# Patient Record
Sex: MALE | Race: WHITE | NOT HISPANIC OR LATINO
[De-identification: names, ages, dates, MRNs, and addresses within clinical notes are randomized per-mention and may not be internally consistent; named-entity substitution may affect disease eponyms.]

---

## 2017-10-30 ENCOUNTER — RECORD ABSTRACTING (OUTPATIENT)
Age: 67
End: 2017-10-30

## 2017-10-30 DIAGNOSIS — Z82.49 FAMILY HISTORY OF ISCHEMIC HEART DISEASE AND OTHER DISEASES OF THE CIRCULATORY SYSTEM: ICD-10-CM

## 2017-10-30 DIAGNOSIS — Z80.52 FAMILY HISTORY OF MALIGNANT NEOPLASM OF BLADDER: ICD-10-CM

## 2017-10-30 DIAGNOSIS — Z83.3 FAMILY HISTORY OF DIABETES MELLITUS: ICD-10-CM

## 2017-10-30 DIAGNOSIS — Z83.49 FAMILY HISTORY OF OTHER ENDOCRINE, NUTRITIONAL AND METABOLIC DISEASES: ICD-10-CM

## 2017-10-30 DIAGNOSIS — Z80.49 FAMILY HISTORY OF MALIGNANT NEOPLASM OF OTHER GENITAL ORGANS: ICD-10-CM

## 2017-10-30 DIAGNOSIS — Z87.39 PERSONAL HISTORY OF OTHER DISEASES OF THE MUSCULOSKELETAL SYSTEM AND CONNECTIVE TISSUE: ICD-10-CM

## 2017-11-06 ENCOUNTER — RX RENEWAL (OUTPATIENT)
Age: 67
End: 2017-11-06

## 2017-11-29 ENCOUNTER — APPOINTMENT (OUTPATIENT)
Dept: FAMILY MEDICINE | Facility: CLINIC | Age: 67
End: 2017-11-29
Payer: MEDICARE

## 2017-11-29 VITALS
WEIGHT: 248 LBS | HEIGHT: 69 IN | BODY MASS INDEX: 36.73 KG/M2 | SYSTOLIC BLOOD PRESSURE: 128 MMHG | DIASTOLIC BLOOD PRESSURE: 78 MMHG

## 2017-11-29 PROCEDURE — G0009: CPT

## 2017-11-29 PROCEDURE — 99214 OFFICE O/P EST MOD 30 MIN: CPT | Mod: 25

## 2017-11-29 PROCEDURE — 90732 PPSV23 VACC 2 YRS+ SUBQ/IM: CPT

## 2017-11-29 PROCEDURE — 36415 COLL VENOUS BLD VENIPUNCTURE: CPT

## 2017-11-29 RX ORDER — MUPIROCIN 20 MG/G
2 OINTMENT TOPICAL
Qty: 22 | Refills: 0 | Status: DISCONTINUED | COMMUNITY
Start: 2017-08-03

## 2017-11-30 ENCOUNTER — RESULT CHARGE (OUTPATIENT)
Age: 67
End: 2017-11-30

## 2017-11-30 LAB
BASOPHILS # BLD AUTO: 0.04 K/UL
BASOPHILS NFR BLD AUTO: 0.7 %
BILIRUB UR QL STRIP: 0
CHOLEST SERPL-MCNC: 179 MG/DL
CHOLEST/HDLC SERPL: 4.3 RATIO
CLARITY UR: CLEAR
COLLECTION METHOD: NORMAL
EOSINOPHIL # BLD AUTO: 0.21 K/UL
EOSINOPHIL NFR BLD AUTO: 3.5
GLUCOSE UR-MCNC: 500
HCG UR QL: 0.2 EU/DL
HCT VFR BLD CALC: 42.5 %
HDLC SERPL-MCNC: 42 MG/DL
HGB BLD-MCNC: 14.2 G/DL
HGB UR QL STRIP.AUTO: 0
IMM GRANULOCYTES NFR BLD AUTO: 0.3 %
KETONES UR-MCNC: 0
LDLC SERPL CALC-MCNC: 107 MG/DL
LEUKOCYTE ESTERASE UR QL STRIP: 0
LYMPHOCYTES # BLD AUTO: 1.83 K/UL
LYMPHOCYTES NFR BLD AUTO: 30.6 %
MAN DIFF?: NORMAL
MCHC RBC-ENTMCNC: 31.3 PG
MCHC RBC-ENTMCNC: 33.4 GM/DL
MCV RBC AUTO: 93.8 FL
MONOCYTES # BLD AUTO: 0.43 K/UL
MONOCYTES NFR BLD AUTO: 7.2 %
NEUTROPHILS # BLD AUTO: 3.46 K/UL
NEUTROPHILS NFR BLD AUTO: 57.7 %
NITRITE UR QL STRIP: 0
PH UR STRIP: 5.5
PLATELET # BLD AUTO: 244 K/UL
PROT UR STRIP-MCNC: 0
PSA FREE FLD-MCNC: 7.2
PSA FREE SERPL-MCNC: 0.26 NG/ML
PSA SERPL-MCNC: 3.59 NG/ML
RBC # BLD: 4.53 M/UL
RBC # FLD: 13 %
SP GR UR STRIP: 1.01
T3FREE SERPL-MCNC: 2.89 PG/ML
T4 FREE SERPL-MCNC: 1.3 NG/DL
TRIGL SERPL-MCNC: 151 MG/DL
TSH SERPL-ACNC: 0.49 UIU/ML
WBC # FLD AUTO: 5.99 K/UL

## 2017-12-05 ENCOUNTER — RECORD ABSTRACTING (OUTPATIENT)
Age: 67
End: 2017-12-05

## 2017-12-05 LAB
ALBUMIN SERPL ELPH-MCNC: 4.8 G/DL
ALP BLD-CCNC: 80 U/L
ALT SERPL-CCNC: 40 U/L
ANION GAP SERPL CALC-SCNC: 19 MMOL/L
AST SERPL-CCNC: 33 U/L
BILIRUB SERPL-MCNC: 0.7 MG/DL
BUN SERPL-MCNC: 30 MG/DL
CALCIUM SERPL-MCNC: 10.3 MG/DL
CHLORIDE SERPL-SCNC: 99 MMOL/L
CO2 SERPL-SCNC: 25 MMOL/L
CREAT SERPL-MCNC: 1.15 MG/DL
GLUCOSE SERPL-MCNC: 129 MG/DL
POTASSIUM SERPL-SCNC: 4.8 MMOL/L
PROT SERPL-MCNC: 7.7 G/DL
SODIUM SERPL-SCNC: 143 MMOL/L

## 2017-12-06 ENCOUNTER — TRANSCRIPTION ENCOUNTER (OUTPATIENT)
Age: 67
End: 2017-12-06

## 2017-12-07 ENCOUNTER — RX RENEWAL (OUTPATIENT)
Age: 67
End: 2017-12-07

## 2017-12-11 ENCOUNTER — APPOINTMENT (OUTPATIENT)
Dept: FAMILY MEDICINE | Facility: CLINIC | Age: 67
End: 2017-12-11
Payer: MEDICARE

## 2017-12-11 VITALS
BODY MASS INDEX: 36.73 KG/M2 | HEIGHT: 69 IN | DIASTOLIC BLOOD PRESSURE: 68 MMHG | WEIGHT: 248 LBS | SYSTOLIC BLOOD PRESSURE: 124 MMHG

## 2017-12-11 DIAGNOSIS — Z71.89 OTHER SPECIFIED COUNSELING: ICD-10-CM

## 2017-12-11 LAB — PSA SERPL-MCNC: NORMAL

## 2017-12-11 PROCEDURE — 90472 IMMUNIZATION ADMIN EACH ADD: CPT

## 2017-12-11 PROCEDURE — 99213 OFFICE O/P EST LOW 20 MIN: CPT | Mod: 25

## 2017-12-11 PROCEDURE — 90632 HEPA VACCINE ADULT IM: CPT | Mod: GY

## 2017-12-11 PROCEDURE — 90691 TYPHOID VACCINE IM: CPT | Mod: GY

## 2017-12-11 PROCEDURE — 90471 IMMUNIZATION ADMIN: CPT

## 2017-12-12 LAB — HBA1C MFR BLD HPLC: 6.6 %

## 2018-01-05 ENCOUNTER — RX RENEWAL (OUTPATIENT)
Age: 68
End: 2018-01-05

## 2018-02-09 ENCOUNTER — RX RENEWAL (OUTPATIENT)
Age: 68
End: 2018-02-09

## 2018-02-09 DIAGNOSIS — L65.9 NONSCARRING HAIR LOSS, UNSPECIFIED: ICD-10-CM

## 2018-03-08 ENCOUNTER — FORM ENCOUNTER (OUTPATIENT)
Age: 68
End: 2018-03-08

## 2018-03-09 ENCOUNTER — APPOINTMENT (OUTPATIENT)
Dept: RADIOLOGY | Facility: CLINIC | Age: 68
End: 2018-03-09
Payer: MEDICARE

## 2018-03-09 ENCOUNTER — OUTPATIENT (OUTPATIENT)
Dept: OUTPATIENT SERVICES | Facility: HOSPITAL | Age: 68
LOS: 1 days | End: 2018-03-09
Payer: MEDICARE

## 2018-03-09 ENCOUNTER — APPOINTMENT (OUTPATIENT)
Dept: FAMILY MEDICINE | Facility: CLINIC | Age: 68
End: 2018-03-09
Payer: MEDICARE

## 2018-03-09 VITALS
BODY MASS INDEX: 36.73 KG/M2 | HEIGHT: 69 IN | WEIGHT: 248 LBS | DIASTOLIC BLOOD PRESSURE: 72 MMHG | SYSTOLIC BLOOD PRESSURE: 118 MMHG

## 2018-03-09 DIAGNOSIS — Z00.8 ENCOUNTER FOR OTHER GENERAL EXAMINATION: ICD-10-CM

## 2018-03-09 PROCEDURE — 71046 X-RAY EXAM CHEST 2 VIEWS: CPT | Mod: 26

## 2018-03-09 PROCEDURE — 99213 OFFICE O/P EST LOW 20 MIN: CPT

## 2018-03-09 PROCEDURE — 71046 X-RAY EXAM CHEST 2 VIEWS: CPT

## 2018-03-14 ENCOUNTER — RX RENEWAL (OUTPATIENT)
Age: 68
End: 2018-03-14

## 2018-04-11 ENCOUNTER — RX RENEWAL (OUTPATIENT)
Age: 68
End: 2018-04-11

## 2018-05-14 ENCOUNTER — RX RENEWAL (OUTPATIENT)
Age: 68
End: 2018-05-14

## 2018-06-08 ENCOUNTER — RX RENEWAL (OUTPATIENT)
Age: 68
End: 2018-06-08

## 2018-07-12 ENCOUNTER — NON-APPOINTMENT (OUTPATIENT)
Age: 68
End: 2018-07-12

## 2018-07-12 ENCOUNTER — APPOINTMENT (OUTPATIENT)
Dept: FAMILY MEDICINE | Facility: CLINIC | Age: 68
End: 2018-07-12
Payer: MEDICARE

## 2018-07-12 VITALS
DIASTOLIC BLOOD PRESSURE: 74 MMHG | BODY MASS INDEX: 37.47 KG/M2 | WEIGHT: 253 LBS | SYSTOLIC BLOOD PRESSURE: 126 MMHG | HEIGHT: 69 IN

## 2018-07-12 LAB
BILIRUB UR QL STRIP: 0
CLARITY UR: CLEAR
COLLECTION METHOD: NORMAL
GLUCOSE UR-MCNC: 1000
HCG UR QL: 0.2 EU/DL
HGB UR QL STRIP.AUTO: 0
KETONES UR-MCNC: 0
LEUKOCYTE ESTERASE UR QL STRIP: 0
NITRITE UR QL STRIP: 0
PH UR STRIP: 5
PROT UR STRIP-MCNC: 0
SP GR UR STRIP: 1.02

## 2018-07-12 PROCEDURE — 36415 COLL VENOUS BLD VENIPUNCTURE: CPT

## 2018-07-12 PROCEDURE — 90632 HEPA VACCINE ADULT IM: CPT | Mod: GY

## 2018-07-12 PROCEDURE — G0439: CPT

## 2018-07-12 PROCEDURE — 90471 IMMUNIZATION ADMIN: CPT | Mod: GY

## 2018-07-12 PROCEDURE — 99213 OFFICE O/P EST LOW 20 MIN: CPT | Mod: 25

## 2018-07-12 PROCEDURE — 93000 ELECTROCARDIOGRAM COMPLETE: CPT

## 2018-07-12 PROCEDURE — 81002 URINALYSIS NONAUTO W/O SCOPE: CPT

## 2018-07-12 NOTE — PHYSICAL EXAM
[20/___] : left eye 20/[unfilled] [FreeTextEntry1] : 500 R 25 L 15   1000 R 15 L 15   2000 R 15 L 15   4000 R 30 L 20

## 2018-07-12 NOTE — HEALTH RISK ASSESSMENT
[Very Good] : ~his/her~  mood as very good [No falls in past year] : Patient reported no falls in the past year [0] : 2) Feeling down, depressed, or hopeless: Not at all (0) [Patient reported colonoscopy was abnormal] : Patient reported colonoscopy was abnormal [None] : None [With Significant Other] : lives with significant other [# of Members in Household ___] :  household currently consist of [unfilled] member(s) [Retired] : retired [High School] : high school [Single] : single [Sexually Active] : sexually active [Feels Safe at Home] : Feels safe at home [Fully functional (bathing, dressing, toileting, transferring, walking, feeding)] : Fully functional (bathing, dressing, toileting, transferring, walking, feeding) [Fully functional (using the telephone, shopping, preparing meals, housekeeping, doing laundry, using] : Fully functional and needs no help or supervision to perform IADLs (using the telephone, shopping, preparing meals, housekeeping, doing laundry, using transportation, managing medications and managing finances) [Smoke Detector] : smoke detector [Carbon Monoxide Detector] : carbon monoxide detector [Sunscreen] : uses sunscreen [Discussed at today's visit] : Advance Directives Discussed at today's visit [] : No [[id number]] : 0 [Change in mental status noted] : No change in mental status noted [Language] : denies difficulty with language [Behavior] : denies difficulty with behavior [Handling Complex Tasks] : denies difficulty handling complex tasks [High Risk Behavior] : no high risk behavior [Reports changes in hearing] : Reports no changes in hearing [Reports changes in vision] : Reports no changes in vision [Reports changes in dental health] : Reports no changes in dental health [Seat Belt] : does not use seat belt [ColonoscopyDate] : 01/10 [ColonoscopyComments] : polyps

## 2018-07-13 LAB
ALBUMIN SERPL ELPH-MCNC: 4.9 G/DL
ALP BLD-CCNC: 83 U/L
ALT SERPL-CCNC: 37 U/L
ANION GAP SERPL CALC-SCNC: 19 MMOL/L
AST SERPL-CCNC: 30 U/L
BASOPHILS # BLD AUTO: 0.04 K/UL
BASOPHILS NFR BLD AUTO: 0.6 %
BILIRUB SERPL-MCNC: 0.8 MG/DL
BUN SERPL-MCNC: 28 MG/DL
CALCIUM SERPL-MCNC: 9.7 MG/DL
CHLORIDE SERPL-SCNC: 97 MMOL/L
CHOLEST SERPL-MCNC: 180 MG/DL
CHOLEST/HDLC SERPL: 3.9 RATIO
CO2 SERPL-SCNC: 25 MMOL/L
CREAT SERPL-MCNC: 1.04 MG/DL
EOSINOPHIL # BLD AUTO: 0.41 K/UL
EOSINOPHIL NFR BLD AUTO: 6.5 %
GLUCOSE SERPL-MCNC: 127 MG/DL
HBA1C MFR BLD HPLC: 6.8 %
HCT VFR BLD CALC: 42.9 %
HDLC SERPL-MCNC: 46 MG/DL
HGB BLD-MCNC: 14.6 G/DL
IMM GRANULOCYTES NFR BLD AUTO: 0.2 %
LDLC SERPL CALC-MCNC: 110 MG/DL
LYMPHOCYTES # BLD AUTO: 1.61 K/UL
LYMPHOCYTES NFR BLD AUTO: 25.6 %
MAN DIFF?: NORMAL
MCHC RBC-ENTMCNC: 31.3 PG
MCHC RBC-ENTMCNC: 34 GM/DL
MCV RBC AUTO: 91.9 FL
MONOCYTES # BLD AUTO: 0.65 K/UL
MONOCYTES NFR BLD AUTO: 10.4 %
NEUTROPHILS # BLD AUTO: 3.56 K/UL
NEUTROPHILS NFR BLD AUTO: 56.7 %
PLATELET # BLD AUTO: 227 K/UL
POTASSIUM SERPL-SCNC: 4.4 MMOL/L
PROT SERPL-MCNC: 7.5 G/DL
RBC # BLD: 4.67 M/UL
RBC # FLD: 13.6 %
SODIUM SERPL-SCNC: 141 MMOL/L
TRIGL SERPL-MCNC: 122 MG/DL
WBC # FLD AUTO: 6.28 K/UL

## 2018-07-14 LAB
PSA FREE FLD-MCNC: 7.2
PSA FREE SERPL-MCNC: 0.34 NG/ML
PSA SERPL-MCNC: 4.71 NG/ML

## 2018-07-16 ENCOUNTER — RX RENEWAL (OUTPATIENT)
Age: 68
End: 2018-07-16

## 2018-07-17 ENCOUNTER — TRANSCRIPTION ENCOUNTER (OUTPATIENT)
Age: 68
End: 2018-07-17

## 2018-07-17 LAB
CREAT SPEC-SCNC: 97 MG/DL
MICROALBUMIN 24H UR DL<=1MG/L-MCNC: <1.2 MG/DL
MICROALBUMIN/CREAT 24H UR-RTO: NORMAL
T3FREE SERPL-MCNC: 3.08 PG/ML
T4 FREE SERPL-MCNC: 2 NG/DL
TSH SERPL-ACNC: 0.25 UIU/ML

## 2018-07-23 ENCOUNTER — RX RENEWAL (OUTPATIENT)
Age: 68
End: 2018-07-23

## 2018-08-08 ENCOUNTER — RX RENEWAL (OUTPATIENT)
Age: 68
End: 2018-08-08

## 2018-08-17 ENCOUNTER — APPOINTMENT (OUTPATIENT)
Dept: FAMILY MEDICINE | Facility: CLINIC | Age: 68
End: 2018-08-17
Payer: MEDICARE

## 2018-08-17 VITALS
DIASTOLIC BLOOD PRESSURE: 76 MMHG | WEIGHT: 253 LBS | HEIGHT: 69 IN | SYSTOLIC BLOOD PRESSURE: 118 MMHG | BODY MASS INDEX: 37.47 KG/M2

## 2018-08-17 PROCEDURE — 99213 OFFICE O/P EST LOW 20 MIN: CPT

## 2018-08-17 NOTE — COUNSELING
[Weight management counseling provided] : Weight management [Healthy eating counseling provided] : healthy eating [Activity counseling provided] : activity [Behavioral health counseling provided] : behavioral health  [Weight Self Once Weekly] : Weight self once weekly [Low Salt Diet] : Low salt diet [Walking] : Walking [Engage in a relaxing activity] : Engage in a relaxing activity [None] : None [Good understanding] : Patient has a good understanding of lifestyle changes and the steps needed to achieve self management goals

## 2018-08-17 NOTE — REVIEW OF SYSTEMS
[Nausea] : no nausea [Constipation] : no constipation [Diarrhea] : no diarrhea [Vomiting] : no vomiting [Melena] : no melena [Negative] : Heme/Lymph

## 2018-08-17 NOTE — HISTORY OF PRESENT ILLNESS
[FreeTextEntry1] : Discuss medication. Has been on Protonix for over a year. States if he misses a dose he gets return of heartburn. Has not gone yet for repeat upper endoscopy

## 2018-08-17 NOTE — PLAN
[FreeTextEntry1] : Recommended cutting down on Protonix if able \par Still needs upper endoscopy\par F/u prn

## 2018-09-12 ENCOUNTER — RX RENEWAL (OUTPATIENT)
Age: 68
End: 2018-09-12

## 2018-09-14 ENCOUNTER — RX RENEWAL (OUTPATIENT)
Age: 68
End: 2018-09-14

## 2018-09-20 ENCOUNTER — APPOINTMENT (OUTPATIENT)
Dept: FAMILY MEDICINE | Facility: CLINIC | Age: 68
End: 2018-09-20
Payer: MEDICARE

## 2018-09-20 VITALS
SYSTOLIC BLOOD PRESSURE: 120 MMHG | BODY MASS INDEX: 37.47 KG/M2 | WEIGHT: 253 LBS | DIASTOLIC BLOOD PRESSURE: 72 MMHG | HEIGHT: 69 IN

## 2018-09-20 PROCEDURE — 90686 IIV4 VACC NO PRSV 0.5 ML IM: CPT

## 2018-09-20 PROCEDURE — 99213 OFFICE O/P EST LOW 20 MIN: CPT | Mod: 25

## 2018-09-20 PROCEDURE — G0008: CPT

## 2018-09-20 NOTE — COUNSELING
[Weight management counseling provided] : Weight management [Healthy eating counseling provided] : healthy eating [Behavioral health counseling provided] : behavioral health  [None] : None [Good understanding] : Patient has a good understanding of lifestyle changes and the steps needed to achieve self management goals

## 2018-10-11 ENCOUNTER — RX RENEWAL (OUTPATIENT)
Age: 68
End: 2018-10-11

## 2018-10-19 ENCOUNTER — APPOINTMENT (OUTPATIENT)
Dept: FAMILY MEDICINE | Facility: CLINIC | Age: 68
End: 2018-10-19
Payer: MEDICARE

## 2018-10-19 VITALS
SYSTOLIC BLOOD PRESSURE: 118 MMHG | WEIGHT: 253 LBS | HEIGHT: 69 IN | BODY MASS INDEX: 37.47 KG/M2 | DIASTOLIC BLOOD PRESSURE: 78 MMHG

## 2018-10-19 PROCEDURE — 99214 OFFICE O/P EST MOD 30 MIN: CPT | Mod: 25

## 2018-10-19 PROCEDURE — 36415 COLL VENOUS BLD VENIPUNCTURE: CPT

## 2018-10-19 PROCEDURE — 81002 URINALYSIS NONAUTO W/O SCOPE: CPT

## 2018-10-19 RX ORDER — PROMETHAZINE HYDROCHLORIDE AND CODEINE PHOSPHATE 6.25; 1 MG/5ML; MG/5ML
6.25-1 SOLUTION ORAL
Qty: 120 | Refills: 0 | Status: DISCONTINUED | COMMUNITY
Start: 2018-03-09 | End: 2018-10-19

## 2018-10-19 NOTE — HISTORY OF PRESENT ILLNESS
[Diabetes Mellitus] : Diabetes Mellitus [Hyperlipidemia] : Hyperlipidemia [FreeTextEntry6] : Fasting DM and cholesterol re check, also PSA [No episodes] : No hypoglycemic episodes since the last visit. [Does not check] : Patient does not check blood glucose regularly [Regular podiatrist visits] : Patient did not have regular podiatrist visit [Understanding of foot care] : Patient expressed understanding of foot care [Retinopathy] : No retinopathy [Most Recent A1C: ___] : Most recent A1C was [unfilled] [Target A1C:  ___] : Target A1C is [unfilled] [Target goal met] : A1C target goal met [Moderate Intensity] : Patient is currently on moderate intensity statin  therapy [EyeExamDate] : 10/19

## 2018-10-19 NOTE — HEALTH RISK ASSESSMENT
[] : No [No falls in past year] : Patient reported no falls in the past year [0] : 2) Feeling down, depressed, or hopeless: Not at all (0) [[id number]] : 0

## 2018-10-22 LAB
ALBUMIN SERPL ELPH-MCNC: 5 G/DL
ALP BLD-CCNC: 89 U/L
ALT SERPL-CCNC: 50 U/L
ANION GAP SERPL CALC-SCNC: 16 MMOL/L
AST SERPL-CCNC: 43 U/L
BILIRUB SERPL-MCNC: 0.6 MG/DL
BUN SERPL-MCNC: 23 MG/DL
CALCIUM SERPL-MCNC: 10.2 MG/DL
CHLORIDE SERPL-SCNC: 100 MMOL/L
CHOLEST SERPL-MCNC: 125 MG/DL
CHOLEST/HDLC SERPL: 3.5 RATIO
CO2 SERPL-SCNC: 27 MMOL/L
CREAT SERPL-MCNC: 1.22 MG/DL
CREAT SPEC-SCNC: 108 MG/DL
GLUCOSE SERPL-MCNC: 136 MG/DL
HBA1C MFR BLD HPLC: 7 %
HDLC SERPL-MCNC: 36 MG/DL
LDLC SERPL CALC-MCNC: 57 MG/DL
MICROALBUMIN 24H UR DL<=1MG/L-MCNC: 1.3 MG/DL
MICROALBUMIN/CREAT 24H UR-RTO: 12 MG/G
POTASSIUM SERPL-SCNC: 4.9 MMOL/L
PROT SERPL-MCNC: 7.3 G/DL
SODIUM SERPL-SCNC: 143 MMOL/L
TRIGL SERPL-MCNC: 158 MG/DL

## 2018-11-19 ENCOUNTER — RX RENEWAL (OUTPATIENT)
Age: 68
End: 2018-11-19

## 2018-12-10 ENCOUNTER — RX RENEWAL (OUTPATIENT)
Age: 68
End: 2018-12-10

## 2019-01-10 ENCOUNTER — RX RENEWAL (OUTPATIENT)
Age: 69
End: 2019-01-10

## 2019-01-18 ENCOUNTER — APPOINTMENT (OUTPATIENT)
Dept: FAMILY MEDICINE | Facility: CLINIC | Age: 69
End: 2019-01-18
Payer: MEDICARE

## 2019-01-18 ENCOUNTER — LABORATORY RESULT (OUTPATIENT)
Age: 69
End: 2019-01-18

## 2019-01-18 VITALS
HEIGHT: 69 IN | BODY MASS INDEX: 37.47 KG/M2 | SYSTOLIC BLOOD PRESSURE: 140 MMHG | WEIGHT: 253 LBS | DIASTOLIC BLOOD PRESSURE: 80 MMHG

## 2019-01-18 PROCEDURE — 81002 URINALYSIS NONAUTO W/O SCOPE: CPT

## 2019-01-18 PROCEDURE — 99214 OFFICE O/P EST MOD 30 MIN: CPT | Mod: 25

## 2019-01-18 PROCEDURE — 36415 COLL VENOUS BLD VENIPUNCTURE: CPT

## 2019-01-18 RX ORDER — AZITHROMYCIN 250 MG/1
250 TABLET, FILM COATED ORAL
Qty: 6 | Refills: 0 | Status: DISCONTINUED | COMMUNITY
Start: 2018-03-09 | End: 2019-01-18

## 2019-01-18 NOTE — HEALTH RISK ASSESSMENT
[No falls in past year] : Patient reported no falls in the past year [0] : 2) Feeling down, depressed, or hopeless: Not at all (0) [] : No [YAD9Kddcg] : 0

## 2019-01-18 NOTE — HISTORY OF PRESENT ILLNESS
[Diabetes Mellitus] : Diabetes Mellitus [Hyperlipidemia] : Hyperlipidemia [No episodes] : No hypoglycemic episodes since the last visit. [Does not check] : Patient does not check blood glucose regularly [Regular podiatrist visits] : Patient had regular podiatrist visits [Understanding of foot care] : Patient expressed understanding of foot care [Most Recent A1C: ___] : Most recent A1C was [unfilled] [Target A1C:  ___] : Target A1C is [unfilled] [Moderate Intensity] : Patient is currently on moderate intensity statin  therapy [Does not check BP] : The patient is not checking blood pressure [<130/90] : Target blood pressure is  <130/90 [Near target goal] : BP near target goal [FreeTextEntry6] : Fasting DM check, pt. would like PSA checked too, it was elevated last time. [Retinopathy] : No retinopathy [EyeExamDate] : 09/18

## 2019-01-21 LAB
ALBUMIN SERPL ELPH-MCNC: 4.9 G/DL
ALP BLD-CCNC: 92 U/L
ALT SERPL-CCNC: 28 U/L
ANION GAP SERPL CALC-SCNC: 17 MMOL/L
AST SERPL-CCNC: 22 U/L
BILIRUB SERPL-MCNC: 0.8 MG/DL
BUN SERPL-MCNC: 28 MG/DL
CALCIUM SERPL-MCNC: 10.1 MG/DL
CHLORIDE SERPL-SCNC: 98 MMOL/L
CHOLEST SERPL-MCNC: 157 MG/DL
CHOLEST/HDLC SERPL: 3.7 RATIO
CO2 SERPL-SCNC: 25 MMOL/L
CREAT SERPL-MCNC: 1.11 MG/DL
GLUCOSE SERPL-MCNC: 159 MG/DL
HDLC SERPL-MCNC: 42 MG/DL
LDLC SERPL CALC-MCNC: 92 MG/DL
POTASSIUM SERPL-SCNC: 4.2 MMOL/L
PROT SERPL-MCNC: 7.3 G/DL
SODIUM SERPL-SCNC: 140 MMOL/L
TRIGL SERPL-MCNC: 114 MG/DL

## 2019-01-23 ENCOUNTER — TRANSCRIPTION ENCOUNTER (OUTPATIENT)
Age: 69
End: 2019-01-23

## 2019-01-23 LAB
HBA1C MFR BLD HPLC: 7.3 %
PSA FREE FLD-MCNC: 7 %
PSA FREE SERPL-MCNC: 0.32 NG/ML
PSA SERPL-MCNC: 4.58 NG/ML

## 2019-02-06 ENCOUNTER — RX RENEWAL (OUTPATIENT)
Age: 69
End: 2019-02-06

## 2019-02-11 ENCOUNTER — TRANSCRIPTION ENCOUNTER (OUTPATIENT)
Age: 69
End: 2019-02-11

## 2019-02-11 ENCOUNTER — CLINICAL ADVICE (OUTPATIENT)
Age: 69
End: 2019-02-11

## 2019-02-26 ENCOUNTER — APPOINTMENT (OUTPATIENT)
Age: 69
End: 2019-02-26
Payer: MEDICARE

## 2019-02-26 VITALS
DIASTOLIC BLOOD PRESSURE: 73 MMHG | SYSTOLIC BLOOD PRESSURE: 132 MMHG | WEIGHT: 250 LBS | BODY MASS INDEX: 37.89 KG/M2 | HEIGHT: 68 IN | HEART RATE: 76 BPM | TEMPERATURE: 98 F | OXYGEN SATURATION: 94 %

## 2019-02-26 PROCEDURE — 99203 OFFICE O/P NEW LOW 30 MIN: CPT

## 2019-03-05 NOTE — HISTORY OF PRESENT ILLNESS
[FreeTextEntry1] : 68 year old man seen 02/26/2019 with complaint of elevated PSA. This began 7/2018, when PSA was 4.7. Repeat 1/2019 confirmed elevation. See trend below. He denies any urinary symptoms. \par It is associated with nothing. Nothing makes symptoms occur.\par No hematuria, no dysuria, no frequency, no urgency, no hesitancy, no straining. No incontinence. \par No fevers, no chills, no nausea, no vomiting, no flank pain. \par \par PSA (%FREE) TREND \par 4.58   (7%)   1/2019\par 4.71   (7%)   7/2018\par 3.59   (26%) 11/2017

## 2019-03-05 NOTE — PHYSICAL EXAM
[General Appearance - Well Developed] : well developed [General Appearance - Well Nourished] : well nourished [Normal Appearance] : normal appearance [Well Groomed] : well groomed [General Appearance - In No Acute Distress] : no acute distress [Edema] : no peripheral edema [Respiration, Rhythm And Depth] : normal respiratory rhythm and effort [Exaggerated Use Of Accessory Muscles For Inspiration] : no accessory muscle use [Abdomen Soft] : soft [Abdomen Tenderness] : non-tender [Costovertebral Angle Tenderness] : no ~M costovertebral angle tenderness [Urethral Meatus] : meatus normal [Urinary Bladder Findings] : the bladder was normal on palpation [Scrotum] : the scrotum was normal [Testes Mass (___cm)] : there were no testicular masses [No Prostate Nodules] : no prostate nodules [Prostate Size ___ gm] : prostate size [unfilled] gm [Normal Station and Gait] : the gait and station were normal for the patient's age [] : no rash [No Focal Deficits] : no focal deficits [Oriented To Time, Place, And Person] : oriented to person, place, and time [Affect] : the affect was normal [Mood] : the mood was normal [Not Anxious] : not anxious [No Palpable Adenopathy] : no palpable adenopathy

## 2019-03-05 NOTE — ASSESSMENT
[FreeTextEntry1] : 69 yo M with elevated PSA. Discussed with patient that PSA is imprecise test. PSA can be elevated due to benign prostate enlargement, prostate stimulation, sexual activity, urinary tract infection, prostatitis, as well as prostate cancer. There is no value for PSA which is diagnostic for prostate cancer, nor is there value which conclusively excludes prostate cancer. PSA simply stratifies risk for prostate cancer and diagnosis of prostate cancer requires prostate biopsy. \par \par We discussed that his PSA is only mildly elevated, but the percent free was low which is more concerning. Patient is hesitant to undergo prostate biopsy, but agrees to repeat PSA. If it remains elevated or rises, he will consider consenting to biopsy.

## 2019-03-07 ENCOUNTER — CLINICAL ADVICE (OUTPATIENT)
Age: 69
End: 2019-03-07

## 2019-03-11 ENCOUNTER — FORM ENCOUNTER (OUTPATIENT)
Age: 69
End: 2019-03-11

## 2019-03-12 ENCOUNTER — OUTPATIENT (OUTPATIENT)
Dept: OUTPATIENT SERVICES | Facility: HOSPITAL | Age: 69
LOS: 1 days | End: 2019-03-12
Payer: MEDICARE

## 2019-03-12 ENCOUNTER — APPOINTMENT (OUTPATIENT)
Dept: MRI IMAGING | Facility: CLINIC | Age: 69
End: 2019-03-12
Payer: MEDICARE

## 2019-03-12 DIAGNOSIS — Z00.8 ENCOUNTER FOR OTHER GENERAL EXAMINATION: ICD-10-CM

## 2019-03-12 PROCEDURE — 72197 MRI PELVIS W/O & W/DYE: CPT

## 2019-03-12 PROCEDURE — 72197 MRI PELVIS W/O & W/DYE: CPT | Mod: 26

## 2019-03-12 PROCEDURE — A9585: CPT

## 2019-03-13 ENCOUNTER — TRANSCRIPTION ENCOUNTER (OUTPATIENT)
Age: 69
End: 2019-03-13

## 2019-03-14 ENCOUNTER — RX RENEWAL (OUTPATIENT)
Age: 69
End: 2019-03-14

## 2019-03-15 ENCOUNTER — RX RENEWAL (OUTPATIENT)
Age: 69
End: 2019-03-15

## 2019-03-17 ENCOUNTER — RX RENEWAL (OUTPATIENT)
Age: 69
End: 2019-03-17

## 2019-03-19 ENCOUNTER — APPOINTMENT (OUTPATIENT)
Dept: UROLOGY | Facility: CLINIC | Age: 69
End: 2019-03-19
Payer: MEDICARE

## 2019-03-19 PROCEDURE — 99214 OFFICE O/P EST MOD 30 MIN: CPT

## 2019-03-19 NOTE — PHYSICAL EXAM
[General Appearance - Well Developed] : well developed [General Appearance - Well Nourished] : well nourished [Normal Appearance] : normal appearance [Well Groomed] : well groomed [General Appearance - In No Acute Distress] : no acute distress [Abdomen Soft] : soft [Abdomen Tenderness] : non-tender [Costovertebral Angle Tenderness] : no ~M costovertebral angle tenderness [Urethral Meatus] : meatus normal [Urinary Bladder Findings] : the bladder was normal on palpation [Scrotum] : the scrotum was normal [Testes Mass (___cm)] : there were no testicular masses [No Prostate Nodules] : no prostate nodules [Edema] : no peripheral edema [] : no respiratory distress [Respiration, Rhythm And Depth] : normal respiratory rhythm and effort [Exaggerated Use Of Accessory Muscles For Inspiration] : no accessory muscle use [Oriented To Time, Place, And Person] : oriented to person, place, and time [Affect] : the affect was normal [Not Anxious] : not anxious [Mood] : the mood was normal [Normal Station and Gait] : the gait and station were normal for the patient's age [No Focal Deficits] : no focal deficits [No Palpable Adenopathy] : no palpable adenopathy

## 2019-04-08 ENCOUNTER — LABORATORY RESULT (OUTPATIENT)
Age: 69
End: 2019-04-08

## 2019-04-08 ENCOUNTER — OUTPATIENT (OUTPATIENT)
Dept: OUTPATIENT SERVICES | Facility: HOSPITAL | Age: 69
LOS: 1 days | End: 2019-04-08
Payer: MEDICARE

## 2019-04-08 ENCOUNTER — APPOINTMENT (OUTPATIENT)
Dept: UROLOGY | Facility: CLINIC | Age: 69
End: 2019-04-08
Payer: MEDICARE

## 2019-04-08 VITALS
DIASTOLIC BLOOD PRESSURE: 61 MMHG | SYSTOLIC BLOOD PRESSURE: 114 MMHG | TEMPERATURE: 97.6 F | BODY MASS INDEX: 37.03 KG/M2 | WEIGHT: 250 LBS | HEIGHT: 69 IN | HEART RATE: 65 BPM | RESPIRATION RATE: 17 BRPM

## 2019-04-08 VITALS — SYSTOLIC BLOOD PRESSURE: 120 MMHG | HEART RATE: 61 BPM | DIASTOLIC BLOOD PRESSURE: 69 MMHG

## 2019-04-08 DIAGNOSIS — R35.0 FREQUENCY OF MICTURITION: ICD-10-CM

## 2019-04-08 PROCEDURE — 55700: CPT

## 2019-04-08 PROCEDURE — 76872 US TRANSRECTAL: CPT | Mod: 26

## 2019-04-08 PROCEDURE — 76872 US TRANSRECTAL: CPT

## 2019-04-08 PROCEDURE — 76942 ECHO GUIDE FOR BIOPSY: CPT | Mod: 26,59

## 2019-04-08 PROCEDURE — 76942 ECHO GUIDE FOR BIOPSY: CPT | Mod: 59

## 2019-04-09 DIAGNOSIS — R97.20 ELEVATED PROSTATE SPECIFIC ANTIGEN [PSA]: ICD-10-CM

## 2019-04-09 DIAGNOSIS — N40.1 BENIGN PROSTATIC HYPERPLASIA WITH LOWER URINARY TRACT SYMPTOMS: ICD-10-CM

## 2019-04-09 RX ORDER — ENEMA 19; 7 G/133ML; G/133ML
7-19 ENEMA RECTAL
Qty: 1 | Refills: 0 | Status: COMPLETED | COMMUNITY
Start: 2019-03-20 | End: 2019-04-08

## 2019-04-12 ENCOUNTER — TRANSCRIPTION ENCOUNTER (OUTPATIENT)
Age: 69
End: 2019-04-12

## 2019-04-12 ENCOUNTER — RX RENEWAL (OUTPATIENT)
Age: 69
End: 2019-04-12

## 2019-04-19 ENCOUNTER — APPOINTMENT (OUTPATIENT)
Dept: FAMILY MEDICINE | Facility: CLINIC | Age: 69
End: 2019-04-19
Payer: MEDICARE

## 2019-04-19 VITALS
DIASTOLIC BLOOD PRESSURE: 72 MMHG | BODY MASS INDEX: 37.03 KG/M2 | SYSTOLIC BLOOD PRESSURE: 112 MMHG | HEIGHT: 69 IN | WEIGHT: 250 LBS

## 2019-04-19 LAB
BILIRUB UR QL STRIP: 0
CLARITY UR: CLEAR
COLLECTION METHOD: NORMAL
GLUCOSE UR-MCNC: 500
HCG UR QL: 0.2 EU/DL
HGB UR QL STRIP.AUTO: NORMAL
KETONES UR-MCNC: 0
LEUKOCYTE ESTERASE UR QL STRIP: 0
NITRITE UR QL STRIP: 0
PH UR STRIP: 5
PROT UR STRIP-MCNC: 0
SP GR UR STRIP: 1.02

## 2019-04-19 PROCEDURE — 36415 COLL VENOUS BLD VENIPUNCTURE: CPT

## 2019-04-19 PROCEDURE — 99215 OFFICE O/P EST HI 40 MIN: CPT | Mod: 25

## 2019-04-19 PROCEDURE — 81002 URINALYSIS NONAUTO W/O SCOPE: CPT

## 2019-04-19 RX ORDER — GEMFIBROZIL 600 MG/1
600 TABLET, FILM COATED ORAL TWICE DAILY
Qty: 180 | Refills: 0 | Status: DISCONTINUED | COMMUNITY
End: 2019-04-19

## 2019-04-19 NOTE — HEALTH RISK ASSESSMENT
[No falls in past year] : Patient reported no falls in the past year [0] : 2) Feeling down, depressed, or hopeless: Not at all (0) [] : No [[id number]] : 0

## 2019-04-19 NOTE — HISTORY OF PRESENT ILLNESS
[Diabetes Mellitus] : Diabetes Mellitus [Hyperlipidemia] : Hyperlipidemia [Does not check] : Patient does not check blood glucose regularly [No episodes] : No hypoglycemic episodes since the last visit. [Understanding of foot care] : Patient expressed understanding of foot care [Most Recent A1C: ___] : Most recent A1C was [unfilled] [Target A1C:  ___] : Target A1C is [unfilled] [No Retinopathy] : No retinopathy [Near target goal] : A1C near target goal [FreeTextEntry6] : Fasting DM check, PSA also. Prostate biopsy was positive. Going for radiation tx consultation on Monday with Dr. Smauel Bee in [facility]. [Regular podiatrist visits] : Patient did not have regular podiatrist visit [EyeExamDate] : 04/19

## 2019-04-20 LAB
ALBUMIN SERPL ELPH-MCNC: 5 G/DL
ALP BLD-CCNC: 73 U/L
ALT SERPL-CCNC: 36 U/L
ANION GAP SERPL CALC-SCNC: 16 MMOL/L
APPEARANCE: CLEAR
AST SERPL-CCNC: 30 U/L
BACTERIA: NEGATIVE
BILIRUB SERPL-MCNC: 0.7 MG/DL
BILIRUBIN URINE: NEGATIVE
BLOOD URINE: ABNORMAL
BUN SERPL-MCNC: 34 MG/DL
CALCIUM SERPL-MCNC: 10.2 MG/DL
CHLORIDE SERPL-SCNC: 98 MMOL/L
CHOLEST SERPL-MCNC: 176 MG/DL
CHOLEST/HDLC SERPL: 5 RATIO
CO2 SERPL-SCNC: 25 MMOL/L
COLOR: YELLOW
CREAT SERPL-MCNC: 1.19 MG/DL
CREAT SPEC-SCNC: 173 MG/DL
ESTIMATED AVERAGE GLUCOSE: 128 MG/DL
GLUCOSE QUALITATIVE U: ABNORMAL
GLUCOSE SERPL-MCNC: 99 MG/DL
HBA1C MFR BLD HPLC: 6.1 %
HDLC SERPL-MCNC: 35 MG/DL
HYALINE CASTS: 0 /LPF
KETONES URINE: NEGATIVE
LDLC SERPL CALC-MCNC: 110 MG/DL
LEUKOCYTE ESTERASE URINE: NEGATIVE
MICROALBUMIN 24H UR DL<=1MG/L-MCNC: 2.1 MG/DL
MICROALBUMIN/CREAT 24H UR-RTO: 12 MG/G
MICROSCOPIC-UA: NORMAL
NITRITE URINE: NEGATIVE
PH URINE: 5
POTASSIUM SERPL-SCNC: 4.2 MMOL/L
PROT SERPL-MCNC: 7.4 G/DL
PROTEIN URINE: NORMAL
PSA FREE FLD-MCNC: 7 %
PSA FREE SERPL-MCNC: 0.56 NG/ML
PSA SERPL-MCNC: 7.85 NG/ML
RED BLOOD CELLS URINE: 8 /HPF
SODIUM SERPL-SCNC: 139 MMOL/L
SPECIFIC GRAVITY URINE: 1.02
SQUAMOUS EPITHELIAL CELLS: 1 /HPF
TRIGL SERPL-MCNC: 154 MG/DL
UROBILINOGEN URINE: NORMAL
WHITE BLOOD CELLS URINE: 1 /HPF

## 2019-04-24 ENCOUNTER — RX RENEWAL (OUTPATIENT)
Age: 69
End: 2019-04-24

## 2019-05-08 ENCOUNTER — RX RENEWAL (OUTPATIENT)
Age: 69
End: 2019-05-08

## 2019-05-14 ENCOUNTER — APPOINTMENT (OUTPATIENT)
Dept: UROLOGY | Facility: CLINIC | Age: 69
End: 2019-05-14

## 2019-05-31 ENCOUNTER — FORM ENCOUNTER (OUTPATIENT)
Age: 69
End: 2019-05-31

## 2019-05-31 ENCOUNTER — APPOINTMENT (OUTPATIENT)
Dept: FAMILY MEDICINE | Facility: CLINIC | Age: 69
End: 2019-05-31
Payer: MEDICARE

## 2019-05-31 VITALS
SYSTOLIC BLOOD PRESSURE: 138 MMHG | HEIGHT: 69 IN | WEIGHT: 250 LBS | DIASTOLIC BLOOD PRESSURE: 82 MMHG | BODY MASS INDEX: 37.03 KG/M2

## 2019-05-31 DIAGNOSIS — M54.42 LUMBAGO WITH SCIATICA, LEFT SIDE: ICD-10-CM

## 2019-05-31 DIAGNOSIS — M54.41 LUMBAGO WITH SCIATICA, LEFT SIDE: ICD-10-CM

## 2019-05-31 PROCEDURE — 99213 OFFICE O/P EST LOW 20 MIN: CPT

## 2019-05-31 NOTE — HISTORY OF PRESENT ILLNESS
[FreeTextEntry8] : Pt. still has pain in lower back and both legs x 1 month.  Also, medication renewal.\par Pain comes and goes. Went to a chiropractor which helped somewhat, but still with pain. Pain radiating down both legs to the calf. Radiation is on and off. No paresthesias or weakness.

## 2019-05-31 NOTE — PHYSICAL EXAM
[Normal] : no CVA tenderness, no spinal tenderness [de-identified] : Pain on lateral and roational ROM on and off with radiation down leg. No limitation. No deficits. No weakness

## 2019-06-01 ENCOUNTER — APPOINTMENT (OUTPATIENT)
Dept: RADIOLOGY | Facility: CLINIC | Age: 69
End: 2019-06-01
Payer: MEDICARE

## 2019-06-01 ENCOUNTER — OUTPATIENT (OUTPATIENT)
Dept: OUTPATIENT SERVICES | Facility: HOSPITAL | Age: 69
LOS: 1 days | End: 2019-06-01
Payer: MEDICARE

## 2019-06-01 DIAGNOSIS — M54.42 LUMBAGO WITH SCIATICA, LEFT SIDE: ICD-10-CM

## 2019-06-01 PROCEDURE — 72110 X-RAY EXAM L-2 SPINE 4/>VWS: CPT

## 2019-06-01 PROCEDURE — 72110 X-RAY EXAM L-2 SPINE 4/>VWS: CPT | Mod: 26

## 2019-06-04 ENCOUNTER — TRANSCRIPTION ENCOUNTER (OUTPATIENT)
Age: 69
End: 2019-06-04

## 2019-06-10 ENCOUNTER — CLINICAL ADVICE (OUTPATIENT)
Age: 69
End: 2019-06-10

## 2019-06-10 ENCOUNTER — RX RENEWAL (OUTPATIENT)
Age: 69
End: 2019-06-10

## 2019-06-21 ENCOUNTER — APPOINTMENT (OUTPATIENT)
Dept: FAMILY MEDICINE | Facility: CLINIC | Age: 69
End: 2019-06-21
Payer: MEDICARE

## 2019-06-21 VITALS
BODY MASS INDEX: 35.25 KG/M2 | WEIGHT: 238 LBS | HEIGHT: 69 IN | DIASTOLIC BLOOD PRESSURE: 62 MMHG | SYSTOLIC BLOOD PRESSURE: 110 MMHG

## 2019-06-21 DIAGNOSIS — M54.5 LOW BACK PAIN: ICD-10-CM

## 2019-06-21 DIAGNOSIS — G89.29 LOW BACK PAIN: ICD-10-CM

## 2019-06-21 PROCEDURE — 99213 OFFICE O/P EST LOW 20 MIN: CPT

## 2019-06-21 NOTE — PHYSICAL EXAM
[Normal] : normal gait, coordination grossly intact, no focal deficits [de-identified] : FROM of back. No weakness or limitation. No focal deficits no striagh leg raise tenderness [de-identified] : diminished reflexes bilaterally [de-identified] : FROM as above

## 2019-06-27 ENCOUNTER — FORM ENCOUNTER (OUTPATIENT)
Age: 69
End: 2019-06-27

## 2019-06-28 ENCOUNTER — APPOINTMENT (OUTPATIENT)
Dept: MRI IMAGING | Facility: CLINIC | Age: 69
End: 2019-06-28
Payer: MEDICARE

## 2019-06-28 ENCOUNTER — OUTPATIENT (OUTPATIENT)
Dept: OUTPATIENT SERVICES | Facility: HOSPITAL | Age: 69
LOS: 1 days | End: 2019-06-28
Payer: MEDICARE

## 2019-06-28 DIAGNOSIS — Z00.8 ENCOUNTER FOR OTHER GENERAL EXAMINATION: ICD-10-CM

## 2019-06-28 PROCEDURE — 72148 MRI LUMBAR SPINE W/O DYE: CPT

## 2019-06-28 PROCEDURE — 72148 MRI LUMBAR SPINE W/O DYE: CPT | Mod: 26

## 2019-07-02 ENCOUNTER — TRANSCRIPTION ENCOUNTER (OUTPATIENT)
Age: 69
End: 2019-07-02

## 2019-07-10 ENCOUNTER — CLINICAL ADVICE (OUTPATIENT)
Age: 69
End: 2019-07-10

## 2019-07-11 ENCOUNTER — RX RENEWAL (OUTPATIENT)
Age: 69
End: 2019-07-11

## 2019-07-12 ENCOUNTER — APPOINTMENT (OUTPATIENT)
Dept: FAMILY MEDICINE | Facility: CLINIC | Age: 69
End: 2019-07-12
Payer: MEDICARE

## 2019-07-12 VITALS
HEIGHT: 69 IN | DIASTOLIC BLOOD PRESSURE: 76 MMHG | WEIGHT: 238 LBS | SYSTOLIC BLOOD PRESSURE: 112 MMHG | BODY MASS INDEX: 35.25 KG/M2

## 2019-07-12 PROCEDURE — 36415 COLL VENOUS BLD VENIPUNCTURE: CPT

## 2019-07-12 PROCEDURE — 99215 OFFICE O/P EST HI 40 MIN: CPT | Mod: 25

## 2019-07-12 NOTE — HISTORY OF PRESENT ILLNESS
[Diabetes Mellitus] : Diabetes Mellitus [Hyperlipidemia] : Hyperlipidemia [No episodes] : No hypoglycemic episodes since the last visit. [Does not check] : Patient does not check blood glucose regularly [No Retinopathy] : No retinopathy [Understanding of foot care] : Patient expressed understanding of foot care [Target A1C:  ___] : Target A1C is [unfilled] [Most Recent A1C: ___] : Most recent A1C was [unfilled] [High Intensity] : Patient is currently on high intensity statin therapy [Target goal met] : A1C target goal met [FreeTextEntry6] : Fasting DM check, cholesterol check. [Regular podiatrist visits] : Patient did not have regular podiatrist visit [EyeExamDate] : 05/19

## 2019-07-12 NOTE — HEALTH RISK ASSESSMENT
[Yes] : Yes [] : No [Patient reported colonoscopy was abnormal] : Patient reported colonoscopy was abnormal [ColonoscopyDate] : 03/19 [ColonoscopyComments] : polyps

## 2019-07-15 LAB
ALBUMIN SERPL ELPH-MCNC: 5 G/DL
ALP BLD-CCNC: 67 U/L
ALT SERPL-CCNC: 35 U/L
ANION GAP SERPL CALC-SCNC: 17 MMOL/L
AST SERPL-CCNC: 35 U/L
BILIRUB SERPL-MCNC: 0.9 MG/DL
BUN SERPL-MCNC: 31 MG/DL
CALCIUM SERPL-MCNC: 10.1 MG/DL
CHLORIDE SERPL-SCNC: 97 MMOL/L
CHOLEST SERPL-MCNC: 172 MG/DL
CHOLEST/HDLC SERPL: 4.5 RATIO
CO2 SERPL-SCNC: 26 MMOL/L
CREAT SERPL-MCNC: 1.38 MG/DL
ESTIMATED AVERAGE GLUCOSE: 128 MG/DL
GLUCOSE SERPL-MCNC: 91 MG/DL
HBA1C MFR BLD HPLC: 6.1 %
HDLC SERPL-MCNC: 38 MG/DL
LDLC SERPL CALC-MCNC: 104 MG/DL
POTASSIUM SERPL-SCNC: 4.2 MMOL/L
PROT SERPL-MCNC: 7.4 G/DL
SODIUM SERPL-SCNC: 140 MMOL/L
TRIGL SERPL-MCNC: 148 MG/DL

## 2019-09-05 ENCOUNTER — RX RENEWAL (OUTPATIENT)
Age: 69
End: 2019-09-05

## 2019-10-05 ENCOUNTER — RX RENEWAL (OUTPATIENT)
Age: 69
End: 2019-10-05

## 2019-10-11 ENCOUNTER — NON-APPOINTMENT (OUTPATIENT)
Age: 69
End: 2019-10-11

## 2019-10-11 ENCOUNTER — LABORATORY RESULT (OUTPATIENT)
Age: 69
End: 2019-10-11

## 2019-10-11 ENCOUNTER — APPOINTMENT (OUTPATIENT)
Dept: FAMILY MEDICINE | Facility: CLINIC | Age: 69
End: 2019-10-11
Payer: MEDICARE

## 2019-10-11 VITALS
BODY MASS INDEX: 37.03 KG/M2 | WEIGHT: 250 LBS | DIASTOLIC BLOOD PRESSURE: 70 MMHG | SYSTOLIC BLOOD PRESSURE: 120 MMHG | HEIGHT: 69 IN

## 2019-10-11 PROCEDURE — G0444 DEPRESSION SCREEN ANNUAL: CPT | Mod: 59

## 2019-10-11 PROCEDURE — 36415 COLL VENOUS BLD VENIPUNCTURE: CPT

## 2019-10-11 PROCEDURE — 93000 ELECTROCARDIOGRAM COMPLETE: CPT

## 2019-10-11 PROCEDURE — 99213 OFFICE O/P EST LOW 20 MIN: CPT | Mod: 25

## 2019-10-11 PROCEDURE — G0439: CPT

## 2019-10-11 PROCEDURE — G0008: CPT

## 2019-10-11 PROCEDURE — 90686 IIV4 VACC NO PRSV 0.5 ML IM: CPT

## 2019-10-11 NOTE — PHYSICAL EXAM
[20/___] : left eye 20/[unfilled] [No Acute Distress] : no acute distress [Well Developed] : well developed [Well Nourished] : well nourished [Normal Sclera/Conjunctiva] : normal sclera/conjunctiva [Well-Appearing] : well-appearing [PERRL] : pupils equal round and reactive to light [EOMI] : extraocular movements intact [Normal Oropharynx] : the oropharynx was normal [Normal Outer Ear/Nose] : the outer ears and nose were normal in appearance [No JVD] : no jugular venous distention [No Lymphadenopathy] : no lymphadenopathy [Supple] : supple [Thyroid Normal, No Nodules] : the thyroid was normal and there were no nodules present [No Respiratory Distress] : no respiratory distress  [Normal Rate] : normal rate  [No Accessory Muscle Use] : no accessory muscle use [Clear to Auscultation] : lungs were clear to auscultation bilaterally [Regular Rhythm] : with a regular rhythm [No Carotid Bruits] : no carotid bruits [No Murmur] : no murmur heard [Normal S1, S2] : normal S1 and S2 [No Abdominal Bruit] : a ~M bruit was not heard ~T in the abdomen [No Varicosities] : no varicosities [No Edema] : there was no peripheral edema [Pedal Pulses Present] : the pedal pulses are present [Soft] : abdomen soft [No Palpable Aorta] : no palpable aorta [No Extremity Clubbing/Cyanosis] : no extremity clubbing/cyanosis [Non-distended] : non-distended [Non Tender] : non-tender [No Masses] : no abdominal mass palpated [No HSM] : no HSM [Normal Bowel Sounds] : normal bowel sounds [Normal Posterior Cervical Nodes] : no posterior cervical lymphadenopathy [Normal Anterior Cervical Nodes] : no anterior cervical lymphadenopathy [No CVA Tenderness] : no CVA  tenderness [No Spinal Tenderness] : no spinal tenderness [No Joint Swelling] : no joint swelling [Grossly Normal Strength/Tone] : grossly normal strength/tone [No Rash] : no rash [Coordination Grossly Intact] : coordination grossly intact [No Focal Deficits] : no focal deficits [Normal Gait] : normal gait [Deep Tendon Reflexes (DTR)] : deep tendon reflexes were 2+ and symmetric [Normal Affect] : the affect was normal [Normal Insight/Judgement] : insight and judgment were intact [FreeTextEntry1] : 500 R 25 L 30   1000 R 20 L 20   2000 R 15 L 15   4000 R 20 L 20

## 2019-10-11 NOTE — HEALTH RISK ASSESSMENT
[0] : 2) Feeling down, depressed, or hopeless: Not at all (0) [Very Good] : ~his/her~  mood as very good [Yes] : Yes [4 or more  times a week (4 pts)] : 4 or more  times a week (4 points) [1 or 2 (0 pts)] : 1 or 2 (0 points) [Never (0 pts)] : Never (0 points) [No] : In the past 12 months have you used drugs other than those required for medical reasons? No [No falls in past year] : Patient reported no falls in the past year [HIV test declined] : HIV test declined [Hepatitis C test declined] : Hepatitis C test declined [None] : None [With Significant Other] : lives with significant other [Retired] : retired [High School] : high school [Single] : single [# Of Children ___] : has [unfilled] children [Sexually Active] : sexually active [Feels Safe at Home] : Feels safe at home [Fully functional (bathing, dressing, toileting, transferring, walking, feeding)] : Fully functional (bathing, dressing, toileting, transferring, walking, feeding) [Fully functional (using the telephone, shopping, preparing meals, housekeeping, doing laundry, using] : Fully functional and needs no help or supervision to perform IADLs (using the telephone, shopping, preparing meals, housekeeping, doing laundry, using transportation, managing medications and managing finances) [Smoke Detector] : smoke detector [Carbon Monoxide Detector] : carbon monoxide detector [Patient/Caregiver not ready to engage] : Patient/Caregiver not ready to engage [Designated Healthcare Proxy] : Designated healthcare proxy [] : No [de-identified] : SBIRT screen on chart and patient appropriately counseled [Audit-CScore] : 4 [UFS6Eomgo] : 0 [Language] : denies difficulty with language [Change in mental status noted] : No change in mental status noted [Behavior] : denies difficulty with behavior [Reasoning] : denies difficulty with reasoning [Reports changes in hearing] : Reports no changes in hearing [High Risk Behavior] : no high risk behavior [Reports changes in vision] : Reports no changes in vision [Reports changes in dental health] : Reports no changes in dental health [Guns at Home] : no guns at home [Sunscreen] : does not use sunscreen [Seat Belt] : does not use seat belt [AdvancecareDate] : 10/19

## 2019-10-14 LAB
ALBUMIN SERPL ELPH-MCNC: 4.9 G/DL
ALP BLD-CCNC: 71 U/L
ALT SERPL-CCNC: 33 U/L
ANION GAP SERPL CALC-SCNC: 18 MMOL/L
AST SERPL-CCNC: 29 U/L
BASOPHILS # BLD AUTO: 0.04 K/UL
BASOPHILS NFR BLD AUTO: 0.8 %
BILIRUB SERPL-MCNC: 0.6 MG/DL
BUN SERPL-MCNC: 33 MG/DL
CALCIUM SERPL-MCNC: 9.7 MG/DL
CHLORIDE SERPL-SCNC: 101 MMOL/L
CHOLEST SERPL-MCNC: 165 MG/DL
CHOLEST/HDLC SERPL: 3.8 RATIO
CO2 SERPL-SCNC: 22 MMOL/L
CREAT SERPL-MCNC: 1.27 MG/DL
EOSINOPHIL # BLD AUTO: 0.2 K/UL
EOSINOPHIL NFR BLD AUTO: 3.8 %
GLUCOSE SERPL-MCNC: 105 MG/DL
HCT VFR BLD CALC: 38.6 %
HDLC SERPL-MCNC: 44 MG/DL
HGB BLD-MCNC: 12.8 G/DL
IMM GRANULOCYTES NFR BLD AUTO: 0.2 %
LDLC SERPL CALC-MCNC: 98 MG/DL
LYMPHOCYTES # BLD AUTO: 1.36 K/UL
LYMPHOCYTES NFR BLD AUTO: 25.7 %
MAN DIFF?: NORMAL
MCHC RBC-ENTMCNC: 31.4 PG
MCHC RBC-ENTMCNC: 33.2 GM/DL
MCV RBC AUTO: 94.6 FL
MONOCYTES # BLD AUTO: 0.53 K/UL
MONOCYTES NFR BLD AUTO: 10 %
NEUTROPHILS # BLD AUTO: 3.16 K/UL
NEUTROPHILS NFR BLD AUTO: 59.5 %
PLATELET # BLD AUTO: 197 K/UL
POTASSIUM SERPL-SCNC: 4.7 MMOL/L
PROT SERPL-MCNC: 7.3 G/DL
PSA FREE FLD-MCNC: 15 %
PSA FREE SERPL-MCNC: 0.12 NG/ML
PSA SERPL-MCNC: 0.78 NG/ML
RBC # BLD: 4.08 M/UL
RBC # FLD: 12.9 %
SODIUM SERPL-SCNC: 141 MMOL/L
T3FREE SERPL-MCNC: 3.22 PG/ML
T4 FREE SERPL-MCNC: 1.4 NG/DL
TRIGL SERPL-MCNC: 116 MG/DL
TSH SERPL-ACNC: 0.75 UIU/ML
WBC # FLD AUTO: 5.3 K/UL

## 2019-11-18 ENCOUNTER — APPOINTMENT (OUTPATIENT)
Dept: FAMILY MEDICINE | Facility: CLINIC | Age: 69
End: 2019-11-18
Payer: MEDICARE

## 2019-11-18 VITALS
SYSTOLIC BLOOD PRESSURE: 134 MMHG | BODY MASS INDEX: 37.03 KG/M2 | DIASTOLIC BLOOD PRESSURE: 74 MMHG | HEIGHT: 69 IN | WEIGHT: 250 LBS

## 2019-11-18 PROCEDURE — 99213 OFFICE O/P EST LOW 20 MIN: CPT

## 2019-11-18 NOTE — COUNSELING
[Fall prevention counseling provided] : Fall prevention counseling provided [Behavioral health counseling provided] : Behavioral health counseling provided [Good understanding] : Patient has a good understanding of disease, goals and obesity follow-up plan

## 2019-11-18 NOTE — HISTORY OF PRESENT ILLNESS
[FreeTextEntry1] : RX renewal of diazepam. Does not need to use it that frequently. Meds work when he takes it.

## 2019-11-18 NOTE — PHYSICAL EXAM
[No Acute Distress] : no acute distress [Well Nourished] : well nourished [Well-Appearing] : well-appearing [Well Developed] : well developed [Normal Sclera/Conjunctiva] : normal sclera/conjunctiva [PERRL] : pupils equal round and reactive to light [Normal Outer Ear/Nose] : the outer ears and nose were normal in appearance [EOMI] : extraocular movements intact [No JVD] : no jugular venous distention [Normal Oropharynx] : the oropharynx was normal [No Lymphadenopathy] : no lymphadenopathy [Supple] : supple [Thyroid Normal, No Nodules] : the thyroid was normal and there were no nodules present [No Respiratory Distress] : no respiratory distress  [No Accessory Muscle Use] : no accessory muscle use [Normal Rate] : normal rate  [Clear to Auscultation] : lungs were clear to auscultation bilaterally [Regular Rhythm] : with a regular rhythm [Normal S1, S2] : normal S1 and S2 [No Murmur] : no murmur heard [No Carotid Bruits] : no carotid bruits [No Varicosities] : no varicosities [No Abdominal Bruit] : a ~M bruit was not heard ~T in the abdomen [Pedal Pulses Present] : the pedal pulses are present [No Edema] : there was no peripheral edema [No Palpable Aorta] : no palpable aorta [Soft] : abdomen soft [No Extremity Clubbing/Cyanosis] : no extremity clubbing/cyanosis [Non Tender] : non-tender [No Masses] : no abdominal mass palpated [Non-distended] : non-distended [No HSM] : no HSM [Normal Bowel Sounds] : normal bowel sounds [Normal Posterior Cervical Nodes] : no posterior cervical lymphadenopathy [Normal Anterior Cervical Nodes] : no anterior cervical lymphadenopathy [No Spinal Tenderness] : no spinal tenderness [No CVA Tenderness] : no CVA  tenderness [No Joint Swelling] : no joint swelling [Grossly Normal Strength/Tone] : grossly normal strength/tone [No Rash] : no rash [Coordination Grossly Intact] : coordination grossly intact [Normal Gait] : normal gait [No Focal Deficits] : no focal deficits [Deep Tendon Reflexes (DTR)] : deep tendon reflexes were 2+ and symmetric [Normal Affect] : the affect was normal [Normal Insight/Judgement] : insight and judgment were intact

## 2019-11-20 ENCOUNTER — APPOINTMENT (OUTPATIENT)
Dept: UROLOGY | Facility: CLINIC | Age: 69
End: 2019-11-20
Payer: MEDICARE

## 2019-11-20 PROCEDURE — 99214 OFFICE O/P EST MOD 30 MIN: CPT

## 2019-11-20 NOTE — PHYSICAL EXAM
[General Appearance - Well Developed] : well developed [General Appearance - Well Nourished] : well nourished [Normal Appearance] : normal appearance [Well Groomed] : well groomed [General Appearance - In No Acute Distress] : no acute distress [Abdomen Soft] : soft [Abdomen Tenderness] : non-tender [Urethral Meatus] : meatus normal [Costovertebral Angle Tenderness] : no ~M costovertebral angle tenderness [Urinary Bladder Findings] : the bladder was normal on palpation [Scrotum] : the scrotum was normal [Testes Mass (___cm)] : there were no testicular masses [No Prostate Nodules] : no prostate nodules [Respiration, Rhythm And Depth] : normal respiratory rhythm and effort [Edema] : no peripheral edema [] : no respiratory distress [Exaggerated Use Of Accessory Muscles For Inspiration] : no accessory muscle use [Oriented To Time, Place, And Person] : oriented to person, place, and time [Affect] : the affect was normal [Mood] : the mood was normal [Not Anxious] : not anxious [Normal Station and Gait] : the gait and station were normal for the patient's age [No Focal Deficits] : no focal deficits [No Palpable Adenopathy] : no palpable adenopathy

## 2019-11-21 LAB
APPEARANCE: CLEAR
BACTERIA: NEGATIVE
BILIRUBIN URINE: NEGATIVE
BLOOD URINE: NEGATIVE
COLOR: YELLOW
GLUCOSE QUALITATIVE U: ABNORMAL
HYALINE CASTS: 0 /LPF
KETONES URINE: NEGATIVE
LEUKOCYTE ESTERASE URINE: NEGATIVE
MICROSCOPIC-UA: NORMAL
NITRITE URINE: NEGATIVE
PH URINE: 7.5
PROTEIN URINE: NEGATIVE
RED BLOOD CELLS URINE: 0 /HPF
SPECIFIC GRAVITY URINE: 1.02
SQUAMOUS EPITHELIAL CELLS: 0 /HPF
UROBILINOGEN URINE: NORMAL
WHITE BLOOD CELLS URINE: 0 /HPF

## 2019-11-22 ENCOUNTER — RX RENEWAL (OUTPATIENT)
Age: 69
End: 2019-11-22

## 2019-11-24 LAB — URINE CYTOLOGY: NORMAL

## 2019-12-03 ENCOUNTER — RX RENEWAL (OUTPATIENT)
Age: 69
End: 2019-12-03

## 2019-12-04 ENCOUNTER — RX RENEWAL (OUTPATIENT)
Age: 69
End: 2019-12-04

## 2019-12-16 ENCOUNTER — RX RENEWAL (OUTPATIENT)
Age: 69
End: 2019-12-16

## 2020-01-24 ENCOUNTER — APPOINTMENT (OUTPATIENT)
Dept: FAMILY MEDICINE | Facility: CLINIC | Age: 70
End: 2020-01-24
Payer: MEDICARE

## 2020-01-24 VITALS
OXYGEN SATURATION: 98 % | WEIGHT: 254 LBS | SYSTOLIC BLOOD PRESSURE: 100 MMHG | HEIGHT: 69 IN | DIASTOLIC BLOOD PRESSURE: 70 MMHG | BODY MASS INDEX: 37.62 KG/M2 | TEMPERATURE: 98.2 F | HEART RATE: 61 BPM

## 2020-01-24 LAB
BILIRUB UR QL STRIP: NEGATIVE
CLARITY UR: CLEAR
GLUCOSE UR-MCNC: 500
HCG UR QL: 0.2 EU/DL
HGB UR QL STRIP.AUTO: NEGATIVE
KETONES UR-MCNC: 15
LEUKOCYTE ESTERASE UR QL STRIP: NEGATIVE
NITRITE UR QL STRIP: NEGATIVE
PH UR STRIP: 5
PROT UR STRIP-MCNC: NEGATIVE
SP GR UR STRIP: 1.03

## 2020-01-24 PROCEDURE — 81002 URINALYSIS NONAUTO W/O SCOPE: CPT

## 2020-01-24 PROCEDURE — 36415 COLL VENOUS BLD VENIPUNCTURE: CPT

## 2020-01-24 PROCEDURE — 99214 OFFICE O/P EST MOD 30 MIN: CPT | Mod: 25

## 2020-01-27 LAB
ALBUMIN SERPL ELPH-MCNC: 4.6 G/DL
ALP BLD-CCNC: 80 U/L
ALT SERPL-CCNC: 36 U/L
ANION GAP SERPL CALC-SCNC: 15 MMOL/L
AST SERPL-CCNC: 30 U/L
BASOPHILS # BLD AUTO: 0.05 K/UL
BASOPHILS NFR BLD AUTO: 0.8 %
BILIRUB SERPL-MCNC: 0.8 MG/DL
BUN SERPL-MCNC: 25 MG/DL
CALCIUM SERPL-MCNC: 10.1 MG/DL
CHLORIDE SERPL-SCNC: 98 MMOL/L
CHOLEST SERPL-MCNC: 111 MG/DL
CHOLEST/HDLC SERPL: 2.6 RATIO
CO2 SERPL-SCNC: 26 MMOL/L
CREAT SERPL-MCNC: 1.21 MG/DL
EOSINOPHIL # BLD AUTO: 0.18 K/UL
EOSINOPHIL NFR BLD AUTO: 2.9 %
ESTIMATED AVERAGE GLUCOSE: 117 MG/DL
GLUCOSE SERPL-MCNC: 115 MG/DL
HBA1C MFR BLD HPLC: 5.7 %
HCT VFR BLD CALC: 37.4 %
HDLC SERPL-MCNC: 43 MG/DL
HGB BLD-MCNC: 12.4 G/DL
IMM GRANULOCYTES NFR BLD AUTO: 0.2 %
LDLC SERPL CALC-MCNC: 47 MG/DL
LYMPHOCYTES # BLD AUTO: 1.25 K/UL
LYMPHOCYTES NFR BLD AUTO: 19.9 %
MAN DIFF?: NORMAL
MCHC RBC-ENTMCNC: 30.3 PG
MCHC RBC-ENTMCNC: 33.2 GM/DL
MCV RBC AUTO: 91.4 FL
MONOCYTES # BLD AUTO: 0.62 K/UL
MONOCYTES NFR BLD AUTO: 9.9 %
NEUTROPHILS # BLD AUTO: 4.16 K/UL
NEUTROPHILS NFR BLD AUTO: 66.3 %
PLATELET # BLD AUTO: 214 K/UL
POTASSIUM SERPL-SCNC: 4.3 MMOL/L
PROT SERPL-MCNC: 6.8 G/DL
PSA FREE FLD-MCNC: 24 %
PSA FREE SERPL-MCNC: 0.11 NG/ML
PSA SERPL-MCNC: 0.45 NG/ML
RBC # BLD: 4.09 M/UL
RBC # FLD: 12.5 %
SODIUM SERPL-SCNC: 139 MMOL/L
T3FREE SERPL-MCNC: 3.05 PG/ML
T4 FREE SERPL-MCNC: 1.9 NG/DL
TRIGL SERPL-MCNC: 104 MG/DL
TSH SERPL-ACNC: 0.42 UIU/ML
WBC # FLD AUTO: 6.27 K/UL

## 2020-05-22 ENCOUNTER — APPOINTMENT (OUTPATIENT)
Dept: FAMILY MEDICINE | Facility: CLINIC | Age: 70
End: 2020-05-22
Payer: MEDICARE

## 2020-05-22 VITALS
RESPIRATION RATE: 14 BRPM | BODY MASS INDEX: 35.84 KG/M2 | OXYGEN SATURATION: 98 % | SYSTOLIC BLOOD PRESSURE: 137 MMHG | HEIGHT: 69 IN | HEART RATE: 71 BPM | TEMPERATURE: 98.6 F | DIASTOLIC BLOOD PRESSURE: 78 MMHG | WEIGHT: 242 LBS

## 2020-05-22 DIAGNOSIS — Z11.59 ENCOUNTER FOR SCREENING FOR OTHER VIRAL DISEASES: ICD-10-CM

## 2020-05-22 LAB
BILIRUB UR QL STRIP: NORMAL
CLARITY UR: CLEAR
COLLECTION METHOD: NORMAL
GLUCOSE UR-MCNC: NORMAL
HCG UR QL: 0.2 EU/DL
HGB UR QL STRIP.AUTO: NORMAL
KETONES UR-MCNC: NORMAL
LEUKOCYTE ESTERASE UR QL STRIP: NORMAL
NITRITE UR QL STRIP: NORMAL
PH UR STRIP: 5
PROT UR STRIP-MCNC: NORMAL
SP GR UR STRIP: 1.02

## 2020-05-22 PROCEDURE — 81002 URINALYSIS NONAUTO W/O SCOPE: CPT

## 2020-05-22 PROCEDURE — 99214 OFFICE O/P EST MOD 30 MIN: CPT | Mod: 25

## 2020-05-22 PROCEDURE — 36415 COLL VENOUS BLD VENIPUNCTURE: CPT

## 2020-05-22 NOTE — HISTORY OF PRESENT ILLNESS
[Diabetes Mellitus] : Diabetes Mellitus [Obesity] : Obesity [Hypertension] : Hypertension [Other: ___] : [unfilled] [FreeTextEntry6] : PT IS REQUESTING COVID-19 ANTIBODIES [No episodes] : No hypoglycemic episodes since the last visit. [Does not check] : Patient does not check blood glucose regularly [Understanding of foot care] : Patient expressed understanding of foot care [Most Recent A1C: ___] : Most recent A1C was [unfilled] [Target A1C:  ___] : Target A1C is [unfilled] [Target goal met] : A1C target goal met [Moderate Intensity] : Patient is currently on moderate intensity statin  therapy [EyeExamDate] : 10/19

## 2020-05-22 NOTE — HEALTH RISK ASSESSMENT
[No falls in past year] : Patient reported no falls in the past year [] : No [YFS2Ijxvs] : 0 [0] : 2) Feeling down, depressed, or hopeless: Not at all (0)

## 2020-05-23 LAB
ALBUMIN SERPL ELPH-MCNC: 4.6 G/DL
ALP BLD-CCNC: 86 U/L
ALT SERPL-CCNC: 32 U/L
ANION GAP SERPL CALC-SCNC: 14 MMOL/L
AST SERPL-CCNC: 28 U/L
BILIRUB SERPL-MCNC: 0.6 MG/DL
BUN SERPL-MCNC: 29 MG/DL
CALCIUM SERPL-MCNC: 9.7 MG/DL
CHLORIDE SERPL-SCNC: 102 MMOL/L
CHOLEST SERPL-MCNC: 112 MG/DL
CHOLEST/HDLC SERPL: 3.3 RATIO
CO2 SERPL-SCNC: 26 MMOL/L
CREAT SERPL-MCNC: 1.17 MG/DL
ESTIMATED AVERAGE GLUCOSE: 134 MG/DL
GLUCOSE SERPL-MCNC: 111 MG/DL
HBA1C MFR BLD HPLC: 6.3 %
HDLC SERPL-MCNC: 34 MG/DL
LDLC SERPL CALC-MCNC: 53 MG/DL
POTASSIUM SERPL-SCNC: 4.3 MMOL/L
PROT SERPL-MCNC: 7 G/DL
PSA FREE FLD-MCNC: 18 %
PSA FREE SERPL-MCNC: 0.05 NG/ML
PSA SERPL-MCNC: 0.27 NG/ML
SARS-COV-2 IGG SERPL IA-ACNC: <0.1 INDEX
SARS-COV-2 IGG SERPL QL IA: NEGATIVE
SODIUM SERPL-SCNC: 141 MMOL/L
TRIGL SERPL-MCNC: 125 MG/DL

## 2020-05-28 ENCOUNTER — APPOINTMENT (OUTPATIENT)
Dept: UROLOGY | Facility: CLINIC | Age: 70
End: 2020-05-28
Payer: MEDICARE

## 2020-05-28 VITALS — TEMPERATURE: 97.3 F

## 2020-05-28 DIAGNOSIS — R35.0 FREQUENCY OF MICTURITION: ICD-10-CM

## 2020-05-28 PROCEDURE — 99213 OFFICE O/P EST LOW 20 MIN: CPT

## 2020-05-28 NOTE — PHYSICAL EXAM
[General Appearance - Well Developed] : well developed [General Appearance - Well Nourished] : well nourished [Normal Appearance] : normal appearance [General Appearance - In No Acute Distress] : no acute distress [Well Groomed] : well groomed [Abdomen Soft] : soft [Abdomen Tenderness] : non-tender [Costovertebral Angle Tenderness] : no ~M costovertebral angle tenderness [Scrotum] : the scrotum was normal [Urinary Bladder Findings] : the bladder was normal on palpation [Urethral Meatus] : meatus normal [Testes Mass (___cm)] : there were no testicular masses [No Prostate Nodules] : no prostate nodules [Edema] : no peripheral edema [Respiration, Rhythm And Depth] : normal respiratory rhythm and effort [Exaggerated Use Of Accessory Muscles For Inspiration] : no accessory muscle use [] : no respiratory distress [Affect] : the affect was normal [Oriented To Time, Place, And Person] : oriented to person, place, and time [Mood] : the mood was normal [Not Anxious] : not anxious [Normal Station and Gait] : the gait and station were normal for the patient's age [No Palpable Adenopathy] : no palpable adenopathy [No Focal Deficits] : no focal deficits

## 2020-05-29 LAB
APPEARANCE: CLEAR
BACTERIA: NEGATIVE
BILIRUBIN URINE: NEGATIVE
BLOOD URINE: NEGATIVE
COLOR: ABNORMAL
GLUCOSE QUALITATIVE U: ABNORMAL
HYALINE CASTS: 0 /LPF
KETONES URINE: NEGATIVE
LEUKOCYTE ESTERASE URINE: NEGATIVE
MICROSCOPIC-UA: NORMAL
NITRITE URINE: NEGATIVE
PH URINE: 5.5
PROTEIN URINE: NORMAL
RED BLOOD CELLS URINE: 1 /HPF
SPECIFIC GRAVITY URINE: 1.04
SQUAMOUS EPITHELIAL CELLS: 0 /HPF
URINE CYTOLOGY: NORMAL
UROBILINOGEN URINE: NORMAL
WHITE BLOOD CELLS URINE: 1 /HPF

## 2020-08-29 ENCOUNTER — TRANSCRIPTION ENCOUNTER (OUTPATIENT)
Age: 70
End: 2020-08-29

## 2020-09-09 ENCOUNTER — LABORATORY RESULT (OUTPATIENT)
Age: 70
End: 2020-09-09

## 2020-09-09 ENCOUNTER — APPOINTMENT (OUTPATIENT)
Dept: FAMILY MEDICINE | Facility: CLINIC | Age: 70
End: 2020-09-09
Payer: MEDICARE

## 2020-09-09 VITALS
BODY MASS INDEX: 36.14 KG/M2 | TEMPERATURE: 97.5 F | WEIGHT: 244 LBS | OXYGEN SATURATION: 96 % | RESPIRATION RATE: 16 BRPM | HEART RATE: 70 BPM | SYSTOLIC BLOOD PRESSURE: 118 MMHG | DIASTOLIC BLOOD PRESSURE: 73 MMHG | HEIGHT: 69 IN

## 2020-09-09 PROCEDURE — G0008: CPT

## 2020-09-09 PROCEDURE — 90662 IIV NO PRSV INCREASED AG IM: CPT

## 2020-09-09 PROCEDURE — 99214 OFFICE O/P EST MOD 30 MIN: CPT | Mod: 25

## 2020-09-09 PROCEDURE — 81003 URINALYSIS AUTO W/O SCOPE: CPT | Mod: QW

## 2020-09-09 PROCEDURE — 36415 COLL VENOUS BLD VENIPUNCTURE: CPT

## 2020-09-09 RX ORDER — DIAZEPAM 5 MG/1
5 TABLET ORAL
Qty: 30 | Refills: 0 | Status: ACTIVE | COMMUNITY
Start: 1900-01-01 | End: 1900-01-01

## 2020-09-09 NOTE — HEALTH RISK ASSESSMENT
[Yes] : Yes [] : No [No falls in past year] : Patient reported no falls in the past year [[id number]] : 0 [0] : 2) Feeling down, depressed, or hopeless: Not at all (0)

## 2020-09-09 NOTE — PHYSICAL EXAM
[Normal] : no respiratory distress, lungs were clear to auscultation bilaterally and no accessory muscle use [Normal Rate] : normal rate  [Regular Rhythm] : with a regular rhythm [Pedal Pulses Present] : the pedal pulses are present [No Edema] : there was no peripheral edema [de-identified] : 3/6 holosystolic murmur loudest at apex and radiating to axilla. [de-identified] : Decreased sensation on monofilament foot testing at points 1, 2, 3, 4, 5, 6, 8, and 10 on left and 1, 2, 3, 4, 5, 6, and 10 on right.

## 2020-09-09 NOTE — HISTORY OF PRESENT ILLNESS
[Diabetes Mellitus] : Diabetes Mellitus [Patient was last seen on ___] : Patient was last seen on [unfilled] [No episodes] : No hypoglycemic episodes since the last visit. [Does not check] : Patient does not check blood glucose regularly [No Retinopathy] : No retinopathy [Understanding of foot care] : Patient expressed understanding of foot care [Regular podiatrist visits] : Patient did not have regular podiatrist visit [Most Recent A1C: ___] : Most recent A1C was [unfilled] [Target goal met] : A1C target goal met [High Intensity] : Patient is currently on high intensity statin therapy [EyeExamDate] : 08/20

## 2020-09-10 ENCOUNTER — LABORATORY RESULT (OUTPATIENT)
Age: 70
End: 2020-09-10

## 2020-09-11 LAB
ALBUMIN SERPL ELPH-MCNC: 4.6 G/DL
ALP BLD-CCNC: 80 U/L
ALT SERPL-CCNC: 29 U/L
ANION GAP SERPL CALC-SCNC: 13 MMOL/L
AST SERPL-CCNC: 26 U/L
BILIRUB SERPL-MCNC: 0.7 MG/DL
BILIRUB UR QL STRIP: NORMAL
BUN SERPL-MCNC: 27 MG/DL
CALCIUM SERPL-MCNC: 9.7 MG/DL
CHLORIDE SERPL-SCNC: 102 MMOL/L
CHOLEST SERPL-MCNC: 106 MG/DL
CHOLEST/HDLC SERPL: 2.7 RATIO
CO2 SERPL-SCNC: 27 MMOL/L
CREAT SERPL-MCNC: 1.22 MG/DL
CREAT SPEC-SCNC: 164 MG/DL
ESTIMATED AVERAGE GLUCOSE: 128 MG/DL
GLUCOSE SERPL-MCNC: 113 MG/DL
GLUCOSE UR-MCNC: NORMAL
HBA1C MFR BLD HPLC: 6.1 %
HCG UR QL: 0.2 EU/DL
HDLC SERPL-MCNC: 40 MG/DL
HGB UR QL STRIP.AUTO: NORMAL
KETONES UR-MCNC: NORMAL
LDLC SERPL CALC-MCNC: 46 MG/DL
LEUKOCYTE ESTERASE UR QL STRIP: NORMAL
MICROALBUMIN 24H UR DL<=1MG/L-MCNC: 1.2 MG/DL
MICROALBUMIN/CREAT 24H UR-RTO: 8 MG/G
NITRITE UR QL STRIP: NORMAL
PH UR STRIP: 6.5
POTASSIUM SERPL-SCNC: 4.4 MMOL/L
PROT SERPL-MCNC: 7 G/DL
PROT UR STRIP-MCNC: NORMAL
SODIUM SERPL-SCNC: 141 MMOL/L
SP GR UR STRIP: >=1.03
TRIGL SERPL-MCNC: 100 MG/DL

## 2020-09-14 ENCOUNTER — TRANSCRIPTION ENCOUNTER (OUTPATIENT)
Age: 70
End: 2020-09-14

## 2020-09-15 ENCOUNTER — TRANSCRIPTION ENCOUNTER (OUTPATIENT)
Age: 70
End: 2020-09-15

## 2020-10-02 ENCOUNTER — APPOINTMENT (OUTPATIENT)
Dept: FAMILY MEDICINE | Facility: CLINIC | Age: 70
End: 2020-10-02
Payer: MEDICARE

## 2020-10-02 VITALS
OXYGEN SATURATION: 96 % | HEIGHT: 69 IN | SYSTOLIC BLOOD PRESSURE: 123 MMHG | WEIGHT: 251 LBS | BODY MASS INDEX: 37.18 KG/M2 | TEMPERATURE: 97.6 F | DIASTOLIC BLOOD PRESSURE: 74 MMHG | HEART RATE: 73 BPM

## 2020-10-02 PROCEDURE — 99213 OFFICE O/P EST LOW 20 MIN: CPT

## 2020-10-03 NOTE — PHYSICAL EXAM
[Normal Oropharynx] : the oropharynx was normal [Normal TMs] : both tympanic membranes were normal [Normal Nasal Mucosa] : the nasal mucosa was normal [Normal] : normal gait, coordination grossly intact, no focal deficits and deep tendon reflexes were 2+ and symmetric [de-identified] : Mild erythema and welling to right cheek. Swlling extending down past jaw like and significant selling to skin of right side of neck. No discharge

## 2020-10-03 NOTE — PLAN
[FreeTextEntry1] : Advised at length\par Ice packs\par Benedryl prn\par NSAID prn\par Call immed if any rssh, SOB, wheeze, swelling of tongue or face

## 2020-10-03 NOTE — HISTORY OF PRESENT ILLNESS
[FreeTextEntry8] : pt says he got bit by a bee 2 days ago on his R cheek of his face and its itchy.+\par Swelling to fact and neck. No SOB no wheeze. No fevers\par

## 2020-11-11 ENCOUNTER — RX RENEWAL (OUTPATIENT)
Age: 70
End: 2020-11-11

## 2020-11-12 ENCOUNTER — RX RENEWAL (OUTPATIENT)
Age: 70
End: 2020-11-12

## 2020-11-28 ENCOUNTER — RX RENEWAL (OUTPATIENT)
Age: 70
End: 2020-11-28

## 2020-12-03 ENCOUNTER — APPOINTMENT (OUTPATIENT)
Dept: UROLOGY | Facility: CLINIC | Age: 70
End: 2020-12-03

## 2020-12-11 ENCOUNTER — APPOINTMENT (OUTPATIENT)
Dept: FAMILY MEDICINE | Facility: CLINIC | Age: 70
End: 2020-12-11
Payer: MEDICARE

## 2020-12-11 VITALS
HEIGHT: 69 IN | DIASTOLIC BLOOD PRESSURE: 69 MMHG | TEMPERATURE: 96.7 F | OXYGEN SATURATION: 96 % | SYSTOLIC BLOOD PRESSURE: 127 MMHG | WEIGHT: 251 LBS | BODY MASS INDEX: 37.18 KG/M2 | HEART RATE: 69 BPM

## 2020-12-11 DIAGNOSIS — F41.1 GENERALIZED ANXIETY DISORDER: ICD-10-CM

## 2020-12-11 LAB
BILIRUB UR QL STRIP: NORMAL
CLARITY UR: CLEAR
COLLECTION METHOD: NORMAL
GLUCOSE UR-MCNC: ABNORMAL
HCG UR QL: 0.2 EU/DL
HGB UR QL STRIP.AUTO: NORMAL
KETONES UR-MCNC: NORMAL
LEUKOCYTE ESTERASE UR QL STRIP: NORMAL
NITRITE UR QL STRIP: NORMAL
PH UR STRIP: 5
PROT UR STRIP-MCNC: NORMAL
SP GR UR STRIP: 1.03

## 2020-12-11 PROCEDURE — 36415 COLL VENOUS BLD VENIPUNCTURE: CPT

## 2020-12-11 PROCEDURE — G0439: CPT

## 2020-12-11 PROCEDURE — 81002 URINALYSIS NONAUTO W/O SCOPE: CPT

## 2020-12-11 PROCEDURE — 99213 OFFICE O/P EST LOW 20 MIN: CPT | Mod: 25

## 2020-12-11 NOTE — PHYSICAL EXAM
[No Acute Distress] : no acute distress [Well Nourished] : well nourished [Well Developed] : well developed [Well-Appearing] : well-appearing [Normal Sclera/Conjunctiva] : normal sclera/conjunctiva [PERRL] : pupils equal round and reactive to light [EOMI] : extraocular movements intact [Normal Outer Ear/Nose] : the outer ears and nose were normal in appearance [Normal Oropharynx] : the oropharynx was normal [No JVD] : no jugular venous distention [No Lymphadenopathy] : no lymphadenopathy [Supple] : supple [Thyroid Normal, No Nodules] : the thyroid was normal and there were no nodules present [No Respiratory Distress] : no respiratory distress  [No Accessory Muscle Use] : no accessory muscle use [Clear to Auscultation] : lungs were clear to auscultation bilaterally [Normal Rate] : normal rate  [Regular Rhythm] : with a regular rhythm [Normal S1, S2] : normal S1 and S2 [No Murmur] : no murmur heard [No Carotid Bruits] : no carotid bruits [No Abdominal Bruit] : a ~M bruit was not heard ~T in the abdomen [No Varicosities] : no varicosities [Pedal Pulses Present] : the pedal pulses are present [No Edema] : there was no peripheral edema [No Palpable Aorta] : no palpable aorta [No Extremity Clubbing/Cyanosis] : no extremity clubbing/cyanosis [Soft] : abdomen soft [Non Tender] : non-tender [Non-distended] : non-distended [No Masses] : no abdominal mass palpated [No HSM] : no HSM [Normal Bowel Sounds] : normal bowel sounds [Normal] : soft, non-tender, non-distended, no masses palpated, no HSM and normal bowel sounds [Normal Posterior Cervical Nodes] : no posterior cervical lymphadenopathy [Normal Anterior Cervical Nodes] : no anterior cervical lymphadenopathy [No CVA Tenderness] : no CVA  tenderness [No Spinal Tenderness] : no spinal tenderness [No Joint Swelling] : no joint swelling [Grossly Normal Strength/Tone] : grossly normal strength/tone [No Rash] : no rash [Coordination Grossly Intact] : coordination grossly intact [No Focal Deficits] : no focal deficits [Normal Gait] : normal gait [Normal Affect] : the affect was normal [Normal Insight/Judgement] : insight and judgment were intact

## 2020-12-11 NOTE — HEALTH RISK ASSESSMENT
[Yes] : Yes [2 - 4 times a month (2 pts)] : 2-4 times a month (2 points) [1 or 2 (0 pts)] : 1 or 2 (0 points) [No] : In the past 12 months have you used drugs other than those required for medical reasons? No [Very Good] : ~his/her~  mood as very good [No falls in past year] : Patient reported no falls in the past year [0] : 2) Feeling down, depressed, or hopeless: Not at all (0) [No Retinopathy] : No retinopathy [Patient reported colonoscopy was normal] : Patient reported colonoscopy was normal [HIV test declined] : HIV test declined [Hepatitis C test declined] : Hepatitis C test declined [None] : None [With Significant Other] : lives with significant other [Retired] : retired [High School] : high school [Single] : single [Significant Other] : lives with significant other [Sexually Active] : sexually active [Feels Safe at Home] : Feels safe at home [Fully functional (bathing, dressing, toileting, transferring, walking, feeding)] : Fully functional (bathing, dressing, toileting, transferring, walking, feeding) [Fully functional (using the telephone, shopping, preparing meals, housekeeping, doing laundry, using] : Fully functional and needs no help or supervision to perform IADLs (using the telephone, shopping, preparing meals, housekeeping, doing laundry, using transportation, managing medications and managing finances) [] : No [Audit-CScore] : 2 [[id number]] : 0 [EyeExamDate] : 08/20 [Change in mental status noted] : No change in mental status noted [Language] : denies difficulty with language [Behavior] : denies difficulty with behavior [Learning/Retaining New Information] : denies difficulty learning/retaining new information [Handling Complex Tasks] : denies difficulty handling complex tasks [Reasoning] : denies difficulty with reasoning [Spatial Ability and Orientation] : denies difficulty with spatial ability and orientation [High Risk Behavior] : no high risk behavior [Reports changes in hearing] : Reports no changes in hearing [Reports changes in vision] : Reports no changes in vision [Reports changes in dental health] : Reports no changes in dental health [Smoke Detector] : no smoke detector [Carbon Monoxide Detector] : no carbon monoxide detector [Guns at Home] : no guns at home [ColonoscopyDate] : 03/19

## 2020-12-13 LAB
CHOLEST SERPL-MCNC: 109 MG/DL
CREAT SPEC-SCNC: 176 MG/DL
ESTIMATED AVERAGE GLUCOSE: 137 MG/DL
HBA1C MFR BLD HPLC: 6.4 %
HDLC SERPL-MCNC: 38 MG/DL
LDLC SERPL CALC-MCNC: 50 MG/DL
MICROALBUMIN 24H UR DL<=1MG/L-MCNC: 1.2 MG/DL
MICROALBUMIN/CREAT 24H UR-RTO: 7 MG/G
NONHDLC SERPL-MCNC: 71 MG/DL
PSA FREE FLD-MCNC: 23 %
PSA FREE SERPL-MCNC: 0.03 NG/ML
PSA SERPL-MCNC: 0.15 NG/ML
T3FREE SERPL-MCNC: 2.82 PG/ML
T4 FREE SERPL-MCNC: 1.6 NG/DL
TRIGL SERPL-MCNC: 104 MG/DL
TSH SERPL-ACNC: 0.5 UIU/ML

## 2020-12-23 ENCOUNTER — TRANSCRIPTION ENCOUNTER (OUTPATIENT)
Age: 70
End: 2020-12-23

## 2020-12-23 LAB
ALBUMIN SERPL ELPH-MCNC: 4.7 G/DL
ALP BLD-CCNC: 90 U/L
ALT SERPL-CCNC: 26 U/L
ANION GAP SERPL CALC-SCNC: 15 MMOL/L
AST SERPL-CCNC: 36 U/L
BILIRUB SERPL-MCNC: 0.9 MG/DL
BUN SERPL-MCNC: 41 MG/DL
CALCIUM SERPL-MCNC: 9.9 MG/DL
CHLORIDE SERPL-SCNC: 99 MMOL/L
CO2 SERPL-SCNC: 23 MMOL/L
CREAT SERPL-MCNC: 1.49 MG/DL
GLUCOSE SERPL-MCNC: 122 MG/DL
POTASSIUM SERPL-SCNC: 4 MMOL/L
PROT SERPL-MCNC: 7.2 G/DL
SODIUM SERPL-SCNC: 138 MMOL/L

## 2020-12-31 ENCOUNTER — RX RENEWAL (OUTPATIENT)
Age: 70
End: 2020-12-31

## 2021-01-14 ENCOUNTER — APPOINTMENT (OUTPATIENT)
Dept: FAMILY MEDICINE | Facility: CLINIC | Age: 71
End: 2021-01-14
Payer: MEDICARE

## 2021-01-14 VITALS
TEMPERATURE: 97.2 F | HEIGHT: 69 IN | SYSTOLIC BLOOD PRESSURE: 131 MMHG | DIASTOLIC BLOOD PRESSURE: 72 MMHG | HEART RATE: 66 BPM | WEIGHT: 251 LBS | BODY MASS INDEX: 37.18 KG/M2 | OXYGEN SATURATION: 99 %

## 2021-01-14 DIAGNOSIS — R79.89 OTHER SPECIFIED ABNORMAL FINDINGS OF BLOOD CHEMISTRY: ICD-10-CM

## 2021-01-14 PROCEDURE — 36415 COLL VENOUS BLD VENIPUNCTURE: CPT

## 2021-01-14 PROCEDURE — 81002 URINALYSIS NONAUTO W/O SCOPE: CPT

## 2021-01-15 ENCOUNTER — TRANSCRIPTION ENCOUNTER (OUTPATIENT)
Age: 71
End: 2021-01-15

## 2021-01-15 LAB
ANION GAP SERPL CALC-SCNC: 16 MMOL/L
BUN SERPL-MCNC: 31 MG/DL
CALCIUM SERPL-MCNC: 9.8 MG/DL
CHLORIDE SERPL-SCNC: 99 MMOL/L
CO2 SERPL-SCNC: 23 MMOL/L
CREAT SERPL-MCNC: 1.41 MG/DL
GLUCOSE SERPL-MCNC: 108 MG/DL
POTASSIUM SERPL-SCNC: 4.1 MMOL/L
SODIUM SERPL-SCNC: 138 MMOL/L

## 2021-01-27 ENCOUNTER — RX RENEWAL (OUTPATIENT)
Age: 71
End: 2021-01-27

## 2021-01-28 ENCOUNTER — RX RENEWAL (OUTPATIENT)
Age: 71
End: 2021-01-28

## 2021-02-14 ENCOUNTER — RX RENEWAL (OUTPATIENT)
Age: 71
End: 2021-02-14

## 2021-03-11 ENCOUNTER — RX RENEWAL (OUTPATIENT)
Age: 71
End: 2021-03-11

## 2021-03-12 ENCOUNTER — APPOINTMENT (OUTPATIENT)
Dept: FAMILY MEDICINE | Facility: CLINIC | Age: 71
End: 2021-03-12
Payer: MEDICARE

## 2021-03-12 VITALS
BODY MASS INDEX: 37.18 KG/M2 | HEIGHT: 69 IN | WEIGHT: 251 LBS | HEART RATE: 67 BPM | TEMPERATURE: 97.3 F | OXYGEN SATURATION: 97 % | DIASTOLIC BLOOD PRESSURE: 70 MMHG | SYSTOLIC BLOOD PRESSURE: 118 MMHG

## 2021-03-12 LAB
BILIRUB UR QL STRIP: NORMAL
CLARITY UR: CLEAR
COLLECTION METHOD: NORMAL
GLUCOSE UR-MCNC: ABNORMAL
HCG UR QL: 0.2 EU/DL
HGB UR QL STRIP.AUTO: NORMAL
KETONES UR-MCNC: ABNORMAL
LEUKOCYTE ESTERASE UR QL STRIP: NORMAL
NITRITE UR QL STRIP: NORMAL
PH UR STRIP: 5
PROT UR STRIP-MCNC: NORMAL
SP GR UR STRIP: 1.03

## 2021-03-12 PROCEDURE — 99214 OFFICE O/P EST MOD 30 MIN: CPT | Mod: 25

## 2021-03-12 PROCEDURE — 81003 URINALYSIS AUTO W/O SCOPE: CPT | Mod: QW

## 2021-03-12 PROCEDURE — 36415 COLL VENOUS BLD VENIPUNCTURE: CPT

## 2021-03-12 NOTE — HISTORY OF PRESENT ILLNESS
[Diabetes Mellitus] : Diabetes Mellitus [Hyperlipidemia] : Hyperlipidemia [Hypertension] : Hypertension [No episodes] : No hypoglycemic episodes since the last visit. [Does not check] : Patient does not check blood glucose regularly [Understanding of foot care] : Patient expressed understanding of foot care [No Retinopathy] : No retinopathy [Most Recent A1C: ___] : Most recent A1C was [unfilled] [Target A1C:  ___] : Target A1C is [unfilled] [Target goal met] : A1C target goal met [FreeTextEntry6] : pt is here for DM check  [Regular podiatrist visits] : Patient did not have regular podiatrist visit [EyeExamDate] : 10/20

## 2021-03-12 NOTE — HEALTH RISK ASSESSMENT
[Yes] : Yes [No falls in past year] : Patient reported no falls in the past year [0] : 2) Feeling down, depressed, or hopeless: Not at all (0) [] : No [[id number]] : 0

## 2021-03-13 LAB
ALBUMIN SERPL ELPH-MCNC: 4.3 G/DL
ALP BLD-CCNC: 85 U/L
ALT SERPL-CCNC: 23 U/L
ANION GAP SERPL CALC-SCNC: 10 MMOL/L
AST SERPL-CCNC: 24 U/L
BILIRUB SERPL-MCNC: 0.6 MG/DL
BUN SERPL-MCNC: 27 MG/DL
CALCIUM SERPL-MCNC: 9.5 MG/DL
CHLORIDE SERPL-SCNC: 101 MMOL/L
CHOLEST SERPL-MCNC: 95 MG/DL
CO2 SERPL-SCNC: 27 MMOL/L
CREAT SERPL-MCNC: 1.12 MG/DL
CREAT SPEC-SCNC: 224 MG/DL
ESTIMATED AVERAGE GLUCOSE: 114 MG/DL
GLUCOSE SERPL-MCNC: 101 MG/DL
HBA1C MFR BLD HPLC: 5.6 %
HDLC SERPL-MCNC: 36 MG/DL
LDLC SERPL CALC-MCNC: 35 MG/DL
MICROALBUMIN 24H UR DL<=1MG/L-MCNC: 1.4 MG/DL
MICROALBUMIN/CREAT 24H UR-RTO: 6 MG/G
NONHDLC SERPL-MCNC: 59 MG/DL
POTASSIUM SERPL-SCNC: 4.1 MMOL/L
PROT SERPL-MCNC: 6.9 G/DL
SODIUM SERPL-SCNC: 137 MMOL/L
TRIGL SERPL-MCNC: 120 MG/DL

## 2021-04-02 ENCOUNTER — RX RENEWAL (OUTPATIENT)
Age: 71
End: 2021-04-02

## 2021-04-14 ENCOUNTER — RX RENEWAL (OUTPATIENT)
Age: 71
End: 2021-04-14

## 2021-04-15 ENCOUNTER — RX RENEWAL (OUTPATIENT)
Age: 71
End: 2021-04-15

## 2021-05-03 ENCOUNTER — RX RENEWAL (OUTPATIENT)
Age: 71
End: 2021-05-03

## 2021-06-01 ENCOUNTER — RX RENEWAL (OUTPATIENT)
Age: 71
End: 2021-06-01

## 2021-06-11 ENCOUNTER — APPOINTMENT (OUTPATIENT)
Dept: FAMILY MEDICINE | Facility: CLINIC | Age: 71
End: 2021-06-11
Payer: MEDICARE

## 2021-06-11 VITALS
DIASTOLIC BLOOD PRESSURE: 69 MMHG | HEART RATE: 63 BPM | OXYGEN SATURATION: 98 % | WEIGHT: 251 LBS | SYSTOLIC BLOOD PRESSURE: 114 MMHG | HEIGHT: 69 IN | TEMPERATURE: 97.8 F | BODY MASS INDEX: 37.18 KG/M2

## 2021-06-11 DIAGNOSIS — T63.92XA: ICD-10-CM

## 2021-06-11 LAB
BILIRUB UR QL STRIP: NORMAL
CLARITY UR: CLEAR
COLLECTION METHOD: NORMAL
GLUCOSE UR-MCNC: ABNORMAL
HCG UR QL: 0.2 EU/DL
HGB UR QL STRIP.AUTO: NORMAL
KETONES UR-MCNC: NORMAL
LEUKOCYTE ESTERASE UR QL STRIP: NORMAL
NITRITE UR QL STRIP: NORMAL
PH UR STRIP: 5
PROT UR STRIP-MCNC: NORMAL
SP GR UR STRIP: 1.01

## 2021-06-11 PROCEDURE — 99213 OFFICE O/P EST LOW 20 MIN: CPT | Mod: 25

## 2021-06-11 PROCEDURE — 36415 COLL VENOUS BLD VENIPUNCTURE: CPT

## 2021-06-11 PROCEDURE — 81003 URINALYSIS AUTO W/O SCOPE: CPT | Mod: QW

## 2021-06-11 RX ORDER — DICLOFENAC SODIUM 75 MG/1
75 TABLET, DELAYED RELEASE ORAL
Qty: 1 | Refills: 1 | Status: DISCONTINUED | COMMUNITY
Start: 2018-11-19 | End: 2021-06-11

## 2021-06-11 RX ORDER — CEFDINIR 300 MG/1
300 CAPSULE ORAL
Qty: 6 | Refills: 0 | Status: DISCONTINUED | COMMUNITY
Start: 2019-03-20 | End: 2021-06-11

## 2021-06-11 NOTE — PHYSICAL EXAM
[Normal Rate] : normal rate  [Regular Rhythm] : with a regular rhythm [Normal S1, S2] : normal S1 and S2 [Normal] : no joint swelling and grossly normal strength and tone [de-identified] : 1-2/6 syst murmur

## 2021-06-11 NOTE — HEALTH RISK ASSESSMENT
[Yes] : Yes [2 - 3 times a week (3 pts)] : 2 - 3  times a week (3 points) [1 or 2 (0 pts)] : 1 or 2 (0 points) [Never (0 pts)] : Never (0 points) [No] : In the past 12 months have you used drugs other than those required for medical reasons? No [No falls in past year] : Patient reported no falls in the past year [0] : 2) Feeling down, depressed, or hopeless: Not at all (0) [] : No [Audit-CScore] : 3 [[id number]] : 0

## 2021-06-11 NOTE — HISTORY OF PRESENT ILLNESS
[Diabetes Mellitus] : Diabetes Mellitus [FreeTextEntry6] : Pt is here for DM check. [No episodes] : No hypoglycemic episodes since the last visit. [Does not check] : Patient does not check blood glucose regularly [Regular podiatrist visits] : Patient did not have regular podiatrist visit [Understanding of foot care] : Patient expressed understanding of foot care

## 2021-06-13 LAB
ALBUMIN SERPL ELPH-MCNC: 4.4 G/DL
ALP BLD-CCNC: 73 U/L
ALT SERPL-CCNC: 18 U/L
ANION GAP SERPL CALC-SCNC: 12 MMOL/L
AST SERPL-CCNC: 23 U/L
BILIRUB SERPL-MCNC: 1 MG/DL
BUN SERPL-MCNC: 24 MG/DL
CALCIUM SERPL-MCNC: 9.6 MG/DL
CHLORIDE SERPL-SCNC: 101 MMOL/L
CHOLEST SERPL-MCNC: 102 MG/DL
CO2 SERPL-SCNC: 24 MMOL/L
CREAT SERPL-MCNC: 1.13 MG/DL
CREAT SPEC-SCNC: 51 MG/DL
ESTIMATED AVERAGE GLUCOSE: 114 MG/DL
GLUCOSE SERPL-MCNC: 94 MG/DL
HBA1C MFR BLD HPLC: 5.6 %
HDLC SERPL-MCNC: 45 MG/DL
LDLC SERPL CALC-MCNC: 40 MG/DL
MICROALBUMIN 24H UR DL<=1MG/L-MCNC: <1.2 MG/DL
MICROALBUMIN/CREAT 24H UR-RTO: NORMAL MG/G
NONHDLC SERPL-MCNC: 57 MG/DL
POTASSIUM SERPL-SCNC: 4.2 MMOL/L
PROT SERPL-MCNC: 6.6 G/DL
PSA FREE FLD-MCNC: 15 %
PSA FREE SERPL-MCNC: 0.02 NG/ML
PSA SERPL-MCNC: 0.14 NG/ML
SODIUM SERPL-SCNC: 138 MMOL/L
TRIGL SERPL-MCNC: 83 MG/DL

## 2021-06-20 ENCOUNTER — RX RENEWAL (OUTPATIENT)
Age: 71
End: 2021-06-20

## 2021-07-01 ENCOUNTER — RX RENEWAL (OUTPATIENT)
Age: 71
End: 2021-07-01

## 2021-07-19 ENCOUNTER — RX RENEWAL (OUTPATIENT)
Age: 71
End: 2021-07-19

## 2021-08-16 ENCOUNTER — RX RENEWAL (OUTPATIENT)
Age: 71
End: 2021-08-16

## 2021-09-06 ENCOUNTER — RX RENEWAL (OUTPATIENT)
Age: 71
End: 2021-09-06

## 2021-09-10 ENCOUNTER — APPOINTMENT (OUTPATIENT)
Dept: FAMILY MEDICINE | Facility: CLINIC | Age: 71
End: 2021-09-10
Payer: MEDICARE

## 2021-09-10 ENCOUNTER — NON-APPOINTMENT (OUTPATIENT)
Age: 71
End: 2021-09-10

## 2021-09-10 VITALS
TEMPERATURE: 97.3 F | DIASTOLIC BLOOD PRESSURE: 71 MMHG | SYSTOLIC BLOOD PRESSURE: 114 MMHG | WEIGHT: 251 LBS | HEART RATE: 68 BPM | BODY MASS INDEX: 37.18 KG/M2 | HEIGHT: 69 IN | OXYGEN SATURATION: 98 %

## 2021-09-10 LAB
BILIRUB UR QL STRIP: NORMAL
CLARITY UR: CLEAR
COLLECTION METHOD: NORMAL
GLUCOSE UR-MCNC: ABNORMAL
HCG UR QL: 0.2 EU/DL
HGB UR QL STRIP.AUTO: NORMAL
KETONES UR-MCNC: ABNORMAL
LEUKOCYTE ESTERASE UR QL STRIP: NORMAL
NITRITE UR QL STRIP: NORMAL
PH UR STRIP: 6
PROT UR STRIP-MCNC: NORMAL
SP GR UR STRIP: 1.02

## 2021-09-10 PROCEDURE — 90662 IIV NO PRSV INCREASED AG IM: CPT

## 2021-09-10 PROCEDURE — 99214 OFFICE O/P EST MOD 30 MIN: CPT | Mod: 25

## 2021-09-10 PROCEDURE — 36415 COLL VENOUS BLD VENIPUNCTURE: CPT

## 2021-09-10 PROCEDURE — G0008: CPT

## 2021-09-10 PROCEDURE — 81003 URINALYSIS AUTO W/O SCOPE: CPT | Mod: QW

## 2021-09-10 NOTE — PHYSICAL EXAM
[No Acute Distress] : no acute distress [Well Nourished] : well nourished [Well Developed] : well developed [Well-Appearing] : well-appearing [Normal Sclera/Conjunctiva] : normal sclera/conjunctiva [PERRL] : pupils equal round and reactive to light [EOMI] : extraocular movements intact [Normal Outer Ear/Nose] : the outer ears and nose were normal in appearance [Normal Oropharynx] : the oropharynx was normal [No JVD] : no jugular venous distention [No Lymphadenopathy] : no lymphadenopathy [Supple] : supple [Thyroid Normal, No Nodules] : the thyroid was normal and there were no nodules present [No Respiratory Distress] : no respiratory distress  [No Accessory Muscle Use] : no accessory muscle use [Clear to Auscultation] : lungs were clear to auscultation bilaterally [Normal Rate] : normal rate  [Regular Rhythm] : with a regular rhythm [Normal S1, S2] : normal S1 and S2 [No Murmur] : no murmur heard [No Carotid Bruits] : no carotid bruits [No Abdominal Bruit] : a ~M bruit was not heard ~T in the abdomen [No Varicosities] : no varicosities [Pedal Pulses Present] : the pedal pulses are present [No Edema] : there was no peripheral edema [No Palpable Aorta] : no palpable aorta [No Extremity Clubbing/Cyanosis] : no extremity clubbing/cyanosis [Soft] : abdomen soft [Non Tender] : non-tender [Non-distended] : non-distended [No Masses] : no abdominal mass palpated [No HSM] : no HSM [Normal Bowel Sounds] : normal bowel sounds [Normal Posterior Cervical Nodes] : no posterior cervical lymphadenopathy [Normal Anterior Cervical Nodes] : no anterior cervical lymphadenopathy [No Spinal Tenderness] : no spinal tenderness [No CVA Tenderness] : no CVA  tenderness [No Joint Swelling] : no joint swelling [Grossly Normal Strength/Tone] : grossly normal strength/tone [No Rash] : no rash [Coordination Grossly Intact] : coordination grossly intact [No Focal Deficits] : no focal deficits [Normal Gait] : normal gait [Normal Affect] : the affect was normal [Deep Tendon Reflexes (DTR)] : deep tendon reflexes were 2+ and symmetric [Normal Insight/Judgement] : insight and judgment were intact

## 2021-09-10 NOTE — HISTORY OF PRESENT ILLNESS
[Diabetes Mellitus] : Diabetes Mellitus [FreeTextEntry6] : Pt is here for DM check. [Does not check] : Patient does not check blood glucose regularly [Regular podiatrist visits] : Patient did not have regular podiatrist visit [Understanding of foot care] : Patient expressed understanding of foot care [No Retinopathy] : No retinopathy [Most Recent A1C: ___] : Most recent A1C was [unfilled] [Target A1C:  ___] : Target A1C is [unfilled] [Moderate Intensity] : Patient is currently on moderate intensity statin  therapy [EyeExamDate] : 03/20

## 2021-09-11 LAB
ALBUMIN SERPL ELPH-MCNC: 4.6 G/DL
ALP BLD-CCNC: 94 U/L
ALT SERPL-CCNC: 24 U/L
ANION GAP SERPL CALC-SCNC: 13 MMOL/L
AST SERPL-CCNC: 29 U/L
BILIRUB SERPL-MCNC: 0.5 MG/DL
BUN SERPL-MCNC: 31 MG/DL
CALCIUM SERPL-MCNC: 9.5 MG/DL
CHLORIDE SERPL-SCNC: 100 MMOL/L
CHOLEST SERPL-MCNC: 110 MG/DL
CO2 SERPL-SCNC: 24 MMOL/L
CREAT SERPL-MCNC: 1.21 MG/DL
CREAT SPEC-SCNC: 166 MG/DL
ESTIMATED AVERAGE GLUCOSE: 114 MG/DL
GLUCOSE SERPL-MCNC: 101 MG/DL
HBA1C MFR BLD HPLC: 5.6 %
HDLC SERPL-MCNC: 40 MG/DL
LDLC SERPL CALC-MCNC: 46 MG/DL
MICROALBUMIN 24H UR DL<=1MG/L-MCNC: <1.2 MG/DL
MICROALBUMIN/CREAT 24H UR-RTO: NORMAL MG/G
NONHDLC SERPL-MCNC: 71 MG/DL
POTASSIUM SERPL-SCNC: 4.1 MMOL/L
PROT SERPL-MCNC: 6.9 G/DL
SODIUM SERPL-SCNC: 137 MMOL/L
TRIGL SERPL-MCNC: 123 MG/DL

## 2021-09-13 LAB
PSA FREE FLD-MCNC: NORMAL %
PSA FREE SERPL-MCNC: <0.01 NG/ML
PSA SERPL-MCNC: 0.11 NG/ML

## 2021-09-17 ENCOUNTER — RX RENEWAL (OUTPATIENT)
Age: 71
End: 2021-09-17

## 2021-09-24 ENCOUNTER — TRANSCRIPTION ENCOUNTER (OUTPATIENT)
Age: 71
End: 2021-09-24

## 2021-10-04 ENCOUNTER — RX RENEWAL (OUTPATIENT)
Age: 71
End: 2021-10-04

## 2021-11-02 ENCOUNTER — RX RENEWAL (OUTPATIENT)
Age: 71
End: 2021-11-02

## 2021-11-22 ENCOUNTER — RX RENEWAL (OUTPATIENT)
Age: 71
End: 2021-11-22

## 2021-12-04 ENCOUNTER — RX RENEWAL (OUTPATIENT)
Age: 71
End: 2021-12-04

## 2021-12-13 ENCOUNTER — APPOINTMENT (OUTPATIENT)
Dept: FAMILY MEDICINE | Facility: CLINIC | Age: 71
End: 2021-12-13
Payer: MEDICARE

## 2021-12-13 ENCOUNTER — RESULT CHARGE (OUTPATIENT)
Age: 71
End: 2021-12-13

## 2021-12-13 VITALS
BODY MASS INDEX: 34.96 KG/M2 | OXYGEN SATURATION: 97 % | HEIGHT: 69 IN | SYSTOLIC BLOOD PRESSURE: 119 MMHG | TEMPERATURE: 98.2 F | WEIGHT: 236 LBS | DIASTOLIC BLOOD PRESSURE: 74 MMHG | HEART RATE: 65 BPM

## 2021-12-13 DIAGNOSIS — Z87.898 PERSONAL HISTORY OF OTHER SPECIFIED CONDITIONS: ICD-10-CM

## 2021-12-13 LAB
BILIRUB UR QL STRIP: NORMAL
CLARITY UR: CLEAR
COLLECTION METHOD: NORMAL
GLUCOSE UR-MCNC: ABNORMAL
HCG UR QL: 0.2 EU/DL
HGB UR QL STRIP.AUTO: NORMAL
KETONES UR-MCNC: NORMAL
LEUKOCYTE ESTERASE UR QL STRIP: NORMAL
NITRITE UR QL STRIP: NORMAL
PH UR STRIP: 5.5
PROT UR STRIP-MCNC: NORMAL
SP GR UR STRIP: 1.01

## 2021-12-13 PROCEDURE — G0439: CPT

## 2021-12-13 PROCEDURE — 81003 URINALYSIS AUTO W/O SCOPE: CPT | Mod: QW

## 2021-12-13 PROCEDURE — 99213 OFFICE O/P EST LOW 20 MIN: CPT | Mod: 25

## 2021-12-13 PROCEDURE — 36415 COLL VENOUS BLD VENIPUNCTURE: CPT

## 2021-12-13 NOTE — HEALTH RISK ASSESSMENT
[Never] : Never [Yes] : Yes [2 - 4 times a month (2 pts)] : 2-4 times a month (2 points) [7 to 9 (3 pts)] : 7 to 9 (3  points) [Never (0 pts)] : Never (0 points) [No] : In the past 12 months have you used drugs other than those required for medical reasons? No [No falls in past year] : Patient reported no falls in the past year [0] : 2) Feeling down, depressed, or hopeless: Not at all (0) [Patient reported colonoscopy was normal] : Patient reported colonoscopy was normal [HIV test declined] : HIV test declined [Hepatitis C test declined] : Hepatitis C test declined [With Family] : lives with family [# of Members in Household ___] :  household currently consist of [unfilled] member(s) [Retired] : retired [College] : College [Single] : single [Sexually Active] : sexually active [Feels Safe at Home] : Feels safe at home [Fully functional (bathing, dressing, toileting, transferring, walking, feeding)] : Fully functional (bathing, dressing, toileting, transferring, walking, feeding) [Fully functional (using the telephone, shopping, preparing meals, housekeeping, doing laundry, using] : Fully functional and needs no help or supervision to perform IADLs (using the telephone, shopping, preparing meals, housekeeping, doing laundry, using transportation, managing medications and managing finances) [Reports normal functional visual acuity (ie: able to read med bottle)] : Reports normal functional visual acuity [Smoke Detector] : smoke detector [Carbon Monoxide Detector] : carbon monoxide detector [Sunscreen] : uses sunscreen [Very Good] : ~his/her~  mood as very good [PHQ-2 Negative - No further assessment needed] : PHQ-2 Negative - No further assessment needed [Audit-CScore] : 5 [[id number]] : 0 [Change in mental status noted] : No change in mental status noted [Language] : denies difficulty with language [Behavior] : denies difficulty with behavior [Learning/Retaining New Information] : denies difficulty learning/retaining new information [Handling Complex Tasks] : denies difficulty handling complex tasks [Reasoning] : denies difficulty with reasoning [Spatial Ability and Orientation] : denies difficulty with spatial ability and orientation [None] : None [High Risk Behavior] : no high risk behavior [Reports changes in hearing] : Reports no changes in hearing [Reports changes in vision] : Reports no changes in vision [Reports changes in dental health] : Reports no changes in dental health [Guns at Home] : no guns at home [Seat Belt] : does not use seat belt [Travel to Developing Areas] : does not  travel to developing areas [TB Exposure] : is not being exposed to tuberculosis [Caregiver Concerns] : does not have caregiver concerns [ColonoscopyDate] : 03/19 [Designated Healthcare Proxy] : Designated healthcare proxy [AdvancecareDate] : 12/21

## 2021-12-14 LAB
ALBUMIN SERPL ELPH-MCNC: 4.3 G/DL
ALP BLD-CCNC: 82 U/L
ALT SERPL-CCNC: 24 U/L
ANION GAP SERPL CALC-SCNC: 14 MMOL/L
AST SERPL-CCNC: 28 U/L
BASOPHILS # BLD AUTO: 0.05 K/UL
BASOPHILS NFR BLD AUTO: 0.8 %
BILIRUB SERPL-MCNC: 1.1 MG/DL
BUN SERPL-MCNC: 22 MG/DL
CALCIUM SERPL-MCNC: 10 MG/DL
CHLORIDE SERPL-SCNC: 102 MMOL/L
CHOLEST SERPL-MCNC: 118 MG/DL
CO2 SERPL-SCNC: 25 MMOL/L
CREAT SERPL-MCNC: 1.19 MG/DL
CREAT SPEC-SCNC: 61 MG/DL
EOSINOPHIL # BLD AUTO: 0.12 K/UL
EOSINOPHIL NFR BLD AUTO: 1.9 %
ESTIMATED AVERAGE GLUCOSE: 111 MG/DL
GLUCOSE SERPL-MCNC: 104 MG/DL
HBA1C MFR BLD HPLC: 5.5 %
HCT VFR BLD CALC: 38.9 %
HDLC SERPL-MCNC: 45 MG/DL
HGB BLD-MCNC: 12.9 G/DL
IMM GRANULOCYTES NFR BLD AUTO: 0.2 %
LDLC SERPL CALC-MCNC: 52 MG/DL
LYMPHOCYTES # BLD AUTO: 1.33 K/UL
LYMPHOCYTES NFR BLD AUTO: 21.1 %
MAN DIFF?: NORMAL
MCHC RBC-ENTMCNC: 30.1 PG
MCHC RBC-ENTMCNC: 33.2 GM/DL
MCV RBC AUTO: 90.7 FL
MICROALBUMIN 24H UR DL<=1MG/L-MCNC: <1.2 MG/DL
MICROALBUMIN/CREAT 24H UR-RTO: NORMAL MG/G
MONOCYTES # BLD AUTO: 0.63 K/UL
MONOCYTES NFR BLD AUTO: 10 %
NEUTROPHILS # BLD AUTO: 4.16 K/UL
NEUTROPHILS NFR BLD AUTO: 66 %
NONHDLC SERPL-MCNC: 73 MG/DL
PLATELET # BLD AUTO: 187 K/UL
POTASSIUM SERPL-SCNC: 4.4 MMOL/L
PROT SERPL-MCNC: 6.7 G/DL
PSA FREE FLD-MCNC: 13 %
PSA FREE SERPL-MCNC: 0.02 NG/ML
PSA SERPL-MCNC: 0.17 NG/ML
RBC # BLD: 4.29 M/UL
RBC # FLD: 13.6 %
SODIUM SERPL-SCNC: 140 MMOL/L
T3FREE SERPL-MCNC: 2.31 PG/ML
T4 FREE SERPL-MCNC: 1.5 NG/DL
TRIGL SERPL-MCNC: 110 MG/DL
TSH SERPL-ACNC: 0.61 UIU/ML
WBC # FLD AUTO: 6.3 K/UL

## 2021-12-17 ENCOUNTER — RX RENEWAL (OUTPATIENT)
Age: 71
End: 2021-12-17

## 2021-12-18 ENCOUNTER — TRANSCRIPTION ENCOUNTER (OUTPATIENT)
Age: 71
End: 2021-12-18

## 2021-12-18 LAB
COVID-19 NUCLEOCAPSID  GAM ANTIBODY INTERPRETATION: POSITIVE
COVID-19 SPIKE DOMAIN ANTIBODY INTERPRETATION: POSITIVE
SARS-COV-2 AB SERPL IA-ACNC: >250 U/ML
SARS-COV-2 AB SERPL QL IA: 32.3 INDEX

## 2021-12-20 ENCOUNTER — RX RENEWAL (OUTPATIENT)
Age: 71
End: 2021-12-20

## 2022-03-21 ENCOUNTER — APPOINTMENT (OUTPATIENT)
Dept: FAMILY MEDICINE | Facility: CLINIC | Age: 72
End: 2022-03-21
Payer: MEDICARE

## 2022-03-21 VITALS
DIASTOLIC BLOOD PRESSURE: 69 MMHG | SYSTOLIC BLOOD PRESSURE: 106 MMHG | BODY MASS INDEX: 34.8 KG/M2 | TEMPERATURE: 98 F | WEIGHT: 235 LBS | HEIGHT: 69 IN | HEART RATE: 64 BPM | OXYGEN SATURATION: 96 %

## 2022-03-21 LAB
BILIRUB UR QL STRIP: NORMAL
GLUCOSE UR-MCNC: ABNORMAL
HCG UR QL: 0.2 EU/DL
HGB UR QL STRIP.AUTO: NORMAL
KETONES UR-MCNC: NORMAL
LEUKOCYTE ESTERASE UR QL STRIP: NORMAL
NITRITE UR QL STRIP: NORMAL
PH UR STRIP: 5
PROT UR STRIP-MCNC: NORMAL
SP GR UR STRIP: 1.02

## 2022-03-21 PROCEDURE — 36415 COLL VENOUS BLD VENIPUNCTURE: CPT

## 2022-03-21 PROCEDURE — 99214 OFFICE O/P EST MOD 30 MIN: CPT | Mod: 25

## 2022-03-21 PROCEDURE — 81003 URINALYSIS AUTO W/O SCOPE: CPT | Mod: QW

## 2022-03-21 NOTE — HISTORY OF PRESENT ILLNESS
[Diabetes Mellitus] : Diabetes Mellitus [FreeTextEntry6] : pt is here for DM CHECK. [No episodes] : No hypoglycemic episodes since the last visit.

## 2022-03-21 NOTE — HEALTH RISK ASSESSMENT
[Never] : Never [0-4] : 0-4 [Yes] : Yes [2 - 3 times a week (3 pts)] : 2 - 3  times a week (3 points) [1 or 2 (0 pts)] : 1 or 2 (0 points) [Never (0 pts)] : Never (0 points) [No] : In the past 12 months have you used drugs other than those required for medical reasons? No [No falls in past year] : Patient reported no falls in the past year [0] : 2) Feeling down, depressed, or hopeless: Not at all (0) [Audit-CScore] : 3 [[id number]] : 0

## 2022-03-22 LAB
ALBUMIN SERPL ELPH-MCNC: 4.5 G/DL
ALP BLD-CCNC: 79 U/L
ALT SERPL-CCNC: 21 U/L
ANION GAP SERPL CALC-SCNC: 13 MMOL/L
AST SERPL-CCNC: 23 U/L
BILIRUB SERPL-MCNC: 0.8 MG/DL
BUN SERPL-MCNC: 27 MG/DL
CALCIUM SERPL-MCNC: 9.9 MG/DL
CHLORIDE SERPL-SCNC: 99 MMOL/L
CHOLEST SERPL-MCNC: 108 MG/DL
CO2 SERPL-SCNC: 26 MMOL/L
CREAT SERPL-MCNC: 1.21 MG/DL
CREAT SPEC-SCNC: 117 MG/DL
EGFR: 64 ML/MIN/1.73M2
ESTIMATED AVERAGE GLUCOSE: 111 MG/DL
GLUCOSE SERPL-MCNC: 109 MG/DL
HBA1C MFR BLD HPLC: 5.5 %
HDLC SERPL-MCNC: 42 MG/DL
LDLC SERPL CALC-MCNC: 42 MG/DL
MICROALBUMIN 24H UR DL<=1MG/L-MCNC: <1.2 MG/DL
MICROALBUMIN/CREAT 24H UR-RTO: NORMAL MG/G
NONHDLC SERPL-MCNC: 67 MG/DL
POTASSIUM SERPL-SCNC: 4 MMOL/L
PROT SERPL-MCNC: 6.6 G/DL
SODIUM SERPL-SCNC: 137 MMOL/L
TRIGL SERPL-MCNC: 123 MG/DL

## 2022-04-11 PROBLEM — Z11.59 SCREENING FOR VIRAL DISEASE: Status: ACTIVE | Noted: 2020-05-22

## 2022-06-12 ENCOUNTER — RX RENEWAL (OUTPATIENT)
Age: 72
End: 2022-06-12

## 2022-06-20 ENCOUNTER — APPOINTMENT (OUTPATIENT)
Dept: FAMILY MEDICINE | Facility: CLINIC | Age: 72
End: 2022-06-20
Payer: MEDICARE

## 2022-06-20 VITALS
BODY MASS INDEX: 34.8 KG/M2 | HEART RATE: 67 BPM | HEIGHT: 69 IN | DIASTOLIC BLOOD PRESSURE: 65 MMHG | WEIGHT: 235 LBS | OXYGEN SATURATION: 96 % | SYSTOLIC BLOOD PRESSURE: 105 MMHG

## 2022-06-20 PROCEDURE — 36415 COLL VENOUS BLD VENIPUNCTURE: CPT

## 2022-06-20 PROCEDURE — 99214 OFFICE O/P EST MOD 30 MIN: CPT | Mod: 25

## 2022-06-20 PROCEDURE — 81003 URINALYSIS AUTO W/O SCOPE: CPT | Mod: QW

## 2022-06-20 NOTE — HISTORY OF PRESENT ILLNESS
[Diabetes Mellitus] : Diabetes Mellitus [Hyperlipidemia] : Hyperlipidemia [Hypertension] : Hypertension [Other: ___] : [unfilled] [FreeTextEntry6] : pt is here for dm check\par C/o periodic GERD [No episodes] : No hypoglycemic episodes since the last visit. [Does not check] : Patient does not check blood glucose regularly [Understanding of foot care] : Patient expressed understanding of foot care [Retinopathy] : Retinopathy [Most Recent A1C: ___] : Most recent A1C was [unfilled] [Target A1C:  ___] : Target A1C is [unfilled] [Moderate Intensity] : Patient is currently on moderate intensity statin  therapy [EyeExamDate] : 04/22 [Does not check BP] : The patient is not checking blood pressure [<140/90] : Target blood pressure is <140/90 [Target goal met] : BP target goal met [Doing Well] : Patient is doing well [Managed with medications] : managed with  medication [Moderate Intensity Therapy] : Patient is currently on moderate intensity statin  therapy

## 2022-06-21 LAB
ALBUMIN SERPL ELPH-MCNC: 4.6 G/DL
ALP BLD-CCNC: 81 U/L
ALT SERPL-CCNC: 24 U/L
ANION GAP SERPL CALC-SCNC: 13 MMOL/L
AST SERPL-CCNC: 29 U/L
BILIRUB SERPL-MCNC: 1 MG/DL
BUN SERPL-MCNC: 22 MG/DL
CALCIUM SERPL-MCNC: 9.3 MG/DL
CHLORIDE SERPL-SCNC: 103 MMOL/L
CHOLEST SERPL-MCNC: 115 MG/DL
CO2 SERPL-SCNC: 25 MMOL/L
CREAT SERPL-MCNC: 1.16 MG/DL
CREAT SPEC-SCNC: 161 MG/DL
EGFR: 67 ML/MIN/1.73M2
ESTIMATED AVERAGE GLUCOSE: 123 MG/DL
GLUCOSE SERPL-MCNC: 99 MG/DL
HBA1C MFR BLD HPLC: 5.9 %
HDLC SERPL-MCNC: 41 MG/DL
LDLC SERPL CALC-MCNC: 49 MG/DL
MICROALBUMIN 24H UR DL<=1MG/L-MCNC: <1.2 MG/DL
MICROALBUMIN/CREAT 24H UR-RTO: NORMAL MG/G
NONHDLC SERPL-MCNC: 73 MG/DL
POTASSIUM SERPL-SCNC: 4.3 MMOL/L
PROT SERPL-MCNC: 6.8 G/DL
PSA FREE FLD-MCNC: 14 %
PSA FREE SERPL-MCNC: 0.03 NG/ML
PSA SERPL-MCNC: 0.21 NG/ML
SODIUM SERPL-SCNC: 140 MMOL/L
TRIGL SERPL-MCNC: 123 MG/DL

## 2022-06-22 LAB
BILIRUB UR QL STRIP: NORMAL
GLUCOSE UR-MCNC: ABNORMAL
HCG UR QL: 0.2 EU/DL
HGB UR QL STRIP.AUTO: NORMAL
KETONES UR-MCNC: ABNORMAL
LEUKOCYTE ESTERASE UR QL STRIP: NORMAL
NITRITE UR QL STRIP: NORMAL
PH UR STRIP: 5.5
PROT UR STRIP-MCNC: NORMAL
SP GR UR STRIP: 1.02

## 2022-07-11 ENCOUNTER — RX RENEWAL (OUTPATIENT)
Age: 72
End: 2022-07-11

## 2022-07-13 ENCOUNTER — RX RENEWAL (OUTPATIENT)
Age: 72
End: 2022-07-13

## 2022-08-24 ENCOUNTER — RX RENEWAL (OUTPATIENT)
Age: 72
End: 2022-08-24

## 2022-09-12 ENCOUNTER — RESULT CHARGE (OUTPATIENT)
Age: 72
End: 2022-09-12

## 2022-09-12 ENCOUNTER — APPOINTMENT (OUTPATIENT)
Dept: FAMILY MEDICINE | Facility: CLINIC | Age: 72
End: 2022-09-12

## 2022-09-12 VITALS
BODY MASS INDEX: 34.7 KG/M2 | SYSTOLIC BLOOD PRESSURE: 96 MMHG | DIASTOLIC BLOOD PRESSURE: 78 MMHG | HEART RATE: 71 BPM | TEMPERATURE: 97.8 F | WEIGHT: 235 LBS | OXYGEN SATURATION: 97 %

## 2022-09-12 LAB
BILIRUB UR QL STRIP: NORMAL
GLUCOSE UR-MCNC: ABNORMAL
HCG UR QL: 0.2 EU/DL
HGB UR QL STRIP.AUTO: NORMAL
KETONES UR-MCNC: NORMAL
LEUKOCYTE ESTERASE UR QL STRIP: NORMAL
NITRITE UR QL STRIP: NORMAL
PH UR STRIP: 6
PROT UR STRIP-MCNC: NORMAL
SP GR UR STRIP: 1.02

## 2022-09-12 PROCEDURE — G0008: CPT

## 2022-09-12 PROCEDURE — 90686 IIV4 VACC NO PRSV 0.5 ML IM: CPT

## 2022-09-12 PROCEDURE — 81003 URINALYSIS AUTO W/O SCOPE: CPT | Mod: QW

## 2022-09-12 PROCEDURE — 99214 OFFICE O/P EST MOD 30 MIN: CPT | Mod: 25

## 2022-09-12 PROCEDURE — 36415 COLL VENOUS BLD VENIPUNCTURE: CPT

## 2022-09-12 NOTE — HISTORY OF PRESENT ILLNESS
[Diabetes Mellitus] : Diabetes Mellitus [No episodes] : No hypoglycemic episodes since the last visit. [Does not check] : Patient does not check blood glucose regularly [Understanding of foot care] : Patient expressed understanding of foot care [No Retinopathy] : No retinopathy [Most Recent A1C: ___] : Most recent A1C was [unfilled] [Target goal met] : A1C target goal met [Moderate Intensity] : Patient is currently on moderate intensity statin  therapy [Managed with medications] : managed with  medication [Moderate Intensity Therapy] : Patient is currently on moderate intensity statin  therapy [FreeTextEntry6] : pt is here for dm check. [EyeExamDate] : 06/22

## 2022-09-13 LAB
ALBUMIN SERPL ELPH-MCNC: 4.3 G/DL
ALP BLD-CCNC: 101 U/L
ALT SERPL-CCNC: 22 U/L
ANION GAP SERPL CALC-SCNC: 14 MMOL/L
AST SERPL-CCNC: 29 U/L
BILIRUB SERPL-MCNC: 0.8 MG/DL
BUN SERPL-MCNC: 26 MG/DL
CALCIUM SERPL-MCNC: 10 MG/DL
CHLORIDE SERPL-SCNC: 101 MMOL/L
CHOLEST SERPL-MCNC: 118 MG/DL
CO2 SERPL-SCNC: 23 MMOL/L
CREAT SERPL-MCNC: 1.15 MG/DL
CREAT SPEC-SCNC: 110 MG/DL
EGFR: 68 ML/MIN/1.73M2
ESTIMATED AVERAGE GLUCOSE: 114 MG/DL
GLUCOSE SERPL-MCNC: 103 MG/DL
HBA1C MFR BLD HPLC: 5.6 %
HDLC SERPL-MCNC: 41 MG/DL
LDLC SERPL CALC-MCNC: 52 MG/DL
MICROALBUMIN 24H UR DL<=1MG/L-MCNC: <1.2 MG/DL
MICROALBUMIN/CREAT 24H UR-RTO: NORMAL MG/G
NONHDLC SERPL-MCNC: 78 MG/DL
POTASSIUM SERPL-SCNC: 4.2 MMOL/L
PROT SERPL-MCNC: 6.6 G/DL
PSA FREE FLD-MCNC: NORMAL %
PSA FREE SERPL-MCNC: <0.01 NG/ML
PSA SERPL-MCNC: 0.14 NG/ML
SODIUM SERPL-SCNC: 138 MMOL/L
T3FREE SERPL-MCNC: 2.24 PG/ML
T4 FREE SERPL-MCNC: 1.5 NG/DL
TRIGL SERPL-MCNC: 129 MG/DL
TSH SERPL-ACNC: 1.08 UIU/ML

## 2022-09-19 ENCOUNTER — RX RENEWAL (OUTPATIENT)
Age: 72
End: 2022-09-19

## 2022-09-21 ENCOUNTER — RX RENEWAL (OUTPATIENT)
Age: 72
End: 2022-09-21

## 2022-10-04 ENCOUNTER — APPOINTMENT (OUTPATIENT)
Dept: FAMILY MEDICINE | Facility: CLINIC | Age: 72
End: 2022-10-04

## 2022-10-04 DIAGNOSIS — J06.9 ACUTE UPPER RESPIRATORY INFECTION, UNSPECIFIED: ICD-10-CM

## 2022-10-04 PROCEDURE — 99213 OFFICE O/P EST LOW 20 MIN: CPT | Mod: 95

## 2022-10-04 NOTE — HISTORY OF PRESENT ILLNESS
[Sore Throat] : sore throat [Cough] : cough [Earache] : earache [Headache] : headache [Tmax= ___] : Tmax = [unfilled] [Home] : at home, [unfilled] , at the time of the visit. [Medical Office: ([facility])___] : at the medical office located in  [Verbal consent obtained from patient] : the patient, [unfilled] [FreeTextEntry4] : Miriam Moeller [Congestion] : no congestion [Wheezing] : no wheezing [Chills] : no chills [Anorexia] : no anorexia [Shortness Of Breath] : no shortness of breath [Fatigue] : not fatigue [Fever] : no fever [FreeTextEntry2] : No loss of smell or taste, N/V/D [FreeTextEntry8] : C/o sore throat x 3 days. Tested for covid with rapid test negative last pm

## 2022-10-04 NOTE — PLAN
[FreeTextEntry1] : Get plenty of rest\par Drink plenty of fluids\par Tylenol or Advil as needed for pain or fever\par F/u if not improving or if symptoms worsen\par Gargle with warm salt water\par Chloraseptic spray as needed for sore throat\par Drink warm liquids\par vaporizer or humidifier\par Do another covid test tonight or tomorrow

## 2022-10-04 NOTE — PHYSICAL EXAM
[Normal Sclera/Conjunctiva] : normal sclera/conjunctiva [Normal Outer Ear/Nose] : the outer ears and nose were normal in appearance [No JVD] : no jugular venous distention [No Respiratory Distress] : no respiratory distress  [Normal] : normal rate, regular rhythm, normal S1 and S2 and no murmur heard

## 2022-11-05 ENCOUNTER — RX RENEWAL (OUTPATIENT)
Age: 72
End: 2022-11-05

## 2022-12-04 ENCOUNTER — RX RENEWAL (OUTPATIENT)
Age: 72
End: 2022-12-04

## 2022-12-14 ENCOUNTER — APPOINTMENT (OUTPATIENT)
Dept: FAMILY MEDICINE | Facility: CLINIC | Age: 72
End: 2022-12-14

## 2022-12-14 ENCOUNTER — NON-APPOINTMENT (OUTPATIENT)
Age: 72
End: 2022-12-14

## 2022-12-14 VITALS
DIASTOLIC BLOOD PRESSURE: 74 MMHG | HEIGHT: 69 IN | OXYGEN SATURATION: 97 % | SYSTOLIC BLOOD PRESSURE: 122 MMHG | WEIGHT: 230 LBS | HEART RATE: 69 BPM | TEMPERATURE: 98 F | BODY MASS INDEX: 34.07 KG/M2

## 2022-12-14 DIAGNOSIS — N13.8 BENIGN PROSTATIC HYPERPLASIA WITH LOWER URINARY TRACT SYMPMS: ICD-10-CM

## 2022-12-14 DIAGNOSIS — N40.1 BENIGN PROSTATIC HYPERPLASIA WITH LOWER URINARY TRACT SYMPMS: ICD-10-CM

## 2022-12-14 PROCEDURE — 36415 COLL VENOUS BLD VENIPUNCTURE: CPT

## 2022-12-14 PROCEDURE — 99213 OFFICE O/P EST LOW 20 MIN: CPT | Mod: 25

## 2022-12-14 PROCEDURE — 81003 URINALYSIS AUTO W/O SCOPE: CPT | Mod: QW

## 2022-12-14 PROCEDURE — G0439: CPT

## 2022-12-14 PROCEDURE — 93000 ELECTROCARDIOGRAM COMPLETE: CPT

## 2022-12-14 NOTE — HEALTH RISK ASSESSMENT
[Never] : Never [2 - 3 times a week (3 pts)] : 2 - 3  times a week (3 points) [1 or 2 (0 pts)] : 1 or 2 (0 points) [Never (0 pts)] : Never (0 points) [No] : In the past 12 months have you used drugs other than those required for medical reasons? No [No falls in past year] : Patient reported no falls in the past year [0] : 2) Feeling down, depressed, or hopeless: Not at all (0) [PHQ-2 Negative - No further assessment needed] : PHQ-2 Negative - No further assessment needed [HIV test declined] : HIV test declined [Hepatitis C test declined] : Hepatitis C test declined [With Family] : lives with family [# of Members in Household ___] :  household currently consist of [unfilled] member(s) [Retired] : retired [High School] : high school [Single] : single [Sexually Active] : sexually active [Feels Safe at Home] : Feels safe at home [Fully functional (bathing, dressing, toileting, transferring, walking, feeding)] : Fully functional (bathing, dressing, toileting, transferring, walking, feeding) [Fully functional (using the telephone, shopping, preparing meals, housekeeping, doing laundry, using] : Fully functional and needs no help or supervision to perform IADLs (using the telephone, shopping, preparing meals, housekeeping, doing laundry, using transportation, managing medications and managing finances) [Reports normal functional visual acuity (ie: able to read med bottle)] : Reports normal functional visual acuity [Smoke Detector] : smoke detector [Carbon Monoxide Detector] : carbon monoxide detector [Safety elements used in home] : safety elements used in home [Seat Belt] :  uses seat belt [Sunscreen] : uses sunscreen [Audit-CScore] : 3 [[id number]] : 0 [Change in mental status noted] : No change in mental status noted [Language] : denies difficulty with language [Behavior] : denies difficulty with behavior [Learning/Retaining New Information] : denies difficulty learning/retaining new information [Handling Complex Tasks] : denies difficulty handling complex tasks [Reasoning] : denies difficulty with reasoning [Spatial Ability and Orientation] : denies difficulty with spatial ability and orientation [Reports changes in hearing] : Reports no changes in hearing [Reports changes in vision] : Reports no changes in vision [Reports changes in dental health] : Reports no changes in dental health [Guns at Home] : no guns at home [Travel to Developing Areas] : does not  travel to developing areas [TB Exposure] : is not being exposed to tuberculosis [Caregiver Concerns] : does not have caregiver concerns [ColonoscopyDate] : 1167 [ColonoscopyComments] : polyps

## 2022-12-14 NOTE — HISTORY OF PRESENT ILLNESS
[FreeTextEntry1] : pt is here for cpe\par States that pantoprazole does not always work. Needs to supplement it with tums\par Also had an episode of RICE during thanksgiving while walking up a steep hill. Has not happed since

## 2022-12-15 LAB
BASOPHILS # BLD AUTO: 0.05 K/UL
BASOPHILS NFR BLD AUTO: 0.8 %
BILIRUB UR QL STRIP: NORMAL
CHOLEST SERPL-MCNC: 108 MG/DL
CLARITY UR: CLEAR
COLLECTION METHOD: NORMAL
EOSINOPHIL # BLD AUTO: 0.16 K/UL
EOSINOPHIL NFR BLD AUTO: 2.5 %
ESTIMATED AVERAGE GLUCOSE: 117 MG/DL
GLUCOSE UR-MCNC: ABNORMAL
HBA1C MFR BLD HPLC: 5.7 %
HCG UR QL: 0.2 EU/DL
HCT VFR BLD CALC: 38.5 %
HDLC SERPL-MCNC: 39 MG/DL
HGB BLD-MCNC: 12.8 G/DL
HGB UR QL STRIP.AUTO: NORMAL
IMM GRANULOCYTES NFR BLD AUTO: 0.2 %
KETONES UR-MCNC: NORMAL
LDLC SERPL CALC-MCNC: 43 MG/DL
LEUKOCYTE ESTERASE UR QL STRIP: NORMAL
LYMPHOCYTES # BLD AUTO: 1.58 K/UL
LYMPHOCYTES NFR BLD AUTO: 24.4 %
MAN DIFF?: NORMAL
MCHC RBC-ENTMCNC: 29.6 PG
MCHC RBC-ENTMCNC: 33.2 GM/DL
MCV RBC AUTO: 88.9 FL
MONOCYTES # BLD AUTO: 0.61 K/UL
MONOCYTES NFR BLD AUTO: 9.4 %
NEUTROPHILS # BLD AUTO: 4.07 K/UL
NEUTROPHILS NFR BLD AUTO: 62.7 %
NITRITE UR QL STRIP: NORMAL
NONHDLC SERPL-MCNC: 69 MG/DL
PH UR STRIP: 6
PLATELET # BLD AUTO: 201 K/UL
PROT UR STRIP-MCNC: NORMAL
PSA FREE FLD-MCNC: 16 %
PSA FREE SERPL-MCNC: 0.02 NG/ML
PSA SERPL-MCNC: 0.12 NG/ML
RBC # BLD: 4.33 M/UL
RBC # FLD: 12.8 %
SP GR UR STRIP: 1.02
T3FREE SERPL-MCNC: 2.35 PG/ML
T4 FREE SERPL-MCNC: 1.8 NG/DL
TRIGL SERPL-MCNC: 133 MG/DL
TSH SERPL-ACNC: 0.43 UIU/ML
WBC # FLD AUTO: 6.48 K/UL

## 2022-12-16 LAB
HCV AB SER QL: NONREACTIVE
HCV S/CO RATIO: 0.27 S/CO
HIV1+2 AB SPEC QL IA.RAPID: NONREACTIVE

## 2022-12-19 LAB
ALBUMIN SERPL ELPH-MCNC: 4.7 G/DL
ALP BLD-CCNC: 78 U/L
ALT SERPL-CCNC: 26 U/L
ANION GAP SERPL CALC-SCNC: 13 MMOL/L
AST SERPL-CCNC: 28 U/L
BILIRUB SERPL-MCNC: 1.2 MG/DL
BUN SERPL-MCNC: 27 MG/DL
CALCIUM SERPL-MCNC: 10.4 MG/DL
CHLORIDE SERPL-SCNC: 101 MMOL/L
CO2 SERPL-SCNC: 25 MMOL/L
CREAT SERPL-MCNC: 1.38 MG/DL
EGFR: 54 ML/MIN/1.73M2
GLUCOSE SERPL-MCNC: 104 MG/DL
POTASSIUM SERPL-SCNC: 4 MMOL/L
PROT SERPL-MCNC: 7 G/DL
SODIUM SERPL-SCNC: 140 MMOL/L

## 2023-01-04 ENCOUNTER — TRANSCRIPTION ENCOUNTER (OUTPATIENT)
Age: 73
End: 2023-01-04

## 2023-01-04 ENCOUNTER — INPATIENT (INPATIENT)
Facility: HOSPITAL | Age: 73
LOS: 0 days | Discharge: ANOTHER HOSPITAL | DRG: 287 | End: 2023-01-05
Attending: FAMILY MEDICINE | Admitting: FAMILY MEDICINE
Payer: MEDICARE

## 2023-01-04 VITALS — HEIGHT: 68 IN | WEIGHT: 225.09 LBS

## 2023-01-04 DIAGNOSIS — I24.9 ACUTE ISCHEMIC HEART DISEASE, UNSPECIFIED: ICD-10-CM

## 2023-01-04 LAB
A1C WITH ESTIMATED AVERAGE GLUCOSE RESULT: 5.7 % — HIGH (ref 4–5.6)
ALBUMIN SERPL ELPH-MCNC: 3.5 G/DL — SIGNIFICANT CHANGE UP (ref 3.3–5)
ALP SERPL-CCNC: 85 U/L — SIGNIFICANT CHANGE UP (ref 40–120)
ALT FLD-CCNC: 28 U/L — SIGNIFICANT CHANGE UP (ref 12–78)
ANION GAP SERPL CALC-SCNC: 6 MMOL/L — SIGNIFICANT CHANGE UP (ref 5–17)
APPEARANCE UR: CLEAR — SIGNIFICANT CHANGE UP
APTT BLD: 34 SEC — SIGNIFICANT CHANGE UP (ref 27.5–35.5)
AST SERPL-CCNC: 27 U/L — SIGNIFICANT CHANGE UP (ref 15–37)
BASOPHILS # BLD AUTO: 0.05 K/UL — SIGNIFICANT CHANGE UP (ref 0–0.2)
BASOPHILS NFR BLD AUTO: 0.6 % — SIGNIFICANT CHANGE UP (ref 0–2)
BILIRUB SERPL-MCNC: 0.7 MG/DL — SIGNIFICANT CHANGE UP (ref 0.2–1.2)
BILIRUB UR-MCNC: NEGATIVE — SIGNIFICANT CHANGE UP
BLD GP AB SCN SERPL QL: SIGNIFICANT CHANGE UP
BUN SERPL-MCNC: 28 MG/DL — HIGH (ref 7–23)
CALCIUM SERPL-MCNC: 9.1 MG/DL — SIGNIFICANT CHANGE UP (ref 8.5–10.1)
CHLORIDE SERPL-SCNC: 103 MMOL/L — SIGNIFICANT CHANGE UP (ref 96–108)
CO2 SERPL-SCNC: 28 MMOL/L — SIGNIFICANT CHANGE UP (ref 22–31)
COLOR SPEC: YELLOW — SIGNIFICANT CHANGE UP
CREAT SERPL-MCNC: 1.25 MG/DL — SIGNIFICANT CHANGE UP (ref 0.5–1.3)
DIFF PNL FLD: NEGATIVE — SIGNIFICANT CHANGE UP
EGFR: 61 ML/MIN/1.73M2 — SIGNIFICANT CHANGE UP
EOSINOPHIL # BLD AUTO: 0.19 K/UL — SIGNIFICANT CHANGE UP (ref 0–0.5)
EOSINOPHIL NFR BLD AUTO: 2.4 % — SIGNIFICANT CHANGE UP (ref 0–6)
ESTIMATED AVERAGE GLUCOSE: 117 MG/DL — HIGH (ref 68–114)
FLUAV AG NPH QL: SIGNIFICANT CHANGE UP
FLUBV AG NPH QL: SIGNIFICANT CHANGE UP
GLUCOSE BLDC GLUCOMTR-MCNC: 98 MG/DL — SIGNIFICANT CHANGE UP (ref 70–99)
GLUCOSE SERPL-MCNC: 118 MG/DL — HIGH (ref 70–99)
GLUCOSE UR QL: 1000 MG/DL
HCT VFR BLD CALC: 37.1 % — LOW (ref 39–50)
HGB BLD-MCNC: 12.3 G/DL — LOW (ref 13–17)
IMM GRANULOCYTES NFR BLD AUTO: 0.3 % — SIGNIFICANT CHANGE UP (ref 0–0.9)
INR BLD: 1.01 RATIO — SIGNIFICANT CHANGE UP (ref 0.88–1.16)
KETONES UR-MCNC: NEGATIVE — SIGNIFICANT CHANGE UP
LEUKOCYTE ESTERASE UR-ACNC: NEGATIVE — SIGNIFICANT CHANGE UP
LYMPHOCYTES # BLD AUTO: 1.63 K/UL — SIGNIFICANT CHANGE UP (ref 1–3.3)
LYMPHOCYTES # BLD AUTO: 20.7 % — SIGNIFICANT CHANGE UP (ref 13–44)
MAGNESIUM SERPL-MCNC: 2.1 MG/DL — SIGNIFICANT CHANGE UP (ref 1.6–2.6)
MCHC RBC-ENTMCNC: 29.1 PG — SIGNIFICANT CHANGE UP (ref 27–34)
MCHC RBC-ENTMCNC: 33.2 GM/DL — SIGNIFICANT CHANGE UP (ref 32–36)
MCV RBC AUTO: 87.7 FL — SIGNIFICANT CHANGE UP (ref 80–100)
MONOCYTES # BLD AUTO: 0.77 K/UL — SIGNIFICANT CHANGE UP (ref 0–0.9)
MONOCYTES NFR BLD AUTO: 9.8 % — SIGNIFICANT CHANGE UP (ref 2–14)
NEUTROPHILS # BLD AUTO: 5.2 K/UL — SIGNIFICANT CHANGE UP (ref 1.8–7.4)
NEUTROPHILS NFR BLD AUTO: 66.2 % — SIGNIFICANT CHANGE UP (ref 43–77)
NITRITE UR-MCNC: NEGATIVE — SIGNIFICANT CHANGE UP
PH UR: 5 — SIGNIFICANT CHANGE UP (ref 5–8)
PLATELET # BLD AUTO: 225 K/UL — SIGNIFICANT CHANGE UP (ref 150–400)
POTASSIUM SERPL-MCNC: 3.9 MMOL/L — SIGNIFICANT CHANGE UP (ref 3.5–5.3)
POTASSIUM SERPL-SCNC: 3.9 MMOL/L — SIGNIFICANT CHANGE UP (ref 3.5–5.3)
PROT SERPL-MCNC: 6.9 GM/DL — SIGNIFICANT CHANGE UP (ref 6–8.3)
PROT UR-MCNC: NEGATIVE — SIGNIFICANT CHANGE UP
PROTHROM AB SERPL-ACNC: 11.7 SEC — SIGNIFICANT CHANGE UP (ref 10.5–13.4)
RBC # BLD: 4.23 M/UL — SIGNIFICANT CHANGE UP (ref 4.2–5.8)
RBC # FLD: 12.5 % — SIGNIFICANT CHANGE UP (ref 10.3–14.5)
RSV RNA NPH QL NAA+NON-PROBE: SIGNIFICANT CHANGE UP
SARS-COV-2 RNA SPEC QL NAA+PROBE: SIGNIFICANT CHANGE UP
SODIUM SERPL-SCNC: 137 MMOL/L — SIGNIFICANT CHANGE UP (ref 135–145)
SP GR SPEC: 1.01 — SIGNIFICANT CHANGE UP (ref 1.01–1.02)
T3 SERPL-MCNC: 86 NG/DL — SIGNIFICANT CHANGE UP (ref 80–200)
T4 AB SER-ACNC: 10.7 UG/DL — SIGNIFICANT CHANGE UP (ref 4.6–12)
TROPONIN I, HIGH SENSITIVITY RESULT: 18.9 NG/L — SIGNIFICANT CHANGE UP
TROPONIN I, HIGH SENSITIVITY RESULT: 21.73 NG/L — SIGNIFICANT CHANGE UP
TROPONIN I, HIGH SENSITIVITY RESULT: 22.08 NG/L — SIGNIFICANT CHANGE UP
TSH SERPL-MCNC: 0.37 UU/ML — SIGNIFICANT CHANGE UP (ref 0.34–4.82)
UROBILINOGEN FLD QL: NEGATIVE — SIGNIFICANT CHANGE UP
WBC # BLD: 7.86 K/UL — SIGNIFICANT CHANGE UP (ref 3.8–10.5)
WBC # FLD AUTO: 7.86 K/UL — SIGNIFICANT CHANGE UP (ref 3.8–10.5)

## 2023-01-04 PROCEDURE — 99222 1ST HOSP IP/OBS MODERATE 55: CPT

## 2023-01-04 PROCEDURE — 82962 GLUCOSE BLOOD TEST: CPT

## 2023-01-04 PROCEDURE — 93010 ELECTROCARDIOGRAM REPORT: CPT

## 2023-01-04 PROCEDURE — 81003 URINALYSIS AUTO W/O SCOPE: CPT

## 2023-01-04 PROCEDURE — C1894: CPT

## 2023-01-04 PROCEDURE — 84443 ASSAY THYROID STIM HORMONE: CPT

## 2023-01-04 PROCEDURE — C1769: CPT

## 2023-01-04 PROCEDURE — 93454 CORONARY ARTERY ANGIO S&I: CPT | Mod: 26

## 2023-01-04 PROCEDURE — 71045 X-RAY EXAM CHEST 1 VIEW: CPT | Mod: 26

## 2023-01-04 PROCEDURE — 93306 TTE W/DOPPLER COMPLETE: CPT

## 2023-01-04 PROCEDURE — 84480 ASSAY TRIIODOTHYRONINE (T3): CPT

## 2023-01-04 PROCEDURE — 36415 COLL VENOUS BLD VENIPUNCTURE: CPT

## 2023-01-04 PROCEDURE — 84484 ASSAY OF TROPONIN QUANT: CPT

## 2023-01-04 PROCEDURE — 71045 X-RAY EXAM CHEST 1 VIEW: CPT

## 2023-01-04 PROCEDURE — 83036 HEMOGLOBIN GLYCOSYLATED A1C: CPT

## 2023-01-04 PROCEDURE — 93454 CORONARY ARTERY ANGIO S&I: CPT

## 2023-01-04 PROCEDURE — C1887: CPT

## 2023-01-04 PROCEDURE — 84436 ASSAY OF TOTAL THYROXINE: CPT

## 2023-01-04 PROCEDURE — 86803 HEPATITIS C AB TEST: CPT

## 2023-01-04 PROCEDURE — 99285 EMERGENCY DEPT VISIT HI MDM: CPT

## 2023-01-04 RX ORDER — ATORVASTATIN CALCIUM 80 MG/1
20 TABLET, FILM COATED ORAL AT BEDTIME
Refills: 0 | Status: DISCONTINUED | OUTPATIENT
Start: 2023-01-04 | End: 2023-01-05

## 2023-01-04 RX ORDER — SODIUM CHLORIDE 9 MG/ML
1000 INJECTION, SOLUTION INTRAVENOUS
Refills: 0 | Status: DISCONTINUED | OUTPATIENT
Start: 2023-01-04 | End: 2023-01-05

## 2023-01-04 RX ORDER — ASPIRIN/CALCIUM CARB/MAGNESIUM 324 MG
81 TABLET ORAL ONCE
Refills: 0 | Status: COMPLETED | OUTPATIENT
Start: 2023-01-04 | End: 2023-01-04

## 2023-01-04 RX ORDER — ZOLPIDEM TARTRATE 10 MG/1
5 TABLET ORAL AT BEDTIME
Refills: 0 | Status: DISCONTINUED | OUTPATIENT
Start: 2023-01-04 | End: 2023-01-05

## 2023-01-04 RX ORDER — SODIUM CHLORIDE 9 MG/ML
1000 INJECTION INTRAMUSCULAR; INTRAVENOUS; SUBCUTANEOUS
Refills: 0 | Status: DISCONTINUED | OUTPATIENT
Start: 2023-01-04 | End: 2023-01-05

## 2023-01-04 RX ORDER — LOPERAMIDE HCL 2 MG
2 TABLET ORAL EVERY 6 HOURS
Refills: 0 | Status: DISCONTINUED | OUTPATIENT
Start: 2023-01-04 | End: 2023-01-05

## 2023-01-04 RX ORDER — GLUCAGON INJECTION, SOLUTION 0.5 MG/.1ML
1 INJECTION, SOLUTION SUBCUTANEOUS ONCE
Refills: 0 | Status: DISCONTINUED | OUTPATIENT
Start: 2023-01-04 | End: 2023-01-05

## 2023-01-04 RX ORDER — SENNA PLUS 8.6 MG/1
2 TABLET ORAL AT BEDTIME
Refills: 0 | Status: DISCONTINUED | OUTPATIENT
Start: 2023-01-04 | End: 2023-01-05

## 2023-01-04 RX ORDER — DEXTROSE 50 % IN WATER 50 %
15 SYRINGE (ML) INTRAVENOUS ONCE
Refills: 0 | Status: DISCONTINUED | OUTPATIENT
Start: 2023-01-04 | End: 2023-01-05

## 2023-01-04 RX ORDER — INSULIN LISPRO 100/ML
VIAL (ML) SUBCUTANEOUS
Refills: 0 | Status: DISCONTINUED | OUTPATIENT
Start: 2023-01-04 | End: 2023-01-05

## 2023-01-04 RX ORDER — LOSARTAN POTASSIUM 100 MG/1
100 TABLET, FILM COATED ORAL DAILY
Refills: 0 | Status: DISCONTINUED | OUTPATIENT
Start: 2023-01-04 | End: 2023-01-05

## 2023-01-04 RX ORDER — ENOXAPARIN SODIUM 100 MG/ML
40 INJECTION SUBCUTANEOUS EVERY 24 HOURS
Refills: 0 | Status: DISCONTINUED | OUTPATIENT
Start: 2023-01-04 | End: 2023-01-05

## 2023-01-04 RX ORDER — LANOLIN ALCOHOL/MO/W.PET/CERES
5 CREAM (GRAM) TOPICAL AT BEDTIME
Refills: 0 | Status: DISCONTINUED | OUTPATIENT
Start: 2023-01-04 | End: 2023-01-05

## 2023-01-04 RX ORDER — DEXTROSE 50 % IN WATER 50 %
25 SYRINGE (ML) INTRAVENOUS ONCE
Refills: 0 | Status: DISCONTINUED | OUTPATIENT
Start: 2023-01-04 | End: 2023-01-05

## 2023-01-04 RX ORDER — ONDANSETRON 8 MG/1
4 TABLET, FILM COATED ORAL EVERY 6 HOURS
Refills: 0 | Status: DISCONTINUED | OUTPATIENT
Start: 2023-01-04 | End: 2023-01-05

## 2023-01-04 RX ORDER — LEVOTHYROXINE SODIUM 125 MCG
150 TABLET ORAL DAILY
Refills: 0 | Status: DISCONTINUED | OUTPATIENT
Start: 2023-01-04 | End: 2023-01-05

## 2023-01-04 RX ORDER — PANTOPRAZOLE SODIUM 20 MG/1
40 TABLET, DELAYED RELEASE ORAL
Refills: 0 | Status: DISCONTINUED | OUTPATIENT
Start: 2023-01-04 | End: 2023-01-05

## 2023-01-04 RX ORDER — DEXTROSE 50 % IN WATER 50 %
12.5 SYRINGE (ML) INTRAVENOUS ONCE
Refills: 0 | Status: DISCONTINUED | OUTPATIENT
Start: 2023-01-04 | End: 2023-01-05

## 2023-01-04 RX ORDER — ASPIRIN/CALCIUM CARB/MAGNESIUM 324 MG
81 TABLET ORAL DAILY
Refills: 0 | Status: DISCONTINUED | OUTPATIENT
Start: 2023-01-04 | End: 2023-01-05

## 2023-01-04 RX ORDER — NITROGLYCERIN 6.5 MG
0.4 CAPSULE, EXTENDED RELEASE ORAL
Refills: 0 | Status: DISCONTINUED | OUTPATIENT
Start: 2023-01-04 | End: 2023-01-05

## 2023-01-04 RX ORDER — SODIUM CHLORIDE 9 MG/ML
3 INJECTION INTRAMUSCULAR; INTRAVENOUS; SUBCUTANEOUS ONCE
Refills: 0 | Status: COMPLETED | OUTPATIENT
Start: 2023-01-04 | End: 2023-01-04

## 2023-01-04 RX ORDER — SODIUM CHLORIDE 9 MG/ML
250 INJECTION INTRAMUSCULAR; INTRAVENOUS; SUBCUTANEOUS ONCE
Refills: 0 | Status: COMPLETED | OUTPATIENT
Start: 2023-01-04 | End: 2023-01-04

## 2023-01-04 RX ADMIN — ATORVASTATIN CALCIUM 20 MILLIGRAM(S): 80 TABLET, FILM COATED ORAL at 21:55

## 2023-01-04 RX ADMIN — Medication 81 MILLIGRAM(S): at 06:41

## 2023-01-04 RX ADMIN — SODIUM CHLORIDE 75 MILLILITER(S): 9 INJECTION INTRAMUSCULAR; INTRAVENOUS; SUBCUTANEOUS at 17:32

## 2023-01-04 RX ADMIN — Medication 5 MILLIGRAM(S): at 21:54

## 2023-01-04 RX ADMIN — SODIUM CHLORIDE 250 MILLILITER(S): 9 INJECTION INTRAMUSCULAR; INTRAVENOUS; SUBCUTANEOUS at 11:35

## 2023-01-04 NOTE — H&P ADULT - NSHPPHYSICALEXAM_GEN_ALL_CORE
Vital Signs Last 24 Hrs  T(C): 37.1 (04 Jan 2023 03:26), Max: 37.1 (04 Jan 2023 03:26)  T(F): 98.7 (04 Jan 2023 03:26), Max: 98.7 (04 Jan 2023 03:26)  HR: 62 (04 Jan 2023 03:26) (62 - 62)  BP: 119/70 (04 Jan 2023 03:26) (119/70 - 119/70)  BP(mean): 85 (04 Jan 2023 03:26) (85 - 85)  RR: 18 (04 Jan 2023 03:26) (18 - 18)  SpO2: 100% (04 Jan 2023 03:26) (100% - 100%)    Parameters below as of 04 Jan 2023 03:26  Patient On (Oxygen Delivery Method): room air      Constitutional: NAD, awake and alert, well-developed  HEENT: PERRLA, EOMI, Pharynx Clear  Neck: Supple, No JVD, No Lymphadenopathy  Respiratory: Breath sounds are clear bilaterally, No wheezing, rales or rhonchi  Cardiovascular: S1 and S2, regular rate and rhythm, 2/6 systolic Murmur, rubs or gallops  Gastrointestinal: Bowel Sounds present, soft, nontender. No Hepatosplenomegaly. No masses  Extremities: Without clubbing, cyanosis or edema  Vascular: 2+ peripheral pulses  Neurological: A/O x 3, no focal deficits  Musculoskeletal: FROM upper and lower extremities  Skin: No rashes

## 2023-01-04 NOTE — CONSULT NOTE ADULT - ASSESSMENT
73 yo M with above PMHx presents c/o acute CP associated with nausea    -Concerning patient is having typical cardiac symptoms with significant risk factors and recent + nuclear stress test  -Plan for [facility] today with Dr. Elkins. it will be in the late afternoon so OK to have a light breakfast  -Restart his home ASA 81 and atorvastatin 40  -Monitor BP at home on losartan 100-HCTZ 12.5, would continue  -Recommend holding his metformin since will be getting IV contrast via [facility]

## 2023-01-04 NOTE — PHARMACOTHERAPY INTERVENTION NOTE - COMMENTS
Medication history complete, reviewed medication with patient and confirmed with Awilda.  Called MD Dr. Jernigan at [phone] to verify patients dose of Atorvastatin (20mg) and Levothyroxine (150).

## 2023-01-04 NOTE — ED PROVIDER NOTE - CLINICAL SUMMARY MEDICAL DECISION MAKING FREE TEXT BOX
pt with hx dm with recurrent chest pain since cruise vacation where he had simlar episode and was found to have inc troponin pt with hx dm, htn, obesity with recurrent chest pain since [place] episode and was found to have inc troponin. Labs, chest xray ekg cardiology consult admission

## 2023-01-04 NOTE — ED ADULT TRIAGE NOTE - CHIEF COMPLAINT QUOTE
PT C/O CHEST PAIN, NAUSEA. "PT STATES I WAS LAYING DOWN IN BED AND I STARTED GETTING CHEST PAIN, AND NAUSEA. I HAVEN'T HAD THIS PAIN BEFORE". HX DM, HBP, HLD. SEEN BY DR GARCIA ON TUESDAY CALLED BY HIS OFFICE AND TOLD HAS TO HAVE A ANGIOGRAM ON TUESDAY. PT IS WEARING A HEART MONITOR  . PT TOOK 1 ASPIRIN 325 AT 2PM . PT WIFE STATES " HE WAS ON CRUISE AND PT WAS LYING ON BED UNCONSCIOUS, I TRIED TO WAKE HIM NO RESPONSE THEN HE WOKE UP AND THREW UP LIKE A VOLCANO. WHILE ON CRUISE HIS BLOOD TEST  TROPONIN WAS A 9 AND THEN WAS A 1 3 DAYS LATER. PT IS A&OX 3 SPEAKING IN FULL SENTENCES. PT IS EXPERIENCING PAIN AND STAT EKG DONE IN TRIAGE.

## 2023-01-04 NOTE — H&P ADULT - ASSESSMENT
Pt is a 71 yo wm with hx dm, htn, obesity, RICE, abnomal PET scan presents with CP     Assessment:  Acute Cardiac Syndrome  Diabetes  Obesity  HTN  Prostate Cancer s/p Cyber Knife  BPH  Hypothyroidism      Plan:  Admit to telemetry  Serial CE's  Cardiology Consult appreciated  Continue Losartan  Hold Metformin  Hold Farxiga  Hold Januvia  Insulin for coverage as needed  For Cardiac Cath this afternoon  Continue Lipitor  Continue ASA  Continue Synthroid  Futher Tx as his condition warrants    DVT Prophylaxis:  SQ lovenox    Advanced Directives:  Full Code

## 2023-01-04 NOTE — H&P ADULT - HISTORY OF PRESENT ILLNESS
Pt is a 73 yo wm with hx dm, htn, obesity who was on cruise on Dec 28 th when he developed midsternal cp with vomiting and passed out. Het saw ship's doctor who did bw and ekg and he was told he had a troponin of 9. pt was treated and it was decided to trend troponins and they went down. Pt followed up with his cardiologist upon return who advised him to have pet then advised him to have angiogram which was scheduled for 1 week. Pt states was advised to go to ER if pain returned.On evening of admission pt was lying in bed and noted midsternal chest pain with slight nausea. No fever or cough. Nonsmoker occ EtOH no drugs. Positive FH for CAD.        Active Problems  Encounter for preventive health examination (V70.0) (Z00.00)  Acute bilateral low back pain with bilateral sciatica (724.2,724.3) (M54.42,M54.41)  Alopecia (704.00) (L65.9)  Benign localized hyperplasia of prostate with urinary obstruction (600.21,599.69)  (N40.1,N13.8)  Bronchitis (490) (J40)  Chronic bilateral low back pain without sciatica (724.2,338.29) (M54.50,G89.29)  Chronic GERD (530.81) (K21.9)  Counseling for travel (V65.49) (Z71.84)  Diabetes mellitus type 2 in obese (250.00,278.00) (E11.69,E66.9)  Generalized anxiety disorder (300.02) (F41.1)  Hyperlipidemia (272.4) (E78.5)  Hypertension (401.9) (I10)  Hypothyroidism (244.9) (E03.9)  Insect bite of other part of head, initial encounter (910.4,E906.4) (S00.86XA,W57.XXXA)  Muscle weakness of lower extremity (728.87) (M62.81)  Non-insulin dependent type 2 diabetes mellitus (250.00) (E11.9)  Obesity due to excess calories (278.00) (E66.09)  Prerenal azotemia (790.6) (R79.89)  Prophylactic vaccination against streptococcus pneumoniae and influenza (V06.6) (Z23)  Prostate cancer s/p Cyber Knife (185) (C61)  Screening for viral disease (V73.99) (Z11.59)  Urinary frequency (788.41) (R35.0)    Past Medical History  History of arthritis (V13.4) (Z87.39)  History of elevated prostate specific antigen (PSA) (V13.89) (Z87.898)  History of elevated prostate specific antigen (PSA) (V13.89) (Z87.898)  History of Venomous sting, intentional self-harm, initial encounter (989.5,E950.9)  (T63.92XA)  Viral URI with cough (465.9) (J06.9)    Surgical History  History of Surgery Excision Lipoma    Family History  Family history of cardiac disorder (V17.49) (Z82.49) : Father, Brother  Family history of hyperlipidemia (V18.19) (Z83.438) : Father  Family history of malignant neoplasm of urinary bladder (V16.52) (Z80.52) : Mother,  Brother  Family history of malignant neoplasm of uterus (V16.49) (Z80.49) : Sister  Family history of thyroid disease (V18.19) (Z83.49) : Sister  Family history of type 2 diabetes mellitus (V18.0) (Z83.3) : Brother    Social History  Moderate alcohol use  Never a smoker  No illicit drug use  Occupation    Home Meds  Atorvastatin Calcium 20 MG Oral Tablet; Take 1 tablet daily  diazePAM 5 MG Oral Tablet; TAKE 1 TABLET DAILY MDD:1 tab  Farxiga 10 MG Oral Tablet; Take 1 tablet daily  Finasteride 1 MG Oral Tablet; TAKE ONE TABLET BY MOUTH ONE TIME DAILY  Januvia 50 MG Oral Tablet; Take 1 tablet daily  Levothyroxine Sodium 150 MCG Oral Tablet; Take 1 tablet daily  Losartan Potassium-HCTZ 100-12.5 MG Oral Tablet; Take 1 tablet daily  metFORMIN HCl  MG Oral Tablet Extended Release 24 Hour; TAKE 4 TABLETS  ONCE DAILY  Pantoprazole Sodium 20 MG Oral Tablet Delayed Release; TAKE 1 TABLET ONCE DAILY  Zolpidem Tartrate 10 MG Oral Tablet; TAKE 1 TABLET AT BEDTIME PRN MDD:1 tab    Allergies  No Known Drug Allergies

## 2023-01-04 NOTE — ED PROVIDER NOTE - PROGRESS NOTE DETAILS
Acute bronchiolitis Katharine DO: Patient had been admitted by Dr. Vargas- patient's pmd- Dr. Jernigan- admitting team made aware; Dr. Jernigan to admit; Dr. Kianna Heath at bedside- plan for cardiac cath today and patient made aware.

## 2023-01-04 NOTE — ED PROVIDER NOTE - NSICDXPASTMEDICALHX_GEN_ALL_CORE_FT
PAST MEDICAL HISTORY:  Diabetes mellitus     Hypertension     Hypothyroidism     Obesity     Prostate CA

## 2023-01-04 NOTE — PROGRESS NOTE ADULT - SUBJECTIVE AND OBJECTIVE BOX
HPI:   Pt is a 71 yo wm with hx T2DM, HTN, HLD obesity, GERD, hypothyroidism, prostate cancer who was on cruise on Dec 28 th when he developed midsternal cp with vomiting and passed out. He saw ship's doctor who did bw and ekg and he was told he had a troponin of 9. pt was treated and it was decided to trend troponins and they went down. Pt followed up with his cardiologist upon return who advised him to have pet then advised him to have angiogram which was scheduled for 1 week. Pt states was advised to go to ER if pain returned. On evening of admission pt was lying in bed and noted midsternal chest pain with slight nausea. No fever or cough. Nonsmoker occ EtOH no drugs. Positive FH for CAD. Pt. referred for [facility] for further evaluation.        Home Meds  Atorvastatin Calcium 20 MG Oral Tablet; Take 1 tablet daily  diazePAM 5 MG Oral Tablet; TAKE 1 TABLET DAILY MDD:1 tab  Farxiga 10 MG Oral Tablet; Take 1 tablet daily  Finasteride 1 MG Oral Tablet; TAKE ONE TABLET BY MOUTH ONE TIME DAILY  Januvia 50 MG Oral Tablet; Take 1 tablet daily  Levothyroxine Sodium 150 MCG Oral Tablet; Take 1 tablet daily  Losartan Potassium-HCTZ 100-12.5 MG Oral Tablet; Take 1 tablet daily  metFORMIN HCl  MG Oral Tablet Extended Release 24 Hour; TAKE 4 TABLETS  ONCE DAILY  Pantoprazole Sodium 20 MG Oral Tablet Delayed Release; TAKE 1 TABLET ONCE DAILY  Zolpidem Tartrate 10 MG Oral Tablet; TAKE 1 TABLET AT BEDTIME PRN MDD:1 tab    Allergies: No Known Drug Allergies   (04 Jan 2023 09:58)      T(C): 37.1 (01-04-23 @ 03:26), Max: 37.1 (01-04-23 @ 03:26)  HR: 63 (01-04-23 @ 16:35) (62 - 63)  BP: 128/70 (01-04-23 @ 16:35) (119/70 - 128/70)  RR: 18 (01-04-23 @ 16:35) (18 - 18)  SpO2: 98% (01-04-23 @ 16:35) (98% - 100%)  Wt(kg): --    PHYSICAL EXAM:  Neurologic: Non-focal, AxOx3.  No neuro deficits  Vascular: Peripheral pulses palpable 2+ bilaterally  Procedure Site: RT. radial band in place site benign soft no bleeding no hematoma +1 radial pulse no c/o numbness/tingling, <3sec cap refill, fingers/hand warm to touch      PROCEDURE RESULTS:  S/P [facility] non-obstructive CAD aortic valve calcification    ASSESSMENT/PLAN: 	  Pt is a 71 yo wm with hx T2DM, HTN, HLD obesity, GERD, hypothyroidism, prostate cancer who was on cruise on Dec 28 th when he developed midsternal cp with vomiting and passed out. He saw ship's doctor who did bw and ekg and he was told he had a troponin of 9. pt was treated and it was decided to trend troponins and they went down. Pt followed up with his cardiologist upon return who advised him to have pet then advised him to have angiogram which was scheduled for 1 week. Pt states was advised to go to ER if pain returned. On evening of admission pt was lying in bed and noted midsternal chest pain with slight nausea. S/P [facility]     -Continue telemetry monitoring   -VS, labs, diet, activity as per PCI orders  -IV hydration  -Encourage PO fluids  -2Decho to evaluate valves (aortic valve calcification)  -ASA 81mg  -Losartan 100mg  -Lipitor 20mg  -HCTZ 12.5mg   -Toprol XL\-Plan of care D/W pt. and MD  -Discussed therapeutic lifestyle changes to reduce risk factors such as following a cardiac diet, weight loss, maintaining a healthy weight, exercise, smoking cessation, medication compliance, and regular follow-up  with MD to know our numbers (BP, cholesterol, weight, and glucose  - Follow-up AM labs/EKG/site check  -Follow-up with attending/cardiologist       HPI:   Pt is a 71 yo wm with hx T2DM, HTN, HLD obesity, GERD, hypothyroidism, prostate cancer who was on cruise on Dec 28 th when he developed midsternal cp with vomiting and passed out. He saw ship's doctor who did bw and ekg and he was told he had a troponin of 9. pt was treated and it was decided to trend troponins and they went down. Pt followed up with his cardiologist upon return who advised him to have pet then advised him to have angiogram which was scheduled for 1 week. Pt states was advised to go to ER if pain returned. On evening of admission pt was lying in bed and noted midsternal chest pain with slight nausea. No fever or cough. Nonsmoker occ EtOH no drugs. Positive FH for CAD. Pt. referred for [facility] for further evaluation.        Home Meds  Atorvastatin Calcium 20 MG Oral Tablet; Take 1 tablet daily  diazePAM 5 MG Oral Tablet; TAKE 1 TABLET DAILY MDD:1 tab  Farxiga 10 MG Oral Tablet; Take 1 tablet daily  Finasteride 1 MG Oral Tablet; TAKE ONE TABLET BY MOUTH ONE TIME DAILY  Januvia 50 MG Oral Tablet; Take 1 tablet daily  Levothyroxine Sodium 150 MCG Oral Tablet; Take 1 tablet daily  Losartan Potassium-HCTZ 100-12.5 MG Oral Tablet; Take 1 tablet daily  metFORMIN HCl  MG Oral Tablet Extended Release 24 Hour; TAKE 4 TABLETS  ONCE DAILY  Pantoprazole Sodium 20 MG Oral Tablet Delayed Release; TAKE 1 TABLET ONCE DAILY  Zolpidem Tartrate 10 MG Oral Tablet; TAKE 1 TABLET AT BEDTIME PRN MDD:1 tab    Allergies: No Known Drug Allergies   (04 Jan 2023 09:58)      T(C): 37.1 (01-04-23 @ 03:26), Max: 37.1 (01-04-23 @ 03:26)  HR: 63 (01-04-23 @ 16:35) (62 - 63)  BP: 128/70 (01-04-23 @ 16:35) (119/70 - 128/70)  RR: 18 (01-04-23 @ 16:35) (18 - 18)  SpO2: 98% (01-04-23 @ 16:35) (98% - 100%)  Wt(kg): --    PHYSICAL EXAM:  Neurologic: Non-focal, AxOx3.  No neuro deficits  Vascular: Peripheral pulses palpable 2+ bilaterally  Procedure Site: RT. radial band in place site benign soft no bleeding no hematoma +1 radial pulse no c/o numbness/tingling, <3sec cap refill, fingers/hand warm to touch      PROCEDURE RESULTS:  S/P [facility] non-obstructive CAD aortic valve calcification    ASSESSMENT/PLAN: 	  Pt is a 71 yo wm with hx T2DM, HTN, HLD obesity, GERD, hypothyroidism, prostate cancer who was on cruise on Dec 28 th when he developed midsternal cp with vomiting and passed out. He saw ship's doctor who did bw and ekg and he was told he had a troponin of 9. pt was treated and it was decided to trend troponins and they went down. Pt followed up with his cardiologist upon return who advised him to have pet then advised him to have angiogram which was scheduled for 1 week. Pt states was advised to go to ER if pain returned. On evening of admission pt was lying in bed and noted midsternal chest pain with slight nausea. S/P [facility]     -Continue telemetry monitoring   -VS, labs, diet, activity as per PCI orders  -Gentle hydration EDP not calculated   -Encourage PO fluids  -2Decho to evaluate valves (aortic valve calcification)  -ASA 81mg  -Losartan 100mg  -Lipitor 20mg  -HCTZ 12.5mg   -Toprol XL\-Plan of care D/W pt. and MD  -Discussed therapeutic lifestyle changes to reduce risk factors such as following a cardiac diet, weight loss, maintaining a healthy weight, exercise, smoking cessation, medication compliance, and regular follow-up  with MD to know our numbers (BP, cholesterol, weight, and glucose  - Follow-up AM labs/EKG/site check  -Follow-up with attending/cardiologist

## 2023-01-04 NOTE — H&P ADULT - NSHPREVIEWOFSYSTEMS_GEN_ALL_CORE
CONSTITUTIONAL: No weakness, fevers or chills  EYES/ENT: No visual changes;  No vertigo or throat pain   NECK: No pain or stiffness  RESPIRATORY: No cough, wheezing, hemoptysis; No shortness of breath at rest. RICE  CARDIOVASCULAR: No  palpitations  GASTROINTESTINAL: No abdominal or epigastric pain. No nausea, vomiting, or hematemesis; No diarrhea or constipation. No melena or hematochezia.  GENITOURINARY: No dysuria, frequency or hematuria  NEUROLOGICAL: No numbness or weakness  SKIN: No itching, burning, rashes, or lesions   All other review of systems is negative unless indicated above

## 2023-01-04 NOTE — ED ADULT NURSE NOTE - OBJECTIVE STATEMENT
73 y/o male, a&ox4, received to Ed with c/o CP. Pt reports CP, starting last night while sleeping, took an aspirin, CP resolved prior to Ed arrival. 12 lead ECG completed, NSR on cardiac monitor. PMH HTN, DM non insulin dependent. Pt has been 18G IV placed to left forearm, labs collected and sent off. Pt medicated as per MD orders.

## 2023-01-04 NOTE — ED PROVIDER NOTE - OBJECTIVE STATEMENT
Pt is a 71 yo wm with hx dm, htn, obesisty whowas on cruise on Dec 28 th when he developed midsternal cp with vomiting and passed out. SO antioneyemi pt saw bro doctor who did bw and ekg and he was told he had a troponin of 9. pt was treated and it was decided to trend troponins and they went down. pt followed up with his cardiologist upon return who advised him to have pet then advised him to have angiogram which is scheduled. Pt states was advised to go to ER if pain returned. Tonight pt was lyiing in bed and noted midsternal chest pain with slight nausea. No fever or cough. Nonsmoker occ etoh no drugs. Pt is a 73 yo wm with hx dm, htn, obesity who was on cruise on Dec 28 th when he developed midsternal cp with vomiting and passed out. SO colton pt saw bro doctor who did bw and ekg and he was told he had a troponin of 9. pt was treated and it was decided to trend troponins and they went down. pt followed up with his cardiologist upon return who advised him to have pet then advised him to have angiogram which is scheduled. Pt states was advised to go to ER if pain returned. Tonight pt was lyiing in bed and noted midsternal chest pain with slight nausea. No fever or cough. Nonsmoker occ etoh no drugs.

## 2023-01-04 NOTE — CHART NOTE - NSCHARTNOTEFT_GEN_A_CORE
ER 1/4/23: Pt is a 71 yo wm with hx dm, htn, obesity who was on cruise on Dec 28 th when he developed midsternal cp with vomiting and passed out. Het saw ship's doctor who did bw and ekg and he was told he had a troponin of 9. pt was treated and it was decided to trend troponins and they went down. Pt followed up with his cardiologist upon return who advised him to have pet then advised him to have angiogram which was scheduled for 1 week. Pt states was advised to go to ER if pain returned.On evening of admission pt was lying in bed and noted midsternal chest pain with slight nausea. No fever or cough. Nonsmoker occ EtOH no drugs. Positive FH for CAD.    1/4/23 pt referred to cardiac cath for further evaluation. Pt seen and examined in ER holding. No acute distress noted, denies CP/SOB at this time.  Pt gives consent to speak with Jennifer Everett ([phone]) in regards to procedure and outcomes.   ASA class: II  Creatinine:  1.25  GFR: 61  Bleeding  Risk score:  1.1%  Jett Score: 4pts   SIOBHAN prehydration NS 250cc bolus x 1  -Consent obtained for cardiac catheterization w/ coronary angiogram, possible sedation and/or analgesia and possible stent placement. Pt is competent, has capacity, and understands risks and benefits of procedure. Risks and benefits discussed. Risk discussed included, but not limited to MI, stroke, mortality, major bleeding, arrythmia, or infection. All questions answered

## 2023-01-04 NOTE — PACU DISCHARGE NOTE - COMMENTS
report given to RN on [facility]. s/p LHc, right wrist pressure drsg in place. CDI, no evidence of hematoma or bleeding. vital signs stable. transported on zoll monitor

## 2023-01-04 NOTE — ED PROVIDER NOTE - DIFFERENTIAL DIAGNOSIS
Differential Diagnosis acs, cardiomyopathy, pe, chest wall pain, pericarditis, pleuritis, aortic dissection

## 2023-01-04 NOTE — H&P ADULT - NSHPLABSRESULTS_GEN_ALL_CORE
.                      12.3   7.86  )-----------( 225      ( 2023 05:00 )             37.1     01-04    137  |  103  |  28<H>  ----------------------------<  118<H>  3.9   |  28  |  1.25    Ca    9.1      2023 05:00  Mg     2.1     -    TPro  6.9  /  Alb  3.5  /  TBili  0.7  /  DBili  x   /  AST  27  /  ALT  28  /  AlkPhos  85  01-04    Urinalysis Basic - ( 2023 09:38 )  Color: Yellow / Appearance: Clear / S.015 / pH: x  Gluc: x / Ketone: Negative  / Bili: Negative / Urobili: Negative   Blood: x / Protein: Negative / Nitrite: Negative   Leuk Esterase: Negative / RBC: x / WBC x   Sq Epi: x / Non Sq Epi: x / Bacteria: x      PT/INR - ( 2023 05:00 )   PT: 11.7 sec;   INR: 1.01 ratio    PTT - ( 2023 05:00 )  PTT:34.0 sec

## 2023-01-05 ENCOUNTER — TRANSCRIPTION ENCOUNTER (OUTPATIENT)
Age: 73
End: 2023-01-05

## 2023-01-05 ENCOUNTER — INPATIENT (INPATIENT)
Facility: HOSPITAL | Age: 73
LOS: 0 days | Discharge: HOME | DRG: 307 | End: 2023-01-06
Attending: THORACIC SURGERY (CARDIOTHORACIC VASCULAR SURGERY) | Admitting: THORACIC SURGERY (CARDIOTHORACIC VASCULAR SURGERY)
Payer: MEDICARE

## 2023-01-05 VITALS
WEIGHT: 223.33 LBS | HEART RATE: 65 BPM | OXYGEN SATURATION: 95 % | RESPIRATION RATE: 18 BRPM | DIASTOLIC BLOOD PRESSURE: 67 MMHG | SYSTOLIC BLOOD PRESSURE: 119 MMHG | TEMPERATURE: 99 F | HEIGHT: 68 IN

## 2023-01-05 VITALS — SYSTOLIC BLOOD PRESSURE: 134 MMHG | HEART RATE: 94 BPM | DIASTOLIC BLOOD PRESSURE: 63 MMHG

## 2023-01-05 DIAGNOSIS — I35.0 NONRHEUMATIC AORTIC (VALVE) STENOSIS: ICD-10-CM

## 2023-01-05 LAB
A1C WITH ESTIMATED AVERAGE GLUCOSE RESULT: 5.3 % — SIGNIFICANT CHANGE UP (ref 4–5.6)
A1C WITH ESTIMATED AVERAGE GLUCOSE RESULT: 5.6 % — SIGNIFICANT CHANGE UP (ref 4–5.6)
ALBUMIN SERPL ELPH-MCNC: 4.3 G/DL — SIGNIFICANT CHANGE UP (ref 3.3–5.2)
ALP SERPL-CCNC: 80 U/L — SIGNIFICANT CHANGE UP (ref 40–120)
ALT FLD-CCNC: 23 U/L — SIGNIFICANT CHANGE UP
ANION GAP SERPL CALC-SCNC: 11 MMOL/L — SIGNIFICANT CHANGE UP (ref 5–17)
APTT BLD: 33.1 SEC — SIGNIFICANT CHANGE UP (ref 27.5–35.5)
AST SERPL-CCNC: 26 U/L — SIGNIFICANT CHANGE UP
BASOPHILS # BLD AUTO: 0.04 K/UL — SIGNIFICANT CHANGE UP (ref 0–0.2)
BASOPHILS NFR BLD AUTO: 0.5 % — SIGNIFICANT CHANGE UP (ref 0–2)
BILIRUB SERPL-MCNC: 0.9 MG/DL — SIGNIFICANT CHANGE UP (ref 0.4–2)
BLD GP AB SCN SERPL QL: SIGNIFICANT CHANGE UP
BUN SERPL-MCNC: 20.2 MG/DL — HIGH (ref 8–20)
CALCIUM SERPL-MCNC: 9.4 MG/DL — SIGNIFICANT CHANGE UP (ref 8.4–10.5)
CHLORIDE SERPL-SCNC: 102 MMOL/L — SIGNIFICANT CHANGE UP (ref 96–108)
CO2 SERPL-SCNC: 25 MMOL/L — SIGNIFICANT CHANGE UP (ref 22–29)
CREAT SERPL-MCNC: 1.24 MG/DL — SIGNIFICANT CHANGE UP (ref 0.5–1.3)
EGFR: 62 ML/MIN/1.73M2 — SIGNIFICANT CHANGE UP
EOSINOPHIL # BLD AUTO: 0.14 K/UL — SIGNIFICANT CHANGE UP (ref 0–0.5)
EOSINOPHIL NFR BLD AUTO: 1.8 % — SIGNIFICANT CHANGE UP (ref 0–6)
ESTIMATED AVERAGE GLUCOSE: 105 MG/DL — SIGNIFICANT CHANGE UP (ref 68–114)
ESTIMATED AVERAGE GLUCOSE: 114 MG/DL — SIGNIFICANT CHANGE UP (ref 68–114)
GLUCOSE BLDC GLUCOMTR-MCNC: 100 MG/DL — HIGH (ref 70–99)
GLUCOSE BLDC GLUCOMTR-MCNC: 107 MG/DL — HIGH (ref 70–99)
GLUCOSE BLDC GLUCOMTR-MCNC: 123 MG/DL — HIGH (ref 70–99)
GLUCOSE SERPL-MCNC: 109 MG/DL — HIGH (ref 70–99)
HCT VFR BLD CALC: 36.4 % — LOW (ref 39–50)
HCV AB S/CO SERPL IA: 0.18 S/CO — SIGNIFICANT CHANGE UP (ref 0–0.99)
HCV AB SERPL-IMP: SIGNIFICANT CHANGE UP
HGB BLD-MCNC: 11.9 G/DL — LOW (ref 13–17)
IMM GRANULOCYTES NFR BLD AUTO: 0.3 % — SIGNIFICANT CHANGE UP (ref 0–0.9)
INR BLD: 1.09 RATIO — SIGNIFICANT CHANGE UP (ref 0.88–1.16)
LYMPHOCYTES # BLD AUTO: 1.51 K/UL — SIGNIFICANT CHANGE UP (ref 1–3.3)
LYMPHOCYTES # BLD AUTO: 19.3 % — SIGNIFICANT CHANGE UP (ref 13–44)
MAGNESIUM SERPL-MCNC: 2.2 MG/DL — SIGNIFICANT CHANGE UP (ref 1.6–2.6)
MCHC RBC-ENTMCNC: 28.5 PG — SIGNIFICANT CHANGE UP (ref 27–34)
MCHC RBC-ENTMCNC: 32.7 GM/DL — SIGNIFICANT CHANGE UP (ref 32–36)
MCV RBC AUTO: 87.1 FL — SIGNIFICANT CHANGE UP (ref 80–100)
MONOCYTES # BLD AUTO: 0.72 K/UL — SIGNIFICANT CHANGE UP (ref 0–0.9)
MONOCYTES NFR BLD AUTO: 9.2 % — SIGNIFICANT CHANGE UP (ref 2–14)
NEUTROPHILS # BLD AUTO: 5.38 K/UL — SIGNIFICANT CHANGE UP (ref 1.8–7.4)
NEUTROPHILS NFR BLD AUTO: 68.9 % — SIGNIFICANT CHANGE UP (ref 43–77)
NT-PROBNP SERPL-SCNC: 318 PG/ML — HIGH (ref 0–300)
PA ADP PRP-ACNC: 242 PRU — SIGNIFICANT CHANGE UP (ref 180–376)
PLATELET # BLD AUTO: 213 K/UL — SIGNIFICANT CHANGE UP (ref 150–400)
POTASSIUM SERPL-MCNC: 4.1 MMOL/L — SIGNIFICANT CHANGE UP (ref 3.5–5.3)
POTASSIUM SERPL-SCNC: 4.1 MMOL/L — SIGNIFICANT CHANGE UP (ref 3.5–5.3)
PREALB SERPL-MCNC: 26 MG/DL — SIGNIFICANT CHANGE UP (ref 18–38)
PROT SERPL-MCNC: 7 G/DL — SIGNIFICANT CHANGE UP (ref 6.6–8.7)
PROTHROM AB SERPL-ACNC: 12.6 SEC — SIGNIFICANT CHANGE UP (ref 10.5–13.4)
RBC # BLD: 4.18 M/UL — LOW (ref 4.2–5.8)
RBC # FLD: 12.7 % — SIGNIFICANT CHANGE UP (ref 10.3–14.5)
SODIUM SERPL-SCNC: 138 MMOL/L — SIGNIFICANT CHANGE UP (ref 135–145)
TSH SERPL-MCNC: 0.45 UIU/ML — SIGNIFICANT CHANGE UP (ref 0.27–4.2)
WBC # BLD: 7.81 K/UL — SIGNIFICANT CHANGE UP (ref 3.8–10.5)
WBC # FLD AUTO: 7.81 K/UL — SIGNIFICANT CHANGE UP (ref 3.8–10.5)

## 2023-01-05 PROCEDURE — 99238 HOSP IP/OBS DSCHRG MGMT 30/<: CPT

## 2023-01-05 PROCEDURE — 93306 TTE W/DOPPLER COMPLETE: CPT | Mod: 26

## 2023-01-05 PROCEDURE — 93010 ELECTROCARDIOGRAM REPORT: CPT

## 2023-01-05 PROCEDURE — 99231 SBSQ HOSP IP/OBS SF/LOW 25: CPT

## 2023-01-05 PROCEDURE — 99223 1ST HOSP IP/OBS HIGH 75: CPT

## 2023-01-05 PROCEDURE — 71045 X-RAY EXAM CHEST 1 VIEW: CPT | Mod: 26

## 2023-01-05 RX ORDER — GLUCAGON INJECTION, SOLUTION 0.5 MG/.1ML
1 INJECTION, SOLUTION SUBCUTANEOUS ONCE
Refills: 0 | Status: DISCONTINUED | OUTPATIENT
Start: 2023-01-05 | End: 2023-01-06

## 2023-01-05 RX ORDER — LANOLIN ALCOHOL/MO/W.PET/CERES
10 CREAM (GRAM) TOPICAL AT BEDTIME
Refills: 0 | Status: DISCONTINUED | OUTPATIENT
Start: 2023-01-05 | End: 2023-01-06

## 2023-01-05 RX ORDER — ATORVASTATIN CALCIUM 80 MG/1
20 TABLET, FILM COATED ORAL AT BEDTIME
Refills: 0 | Status: DISCONTINUED | OUTPATIENT
Start: 2023-01-05 | End: 2023-01-06

## 2023-01-05 RX ORDER — DEXTROSE 50 % IN WATER 50 %
25 SYRINGE (ML) INTRAVENOUS ONCE
Refills: 0 | Status: DISCONTINUED | OUTPATIENT
Start: 2023-01-05 | End: 2023-01-06

## 2023-01-05 RX ORDER — LEVOTHYROXINE SODIUM 125 MCG
175 TABLET ORAL DAILY
Refills: 0 | Status: DISCONTINUED | OUTPATIENT
Start: 2023-01-05 | End: 2023-01-06

## 2023-01-05 RX ORDER — ENOXAPARIN SODIUM 100 MG/ML
40 INJECTION SUBCUTANEOUS EVERY 24 HOURS
Refills: 0 | Status: DISCONTINUED | OUTPATIENT
Start: 2023-01-05 | End: 2023-01-06

## 2023-01-05 RX ORDER — ASPIRIN/CALCIUM CARB/MAGNESIUM 324 MG
81 TABLET ORAL DAILY
Refills: 0 | Status: DISCONTINUED | OUTPATIENT
Start: 2023-01-05 | End: 2023-01-06

## 2023-01-05 RX ORDER — GINKGO BILOBA 40 MG
1 CAPSULE ORAL
Qty: 0 | Refills: 0 | DISCHARGE

## 2023-01-05 RX ORDER — INSULIN LISPRO 100/ML
VIAL (ML) SUBCUTANEOUS
Refills: 0 | Status: DISCONTINUED | OUTPATIENT
Start: 2023-01-05 | End: 2023-01-06

## 2023-01-05 RX ORDER — MILK THISTLE 150 MG
1 CAPSULE ORAL
Qty: 0 | Refills: 0 | DISCHARGE

## 2023-01-05 RX ORDER — FINASTERIDE 5 MG/1
5 TABLET, FILM COATED ORAL DAILY
Refills: 0 | Status: DISCONTINUED | OUTPATIENT
Start: 2023-01-05 | End: 2023-01-06

## 2023-01-05 RX ORDER — UBIDECARENONE 100 MG
1 CAPSULE ORAL
Qty: 0 | Refills: 0 | DISCHARGE

## 2023-01-05 RX ORDER — GLUCOSAMINE/CHONDROITIN/C/MANG 500-400 MG
1 CAPSULE ORAL
Qty: 0 | Refills: 0 | DISCHARGE

## 2023-01-05 RX ORDER — SODIUM CHLORIDE 9 MG/ML
3 INJECTION INTRAMUSCULAR; INTRAVENOUS; SUBCUTANEOUS EVERY 8 HOURS
Refills: 0 | Status: DISCONTINUED | OUTPATIENT
Start: 2023-01-05 | End: 2023-01-06

## 2023-01-05 RX ORDER — PANTOPRAZOLE SODIUM 20 MG/1
40 TABLET, DELAYED RELEASE ORAL
Refills: 0 | Status: DISCONTINUED | OUTPATIENT
Start: 2023-01-05 | End: 2023-01-06

## 2023-01-05 RX ORDER — DEXTROSE 50 % IN WATER 50 %
15 SYRINGE (ML) INTRAVENOUS ONCE
Refills: 0 | Status: DISCONTINUED | OUTPATIENT
Start: 2023-01-05 | End: 2023-01-06

## 2023-01-05 RX ORDER — DEXTROSE 50 % IN WATER 50 %
12.5 SYRINGE (ML) INTRAVENOUS ONCE
Refills: 0 | Status: DISCONTINUED | OUTPATIENT
Start: 2023-01-05 | End: 2023-01-06

## 2023-01-05 RX ORDER — LEVOTHYROXINE SODIUM 125 MCG
1 TABLET ORAL
Qty: 0 | Refills: 0 | DISCHARGE

## 2023-01-05 RX ADMIN — ATORVASTATIN CALCIUM 20 MILLIGRAM(S): 80 TABLET, FILM COATED ORAL at 21:53

## 2023-01-05 RX ADMIN — ENOXAPARIN SODIUM 40 MILLIGRAM(S): 100 INJECTION SUBCUTANEOUS at 15:20

## 2023-01-05 RX ADMIN — SODIUM CHLORIDE 3 MILLILITER(S): 9 INJECTION INTRAMUSCULAR; INTRAVENOUS; SUBCUTANEOUS at 21:46

## 2023-01-05 RX ADMIN — Medication 10 MILLIGRAM(S): at 22:00

## 2023-01-05 RX ADMIN — PANTOPRAZOLE SODIUM 40 MILLIGRAM(S): 20 TABLET, DELAYED RELEASE ORAL at 06:48

## 2023-01-05 RX ADMIN — Medication 81 MILLIGRAM(S): at 09:28

## 2023-01-05 RX ADMIN — Medication 150 MICROGRAM(S): at 06:49

## 2023-01-05 RX ADMIN — LOSARTAN POTASSIUM 100 MILLIGRAM(S): 100 TABLET, FILM COATED ORAL at 09:28

## 2023-01-05 NOTE — PROGRESS NOTE ADULT - SUBJECTIVE AND OBJECTIVE BOX
CHIEF COMPLAINT:  Patient is a 72y old  Male who presents with a chief complaint of CP and nausea    HPI: 73 yo M h/o HTN, DM, HLD, GERD, hypothyroidism prostate CA s/p cyberknife and obesity who presents c/o CP and nausea. Mid 2022 he was on a cruise when he developed midsternal CP, nausea and syncopized. He had an EKG, troponins and lab work done that was stable. After jose cruise he continued to have intermittent CP and progressive SOB/RICE. He had an outpatient cardiac PET in my office that was mildly positive in the anterior walls/LAD region concerning for LAD ischemia. He was planned for LHC with Dr. Elkins 1/10/23.    1/3/22 when he was laying down to go to bed he had severe substernal chest pains with associated nausea. It did not feel like his usual GERD, symptoms have now resolved.  : Patient is s/p LHC showing mild nonobstructive CAD but was concerning he may have severe AS. Overnight patient had some episodes of second degree type 1 AV block, asymptomatic. He is feeling well with no complaints this AM        PMHx:  PAST MEDICAL & SURGICAL HISTORY:  See above    Social History:  Never smoker. Denies any significant etoh or any illicit drug use.  Dr. Abdulaziz Jernigan is his nephew    FAMILY HISTORY:  Father had his first MI at age 65,  around 78  Brother had MI at 62      ALLERGIES:  Allergies  No Known Allergies    REVIEW OF SYSTEMS:  10 system ROS was obtained, all pertinent positives and negatives are in HPI otherwise they were negative.    Vital Signs Last 24 Hrs  T(C): 36.7 (2023 07:43), Max: 36.9 (2023 19:55)  T(F): 98 (2023 07:43), Max: 98.5 (2023 19:55)  HR: 57 (2023 07:43) (57 - 72)  BP: 121/63 (2023 07:43) (105/48 - 128/70)  BP(mean): 67 (2023 18:26) (67 - 67)  RR: 17 (2023 07:43) (17 - 18)  SpO2: 95% (2023 07:43) (95% - 98%)    Parameters below as of 2023 07:43  Patient On (Oxygen Delivery Method): room air      PHYSICAL EXAM:   Constitutional: awake and alert in NAD  HEENT: EOMI, Normal Hearing, MMM  Neck: Soft and supple, No LAD, No JVD, no carotid bruit  Respiratory: Breath sounds are clear bilaterally, No wheezing, rales or rhonchi, good air movement  Cardiovascular: RRR, IV/VI systolic ejection murmur at RUSB radiating to carotids and III/VI systolic murmur at apex  Gastrointestinal: Bowel Sounds present, soft, nontender, nondistended, no guarding, no rebound  Extremities: Warm and well perfused, no peripheral edema  Neurological: A/O x 3, no focal deficits appreciated  Skin: No rashes appreciated    MEDICATIONS  (STANDING):  aspirin enteric coated 81 milliGRAM(s) Oral daily  atorvastatin 20 milliGRAM(s) Oral at bedtime  dextrose 5%. 1000 milliLiter(s) (50 mL/Hr) IV Continuous <Continuous>  dextrose 5%. 1000 milliLiter(s) (100 mL/Hr) IV Continuous <Continuous>  dextrose 50% Injectable 25 Gram(s) IV Push once  dextrose 50% Injectable 12.5 Gram(s) IV Push once  dextrose 50% Injectable 25 Gram(s) IV Push once  enoxaparin Injectable 40 milliGRAM(s) SubCutaneous every 24 hours  glucagon  Injectable 1 milliGRAM(s) IntraMuscular once  hydrochlorothiazide 12.5 milliGRAM(s) Oral daily  insulin lispro (ADMELOG) corrective regimen sliding scale   SubCutaneous three times a day before meals  levothyroxine 150 MICROGram(s) Oral daily  losartan 100 milliGRAM(s) Oral daily  melatonin 5 milliGRAM(s) Oral at bedtime  pantoprazole    Tablet 40 milliGRAM(s) Oral before breakfast  sodium chloride 0.9%. 1000 milliLiter(s) (75 mL/Hr) IV Continuous <Continuous>    MEDICATIONS  (PRN):  aluminum hydroxide/magnesium hydroxide/simethicone Suspension 30 milliLiter(s) Oral every 4 hours PRN Dyspepsia  dextrose Oral Gel 15 Gram(s) Oral once PRN Blood Glucose LESS THAN 70 milliGRAM(s)/deciliter  loperamide 2 milliGRAM(s) Oral every 6 hours PRN Diarrhea  nitroglycerin     SubLingual 0.4 milliGRAM(s) SubLingual every 5 minutes PRN Chest Pain  ondansetron Injectable 4 milliGRAM(s) IV Push every 6 hours PRN Nausea  senna 2 Tablet(s) Oral at bedtime PRN Constipation  zolpidem 5 milliGRAM(s) Oral at bedtime PRN Insomnia  zolpidem 5 milliGRAM(s) Oral at bedtime PRN Insomnia      LABS: All Labs Reviewed:                      12.3   7.86  )-----------( 225      ( 2023 05:00 )             37.1     01-04    137  |  103  |  28<H>  ----------------------------<  118<H>  3.9   |  28  |  1.25    Ca    9.1      2023 05:00  Mg     2.1     01-04    TPro  6.9  /  Alb  3.5  /  TBili  0.7  /  DBili  x   /  AST  27  /  ALT  28  /  AlkPhos  85  01-04    PT/INR - ( 2023 05:00 )   PT: 11.7 sec;   INR: 1.01 ratio    PTT - ( 2023 05:00 )  PTT:34.0 sec    HS-troponins WNL x2    RADIOLOGY:      EK/4/23, my interpretation NSR, LAFB, LVH and PRWP. Abnormal EKG    TELEMETRY:    Reviewed. Remains in sinus rhythm. Overnight had a few episodes of second degree type 1 AV block.    ECHO:    ACC: [id number] EXAM:  ECHO TTE WO CON COMP W DOPP                        PROCEDURE DATE:  2023      M-Mode Measurements (cm)     LVEDd: 5.23 cm            LVESd: 3.47 cm   IVSEd: 1.5 cm   LVPWd: 1.5 cm             AO Root Dimension: 3.6cm                             ACS: 0.6 cm                             LA: 3.9 cm                             LVOT: 2 cm    Doppler Measurements:     AV Velocity:467 cm/s                 MV Peak E-Wave: 52.8 cm/s   AV Peak Gradient: 87.24 mmHg MV Peak A-Wave: 102 cm/s   AV Mean Gradient: 59 mmHg            MV E/A Ratio: 0.52 %   AV Area (Continuity):0.5 cm^2        MV Peak Gradient: 1.12 mmHg   TR Velocity:250 cm/s   TR Gradient:25 mmHg   Estimated RAP:5 mmHg   RVSP:30 mmHg       Summary     The left ventricle is normal in size, wall motion and contractility.   Estimated left ventricular ejection fraction is 55-60 %.   The left atrium is mildly dilated.   Normal appearing right ventricle structure and function.   Significant fibrocalcific changes noted to the aortic valve leaflets with restriction in leaflet excursion.   Peak and mean transaortic gradients are 97 and 67 mmHg respectively; JESS is estimated around 0.4-0.5cm2. this finding is consistent with severe   aortic stenosis.   Mild (1+) aortic regurgitation is present.   Mild mitral annular calcification is present.   Trace to mild mitral regurgitation is present.   EA reversal of the mitral inflow consistent with reduced compliance of the left ventricle.   Trace to mild tricuspid valve regurgitation is present.   Trace pericardial effusion is present.   There is no evidence of tamponade.    [facility]:  (23): Sperate ostia for LAD and LCx (no LM), LAD with mild diffuse disease with 30% stenosis in mLAD, LCx with mild diffuse disease with 20% stenosis in pLCx, RCA mild diffuse disease. Concerns for increased gradients at AV and significant calcifications of AV noted.

## 2023-01-05 NOTE — DISCHARGE NOTE PROVIDER - HOSPITAL COURSE
Pt underwent cardiac cath which showed non obstructive CAD but suspicious for AS. Underwent echo which showed sever AS. Pt candidate for AVR  possibly TAVR  Seen by cardiology and interventional Cardiology. Arrangements made to transfer to [facility] for w/u for AVR and possible surgery

## 2023-01-05 NOTE — H&P ADULT - NSHPPHYSICALEXAM_GEN_ALL_CORE
Gen:  Neuro:  HEENT:  Neck:  Pulm:  CV:  Abd:  Ext:  skin:  vascular:   psych: Gen: NAD  Neuro: A&Ox3 non focal speech clear and intact  HEENT:PERRL  Neck: supple no JVD or carotid bruits  Pulm: CTA b/l no wheezing  CV:S1S2 RRR +CLIVE  Abd:+BS soft NT ND  Ext: no edema/cyanosis OQUENDO   skin: warm dry perfused, no rashes  vascular: 2+ DP/radial pulses b/l  psych: normal mood and affect

## 2023-01-05 NOTE — PROGRESS NOTE ADULT - ASSESSMENT
Pt is a 71 yo wm with hx dm, htn, obesity, RICE, abnomal PET scan presents with CP     Assessment:  Rapidly Worsening Aortic stenosis with h/o RICE, Syncope and CP  Diabetes  Obesity  HTN  Prostate Cancer s/p Cyber Knife  BPH  Hypothyroidism      Plan:  Continue telemetry  Cardiology Consult appreciated  Continue Losartan  Hold Metformin  Hold Farxiga  Hold Januvia  Insulin for coverage as needed  For Transfer to [facility] for Cardiac CT, Thoracic Surgery evaluation and anticipated TAVR this afternoon  Continue Lipitor  Continue ASA  Continue Synthroid    DVT Prophylaxis:  SQ lovenox    Advanced Directives:  Full Code        
73 yo M with above PMHx presents c/o acute CP associated with nausea    -LHC showing nonobstructive CAD with concerns for significant AS, which was confirmed on TTE showing severe AS with mean gradients as high as 65mmHg and JESS calculated around 0.4-0.5  -C/W ASA and statin, BB  -Discussed with Dr. Elkins and Dr. Jernigan. Plan for Structural heart team/Dr. Elkins to see patient today to start the process of outpatient AVR (likely TAVR). After they see him he is stable from a cardiac standpoint for D/C.  -He was told to limit his caffeine since dehydrating and avoid etoh. He was told to stay hydrated and take it easy, no heavy lifting.

## 2023-01-05 NOTE — CONSULT NOTE ADULT - ASSESSMENT
Pt is a 71 yo M with a history of  diabetes, HTN, prostate ca s/p cyberknife 2 who complains fo worsening fatigue for the past 6 weeks with a syncopal episode 2 weeks ago, presenting to ED on 1/4 c/o chest pain found to have severe aortic stenosis.

## 2023-01-05 NOTE — DISCHARGE NOTE PROVIDER - CARE PROVIDER_API CALL
Abdulaziz Jernigan)  Family Medicine  [address] [facility], [place], [address], [place] [address]  Phone: [phone]  Fax: [phone]  Follow Up Time:

## 2023-01-05 NOTE — PATIENT PROFILE ADULT - FALL HARM RISK - HARM RISK INTERVENTIONS

## 2023-01-05 NOTE — DISCHARGE NOTE PROVIDER - NSDCMRMEDTOKEN_GEN_ALL_CORE_FT
Aspirin Enteric Coated 81 mg oral delayed release tablet: 1 tab(s) orally once a day  atorvastatin 20 mg oral tablet: 1 tab(s) orally once a day  Centrum Silver oral tablet: 1 tab(s) orally once a day  cyanocobalamin 1000 mcg oral tablet: 1 tab(s) orally once a day  doxycycline hyclate 100 mg oral tablet: 1 tab(s) orally once a day  Farxiga 10 mg oral tablet: 1 tab(s) orally once a day  finasteride 1 mg oral tablet: 1 tab(s) orally once a day  Januvia 50 mg oral tablet: 1 tab(s) orally once a day  Krill Oil 500m cap(s) orally once a day  levothyroxine 150 mcg (0.15 mg) oral tablet: 1 tab(s) orally once a day  losartan-hydroCHLOROthiazide 100 mg-12.5 mg oral tablet: 1 tab(s) orally once a day  Melatonin 5 mg oral tablet: 2 tab(s) orally once a day (at bedtime)  metFORMIN 500 mg oral tablet, extended release: 2 tab(s) orally 2 times a day  pantoprazole 40 mg oral delayed release tablet: 1 tab(s) orally once a day  PreserVision AREDS 2 oral capsule: 1 cap(s) orally once a day  Super B Complex oral tablet: 1 tab(s) orally once a day  Vitamin D3 25 mcg (1000 intl units) oral tablet: 2 tab(s) orally once a day  zolpidem 10 mg oral tablet: 1 tab(s) orally once a day (at bedtime)

## 2023-01-05 NOTE — H&P ADULT - PROBLEM SELECTOR PLAN 1
admit to Dr. Rodriges  preop work up for TAVR  possible TAVR CTA in AM if Bun/Cr stable  JOHN for DM  hold oral agents  hold losartan/hctz    d/w Dr. Rodriges

## 2023-01-05 NOTE — H&P ADULT - NSICDXPASTMEDICALHX_GEN_ALL_CORE_FT
PAST MEDICAL HISTORY:  Diabetes mellitus     Hypertension     Obesity      PAST MEDICAL HISTORY:  Diabetes mellitus     Hypertension     Hypothyroidism     Obesity     Prostate CA

## 2023-01-05 NOTE — CONSULT NOTE ADULT - TIME BILLING
see above
Differential diagnosis and plan of care discussed with patient after the evaluation.   Counseling on diet, exercise, and medication compliance was done.  Counseling on smoking and alcohol cessation was offered when appropriate.  Pain assessed and judicious use of narcotics when appropriate was discussed.  Importance of fall prevention discussed.  Advanced care planning was discussed with patient and family.

## 2023-01-05 NOTE — CONSULT NOTE ADULT - NS ATTEND AMEND GEN_ALL_CORE FT
Agree with above. Plan to fast track TAVR evaluation by transfer to [facility] for CTA TAVR protocol and CTS evaluation. Based on assessment of anatomy, will then decide on optimal valve therapy. Findings relayed to patient and patient's family.     Thank you for allowing me to participate in the care of your patient. Feel free to contact me if any clinical issues arise via contact information below or MS Teams.    Markus Elkins MD, FAC, [facility]   Director, Interventional Cardiology, [address], [address] [place] [address], [address] Office: [phone]  [place] Office: [phone]  Email: [email or web address]

## 2023-01-05 NOTE — PATIENT PROFILE ADULT - FALL HARM RISK - HARM RISK INTERVENTIONS

## 2023-01-05 NOTE — DISCHARGE NOTE NURSING/CASE MANAGEMENT/SOCIAL WORK - NSDCPEFALRISK_GEN_ALL_CORE
For information on Fall & Injury Prevention, visit: [email or web address] OR  [email or web address] OR  https://www.cdc.gov/steadi/patient.html

## 2023-01-05 NOTE — DISCHARGE NOTE NURSING/CASE MANAGEMENT/SOCIAL WORK - PATIENT PORTAL LINK FT
You can access the FollowMyHealth Patient Portal offered by [facility] by registering at the following website: [email or web address]. By joining [employer]’s FollowMyHealth portal, you will also be able to view your health information using other applications (apps) compatible with our system.

## 2023-01-05 NOTE — PATIENT PROFILE ADULT - FUNCTIONAL ASSESSMENT - BASIC MOBILITY 6.
4-calculated by average/Not able to assess (calculate score using [facility] averaging method)

## 2023-01-05 NOTE — DISCHARGE NOTE NURSING/CASE MANAGEMENT/SOCIAL WORK - NSDPACMPNY_GEN_ALL_CORE
NICOLETTE 3/14/22: EF (Visual Estimate): 40-45 %  Moderate global left ventricular systolic dysfunction. Tethered mitral valve leaflets with normal opening. Moderate-severe mitral regurgitation.Mild atheroma noted in aortic arch/descending aorta. 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   -renal f/u noted- planning initiating HD this admission- s/p non tunneled HD catheter on 3/15/22   -UE dopplers noted focal THROMBOSIS of the right cephalic vein at the  antecubital fossa.-- already on ac   -vascular f/u pending AVF --- pt can proceed from a cv standpoint      #THROMBOSIS of the right cephalic vein   -on a.c , vascular following    #Atrial Fibrillation/Flutter. S/P AF Ablation  -sp nicolette/ dccv 3/14   -yesterday noted with brief approx 9 sec 2nd degree type 1--- remains in NSR 1st degree   -EP to f/u, amiodarone 200 mg daily , Toprol  XL 25 mg daily  -nicolette  EF (Visual Estimate): 40-45 %  Moderate global left ventricular systolic dysfunction. Tethered mitral valve leaflets with normal opening. Moderate-severe mitral regurgitation. No left atrial or left atrial appendage thrombus  -on eliquis -- currently on HOLD for avf       #Coronary Artery Disease. S/P PCI to LAD  -stable without chest pain or dyspnea  -hsT peaked, likely demand ischemia in setting of CKD and acute HF   -continue toprol, statin, and asa 81mg daily    #Mitral Regurgitation  -continue to monitor, nicolette Tethered mitral valve leaflets with normal opening. Moderate-severe mitral regurgitation.    #Hypertension  -Blood pressure controlled  -Continue current medications.     NICOLETTE 3/14/22: EF (Visual Estimate): 40-45 %  Moderate global left ventricular systolic dysfunction. Tethered mitral valve leaflets with normal opening. Moderate-severe mitral regurgitation.Mild atheroma noted in aortic arch/descending aorta. 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   -renal f/u noted- planning initiating HD this admission- s/p non tunneled HD catheter on 3/15/22   -UE dopplers noted focal THROMBOSIS of the right cephalic vein at the  antecubital fossa.-- already on ac   -vascular f/u pending AVF --- pt can proceed from a cv standpoint      #THROMBOSIS of the right cephalic vein   -on a.c , vascular following    #Atrial Fibrillation/Flutter. S/P AF Ablation  -sp nicolette/ dccv 3/14   -yesterday noted with brief approx 9 sec 2nd degree type 1--- remains in NSR 1st degree   -EP to f/u, amiodarone 200 mg daily , Toprol  XL 25 mg daily  -nicolette  EF (Visual Estimate): 40-45 %  Moderate global left ventricular systolic dysfunction. Tethered mitral valve leaflets with normal opening. Moderate-severe mitral regurgitation. No left atrial or left atrial appendage thrombus  -on eliquis -- currently on HOLD for avf       #Coronary Artery Disease. S/P PCI to LAD  -stable without chest pain or dyspnea  -hsT peaked, likely demand ischemia in setting of CKD and acute HF   -continue toprol, statin, and asa 81mg daily    #Mitral Regurgitation  -continue to monitor, nicolette Tethered mitral valve leaflets with normal opening. Moderate-severe mitral regurgitation.    #Hypertension  -Blood pressure controlled  -Continue current medications.     Other (Specify)

## 2023-01-05 NOTE — H&P ADULT - HISTORY OF PRESENT ILLNESS
72M, pmhx HTN, DM (5.7), obesity, fhx of CAD with recent episode of CP/vomitting syncope while on cruis, + troponins was scheduled for elective cath, but presented to [place] ED 1/4 with CP/nausea, neg trops, echo with severe AS (JESS 0.5, mG 67, pG 97), cath with non obstructive CAD now transferred to [facility] for TAVR eval.  72M, pmhx HTN, DM (5.7), obesity, HLD, hypothyroidism, prostate ca s/p CyberKnife Fhx of CAD with recent episode of SOB, vomiting and syncope while on cruise in December, + troponins was scheduled for elective cath, but presented to [place] ED 1/4 with CP/nausea, neg trops, echo with severe AS (JESS 0.5, mG 67, pG 97), cath with non obstructive CAD now transferred to [facility] for TAVR eval.   72M, pmhx HTN, DM (5.7), obesity, HLD, hypothyroidism, prostate ca s/p CyberKnife Fhx of CAD with recent episode of SOB, vomiting and syncope while on cruise in December, + troponins was scheduled for elective cath, but presented to [place] ED 1/4 with CP/nausea, neg trops, echo with severe AS (JESS 0.5, mG 67, pG 97), cath with non obstructive CAD now transferred to [facility] for TAVR eval.  Pt reports a few episodes of SOB/RICE over last year. One time being while pushing snow in the driveway, another time while hiking with his neice where he needed to take multiple rest breaks and now the time on the cruise.  Pt states he can exert himself without exacerbation of symptoms.  Pt denies current CP, palpitations, SOB, cough, fever, chills, itchiness/rash, diaphoresis, vision changes, HA, dizziness/lightheadedness, numbness/tingling, abd pain, N/V.

## 2023-01-05 NOTE — CONSULT NOTE ADULT - PROBLEM SELECTOR RECOMMENDATION 9
Echo showed normal EF but JESS 0.5, PG/MG 97/67 and PVel 4.6 m/s with worsening fatigue and recent syncope.  - Will transfer to [facility] for further evaluation

## 2023-01-05 NOTE — PROGRESS NOTE ADULT - SUBJECTIVE AND OBJECTIVE BOX
SUBJECTIVE:    CHIEF COMPLAINT:  Patient is a 72y old  Male who presents with a chief complaint of Chest Pain (2023 09:06)      HPI:   Pt is a 71 yo wm with hx dm, htn, obesity who was on cruise on Dec 28 th when he developed midsternal cp with vomiting and passed out. Het saw ship's doctor who did bw and ekg and he was told he had a troponin of 9. pt was treated and it was decided to trend troponins and they went down. Pt followed up with his cardiologist upon return who advised him to have pet then advised him to have angiogram which was scheduled for 1 week. Pt states was advised to go to ER if pain returned.On evening of admission pt was lying in bed and noted midsternal chest pain with slight nausea. No fever or cough. Nonsmoker occ EtOH no drugs. Positive FH for CAD.        Active Problems  Encounter for preventive health examination (V70.0) (Z00.00)  Acute bilateral low back pain with bilateral sciatica (724.2,724.3) (M54.42,M54.41)  Alopecia (704.00) (L65.9)  Benign localized hyperplasia of prostate with urinary obstruction (600.21,599.69)  (N40.1,N13.8)  Bronchitis (490) (J40)  Chronic bilateral low back pain without sciatica (724.2,338.29) (M54.50,G89.29)  Chronic GERD (530.81) (K21.9)  Counseling for travel (V65.49) (Z71.84)  Diabetes mellitus type 2 in obese (250.00,278.00) (E11.69,E66.9)  Generalized anxiety disorder (300.02) (F41.1)  Hyperlipidemia (272.4) (E78.5)  Hypertension (401.9) (I10)  Hypothyroidism (244.9) (E03.9)  Insect bite of other part of head, initial encounter (910.4,E906.4) (S00.86XA,W57.XXXA)  Muscle weakness of lower extremity (728.87) (M62.81)  Non-insulin dependent type 2 diabetes mellitus (250.00) (E11.9)  Obesity due to excess calories (278.00) (E66.09)  Prerenal azotemia (790.6) (R79.89)  Prophylactic vaccination against streptococcus pneumoniae and influenza (V06.6) (Z23)  Prostate cancer s/p Cyber Knife (185) (C61)  Screening for viral disease (V73.99) (Z11.59)  Urinary frequency (788.41) (R35.0)    Past Medical History  History of arthritis (V13.4) (Z87.39)  History of elevated prostate specific antigen (PSA) (V13.89) (Z87.898)  History of elevated prostate specific antigen (PSA) (V13.89) (Z87.898)  History of Venomous sting, intentional self-harm, initial encounter (989.5,E950.9)  (T63.92XA)  Viral URI with cough (465.9) (J06.9)    Surgical History  History of Surgery Excision Lipoma    Family History  Family history of cardiac disorder (V17.49) (Z82.49) : Father, Brother  Family history of hyperlipidemia (V18.19) (Z83.438) : Father  Family history of malignant neoplasm of urinary bladder (V16.52) (Z80.52) : Mother,  Brother  Family history of malignant neoplasm of uterus (V16.49) (Z80.49) : Sister  Family history of thyroid disease (V18.19) (Z83.49) : Sister  Family history of type 2 diabetes mellitus (V18.0) (Z83.3) : Brother    Social History  Moderate alcohol use  Never a smoker  No illicit drug use  Occupation    Home Meds  Atorvastatin Calcium 20 MG Oral Tablet; Take 1 tablet daily  diazePAM 5 MG Oral Tablet; TAKE 1 TABLET DAILY MDD:1 tab  Farxiga 10 MG Oral Tablet; Take 1 tablet daily  Finasteride 1 MG Oral Tablet; TAKE ONE TABLET BY MOUTH ONE TIME DAILY  Januvia 50 MG Oral Tablet; Take 1 tablet daily  Levothyroxine Sodium 150 MCG Oral Tablet; Take 1 tablet daily  Losartan Potassium-HCTZ 100-12.5 MG Oral Tablet; Take 1 tablet daily  metFORMIN HCl  MG Oral Tablet Extended Release 24 Hour; TAKE 4 TABLETS  ONCE DAILY  Pantoprazole Sodium 20 MG Oral Tablet Delayed Release; TAKE 1 TABLET ONCE DAILY  Zolpidem Tartrate 10 MG Oral Tablet; TAKE 1 TABLET AT BEDTIME PRN MDD:1 tab    Allergies  No Known Drug Allergies   (2023 09:58)      Interval HPI and Overnight Events: Pt resting comfortably. Tolerated the Cath. No occlusive CAD but Aortic Stenosis. Echo showed severe AS    REVIEW OF SYSTEMS:  CONSTITUTIONAL: No weakness, fevers or chills  EYES/ENT: No visual changes;  No vertigo or throat pain   NECK: No pain or stiffness  RESPIRATORY: No cough, wheezing, hemoptysis; No shortness of breath  CARDIOVASCULAR: No chest pain or palpitations  GASTROINTESTINAL: No abdominal or epigastric pain. No nausea, vomiting, or hematemesis; No diarrhea or constipation. No melena or hematochezia.  GENITOURINARY: No dysuria, frequency or hematuria  NEUROLOGICAL: No numbness or weakness  SKIN: No itching, burning, rashes, or lesions   All other review of systems is negative unless indicated above    OBJECTIVE    Vital Signs Last 24 Hrs  T(C): 36.7 (2023 07:43), Max: 36.9 (2023 19:55)  T(F): 98 (2023 07:43), Max: 98.5 (2023 19:55)  HR: 94 (2023 09:25) (57 - 94)  BP: 134/63 (2023 09:25) (105/48 - 134/63)  BP(mean): 67 (2023 18:26) (67 - 67)  RR: 17 (2023 07:43) (17 - 18)  SpO2: 95% (2023 07:43) (95% - 98%)    Parameters below as of 2023 07:43  Patient On (Oxygen Delivery Method): room air        MEDICATIONS  (STANDING):  aspirin enteric coated 81 milliGRAM(s) Oral daily  atorvastatin 20 milliGRAM(s) Oral at bedtime  dextrose 5%. 1000 milliLiter(s) (50 mL/Hr) IV Continuous <Continuous>  dextrose 5%. 1000 milliLiter(s) (100 mL/Hr) IV Continuous <Continuous>  dextrose 50% Injectable 25 Gram(s) IV Push once  dextrose 50% Injectable 12.5 Gram(s) IV Push once  dextrose 50% Injectable 25 Gram(s) IV Push once  enoxaparin Injectable 40 milliGRAM(s) SubCutaneous every 24 hours  glucagon  Injectable 1 milliGRAM(s) IntraMuscular once  hydrochlorothiazide 12.5 milliGRAM(s) Oral daily  insulin lispro (ADMELOG) corrective regimen sliding scale   SubCutaneous three times a day before meals  levothyroxine 150 MICROGram(s) Oral daily  losartan 100 milliGRAM(s) Oral daily  melatonin 5 milliGRAM(s) Oral at bedtime  pantoprazole    Tablet 40 milliGRAM(s) Oral before breakfast  sodium chloride 0.9%. 1000 milliLiter(s) (75 mL/Hr) IV Continuous <Continuous>      LABS:                         12.3   7.86  )-----------( 225      ( 2023 05:00 )             37.1     01-04    137  |  103  |  28<H>  ----------------------------<  118<H>  3.9   |  28  |  1.25    Ca    9.1      2023 05:00  Mg     2.1     -04    TPro  6.9  /  Alb  3.5  /  TBili  0.7  /  DBili  x   /  AST  27  /  ALT  28  /  AlkPhos  85  04    Urinalysis Basic - ( 2023 09:38 )    Color: Yellow / Appearance: Clear / S.015 / pH: x  Gluc: x / Ketone: Negative  / Bili: Negative / Urobili: Negative   Blood: x / Protein: Negative / Nitrite: Negative   Leuk Esterase: Negative / RBC: x / WBC x   Sq Epi: x / Non Sq Epi: x / Bacteria: x      PT/INR - ( 2023 05:00 )   PT: 11.7 sec;   INR: 1.01 ratio         PTT - ( 2023 05:00 )  PTT:34.0 sec    THYROID PANEL    @ 13:29  T4 10.7  TSH 0.37    CAPILLARY BLOOD GLUCOSE  POCT Blood Glucose.: 123 mg/dL (2023 11:35)  POCT Blood Glucose.: 100 mg/dL (2023 08:09)  POCT Blood Glucose.: 98 mg/dL (2023 21:53)    RADIOLOGY/EKG:  Cardiac Catheterization (23 @ 16:10) >    Diagnostic Conclusions   Non-obstructive coronary artery disease. Calcified aortic valve present.     Recommendations   Recommend aggressive medical management of non-obstructive CAD.   Obtain TTE  to evaluate aortic valve given worsening murmur as well as heavily      TTE Echo Complete w/o Contrast w/ Doppler (23 @ 07:57) >   Impression     Summary   The left ventricle is normal in size, wall motion and contractility.   Estimated left ventricular ejection fraction is 55-60 %.   The left atrium is mildly dilated.   Normal appearing right ventricle structure and function.   Significant fibrocalcific changes noted to the aortic valve leaflets with   restriction in leaflet excursion.   Peak and mean transaortic gradients are 97 and 67 mmHg respectively; JESS   is estimated around 0.4-0.5cm2. this finding is consistent with severe   aortic stenosis.   Mild (1+) aortic regurgitation is present.   Mild mitral annular calcification is present.   Trace to mild mitral regurgitation is present.   EA reversal of the mitral inflow consistent with reduced compliance of   the   left ventricle.   Trace to mild tricuspid valve regurgitation is present.   Trace pericardial effusion is present.   There is no evidence of tamponade.

## 2023-01-05 NOTE — H&P ADULT - NSHPSOCIALHISTORY_GEN_ALL_CORE
lives with girlfriend  no children  independent, no assist devices   denies cigarettes/ilicit drugs  social ETOH

## 2023-01-05 NOTE — H&P ADULT - ASSESSMENT
72M, pmhx HTN, DM (5.7), obesity, fhx of CAD with recent episode of CP/vomiting syncope while on cruis, + troponins was scheduled for elective cath, but presented to [place] ED 1/4 with CP/nausea, neg trops, echo with severe AS (JESS 0.5, mG 67, pG 97), cath with non obstructive CAD now transferred to [facility] for TAVR eval.  72M, pmhx HTN, DM (5.7), obesity, HLD, hypothyroidism, prostate ca s/p CyberKnife Fhx of CAD with recent episode of SOB, vomiting and syncope while on cruise in December, + troponins was scheduled for elective cath, but presented to [place] ED 1/4 with CP/nausea, neg trops, echo with severe AS (JESS 0.5, mG 67, pG 97), cath with non obstructive CAD now transferred to [facility] for TAVR eval.

## 2023-01-05 NOTE — CONSULT NOTE ADULT - SUBJECTIVE AND OBJECTIVE BOX
HPI:   Pt is a 71 yo M with a history of  diabetes, HTN, prostate ca s/p cyberknife 2 who complains fo worsening fatigue for the past 6 weeks with a syncopal episode 2 weeks ago, presenting to ED on  c/o chest pain found to have severe aortic stenosis.    The patient initially reported exertional fatigue last winter after sweeping snow off his driveway.  He saw his cardiologist and had a stress test which was normal.  In 2022 he was hiking with family when he felt very weak and "almost passed out.'  He hiked back to his car, having to stop to rest every 100 ft.  Since then he has felt more fatigued than normal.  He went on a cruise - and after dinner on , he went back to his room and per girlfriend, he became unconscious and vomited  He was woken up by EMS personnel and seen in [facility] with no KEK changes and normal troponin.  He was planning on following up with his cardiologist but developed mid sternal chest pain and dyspnea early the morning of  waking him form his sleep for which he presented to  ED.  He had a LHC with showed normal coronaries but elevated AV gradients.  Echo showed normal EF but JESS 0.5, PG/MG 97/67 and PVel 4.6 m/s            Active Problems  Encounter for preventive health examination (V70.0) (Z00.00)  Acute bilateral low back pain with bilateral sciatica (724.2,724.3) (M54.42,M54.41)  Alopecia (704.00) (L65.9)  Benign localized hyperplasia of prostate with urinary obstruction (600.21,599.69)  (N40.1,N13.8)  Bronchitis (490) (J40)  Chronic bilateral low back pain without sciatica (724.2,338.29) (M54.50,G89.29)  Chronic GERD (530.81) (K21.9)  Counseling for travel (V65.49) (Z71.84)  Diabetes mellitus type 2 in obese (250.00,278.00) (E11.69,E66.9)  Generalized anxiety disorder (300.02) (F41.1)  Hyperlipidemia (272.4) (E78.5)  Hypertension (401.9) (I10)  Hypothyroidism (244.9) (E03.9)  Insect bite of other part of head, initial encounter (910.4,E906.4) (S00.86XA,W57.XXXA)  Muscle weakness of lower extremity (728.87) (M62.81)  Non-insulin dependent type 2 diabetes mellitus (250.00) (E11.9)  Obesity due to excess calories (278.00) (E66.09)  Prerenal azotemia (790.6) (R79.89)  Prophylactic vaccination against streptococcus pneumoniae and influenza (V06.6) (Z23)  Prostate cancer s/p Cyber Knife (185) (C61)  Screening for viral disease (V73.99) (Z11.59)  Urinary frequency (788.41) (R35.0)    Past Medical History  History of arthritis (V13.4) (Z87.39)  History of elevated prostate specific antigen (PSA) (V13.89) (Z87.898)  History of elevated prostate specific antigen (PSA) (V13.89) (Z87.898)  History of Venomous sting, intentional self-harm, initial encounter (989.5,E950.9)  (T63.92XA)  Viral URI with cough (465.9) (J06.9)    Surgical History  History of Surgery Excision Lipoma    Family History  Family history of cardiac disorder (V17.49) (Z82.49) : Father, Brother  Family history of hyperlipidemia (V18.19) (Z83.438) : Father  Family history of malignant neoplasm of urinary bladder (V16.52) (Z80.52) : Mother,  Brother  Family history of malignant neoplasm of uterus (V16.49) (Z80.49) : Sister  Family history of thyroid disease (V18.19) (Z83.49) : Sister  Family history of type 2 diabetes mellitus (V18.0) (Z83.3) : Brother    Social History  Moderate alcohol use  Never a smoker  No illicit drug use  Occupation    Home Meds  Atorvastatin Calcium 20 MG Oral Tablet; Take 1 tablet daily  diazePAM 5 MG Oral Tablet; TAKE 1 TABLET DAILY MDD:1 tab  Farxiga 10 MG Oral Tablet; Take 1 tablet daily  Finasteride 1 MG Oral Tablet; TAKE ONE TABLET BY MOUTH ONE TIME DAILY  Januvia 50 MG Oral Tablet; Take 1 tablet daily  Levothyroxine Sodium 150 MCG Oral Tablet; Take 1 tablet daily  Losartan Potassium-HCTZ 100-12.5 MG Oral Tablet; Take 1 tablet daily  metFORMIN HCl  MG Oral Tablet Extended Release 24 Hour; TAKE 4 TABLETS  ONCE DAILY  Pantoprazole Sodium 20 MG Oral Tablet Delayed Release; TAKE 1 TABLET ONCE DAILY  Zolpidem Tartrate 10 MG Oral Tablet; TAKE 1 TABLET AT BEDTIME PRN MDD:1 tab    Allergies  No Known Drug Allergies   (2023 09:58)    PAST MEDICAL & SURGICAL HISTORY:    REVIEW OF SYSTEMS:  14 point ROS negative in detail apart from as documented in HPI.    MEDICATIONS  (STANDING):  aspirin enteric coated 81 milliGRAM(s) Oral daily  atorvastatin 20 milliGRAM(s) Oral at bedtime  dextrose 5%. 1000 milliLiter(s) (50 mL/Hr) IV Continuous <Continuous>  dextrose 5%. 1000 milliLiter(s) (100 mL/Hr) IV Continuous <Continuous>  dextrose 50% Injectable 25 Gram(s) IV Push once  dextrose 50% Injectable 12.5 Gram(s) IV Push once  dextrose 50% Injectable 25 Gram(s) IV Push once  enoxaparin Injectable 40 milliGRAM(s) SubCutaneous every 24 hours  glucagon  Injectable 1 milliGRAM(s) IntraMuscular once  hydrochlorothiazide 12.5 milliGRAM(s) Oral daily  insulin lispro (ADMELOG) corrective regimen sliding scale   SubCutaneous three times a day before meals  levothyroxine 150 MICROGram(s) Oral daily  losartan 100 milliGRAM(s) Oral daily  melatonin 5 milliGRAM(s) Oral at bedtime  pantoprazole    Tablet 40 milliGRAM(s) Oral before breakfast  sodium chloride 0.9%. 1000 milliLiter(s) (75 mL/Hr) IV Continuous <Continuous>    MEDICATIONS  (PRN):  aluminum hydroxide/magnesium hydroxide/simethicone Suspension 30 milliLiter(s) Oral every 4 hours PRN Dyspepsia  dextrose Oral Gel 15 Gram(s) Oral once PRN Blood Glucose LESS THAN 70 milliGRAM(s)/deciliter  loperamide 2 milliGRAM(s) Oral every 6 hours PRN Diarrhea  nitroglycerin     SubLingual 0.4 milliGRAM(s) SubLingual every 5 minutes PRN Chest Pain  ondansetron Injectable 4 milliGRAM(s) IV Push every 6 hours PRN Nausea  senna 2 Tablet(s) Oral at bedtime PRN Constipation  zolpidem 5 milliGRAM(s) Oral at bedtime PRN Insomnia  zolpidem 5 milliGRAM(s) Oral at bedtime PRN Insomnia    Home Medications:  Aspirin Enteric Coated 81 mg oral delayed release tablet: 1 tab(s) orally once a day (2023 10:47)  atorvastatin 20 mg oral tablet: 1 tab(s) orally once a day (2023 10:47)  Centrum Silver oral tablet: 1 tab(s) orally once a day (2023 10:47)  cyanocobalamin 1000 mcg oral tablet: 1 tab(s) orally once a day (2023 10:47)  doxycycline hyclate 100 mg oral tablet: 1 tab(s) orally once a day (2023 10:47)  Farxiga 10 mg oral tablet: 1 tab(s) orally once a day (2023 10:47)  finasteride 1 mg oral tablet: 1 tab(s) orally once a day (2023 10:47)  Januvia 50 mg oral tablet: 1 tab(s) orally once a day (2023 10:47)  Krill Oil 500m cap(s) orally once a day (2023 10:47)  levothyroxine 150 mcg (0.15 mg) oral tablet: 1 tab(s) orally once a day (2023 10:47)  losartan-hydroCHLOROthiazide 100 mg-12.5 mg oral tablet: 1 tab(s) orally once a day (2023 10:47)  Melatonin 5 mg oral tablet: 2 tab(s) orally once a day (at bedtime) (2023 10:47)  metFORMIN 500 mg oral tablet, extended release: 2 tab(s) orally 2 times a day (2023 10:47)  pantoprazole 40 mg oral delayed release tablet: 1 tab(s) orally once a day (2023 10:47)  PreserVision AREDS 2 oral capsule: 1 cap(s) orally once a day (2023 10:47)  Super B Complex oral tablet: 1 tab(s) orally once a day (2023 10:47)  Vitamin D3 25 mcg (1000 intl units) oral tablet: 2 tab(s) orally once a day (2023 10:47)  zolpidem 10 mg oral tablet: 1 tab(s) orally once a day (at bedtime) (2023 10:47)    Allergies    No Known Allergies    Intolerances      Social History:    FAMILY HISTORY:      ICU Vital Signs Last 24 Hrs  T(C): 36.7, Max: 36.9 ( @ 19:55)  HR: 94 (57 - 94)  BP: 134/63 (105/48 - 134/63)  BP(mean): 67 (67 - 67)  ABP: --  ABP(mean): --  RR: 17 (17 - 18)  SpO2: 95% (95% - 98%)        I&O's Summary Last 24 Hrs    IN: 75 mL / OUT: 0 mL / NET: 75 mL      Tele: SInus 80s, possible Mobitz I overnight     Physical Exam:    General: No distress. Comfortable.  HEENT: EOM intact.  Neck: Neck supple. JVP mildly elevated. No masses  Chest: Clear to auscultation bilaterally  CV: Normal S1 and S2. Harsh systolic murmur noted No rub, or gallops. Radial pulses normal.  Abdomen: Soft, non-distended, non-tender  Skin: No rashes or skin breakdown  Extremities:  Warm, no edema noted   Neurology: Alert and oriented times three. Sensation intact  Psych: Affect normal    Labs:    ( 23 @ 05:00 )               12.3   7.86  )--------( 225                  37.1     ( 23 @ 05:00 )     137  |  103  |  28  ---------------------<  118  3.9  |  28  |  1.25    Ca 9.1  Phos x   Mg 2.1    ( 23 @ 05:00 )  TPro  6.9  /  Alb  3.5  /  TBili  0.7  /  DBili  x   /  AST  27  /  ALT  28  /  AlkPhos  85    PTT/PT/INR - ( 23 @ 05:00 )  PTT: 34.0 sec / PT: 11.7 sec / INR: 1.01 ratio                    RADIOLOGY & ADDITIONAL STUDIES:
CHIEF COMPLAINT:  Patient is a 72y old  Male who presents with a chief complaint of CP and nausea    HPI: 71 yo M h/o HTN, DM, HLD, GERD, hypothyroidism prostate CA s/p cyberknife and obesity who presents c/o CP and nausea. Mid 2022 he was on a cruise when he developed midsternal CP, nausea and syncopized. He had an EKG, troponins and lab work done that was stable. After jose cruise he continued to have intermittent CP and progressive SOB/RICE. He had an outpatient cardiac PET in my office that was mildly positive in the anterior walls/LAD region concerning for LAD ischemia. He was planned for [facility] with Dr. Elkins 1/10/23.    1/3/22 when he was laying down to go to bed he had severe substernal chest pains with associated nausea. It did not feel like his usual GERD, symptoms have now resolved.        PMHx:  PAST MEDICAL & SURGICAL HISTORY:  See above    Social History:  Never smoker. Denies any significant etoh or any illicit drug use.  Dr. Abdulaziz Jernigan is his nephew    FAMILY HISTORY:  Father had his first MI at age 65,  around 78  Brother had MI at 62      ALLERGIES:  Allergies  No Known Allergies    REVIEW OF SYSTEMS:  10 system ROS was obtained, all pertinent positives and negatives are in HPI otherwise they were negative.    Vital Signs Last 24 Hrs  T(C): 37.1 (2023 03:26), Max: 37.1 (2023 03:26)  T(F): 98.7 (2023 03:26), Max: 98.7 (2023 03:26)  HR: 62 (2023 03:26) (62 - 62)  BP: 119/70 (2023 03:26) (119/70 - 119/70)  BP(mean): 85 (2023 03:26) (85 - 85)  RR: 18 (2023 03:26) (18 - 18)  SpO2: 100% (2023 03:26) (100% - 100%)    Parameters below as of 2023 03:26  Patient On (Oxygen Delivery Method): room air      PHYSICAL EXAM:   Constitutional: awake and alert in NAD  HEENT: EOMI, Normal Hearing, MMM  Neck: Soft and supple, No LAD, No JVD, no carotid bruit  Respiratory: Breath sounds are clear bilaterally, No wheezing, rales or rhonchi, good air movement  Cardiovascular: RRR, soft systolic murmur at apex  Gastrointestinal: Bowel Sounds present, soft, nontender, nondistended, no guarding, no rebound  Extremities: Warm and well perfused, no peripheral edema  Neurological: A/O x 3, no focal deficits appreciated  Skin: No rashes appreciated    MEDICATIONS:  MEDICATIONS  (STANDING):  sodium chloride 0.9% lock flush 3 milliLiter(s) IV Push once      LABS: All Labs Reviewed:                        12.3   7.86  )-----------( 225      ( 2023 05:00 )             37.1     01-04    137  |  103  |  28<H>  ----------------------------<  118<H>  3.9   |  28  |  1.25    Ca    9.1      2023 05:00  Mg     2.1     01-04    TPro  6.9  /  Alb  3.5  /  TBili  0.7  /  DBili  x   /  AST  27  /  ALT  28  /  AlkPhos  85  01-04    PT/INR - ( 2023 05:00 )   PT: 11.7 sec;   INR: 1.01 ratio    PTT - ( 2023 05:00 )  PTT:34.0 sec    HS-troponins WNL x2    RADIOLOGY:      EK/4/23, my interpretation NSR, LAFB, LVH and PRWP. Abnormal EKG    TELEMETRY:    Reviewed. HRs 50-60s in sinus with rare PVCs    ECHO:    Done in the office on 3/14/22: Normal LVSF EF 65-70% with moderate LVH and mild diastolic dysfunction, Mild AV sclerosis with mild AS/AI, Mild MAC/MR, moderately dilated LA, mild TR

## 2023-01-06 ENCOUNTER — NON-APPOINTMENT (OUTPATIENT)
Age: 73
End: 2023-01-06

## 2023-01-06 ENCOUNTER — TRANSCRIPTION ENCOUNTER (OUTPATIENT)
Age: 73
End: 2023-01-06

## 2023-01-06 VITALS
DIASTOLIC BLOOD PRESSURE: 79 MMHG | TEMPERATURE: 98 F | SYSTOLIC BLOOD PRESSURE: 131 MMHG | HEART RATE: 69 BPM | RESPIRATION RATE: 17 BRPM | OXYGEN SATURATION: 96 %

## 2023-01-06 DIAGNOSIS — Z29.9 ENCOUNTER FOR PROPHYLACTIC MEASURES, UNSPECIFIED: ICD-10-CM

## 2023-01-06 DIAGNOSIS — I10 ESSENTIAL (PRIMARY) HYPERTENSION: ICD-10-CM

## 2023-01-06 DIAGNOSIS — E11.9 TYPE 2 DIABETES MELLITUS WITHOUT COMPLICATIONS: ICD-10-CM

## 2023-01-06 DIAGNOSIS — E03.9 HYPOTHYROIDISM, UNSPECIFIED: ICD-10-CM

## 2023-01-06 LAB
ALBUMIN SERPL ELPH-MCNC: 4 G/DL — SIGNIFICANT CHANGE UP (ref 3.3–5.2)
ALP SERPL-CCNC: 76 U/L — SIGNIFICANT CHANGE UP (ref 40–120)
ALT FLD-CCNC: 19 U/L — SIGNIFICANT CHANGE UP
ANION GAP SERPL CALC-SCNC: 11 MMOL/L — SIGNIFICANT CHANGE UP (ref 5–17)
AST SERPL-CCNC: 23 U/L — SIGNIFICANT CHANGE UP
BASOPHILS # BLD AUTO: 0.03 K/UL — SIGNIFICANT CHANGE UP (ref 0–0.2)
BASOPHILS NFR BLD AUTO: 0.4 % — SIGNIFICANT CHANGE UP (ref 0–2)
BILIRUB SERPL-MCNC: 1 MG/DL — SIGNIFICANT CHANGE UP (ref 0.4–2)
BUN SERPL-MCNC: 18.2 MG/DL — SIGNIFICANT CHANGE UP (ref 8–20)
CALCIUM SERPL-MCNC: 9.2 MG/DL — SIGNIFICANT CHANGE UP (ref 8.4–10.5)
CHLORIDE SERPL-SCNC: 105 MMOL/L — SIGNIFICANT CHANGE UP (ref 96–108)
CO2 SERPL-SCNC: 24 MMOL/L — SIGNIFICANT CHANGE UP (ref 22–29)
CREAT SERPL-MCNC: 1.13 MG/DL — SIGNIFICANT CHANGE UP (ref 0.5–1.3)
EGFR: 69 ML/MIN/1.73M2 — SIGNIFICANT CHANGE UP
EOSINOPHIL # BLD AUTO: 0.18 K/UL — SIGNIFICANT CHANGE UP (ref 0–0.5)
EOSINOPHIL NFR BLD AUTO: 2.5 % — SIGNIFICANT CHANGE UP (ref 0–6)
GLUCOSE BLDC GLUCOMTR-MCNC: 102 MG/DL — HIGH (ref 70–99)
GLUCOSE BLDC GLUCOMTR-MCNC: 103 MG/DL — HIGH (ref 70–99)
GLUCOSE BLDC GLUCOMTR-MCNC: 109 MG/DL — HIGH (ref 70–99)
GLUCOSE SERPL-MCNC: 97 MG/DL — SIGNIFICANT CHANGE UP (ref 70–99)
HCT VFR BLD CALC: 36.8 % — LOW (ref 39–50)
HGB BLD-MCNC: 12 G/DL — LOW (ref 13–17)
IMM GRANULOCYTES NFR BLD AUTO: 0.4 % — SIGNIFICANT CHANGE UP (ref 0–0.9)
LYMPHOCYTES # BLD AUTO: 1.94 K/UL — SIGNIFICANT CHANGE UP (ref 1–3.3)
LYMPHOCYTES # BLD AUTO: 27.5 % — SIGNIFICANT CHANGE UP (ref 13–44)
MAGNESIUM SERPL-MCNC: 2 MG/DL — SIGNIFICANT CHANGE UP (ref 1.6–2.6)
MCHC RBC-ENTMCNC: 28.7 PG — SIGNIFICANT CHANGE UP (ref 27–34)
MCHC RBC-ENTMCNC: 32.6 GM/DL — SIGNIFICANT CHANGE UP (ref 32–36)
MCV RBC AUTO: 88 FL — SIGNIFICANT CHANGE UP (ref 80–100)
MONOCYTES # BLD AUTO: 0.72 K/UL — SIGNIFICANT CHANGE UP (ref 0–0.9)
MONOCYTES NFR BLD AUTO: 10.2 % — SIGNIFICANT CHANGE UP (ref 2–14)
MRSA PCR RESULT.: SIGNIFICANT CHANGE UP
NEUTROPHILS # BLD AUTO: 4.16 K/UL — SIGNIFICANT CHANGE UP (ref 1.8–7.4)
NEUTROPHILS NFR BLD AUTO: 59 % — SIGNIFICANT CHANGE UP (ref 43–77)
PLATELET # BLD AUTO: 193 K/UL — SIGNIFICANT CHANGE UP (ref 150–400)
POTASSIUM SERPL-MCNC: 3.8 MMOL/L — SIGNIFICANT CHANGE UP (ref 3.5–5.3)
POTASSIUM SERPL-SCNC: 3.8 MMOL/L — SIGNIFICANT CHANGE UP (ref 3.5–5.3)
PROT SERPL-MCNC: 6.7 G/DL — SIGNIFICANT CHANGE UP (ref 6.6–8.7)
RBC # BLD: 4.18 M/UL — LOW (ref 4.2–5.8)
RBC # FLD: 12.7 % — SIGNIFICANT CHANGE UP (ref 10.3–14.5)
S AUREUS DNA NOSE QL NAA+PROBE: SIGNIFICANT CHANGE UP
SARS-COV-2 RNA SPEC QL NAA+PROBE: SIGNIFICANT CHANGE UP
SODIUM SERPL-SCNC: 140 MMOL/L — SIGNIFICANT CHANGE UP (ref 135–145)
T4 FREE SERPL-MCNC: 1.7 NG/DL — SIGNIFICANT CHANGE UP (ref 0.9–1.8)
WBC # BLD: 7.06 K/UL — SIGNIFICANT CHANGE UP (ref 3.8–10.5)
WBC # FLD AUTO: 7.06 K/UL — SIGNIFICANT CHANGE UP (ref 3.8–10.5)

## 2023-01-06 PROCEDURE — 93005 ELECTROCARDIOGRAM TRACING: CPT

## 2023-01-06 PROCEDURE — 84134 ASSAY OF PREALBUMIN: CPT

## 2023-01-06 PROCEDURE — 36415 COLL VENOUS BLD VENIPUNCTURE: CPT

## 2023-01-06 PROCEDURE — 93880 EXTRACRANIAL BILAT STUDY: CPT

## 2023-01-06 PROCEDURE — 93306 TTE W/DOPPLER COMPLETE: CPT | Mod: 26

## 2023-01-06 PROCEDURE — 75574 CT ANGIO HRT W/3D IMAGE: CPT | Mod: 26

## 2023-01-06 PROCEDURE — 83880 ASSAY OF NATRIURETIC PEPTIDE: CPT

## 2023-01-06 PROCEDURE — 86900 BLOOD TYPING SEROLOGIC ABO: CPT

## 2023-01-06 PROCEDURE — 84439 ASSAY OF FREE THYROXINE: CPT

## 2023-01-06 PROCEDURE — 99232 SBSQ HOSP IP/OBS MODERATE 35: CPT

## 2023-01-06 PROCEDURE — U0003: CPT

## 2023-01-06 PROCEDURE — 87641 MR-STAPH DNA AMP PROBE: CPT

## 2023-01-06 PROCEDURE — 86850 RBC ANTIBODY SCREEN: CPT

## 2023-01-06 PROCEDURE — 93880 EXTRACRANIAL BILAT STUDY: CPT | Mod: 26

## 2023-01-06 PROCEDURE — 85610 PROTHROMBIN TIME: CPT

## 2023-01-06 PROCEDURE — 85730 THROMBOPLASTIN TIME PARTIAL: CPT

## 2023-01-06 PROCEDURE — 85025 COMPLETE CBC W/AUTO DIFF WBC: CPT

## 2023-01-06 PROCEDURE — 82962 GLUCOSE BLOOD TEST: CPT

## 2023-01-06 PROCEDURE — 74174 CTA ABD&PLVS W/CONTRAST: CPT | Mod: 26

## 2023-01-06 PROCEDURE — 84443 ASSAY THYROID STIM HORMONE: CPT

## 2023-01-06 PROCEDURE — 85576 BLOOD PLATELET AGGREGATION: CPT

## 2023-01-06 PROCEDURE — 71045 X-RAY EXAM CHEST 1 VIEW: CPT

## 2023-01-06 PROCEDURE — 83735 ASSAY OF MAGNESIUM: CPT

## 2023-01-06 PROCEDURE — 74174 CTA ABD&PLVS W/CONTRAST: CPT

## 2023-01-06 PROCEDURE — 80053 COMPREHEN METABOLIC PANEL: CPT

## 2023-01-06 PROCEDURE — 75574 CT ANGIO HRT W/3D IMAGE: CPT

## 2023-01-06 PROCEDURE — 94010 BREATHING CAPACITY TEST: CPT

## 2023-01-06 PROCEDURE — 93306 TTE W/DOPPLER COMPLETE: CPT

## 2023-01-06 PROCEDURE — 83036 HEMOGLOBIN GLYCOSYLATED A1C: CPT

## 2023-01-06 PROCEDURE — 86901 BLOOD TYPING SEROLOGIC RH(D): CPT

## 2023-01-06 PROCEDURE — U0005: CPT

## 2023-01-06 PROCEDURE — 87640 STAPH A DNA AMP PROBE: CPT

## 2023-01-06 RX ORDER — MULTIVIT-MIN/FERROUS GLUCONATE 9 MG/15 ML
1 LIQUID (ML) ORAL
Qty: 0 | Refills: 0 | DISCHARGE

## 2023-01-06 RX ORDER — CHOLECALCIFEROL (VITAMIN D3) 125 MCG
2 CAPSULE ORAL
Qty: 0 | Refills: 0 | DISCHARGE

## 2023-01-06 RX ORDER — POTASSIUM CHLORIDE 20 MEQ
40 PACKET (EA) ORAL ONCE
Refills: 0 | Status: COMPLETED | OUTPATIENT
Start: 2023-01-06 | End: 2023-01-06

## 2023-01-06 RX ORDER — PREGABALIN 225 MG/1
1 CAPSULE ORAL
Qty: 0 | Refills: 0 | DISCHARGE

## 2023-01-06 RX ORDER — ASPIRIN/CALCIUM CARB/MAGNESIUM 324 MG
1 TABLET ORAL
Qty: 0 | Refills: 0 | DISCHARGE

## 2023-01-06 RX ORDER — DAPAGLIFLOZIN 10 MG/1
1 TABLET, FILM COATED ORAL
Qty: 0 | Refills: 0 | DISCHARGE

## 2023-01-06 RX ORDER — METFORMIN HYDROCHLORIDE 850 MG/1
2 TABLET ORAL
Qty: 0 | Refills: 0 | DISCHARGE

## 2023-01-06 RX ADMIN — Medication 20 MILLIGRAM(S): at 05:44

## 2023-01-06 RX ADMIN — PANTOPRAZOLE SODIUM 40 MILLIGRAM(S): 20 TABLET, DELAYED RELEASE ORAL at 05:44

## 2023-01-06 RX ADMIN — SODIUM CHLORIDE 3 MILLILITER(S): 9 INJECTION INTRAMUSCULAR; INTRAVENOUS; SUBCUTANEOUS at 05:44

## 2023-01-06 RX ADMIN — Medication 40 MILLIEQUIVALENT(S): at 08:30

## 2023-01-06 RX ADMIN — Medication 20 MILLIGRAM(S): at 18:36

## 2023-01-06 RX ADMIN — ENOXAPARIN SODIUM 40 MILLIGRAM(S): 100 INJECTION SUBCUTANEOUS at 08:31

## 2023-01-06 RX ADMIN — SODIUM CHLORIDE 3 MILLILITER(S): 9 INJECTION INTRAMUSCULAR; INTRAVENOUS; SUBCUTANEOUS at 13:07

## 2023-01-06 RX ADMIN — Medication 175 MICROGRAM(S): at 05:43

## 2023-01-06 RX ADMIN — Medication 81 MILLIGRAM(S): at 08:31

## 2023-01-06 RX ADMIN — FINASTERIDE 5 MILLIGRAM(S): 5 TABLET, FILM COATED ORAL at 08:31

## 2023-01-06 NOTE — PROGRESS NOTE ADULT - SUBJECTIVE AND OBJECTIVE BOX
Severe Aortic Stenosis    PAST MEDICAL & SURGICAL HISTORY:  Hypertension  Diabetes mellitus  Obesity  Hypothyroidism  Prostate CA    Brief Hospital Course: 72M, pmhx HTN, HLD, type 2 DM (HA1c 5.7 on Metformin, Januvia, Farxiga), obesity, hypothyroidism, prostate ca s/p CyberKnife, with known family history of early CAD with recent episode of SOB, vomiting, and syncope while on cruise in December, with + troponins. Patient was scheduled for elective cath, but presented to [place] ED  with complaint of chest pain, nausea, neg trops, with workup significant for TTE with Severe AS (JESS 0.5, mG 67, pG 97). Cardiac cath with non obstructive CAD, now transferred to [facility]  for TAVR evaluation.     Significant recent/past 24 hr events: No overnight events reported.    Subjective: Patient lying in bed in NAD, remains asymptomatic. Denies fevers, chills, lightheadedness, dizziness, HA, CP, palpitations, SOB, cough, abdominal pain, N/V, diarrhea, numbness/tingling in extremities, or any other acute complaints. ROS negative x 10 systems except as noted above.    MEDICATIONS  (STANDING):  aspirin enteric coated 81 milliGRAM(s) Oral daily  atorvastatin 20 milliGRAM(s) Oral at bedtime  doxycycline hyclate Tablet 20 milliGRAM(s) Oral two times a day  enoxaparin Injectable 40 milliGRAM(s) SubCutaneous every 24 hours  finasteride 5 milliGRAM(s) Oral daily  insulin lispro (ADMELOG) corrective regimen sliding scale   SubCutaneous Before meals and at bedtime  levothyroxine 175 MICROGram(s) Oral daily  melatonin 10 milliGRAM(s) Oral at bedtime  pantoprazole    Tablet 40 milliGRAM(s) Oral before breakfast  sodium chloride 0.9% lock flush 3 milliLiter(s) IV Push every 8 hours    Allergies: No Known Allergies    Vitals   T(C): 36.6 (2023 00:00), Max: 37.1 (2023 17:08)  T(F): 97.9 (2023 00:00), Max: 98.8 (2023 17:08)  HR: 56 (2023 00:00) (56 - 94)  BP: 134/74 (2023 00:00) (119/67 - 134/74)  RR: 17 (2023 00:00) (17 - 18)  SpO2: 97% (2023 00:00) (95% - 97%)  O2 Parameters below as of 2023 00:00  Patient On (Oxygen Delivery Method): room air    Physical Exam  Neuro: A+O x 3, non-focal, speech clear and intact  HEENT:  NCAT, No conjuctival edema or icterus, no thrush.    Neck:  Supple, trachea midline  Pulm: CTA bilaterally, no accessory muscle use noted  CV: regular rate, regular rhythm, +S1S2, +CLIVE  Abd: soft, NT, ND, + BS  Ext: OQUENDO x 4, no edema, no cyanosis, distal motor/neuro/circ intact  Skin: warm, dry, perfused    LABS                        11.9   7.81  )-----------( 213      ( 2023 18:00 )             36.4     -    138  |  102  |  20.2<H>  ----------------------------<  109<H>  4.1   |  25.0  |  1.24    Ca    9.4      2023 18:00  Mg     2.2         TPro  7.0  /  Alb  4.3  /  TBili  0.9  /  DBili  x   /  AST  26  /  ALT  23  /  AlkPhos  80  -    PT/INR - ( 2023 18:00 )   PT: 12.6 sec;   INR: 1.09 ratio       PTT - ( 2023 18:00 )  PTT:33.1 sec    P2Y12 Plt Response Test (23 @ 23:35)    P2Y12 Plt Reactivity: 242    Thyroid Stimulating Hormone, Serum (23 @ 18:00)    Thyroid Stimulating Hormone, Serum: 0.45 uIU/mL    T4, Serum (23 @ 13:29)    T4, Serum: 10.7 ug/dL    Serum Pro-Brain Natriuretic Peptide (23 @ 18:00)    Serum Pro-Brain Natriuretic Peptide: 318    A1C with Estimated Average Glucose (23 @ 18:00)    A1C with Estimated Average Glucose Result: 5.3 %    Estimated Average Glucose: 105 mg/dL    POCT Blood Glucose.: 107 mg/dL (23 @ 21:45)  POCT Blood Glucose.: 123 mg/dL (23 @ 11:35)  POCT Blood Glucose.: 100 mg/dL (23 @ 08:09)    Urinalysis Basic - ( 2023 09:38 )  Color: Yellow / Appearance: Clear / S.015 / pH: x  Gluc: x / Ketone: Negative  / Bili: Negative / Urobili: Negative   Blood: x / Protein: Negative / Nitrite: Negative   Leuk Esterase: Negative / RBC: x / WBC x   Sq Epi: x / Non Sq Epi: x / Bacteria: x      Last CXR:  < from: Xray Chest 1 View- PORTABLE-Urgent (Xray Chest 1 View- PORTABLE-Urgent .) (23 @ 06:21) >  IMPRESSION: No acute finding or change.  < end of copied text >    Cardiac Cath:  < from: Cardiac Catheterization (23 @ 16:10) >  Angiographic Findings  Cardiac Arteries and Lesion Findings  LMCA: Separate ostia of LAD and Cx, no LM present  LAD: Mild diffuse atherosclerosis  Mid LAD: 30% stenosis.  LCx: Mild diffuse atherosclerosis  Prox CX: 20% stenosis.  RCA: Mild diffuse atherosclerosis  Impression  Diagnostic Conclusions  Non-obstructive coronary artery disease. Calcified aortic valve present.  Recommendations  Recommend aggressive medical management of non-obstructive CAD. Obtain TTE to evaluate aortic valve given worsening murmur as well as heavily calcified valve.  < end of copied text >    TTE:  < from: TTE Echo Complete w/o Contrast w/ Doppler (23 @ 07:57) >  Impression  Summary  The left ventricle is normal in size, wall motion and contractility. Estimated left ventricular ejection fraction is 55-60 %. The left atrium is mildly dilated. Normal appearing right ventricle structure and function. Significant fibrocalcific changes noted to the aortic valve leaflets with restriction in leaflet excursion. Peak and mean transaortic gradients are 97 and 67 mmHg respectively; JESS is estimated around 0.4-0.5cm2. this finding is consistent with severe aortic stenosis. Mild (1+) aortic regurgitation is present. Mild mitral annular calcification is present. Trace to mild mitral regurgitation is present. EA reversal of the mitral inflow consistent with reduced compliance of the left ventricle. Trace to mild tricuspid valve regurgitation is present. Trace pericardial effusion is present. There is no evidence of tamponade.  < end of copied text >

## 2023-01-06 NOTE — DISCHARGE NOTE PROVIDER - CARE PROVIDERS DIRECT ADDRESSES
,[email or web address]

## 2023-01-06 NOTE — DISCHARGE NOTE NURSING/CASE MANAGEMENT/SOCIAL WORK - NSDCPEFALRISK_GEN_ALL_CORE
Received prescription renewal request for:   Wellbutrin  MG 24 hr tablet    Last renewed on 2-23-22 for 90D/1RF    Last seen: 2-23-22  Follow up: 6 months  No Show/Cancelled appt: none  Next appointment: Message sent to PSAR's to schedule follow up appt    Renewing Rx for 30D/0RF(s)   For information on Fall & Injury Prevention, visit: [email or web address]  [email or web address] OR  https://www.cdc.gov/steadi/patient.html

## 2023-01-06 NOTE — PROGRESS NOTE ADULT - PROBLEM SELECTOR PLAN 2
HA1c 5.7 on Metformin, Januvia, and Farxiga as an outpatient.   Holding oral agents while inpatient.   Check fingersticks AC/HS with ISS ordered for BG control.   Diabetic/ consistent carb diet while not NPO. Currently NPO until TAVR CTA later today.

## 2023-01-06 NOTE — PROGRESS NOTE ADULT - ASSESSMENT
72M, pmhx HTN, HLD, type 2 DM (HA1c 5.7 on Metformin, Januvia, Farxiga), obesity, hypothyroidism, prostate ca s/p CyberKnife, with known family history of early CAD with recent episode of SOB, vomiting, and syncope while on cruise in December, with + troponins. Patient was scheduled for elective cath, but presented to [place] ED 1/4 with complaint of chest pain, nausea, neg trops, with workup significant for TTE with Severe AS (JESS 0.5, mG 67, pG 97). Cardiac cath with non obstructive CAD, now transferred to [facility] 1/5 for TAVR evaluation.

## 2023-01-06 NOTE — DISCHARGE NOTE PROVIDER - NSDCCPCAREPLAN_GEN_ALL_CORE_FT
PRINCIPAL DISCHARGE DIAGNOSIS  Diagnosis: Aortic stenosis  Assessment and Plan of Treatment: Please follow instructions outlined in discharge booklet.  DO NOT TAKE ANY MEDICATIONS ON THE MORNING OF SURGERY.  DO NOT TAKE ANY SUPPLEMENTS PRIOR TO SURGERY.      SECONDARY DISCHARGE DIAGNOSES  Diagnosis: Hypertension  Assessment and Plan of Treatment: Take all medications as prescribed preoperatively.  You have hypertension, or high blood pressure. It is very important to continue your medication as ordered. High blood pressure increases your risk for heart attack, stroke and kidney disease. It does not usually cause symptoms but it can be serious.   1. Be sure to take all of your medication as prescribed.  2. You may find it helpful to get a home blood pressure meter and check your blood pressure periodically.  3. Lifestyle changes can improve your blood pressure and lessen the amount of medication you need, such as: losing weight, choosing a diet low in fat and rich in fruits, vegetables and low-fat dairy products, reducing the amount of salt you eat, cut down on alcohol (to less than two drinks per day) and do something active most days for 30 minutes or more.      Diagnosis: Hypothyroidism  Assessment and Plan of Treatment: Take all medications as prescribed.    Diagnosis: Diabetes mellitus  Assessment and Plan of Treatment: Continue Januvia as previously directed.  HOLD Farxiga 3 days prior to surgery.  Last dose should be MONDAY 1/9/23.  RESTART Metformin on MONDAY 1/9/23 due to recent contrast administration.     PRINCIPAL DISCHARGE DIAGNOSIS  Diagnosis: Aortic stenosis  Assessment and Plan of Treatment: You will return for surgery on Wednesday 1/11/2023.  Presurgical testing will contact you monday or tuesday and review further instructions with you. Please use surgical scrubs starting sunday night daily and twice on tuesday. Do NOT use surgical scrub on your face or genital area. you can use your regular soap in those areas.   do NOT eat or drink anything after midnight tuesday night. The hospital will call you Tuesday evening and tell you when to report Wednesday for surgery.  You do not need to take any of your medications the morning of surgery.      SECONDARY DISCHARGE DIAGNOSES  Diagnosis: Hypertension  Assessment and Plan of Treatment: Take all medications as prescribed preoperatively.  You have hypertension, or high blood pressure. It is very important to continue your medication as ordered. High blood pressure increases your risk for heart attack, stroke and kidney disease. It does not usually cause symptoms but it can be serious.   1. Be sure to take all of your medication as prescribed.  2. You may find it helpful to get a home blood pressure meter and check your blood pressure periodically.  3. Lifestyle changes can improve your blood pressure and lessen the amount of medication you need, such as: losing weight, choosing a diet low in fat and rich in fruits, vegetables and low-fat dairy products, reducing the amount of salt you eat, cut down on alcohol (to less than two drinks per day) and do something active most days for 30 minutes or more.      Diagnosis: Diabetes mellitus  Assessment and Plan of Treatment: Continue Januvia as previously directed.  do NOT restart Farxiga   RESTART Metformin on MONDAY 1/9/23 due to recent contrast administration.    Diagnosis: Hypothyroidism  Assessment and Plan of Treatment: Take all medications as prescribed.

## 2023-01-06 NOTE — DISCHARGE NOTE PROVIDER - NSDCMRMEDTOKEN_GEN_ALL_CORE_FT
Aspirin Enteric Coated 81 mg oral delayed release tablet: 1 tab(s) orally once a day; DO NOT TAKE MORNING OF SURGERY  atorvastatin 20 mg oral tablet: 1 tab(s) orally once a day  doxycycline hyclate 100 mg oral tablet: 1 tab(s) orally once a day  Farxiga 10 mg oral tablet: 1 tab(s) orally once a day; TAKE LAST DOSE ON MONDAY 1/9 UNTIL SURGERY  finasteride 1 mg oral tablet: 1 tab(s) orally once a day  Januvia 50 mg oral tablet: 1 tab(s) orally once a day  levothyroxine 175 mcg (0.175 mg) oral tablet: 1 tab(s) orally once a day  losartan-hydroCHLOROthiazide 100 mg-12.5 mg oral tablet: 1 tab(s) orally once a day  Melatonin 5 mg oral tablet: 2 tab(s) orally once a day (at bedtime)  metFORMIN 500 mg oral tablet, extended release: 2 tab(s) orally 2 times a day; DO NOT RESTART UNTIL MONDAY 1/9 AND THEN HOLD DAY OF SURGERY  pantoprazole 40 mg oral delayed release tablet: 1 tab(s) orally once a day  zolpidem 10 mg oral tablet: 1 tab(s) orally once a day (at bedtime)   Aspirin Enteric Coated 81 mg oral delayed release tablet: 1 tab(s) orally once a day; DO NOT TAKE MORNING OF SURGERY  atorvastatin 20 mg oral tablet: 1 tab(s) orally once a day  doxycycline 20 mg oral tablet: 1 tab(s) orally once a day  finasteride 1 mg oral tablet: 1 tab(s) orally once a day  Januvia 50 mg oral tablet: 1 tab(s) orally once a day  levothyroxine 175 mcg (0.175 mg) oral tablet: 1 tab(s) orally once a day  losartan-hydroCHLOROthiazide 100 mg-12.5 mg oral tablet: 1 tab(s) orally once a day  Melatonin 5 mg oral tablet: 2 tab(s) orally once a day (at bedtime)  metFORMIN 500 mg oral tablet, extended release: 2 tab(s) orally 2 times a day; DO NOT RESTART UNTIL MONDAY 1/9 AND THEN HOLD DAY OF SURGERY  pantoprazole 40 mg oral delayed release tablet: 1 tab(s) orally once a day  zolpidem 10 mg oral tablet: 1 tab(s) orally once a day (at bedtime)

## 2023-01-06 NOTE — PHYSICAL THERAPY INITIAL EVALUATION ADULT - PERTINENT HX OF CURRENT PROBLEM, REHAB EVAL
Pt with PMH obesity, HLD, hypothyroidism, prostate CA, s/p cyberknife Khx of CAD with recent episodes of SOB, vomiting, syncope while on cruise in december, +troponins, scheduled for elective cath but presented to [place] ED 1/4 with CP/ nausea, severe AS, cath with non obstructive CAD now here at [facility] for TAVR eval.

## 2023-01-06 NOTE — DISCHARGE NOTE PROVIDER - NSDCFUADDAPPT_GEN_ALL_CORE_FT
The office will contact you prior to your surgery to discuss all preoperative instructions.    The cardiac surgery office is located at [facility], first floor. Take a left at the end of the lobby until the end of that [place] (past the elevator bank). Make a left and the office is on your right across from the elevators.   The presurgical testing will contact you monday or tuesday to discuss all preoperative instructions.

## 2023-01-06 NOTE — DISCHARGE NOTE PROVIDER - ATTENDING ATTESTATION STATEMENT
I have personally seen and examined the patient. I have collaborated with and supervised the
Well-developed, well nourished

## 2023-01-06 NOTE — DISCHARGE NOTE NURSING/CASE MANAGEMENT/SOCIAL WORK - NSDCFUADDAPPT_GEN_ALL_CORE_FT
The presurgical testing will contact you monday or tuesday to discuss all preoperative instructions.

## 2023-01-06 NOTE — PROGRESS NOTE ADULT - PROBLEM SELECTOR PLAN 1
Cardiac cath at  1/4 with non obstructive CAD.  TTE at  1/5 with Severe AS (JESS 0.5, mG 67, pG 97).   Transferred to [facility] 1/5 for TAVR evaluation.  Follow up further workup with b/l carotid US, PFTs, etc.   Plan for possible TAVR CTA this AM if BUN/Cr remains stable. May need pre and post contrast IVFs.   Plan to be discussed / reviewed with CT Surgery attending / team during AM rounds.

## 2023-01-06 NOTE — DISCHARGE NOTE PROVIDER - CARE PROVIDER_API CALL
Jerman Rodriges)  Surgery; Thoracic and Cardiac Surgery  [address] [place] [address]  Phone: [phone]  Fax: [phone]  Follow Up Time:    Jerman Rodriges)  Surgery; Thoracic and Cardiac Surgery  [address] [place] [address]  Phone: [phone]  Fax: [phone]  Follow Up Time:     Mahamed Heath)  Cardiovascular Disease; Internal Medicine  [address] [facility], [address]  [place] [address]  Phone: [phone]  Fax: [phone]  Follow Up Time:

## 2023-01-06 NOTE — DISCHARGE NOTE NURSING/CASE MANAGEMENT/SOCIAL WORK - PATIENT PORTAL LINK FT
You can access the FollowMyHealth Patient Portal offered by [facility] by registering at the following website: [email or web address]. By joining [employer]’s FollowMyHealth portal, you will also be able to view your health information using other applications (apps) compatible with our system.

## 2023-01-06 NOTE — PHYSICAL THERAPY INITIAL EVALUATION ADULT - ADDITIONAL COMMENTS
Pt AxOx4 states he lives in [facility] with girlfriend with 2STE 0 HR and 12  steps upstairs. Pt is retired and was independent PTA without use or owning DME.  Frailty Test results*  5m walk test= 5 seconds  Dynamometer= LUE: (1) 30kg, (2) 36kg, (3) 34kg  RUE:(1) 40kg, (2) 42kg, (3) 38kg

## 2023-01-06 NOTE — DISCHARGE NOTE PROVIDER - NSDCFUADDINST_GEN_ALL_CORE_FT
Please call the Cardiothoracic Surgery office at [phone] if you are experiencing any shortness of breath, chest pain, fevers or chills, drainage from the incisions, persistent nausea, vomiting or if you have any questions about your medications. If the symptoms are severe, call 911 and go to the nearest hospital.

## 2023-01-06 NOTE — DISCHARGE NOTE PROVIDER - HOSPITAL COURSE
72M, pmhx HTN, HLD, type 2 DM (HA1c 5.7 on Metformin, Januvia, Farxiga), obesity, hypothyroidism, prostate ca s/p CyberKnife, with known family history of early CAD with recent episode of SOB, vomiting, and syncope while on cruise in December, with + troponins. Patient was scheduled for elective cath, but presented to [facility] ED 1/4 with complaint of chest pain, nausea, neg trops, with workup significant for TTE with Severe AS (JESS 0.5, mG 67, pG 97). Cardiac cath with non obstructive CAD, now transferred to [facility] 1/5 for TAVR evaluation.  Pt is stable from a hemodynamic and respiratory standpoint and is cleared for discharge per Dr. Rodriges.  Pt to be scheduled for same day admit TAVR with tentative date of 1/13/2023. 72M, pmhx HTN, HLD, type 2 DM (HA1c 5.7 on Metformin, Januvia, Farxiga), obesity, hypothyroidism, prostate ca s/p CyberKnife, with known family history of early CAD with recent episode of SOB, vomiting, and syncope while on cruise in December, with + troponins. Patient was scheduled for elective cath, but presented to [place] ED 1/4 with complaint of chest pain, nausea, neg trops, with workup significant for TTE with Severe AS (JESS 0.5, mG 67, pG 97). Cardiac cath with non obstructive CAD, now transferred to [facility] 1/5 for TAVR evaluation.  Pt is stable from a hemodynamic and respiratory standpoint and is cleared for discharge per Dr. Rodriges.  Pt to be scheduled for same day admit TAVR with tentative date of 1/11/2023.

## 2023-01-09 PROBLEM — I10 ESSENTIAL (PRIMARY) HYPERTENSION: Chronic | Status: ACTIVE | Noted: 2023-01-05

## 2023-01-09 PROBLEM — E03.9 HYPOTHYROIDISM, UNSPECIFIED: Chronic | Status: ACTIVE | Noted: 2023-01-05

## 2023-01-09 PROBLEM — E11.9 TYPE 2 DIABETES MELLITUS WITHOUT COMPLICATIONS: Chronic | Status: ACTIVE | Noted: 2023-01-05

## 2023-01-09 PROBLEM — C61 MALIGNANT NEOPLASM OF PROSTATE: Chronic | Status: ACTIVE | Noted: 2023-01-05

## 2023-01-09 PROBLEM — E66.9 OBESITY, UNSPECIFIED: Chronic | Status: ACTIVE | Noted: 2023-01-05

## 2023-01-10 ENCOUNTER — NON-APPOINTMENT (OUTPATIENT)
Age: 73
End: 2023-01-10

## 2023-01-10 RX ORDER — CEFUROXIME AXETIL 250 MG
1500 TABLET ORAL ONCE
Refills: 0 | Status: COMPLETED | OUTPATIENT
Start: 2023-01-11 | End: 2023-01-11

## 2023-01-10 NOTE — ASU PATIENT PROFILE, ADULT - LEARNING ASSESSMENT (PATIENT) ADDITIONAL COMMENTS
Telephone interview with pt, instructed on pre-admission instructions/teaching, tips for safer surgery, pain management scale. Pt states last does of Farxiga was 1/3 when he was in the hospital. Pt verbalized understanding of all instructions given.

## 2023-01-10 NOTE — ASU PATIENT PROFILE, ADULT - VISION (WITH CORRECTIVE LENSES IF THE PATIENT USUALLY WEARS THEM):
reading glasses - cheaters/Partially impaired: cannot see medication labels or newsprint, but can see obstacles in path, and the surrounding layout; can count fingers at arm's length

## 2023-01-11 ENCOUNTER — TRANSCRIPTION ENCOUNTER (OUTPATIENT)
Age: 73
End: 2023-01-11

## 2023-01-11 ENCOUNTER — APPOINTMENT (OUTPATIENT)
Dept: CARDIOTHORACIC SURGERY | Facility: HOSPITAL | Age: 73
End: 2023-01-11

## 2023-01-11 ENCOUNTER — APPOINTMENT (OUTPATIENT)
Dept: CARDIOTHORACIC SURGERY | Facility: CLINIC | Age: 73
End: 2023-01-11
Payer: MEDICARE

## 2023-01-11 ENCOUNTER — INPATIENT (INPATIENT)
Facility: HOSPITAL | Age: 73
LOS: 0 days | Discharge: HOME | DRG: 267 | End: 2023-01-12
Attending: THORACIC SURGERY (CARDIOTHORACIC VASCULAR SURGERY) | Admitting: THORACIC SURGERY (CARDIOTHORACIC VASCULAR SURGERY)
Payer: MEDICARE

## 2023-01-11 VITALS
SYSTOLIC BLOOD PRESSURE: 125 MMHG | RESPIRATION RATE: 16 BRPM | WEIGHT: 225.09 LBS | TEMPERATURE: 98 F | HEIGHT: 68 IN | DIASTOLIC BLOOD PRESSURE: 61 MMHG | OXYGEN SATURATION: 98 % | HEART RATE: 60 BPM

## 2023-01-11 DIAGNOSIS — I35.0 NONRHEUMATIC AORTIC (VALVE) STENOSIS: ICD-10-CM

## 2023-01-11 LAB
ALBUMIN SERPL ELPH-MCNC: 3.9 G/DL — SIGNIFICANT CHANGE UP (ref 3.3–5.2)
ALP SERPL-CCNC: 80 U/L — SIGNIFICANT CHANGE UP (ref 40–120)
ALT FLD-CCNC: 18 U/L — SIGNIFICANT CHANGE UP
ANION GAP SERPL CALC-SCNC: 9 MMOL/L — SIGNIFICANT CHANGE UP (ref 5–17)
APTT BLD: 35.4 SEC — SIGNIFICANT CHANGE UP (ref 27.5–35.5)
AST SERPL-CCNC: 26 U/L — SIGNIFICANT CHANGE UP
BASE EXCESS BLDA CALC-SCNC: 1.8 MMOL/L — SIGNIFICANT CHANGE UP (ref -2–3)
BASE EXCESS BLDA CALC-SCNC: 4 MMOL/L — HIGH (ref -2–3)
BASOPHILS # BLD AUTO: 0.03 K/UL — SIGNIFICANT CHANGE UP (ref 0–0.2)
BASOPHILS NFR BLD AUTO: 0.2 % — SIGNIFICANT CHANGE UP (ref 0–2)
BILIRUB SERPL-MCNC: 1.1 MG/DL — SIGNIFICANT CHANGE UP (ref 0.4–2)
BLD GP AB SCN SERPL QL: SIGNIFICANT CHANGE UP
BUN SERPL-MCNC: 24.1 MG/DL — HIGH (ref 8–20)
CA-I BLDA-SCNC: 1.21 MMOL/L — SIGNIFICANT CHANGE UP (ref 1.15–1.33)
CA-I BLDA-SCNC: 1.26 MMOL/L — SIGNIFICANT CHANGE UP (ref 1.15–1.33)
CALCIUM SERPL-MCNC: 8.9 MG/DL — SIGNIFICANT CHANGE UP (ref 8.4–10.5)
CHLORIDE BLDA-SCNC: 103 MMOL/L — SIGNIFICANT CHANGE UP (ref 96–108)
CHLORIDE BLDA-SCNC: 104 MMOL/L — SIGNIFICANT CHANGE UP (ref 96–108)
CHLORIDE SERPL-SCNC: 103 MMOL/L — SIGNIFICANT CHANGE UP (ref 96–108)
CK SERPL-CCNC: 84 U/L — SIGNIFICANT CHANGE UP (ref 30–200)
CO2 SERPL-SCNC: 24 MMOL/L — SIGNIFICANT CHANGE UP (ref 22–29)
COHGB MFR BLDA: 0.9 % — SIGNIFICANT CHANGE UP
COHGB MFR BLDA: 1 % — SIGNIFICANT CHANGE UP
CREAT SERPL-MCNC: 0.95 MG/DL — SIGNIFICANT CHANGE UP (ref 0.5–1.3)
EGFR: 85 ML/MIN/1.73M2 — SIGNIFICANT CHANGE UP
EOSINOPHIL # BLD AUTO: 0.11 K/UL — SIGNIFICANT CHANGE UP (ref 0–0.5)
EOSINOPHIL NFR BLD AUTO: 0.9 % — SIGNIFICANT CHANGE UP (ref 0–6)
GAS PNL BLDA: SIGNIFICANT CHANGE UP
GLUCOSE BLDA-MCNC: 119 MG/DL — HIGH (ref 70–99)
GLUCOSE BLDA-MCNC: 121 MG/DL — HIGH (ref 70–99)
GLUCOSE BLDC GLUCOMTR-MCNC: 112 MG/DL — HIGH (ref 70–99)
GLUCOSE BLDC GLUCOMTR-MCNC: 180 MG/DL — HIGH (ref 70–99)
GLUCOSE SERPL-MCNC: 134 MG/DL — HIGH (ref 70–99)
HCO3 BLDA-SCNC: 26 MMOL/L — SIGNIFICANT CHANGE UP (ref 21–28)
HCO3 BLDA-SCNC: 28 MMOL/L — SIGNIFICANT CHANGE UP (ref 21–28)
HCT VFR BLD CALC: 33.1 % — LOW (ref 39–50)
HCT VFR BLDA CALC: 33 % — SIGNIFICANT CHANGE UP
HCT VFR BLDA CALC: 35 % — SIGNIFICANT CHANGE UP
HGB BLD-MCNC: 11.1 G/DL — LOW (ref 13–17)
HGB BLDA-MCNC: 11.1 G/DL — LOW (ref 12.6–17.4)
HGB BLDA-MCNC: 11.5 G/DL — LOW (ref 12.6–17.4)
IMM GRANULOCYTES NFR BLD AUTO: 0.7 % — SIGNIFICANT CHANGE UP (ref 0–0.9)
INR BLD: 1.14 RATIO — SIGNIFICANT CHANGE UP (ref 0.88–1.16)
LACTATE BLDA-MCNC: 1.2 MMOL/L — SIGNIFICANT CHANGE UP (ref 0.5–2)
LACTATE BLDA-MCNC: 1.5 MMOL/L — SIGNIFICANT CHANGE UP (ref 0.5–2)
LYMPHOCYTES # BLD AUTO: 0.93 K/UL — LOW (ref 1–3.3)
LYMPHOCYTES # BLD AUTO: 7.5 % — LOW (ref 13–44)
MAGNESIUM SERPL-MCNC: 1.7 MG/DL — SIGNIFICANT CHANGE UP (ref 1.6–2.6)
MCHC RBC-ENTMCNC: 29 PG — SIGNIFICANT CHANGE UP (ref 27–34)
MCHC RBC-ENTMCNC: 33.5 GM/DL — SIGNIFICANT CHANGE UP (ref 32–36)
MCV RBC AUTO: 86.4 FL — SIGNIFICANT CHANGE UP (ref 80–100)
METHGB MFR BLDA: 0.3 % — SIGNIFICANT CHANGE UP
METHGB MFR BLDA: 0.3 % — SIGNIFICANT CHANGE UP
MONOCYTES # BLD AUTO: 0.34 K/UL — SIGNIFICANT CHANGE UP (ref 0–0.9)
MONOCYTES NFR BLD AUTO: 2.8 % — SIGNIFICANT CHANGE UP (ref 2–14)
NEUTROPHILS # BLD AUTO: 10.86 K/UL — HIGH (ref 1.8–7.4)
NEUTROPHILS NFR BLD AUTO: 87.9 % — HIGH (ref 43–77)
OXYHGB MFR BLDA: 98 % — HIGH (ref 90–95)
OXYHGB MFR BLDA: 98 % — HIGH (ref 90–95)
PCO2 BLDA: 36 MMHG — SIGNIFICANT CHANGE UP (ref 35–48)
PCO2 BLDA: 44 MMHG — SIGNIFICANT CHANGE UP (ref 35–48)
PH BLDA: 7.42 — SIGNIFICANT CHANGE UP (ref 7.35–7.45)
PH BLDA: 7.46 — HIGH (ref 7.35–7.45)
PLATELET # BLD AUTO: 173 K/UL — SIGNIFICANT CHANGE UP (ref 150–400)
PO2 BLDA: 335 MMHG — HIGH (ref 83–108)
PO2 BLDA: 412 MMHG — HIGH (ref 83–108)
POTASSIUM BLDA-SCNC: 4 MMOL/L — SIGNIFICANT CHANGE UP (ref 3.5–5.1)
POTASSIUM BLDA-SCNC: 4 MMOL/L — SIGNIFICANT CHANGE UP (ref 3.5–5.1)
POTASSIUM SERPL-MCNC: 4.1 MMOL/L — SIGNIFICANT CHANGE UP (ref 3.5–5.3)
POTASSIUM SERPL-SCNC: 4.1 MMOL/L — SIGNIFICANT CHANGE UP (ref 3.5–5.3)
PROT SERPL-MCNC: 6.3 G/DL — LOW (ref 6.6–8.7)
PROTHROM AB SERPL-ACNC: 13.2 SEC — SIGNIFICANT CHANGE UP (ref 10.5–13.4)
RBC # BLD: 3.83 M/UL — LOW (ref 4.2–5.8)
RBC # FLD: 12.8 % — SIGNIFICANT CHANGE UP (ref 10.3–14.5)
SAO2 % BLDA: 99.4 % — HIGH (ref 94–98)
SAO2 % BLDA: 99.6 % — HIGH (ref 94–98)
SODIUM BLDA-SCNC: 134 MMOL/L — LOW (ref 136–145)
SODIUM BLDA-SCNC: 135 MMOL/L — LOW (ref 136–145)
SODIUM SERPL-SCNC: 136 MMOL/L — SIGNIFICANT CHANGE UP (ref 135–145)
TROPONIN T SERPL-MCNC: 0.02 NG/ML — SIGNIFICANT CHANGE UP (ref 0–0.06)
WBC # BLD: 12.36 K/UL — HIGH (ref 3.8–10.5)
WBC # FLD AUTO: 12.36 K/UL — HIGH (ref 3.8–10.5)

## 2023-01-11 PROCEDURE — 99223 1ST HOSP IP/OBS HIGH 75: CPT | Mod: FS

## 2023-01-11 PROCEDURE — 93306 TTE W/DOPPLER COMPLETE: CPT | Mod: 26

## 2023-01-11 PROCEDURE — 71045 X-RAY EXAM CHEST 1 VIEW: CPT | Mod: 26

## 2023-01-11 PROCEDURE — 33361 REPLACE AORTIC VALVE PERQ: CPT | Mod: 62,Q0

## 2023-01-11 PROCEDURE — MCOT1: CPT | Mod: NC

## 2023-01-11 PROCEDURE — 93010 ELECTROCARDIOGRAM REPORT: CPT

## 2023-01-11 DEVICE — CATH CVC 2 LUMEM  8FR X 11CM: Type: IMPLANTABLE DEVICE | Status: FUNCTIONAL

## 2023-01-11 DEVICE — INTRODUCER SHEATH DRYSEAL FLEX 18FR X 33CM: Type: IMPLANTABLE DEVICE | Status: FUNCTIONAL

## 2023-01-11 DEVICE — LOADING SYSTEM EVOLUT FX 23-29MM: Type: IMPLANTABLE DEVICE | Status: FUNCTIONAL

## 2023-01-11 DEVICE — VALVE TAVR EVOLUT FX 29MM: Type: IMPLANTABLE DEVICE | Status: FUNCTIONAL

## 2023-01-11 DEVICE — KIT ARTERIAL CATH 20GAX12 CM: Type: IMPLANTABLE DEVICE | Status: FUNCTIONAL

## 2023-01-11 DEVICE — CATH TAVR EVOLUT FX 14FR 23-29MM: Type: IMPLANTABLE DEVICE | Status: FUNCTIONAL

## 2023-01-11 RX ORDER — ATORVASTATIN CALCIUM 80 MG/1
20 TABLET, FILM COATED ORAL AT BEDTIME
Refills: 0 | Status: DISCONTINUED | OUTPATIENT
Start: 2023-01-11 | End: 2023-01-12

## 2023-01-11 RX ORDER — SITAGLIPTIN 50 MG/1
1 TABLET, FILM COATED ORAL
Qty: 0 | Refills: 0 | DISCHARGE

## 2023-01-11 RX ORDER — SENNA PLUS 8.6 MG/1
2 TABLET ORAL AT BEDTIME
Refills: 0 | Status: DISCONTINUED | OUTPATIENT
Start: 2023-01-11 | End: 2023-01-12

## 2023-01-11 RX ORDER — LANOLIN ALCOHOL/MO/W.PET/CERES
2 CREAM (GRAM) TOPICAL
Qty: 0 | Refills: 0 | DISCHARGE

## 2023-01-11 RX ORDER — FINASTERIDE 5 MG/1
1 TABLET, FILM COATED ORAL
Qty: 0 | Refills: 0 | DISCHARGE

## 2023-01-11 RX ORDER — POTASSIUM CHLORIDE 20 MEQ
10 PACKET (EA) ORAL
Refills: 0 | Status: DISCONTINUED | OUTPATIENT
Start: 2023-01-11 | End: 2023-01-11

## 2023-01-11 RX ORDER — INSULIN LISPRO 100/ML
VIAL (ML) SUBCUTANEOUS
Refills: 0 | Status: DISCONTINUED | OUTPATIENT
Start: 2023-01-11 | End: 2023-01-12

## 2023-01-11 RX ORDER — CEFUROXIME AXETIL 250 MG
1500 TABLET ORAL EVERY 8 HOURS
Refills: 0 | Status: COMPLETED | OUTPATIENT
Start: 2023-01-11 | End: 2023-01-12

## 2023-01-11 RX ORDER — SODIUM CHLORIDE 9 MG/ML
1000 INJECTION INTRAMUSCULAR; INTRAVENOUS; SUBCUTANEOUS
Refills: 0 | Status: DISCONTINUED | OUTPATIENT
Start: 2023-01-11 | End: 2023-01-12

## 2023-01-11 RX ORDER — PANTOPRAZOLE SODIUM 20 MG/1
40 TABLET, DELAYED RELEASE ORAL DAILY
Refills: 0 | Status: DISCONTINUED | OUTPATIENT
Start: 2023-01-12 | End: 2023-01-12

## 2023-01-11 RX ORDER — ATORVASTATIN CALCIUM 80 MG/1
1 TABLET, FILM COATED ORAL
Qty: 0 | Refills: 0 | DISCHARGE

## 2023-01-11 RX ORDER — ZOLPIDEM TARTRATE 10 MG/1
1 TABLET ORAL
Qty: 0 | Refills: 0 | DISCHARGE

## 2023-01-11 RX ORDER — PANTOPRAZOLE SODIUM 20 MG/1
40 TABLET, DELAYED RELEASE ORAL ONCE
Refills: 0 | Status: COMPLETED | OUTPATIENT
Start: 2023-01-11 | End: 2023-01-11

## 2023-01-11 RX ORDER — LEVOTHYROXINE SODIUM 125 MCG
1 TABLET ORAL
Qty: 0 | Refills: 0 | DISCHARGE

## 2023-01-11 RX ORDER — LANOLIN ALCOHOL/MO/W.PET/CERES
5 CREAM (GRAM) TOPICAL AT BEDTIME
Refills: 0 | Status: DISCONTINUED | OUTPATIENT
Start: 2023-01-11 | End: 2023-01-12

## 2023-01-11 RX ORDER — LOSARTAN/HYDROCHLOROTHIAZIDE 100MG-25MG
1 TABLET ORAL
Qty: 0 | Refills: 0 | DISCHARGE

## 2023-01-11 RX ORDER — LEVOTHYROXINE SODIUM 125 MCG
175 TABLET ORAL DAILY
Refills: 0 | Status: DISCONTINUED | OUTPATIENT
Start: 2023-01-12 | End: 2023-01-12

## 2023-01-11 RX ORDER — PANTOPRAZOLE SODIUM 20 MG/1
1 TABLET, DELAYED RELEASE ORAL
Qty: 0 | Refills: 0 | DISCHARGE

## 2023-01-11 RX ORDER — ASPIRIN/CALCIUM CARB/MAGNESIUM 324 MG
81 TABLET ORAL DAILY
Refills: 0 | Status: DISCONTINUED | OUTPATIENT
Start: 2023-01-11 | End: 2023-01-12

## 2023-01-11 RX ORDER — FINASTERIDE 5 MG/1
5 TABLET, FILM COATED ORAL DAILY
Refills: 0 | Status: DISCONTINUED | OUTPATIENT
Start: 2023-01-12 | End: 2023-01-12

## 2023-01-11 RX ORDER — SODIUM CHLORIDE 9 MG/ML
3 INJECTION INTRAMUSCULAR; INTRAVENOUS; SUBCUTANEOUS EVERY 8 HOURS
Refills: 0 | Status: DISCONTINUED | OUTPATIENT
Start: 2023-01-11 | End: 2023-01-11

## 2023-01-11 RX ADMIN — Medication 100 MILLIGRAM(S): at 18:44

## 2023-01-11 RX ADMIN — Medication 1: at 22:15

## 2023-01-11 RX ADMIN — Medication 5 MILLIGRAM(S): at 22:15

## 2023-01-11 RX ADMIN — ATORVASTATIN CALCIUM 20 MILLIGRAM(S): 80 TABLET, FILM COATED ORAL at 22:21

## 2023-01-11 RX ADMIN — Medication 81 MILLIGRAM(S): at 20:13

## 2023-01-11 RX ADMIN — PANTOPRAZOLE SODIUM 40 MILLIGRAM(S): 20 TABLET, DELAYED RELEASE ORAL at 18:44

## 2023-01-11 NOTE — BRIEF OPERATIVE NOTE - NSICDXBRIEFPREOP_GEN_ALL_CORE_FT
PRE-OP DIAGNOSIS:  Severe aortic stenosis 11-Jan-2023 12:12:26  Benito Maradiaga  Chronic diastolic CHF (congestive heart failure), NYHA class 2 11-Jan-2023 12:12:36  Benito Maradiaga  First degree AV block 11-Jan-2023 12:12:49  Benito Maradiaga

## 2023-01-11 NOTE — BRIEF OPERATIVE NOTE - COMMENTS
[employer] Representative: Garret Allen (clinical support)  Invasive Lines: Right Internal Jugular Introducer, Right Brachial Arterial Line, Left Femoral Arterial & Venous Sheaths ?  IV Medication Infusions: ? [employer] Representative: Garret lAlen (clinical support)  Invasive Lines: Right Internal Jugular Introducer, Right Brachial Arterial Line  IV Medication Infusions: None

## 2023-01-11 NOTE — BRIEF OPERATIVE NOTE - OPERATION/FINDINGS
Severe AS, Post Deployment ? PVL (mean gradient ? mmHg & JESS ?), ? Conduction or Rhythm Disturbances, Extubated in the OR? Post Deployment Mild PVL (mean gradient 6 mmHg), No Conduction or Rhythm Disturbances, Extubated in the OR

## 2023-01-11 NOTE — CHART NOTE - NSCHARTNOTEFT_GEN_A_CORE
Commercial 29mm [employer] CoreValve Evolut FX Percutaneous Transfemoral TAVR via Right Common Femoral Artery.  NCT# [id number], STS/ACC TVT Registry Patient ID# [id number].

## 2023-01-11 NOTE — BRIEF OPERATIVE NOTE - NSICDXBRIEFPOSTOP_GEN_ALL_CORE_FT
POST-OP DIAGNOSIS:  Severe aortic stenosis 11-Jan-2023 12:13:04  Benito Maradiaga  Chronic diastolic CHF (congestive heart failure), NYHA class 2 11-Jan-2023 12:13:12  Benito Maradiaga  First degree AV block 11-Jan-2023 12:13:22  Benito Maradiaga

## 2023-01-11 NOTE — BRIEF OPERATIVE NOTE - NSICDXBRIEFPROCEDURE_GEN_ALL_CORE_FT
PROCEDURES:  TAVR, percutaneous 11-Jan-2023 12:13:32 Percutaneous Transfemoral TAVR via Right Common Femoral Artery (29mm CoreValve Evolut FX) (NCT# [id number]) (STS/ACC TVT Registry Patient ID# [id number]) Benito Maradiaga

## 2023-01-12 ENCOUNTER — TRANSCRIPTION ENCOUNTER (OUTPATIENT)
Age: 73
End: 2023-01-12

## 2023-01-12 ENCOUNTER — NON-APPOINTMENT (OUTPATIENT)
Age: 73
End: 2023-01-12

## 2023-01-12 VITALS
RESPIRATION RATE: 22 BRPM | DIASTOLIC BLOOD PRESSURE: 58 MMHG | SYSTOLIC BLOOD PRESSURE: 126 MMHG | HEART RATE: 64 BPM | OXYGEN SATURATION: 98 %

## 2023-01-12 DIAGNOSIS — Z79.84 LONG TERM (CURRENT) USE OF ORAL HYPOGLYCEMIC DRUGS: ICD-10-CM

## 2023-01-12 DIAGNOSIS — K21.9 GASTRO-ESOPHAGEAL REFLUX DISEASE WITHOUT ESOPHAGITIS: ICD-10-CM

## 2023-01-12 DIAGNOSIS — N40.0 BENIGN PROSTATIC HYPERPLASIA WITHOUT LOWER URINARY TRACT SYMPTOMS: ICD-10-CM

## 2023-01-12 DIAGNOSIS — I10 ESSENTIAL (PRIMARY) HYPERTENSION: ICD-10-CM

## 2023-01-12 DIAGNOSIS — R07.9 CHEST PAIN, UNSPECIFIED: ICD-10-CM

## 2023-01-12 DIAGNOSIS — Z82.49 FAMILY HISTORY OF ISCHEMIC HEART DISEASE AND OTHER DISEASES OF THE CIRCULATORY SYSTEM: ICD-10-CM

## 2023-01-12 DIAGNOSIS — E11.9 TYPE 2 DIABETES MELLITUS WITHOUT COMPLICATIONS: ICD-10-CM

## 2023-01-12 DIAGNOSIS — Z85.46 PERSONAL HISTORY OF MALIGNANT NEOPLASM OF PROSTATE: ICD-10-CM

## 2023-01-12 DIAGNOSIS — E03.9 HYPOTHYROIDISM, UNSPECIFIED: ICD-10-CM

## 2023-01-12 DIAGNOSIS — I25.10 ATHEROSCLEROTIC HEART DISEASE OF NATIVE CORONARY ARTERY WITHOUT ANGINA PECTORIS: ICD-10-CM

## 2023-01-12 DIAGNOSIS — E78.5 HYPERLIPIDEMIA, UNSPECIFIED: ICD-10-CM

## 2023-01-12 DIAGNOSIS — E66.9 OBESITY, UNSPECIFIED: ICD-10-CM

## 2023-01-12 DIAGNOSIS — I35.0 NONRHEUMATIC AORTIC (VALVE) STENOSIS: ICD-10-CM

## 2023-01-12 LAB
ALBUMIN SERPL ELPH-MCNC: 3.8 G/DL — SIGNIFICANT CHANGE UP (ref 3.3–5.2)
ALP SERPL-CCNC: 75 U/L — SIGNIFICANT CHANGE UP (ref 40–120)
ALT FLD-CCNC: 17 U/L — SIGNIFICANT CHANGE UP
ANION GAP SERPL CALC-SCNC: 9 MMOL/L — SIGNIFICANT CHANGE UP (ref 5–17)
AST SERPL-CCNC: 22 U/L — SIGNIFICANT CHANGE UP
BILIRUB SERPL-MCNC: 1 MG/DL — SIGNIFICANT CHANGE UP (ref 0.4–2)
BUN SERPL-MCNC: 20.4 MG/DL — HIGH (ref 8–20)
CALCIUM SERPL-MCNC: 8.9 MG/DL — SIGNIFICANT CHANGE UP (ref 8.4–10.5)
CHLORIDE SERPL-SCNC: 103 MMOL/L — SIGNIFICANT CHANGE UP (ref 96–108)
CK SERPL-CCNC: 65 U/L — SIGNIFICANT CHANGE UP (ref 30–200)
CO2 SERPL-SCNC: 25 MMOL/L — SIGNIFICANT CHANGE UP (ref 22–29)
CREAT SERPL-MCNC: 1.01 MG/DL — SIGNIFICANT CHANGE UP (ref 0.5–1.3)
EGFR: 79 ML/MIN/1.73M2 — SIGNIFICANT CHANGE UP
GLUCOSE BLDC GLUCOMTR-MCNC: 108 MG/DL — HIGH (ref 70–99)
GLUCOSE SERPL-MCNC: 109 MG/DL — HIGH (ref 70–99)
HCT VFR BLD CALC: 34 % — LOW (ref 39–50)
HGB BLD-MCNC: 11.4 G/DL — LOW (ref 13–17)
MAGNESIUM SERPL-MCNC: 1.9 MG/DL — SIGNIFICANT CHANGE UP (ref 1.6–2.6)
MCHC RBC-ENTMCNC: 29.2 PG — SIGNIFICANT CHANGE UP (ref 27–34)
MCHC RBC-ENTMCNC: 33.5 GM/DL — SIGNIFICANT CHANGE UP (ref 32–36)
MCV RBC AUTO: 87 FL — SIGNIFICANT CHANGE UP (ref 80–100)
PLATELET # BLD AUTO: 188 K/UL — SIGNIFICANT CHANGE UP (ref 150–400)
POTASSIUM SERPL-MCNC: 4.2 MMOL/L — SIGNIFICANT CHANGE UP (ref 3.5–5.3)
POTASSIUM SERPL-SCNC: 4.2 MMOL/L — SIGNIFICANT CHANGE UP (ref 3.5–5.3)
PROT SERPL-MCNC: 6.2 G/DL — LOW (ref 6.6–8.7)
RBC # BLD: 3.91 M/UL — LOW (ref 4.2–5.8)
RBC # FLD: 12.8 % — SIGNIFICANT CHANGE UP (ref 10.3–14.5)
SODIUM SERPL-SCNC: 137 MMOL/L — SIGNIFICANT CHANGE UP (ref 135–145)
TROPONIN T SERPL-MCNC: 0.02 NG/ML — SIGNIFICANT CHANGE UP (ref 0–0.06)
WBC # BLD: 10.3 K/UL — SIGNIFICANT CHANGE UP (ref 3.8–10.5)
WBC # FLD AUTO: 10.3 K/UL — SIGNIFICANT CHANGE UP (ref 3.8–10.5)

## 2023-01-12 PROCEDURE — C8929: CPT

## 2023-01-12 PROCEDURE — 85018 HEMOGLOBIN: CPT

## 2023-01-12 PROCEDURE — 97163 PT EVAL HIGH COMPLEX 45 MIN: CPT

## 2023-01-12 PROCEDURE — 83735 ASSAY OF MAGNESIUM: CPT

## 2023-01-12 PROCEDURE — C1725: CPT

## 2023-01-12 PROCEDURE — C1760: CPT

## 2023-01-12 PROCEDURE — 82435 ASSAY OF BLOOD CHLORIDE: CPT

## 2023-01-12 PROCEDURE — 82962 GLUCOSE BLOOD TEST: CPT

## 2023-01-12 PROCEDURE — 85025 COMPLETE CBC W/AUTO DIFF WBC: CPT

## 2023-01-12 PROCEDURE — 85610 PROTHROMBIN TIME: CPT

## 2023-01-12 PROCEDURE — C9399: CPT

## 2023-01-12 PROCEDURE — 36415 COLL VENOUS BLD VENIPUNCTURE: CPT

## 2023-01-12 PROCEDURE — 85730 THROMBOPLASTIN TIME PARTIAL: CPT

## 2023-01-12 PROCEDURE — 82803 BLOOD GASES ANY COMBINATION: CPT

## 2023-01-12 PROCEDURE — 71045 X-RAY EXAM CHEST 1 VIEW: CPT

## 2023-01-12 PROCEDURE — 84484 ASSAY OF TROPONIN QUANT: CPT

## 2023-01-12 PROCEDURE — 86923 COMPATIBILITY TEST ELECTRIC: CPT

## 2023-01-12 PROCEDURE — 82330 ASSAY OF CALCIUM: CPT

## 2023-01-12 PROCEDURE — C1889: CPT

## 2023-01-12 PROCEDURE — 93325 DOPPLER ECHO COLOR FLOW MAPG: CPT

## 2023-01-12 PROCEDURE — 76000 FLUOROSCOPY <1 HR PHYS/QHP: CPT

## 2023-01-12 PROCEDURE — 93005 ELECTROCARDIOGRAM TRACING: CPT

## 2023-01-12 PROCEDURE — 99291 CRITICAL CARE FIRST HOUR: CPT

## 2023-01-12 PROCEDURE — C1894: CPT

## 2023-01-12 PROCEDURE — C1769: CPT

## 2023-01-12 PROCEDURE — 84295 ASSAY OF SERUM SODIUM: CPT

## 2023-01-12 PROCEDURE — 82947 ASSAY GLUCOSE BLOOD QUANT: CPT

## 2023-01-12 PROCEDURE — C1750: CPT

## 2023-01-12 PROCEDURE — 93010 ELECTROCARDIOGRAM REPORT: CPT

## 2023-01-12 PROCEDURE — 86900 BLOOD TYPING SEROLOGIC ABO: CPT

## 2023-01-12 PROCEDURE — 83605 ASSAY OF LACTIC ACID: CPT

## 2023-01-12 PROCEDURE — 80053 COMPREHEN METABOLIC PANEL: CPT

## 2023-01-12 PROCEDURE — 93312 ECHO TRANSESOPHAGEAL: CPT

## 2023-01-12 PROCEDURE — 84132 ASSAY OF SERUM POTASSIUM: CPT

## 2023-01-12 PROCEDURE — 71045 X-RAY EXAM CHEST 1 VIEW: CPT | Mod: 26

## 2023-01-12 PROCEDURE — 86850 RBC ANTIBODY SCREEN: CPT

## 2023-01-12 PROCEDURE — 85014 HEMATOCRIT: CPT

## 2023-01-12 PROCEDURE — 86901 BLOOD TYPING SEROLOGIC RH(D): CPT

## 2023-01-12 PROCEDURE — 93320 DOPPLER ECHO COMPLETE: CPT

## 2023-01-12 PROCEDURE — 82550 ASSAY OF CK (CPK): CPT

## 2023-01-12 PROCEDURE — 85027 COMPLETE CBC AUTOMATED: CPT

## 2023-01-12 PROCEDURE — C1887: CPT

## 2023-01-12 RX ORDER — ASPIRIN/CALCIUM CARB/MAGNESIUM 324 MG
1 TABLET ORAL
Qty: 0 | Refills: 0 | DISCHARGE

## 2023-01-12 RX ORDER — DAPAGLIFLOZIN 10 MG/1
10 TABLET, FILM COATED ORAL EVERY 24 HOURS
Refills: 0 | Status: DISCONTINUED | OUTPATIENT
Start: 2023-01-12 | End: 2023-01-12

## 2023-01-12 RX ORDER — DAPAGLIFLOZIN 10 MG/1
1 TABLET, FILM COATED ORAL
Qty: 0 | Refills: 0 | DISCHARGE
Start: 2023-01-12

## 2023-01-12 RX ORDER — BNT162B2 ORIGINAL AND OMICRON BA.4/BA.5 .1125; .1125 MG/2.25ML; MG/2.25ML
0.3 INJECTION, SUSPENSION INTRAMUSCULAR ONCE
Refills: 0 | Status: COMPLETED | OUTPATIENT
Start: 2023-01-12 | End: 2023-01-12

## 2023-01-12 RX ORDER — LOSARTAN POTASSIUM 100 MG/1
100 TABLET, FILM COATED ORAL DAILY
Refills: 0 | Status: DISCONTINUED | OUTPATIENT
Start: 2023-01-12 | End: 2023-01-12

## 2023-01-12 RX ORDER — METFORMIN HYDROCHLORIDE 850 MG/1
2 TABLET ORAL
Qty: 0 | Refills: 0 | DISCHARGE

## 2023-01-12 RX ADMIN — DAPAGLIFLOZIN 10 MILLIGRAM(S): 10 TABLET, FILM COATED ORAL at 11:56

## 2023-01-12 RX ADMIN — Medication 100 MILLIGRAM(S): at 03:26

## 2023-01-12 RX ADMIN — FINASTERIDE 5 MILLIGRAM(S): 5 TABLET, FILM COATED ORAL at 11:57

## 2023-01-12 RX ADMIN — Medication 175 MICROGRAM(S): at 06:20

## 2023-01-12 RX ADMIN — Medication 100 MILLIGRAM(S): at 12:03

## 2023-01-12 RX ADMIN — Medication 81 MILLIGRAM(S): at 11:57

## 2023-01-12 RX ADMIN — LOSARTAN POTASSIUM 100 MILLIGRAM(S): 100 TABLET, FILM COATED ORAL at 11:56

## 2023-01-12 NOTE — DISCHARGE NOTE NURSING/CASE MANAGEMENT/SOCIAL WORK - NSDCPEFALRISK_GEN_ALL_CORE
For information on Fall & Injury Prevention, visit: [email or web address] OR  [email or web address] OR  https://www.cdc.gov/steadi/patient.html 35.6

## 2023-01-12 NOTE — DISCHARGE NOTE PROVIDER - NSDCFUSCHEDAPPT_GEN_ALL_CORE_FT
[facility]  [address]  Scheduled Appointment: 03/10/2023     Jerman Rodriges  [facility] Physician [facility]  CTSURG [address]  Scheduled Appointment: 01/24/2023    [facility]  [facility] [address] [facility]  Scheduled Appointment: 03/10/2023

## 2023-01-12 NOTE — DISCHARGE NOTE PROVIDER - NSDCFUADDINST_GEN_ALL_CORE_FT
Please call the Cardiothoracic Surgery office at [phone] if you are experiencing any shortness of breath, chest pain, fevers or chills, drainage from the incisions, persistent nausea, vomiting or if you have any questions about your medications. If the symptoms are severe, call 911 and go to the nearest hospital.

## 2023-01-12 NOTE — DISCHARGE NOTE PROVIDER - NSDCCPCAREPLAN_GEN_ALL_CORE_FT
PRINCIPAL DISCHARGE DIAGNOSIS  Diagnosis: Severe aortic stenosis  Assessment and Plan of Treatment: - Keep the surgical sites clean and dry.  You may shower and pat the site dry.  - Monitor the surgical sites for signs of redness or drainage, these should be reported to your doctor immediately.  - You will receive a wallet card about your new valve in the mail.  Please carry it with you to present to anyone who may ask if you have any medical implants.  - Be sure to inform your doctors including your dentist about your valve since you will need to take antibiotics to reduce the risk of infection before certain medical and dental procedures.  - You will be given an appointment to follow up with your doctor in approximately 2 weeks.  It is important to keep this appointment so that your new valve can be assessed.  - You may resume all your normal activities.   --  You are being discharged with a ZUGGIOT heart rate monitor. This device will monitor your heart rate for 28 days after your TAVR surgery. You may shower with the device in place but do not submerge in water. You must follow the directions in the box to change the dressing every week. Carry the phone provided with you at all times. The phone transmits the signal from the monitor to your cardiologist. Charge the PHONE every night and keep it near you when doing so. Charge the MONITOR each week when you change the dressing. At the end of 28 days, place the sensor and phone in the  box and place in mailing envelope. The envelope can then be placed in your mailbox to return to the company. If there is any issue with your heart rate, you will recieved a phone call from the Cardiologist with further instructions. You can call the CT Surgery office with any questions.      SECONDARY DISCHARGE DIAGNOSES  Diagnosis: Chronic diastolic heart failure  Assessment and Plan of Treatment: Please follow up with your Cardiologist and Primary Care Physician 1-2 weeks from discharge.    Diagnosis: Diabetes mellitus  Assessment and Plan of Treatment: Resume Metformin 1/14/2023.   Please follow up with your Primary Care Physician 1-2 weeks from discharge.    Diagnosis: Hypertension  Assessment and Plan of Treatment: Resume Losartan-Hydrochlorothiazine as normal.  Please follow up with your Cardiologist and Primary Care Physician 1-2 weeks from discharge.

## 2023-01-12 NOTE — DISCHARGE NOTE PROVIDER - HOSPITAL COURSE
72M with PMH severe AS, Mod LVH, Chronic Diastolic CHF, Non-obstructive CAD, HTN, HLD, Baseline 1st degree AVB, Never Smoker, Stage 2 CKD, Prostate Ca s/p Cyberknife, Obesity, DM2 (A1C 5.3), GERD, Hypothyroid, L4 Pars Fracture (no subluxation), Chronic Back Pain, recently admitted to  1/4/23 after multiple syncopal episodes with unstable angina then 1/5/23 transferred to [facility] for evaluation of severe AS and was discharged home 1/6/23. Pt was admitted through [facility] 1/11/23 and underwent Percutaneous TF-TAVR via RCFA (CoreValve) with Dr. Rodriges. Post deployment, intraop ROVERTO revealed  mild PVL (mG 6 mmHg) without conduction or rhythm changes and pt was extubated in the OR. Postop course unremarkable.    Per Dr. Rodriges, patient is stable for discharge with follow up.   Patient will be discharged with a [employer] MCOT monitor to evaluate for potential conduction or rhythm disturbances post TAVR.    Post Procedure TTE:               72M with PMH severe AS, Mod LVH, Chronic Diastolic CHF, Non-obstructive CAD, HTN, HLD, Baseline 1st degree AVB, Never Smoker, Stage 2 CKD, Prostate Ca s/p Cyberknife, Obesity, DM2 (A1C 5.3), GERD, Hypothyroid, L4 Pars Fracture (no subluxation), Chronic Back Pain, recently admitted to  1/4/23 after multiple syncopal episodes with unstable angina then 1/5/23 transferred to [facility] for evaluation of severe AS and was discharged home 1/6/23. Pt was admitted through [facility] 1/11/23 and underwent Percutaneous TF-TAVR via RCFA (CoreValve) with Dr. Rodriges. Post deployment, intraop ROVERTO revealed  mild PVL (mG 6 mmHg) without conduction or rhythm changes and pt was extubated in the OR. Postop course unremarkable.    Per Dr. Rodriges, patient is stable for discharge with follow up.   Patient will be discharged with a [employer] MCOT monitor to evaluate for potential conduction or rhythm disturbances post TAVR.    Post Procedure TTE:  < from: TTE Echo Complete w/ Contrast w/ Doppler (01.11.23 @ 19:34) >  Summary:   1. Left ventricular ejection fraction, by visual estimation, is >75%.   2. Hyperdynamic global left ventricular systolic function.   3. Spectral Doppler shows impaired relaxation pattern of left   ventricular myocardial filling (Grade I diastolic dysfunction).   4. There is moderate concentric left ventricular hypertrophy.   5. Mildly enlarged left atrium.   6. There is no evidence of pericardial effusion.   7. Mild mitral annular calcification.   8. Thickening of the anterior mitral valve leaflet.   9. Trace mitral valve regurgitation.  10. TAVR in the aortic position.  11. LVOT vmax at rest : 120 cm/s, LVOT vmax with valsalva : 157 cm/s. No   evidence of dynamic LVOTO. There is mild paravalvular regurgitation of   the prosthetic AoV.  12. Mildly dilated pulmonary artery.  13. Adequate TR velocity was not obtained to accurately assess RVSP.  < end of copied text >

## 2023-01-12 NOTE — PATIENT PROFILE ADULT - NSPROPOAURINARYCATHETER_GEN_A_NUR
HR=92 bpm, ICXY=138/93 mmhg, SpO2=98 %, Resp=13 B/min, EtCO2=35 mmHg, Apnea=0 Seconds, Pain=0, Nettles=2, Comment=SR no

## 2023-01-12 NOTE — DISCHARGE NOTE PROVIDER - CARE PROVIDER_API CALL
Jerman Rodriges)  Surgery; Thoracic and Cardiac Surgery  [address] [place] [address]  Phone: [phone]  Fax: [phone]  Established Patient  Follow Up Time: 2 weeks    Kianna Heath ()  Cardiovascular Disease; Internal Medicine  [address] [facility], [address]  [place] [address]  Phone: [phone]  Fax: [phone]  Established Patient  Follow Up Time: 2 weeks   Jerman Rodriges)  Surgery; Thoracic and Cardiac Surgery  [address] [place] [address]  Phone: [phone]  Fax: [phone]  Established Patient  Scheduled Appointment: 01/24/2023 09:45 AM    Kianna Heath)  Cardiovascular Disease; Internal Medicine  [address] [facility], [address]  [place] [address]  Phone: [phone]  Fax: [phone]  Established Patient  Follow Up Time: 2 weeks

## 2023-01-12 NOTE — DISCHARGE NOTE NURSING/CASE MANAGEMENT/SOCIAL WORK - NSDCFUADDAPPT_GEN_ALL_CORE_FT
Please follow up with Dr. Rodriges on 01/24 at 9:45 AM  --  The cardiac surgery office is located on the first floor of [facility] at [facility] [place], [place]. Please enter through the lobby. A [facility] employee will then direct you where to go.  --  Your Care Navigator Nurse Practitioner will be in touch to see you in your home within a few days from discharge. The Follow Your Heart program can help ensure you understand your medications, discharge instructions and answer any questions you may have at that time. They are also a great source to address concerns during the day and may be reached at [phone].

## 2023-01-12 NOTE — DISCHARGE NOTE PROVIDER - NSDCCPTREATMENT_GEN_ALL_CORE_FT
PRINCIPAL PROCEDURE  Procedure: TAVR, percutaneous  Findings and Treatment: Percutaneous Transfemoral TAVR via Right Common Femoral Artery (29mm CoreValve Evolut FX) (NCT# [id number]) (STS/ACC TVT Registry Patient ID# [id number])

## 2023-01-12 NOTE — DISCHARGE NOTE PROVIDER - PROVIDER TOKENS
PROVIDER:[TOKEN:[[id number]],FOLLOWUP:[2 weeks],ESTABLISHEDPATIENT:[T]],PROVIDER:[TOKEN:[[id number]],FOLLOWUP:[2 weeks],ESTABLISHEDPATIENT:[T]] PROVIDER:[TOKEN:[[id number]],SCHEDULEDAPPT:[01/24/2023],SCHEDULEDAPPTTIME:[09:45 AM],ESTABLISHEDPATIENT:[T]],PROVIDER:[TOKEN:[[id number]],FOLLOWUP:[2 weeks],ESTABLISHEDPATIENT:[T]]

## 2023-01-12 NOTE — DISCHARGE NOTE PROVIDER - NSDCFUADDAPPT_GEN_ALL_CORE_FT
Please follow up with Dr. Rodriges on __________  --  The cardiac surgery office is located on the first floor of [facility] at [address] [place], [place]. Please enter through the lobby. A [facility] employee will then direct you where to go.  --  Your Care Navigator Nurse Practitioner will be in touch to see you in your home within a few days from discharge. The Follow Your Heart program can help ensure you understand your medications, discharge instructions and answer any questions you may have at that time. They are also a great source to address concerns during the day and may be reached at [phone].  Please follow up with Dr. Rodriges on 01/24 at 9:45 AM  --  The cardiac surgery office is located on the first floor of [facility] at [facility] [place], [place]. Please enter through the lobby. A [facility] employee will then direct you where to go.  --  Your Care Navigator Nurse Practitioner will be in touch to see you in your home within a few days from discharge. The Follow Your Heart program can help ensure you understand your medications, discharge instructions and answer any questions you may have at that time. They are also a great source to address concerns during the day and may be reached at [phone].

## 2023-01-12 NOTE — DISCHARGE NOTE NURSING/CASE MANAGEMENT/SOCIAL WORK - PATIENT PORTAL LINK FT
You can access the FollowMyHealth Patient Portal offered by [facility] by registering at the following website: [email or web address]. By joining [employer]’s FollowMyHealth portal, you will also be able to view your health information using other applications (apps) compatible with our system.

## 2023-01-12 NOTE — PROGRESS NOTE ADULT - SUBJECTIVE AND OBJECTIVE BOX
CONNOR SANDRA  MRN#: [id number]  Subjective:  Patient was seen and evaluate on AM rounds offering no specific complaints at this time.    OBJECTIVE:  ICU Vital Signs Last 24 Hrs  T(C): 36.4 (11 Jan 2023 14:29), Max: 36.7 (11 Jan 2023 10:37)  T(F): 97.6 (11 Jan 2023 14:29), Max: 98.1 (11 Jan 2023 10:37)  HR: 56 (11 Jan 2023 15:30) (51 - 61)  BP: 112/59 (11 Jan 2023 15:00) (112/59 - 125/61)  BP(mean): 82 (11 Jan 2023 15:00) (79 - 82)  ABP: 129/43 (11 Jan 2023 15:30) (124/41 - 144/52)  ABP(mean): 68 (11 Jan 2023 15:30) (64 - 87)  RR: 12 (11 Jan 2023 15:30) (12 - 21)  SpO2: 99% (11 Jan 2023 15:30) (98% - 100%)    O2 Parameters below as of 11 Jan 2023 15:00  Patient On (Oxygen Delivery Method): nasal cannula  O2 Flow (L/min): 2    01-11 @ 07:01  -  01-11 @ 15:31  --------------------------------------------------------  IN: 10 mL / OUT: 0 mL / NET: 10 mL    PHYSICAL EXAM:Daily Height in cm: 172.72 (11 Jan 2023 10:37)    Daily   General: WN/WD NAD    HEENT:     + NCAT  + EOMI  - Conjuctival edema   - Icterus   - Thrush   - ETT  - NGT/OGT  Neck:         + FROM    + JVD     - Nodes     - Masses    + Mid-line trachea   - Tracheostomy  Chest:         - Sternal click  - Sternal drainage  - Pacing wires  - Chest tubes  - SubQ emphysema  Lungs:          + CTA   - Rhonchi    - Rales    - Wheezing     - Decreased BS   - Dullness R L  Cardiac:       + S1 + S2    + RRR   - Irregular   - S3  - S4    - Murmurs   - Rub   - Hamman’s sign   Abdomen:    + BS     + Soft    + Non-tender     - Distended    - Organomegaly  - PEG  Extremities:   - Cyanosis U/L   - Clubbing  U/L  - LE/UE Edema   + Capillary refill    + Pulses - groin TVP  Neuro:        + Awake   +  Alert   - Confused   - Lethargic   - Sedated   - Generalized Weakness  Skin:        - Rashes    - Erythema   + Normal incisions   + IV sites intact  - Sacral decubitus    HOSPITAL MEDICATIONS: All mediciations reviewed and analyzed    MEDICATIONS  (STANDING):  aspirin enteric coated 81 milliGRAM(s) Oral daily  cefuroxime  IVPB 1500 milliGRAM(s) IV Intermittent every 8 hours  pantoprazole  Injectable 40 milliGRAM(s) IV Push once  potassium chloride  10 mEq/50 mL IVPB 10 milliEquivalent(s) IV Intermittent every 1 hour  potassium chloride  10 mEq/50 mL IVPB 10 milliEquivalent(s) IV Intermittent every 1 hour  potassium chloride  10 mEq/50 mL IVPB 10 milliEquivalent(s) IV Intermittent every 1 hour  sodium chloride 0.9%. 1000 milliLiter(s) (10 mL/Hr) IV Continuous <Continuous>    MEDICATIONS  (PRN):    LABS: All Lab data reviewed and analyzed                        11.1   12.36 )-----------( 173      ( 11 Jan 2023 14:13 )             33.1    01-11    136  |  103  |  24.1<H>  ----------------------------<  134<H>  4.1   |  24.0  |  0.95    Ca    8.9      11 Jan 2023 14:13  Mg     1.7     01-11    TPro  6.3<L>  /  Alb  3.9  /  TBili  1.1  /  DBili  x   /  AST  26  /  ALT  18  /  AlkPhos  80  01-11    PT/INR - ( 11 Jan 2023 14:13 )   PT: 13.2 sec;   INR: 1.14 ratio         PTT - ( 11 Jan 2023 14:13 )  PTT:35.4 sec LIVER FUNCTIONS - ( 11 Jan 2023 14:13 )  Alb: 3.9 g/dL / Pro: 6.3 g/dL / ALK PHOS: 80 U/L / ALT: 18 U/L / AST: 26 U/L / GGT: x         ABG - ( 11 Jan 2023 14:33 )  pH, Arterial: 7.440 pH, Blood: x     /  pCO2: 37    /  pO2: 141   / HCO3: 25    / Base Excess: 0.9   /  SaO2: 99.1      RADIOLOGY: - Reviewed and analyzed     Assessment:  S/P transfemoral NOMAN    Plan:  Respiratory status required supplemental oxygen & the following of continuous pulse oximetry for support & to prevent decompensation  Continued early mobilization as tolerated  Addressed analgesic regimen to optimize function  ASA continued for thromboembolism prophylaxis  Protonix maintained for GI bleeding prophylaxis  No need for TVP-EPS consult, patient has his baseline first degree heart block  Reviewed & addressed surgical site infection prophylaxis regimen
CONNOR SANDRA  MRN-[id number]    HPI:  72M with PMH severe AS, Mod LVH, Chronic Diastolic CHF, Non-obstructive CAD, HTN, HLD, Baseline 1st degree AVB, Never Smoker, Stage 2 CKD, Prostate Ca s/p Cyberknife, Obesity, DM2 (A1C 5.3), GERD, Hypothyroid, L4 Pars Fracture (no subluxation), Chronic Back Pain, recently admitted to  1/4/23 after multiple syncopal episodes with unstable angina then 1/5/23 transferred to [facility] for evaluation of severe AS and was discharged home 1/6/23. Pt was admitted through [facility] 1/11/23 and underwent Percutaneous TF-TAVR via RCFA (CoreValve) with Dr. Rodriges. Post deployment, intraop ROVERTO revealed  mild PVL (mG 6 mmHg) without conduction or rhythm changes and pt was extubated in the OR. Postop course unremarkable.  Patient will be discharged with a [employer] MCOT monitor to evaluate for potential conduction or rhythm disturbances post TAVR-    Surgery/Hospital Course:  ·  PRE-OP DIAGNOSIS:  Severe aortic stenosis   Chronic diastolic CHF (congestive heart failure), NYHA class 2   First degree AV block   ·  POST-OP DIAGNOSIS:  Severe aortic stenosis  Chronic diastolic CHF (congestive heart failure), NYHA class 2   First degree AV block   ·  PROCEDURES:  TAVR, percutaneous 11-Jan-2023   Percutaneous Transfemoral TAVR via Right Common Femoral Artery (29mm CoreValve Evolut FX)   Today:  No acute events     ICU Vital Signs Last 24 Hrs  T(C): 37 (12 Jan 2023 08:00), Max: 37 (12 Jan 2023 08:00)  T(F): 98.6 (12 Jan 2023 08:00), Max: 98.6 (12 Jan 2023 08:00)  HR: 59 (12 Jan 2023 07:00) (51 - 80)  BP: 124/59 (12 Jan 2023 07:00) (106/56 - 140/66)  BP(mean): 85 (12 Jan 2023 07:00) (74 - 95)  ABP: 144/49 (12 Jan 2023 04:00) (119/42 - 145/78)  ABP(mean): 79 (12 Jan 2023 04:00) (59 - 95)  RR: 16 (12 Jan 2023 07:00) (12 - 28)  SpO2: 100% (12 Jan 2023 07:00) (93% - 100%)    O2 Parameters below as of 12 Jan 2023 04:00  Patient On (Oxygen Delivery Method): room air            Physical Exam:  Gen: A&O   CNS: non focal 	  Neck: no JVD  RES : clear , no wheezing              CVS: Regular  rhythm. Normal S1/S2  Abd: Soft, non-distended. Bowel sounds present.  Skin: No rash.  Ext:  no edema    ============================I/O===========================   I&O's Detail    11 Jan 2023 07:01  -  12 Jan 2023 07:00  --------------------------------------------------------  IN:    IV PiggyBack: 100 mL    Oral Fluid: 400 mL    sodium chloride 0.9%: 60 mL  Total IN: 560 mL    OUT:    Voided (mL): 1600 mL  Total OUT: 1600 mL    Total NET: -1040 mL        ============================ LABS =========================                        11.4   10.30 )-----------( 188      ( 12 Jan 2023 02:15 )             34.0     01-12    137  |  103  |  20.4<H>  ----------------------------<  109<H>  4.2   |  25.0  |  1.01    Ca    8.9      12 Jan 2023 02:15  Mg     1.9     01-12    TPro  6.2<L>  /  Alb  3.8  /  TBili  1.0  /  DBili  x   /  AST  22  /  ALT  17  /  AlkPhos  75  01-12    LIVER FUNCTIONS - ( 12 Jan 2023 02:15 )  Alb: 3.8 g/dL / Pro: 6.2 g/dL / ALK PHOS: 75 U/L / ALT: 17 U/L / AST: 22 U/L / GGT: x           PT/INR - ( 11 Jan 2023 14:13 )   PT: 13.2 sec;   INR: 1.14 ratio         PTT - ( 11 Jan 2023 14:13 )  PTT:35.4 sec  ABG - ( 11 Jan 2023 14:33 )  pH, Arterial: 7.440 pH, Blood: x     /  pCO2: 37    /  pO2: 141   / HCO3: 25    / Base Excess: 0.9   /  SaO2: 99.1                ======================Micro/Rad/Cardio=================  Culture: Reviewed   CXR: Reviewed  Echo:Reviewed  ======================================================  PAST MEDICAL & SURGICAL HISTORY:  Hypertension      Diabetes mellitus      Obesity      Hypothyroidism      Prostate CA        ====================ASSESSMENT ==============  72M with PMH severe AS, Mod LVH, Chronic Diastolic CHF, Non-obstructive CAD, HTN, HLD, Baseline 1st degree AVB, Never Smoker, Stage 2 CKD, Prostate Ca s/p Cyberknife, Obesity, DM2 (A1C 5.3), GERD, Hypothyroid, L4 Pars Fracture (no subluxation), Chronic Back Pain, recently admitted to  1/4/23 after multiple syncopal episodes with unstable angina then 1/5/23 transferred to [facility] for evaluation of severe AS and was discharged home 1/6/23. Pt was admitted through [facility] 1/11/23 and underwent Percutaneous TF-TAVR via RCFA (CoreValve) with Dr. Rodriges. Post deployment, intraop ROVERTO revealed  mild PVL (mG 6 mmHg) without conduction or rhythm changes and pt was extubated in the OR. Postop course unremarkable.      Hypertension  Diabetes mellitus  Obesity  Hypothyroidism  Prostate CA  Post op Hypovolemia  Post op respiratory insufficiency       Plan:  Aspirin Enteric Coated 81 mg oral delayed release tablet: 1 tab(s) orally once a day; DO NOT TAKE MORNING OF SURGERY  atorvastatin 20 mg oral tablet: 1 tab(s) orally once a day  doxycycline 20 mg oral tablet: 1 tab(s) orally once a day  finasteride 1 mg oral tablet: 1 tab(s) orally once a day  Januvia 50 mg oral tablet: 1 tab(s) orally once a day  levothyroxine 175 mcg (0.175 mg) oral tablet: 1 tab(s) orally once a day  losartan-hydroCHLOROthiazide 100 mg-12.5 mg oral tablet: 1 tab(s) orally once a day  Melatonin 5 mg oral tablet: 2 tab(s) orally once a day (at bedtime)  metFORMIN 500 mg oral tablet, extended release: 2 tab(s) orally 2 times a day; DO NOT RESTART UNTIL MONDAY 1/9 AND THEN HOLD DAY OF SURGERY  pantoprazole 40 mg oral delayed release tablet: 1 tab(s) orally once a day  zolpidem 10 mg oral tablet: 1 tab(s) orally once a day (at bedtime)  -  ====================== NEUROLOGY=====================  melatonin 5 milliGRAM(s) Oral at bedtime    ==================== RESPIRATORY======================  Post op respiratory insufficiency    ====================CARDIOVASCULAR==================  Post op Hypovolemia  hydrochlorothiazide 12.5 milliGRAM(s) Oral daily  losartan 100 milliGRAM(s) Oral daily    ===================HEMATOLOGIC/ONC ===================  Monitor H&H/Plts    aspirin enteric coated 81 milliGRAM(s) Oral daily    ===================== RENAL =========================  Continue monitoring urine output, I&OS, BUN/Cr     ==================== GASTROINTESTINAL===================  pantoprazole    Tablet 40 milliGRAM(s) Oral daily  senna 2 Tablet(s) Oral at bedtime  sodium chloride 0.9%. 1000 milliLiter(s) (10 mL/Hr) IV Continuous <Continuous>    =======================    ENDOCRINE  =====================  atorvastatin 20 milliGRAM(s) Oral at bedtime  dapagliflozin 10 milliGRAM(s) Oral every 24 hours  finasteride 5 milliGRAM(s) Oral daily  insulin lispro (ADMELOG) corrective regimen sliding scale   SubCutaneous Before meals and at bedtime  levothyroxine 175 MICROGram(s) Oral daily  SITagliptin 50 milliGRAM(s) Oral daily    ========================INFECTIOUS DISEASE================      -Monitor Neurologic status ,   -Head of the bed should remain elevated to 45 degrees,  -Monitor adequacy of oxygenation and ventilation and attempt to wean oxygen ,  -Monitor for arrhythmias and monitor parameters for organ perfusion,  -Glycemic control is satisfactory,  -Nutritional goals will be met using po eventually , insure adequate caloric intake and monitor the same ,  -Electrolytes have been repleted as necessary , pain control has been achieved  and wound care has been carried out ,  -Stress ulcer and VTE prophylaxis will be achieved,  -Agressive PT and early mobility and ambulation goals will be met,      I have spent 35 minutes providing acute care for this critically ill patient     Patient requires continuous monitoring with bedside rhythm monitoring, pulse ox monitoring, and intermittent blood gas analysis. Care plan discussed with ICU care team. Patient remained critical and at risk for life threatening decompensation.

## 2023-01-12 NOTE — DISCHARGE NOTE PROVIDER - NSDCMRMEDTOKEN_GEN_ALL_CORE_FT
Aspirin Enteric Coated 81 mg oral delayed release tablet: 1 tab(s) orally once a day; DO NOT TAKE MORNING OF SURGERY  atorvastatin 20 mg oral tablet: 1 tab(s) orally once a day  doxycycline 20 mg oral tablet: 1 tab(s) orally once a day  finasteride 1 mg oral tablet: 1 tab(s) orally once a day  Januvia 50 mg oral tablet: 1 tab(s) orally once a day  levothyroxine 175 mcg (0.175 mg) oral tablet: 1 tab(s) orally once a day  losartan-hydroCHLOROthiazide 100 mg-12.5 mg oral tablet: 1 tab(s) orally once a day  Melatonin 5 mg oral tablet: 2 tab(s) orally once a day (at bedtime)  metFORMIN 500 mg oral tablet, extended release: 2 tab(s) orally 2 times a day; DO NOT RESTART UNTIL MONDAY 1/9 AND THEN HOLD DAY OF SURGERY  pantoprazole 40 mg oral delayed release tablet: 1 tab(s) orally once a day  zolpidem 10 mg oral tablet: 1 tab(s) orally once a day (at bedtime)   Aspirin Enteric Coated 81 mg oral delayed release tablet: 1 tab(s) orally once a day  atorvastatin 20 mg oral tablet: 1 tab(s) orally once a day  dapagliflozin 10 mg oral tablet: 1 tab(s) orally every 24 hours  doxycycline 20 mg oral tablet: 1 tab(s) orally once a day  finasteride 1 mg oral tablet: 1 tab(s) orally once a day  Januvia 50 mg oral tablet: 1 tab(s) orally once a day  levothyroxine 175 mcg (0.175 mg) oral tablet: 1 tab(s) orally once a day  losartan-hydroCHLOROthiazide 100 mg-12.5 mg oral tablet: 1 tab(s) orally once a day  Melatonin 5 mg oral tablet: 2 tab(s) orally once a day (at bedtime)  metFORMIN 500 mg oral tablet, extended release: 2 tab(s) orally 2 times a day; DO NOT RESTART UNTIL SATURDAY 1/14  pantoprazole 40 mg oral delayed release tablet: 1 tab(s) orally once a day  zolpidem 10 mg oral tablet: 1 tab(s) orally once a day (at bedtime)

## 2023-01-13 ENCOUNTER — NON-APPOINTMENT (OUTPATIENT)
Age: 73
End: 2023-01-13

## 2023-01-13 RX ORDER — LOSARTAN POTASSIUM AND HYDROCHLOROTHIAZIDE 100; 12.5 MG/1; MG/1
100-12.5 TABLET, FILM COATED ORAL DAILY
Refills: 0 | Status: DISCONTINUED | COMMUNITY
End: 2023-01-13

## 2023-01-13 RX ORDER — DAPAGLIFLOZIN 10 MG/1
10 TABLET, FILM COATED ORAL
Qty: 90 | Refills: 0 | Status: DISCONTINUED | COMMUNITY
Start: 2019-12-16 | End: 2023-01-13

## 2023-01-14 RX ORDER — METFORMIN HYDROCHLORIDE 850 MG/1
2 TABLET ORAL
Qty: 0 | Refills: 0 | DISCHARGE
Start: 2023-01-14

## 2023-01-15 ENCOUNTER — TRANSCRIPTION ENCOUNTER (OUTPATIENT)
Age: 73
End: 2023-01-15

## 2023-01-16 ENCOUNTER — OFFICE (OUTPATIENT)
Dept: URBAN - METROPOLITAN AREA CLINIC 102 | Facility: CLINIC | Age: 73
Setting detail: OPHTHALMOLOGY
End: 2023-01-16
Payer: MEDICARE

## 2023-01-16 DIAGNOSIS — H25.13: ICD-10-CM

## 2023-01-16 DIAGNOSIS — E11.9: ICD-10-CM

## 2023-01-16 DIAGNOSIS — E11.3211: ICD-10-CM

## 2023-01-16 DIAGNOSIS — H40.033: ICD-10-CM

## 2023-01-16 PROCEDURE — 92014 COMPRE OPH EXAM EST PT 1/>: CPT | Performed by: OPHTHALMOLOGY

## 2023-01-16 PROCEDURE — 92134 CPTRZ OPH DX IMG PST SGM RTA: CPT | Performed by: OPHTHALMOLOGY

## 2023-01-16 ASSESSMENT — KERATOMETRY
OD_K2POWER_DIOPTERS: 44.75
OS_K2POWER_DIOPTERS: 45.00
OD_AXISANGLE_DEGREES: 151
OS_AXISANGLE_DEGREES: 030
OD_K1POWER_DIOPTERS: 44.00
OS_K1POWER_DIOPTERS: 44.00

## 2023-01-16 ASSESSMENT — SPHEQUIV_DERIVED
OD_SPHEQUIV: 0.3
OS_SPHEQUIV: 0

## 2023-01-16 ASSESSMENT — REFRACTION_AUTOREFRACTION
OD_AXIS: 096
OS_AXIS: 119
OS_SPHERE: +0.25
OD_SPHERE: +0.55
OS_CYLINDER: -0.50
OD_CYLINDER: -0.50

## 2023-01-16 ASSESSMENT — AXIALLENGTH_DERIVED
OD_AL: 23.1616
OS_AL: 23.2302

## 2023-01-16 ASSESSMENT — CONFRONTATIONAL VISUAL FIELD TEST (CVF)
OS_FINDINGS: FULL
OD_FINDINGS: FULL

## 2023-01-16 ASSESSMENT — VISUAL ACUITY
OS_BCVA: 20/20+
OD_BCVA: 20/20

## 2023-01-17 ENCOUNTER — APPOINTMENT (OUTPATIENT)
Dept: CARE COORDINATION | Facility: HOME HEALTH | Age: 73
End: 2023-01-17

## 2023-01-17 ENCOUNTER — RX RENEWAL (OUTPATIENT)
Age: 73
End: 2023-01-17

## 2023-01-17 ENCOUNTER — TRANSCRIPTION ENCOUNTER (OUTPATIENT)
Age: 73
End: 2023-01-17

## 2023-01-17 VITALS
SYSTOLIC BLOOD PRESSURE: 128 MMHG | BODY MASS INDEX: 33.18 KG/M2 | DIASTOLIC BLOOD PRESSURE: 78 MMHG | RESPIRATION RATE: 15 BRPM | HEIGHT: 69 IN | HEART RATE: 70 BPM | OXYGEN SATURATION: 98 % | WEIGHT: 224 LBS

## 2023-01-17 NOTE — REVIEW OF SYSTEMS
[Fever] : no fever [Chills] : no chills [Feeling Poorly] : not feeling poorly [Feeling Tired] : feeling tired [Negative] : Psychiatric [FreeTextEntry7] : 2 days ago (at pt baseline)

## 2023-01-17 NOTE — ASSESSMENT
[FreeTextEntry1] : Pt recovering well at home s/p TAVR. Reviewed all medications and dosages with pt understanding. Pt has all medications in home and is taking as prescribed. Pain controlled with current medication regimen. Pt has some mild discomfort to right foot, likely neuropathic, not impeding ability to ambulate. Pt advised to elevate at rest and monitor for worsening symptoms at this time. Foot itself warm, pink without edema, unremarkable with full ROM. Pt encouraged to use Miralax daily for regularity as his last BM he felt he had to use force to relieve, advised pt to avoid further straining given groin access of TAVR. Pt with understanding he will obtain Miralax. No further new symptoms, issues or concerns to report at this time.\par

## 2023-01-17 NOTE — REASON FOR VISIT
[Follow-Up: _____] : a [unfilled] follow-up visit [FreeTextEntry1] : FOLLOW YOUR HEART- Transitional Care Management Program- [facility]\par \par

## 2023-01-17 NOTE — HISTORY OF PRESENT ILLNESS
[FreeTextEntry1] : 72M with PMH severe AS, Mod LVH, Chronic Diastolic CHF, Non-obstructive CAD, \par HTN, HLD, Baseline 1st degree AVB, Never Smoker, Stage 2 CKD, Prostate Ca s/p \par Cyberknife, Obesity, DM2 (A1C 5.3), GERD, Hypothyroid, L4 Pars Fracture (no \par subluxation), Chronic Back Pain, recently admitted to  1/4/23 after multiple \par syncopal episodes with unstable angina then 1/5/23 transferred to [facility] for \par evaluation of severe AS and was discharged home 1/6/23. Pt was admitted through \par [facility] 1/11/23 and underwent Percutaneous TF-TAVR via RCFA (CoreValve) with  \radha Rodriges. Post deployment, intraop ROVERTO revealed  mild PVL (mG 6 mmHg) without \par conduction or rhythm changes and pt was extubated in the OR. Postop course \par unremarkable. Pt discharged with MCOT monitoring for 30 days post TAVR. Pt remained hemodynamically stable and discharged home with support of spouse/family and the [facility] team. Initial visit completed in home\par CC "I get tired, but I'm doing ok"\par

## 2023-01-17 NOTE — PHYSICAL EXAM
[Sclera] : the sclera and conjunctiva were normal [Neck Appearance] : the appearance of the neck was normal [Respiration, Rhythm And Depth] : normal respiratory rhythm and effort [Exaggerated Use Of Accessory Muscles For Inspiration] : no accessory muscle use [Auscultation Breath Sounds / Voice Sounds] : lungs were clear to auscultation bilaterally [Apical Impulse] : the apical impulse was normal [Heart Rate And Rhythm] : heart rate was normal and rhythm regular [Heart Sounds] : normal S1 and S2 [Murmurs] : no murmurs [Examination Of The Chest] : the chest was normal in appearance [1+] : left 1+ [FreeTextEntry2] : B/L LE without edema, B/L calves soft, NT [Abdomen Soft] : soft [Abdomen Tenderness] : non-tender [Abnormal Walk] : normal gait [Musculoskeletal - Swelling] : no joint swelling seen [Skin Color & Pigmentation] : normal skin color and pigmentation [Skin Turgor] : normal skin turgor [] : no rash [FreeTextEntry1] : b/l groin sites with mild soft and resolving ecchymosis. steri strips in place. no induration or erythema noted [Oriented To Time, Place, And Person] : oriented to person, place, and time [Impaired Insight] : insight and judgment were intact [Affect] : the affect was normal

## 2023-01-23 NOTE — REVIEW OF SYSTEMS
Unable to reach patient daughter or leave ms.    Home health order was placed. Referral communication said she is est with [facility].. Telephone note says [facility].. I don't see orders for hospital bed, BP cuff, or shower attachment. Need to get in touch with patient daughter for further info.    [Negative] : Heme/Lymph

## 2023-01-24 ENCOUNTER — APPOINTMENT (OUTPATIENT)
Dept: CARDIOTHORACIC SURGERY | Facility: CLINIC | Age: 73
End: 2023-01-24
Payer: MEDICARE

## 2023-01-24 VITALS
BODY MASS INDEX: 33.18 KG/M2 | OXYGEN SATURATION: 98 % | DIASTOLIC BLOOD PRESSURE: 67 MMHG | HEIGHT: 69 IN | RESPIRATION RATE: 16 BRPM | HEART RATE: 69 BPM | SYSTOLIC BLOOD PRESSURE: 129 MMHG | TEMPERATURE: 98.8 F | WEIGHT: 224 LBS

## 2023-01-24 DIAGNOSIS — I35.0 NONRHEUMATIC AORTIC (VALVE) STENOSIS: ICD-10-CM

## 2023-01-24 PROCEDURE — 99214 OFFICE O/P EST MOD 30 MIN: CPT

## 2023-01-25 NOTE — CONSULT LETTER
[Dear  ___] : Dear  [unfilled], [Courtesy Letter:] : I had the pleasure of seeing your patient, [unfilled], in my office today. [Please see my note below.] : Please see my note below. [Consult Closing:] : Thank you very much for allowing me to participate in the care of this patient.  If you have any questions, please do not hesitate to contact me. [Sincerely,] : Sincerely, [FreeTextEntry2] : Kianna Heath DO [FreeTextEntry3] : Jerman Rodriges MD\par , Cardiothoracic Surgery\par , Cardiovascular and Thoracic Surgery\par [facility], [facility]\par [facility]\par [facility]\par [address] [place] [address]\[place] [address]\par Tel. [phone]\par Fax [phone]

## 2023-01-25 NOTE — REASON FOR VISIT
[Spouse] : spouse [de-identified] : transcatheter aortic valve replacement with #29 medtronic evolute FX [de-identified] : 1/11/23 [de-identified] : Post deployment, intraop ROVERTO revealed  mild PVL (mG 6 mmHg) without conduction or rhythm changes and pt was extubated in the OR. Postop course unremarkable. \par \par Today he reports some shortness of breath with exertion and palpitations. He had one episode of dizziness yesterday afternoon. Patient denies chest pain, cough, fevers, chills, fatigue and unintentional weight loss or gain.  All 10 review of symptoms negative unless specified above.

## 2023-01-25 NOTE — PROCEDURE
[FreeTextEntry1] : Transthoracic echocardiogram at [facility] on 1/11/23:\par Summary:\par Left ventricular ejection fraction >75%.\par Hyperdynamic global left ventricular systolic function\par Spectral Doppler shows impaired relaxation pattern of left ventricular myocardial filling (Grade I diastolic dysfunction)\par There is moderate concentric left ventricular hypertrophy\par Mildly enlarged left atrium\par There is no evidence of pericardial effusion\par Mild mitral annular calcification\par Thickening of the anterior mitral valve leaflet\par Trace mitral valve regurgitation\par TAVR in the aortic position\par LVOT vmax at rest: 120 cm/s, LVOT vmax with valsalva: 157 cm/s. No evidence of dynamic LVOTO. THere is mild paravalvular regurgitation of the prosthetic AoV\par Mildly dilated pulmonary artery\par Adequate TR velocity was not obtained to accurately assess RVSP.

## 2023-01-25 NOTE — ASSESSMENT
[FreeTextEntry1] : I had the pleasure of seeing Mr. CONNOR SANDRA  in the office today following his transcatheter aortic valve replacement.\par \par Briefly, Mr. SANDRA  is an 72 year old male who is status post transfemoral transcatheter aortic valve replacement on 1/11/23. The postoperative course was unremarkable. Since discharge, Mr. SANDRA  has been continuously improving and at this time primarily complains of some shortness of breath with exertion and palpitations.\par \par Exam was benign with well healing incisions and MCOT review was notable for Second degree, type 1 heart block. This was last noted at 6am on 1/21 and was likely while patient was asleep. This was escalated to the cardiology team who will evaluate.\par \par During the visit, we discussed the importance of increasing activity and exercise as tolerated. We discussed the need for antibiotic prophylaxis with procedures such as dental work and colonoscopy or endoscopy. We recommend waiting a full six months before undergoing any dental procedure or cleaning. The patient should maintain the MCOT device as instructed for a total of 30 days.\par \par Overall, I am happy with Mr. SANDRA's  progress. All questions were answered and concerns were addressed. I encouraged the patient to call the office with any other concerns.\par \par In summary:\par - EP evaluation\par - Continue current medications, may continue Plavix\par - May drive and lift without physical restriction\par - 30 day requirements post-TAVR: labs including complete blood count and basic metabolic panel, electrocardiogram and echocardiogram and may be completed.\par - Increase activity as tolerated\par - Eat a heart healthy diet\par - Follow up with cardiologist, Dr. Heath\par \par I, Dr. Rodriges, personally performed the evaluation and management (E/M) services for this established patient who presents today with an existing condition.  That E/M includes conducting the examination, assessing all new/exacerbated conditions, and establishing a new plan of care.  Today, Panchito Gaspar PA-C, was here to observe my evaluation and management services for this new problem/exacerbated condition to be followed going forward.\par \par

## 2023-01-25 NOTE — PHYSICAL EXAM
[] : no respiratory distress [Respiration, Rhythm And Depth] : normal respiratory rhythm and effort [Auscultation Breath Sounds / Voice Sounds] : lungs were clear to auscultation bilaterally [Apical Impulse] : the apical impulse was normal [Heart Rate And Rhythm] : heart rate was normal and rhythm regular [Heart Sounds] : normal S1 and S2 [Clean] : clean [Dry] : dry [Healing Well] : healing well [No Edema] : no edema [FreeTextEntry3] : b/l

## 2023-02-01 ENCOUNTER — NON-APPOINTMENT (OUTPATIENT)
Age: 73
End: 2023-02-01

## 2023-02-01 ENCOUNTER — APPOINTMENT (OUTPATIENT)
Dept: ELECTROPHYSIOLOGY | Facility: CLINIC | Age: 73
End: 2023-02-01
Payer: MEDICARE

## 2023-02-01 VITALS
BODY MASS INDEX: 33.92 KG/M2 | TEMPERATURE: 98.1 F | HEART RATE: 69 BPM | WEIGHT: 229 LBS | OXYGEN SATURATION: 96 % | HEIGHT: 69 IN | DIASTOLIC BLOOD PRESSURE: 64 MMHG | SYSTOLIC BLOOD PRESSURE: 128 MMHG

## 2023-02-01 PROCEDURE — 99203 OFFICE O/P NEW LOW 30 MIN: CPT

## 2023-02-01 PROCEDURE — 93000 ELECTROCARDIOGRAM COMPLETE: CPT

## 2023-02-01 RX ORDER — MELATONIN 5 MG
5 CAPSULE ORAL
Refills: 0 | Status: ACTIVE | COMMUNITY

## 2023-02-01 RX ORDER — TURMERIC ROOT EXTRACT 500 MG
TABLET ORAL DAILY
Refills: 0 | Status: ACTIVE | COMMUNITY

## 2023-02-01 RX ORDER — UBIDECARENONE/VIT E ACET 100MG-5
CAPSULE ORAL
Refills: 0 | Status: ACTIVE | COMMUNITY

## 2023-02-01 RX ORDER — FOLIC ACID/MULTIVIT,IRON,MINER 0.4MG-18MG
TABLET ORAL
Refills: 0 | Status: ACTIVE | COMMUNITY

## 2023-02-01 RX ORDER — HYDROCORTISONE ACETATE 0.5 %
CREAM (GRAM) TOPICAL DAILY
Refills: 0 | Status: ACTIVE | COMMUNITY

## 2023-02-01 RX ORDER — MULTIVITAMIN WITH MINERALS
TABLET ORAL
Refills: 0 | Status: ACTIVE | COMMUNITY

## 2023-02-01 RX ORDER — ASCORBIC ACID 300 MG
TABLET,CHEWABLE ORAL
Refills: 0 | Status: ACTIVE | COMMUNITY

## 2023-02-01 RX ORDER — ASPIRIN ENTERIC COATED TABLETS 81 MG 81 MG/1
81 TABLET, DELAYED RELEASE ORAL
Refills: 0 | Status: ACTIVE | COMMUNITY

## 2023-02-01 NOTE — HISTORY OF PRESENT ILLNESS
[FreeTextEntry1] : Mr. Menezes is a 72 year old male refered for arrhythmia management. The patient has a history of severe aortic stenosis and underwent TAVR (Medtronic Evolute FX) on 1/11/23. In addition, the patient has a history of HTN, DM, and hypothyroidism. The patient is wearing an MCOT post TAVR and was noted to have Mobitz type I AV block on monitoring. The episodes are associated with sinus slowing and likely represent vagally mediated AV conduction abnormality during sleep. No AV conduction disturbance is seen while awake. The patient has first degree AV block and left axis at baseline and a narrow QRS complex (unchanged from baseline). The patient denies any symptoms of dizziness, lightheadedness, near syncope, syncope or shortness of breath. He is not on any AV rosamaria blockers. .

## 2023-02-01 NOTE — DISCUSSION/SUMMARY
[FreeTextEntry1] : In summary, Mr. Menezes is a 72 year old male with a history of severe aortic stenosis s/p TAVR on 1/11/23. The patient has been wearing an MCOT post TAVR which showed Mobitz type I AV block. The episodes are associated with significant sinus node slowing suggesting a vagally mediated conduction abnormaltiy. Baseline ECG has a first degree AV block, left axis and a narrow QRS complex which appears unchanged from baseline. There is no indication for a pacemaker at this time. He will return for a follow up visit in 3 months or earlier if needed. \par  [EKG obtained to assist in diagnosis and management of assessed problem(s)] : EKG obtained to assist in diagnosis and management of assessed problem(s)

## 2023-02-05 ENCOUNTER — EMERGENCY (EMERGENCY)
Facility: HOSPITAL | Age: 73
LOS: 0 days | Discharge: HOME | End: 2023-02-05
Attending: EMERGENCY MEDICINE
Payer: MEDICARE

## 2023-02-05 VITALS
DIASTOLIC BLOOD PRESSURE: 71 MMHG | SYSTOLIC BLOOD PRESSURE: 145 MMHG | HEART RATE: 62 BPM | OXYGEN SATURATION: 100 % | TEMPERATURE: 98 F | RESPIRATION RATE: 16 BRPM

## 2023-02-05 VITALS — HEIGHT: 68 IN | WEIGHT: 223.11 LBS

## 2023-02-05 DIAGNOSIS — Z95.2 PRESENCE OF PROSTHETIC HEART VALVE: ICD-10-CM

## 2023-02-05 DIAGNOSIS — I10 ESSENTIAL (PRIMARY) HYPERTENSION: ICD-10-CM

## 2023-02-05 DIAGNOSIS — E11.9 TYPE 2 DIABETES MELLITUS WITHOUT COMPLICATIONS: ICD-10-CM

## 2023-02-05 DIAGNOSIS — Z79.82 LONG TERM (CURRENT) USE OF ASPIRIN: ICD-10-CM

## 2023-02-05 DIAGNOSIS — Z79.84 LONG TERM (CURRENT) USE OF ORAL HYPOGLYCEMIC DRUGS: ICD-10-CM

## 2023-02-05 DIAGNOSIS — E03.9 HYPOTHYROIDISM, UNSPECIFIED: ICD-10-CM

## 2023-02-05 DIAGNOSIS — Z79.02 LONG TERM (CURRENT) USE OF ANTITHROMBOTICS/ANTIPLATELETS: ICD-10-CM

## 2023-02-05 DIAGNOSIS — R04.0 EPISTAXIS: ICD-10-CM

## 2023-02-05 DIAGNOSIS — Z85.46 PERSONAL HISTORY OF MALIGNANT NEOPLASM OF PROSTATE: ICD-10-CM

## 2023-02-05 PROCEDURE — 99283 EMERGENCY DEPT VISIT LOW MDM: CPT

## 2023-02-05 PROCEDURE — 30903 CONTROL OF NOSEBLEED: CPT

## 2023-02-05 PROCEDURE — 30901 CONTROL OF NOSEBLEED: CPT | Mod: LT

## 2023-02-05 PROCEDURE — 99284 EMERGENCY DEPT VISIT MOD MDM: CPT | Mod: 25

## 2023-02-05 RX ORDER — CEPHALEXIN 500 MG
1 CAPSULE ORAL
Qty: 15 | Refills: 0
Start: 2023-02-05 | End: 2023-02-09

## 2023-02-05 RX ORDER — CEPHALEXIN 500 MG
500 CAPSULE ORAL ONCE
Refills: 0 | Status: COMPLETED | OUTPATIENT
Start: 2023-02-05 | End: 2023-02-05

## 2023-02-05 RX ADMIN — Medication 500 MILLIGRAM(S): at 10:42

## 2023-02-05 NOTE — ED PROVIDER NOTE - OBJECTIVE STATEMENT
73 y/o male with PMHx of prostate CA, hypothyroidism, DM, HTN, aortic valve replacement on 1/10/23 and placed on Plavix and baby ASA presents to the ED for epistaxis starting at 4 AM this morning. Pt woke up with nose bleed spontaneously. Reports that he has had no injury to his nose and did nothing to his nose yesterday. Pt squeezed around his nose for 10 minutes and subsequently packed his left nares after bleeding was not controlled.

## 2023-02-05 NOTE — ED ADULT TRIAGE NOTE - CHIEF COMPLAINT QUOTE
Patient presents to the emergency room with complaints of nose bleed that started 4 hours prior to arrival, nose packed by patient at home. Patient on plavix and aspirin 81mg, history of aortic valve replacement. Patient in no acute respiratory distress in triage.

## 2023-02-05 NOTE — ED ADULT TRIAGE NOTE - HEIGHT IN FEET
Form received at [facility] on 1/31/2018.     Please note that it takes 7-10 business days for completion.     Signed authorization received with form.   5

## 2023-02-05 NOTE — ED PROCEDURE NOTE - PROCEDURE ADDITIONAL DETAILS
Direct pressure applied for 10 minutes. Still with nose bleeding. Rhino rocket used 4.5 cm placed left nose. 4.5 cc air placed. Pt tolerated well. No active bleeding after nasal placed.

## 2023-02-05 NOTE — ED CLERICAL - NS ED CARE COORDINATION INFORMATION
This patient is enrolled in the [facility] program and has undergone a cardiac surgery procedure at [facility] and has active care navigation. This patient can be followed up by the care navigation team within 24 hours. To arrange close follow-up or to obtain additional clinical information about this patient, please call the contact number above. Please call the cardiac surgery team once patient is registered at [phone] for consultation PRIOR to disposition decision.  The patient recently underwent a cardiac surgery procedure and the team can assist in acute medical management.

## 2023-02-05 NOTE — ED ADULT NURSE NOTE - OBJECTIVE STATEMENT
patient ambulatory to ED c/o epistaxis x 4 hours.  on plavix and baby ASA.  woke up with nose bleeding spontaneously.

## 2023-02-05 NOTE — ED ADULT NURSE NOTE - NSFALLRSKINDICATORS_ED_ALL_ED
Subjective .     REASONS FOR FOLLOWUP:    1. Stage IIA ([id number]) left breast cancer:  Status post left mastectomy with left axillary lymph node dissection 03/12/2013.  Lesion #1 invasive ductal carcinoma grade 2, 1.5 cm ER positive (90%), IA positive (1-4%), HER-2/jodi FISH negative (ratio 1.4),  Ki-67 score of 8%.  Lesion #2 invasive ductal carcinoma, grade 2, 1.7 cm,  ER positive (70%), IA negative (%),HER-2/jodi FISH positive (ratio of 3.0).  3 of 15 lymph nodes positive (2 macrometastases, 1 micrometastasis, largest lesion 2.5 cm replacing lymph node).  Lymph node evaluated and is ER positive (90%), IA positive (30%), HER-2/jodi FISH negative (ratio of 1.4).    2. Iron deficiency anemia presumed secondary to menstrual blood loss.  Previously treated with iron sulfate 325 mg daily.  Iron discontinued secondary to GI side effects (diarrhea).  3. BRCA-1 and 2 testing negative.  Subsequent gene panel testing 10/3/17 with PRATEEK VUS (c.799A>G).  4. Participation in the [employer] Clinical trial.   5. Status post Mediport placement by Dr. Neumann 04/08/2013.  Mediport removed by Dr. Mabry 07/21/2014.  6. Initiation of adjuvant chemotherapy 04/16/2013.  Treatment planned:  dose-dense Adriamycin and Cytoxan with Neulasta support q.2 weeks x4 cycles.  Subsequent weekly Taxol and Herceptin x12 weeks.  Subsequent continuation of Herceptin q.3 weeks to complete one year.  Adjuvant endocrine therapy after completion of chemotherapy with tamoxifen (premenopausal).   Adjuvant radiation therapy following completion of chemotherapy.  Last dose of Taxol administered 08/27/2013. Tamoxifen initiated 09/25/2013 (plan to treat for 10 years).   Final dose of Herceptin administered 05/21/2014 (one year of treatment completed 06/11/2014).   7. 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  8. Borderline elevated transaminases. CT abdomen 10/30/2013 with normal appearing liver. Suspected steatohepatitis.    9. Left foot plantar fasciitis with stress fracture of the 4th metatarsal, status post evaluation with bone scan, 09/29/2014 and MRI of the left foot, 10/08/2014. Negative for metastatic disease.    10. Lymphedema of the left upper extremity with compression sleeve in place and having been evaluated in the lymphedema clinic.   11. Left wrist tenosynovitis identified on MRI of 06/18/2015. Status post steroid injection by orthopedics, on 07/18/2015.   12. Anemia with hemoglobin decreased to 10.8 on 3/30/16.  Evaluation revealing evidence of iron deficiency (related to menstrual blood loss) and B12 deficiency.  Initiated oral iron sulfate 325 mg twice a day as well as B12 replacement with weekly injections ×4 and subsequent monthly injections with intended transition to oral B12 1000 µg daily, however, patient did not follow through.  Patient also discontinued oral iron.  Recurrent iron deficiency with oral iron resumed 5/4/17 twice a day dosing, again discontinued due to poor tolerance.  Administration of Feraheme 11/15 and 11/22/17.  13. Vitamin D deficiency continuing on vitamin D 2000 units daily  14. Positive rheumatoid factor 4/28/17 (16.4), referred to rheumatology for further evaluation.  Initiated treatment with Plaquenil per rheumatology.  15. Status post KATE/BSO 6/5/18 (benign pathologic findings).  Transition from tamoxifen to adjuvant Arimidex 11/20/18 due to surgical postmenopausal state.  16. Osteopenia: DEXA scan 10/12/18 with maximal T score -1.4 in the lumbar spine, monitor annually on aromatase inhibitor therapy.  Calcium and vitamin D supplementation.  Normal 25-hydroxy vitamin D level 10/4/18 at 35.6.    HISTORY OF PRESENT ILLNESS:  The patient is a 45 y.o. year old female who is here for follow-up with the above-mentioned history.    History of Present Illness patient returns today in follow-up with laboratory studies and DEXA scan to review.  We are to discuss potential transition from  tamoxifen to aromatase inhibitor therapy since she is now surgically postmenopausal.  The interval since her last visit she does have some mild sinus congestion, recovering from an upper respiratory infection.  She does have mild chronic fatigue.  She has chronic arthritic complaints that are stable, involving her hands.  She does still have an area of scaling around the lateral aspect of her left chest wall which was felt to previously be a fungal infection.  She was given topical miconazole cream however has not been very compliant with this.  The area is pruritic.  She has not been taking her oral B12 for unclear reasons.  She also has not been taking calcium and vitamin D since her last visit although we had discussed this.    PAST MEDICAL HISTORY:    1. Hypertension.  2. History of menorrhagia status post [facility] with Dr. Elda Roland 16.  Pathology showed polypoid endometrium with disordered proliferation but no evidence of malignancy.  Menstrual blood loss ceased.  3. History of phentermine use for weight loss.  4. Status post knee surgery after traumatic fracture with hardware in place in .  5. Status post  x2.  6. Status post Mediport placement by Dr. Neumann on 2013. Mediport removed by Dr. Mabry 2014.  7. Left foot plantar fasciitis with stress fracture of the left 4th metatarsal, 2014.   8. Osteopenia.   9. Vitamin D deficiency.   10. Anxiety/depression.  Hospitalization required in 10/2015 due to major depressive disorder with psychotic features.   11. Anemia secondary to iron deficiency (menstrual blood loss) and B12 deficiency.  12. Suspected early rheumatoid arthritis initiated Plaquenil under the direction of Dr. Lopez in rheumatology in May 2017.  Positive rheumatoid factor 16.4 on 17.  13. Status post right carpal tunnel release surgery in 2017  14. Diabetes mellitus  15. Status post KATE/BSO 18    OB-GYN HISTORY:   G3, P3, A0, menarche age 12.   The patient has had regular menstrual cycles, however, has some degree of menorrhagia.  She was on oral contraceptives for 3-4 years in her late teens to early 20s. Menstrual cycles ceased with the 1st cycle of chemotherapy.     Menstrual cycles resumed in 2015. Endometrial biopsy performed due to endometrial thickening in 2015 and again 16 with benign pathology.  Recurrent severe dysfunctional uterine bleeding in early 2016 causing anemia.  D&C 16-  pathology showed polypoid endometrium with disordered proliferation but no evidence of malignancy.   Past Medical History:   Diagnosis Date   • Anxiety    • Arthritis    • Depression    • H/O Borderline vitamin D deficiency    • H/O Grade 1 peripheral neuropathy     Involving feet.   • H/O Iron deficiency anemia     Presumed secondary to menstrual loss.   • H/O Lymphedema of left upper extremity    • Hx of being hospitalized 10/2015    Due to major depressive disorder with psychotic features.   • Hypertension    • Malignant neoplasm of breast (CMS/HCC) 2013    LEFT SIDE WITH MASTECTOMY (BRCA-1 and 2 negative)   • Menorrhagia    • Osteopenia    • Plantar fasciitis     LEFT FOOT   • Rash     BILATERAL LOWER LEGS CAUSED BY CHEMO   • Sleep apnea     USES CPAP   • Tenosynovitis of left wrist    • Vitamin D deficiency     BORDERLINE     Past Surgical History:   Procedure Laterality Date   •  SECTION  , 2002    x2   • KNEE SURGERY Right     After traumatic fracture with hardware in place.   • MEDIPORT INSERTION, DOUBLE Right    • MEDIPORT REMOVAL Right 2014    By Dr. Mabry.   • MODIFIED RADICAL MASTECTOMY W/ AXILLARY LYMPH NODE DISSECTION Left 2013     ONCOLOGIC HISTORY:  (History from previous dates can be found in the separate document.)    Current Outpatient Medications on File Prior to Visit   Medication Sig Dispense Refill   • cholecalciferol (VITAMIN D3) 1000 UNITS tablet Take 1,000 Units by mouth daily.      • Cyanocobalamin (B-12) 1000 MCG capsule Take 1 capsule by mouth Daily.     • escitalopram (LEXAPRO) 20 MG tablet Take 20 mg by mouth daily.     • hydroxychloroquine (PLAQUENIL) 200 MG tablet Take 200 mg by mouth Daily.     • Loratadine 10 MG capsule Take 10 mg by mouth Daily.     • LORazepam (ATIVAN) 1 MG tablet Take 1 mg by mouth 3 (three) times a day.     • losartan-hydrochlorothiazide (HYZAAR) 100-25 MG per tablet Take 1 tablet by mouth Every Night.     • metFORMIN (GLUCOPHAGE) 500 MG tablet Take 500 mg by mouth Daily With Breakfast.     • metoprolol succinate XL (TOPROL-XL) 50 MG 24 hr tablet Take 50 mg by mouth Every Night.     • miconazole (MICONAZOLE ANTIFUNGAL) 2 % cream Apply  topically to the appropriate area as directed Every 12 (Twelve) Hours. 35 g 1   • TRULICITY 0.75 MG/0.5ML solution pen-injector      • vitamin D (ERGOCALCIFEROL) 08421 UNITS capsule capsule Take 1 capsule by mouth 1 (One) Time Per Week. For 8 weeks 8 capsule 0   • docusate sodium (COLACE) 250 MG capsule Take 1 capsule by mouth 2 (Two) Times a Day As Needed for Constipation. 30 capsule 0   • ferrous sulfate 325 (65 FE) MG tablet Take 325 mg by mouth 2 (two) times a day.     • fluticasone (FLONASE) 50 MCG/ACT nasal spray 1 spray into each nostril As Needed for Allergies.     • HYDROcodone-acetaminophen (NORCO) 5-325 MG per tablet Take 1 tablet by mouth Every 6 (Six) Hours As Needed for Severe Pain . 30 tablet 0   • ibuprofen (ADVIL,MOTRIN) 600 MG tablet Take 1 tablet by mouth Every 6 (Six) Hours As Needed for Mild Pain  or Moderate Pain . 60 tablet 0   • naproxen (NAPROSYN) 500 MG tablet Take 500 mg by mouth As Needed for Moderate Pain .       Current Facility-Administered Medications on File Prior to Visit   Medication Dose Route Frequency Provider Last Rate Last Dose   • cyanocobalamin injection 1,000 mcg  1,000 mcg Intramuscular Once Glenn Lyon Jr., MD           ALLERGIES:     Allergies   Allergen Reactions   •  Shellfish-Derived Products GI Intolerance   • Shrimp (Diagnostic) Nausea And Vomiting   • Adhesive Tape Rash       SOCIAL HISTORY:  The patient is  and lives with her  in [place], [place].  She works as a teacher.  She denies history of smoking, denies significant alcohol use.    Social History     Socioeconomic History   • Marital status:      Spouse name: Jonathan   • Number of children: 3   • Years of education: College   • Highest education level: Not on file   Social Needs   • Financial resource strain: Not on file   • Food insecurity - worry: Not on file   • Food insecurity - inability: Not on file   • Transportation needs - medical: Not on file   • Transportation needs - non-medical: Not on file   Occupational History   • Occupation: Teacher     Employer: [employer]     Comment: Worked in marketing in past.   Tobacco Use   • Smoking status: Never Smoker   • Smokeless tobacco: Never Used   Substance and Sexual Activity   • Alcohol use: Yes     Comment: Light (less than 1 per week)   • Drug use: No   • Sexual activity: Defer   Other Topics Concern   • Not on file   Social History Narrative    Enjoys family activities, sports, 4-H leader/[employer]. College education.       FAMILY HISTORY:  Maternal grandmother had breast cancer in her 70s; maternal grandmother’s sister had ovarian cancer in her 70s, and another sister had non-Hodgkin's lymphoma in her 70s.  Maternal grandfather had prostate cancer late in life, and his sister had ovarian cancer later in life.  On paternal side there is a history of diabetes mellitus.  A sister has had issues with anemia and easy bruising.  The patient was seen in Genetics Clinic and underwent BRCA-1 and 2 testing prior to surgery which returned negative.      Family History   Problem Relation Age of Onset   • Cancer Other    • Stroke Other    • Diabetes Other    • Hypertension Other    • Other Other         cardiac disorder   • Ovarian cancer Other    •  Lymphoma Other         NON-HODGKIN   • Diabetes insipidus Other    • Anemia Other    • Mental illness Other         Grandfather   • Arthritis Other    • Heart disease Other    • Breast cancer Maternal Grandmother         In her 70s.   • Prostate cancer Maternal Grandfather    • Other Sister         Anemia and easy bruising   • Hypertension Sister    • Hypertension Mother    • Mental illness Mother    • Hypertension Father    • Diabetes Father    • Malig Hyperthermia Neg Hx        Review of Systems   Constitutional: Positive for fatigue. Negative for activity change, appetite change, fever and unexpected weight change.   HENT: Positive for congestion. Negative for mouth sores, nosebleeds and voice change.    Respiratory: Negative for cough and shortness of breath.    Cardiovascular: Negative for chest pain, palpitations and leg swelling.   Gastrointestinal: Negative for abdominal distention, abdominal pain, blood in stool, constipation, diarrhea, nausea and vomiting.   Endocrine: Negative for cold intolerance and heat intolerance.   Genitourinary: Negative for difficulty urinating, dysuria, frequency, hematuria and menstrual problem.   Musculoskeletal: Positive for arthralgias. Negative for back pain, joint swelling and myalgias.   Skin: Negative for rash.   Neurological: Negative for dizziness, syncope, weakness, light-headedness, numbness and headaches.   Hematological: Negative for adenopathy. Does not bruise/bleed easily.   Psychiatric/Behavioral: Negative for confusion, dysphoric mood and sleep disturbance. The patient is not nervous/anxious.          Objective      Vitals:    11/20/18 1622   BP: 123/77   Pulse: 103   Resp: 18   Temp: 98.8 °F (37.1 °C)   SpO2: 95%      Current Status 11/20/2018   ECOG score 0   Pain 0/10    Physical Exam   Constitutional: She is oriented to person, place, and time. She appears well-developed and well-nourished.   HENT:   Mouth/Throat: Oropharynx is clear and moist.   Eyes:  Conjunctivae are normal.   Neck: No thyromegaly present.   Cardiovascular: Normal rate and regular rhythm. Exam reveals no gallop and no friction rub.   No murmur heard.  Pulmonary/Chest: Effort normal and breath sounds normal. No respiratory distress. She has no wheezes.   Abdominal: Soft. Bowel sounds are normal. She exhibits no distension. There is no tenderness.   Musculoskeletal: She exhibits no edema.   Lymphadenopathy:        Head (right side): No submandibular adenopathy present.     She has no cervical adenopathy.     She has no axillary adenopathy.        Right: No inguinal and no supraclavicular adenopathy present.        Left: No inguinal and no supraclavicular adenopathy present.   Neurological: She is alert and oriented to person, place, and time. She displays normal reflexes. No cranial nerve deficit. She exhibits normal muscle tone.   Skin: Skin is warm and dry. No rash noted.   There is a 2 cm annular scaling lesion in the left axillary fold, less erythematous with more scaling than previous exam.   Psychiatric: She has a normal mood and affect. Her behavior is normal.       RECENT LABS:  Hematology WBC   Date Value Ref Range Status   11/20/2018 9.11 4.00 - 10.00 10*3/mm3 Final     RBC   Date Value Ref Range Status   11/20/2018 4.20 3.90 - 5.00 10*6/mm3 Final     Hemoglobin   Date Value Ref Range Status   11/20/2018 12.0 11.5 - 14.9 g/dL Final     Hematocrit   Date Value Ref Range Status   11/20/2018 37.7 34.0 - 45.0 % Final     Platelets   Date Value Ref Range Status   11/20/2018 359 150 - 375 10*3/mm3 Final        Lab Results   Component Value Date    GLUCOSE 144 (H) 11/20/2018    BUN 19 11/20/2018    CREATININE 0.69 11/20/2018    EGFRIFNONA 92 11/20/2018    BCR 27.5 11/20/2018    CO2 27.0 11/20/2018    CALCIUM 9.5 11/20/2018    ALBUMIN 4.10 11/20/2018    AST 17 11/20/2018    ALT 17 11/20/2018     25-hydroxy vitamin D level 10/4/18 was 35.6    DEXA scan 10/12/18 with maximal T score -1.4 lumbar  spine    Mammogram 10/22/18 negative, BI-RADS 1.    Assessment/Plan     1. Stage IIA ([id number]) left breast cancer:   The patient had 2 primary lesions of the left breast, the first ER positive, NE borderline, HER-2/jodi negative. The second was ER positive, NE negative, HER-2/jodi positive. She had 3 of 15 lymph nodes positive. She received adjuvant dose-dense chemotherapy with AC x4 cycles followed by weekly Taxol x12 doses completed on 08/27/2013. She did receive Herceptin in conjunction with the Taxol and then completed a total of a year with last dose of Herceptin received on 05/21/2014 (3-week dose). She did initiate adjuvant tamoxifen on 09/25/2013 and we anticipate likely a 10-year course of treatment. BRCA-1 and 2 testing negative at time of diagnosis.  Subsequent gene panel testing 10/3/17 with PRATEEK VUS (c.799A>G).    Due to continued menorrhagia she underwent KATE/BSO on 6/5/18 with benign findings.  DEXA scan 10/12/18 with evidence of osteopenia with maximal T score -1.4 the lumbar spine.  Today, the patient returns with no clinical evidence of recurrent disease.  She underwent her mammogram on 10/22/18 which was negative, BI-RADS 1.  Since she is now surgically postmenopausal and there is evidence of only mild osteopenia, we have discussed potential transition from tamoxifen to aromatase inhibitor therapy with Arimidex 1 mg daily.  At this point, I have recommended that we attempt aromatase inhibitor therapy.  I do have some concerns regarding potential worsening of her arthralgias and we will need to monitor this closely.  She is willing to proceed.  I will see her back in 6-8 weeks to assess her tolerance.    2. Depression/anxiety with prior psychotic episode: The patient required hospitalization in the behavioral health unit in October 2015. She has had difficulties with anxiety and depression in the past, however, given her breast cancer diagnosis as well as stressors from her job and family, it  appears that these precipitated the event.  She has stabilized on Lexapro at 20 mg per day.   3. Left wrist tenosynovitis: This has improved.  4. Diffuse arthralgias and fatigue with suspected early rheumatoid arthritis: The patient was experiencing diffuse arthralgias as well as pain in her wrists right greater than left.  We did perform an evaluation 4/28/17 with negative MARYSE panel however rheumatoid factor was positive at 16.4.  She was referred to rheumatology Dr. Lopez in May 2017. It appears that the patient was felt to potentially have early autoimmune disorder/rheumatoid arthritis.  She was started on Plaquenil.  Symptoms have stabilized and improved slightly over time.  She does continue routine follow-up with rheumatology.  Concern regarding potential worsening of symptoms on aromatase inhibitor therapy.  5. Vitamin D deficiency: The patient went off her vitamin D supplement and developed recurrent vitamin D deficiency.  She had been receiving 1000 units per day with a low level on 5/27/16 of 25.2.  She subsequently increased her dose to 2000 units daily and her level improved to 34.1.  She reported compliance with her vitamin D supplementation and level on 4/28/17 was low at 29.8.  Therefore, she was transitioned to 50,000 units weekly for 8 weeks in early May 2017.  A repeat 25-hydroxy vitamin D level on 7/14/17 was 34.9.  The patient was asked to transition back to a 2000 unit daily.  Repeat 25-hydroxy vitamin D level on 2/28/18 was normal at 34.9.  For unclear reasons, the patient went off of vitamin D supplementation.  Repeat level on 10/4/18 was 35.6 and she was asked to resume vitamin D and calcium supplementation.  The patient however did not do so for unclear reasons.  She returns with DEXA scan showing osteopenia will be discussed below in we again recommended resuming calcium and vitamin D supplementation at least over-the-counter and she is agreeable.  6. Hot flashes secondary to tamoxifen: The  patient is not requiring any pharmacologic therapy for this.  Her symptoms are significantly improved.   7. Endometrial thickening with dysfunctional uterine bleeding on tamoxifen: The patient underwent endometrial biopsy in October 2015 and again in 1/18/16 with negative findings.  She then experienced a significant degree of menstrual blood loss in early March 2016 to the point where she became anemic.  She did undergo a D and C with Dr. Roland on 4/13/16.  Pathology showed a polypoid endometrium with disordered proliferation but no evidence of malignancy.  She did see her gynecologist in January 2017 and they had discussed a possible laparoscopic assisted vaginal hysterectomy once she loses some more weight.  The patient had 2 profuse menstrual cycles, one in April and the next in September 2017.  She had subsequent recurrence of menorrhagia and ultimately underwent KATE/BSO on 6/5/18.  Pathologic findings were benign.  8. Anemia: The patient developed significant anemia with a hemoglobin down to 10.8 on 3/30/16.  Evaluation revealed iron deficiency with a ferritin of 12.5 iron percent saturation of 5 and a TIBC 535.  She started oral iron sulfate 325 mg twice a day and was tolerating this very well.  Her hemoglobin improved into the 12 range.  Her iron studies showed a significant improvement previously in October 2016 with ferritin up to 54, iron saturation 16%.  She was asked to continue on oral iron twice a day however she did not do so.  The patient also was found to have B12 deficiency with a level of 187 on 3/30/16.  She started B12 replacement and received weekly dosing ×4 followed bimonthly injections, last received 7/22/16.  As it was difficult for her to come in to the office for B12 injections, she was asked to begin oral B12 1000 µg daily however she did not do this.  On 4/28/17, she was mildly anemic with a hemoglobin of 11.4.  She had a thrombocytosis as well, related to iron deficiency.  Repeat iron  studies 4/28/17 showed a ferritin that declined to 42, iron saturation low at 7%, elevated TIBC at 491. She was asked to resume oral iron twice daily.  B12 level 4/28/17 had remained in the low normal range at 370 and was monitored.  On 7/14/17, B12 level was again low normal at 299 if she was asked to resume oral B12 however she did not do so.  She was intolerant of oral iron with diarrhea and abdominal cramping.  Labs on 11/8/17 showed a declining iron saturation 9% and a slight decline in ferritin down to 39 with a TIBC at the 501.  Her hemoglobin was up to 12.8.  Oral iron was discontinued 11/8/17 and she proceeded on with Feraheme on 11/15/17 and 11/22/17.  She also resumed oral B12 1000 µg daily.  The patient had recurrent menorrhagia but is now status post KATE/BSO on 6/5/18.  Labs 10/4/18 with hemoglobin 12.0,  ferritin at 64.3, iron saturation low normal at 15% however elevated TIBC at 521.  We will continue to monitor her iron studies for now however hopefully they will not decline significantly further without continued menstrual blood loss.  B12 level 10/4/18 was normal at 573 and the patient was asked to continue on B12 supplementation.  For unclear reasons however she has not done so and I have again recommended that she resume this.  9. Elevated transaminases: The patient has experienced this in the past with negative hepatic imaging.  I suspect that she has underlying steatohepatitis.  Current LFTs are normal.  10. Left upper extremity lymphedema: The patient does wear her sleeve intermittently, is not wearing it today.   11. Osteopenia: Previous DEXA scan 3/5/14 osteopenia with maximal T score of -1.1 in the lumbar spine.  Repeat DEXA scan in 10/12/18 with maximal T score -1.4 lumbar spine showing worsening since 2014.  Patient was advised to resume calcium and vitamin D supplementation.  Aromatase inhibitor therapy initiated with Arimidex 1 mg daily on 11/20/18, plan annual DEXA scan while on  aromatase inhibitor therapy.  12. Tinea corporis: The patient had an area of apparent ringworm around her left axillary fold measuring around 2 cm, annular with central scaling.  Prescription sent miconazole 1% cream.  She noted initial improvement however has not been compliant with the cream and still has an area of scaling in this region on exam today.  I recommended that she resume the cream 3 times daily and if it is not improving she will require referral to dermatology.  13. Chronic fatigue: Somewhat improved today.  Thyroid function studies normal the last visit.  The patient reports compliance with CPAP.    PLAN:    1. Discontinue tamoxifen and begin Arimidex 1 mg daily  2. Resume oral B12 1000 µg daily  3. Resume calcium and vitamin D supplementation  4. Resume topical miconazole cream 3 times daily.  If no improvement in area of presumed tinea corporis, will refer to dermatology.  5. M.D. visit in 6-8 weeks with CBC, CMP                             no

## 2023-02-05 NOTE — ED PROVIDER NOTE - NSFOLLOWUPINSTRUCTIONS_ED_ALL_ED_FT
Please call and follow up with ENT 1-3 days.    Return to the Emergency Department for worsening or persistent symptoms, and/or ANY NEW OR CONCERNING SYMPTOMS. If you have issues obtaining follow up, please call: [phone] or [phone]  to obtain a doctor or specialist who takes your insurance in your area. (ENT)      Nosebleed, Adult      A nosebleed is when blood comes out of the nose. Nosebleeds are common. Usually, they are not a sign of a serious condition.    Nosebleeds can happen if a blood vessel in your nose starts to bleed or if the lining of your nose (mucous membrane) cracks. They are commonly caused by:  •Allergies.      •Colds.      •Picking your nose.      •Blowing your nose too hard.      •An injury from sticking an object into your nose or getting hit in the nose.      •Dry or cold air.      Less common causes of nosebleeds include:  •Toxic fumes.      •Something abnormal in the nose or in the air-filled spaces in the bones of the face (sinuses).      •Growths in the nose, such as polyps.      •Blood thinners or conditions that cause blood to clot slowly.      •Certain illnesses or procedures that irritate or dry out the nasal passages.        Follow these instructions at home:      When you have a nosebleed:      •Sit down and tilt your head slightly forward.      •Use a clean towel or tissue to pinch your nostrils under the bony part of your nose. After 5 minutes, let go of your nose and see if bleeding starts again. Do not release pressure before that time. If there is still bleeding, repeat the pinching and holding for 5 minutes or until the bleeding stops.      • Do not place tissues or gauze in the nose to stop the bleeding.      •Avoid lying down and avoid tilting your head backward. That may make blood collect in the throat and cause gagging or coughing.      •Use a nasal spray decongestant to help with a nosebleed as told by your health care provider.      After a nosebleed:     •Avoid blowing your nose or sniffing for a number of hours.      •Avoid straining, lifting, or bending at the waist for several days. You may go back to other normal activities as you are able.    •If you are taking aspirin or blood thinners and you have nosebleeds, talk to your health care provider. These medicines make bleeding more likely.  •Ask your health care provider if you should stop taking the medicines or if you should adjust the dose.      •Do not stop taking medicines that your health care provider has recommended unless he or she tells you to stop taking them.      •If your nosebleed was caused by dry mucous membranes, use over-the-counter saline nasal spray or gel and a humidifier as told by your health care provider. This will keep the mucous membranes moist and allow them to heal. If you need to use one of these products:  •Choose one that is water-soluble.      •Use only as much as you need and use it only as often as needed.      •Do not lie down right after you use it.      •If you get nosebleeds often, talk with your health care provider about medical treatments. Options may include:  •Nasal cautery. This treatment stops and prevents nosebleeds by using a chemical swab or electrical device to lightly burn tiny blood vessels inside the nose.      •Nasal packing. A gauze or other material is placed in the nose to keep constant pressure on the bleeding area.          Contact a health care provider if you:    •Have a fever.      •Get nosebleeds often or more often than usual.      •Bruise very easily.      •Have a nosebleed from having something stuck in your nose.      •Have bleeding in your mouth.      •Vomit or cough up brown material.      •Have a nosebleed after you start a new medicine.        Get help right away if:    •You have a nosebleed after a fall or a head injury.      •Your nosebleed does not go away after 20 minutes.      •You feel dizzy or weak.      •You have unusual bleeding from other parts of your body.      •You have unusual bruising on other parts of your body.      •You become sweaty.      •You vomit blood.        Summary    •A nosebleed is when blood comes out of the nose. Common causes include allergies, an injury to the nose, or cold or dry air.      •Initial treatment includes applying pressure for 5 minutes.       •Moisturizing the nose with saline nasal spray or gel after a nosebleed may help prevent future bleeding.      •Get help right away if your nosebleed does not go away after 20 minutes.      This information is not intended to replace advice given to you by your health care provider. Make sure you discuss any questions you have with your health care provider.

## 2023-02-05 NOTE — ED PROVIDER NOTE - PATIENT PORTAL LINK FT
You can access the FollowMyHealth Patient Portal offered by [facility] by registering at the following website: [email or web address]. By joining [employer]’s FollowMyHealth portal, you will also be able to view your health information using other applications (apps) compatible with our system.

## 2023-02-09 ENCOUNTER — NON-APPOINTMENT (OUTPATIENT)
Age: 73
End: 2023-02-09

## 2023-02-09 ENCOUNTER — APPOINTMENT (OUTPATIENT)
Dept: OTOLARYNGOLOGY | Facility: CLINIC | Age: 73
End: 2023-02-09

## 2023-02-09 ENCOUNTER — TRANSCRIPTION ENCOUNTER (OUTPATIENT)
Age: 73
End: 2023-02-09

## 2023-02-11 ENCOUNTER — APPOINTMENT (OUTPATIENT)
Dept: FAMILY MEDICINE | Facility: CLINIC | Age: 73
End: 2023-02-11
Payer: MEDICARE

## 2023-02-11 VITALS
SYSTOLIC BLOOD PRESSURE: 112 MMHG | OXYGEN SATURATION: 98 % | BODY MASS INDEX: 33.92 KG/M2 | WEIGHT: 229 LBS | HEART RATE: 72 BPM | TEMPERATURE: 97.9 F | DIASTOLIC BLOOD PRESSURE: 50 MMHG | HEIGHT: 69 IN

## 2023-02-11 PROCEDURE — 99214 OFFICE O/P EST MOD 30 MIN: CPT

## 2023-02-11 RX ORDER — LANCETS
EACH MISCELLANEOUS
Qty: 90 | Refills: 0 | Status: ACTIVE | COMMUNITY
Start: 2023-02-11 | End: 1900-01-01

## 2023-02-11 RX ORDER — LANCETS 33 GAUGE
EACH MISCELLANEOUS
Qty: 100 | Refills: 0 | Status: ACTIVE | COMMUNITY
Start: 2023-02-11 | End: 1900-01-01

## 2023-02-11 NOTE — PHYSICAL EXAM
[Normal] : soft, non-tender, non-distended, no masses palpated, no HSM and normal bowel sounds [de-identified] : trace 1/6 syst murmur....greatly improved over pre-taver exam

## 2023-02-11 NOTE — HISTORY OF PRESENT ILLNESS
[FreeTextEntry1] : Pt is here for a follow up from heart valve surgery.\par Was doing well s/p TAVR  But recently had epistaxis and nose packing.\par Had some SOB but not sure if that is from not being able to breath through his nose\par

## 2023-02-11 NOTE — PLAN
[FreeTextEntry1] : Suspect mild RICE secondary to nasal obstruction\par Observe, and slowly increase exercise\par If significant SOB would recommend sooner cardiac evaluation with echo\par Given referral to [facility]\par Discussed DAPT treatment vs decrease to single agent and bleeding risk. Could also discuss with cardiology at next visit.\par Rx for diabetic supplies

## 2023-02-13 ENCOUNTER — TRANSCRIPTION ENCOUNTER (OUTPATIENT)
Age: 73
End: 2023-02-13

## 2023-02-14 ENCOUNTER — RX RENEWAL (OUTPATIENT)
Age: 73
End: 2023-02-14

## 2023-02-25 ENCOUNTER — RX RENEWAL (OUTPATIENT)
Age: 73
End: 2023-02-25

## 2023-02-28 NOTE — ED ADULT TRIAGE NOTE - PAIN: PRESENCE, MLM
Gloria called to update the team after their visit with Dr. Yoder.  Surgery is scheduled for Thursday at 2pm. Offered support to all of them.  She expressed that Jefry was very emotional earlier but was better after the visit.  Encouraged her to call with any questions or problems.  She verbalized that she would.  Also told her that we would keep in touch.    complains of pain/discomfort

## 2023-03-02 NOTE — ED PROVIDER NOTE - OTHER FINDINGS
Requesting refill of   valACYclovir (VALTREX) 500 MG tablet 30 tablet 0 2/1/2023     Sig - Route: Take 1 tablet by mouth daily. Physical exam due as of 2/17/2023 - Oral      Cpe 12/18/2020 with Margot Calles NP.  CPE scheduled 3/21/2023   Okay to refill per protocol.         Antiviral for Herpes Refill Protocol - 12  Month Protocol Passed 03/02/2023 01:13 AM   Protocol Details  Seen by prescribing provider or same department within the last 12 months or has a future appt in 3 months - IF FAILED PLEASE LOOK AT CHART REVIEW FOR LAST VISIT AND PROCEED ACCORDINGLY    Patient is not pregnant    Medication (including dose and sig) on current meds list       
qs V1, V@

## 2023-03-10 ENCOUNTER — RESULT CHARGE (OUTPATIENT)
Age: 73
End: 2023-03-10

## 2023-03-10 ENCOUNTER — APPOINTMENT (OUTPATIENT)
Dept: FAMILY MEDICINE | Facility: CLINIC | Age: 73
End: 2023-03-10
Payer: MEDICARE

## 2023-03-10 VITALS
TEMPERATURE: 97.9 F | OXYGEN SATURATION: 97 % | HEART RATE: 72 BPM | WEIGHT: 229 LBS | BODY MASS INDEX: 33.92 KG/M2 | DIASTOLIC BLOOD PRESSURE: 58 MMHG | SYSTOLIC BLOOD PRESSURE: 116 MMHG | HEIGHT: 69 IN

## 2023-03-10 DIAGNOSIS — E66.09 OTHER OBESITY DUE TO EXCESS CALORIES: ICD-10-CM

## 2023-03-10 LAB
BILIRUB UR QL STRIP: NORMAL
CLARITY UR: CLEAR
COLLECTION METHOD: NORMAL
GLUCOSE UR-MCNC: ABNORMAL
HCG UR QL: 0.2 EU/DL
HGB UR QL STRIP.AUTO: NORMAL
KETONES UR-MCNC: NORMAL
LEUKOCYTE ESTERASE UR QL STRIP: NORMAL
NITRITE UR QL STRIP: NORMAL
PH UR STRIP: 5.5
PROT UR STRIP-MCNC: NORMAL
SP GR UR STRIP: 1.02

## 2023-03-10 PROCEDURE — 99214 OFFICE O/P EST MOD 30 MIN: CPT | Mod: 25

## 2023-03-10 PROCEDURE — 36415 COLL VENOUS BLD VENIPUNCTURE: CPT

## 2023-03-10 PROCEDURE — 81003 URINALYSIS AUTO W/O SCOPE: CPT | Mod: QW

## 2023-03-10 NOTE — HEALTH RISK ASSESSMENT
[Yes] : Yes [2 - 4 times a month (2 pts)] : 2-4 times a month (2 points) [1 or 2 (0 pts)] : 1 or 2 (0 points) [Never (0 pts)] : Never (0 points) [No] : In the past 12 months have you used drugs other than those required for medical reasons? No [No falls in past year] : Patient reported no falls in the past year [0] : 2) Feeling down, depressed, or hopeless: Not at all (0) [PHQ-2 Negative - No further assessment needed] : PHQ-2 Negative - No further assessment needed [Audit-CScore] : 2 [[id number]] : 0

## 2023-03-10 NOTE — HISTORY OF PRESENT ILLNESS
[Diabetes Mellitus] : Diabetes Mellitus [Hyperlipidemia] : Hyperlipidemia [Hypertension] : Hypertension [FreeTextEntry6] : pt is here for dm check [No episodes] : No hypoglycemic episodes since the last visit. [Understanding of foot care] : Patient expressed understanding of foot care [No Retinopathy] : No retinopathy [Most Recent A1C: ___] : Most recent A1C was [unfilled] [Target A1C:  ___] : Target A1C is [unfilled] [Moderate Intensity] : Patient is currently on moderate intensity statin  therapy [EyeExamDate] : 12/1/22

## 2023-03-11 LAB
ALBUMIN SERPL ELPH-MCNC: 4.5 G/DL
ALP BLD-CCNC: 102 U/L
ALT SERPL-CCNC: 25 U/L
ANION GAP SERPL CALC-SCNC: 15 MMOL/L
AST SERPL-CCNC: 24 U/L
BILIRUB SERPL-MCNC: 0.8 MG/DL
BUN SERPL-MCNC: 29 MG/DL
CALCIUM SERPL-MCNC: 9.7 MG/DL
CHLORIDE SERPL-SCNC: 104 MMOL/L
CHOLEST SERPL-MCNC: 120 MG/DL
CO2 SERPL-SCNC: 22 MMOL/L
CREAT SERPL-MCNC: 1.24 MG/DL
EGFR: 62 ML/MIN/1.73M2
ESTIMATED AVERAGE GLUCOSE: 123 MG/DL
GLUCOSE SERPL-MCNC: 114 MG/DL
HBA1C MFR BLD HPLC: 5.9 %
HDLC SERPL-MCNC: 39 MG/DL
LDLC SERPL CALC-MCNC: 57 MG/DL
NONHDLC SERPL-MCNC: 81 MG/DL
POTASSIUM SERPL-SCNC: 4.5 MMOL/L
PROT SERPL-MCNC: 6.7 G/DL
PSA FREE FLD-MCNC: NORMAL %
PSA FREE SERPL-MCNC: <0.01 NG/ML
PSA SERPL-MCNC: 0.08 NG/ML
SODIUM SERPL-SCNC: 141 MMOL/L
TRIGL SERPL-MCNC: 117 MG/DL

## 2023-03-23 ENCOUNTER — APPOINTMENT (OUTPATIENT)
Dept: CARDIOLOGY | Facility: CLINIC | Age: 73
End: 2023-03-23

## 2023-03-30 ENCOUNTER — RX RENEWAL (OUTPATIENT)
Age: 73
End: 2023-03-30

## 2023-03-31 ENCOUNTER — RX RENEWAL (OUTPATIENT)
Age: 73
End: 2023-03-31

## 2023-03-31 RX ORDER — CLOPIDOGREL BISULFATE 75 MG/1
75 TABLET, FILM COATED ORAL
Qty: 90 | Refills: 0 | Status: ACTIVE | COMMUNITY
Start: 2023-01-12 | End: 1900-01-01

## 2023-04-10 ENCOUNTER — APPOINTMENT (OUTPATIENT)
Dept: DERMATOLOGY | Facility: CLINIC | Age: 73
End: 2023-04-10
Payer: MEDICARE

## 2023-04-10 ENCOUNTER — NON-APPOINTMENT (OUTPATIENT)
Age: 73
End: 2023-04-10

## 2023-04-10 PROCEDURE — 99203 OFFICE O/P NEW LOW 30 MIN: CPT

## 2023-04-10 PROCEDURE — D0123: CPT

## 2023-04-10 RX ORDER — CEPHALEXIN 500 MG/1
500 CAPSULE ORAL
Qty: 15 | Refills: 0 | Status: COMPLETED | COMMUNITY
Start: 2023-02-05

## 2023-04-10 NOTE — PHYSICAL EXAM
[Alert] : alert [Oriented x 3] : ~L oriented x 3 [Well Nourished] : well nourished [Full Body Skin Exam Performed] : performed [FreeTextEntry3] : A full skin exam was performed including the scalp, face (including lips, ears, nose and eyes), neck, chest, abdomen, back, buttocks, upper extremities and lower extremities.  The patient declined examination of the genitalia.  \par The exam revealed the following benign growths:\par [place] pigmented nevi.\par Seborrheic keratoses - includes the left frontal hairline and the right lateral eybrow.\par Lentigines.\par

## 2023-04-10 NOTE — HISTORY OF PRESENT ILLNESS
[FreeTextEntry1] : Patient presents for skin examination. [de-identified] : Notes brown lesions on the face.  Present for months, without tenderness or itching.  No bleeding.

## 2023-04-27 ENCOUNTER — RX RENEWAL (OUTPATIENT)
Age: 73
End: 2023-04-27

## 2023-05-03 ENCOUNTER — NON-APPOINTMENT (OUTPATIENT)
Age: 73
End: 2023-05-03

## 2023-05-03 ENCOUNTER — APPOINTMENT (OUTPATIENT)
Dept: ELECTROPHYSIOLOGY | Facility: CLINIC | Age: 73
End: 2023-05-03
Payer: MEDICARE

## 2023-05-03 VITALS
WEIGHT: 235 LBS | HEART RATE: 74 BPM | BODY MASS INDEX: 34.8 KG/M2 | TEMPERATURE: 98.6 F | DIASTOLIC BLOOD PRESSURE: 76 MMHG | OXYGEN SATURATION: 95 % | SYSTOLIC BLOOD PRESSURE: 128 MMHG | HEIGHT: 69 IN

## 2023-05-03 DIAGNOSIS — I44.1 ATRIOVENTRICULAR BLOCK, SECOND DEGREE: ICD-10-CM

## 2023-05-03 PROCEDURE — 99214 OFFICE O/P EST MOD 30 MIN: CPT

## 2023-05-03 PROCEDURE — 93000 ELECTROCARDIOGRAM COMPLETE: CPT

## 2023-05-03 RX ORDER — PANTOPRAZOLE 20 MG/1
20 TABLET, DELAYED RELEASE ORAL
Qty: 90 | Refills: 0 | Status: DISCONTINUED | COMMUNITY
Start: 2022-11-05 | End: 2023-05-03

## 2023-05-03 RX ORDER — ATORVASTATIN CALCIUM 20 MG/1
20 TABLET, FILM COATED ORAL
Qty: 90 | Refills: 0 | Status: DISCONTINUED | COMMUNITY
Start: 2023-02-14 | End: 2023-05-03

## 2023-05-03 RX ORDER — GINKGO BILOBA LEAF EXTRACT 120 MG
120 CAPSULE ORAL
Refills: 0 | Status: DISCONTINUED | COMMUNITY
End: 2023-05-03

## 2023-05-03 NOTE — PHYSICAL EXAM
[No Acute Distress] : no acute distress [Normal S1, S2] : normal S1, S2 [Clear Lung Fields] : clear lung fields [Normal Gait] : normal gait [No Edema] : no edema [Moves all extremities] : moves all extremities [Alert and Oriented] : alert and oriented

## 2023-05-10 ENCOUNTER — RX RENEWAL (OUTPATIENT)
Age: 73
End: 2023-05-10

## 2023-05-15 ENCOUNTER — RX RENEWAL (OUTPATIENT)
Age: 73
End: 2023-05-15

## 2023-05-15 RX ORDER — BLOOD SUGAR DIAGNOSTIC
STRIP MISCELLANEOUS
Qty: 100 | Refills: 0 | Status: ACTIVE | COMMUNITY
Start: 2023-02-11 | End: 1900-01-01

## 2023-05-20 ENCOUNTER — RX RENEWAL (OUTPATIENT)
Age: 73
End: 2023-05-20

## 2023-05-22 ENCOUNTER — OFFICE (OUTPATIENT)
Dept: URBAN - METROPOLITAN AREA CLINIC 102 | Facility: CLINIC | Age: 73
Setting detail: OPHTHALMOLOGY
End: 2023-05-22
Payer: MEDICARE

## 2023-05-22 DIAGNOSIS — H40.033: ICD-10-CM

## 2023-05-22 DIAGNOSIS — H35.373: ICD-10-CM

## 2023-05-22 DIAGNOSIS — E11.3292: ICD-10-CM

## 2023-05-22 DIAGNOSIS — H25.13: ICD-10-CM

## 2023-05-22 DIAGNOSIS — E11.9: ICD-10-CM

## 2023-05-22 DIAGNOSIS — E11.3211: ICD-10-CM

## 2023-05-22 PROCEDURE — 99213 OFFICE O/P EST LOW 20 MIN: CPT | Performed by: OPHTHALMOLOGY

## 2023-05-22 PROCEDURE — 92134 CPTRZ OPH DX IMG PST SGM RTA: CPT | Performed by: OPHTHALMOLOGY

## 2023-05-22 PROCEDURE — 92020 GONIOSCOPY: CPT | Performed by: OPHTHALMOLOGY

## 2023-05-22 ASSESSMENT — AXIALLENGTH_DERIVED
OD_AL: 23.1697
OS_AL: 23.1804

## 2023-05-22 ASSESSMENT — TONOMETRY
OS_IOP_MMHG: 13
OD_IOP_MMHG: 12

## 2023-05-22 ASSESSMENT — VISUAL ACUITY
OD_BCVA: 20/25
OS_BCVA: 20/25-2

## 2023-05-22 ASSESSMENT — KERATOMETRY
OD_K2POWER_DIOPTERS: 44.75
OS_K2POWER_DIOPTERS: 45.00
OS_K1POWER_DIOPTERS: 43.75
OD_AXISANGLE_DEGREES: 166
METHOD_AUTO_MANUAL: AUTO
OS_AXISANGLE_DEGREES: 018
OD_K1POWER_DIOPTERS: 43.00

## 2023-05-22 ASSESSMENT — REFRACTION_AUTOREFRACTION
OD_SPHERE: +1.25
OD_AXIS: 090
OS_AXIS: 113
OS_SPHERE: +0.50
OS_CYLINDER: -0.50
OD_CYLINDER: -1.00

## 2023-05-22 ASSESSMENT — CONFRONTATIONAL VISUAL FIELD TEST (CVF)
OD_FINDINGS: FULL
OS_FINDINGS: FULL

## 2023-05-22 ASSESSMENT — SPHEQUIV_DERIVED
OD_SPHEQUIV: 0.75
OS_SPHEQUIV: 0.25

## 2023-05-24 NOTE — REVIEW OF SYSTEMS
[Feeling Fatigued] : not feeling fatigued [SOB] : no shortness of breath [Dyspnea on exertion] : not dyspnea during exertion [Chest Discomfort] : no chest discomfort [Palpitations] : no palpitations [Orthopnea] : no orthopnea [Syncope] : no syncope [Dizziness] : no dizziness

## 2023-05-24 NOTE — HISTORY OF PRESENT ILLNESS
[FreeTextEntry1] : Mr. Menezes is a 72 year old male who presents for follow up for arrhythmia management. The patient has a history of severe aortic stenosis and underwent TAVR (Medtronic Evolute FX) on 1/11/23. In addition, the patient has a history of HTN, DM, and hypothyroidism. The patient was last evaluated by Dr. Nieves while wearing an MCOT post TAVR and was noted to have Mobitz type I AV block on monitoring. The episodes are associated with sinus slowing and likely represent vagally mediated AV conduction abnormality during sleep. No AV conduction disturbances were seen while awake. EKG revealed first degree AV block and left axis at baseline and a narrow QRS complex (unchanged from baseline). No need for PPM deemed necessary at that time. \par \par MCOT completed and symptom recordings correlated with PVCs, burden 2%. Brief NSVT of 4 beats in duration also noted. \par \par Today, reports he has been feeling well since last follow up. Denies dizziness, near syncope, syncope. ECG SR,  ms, QRS 116ms, left axis.

## 2023-05-24 NOTE — DISCUSSION/SUMMARY
[EKG obtained to assist in diagnosis and management of assessed problem(s)] : EKG obtained to assist in diagnosis and management of assessed problem(s) [FreeTextEntry1] : Mr. Menezes is a 72 year old male who presents for follow up for arrhythmia management. The patient has a history of severe aortic stenosis and underwent TAVR (Medtronic Evolute FX) on 1/11/23. In addition, the patient has a history of HTN, DM, and hypothyroidism. The patient was last evaluated by Dr. Nieves while wearing an MCOT post TAVR and was noted to have Mobitz type I AV block on monitoring. The episodes are associated with sinus slowing and likely represent vagally mediated AV conduction abnormality during sleep. No AV conduction disturbances were seen while awake. EKG revealed first degree AV block and left axis at baseline and a narrow QRS complex (unchanged from baseline). No need for PPM deemed necessary at that time. \par \par MCOT completed and symptom recordings correlated with PVCs, burden 2%. Brief NSVT of 4 beats in duration also noted. \par \par Today, reports he has been feeling well since last follow up. Denies dizziness, near syncope, syncope. ECG SR,  ms, QRS 116ms, left axis. \par \par Recommendation: \par \par In summary, Mr. Menezes is a 72 year old male with a history of severe aortic stenosis s/p TAVR on 1/11/23. The patient completed a 30 day MCOT post TAVR which showed Mobitz type I AV block. The episodes are associated with significant sinus node slowing suggesting a vagally mediated conduction abnormaltiy. Baseline ECG has a first degree AV block, left axis and a narrow QRS complex which appears unchanged from baseline. There is no indication for a pacemaker at this time. He has had no symptoms concerning for bradyarrhythmia since MCOT completed. He was advised to continue general cardiology f/u with EP follow up on an as needed basis.\par \par Ernestina NAYLOR-C

## 2023-06-09 ENCOUNTER — APPOINTMENT (OUTPATIENT)
Dept: FAMILY MEDICINE | Facility: CLINIC | Age: 73
End: 2023-06-09
Payer: MEDICARE

## 2023-06-09 VITALS
OXYGEN SATURATION: 96 % | SYSTOLIC BLOOD PRESSURE: 122 MMHG | HEART RATE: 69 BPM | TEMPERATURE: 97.5 F | WEIGHT: 235 LBS | DIASTOLIC BLOOD PRESSURE: 62 MMHG | BODY MASS INDEX: 34.7 KG/M2

## 2023-06-09 LAB
BILIRUB UR QL STRIP: NORMAL
CLARITY UR: CLEAR
COLLECTION METHOD: NORMAL
GLUCOSE UR-MCNC: ABNORMAL
HCG UR QL: 0.2 EU/DL
HGB UR QL STRIP.AUTO: 6
KETONES UR-MCNC: NORMAL
LEUKOCYTE ESTERASE UR QL STRIP: NORMAL
NITRITE UR QL STRIP: NORMAL
PH UR STRIP: 6
PROT UR STRIP-MCNC: NORMAL
SP GR UR STRIP: 1.02

## 2023-06-09 PROCEDURE — 81003 URINALYSIS AUTO W/O SCOPE: CPT | Mod: QW

## 2023-06-09 PROCEDURE — 99214 OFFICE O/P EST MOD 30 MIN: CPT | Mod: 25

## 2023-06-09 PROCEDURE — 36415 COLL VENOUS BLD VENIPUNCTURE: CPT

## 2023-06-09 RX ORDER — DOXYCYCLINE HYCLATE 20 MG/1
20 TABLET ORAL DAILY
Qty: 90 | Refills: 0 | Status: DISCONTINUED | COMMUNITY
Start: 2021-06-11 | End: 2023-06-09

## 2023-06-09 NOTE — HISTORY OF PRESENT ILLNESS
[Diabetes Mellitus] : Diabetes Mellitus [FreeTextEntry6] : pt is here for DM check [No episodes] : No hypoglycemic episodes since the last visit. [Regular podiatrist visits] : Patient did not have regular podiatrist visit [Understanding of foot care] : Patient expressed understanding of foot care [No Retinopathy] : No retinopathy [Most Recent A1C: ___] : Most recent A1C was [unfilled] [Target A1C:  ___] : Target A1C is [unfilled] [Moderate Intensity] : Patient is currently on moderate intensity statin  therapy [EyeExamDate] : 06/23 [Does not check BP] : The patient is not checking blood pressure [Target goal met] : BP target goal met

## 2023-06-10 LAB
ALBUMIN SERPL ELPH-MCNC: 4.7 G/DL
ALP BLD-CCNC: 94 U/L
ALT SERPL-CCNC: 26 U/L
ANION GAP SERPL CALC-SCNC: 15 MMOL/L
AST SERPL-CCNC: 36 U/L
BILIRUB SERPL-MCNC: 0.9 MG/DL
BUN SERPL-MCNC: 29 MG/DL
CALCIUM SERPL-MCNC: 9.4 MG/DL
CHLORIDE SERPL-SCNC: 103 MMOL/L
CHOLEST SERPL-MCNC: 104 MG/DL
CO2 SERPL-SCNC: 23 MMOL/L
CREAT SERPL-MCNC: 1.3 MG/DL
CREAT SPEC-SCNC: 195 MG/DL
EGFR: 58 ML/MIN/1.73M2
ESTIMATED AVERAGE GLUCOSE: 128 MG/DL
GLUCOSE SERPL-MCNC: 87 MG/DL
HBA1C MFR BLD HPLC: 6.1 %
HDLC SERPL-MCNC: 36 MG/DL
LDLC SERPL CALC-MCNC: 41 MG/DL
MICROALBUMIN 24H UR DL<=1MG/L-MCNC: 1.5 MG/DL
MICROALBUMIN/CREAT 24H UR-RTO: 7 MG/G
NONHDLC SERPL-MCNC: 68 MG/DL
POTASSIUM SERPL-SCNC: 4.5 MMOL/L
PROT SERPL-MCNC: 7.1 G/DL
SODIUM SERPL-SCNC: 141 MMOL/L
TRIGL SERPL-MCNC: 139 MG/DL

## 2023-06-12 ENCOUNTER — RX RENEWAL (OUTPATIENT)
Age: 73
End: 2023-06-12

## 2023-06-23 NOTE — ASU PATIENT PROFILE, ADULT - PATIENT KNOW
yes
[FreeTextEntry1] : Patient will return for Shingrix vaccine.  We will also check insurance coverage.

## 2023-06-29 ENCOUNTER — RX RENEWAL (OUTPATIENT)
Age: 73
End: 2023-06-29

## 2023-07-10 ENCOUNTER — RX RENEWAL (OUTPATIENT)
Age: 73
End: 2023-07-10

## 2023-07-23 ENCOUNTER — RX RENEWAL (OUTPATIENT)
Age: 73
End: 2023-07-23

## 2023-07-30 ENCOUNTER — RX RENEWAL (OUTPATIENT)
Age: 73
End: 2023-07-30

## 2023-08-02 ENCOUNTER — RX RENEWAL (OUTPATIENT)
Age: 73
End: 2023-08-02

## 2023-08-23 ENCOUNTER — RX RENEWAL (OUTPATIENT)
Age: 73
End: 2023-08-23

## 2023-09-07 ENCOUNTER — RESULT CHARGE (OUTPATIENT)
Age: 73
End: 2023-09-07

## 2023-09-08 ENCOUNTER — APPOINTMENT (OUTPATIENT)
Dept: FAMILY MEDICINE | Facility: CLINIC | Age: 73
End: 2023-09-08
Payer: MEDICARE

## 2023-09-08 VITALS
HEART RATE: 78 BPM | SYSTOLIC BLOOD PRESSURE: 122 MMHG | DIASTOLIC BLOOD PRESSURE: 60 MMHG | OXYGEN SATURATION: 95 % | WEIGHT: 235 LBS | TEMPERATURE: 97.4 F | HEIGHT: 69 IN | BODY MASS INDEX: 34.8 KG/M2

## 2023-09-08 DIAGNOSIS — Z95.3 PRESENCE OF XENOGENIC HEART VALVE: ICD-10-CM

## 2023-09-08 DIAGNOSIS — Z23 ENCOUNTER FOR IMMUNIZATION: ICD-10-CM

## 2023-09-08 DIAGNOSIS — I25.10 ATHEROSCLEROTIC HEART DISEASE OF NATIVE CORONARY ARTERY W/OUT ANGINA PECTORIS: ICD-10-CM

## 2023-09-08 PROCEDURE — 90662 IIV NO PRSV INCREASED AG IM: CPT

## 2023-09-08 PROCEDURE — G0008: CPT

## 2023-09-08 PROCEDURE — 99214 OFFICE O/P EST MOD 30 MIN: CPT | Mod: 25

## 2023-09-08 NOTE — HISTORY OF PRESENT ILLNESS
[Diabetes Mellitus] : Diabetes Mellitus [FreeTextEntry6] : Pt is here for DM check. [No episodes] : No hypoglycemic episodes since the last visit. [Regular podiatrist visits] : Patient did not have regular podiatrist visit [Understanding of foot care] : Patient expressed understanding of foot care [Most Recent A1C: ___] : Most recent A1C was [unfilled] [Target A1C:  ___] : Target A1C is [unfilled] [Target goal met] : A1C target goal met [Moderate Intensity] : Patient is currently on moderate intensity statin  therapy [EyeExamDate] : 08/23

## 2023-09-09 LAB
ALBUMIN SERPL ELPH-MCNC: 4.5 G/DL
ALP BLD-CCNC: 91 U/L
ALT SERPL-CCNC: 24 U/L
ANION GAP SERPL CALC-SCNC: 14 MMOL/L
AST SERPL-CCNC: 24 U/L
BILIRUB SERPL-MCNC: 1 MG/DL
BUN SERPL-MCNC: 32 MG/DL
CALCIUM SERPL-MCNC: 9.9 MG/DL
CHLORIDE SERPL-SCNC: 103 MMOL/L
CHOLEST SERPL-MCNC: 110 MG/DL
CO2 SERPL-SCNC: 24 MMOL/L
CREAT SERPL-MCNC: 1.29 MG/DL
CREAT SPEC-SCNC: 214 MG/DL
EGFR: 59 ML/MIN/1.73M2
ESTIMATED AVERAGE GLUCOSE: 128 MG/DL
GLUCOSE SERPL-MCNC: 102 MG/DL
HBA1C MFR BLD HPLC: 6.1 %
HDLC SERPL-MCNC: 39 MG/DL
LDLC SERPL CALC-MCNC: 49 MG/DL
MICROALBUMIN 24H UR DL<=1MG/L-MCNC: 1.7 MG/DL
MICROALBUMIN/CREAT 24H UR-RTO: 8 MG/G
NONHDLC SERPL-MCNC: 71 MG/DL
POTASSIUM SERPL-SCNC: 4.2 MMOL/L
PROT SERPL-MCNC: 6.9 G/DL
PSA FREE FLD-MCNC: NORMAL %
PSA FREE SERPL-MCNC: <0.01 NG/ML
PSA SERPL-MCNC: 0.09 NG/ML
SODIUM SERPL-SCNC: 140 MMOL/L
TRIGL SERPL-MCNC: 121 MG/DL

## 2023-09-11 LAB
BILIRUB UR QL STRIP: NORMAL
CLARITY UR: CLEAR
COLLECTION METHOD: NORMAL
GLUCOSE UR-MCNC: ABNORMAL
HCG UR QL: 0.2 EU/DL
HGB UR QL STRIP.AUTO: NORMAL
KETONES UR-MCNC: ABNORMAL
LEUKOCYTE ESTERASE UR QL STRIP: NORMAL
NITRITE UR QL STRIP: NORMAL
PH UR STRIP: 5.5
PROT UR STRIP-MCNC: NORMAL
SP GR UR STRIP: 1.03

## 2023-09-30 ENCOUNTER — RX RENEWAL (OUTPATIENT)
Age: 73
End: 2023-09-30

## 2023-10-05 ENCOUNTER — RX RENEWAL (OUTPATIENT)
Age: 73
End: 2023-10-05

## 2023-10-14 ENCOUNTER — RX RENEWAL (OUTPATIENT)
Age: 73
End: 2023-10-14

## 2023-11-04 ENCOUNTER — RX RENEWAL (OUTPATIENT)
Age: 73
End: 2023-11-04

## 2023-11-04 RX ORDER — SITAGLIPTIN 50 MG/1
50 TABLET, FILM COATED ORAL
Qty: 90 | Refills: 0 | Status: ACTIVE | COMMUNITY
Start: 2019-02-11 | End: 1900-01-01

## 2023-12-07 ENCOUNTER — APPOINTMENT (OUTPATIENT)
Dept: FAMILY MEDICINE | Facility: CLINIC | Age: 73
End: 2023-12-07
Payer: MEDICARE

## 2023-12-07 VITALS
DIASTOLIC BLOOD PRESSURE: 62 MMHG | HEART RATE: 79 BPM | OXYGEN SATURATION: 95 % | SYSTOLIC BLOOD PRESSURE: 120 MMHG | TEMPERATURE: 97.8 F | WEIGHT: 235 LBS | BODY MASS INDEX: 34.7 KG/M2

## 2023-12-07 DIAGNOSIS — S86.911A STRAIN OF UNSPECIFIED MUSCLE(S) AND TENDON(S) AT LOWER LEG LEVEL, RIGHT LEG, INITIAL ENCOUNTER: ICD-10-CM

## 2023-12-07 PROCEDURE — 99214 OFFICE O/P EST MOD 30 MIN: CPT

## 2023-12-11 NOTE — ED PROCEDURE NOTE - CPROC ED POST PROC CARE GUIDE1
Verbal/written post procedure instructions were given to patient/caregiver./Instructed patient/caregiver to follow-up with primary care physician./Instructed patient/caregiver regarding signs and symptoms of infection. 25-May-2023

## 2023-12-15 ENCOUNTER — APPOINTMENT (OUTPATIENT)
Dept: FAMILY MEDICINE | Facility: CLINIC | Age: 73
End: 2023-12-15
Payer: MEDICARE

## 2023-12-15 VITALS
WEIGHT: 238 LBS | OXYGEN SATURATION: 95 % | SYSTOLIC BLOOD PRESSURE: 112 MMHG | DIASTOLIC BLOOD PRESSURE: 60 MMHG | HEIGHT: 69 IN | HEART RATE: 65 BPM | BODY MASS INDEX: 35.25 KG/M2 | TEMPERATURE: 97.9 F

## 2023-12-15 DIAGNOSIS — Z00.00 ENCOUNTER FOR GENERAL ADULT MEDICAL EXAMINATION W/OUT ABNORMAL FINDINGS: ICD-10-CM

## 2023-12-15 LAB
BILIRUB UR QL STRIP: NORMAL
CLARITY UR: CLEAR
COLLECTION METHOD: NORMAL
GLUCOSE UR-MCNC: ABNORMAL
HCG UR QL: 0.2 EU/DL
HGB UR QL STRIP.AUTO: NORMAL
KETONES UR-MCNC: ABNORMAL
LEUKOCYTE ESTERASE UR QL STRIP: NORMAL
NITRITE UR QL STRIP: NORMAL
PH UR STRIP: 5.5
PROT UR STRIP-MCNC: NORMAL
SP GR UR STRIP: NORMAL

## 2023-12-15 PROCEDURE — 81003 URINALYSIS AUTO W/O SCOPE: CPT | Mod: QW

## 2023-12-15 PROCEDURE — G0439: CPT

## 2023-12-15 PROCEDURE — 36415 COLL VENOUS BLD VENIPUNCTURE: CPT

## 2023-12-15 NOTE — HEALTH RISK ASSESSMENT
[Yes] : Yes [2 - 3 times a week (3 pts)] : 2 - 3  times a week (3 points) [1 or 2 (0 pts)] : 1 or 2 (0 points) [Never (0 pts)] : Never (0 points) [No] : In the past 12 months have you used drugs other than those required for medical reasons? No [No falls in past year] : Patient reported no falls in the past year [0] : 2) Feeling down, depressed, or hopeless: Not at all (0) [PHQ-2 Negative - No further assessment needed] : PHQ-2 Negative - No further assessment needed [Patient reported colonoscopy was normal] : Patient reported colonoscopy was normal [HIV test declined] : HIV test declined [Hepatitis C test declined] : Hepatitis C test declined [With Family] : lives with family [# of Members in Household ___] :  household currently consist of [unfilled] member(s) [Retired] : retired [High School] : high school [] :  [# Of Children ___] : has [unfilled] children [Sexually Active] : sexually active [Feels Safe at Home] : Feels safe at home [Fully functional (bathing, dressing, toileting, transferring, walking, feeding)] : Fully functional (bathing, dressing, toileting, transferring, walking, feeding) [Fully functional (using the telephone, shopping, preparing meals, housekeeping, doing laundry, using] : Fully functional and needs no help or supervision to perform IADLs (using the telephone, shopping, preparing meals, housekeeping, doing laundry, using transportation, managing medications and managing finances) [Reports normal functional visual acuity (ie: able to read med bottle)] : Reports normal functional visual acuity [Smoke Detector] : smoke detector [Carbon Monoxide Detector] : carbon monoxide detector [Seat Belt] :  uses seat belt [Travel to Developing Areas] : travel to developing areas [Never] : Never [Audit-CScore] : 3 [[id number]] : 0 [Change in mental status noted] : No change in mental status noted [Language] : denies difficulty with language [Behavior] : denies difficulty with behavior [Learning/Retaining New Information] : denies difficulty learning/retaining new information [Handling Complex Tasks] : denies difficulty handling complex tasks [Reasoning] : denies difficulty with reasoning [Spatial Ability and Orientation] : denies difficulty with spatial ability and orientation [Reports changes in hearing] : Reports no changes in hearing [Reports changes in vision] : Reports no changes in vision [Reports changes in dental health] : Reports no changes in dental health [Guns at Home] : no guns at home [Sunscreen] : does not use sunscreen [TB Exposure] : is not being exposed to tuberculosis [Caregiver Concerns] : does not have caregiver concerns [ColonoscopyDate] : 03/19

## 2023-12-17 ENCOUNTER — RX RENEWAL (OUTPATIENT)
Age: 73
End: 2023-12-17

## 2023-12-17 RX ORDER — PANTOPRAZOLE 40 MG/1
40 TABLET, DELAYED RELEASE ORAL
Qty: 90 | Refills: 0 | Status: ACTIVE | COMMUNITY
Start: 2019-12-16 | End: 1900-01-01

## 2023-12-17 RX ORDER — METFORMIN ER 500 MG 500 MG/1
500 TABLET ORAL DAILY
Qty: 360 | Refills: 0 | Status: ACTIVE | COMMUNITY
Start: 2019-02-06 | End: 1900-01-01

## 2023-12-17 RX ORDER — ATORVASTATIN CALCIUM 20 MG/1
20 TABLET, FILM COATED ORAL
Qty: 90 | Refills: 0 | Status: ACTIVE | COMMUNITY
Start: 2018-09-20 | End: 1900-01-01

## 2023-12-17 RX ORDER — LOSARTAN POTASSIUM AND HYDROCHLOROTHIAZIDE 12.5; 1 MG/1; MG/1
100-12.5 TABLET ORAL
Qty: 90 | Refills: 0 | Status: ACTIVE | COMMUNITY
Start: 2017-12-07 | End: 1900-01-01

## 2023-12-17 RX ORDER — LEVOTHYROXINE SODIUM 0.15 MG/1
150 TABLET ORAL
Qty: 90 | Refills: 0 | Status: ACTIVE | COMMUNITY
Start: 2019-12-16 | End: 1900-01-01

## 2023-12-18 LAB
ALBUMIN SERPL ELPH-MCNC: 4.4 G/DL
ALP BLD-CCNC: 111 U/L
ALT SERPL-CCNC: 21 U/L
ANION GAP SERPL CALC-SCNC: 14 MMOL/L
AST SERPL-CCNC: 22 U/L
BASOPHILS # BLD AUTO: 0.04 K/UL
BASOPHILS NFR BLD AUTO: 0.6 %
BILIRUB SERPL-MCNC: 0.8 MG/DL
BUN SERPL-MCNC: 32 MG/DL
CALCIUM SERPL-MCNC: 10.4 MG/DL
CHLORIDE SERPL-SCNC: 101 MMOL/L
CHOLEST SERPL-MCNC: 103 MG/DL
CO2 SERPL-SCNC: 23 MMOL/L
CREAT SERPL-MCNC: 1.2 MG/DL
CREAT SPEC-SCNC: 224 MG/DL
EGFR: 64 ML/MIN/1.73M2
EOSINOPHIL # BLD AUTO: 0.17 K/UL
EOSINOPHIL NFR BLD AUTO: 2.7 %
ESTIMATED AVERAGE GLUCOSE: 126 MG/DL
GLUCOSE SERPL-MCNC: 106 MG/DL
HBA1C MFR BLD HPLC: 6 %
HCT VFR BLD CALC: 39.8 %
HDLC SERPL-MCNC: 35 MG/DL
HGB BLD-MCNC: 12.7 G/DL
IMM GRANULOCYTES NFR BLD AUTO: 0.2 %
LDLC SERPL CALC-MCNC: 42 MG/DL
LYMPHOCYTES # BLD AUTO: 1.53 K/UL
LYMPHOCYTES NFR BLD AUTO: 24.2 %
MAN DIFF?: NORMAL
MCHC RBC-ENTMCNC: 27.5 PG
MCHC RBC-ENTMCNC: 31.9 GM/DL
MCV RBC AUTO: 86.3 FL
MICROALBUMIN 24H UR DL<=1MG/L-MCNC: 1.7 MG/DL
MICROALBUMIN/CREAT 24H UR-RTO: 8 MG/G
MONOCYTES # BLD AUTO: 0.53 K/UL
MONOCYTES NFR BLD AUTO: 8.4 %
NEUTROPHILS # BLD AUTO: 4.03 K/UL
NEUTROPHILS NFR BLD AUTO: 63.9 %
NONHDLC SERPL-MCNC: 67 MG/DL
PLATELET # BLD AUTO: 205 K/UL
POTASSIUM SERPL-SCNC: 4.1 MMOL/L
PROT SERPL-MCNC: 6.8 G/DL
PSA FREE FLD-MCNC: NORMAL %
PSA FREE SERPL-MCNC: <0.01 NG/ML
PSA SERPL-MCNC: 0.09 NG/ML
RBC # BLD: 4.61 M/UL
RBC # FLD: 14.9 %
SODIUM SERPL-SCNC: 138 MMOL/L
T3FREE SERPL-MCNC: 2.31 PG/ML
T4 FREE SERPL-MCNC: 1.7 NG/DL
TRIGL SERPL-MCNC: 150 MG/DL
TSH SERPL-ACNC: 0.33 UIU/ML
WBC # FLD AUTO: 6.31 K/UL

## 2024-01-24 ENCOUNTER — NON-APPOINTMENT (OUTPATIENT)
Age: 74
End: 2024-01-24

## 2024-01-25 ENCOUNTER — RX RENEWAL (OUTPATIENT)
Age: 74
End: 2024-01-25

## 2024-02-01 ENCOUNTER — NON-APPOINTMENT (OUTPATIENT)
Age: 74
End: 2024-02-01

## 2024-02-20 ENCOUNTER — RX RENEWAL (OUTPATIENT)
Age: 74
End: 2024-02-20

## 2024-02-20 RX ORDER — GABAPENTIN 100 MG/1
100 CAPSULE ORAL
Qty: 60 | Refills: 11 | Status: ACTIVE | COMMUNITY
Start: 2023-12-07 | End: 1900-01-01

## 2024-03-15 ENCOUNTER — APPOINTMENT (OUTPATIENT)
Dept: FAMILY MEDICINE | Facility: CLINIC | Age: 74
End: 2024-03-15
Payer: MEDICARE

## 2024-03-15 VITALS
DIASTOLIC BLOOD PRESSURE: 60 MMHG | BODY MASS INDEX: 35.25 KG/M2 | HEIGHT: 69 IN | OXYGEN SATURATION: 94 % | SYSTOLIC BLOOD PRESSURE: 110 MMHG | TEMPERATURE: 98 F | HEART RATE: 70 BPM | WEIGHT: 238 LBS

## 2024-03-15 DIAGNOSIS — C61 MALIGNANT NEOPLASM OF PROSTATE: ICD-10-CM

## 2024-03-15 PROCEDURE — 81003 URINALYSIS AUTO W/O SCOPE: CPT | Mod: QW

## 2024-03-15 PROCEDURE — 99214 OFFICE O/P EST MOD 30 MIN: CPT

## 2024-03-15 PROCEDURE — 36415 COLL VENOUS BLD VENIPUNCTURE: CPT

## 2024-03-15 NOTE — HISTORY OF PRESENT ILLNESS
[Diabetes Mellitus] : Diabetes Mellitus [FreeTextEntry6] : Pt is here for DM check. [No episodes] : No hypoglycemic episodes since the last visit. [Understanding of foot care] : Patient expressed understanding of foot care [Retinopathy] : Retinopathy [Most Recent A1C: ___] : Most recent A1C was [unfilled] [Target A1C:  ___] : Target A1C is [unfilled] [EyeExamDate] : 03/24

## 2024-03-17 LAB
ALBUMIN SERPL ELPH-MCNC: 4.6 G/DL
ALP BLD-CCNC: 122 U/L
ALT SERPL-CCNC: 22 U/L
ANION GAP SERPL CALC-SCNC: 17 MMOL/L
AST SERPL-CCNC: 22 U/L
BILIRUB SERPL-MCNC: 0.7 MG/DL
BUN SERPL-MCNC: 30 MG/DL
CALCIUM SERPL-MCNC: 10.3 MG/DL
CHLORIDE SERPL-SCNC: 102 MMOL/L
CHOLEST SERPL-MCNC: 120 MG/DL
CO2 SERPL-SCNC: 23 MMOL/L
CREAT SERPL-MCNC: 1.29 MG/DL
CREAT SPEC-SCNC: 154 MG/DL
EGFR: 59 ML/MIN/1.73M2
ESTIMATED AVERAGE GLUCOSE: 128 MG/DL
GLUCOSE SERPL-MCNC: 108 MG/DL
HBA1C MFR BLD HPLC: 6.1 %
HDLC SERPL-MCNC: 41 MG/DL
LDLC SERPL CALC-MCNC: 55 MG/DL
MICROALBUMIN 24H UR DL<=1MG/L-MCNC: <1.2 MG/DL
MICROALBUMIN/CREAT 24H UR-RTO: NORMAL MG/G
NONHDLC SERPL-MCNC: 79 MG/DL
POTASSIUM SERPL-SCNC: 4.2 MMOL/L
PROT SERPL-MCNC: 6.9 G/DL
PSA FREE FLD-MCNC: NORMAL %
PSA FREE SERPL-MCNC: <0.01 NG/ML
PSA SERPL-MCNC: 0.04 NG/ML
SODIUM SERPL-SCNC: 141 MMOL/L
TRIGL SERPL-MCNC: 135 MG/DL

## 2024-03-18 ENCOUNTER — RESULT CHARGE (OUTPATIENT)
Age: 74
End: 2024-03-18

## 2024-03-20 ENCOUNTER — EMERGENCY (EMERGENCY)
Facility: HOSPITAL | Age: 74
LOS: 0 days | Discharge: HOME | End: 2024-03-20
Attending: EMERGENCY MEDICINE
Payer: MEDICARE

## 2024-03-20 VITALS
DIASTOLIC BLOOD PRESSURE: 75 MMHG | HEIGHT: 69 IN | TEMPERATURE: 98 F | WEIGHT: 233.03 LBS | OXYGEN SATURATION: 98 % | RESPIRATION RATE: 18 BRPM | SYSTOLIC BLOOD PRESSURE: 152 MMHG | HEART RATE: 67 BPM

## 2024-03-20 VITALS
HEART RATE: 77 BPM | OXYGEN SATURATION: 99 % | DIASTOLIC BLOOD PRESSURE: 55 MMHG | RESPIRATION RATE: 17 BRPM | TEMPERATURE: 98 F | SYSTOLIC BLOOD PRESSURE: 143 MMHG

## 2024-03-20 DIAGNOSIS — I10 ESSENTIAL (PRIMARY) HYPERTENSION: ICD-10-CM

## 2024-03-20 DIAGNOSIS — R55 SYNCOPE AND COLLAPSE: ICD-10-CM

## 2024-03-20 DIAGNOSIS — E11.9 TYPE 2 DIABETES MELLITUS WITHOUT COMPLICATIONS: ICD-10-CM

## 2024-03-20 DIAGNOSIS — R42 DIZZINESS AND GIDDINESS: ICD-10-CM

## 2024-03-20 LAB
ALBUMIN SERPL ELPH-MCNC: 4 G/DL — SIGNIFICANT CHANGE UP (ref 3.3–5)
ALP SERPL-CCNC: 97 U/L — SIGNIFICANT CHANGE UP (ref 40–120)
ALT FLD-CCNC: 30 U/L — SIGNIFICANT CHANGE UP (ref 12–78)
ANION GAP SERPL CALC-SCNC: 7 MMOL/L — SIGNIFICANT CHANGE UP (ref 5–17)
APPEARANCE UR: ABNORMAL
APTT BLD: 28 SEC — SIGNIFICANT CHANGE UP (ref 24.5–35.6)
AST SERPL-CCNC: 31 U/L — SIGNIFICANT CHANGE UP (ref 15–37)
BACTERIA # UR AUTO: NEGATIVE /HPF — SIGNIFICANT CHANGE UP
BASOPHILS # BLD AUTO: 0.04 K/UL — SIGNIFICANT CHANGE UP (ref 0–0.2)
BASOPHILS NFR BLD AUTO: 0.5 % — SIGNIFICANT CHANGE UP (ref 0–2)
BILIRUB SERPL-MCNC: 1.4 MG/DL — HIGH (ref 0.2–1.2)
BILIRUB UR-MCNC: NEGATIVE — SIGNIFICANT CHANGE UP
BUN SERPL-MCNC: 29 MG/DL — HIGH (ref 7–23)
CALCIUM SERPL-MCNC: 9.6 MG/DL — SIGNIFICANT CHANGE UP (ref 8.5–10.1)
CHLORIDE SERPL-SCNC: 107 MMOL/L — SIGNIFICANT CHANGE UP (ref 96–108)
CO2 SERPL-SCNC: 25 MMOL/L — SIGNIFICANT CHANGE UP (ref 22–31)
COLOR SPEC: SIGNIFICANT CHANGE UP
CREAT SERPL-MCNC: 1.31 MG/DL — HIGH (ref 0.5–1.3)
DIFF PNL FLD: NEGATIVE — SIGNIFICANT CHANGE UP
EGFR: 57 ML/MIN/1.73M2 — LOW
EOSINOPHIL # BLD AUTO: 0.11 K/UL — SIGNIFICANT CHANGE UP (ref 0–0.5)
EOSINOPHIL NFR BLD AUTO: 1.3 % — SIGNIFICANT CHANGE UP (ref 0–6)
GLUCOSE SERPL-MCNC: 118 MG/DL — HIGH (ref 70–99)
GLUCOSE UR QL: >=1000 MG/DL
HCT VFR BLD CALC: 41 % — SIGNIFICANT CHANGE UP (ref 39–50)
HGB BLD-MCNC: 13.8 G/DL — SIGNIFICANT CHANGE UP (ref 13–17)
IMM GRANULOCYTES NFR BLD AUTO: 0.2 % — SIGNIFICANT CHANGE UP (ref 0–0.9)
INR BLD: 0.98 RATIO — SIGNIFICANT CHANGE UP (ref 0.85–1.18)
KETONES UR-MCNC: ABNORMAL MG/DL
LEUKOCYTE ESTERASE UR-ACNC: NEGATIVE — SIGNIFICANT CHANGE UP
LYMPHOCYTES # BLD AUTO: 1.33 K/UL — SIGNIFICANT CHANGE UP (ref 1–3.3)
LYMPHOCYTES # BLD AUTO: 15.1 % — SIGNIFICANT CHANGE UP (ref 13–44)
MAGNESIUM SERPL-MCNC: 2 MG/DL — SIGNIFICANT CHANGE UP (ref 1.6–2.6)
MCHC RBC-ENTMCNC: 29.3 PG — SIGNIFICANT CHANGE UP (ref 27–34)
MCHC RBC-ENTMCNC: 33.7 GM/DL — SIGNIFICANT CHANGE UP (ref 32–36)
MCV RBC AUTO: 87 FL — SIGNIFICANT CHANGE UP (ref 80–100)
MONOCYTES # BLD AUTO: 0.75 K/UL — SIGNIFICANT CHANGE UP (ref 0–0.9)
MONOCYTES NFR BLD AUTO: 8.5 % — SIGNIFICANT CHANGE UP (ref 2–14)
NEUTROPHILS # BLD AUTO: 6.55 K/UL — SIGNIFICANT CHANGE UP (ref 1.8–7.4)
NEUTROPHILS NFR BLD AUTO: 74.4 % — SIGNIFICANT CHANGE UP (ref 43–77)
NITRITE UR-MCNC: NEGATIVE — SIGNIFICANT CHANGE UP
PH UR: 5.5 — SIGNIFICANT CHANGE UP (ref 5–8)
PLATELET # BLD AUTO: 209 K/UL — SIGNIFICANT CHANGE UP (ref 150–400)
POTASSIUM SERPL-MCNC: 3.8 MMOL/L — SIGNIFICANT CHANGE UP (ref 3.5–5.3)
POTASSIUM SERPL-SCNC: 3.8 MMOL/L — SIGNIFICANT CHANGE UP (ref 3.5–5.3)
PROT SERPL-MCNC: 7.5 GM/DL — SIGNIFICANT CHANGE UP (ref 6–8.3)
PROT UR-MCNC: NEGATIVE MG/DL — SIGNIFICANT CHANGE UP
PROTHROM AB SERPL-ACNC: 11.1 SEC — SIGNIFICANT CHANGE UP (ref 9.5–13)
RBC # BLD: 4.71 M/UL — SIGNIFICANT CHANGE UP (ref 4.2–5.8)
RBC # FLD: 13.8 % — SIGNIFICANT CHANGE UP (ref 10.3–14.5)
RBC CASTS # UR COMP ASSIST: 1 /HPF — SIGNIFICANT CHANGE UP (ref 0–4)
SODIUM SERPL-SCNC: 139 MMOL/L — SIGNIFICANT CHANGE UP (ref 135–145)
SP GR SPEC: >1.03 — HIGH (ref 1–1.03)
SQUAMOUS # UR AUTO: 1 /HPF — SIGNIFICANT CHANGE UP (ref 0–5)
TROPONIN I, HIGH SENSITIVITY RESULT: 10.62 NG/L — SIGNIFICANT CHANGE UP
TROPONIN I, HIGH SENSITIVITY RESULT: 12.87 NG/L — SIGNIFICANT CHANGE UP
UROBILINOGEN FLD QL: 0.2 MG/DL — SIGNIFICANT CHANGE UP (ref 0.2–1)
WBC # BLD: 8.8 K/UL — SIGNIFICANT CHANGE UP (ref 3.8–10.5)
WBC # FLD AUTO: 8.8 K/UL — SIGNIFICANT CHANGE UP (ref 3.8–10.5)
WBC UR QL: 1 /HPF — SIGNIFICANT CHANGE UP (ref 0–5)

## 2024-03-20 PROCEDURE — 85025 COMPLETE CBC W/AUTO DIFF WBC: CPT

## 2024-03-20 PROCEDURE — 81001 URINALYSIS AUTO W/SCOPE: CPT

## 2024-03-20 PROCEDURE — 87086 URINE CULTURE/COLONY COUNT: CPT

## 2024-03-20 PROCEDURE — 71045 X-RAY EXAM CHEST 1 VIEW: CPT | Mod: 26

## 2024-03-20 PROCEDURE — 99285 EMERGENCY DEPT VISIT HI MDM: CPT | Mod: 25

## 2024-03-20 PROCEDURE — 36415 COLL VENOUS BLD VENIPUNCTURE: CPT

## 2024-03-20 PROCEDURE — 85730 THROMBOPLASTIN TIME PARTIAL: CPT

## 2024-03-20 PROCEDURE — 99285 EMERGENCY DEPT VISIT HI MDM: CPT | Mod: FS

## 2024-03-20 PROCEDURE — 93005 ELECTROCARDIOGRAM TRACING: CPT

## 2024-03-20 PROCEDURE — 85610 PROTHROMBIN TIME: CPT

## 2024-03-20 PROCEDURE — 71045 X-RAY EXAM CHEST 1 VIEW: CPT

## 2024-03-20 PROCEDURE — 83735 ASSAY OF MAGNESIUM: CPT

## 2024-03-20 PROCEDURE — 80053 COMPREHEN METABOLIC PANEL: CPT

## 2024-03-20 PROCEDURE — 84484 ASSAY OF TROPONIN QUANT: CPT

## 2024-03-20 PROCEDURE — 93010 ELECTROCARDIOGRAM REPORT: CPT

## 2024-03-20 RX ORDER — SODIUM CHLORIDE 9 MG/ML
1000 INJECTION INTRAMUSCULAR; INTRAVENOUS; SUBCUTANEOUS ONCE
Refills: 0 | Status: COMPLETED | OUTPATIENT
Start: 2024-03-20 | End: 2024-03-20

## 2024-03-20 RX ORDER — OLANZAPINE 15 MG/1
5 TABLET, FILM COATED ORAL ONCE
Refills: 0 | Status: DISCONTINUED | OUTPATIENT
Start: 2024-03-20 | End: 2024-03-20

## 2024-03-20 RX ADMIN — SODIUM CHLORIDE 1000 MILLILITER(S): 9 INJECTION INTRAMUSCULAR; INTRAVENOUS; SUBCUTANEOUS at 18:19

## 2024-03-20 NOTE — ED ADULT NURSE NOTE - OBJECTIVE STATEMENT
Patient presents to ED c/o of dizziness. AOx3, reports he was outside today when he suddenly felt dizzy and lowered himself to the ground which helped relieved his symptom. Denies LOC, chest pain, headache, numbness/tingling. Hx of Aortic valve replacement last January, no longer on plavix. Hx of HTN. Pt reports he no longer feels dizzy at this time. Placed on cardiac monitor, NS rhythm.

## 2024-03-20 NOTE — ED PROVIDER NOTE - OBJECTIVE STATEMENT
73-year-old male with past medical history of diabetes, high blood pressure, aortic valve replacement in 2023 presents to the emergency department complaining of dizziness.  Patient was standing outside talking to his friend when he felt dizzy and had to sit down to keep himself from falling.  Denies head injury or LOC.  Denies chest pain, shortness of breath, vision changes, nausea/vomiting, abdominal pain, dysuria, extremity/numbness/weakness/tingling. - Yojana Avila PA-C

## 2024-03-20 NOTE — ED PROVIDER NOTE - WR ORDER ID 1
[id number] Spontaneous vaginal delivery of liveborn infant from OA position.  Head,shoulders, and body delivery easily.  Infant was suctioned.  No mec, Cord was clamped and cut and infant was passed to mother.  Placenta delivery intact with a 3 vessel cord.  Fundal massage was given and uterine fundus was found to be firm.  vaginal exam revealed an intact cervix, vaginal walls, and sulci.  patient had an intact perineum.  Excellent hemostasis was noted.  patient was stable and went to recovery.  Count was correct x2

## 2024-03-20 NOTE — ED PROVIDER NOTE - PATIENT PORTAL LINK FT
You can access the FollowMyHealth Patient Portal offered by [facility] by registering at the following website: [email or web address]. By joining [employer]’s FollowMyHealth portal, you will also be able to view your health information using other applications (apps) compatible with our system.

## 2024-03-20 NOTE — ED PROVIDER NOTE - CLINICAL SUMMARY MEDICAL DECISION MAKING FREE TEXT BOX
73-year-old male presents emergency department complaining of dizziness at approximately 4:30 PM.  States symptoms have since resolved.  Vitals are stable on arrival.  Patient is neurologically intact. Plan for labs, tropx2, fluids, reassess. - Yojana Avila PA-C

## 2024-03-20 NOTE — ED PROVIDER NOTE - PHYSICAL EXAMINATION
Constitutional: NAD AAOx3  Eyes: PERRLA EOMI  Head: Normocephalic atraumatic  Mouth: MMM  Cardiac: regular rate normal peripheral pulses no LE swelling  Resp: unlabored breathing clear b/l   GI: Abd s/nt/nd  Neuro: CN 2-12 intact. 5/5 strength through out, Negative Romberg. Rapid alternating movements intact. Finger to nose intact , normal strength and sensation  Skin: No visible rashes

## 2024-03-20 NOTE — ED ADULT TRIAGE NOTE - CHIEF COMPLAINT QUOTE
Pt brought in by ambulance from home where he was found lying on the ground outside his home. Pt states that he was talking to his neighbor when he suddenly felt weak and dizzy. Pt lowered himself to the ground to sit secondary to the dizziness and then laid down because it made him feel better. No LOC. Pt A&Ox4. Pt reports feeling better at present time.

## 2024-03-20 NOTE — ED ADULT NURSE REASSESSMENT NOTE - NS ED NURSE REASSESS COMMENT FT1
Received handoff from BRAD Younger. Pt sitting in stretcher in room with family at bedside. Pt given his dinner tray. No acute distress noted at this time. Comfort and safety measures maintained.

## 2024-03-20 NOTE — ED PROVIDER NOTE - ATTENDING APP SHARED VISIT CONTRIBUTION OF CARE
I have seen the patient with the ROMAN and agree with above examination and assessment and plan with the following addendum:        Focused PE:   General: NAD, alert and oriented  Head: Normocephalic, atraumatic  Eyes: PERRLA, EOMI  Cardiac: RRR, no murmurs, rubs or gallops  Resp: CTA, no wheezes, rales or ronchi  GI: Nondistended, nontender, no rebound or guarding  MSK: FROM, no tenderness.   Neuro: Alert and oriented, no focal deficits.   Ext: Non edematous, nontender.     Ddx: Near syncope/ ro acs/ no tearing pain to suggest dissection  Plan: Cbc, cmp, troponin, ecg, cxr, reassess

## 2024-03-20 NOTE — ED PROVIDER NOTE - PROGRESS NOTE DETAILS
Labs and imaging reviewed.  BUN 29, creatinine 1.31, currently receiving fluids. Rest of labs are within normal limits.  Troponin negative.  CXR NAD.  Discussed results with patient.  Feels better.  Will get second troponin and reassess. - Yojana Avila PA-C 2nd troponin negative. UA negative for UTI. Radiologist reading CXR questionable small infiltrate left lower hilum. Patient not having any cough, chest pain, sob, fevers, chills. No leukocytosis. Dr. Lara spoke with patient's PCP Dr. Jernigan, made aware of patient's visit and also reviewed XR. Agreed no need for antibiotics at this time, can follow up in his office outpatient. Patient reassessed, feels much better after fluids. Stable for dc home. Strict return precautions were given. All questions and concerns were addressed. - Yojana Avila PA-C

## 2024-03-20 NOTE — ED ADULT NURSE NOTE - NSFALLUNIVINTERV_ED_ALL_ED
Bed/Stretcher in lowest position, wheels locked, appropriate side rails in place/Call bell, personal items and telephone in reach/Instruct patient to call for assistance before getting out of bed/chair/stretcher/Non-slip footwear applied when patient is off stretcher/[place] to call system/Physically safe environment - no spills, clutter or unnecessary equipment/Purposeful proactive rounding/Room/bathroom lighting operational, light cord in reach

## 2024-03-20 NOTE — ED PROVIDER NOTE - NSFOLLOWUPINSTRUCTIONS_ED_ALL_ED_FT
Follow up with PCP. Please return to ED for new or worsening symptoms.    Near Syncope    WHAT YOU NEED TO KNOW:    What is near syncope? Near syncope, also called presyncope, is the feeling that you may faint (lose consciousness), but you do not. Each time you have this feeling is called a near syncope episode.    What increases my risk for near syncope? Near syncope is often caused by a drop in your blood pressure that happens when you stand up quickly. The following can increase your risk for near syncope:    Certain medicines, such as medicine to lower your blood pressure    Dehydration    Low sodium or blood sugar levels    An abnormal heart rhythm    Hyperventilation (breathing too quickly)  What signs and symptoms may occur before a near syncope episode?    Feeling dizzy or lightheaded    Nausea, feeling warm, sweating, or clammy skin    Blurred vision, or vision that is blacking out    Confusion    Trouble breathing    A fast or fluttering heartbeat, chest pain, or a weak pulse  How is the cause of near syncope diagnosed? Your healthcare provider will examine you. He or she will ask if you have other medical conditions. He or she may order the following tests to find out what is causing your symptoms:    Blood tests may be done to check your electrolyte levels, such as sodium. A problem with your electrolytes can lead to syncope.    Telemetry is continuous monitoring of your heart rhythm. Sticky pads placed on your skin connect to an EKG machine that records your heart rhythm.    An echocardiogram is a type of ultrasound. Sound waves are used to show the structure and function of your heart.    A stress test may show the changes that take place in your heart while it is under stress. Stress may be placed on your heart with exercise or medicine. Ask for more information about this test.    A tilt table test is used to check your heart and your blood pressure when you change positions.  Tilt Table Test      A Holter monitor is also called a portable electrocardiography (EKG) monitor. It shows your heart's electrical activity while you do your usual activities. The monitor is a small battery-operated device that you wear. It will show how fast your heart beats and if it beats in a regular pattern. Your healthcare provider may recommend this if you have syncope often over 2 to 3 days.  Holter Monitor  How is near syncope treated? Treatment depends on the cause of your near syncope. You may need any of the following:    Medicines may be needed to help your heart pump strongly and regularly. Your healthcare provider may also make changes to any medicines that are causing syncope.    Tilt training involves training yourself to stand for 10 to 30 minutes each day against a wall. This helps your body decrease the effects of posture changes and reduces the number of fainting spells.  What can I do to manage near syncope?    Keep a record of your near syncope episodes. Include your symptoms and your activity before and after the episode. The record can help your healthcare provider find the cause of your near syncope and help you manage episodes.    Sit or lie down when needed. This includes when you feel dizzy, your throat is getting tight, and your vision changes. Raise your legs above the level of your heart.    Take slow, deep breaths if you start to breathe faster with anxiety or fear. This can help decrease dizziness and the feeling that you might faint.    Check your blood pressure often. This is important if you take medicine to lower your blood pressure. Check your blood pressure when you are lying down and when you are standing. Ask how often to check during the day. Keep a record of your blood pressure numbers. Your healthcare provider may use the record to help plan your treatment.  How to take a Blood Pressure  What can I do to prevent a near syncope episode?    Move slowly and let yourself get used to one position before you move to another position. This is very important when you change from a lying or sitting position to a standing position. Take some deep breaths before you stand up from a lying position. Stand up slowly. Sudden movements may cause a fainting spell. Sit on the side of the bed or couch for a few minutes before you stand up. If you are on bedrest, try to be upright for about 2 hours each day, or as directed. Do not lock your legs if you are standing for a long period of time. Move your legs and bend your knees to keep blood flowing.    Follow your healthcare provider's recommendations. Your provider may recommend that you drink more liquids to prevent dehydration. You may also need to have more salt to keep your blood pressure from dropping too low and causing syncope. Your provider will tell you how much liquid and sodium to have each day. He or she will also tell you how much physical activity is safe for you. This will depend on what is causing your near syncope.    Watch for signs of low blood sugar. These include hunger, nervousness, sweating, and fast or fluttery heartbeats. Talk with your healthcare provider about ways to keep your blood sugar level steady.    Do not strain if you are constipated. You may faint if you strain to have a bowel movement. Walking is the best way to get your bowels moving. Eat foods high in fiber to make it easier to have a bowel movement. Good examples are high-fiber cereals, beans, vegetables, and whole-grain breads. Prune juice may help make bowel movements softer.    Be careful in hot weather. Heat can cause a syncope episode. Limit activity done outside on hot days. Physical activity in hot weather can lead to dehydration. This can cause an episode.  When should I seek immediate care?    You have sudden chest pain.    You have trouble breathing or shortness of breath.    You have vision changes, are sweating, and have nausea while you are sitting or lying down.    You feel dizzy or flushed and your heart is fluttering.    You lose consciousness.    You cannot use your arm, hand, foot, or leg, or it feels weak.    You have trouble speaking or understanding others when they speak.  When should I contact my healthcare provider?    You have new or worsening symptoms.    Your heart beats faster or slower than usual.    You have questions or concerns about your condition or care.  CARE AGREEMENT:    You have the right to help plan your care. Learn about your health condition and how it may be treated. Discuss treatment options with your healthcare providers to decide what care you want to receive. You always have the right to refuse treatment.

## 2024-03-21 LAB
CULTURE RESULTS: NO GROWTH — SIGNIFICANT CHANGE UP
SPECIMEN SOURCE: SIGNIFICANT CHANGE UP

## 2024-03-25 RX ORDER — DAPAGLIFLOZIN 10 MG/1
10 TABLET, FILM COATED ORAL
Qty: 90 | Refills: 0 | Status: ACTIVE | COMMUNITY
Start: 2024-03-15

## 2024-03-26 ENCOUNTER — APPOINTMENT (OUTPATIENT)
Dept: FAMILY MEDICINE | Facility: CLINIC | Age: 74
End: 2024-03-26
Payer: MEDICARE

## 2024-03-26 VITALS
HEART RATE: 85 BPM | DIASTOLIC BLOOD PRESSURE: 58 MMHG | TEMPERATURE: 97.7 F | SYSTOLIC BLOOD PRESSURE: 124 MMHG | OXYGEN SATURATION: 96 % | BODY MASS INDEX: 35.15 KG/M2 | WEIGHT: 238 LBS

## 2024-03-26 DIAGNOSIS — E11.9 TYPE 2 DIABETES MELLITUS W/OUT COMPLICATIONS: ICD-10-CM

## 2024-03-26 PROCEDURE — 99213 OFFICE O/P EST LOW 20 MIN: CPT

## 2024-03-26 PROCEDURE — 36415 COLL VENOUS BLD VENIPUNCTURE: CPT

## 2024-03-26 NOTE — HISTORY OF PRESENT ILLNESS
[FreeTextEntry2] : pt is here for hospital f.u Wed felt light headed and near syncopal episode  Seen in the ED. BW mostly normal. EKG normal Then the next day had nausea, anorexia, no vomiting or diarrhea.  Had abdominal pain. Then that resolved mostly in the next day or two. Now feels 90% better

## 2024-03-27 ENCOUNTER — TRANSCRIPTION ENCOUNTER (OUTPATIENT)
Age: 74
End: 2024-03-27

## 2024-03-27 LAB
ALBUMIN SERPL ELPH-MCNC: 4.5 G/DL
ALP BLD-CCNC: 120 U/L
ALT SERPL-CCNC: 35 U/L
ANION GAP SERPL CALC-SCNC: 15 MMOL/L
AST SERPL-CCNC: 41 U/L
BASOPHILS # BLD AUTO: 0.04 K/UL
BASOPHILS NFR BLD AUTO: 0.6 %
BILIRUB SERPL-MCNC: 0.9 MG/DL
BUN SERPL-MCNC: 22 MG/DL
CALCIUM SERPL-MCNC: 9.5 MG/DL
CHLORIDE SERPL-SCNC: 101 MMOL/L
CO2 SERPL-SCNC: 23 MMOL/L
CREAT SERPL-MCNC: 1.26 MG/DL
EGFR: 60 ML/MIN/1.73M2
EOSINOPHIL # BLD AUTO: 0.18 K/UL
EOSINOPHIL NFR BLD AUTO: 2.9 %
GLUCOSE SERPL-MCNC: 98 MG/DL
HCT VFR BLD CALC: 39.5 %
HGB BLD-MCNC: 13 G/DL
IMM GRANULOCYTES NFR BLD AUTO: 0.3 %
LYMPHOCYTES # BLD AUTO: 1.38 K/UL
LYMPHOCYTES NFR BLD AUTO: 22 %
MAN DIFF?: NORMAL
MCHC RBC-ENTMCNC: 28.8 PG
MCHC RBC-ENTMCNC: 32.9 GM/DL
MCV RBC AUTO: 87.6 FL
MONOCYTES # BLD AUTO: 0.76 K/UL
MONOCYTES NFR BLD AUTO: 12.1 %
NEUTROPHILS # BLD AUTO: 3.89 K/UL
NEUTROPHILS NFR BLD AUTO: 62.1 %
PLATELET # BLD AUTO: 228 K/UL
POTASSIUM SERPL-SCNC: 4.4 MMOL/L
PROT SERPL-MCNC: 7.2 G/DL
RBC # BLD: 4.51 M/UL
RBC # FLD: 14.3 %
SODIUM SERPL-SCNC: 139 MMOL/L
WBC # FLD AUTO: 6.27 K/UL

## 2024-04-09 ENCOUNTER — APPOINTMENT (OUTPATIENT)
Dept: FAMILY MEDICINE | Facility: CLINIC | Age: 74
End: 2024-04-09
Payer: MEDICARE

## 2024-04-09 VITALS
HEART RATE: 72 BPM | BODY MASS INDEX: 35.25 KG/M2 | DIASTOLIC BLOOD PRESSURE: 60 MMHG | HEIGHT: 69 IN | OXYGEN SATURATION: 98 % | TEMPERATURE: 97.8 F | SYSTOLIC BLOOD PRESSURE: 120 MMHG | WEIGHT: 238 LBS

## 2024-04-09 DIAGNOSIS — L03.011 CELLULITIS OF RIGHT FINGER: ICD-10-CM

## 2024-04-09 PROCEDURE — G2211 COMPLEX E/M VISIT ADD ON: CPT

## 2024-04-09 PROCEDURE — 99213 OFFICE O/P EST LOW 20 MIN: CPT

## 2024-04-09 NOTE — PLAN
[FreeTextEntry1] : Retracted nail /paronychia margin to release purulent material. Pt tolerated well. Wound dressed. Instructed pt on how to perform at home if needed

## 2024-04-15 ENCOUNTER — APPOINTMENT (OUTPATIENT)
Dept: DERMATOLOGY | Facility: CLINIC | Age: 74
End: 2024-04-15
Payer: MEDICARE

## 2024-04-15 DIAGNOSIS — D22.9 MELANOCYTIC NEVI, UNSPECIFIED: ICD-10-CM

## 2024-04-15 DIAGNOSIS — L82.1 OTHER SEBORRHEIC KERATOSIS: ICD-10-CM

## 2024-04-15 PROCEDURE — 99213 OFFICE O/P EST LOW 20 MIN: CPT

## 2024-04-15 NOTE — HISTORY OF PRESENT ILLNESS
[FreeTextEntry1] : Patient presents for skin examination. [de-identified] : Denies new, changing, bleeding or tender lesions on the skin over the past year.

## 2024-04-15 NOTE — PHYSICAL EXAM
[Alert] : alert [Oriented x 3] : ~L oriented x 3 [Well Nourished] : well nourished [Full Body Skin Exam Performed] : performed [FreeTextEntry3] : A full skin exam was performed including the scalp, face, neck, chest, abdomen, back, buttocks, upper extremities and lower extremities.  The patient declined examination of the breasts and genitalia.   The exam did show the following benign growths: Hollister pigmented nevi. Seborrheic keratoses. Lentigines.

## 2024-05-05 ENCOUNTER — RX RENEWAL (OUTPATIENT)
Age: 74
End: 2024-05-05

## 2024-05-05 RX ORDER — DAPAGLIFLOZIN 10 MG/1
10 TABLET, FILM COATED ORAL DAILY
Qty: 90 | Refills: 0 | Status: ACTIVE | COMMUNITY
Start: 1900-01-01 | End: 1900-01-01

## 2024-05-15 RX ORDER — FINASTERIDE 1 MG/1
1 TABLET ORAL
Qty: 90 | Refills: 0 | Status: ACTIVE | COMMUNITY
Start: 2019-03-17 | End: 1900-01-01

## 2024-05-15 RX ORDER — AMOXICILLIN 500 MG/1
500 CAPSULE ORAL
Qty: 4 | Refills: 0 | Status: ACTIVE | COMMUNITY
Start: 2023-03-10 | End: 1900-01-01

## 2024-05-23 ENCOUNTER — OFFICE (OUTPATIENT)
Dept: URBAN - METROPOLITAN AREA CLINIC 102 | Facility: CLINIC | Age: 74
Setting detail: OPHTHALMOLOGY
End: 2024-05-23
Payer: MEDICARE

## 2024-05-23 DIAGNOSIS — H52.4: ICD-10-CM

## 2024-05-23 DIAGNOSIS — H40.033: ICD-10-CM

## 2024-05-23 DIAGNOSIS — H35.373: ICD-10-CM

## 2024-05-23 DIAGNOSIS — H25.13: ICD-10-CM

## 2024-05-23 DIAGNOSIS — E11.9: ICD-10-CM

## 2024-05-23 DIAGNOSIS — E11.3292: ICD-10-CM

## 2024-05-23 DIAGNOSIS — E11.3211: ICD-10-CM

## 2024-05-23 PROBLEM — H52.7 REFRACTIVE ERROR: Status: ACTIVE | Noted: 2024-05-23

## 2024-05-23 PROCEDURE — 92014 COMPRE OPH EXAM EST PT 1/>: CPT | Performed by: OPHTHALMOLOGY

## 2024-05-23 PROCEDURE — 92015 DETERMINE REFRACTIVE STATE: CPT | Performed by: OPHTHALMOLOGY

## 2024-05-23 PROCEDURE — 92020 GONIOSCOPY: CPT | Performed by: OPHTHALMOLOGY

## 2024-05-23 ASSESSMENT — CONFRONTATIONAL VISUAL FIELD TEST (CVF)
OS_FINDINGS: FULL
OD_FINDINGS: FULL

## 2024-06-14 ENCOUNTER — APPOINTMENT (OUTPATIENT)
Dept: FAMILY MEDICINE | Facility: CLINIC | Age: 74
End: 2024-06-14
Payer: MEDICARE

## 2024-06-14 VITALS
WEIGHT: 238 LBS | TEMPERATURE: 97.9 F | SYSTOLIC BLOOD PRESSURE: 130 MMHG | DIASTOLIC BLOOD PRESSURE: 68 MMHG | HEIGHT: 69 IN | HEART RATE: 73 BPM | OXYGEN SATURATION: 100 % | BODY MASS INDEX: 35.25 KG/M2

## 2024-06-14 DIAGNOSIS — E03.9 HYPOTHYROIDISM, UNSPECIFIED: ICD-10-CM

## 2024-06-14 DIAGNOSIS — R55 SYNCOPE AND COLLAPSE: ICD-10-CM
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
## 2024-06-14 DIAGNOSIS — Z87.2 PERSONAL HISTORY OF DISEASES OF THE SKIN AND SUBCUTANEOUS TISSUE: ICD-10-CM

## 2024-06-14 DIAGNOSIS — S00.86XA INSECT BITE (NONVENOMOUS) OF OTHER PART OF HEAD, INITIAL ENCOUNTER: ICD-10-CM

## 2024-06-14 DIAGNOSIS — Z87.09 PERSONAL HISTORY OF OTHER DISEASES OF THE RESPIRATORY SYSTEM: ICD-10-CM

## 2024-06-14 DIAGNOSIS — E78.5 HYPERLIPIDEMIA, UNSPECIFIED: ICD-10-CM

## 2024-06-14 DIAGNOSIS — I10 ESSENTIAL (PRIMARY) HYPERTENSION: ICD-10-CM

## 2024-06-14 DIAGNOSIS — E11.69 TYPE 2 DIABETES MELLITUS WITH OTHER SPECIFIED COMPLICATION: ICD-10-CM

## 2024-06-14 DIAGNOSIS — M62.81 MUSCLE WEAKNESS (GENERALIZED): ICD-10-CM

## 2024-06-14 DIAGNOSIS — E11.42 TYPE 2 DIABETES MELLITUS WITH DIABETIC POLYNEUROPATHY: ICD-10-CM

## 2024-06-14 DIAGNOSIS — K21.9 GASTRO-ESOPHAGEAL REFLUX DISEASE W/OUT ESOPHAGITIS: ICD-10-CM

## 2024-06-14 DIAGNOSIS — E88.9 TYPE 2 DIABETES MELLITUS WITH OTHER SPECIFIED COMPLICATION: ICD-10-CM

## 2024-06-14 DIAGNOSIS — Z41.1 ENCOUNTER FOR COSMETIC SURGERY: ICD-10-CM

## 2024-06-14 LAB
BILIRUB UR QL STRIP: NORMAL
GLUCOSE UR-MCNC: ABNORMAL
HCG UR QL: 0.2 EU/DL
HGB UR QL STRIP.AUTO: NORMAL
KETONES UR-MCNC: NORMAL
LEUKOCYTE ESTERASE UR QL STRIP: NORMAL
NITRITE UR QL STRIP: NORMAL
PH UR STRIP: 5
PROT UR STRIP-MCNC: NORMAL
SP GR UR STRIP: 1.01

## 2024-06-14 PROCEDURE — 81003 URINALYSIS AUTO W/O SCOPE: CPT | Mod: QW

## 2024-06-14 PROCEDURE — 99214 OFFICE O/P EST MOD 30 MIN: CPT

## 2024-06-14 PROCEDURE — 36415 COLL VENOUS BLD VENIPUNCTURE: CPT

## 2024-06-14 RX ORDER — SULFAMETHOXAZOLE AND TRIMETHOPRIM 800; 160 MG/1; MG/1
800-160 TABLET ORAL TWICE DAILY
Qty: 14 | Refills: 0 | Status: DISCONTINUED | COMMUNITY
Start: 2024-04-09 | End: 2024-06-14

## 2024-06-14 NOTE — HEALTH RISK ASSESSMENT
[Yes] : Yes [4 or more  times a week (4 pts)] : 4 or more  times a week (4 points) [1 or 2 (0 pts)] : 1 or 2 (0 points) [Never (0 pts)] : Never (0 points) [No] : In the past 12 months have you used drugs other than those required for medical reasons? No [No falls in past year] : Patient reported no falls in the past year [0] : 2) Feeling down, depressed, or hopeless: Not at all (0) [PHQ-2 Negative - No further assessment needed] : PHQ-2 Negative - No further assessment needed [Audit-CScore] : 4 [[id number]] : 0

## 2024-06-14 NOTE — HISTORY OF PRESENT ILLNESS
[Diabetes Mellitus] : Diabetes Mellitus [No episodes] : No hypoglycemic episodes since the last visit. [Does not check] : Patient does not check blood glucose regularly [Understanding of foot care] : Patient expressed understanding of foot care [No Retinopathy] : No retinopathy [Most Recent A1C: ___] : Most recent A1C was [unfilled] [Target A1C:  ___] : Target A1C is [unfilled] [Target goal met] : A1C target goal met [Moderate Intensity] : Patient is currently on moderate intensity statin  therapy [FreeTextEntry6] : pt is here for DM CHECK [EyeExamDate] : 05/24

## 2024-06-17 LAB
ALBUMIN SERPL ELPH-MCNC: 4.4 G/DL
ALP BLD-CCNC: 106 U/L
ALT SERPL-CCNC: 28 U/L
ANION GAP SERPL CALC-SCNC: 14 MMOL/L
AST SERPL-CCNC: 28 U/L
BILIRUB SERPL-MCNC: 0.8 MG/DL
BUN SERPL-MCNC: 30 MG/DL
CALCIUM SERPL-MCNC: 9.9 MG/DL
CHLORIDE SERPL-SCNC: 101 MMOL/L
CHOLEST SERPL-MCNC: 116 MG/DL
CO2 SERPL-SCNC: 23 MMOL/L
CREAT SERPL-MCNC: 1.27 MG/DL
CREAT SPEC-SCNC: 74 MG/DL
EGFR: 59 ML/MIN/1.73M2
ESTIMATED AVERAGE GLUCOSE: 126 MG/DL
GLUCOSE SERPL-MCNC: 112 MG/DL
HBA1C MFR BLD HPLC: 6 %
HDLC SERPL-MCNC: 41 MG/DL
LDLC SERPL CALC-MCNC: 53 MG/DL
MICROALBUMIN 24H UR DL<=1MG/L-MCNC: <1.2 MG/DL
MICROALBUMIN/CREAT 24H UR-RTO: NORMAL MG/G
NONHDLC SERPL-MCNC: 75 MG/DL
POTASSIUM SERPL-SCNC: 4.2 MMOL/L
PROT SERPL-MCNC: 7 G/DL
SODIUM SERPL-SCNC: 138 MMOL/L
TRIGL SERPL-MCNC: 121 MG/DL

## 2024-08-07 ENCOUNTER — APPOINTMENT (OUTPATIENT)
Dept: FAMILY MEDICINE | Facility: CLINIC | Age: 74
End: 2024-08-07

## 2024-08-07 PROCEDURE — 99214 OFFICE O/P EST MOD 30 MIN: CPT

## 2024-08-07 NOTE — PLAN
[FreeTextEntry1] : Advised at length Avoid caffeine and stimulants If persists will try aciphex instead of protonix If palpitations persist, obtain Holter monitor with Dr Heath

## 2024-08-09 ENCOUNTER — RX RENEWAL (OUTPATIENT)
Age: 74
End: 2024-08-09

## 2024-09-13 ENCOUNTER — APPOINTMENT (OUTPATIENT)
Dept: FAMILY MEDICINE | Facility: CLINIC | Age: 74
End: 2024-09-13
Payer: MEDICARE

## 2024-09-13 VITALS
HEIGHT: 69 IN | DIASTOLIC BLOOD PRESSURE: 72 MMHG | OXYGEN SATURATION: 93 % | SYSTOLIC BLOOD PRESSURE: 120 MMHG | HEART RATE: 68 BPM | TEMPERATURE: 98 F | BODY MASS INDEX: 35.25 KG/M2 | WEIGHT: 238 LBS

## 2024-09-13 DIAGNOSIS — I10 ESSENTIAL (PRIMARY) HYPERTENSION: ICD-10-CM

## 2024-09-13 DIAGNOSIS — E11.9 TYPE 2 DIABETES MELLITUS W/OUT COMPLICATIONS: ICD-10-CM

## 2024-09-13 DIAGNOSIS — I25.10 ATHEROSCLEROTIC HEART DISEASE OF NATIVE CORONARY ARTERY W/OUT ANGINA PECTORIS: ICD-10-CM

## 2024-09-13 DIAGNOSIS — E78.5 HYPERLIPIDEMIA, UNSPECIFIED: ICD-10-CM

## 2024-09-13 DIAGNOSIS — E11.42 TYPE 2 DIABETES MELLITUS WITH DIABETIC POLYNEUROPATHY: ICD-10-CM

## 2024-09-13 PROCEDURE — 81003 URINALYSIS AUTO W/O SCOPE: CPT | Mod: QW

## 2024-09-13 PROCEDURE — G2211 COMPLEX E/M VISIT ADD ON: CPT

## 2024-09-13 PROCEDURE — 99214 OFFICE O/P EST MOD 30 MIN: CPT

## 2024-09-13 PROCEDURE — 36415 COLL VENOUS BLD VENIPUNCTURE: CPT

## 2024-09-13 NOTE — HISTORY OF PRESENT ILLNESS
[Diabetes Mellitus] : Diabetes Mellitus [FreeTextEntry6] : pt is here for dm check  Some numbness in right foot at night after laying down, resolves in the morning once gets up.  Also has been experiencing intermittent palpitations over the last month, about 4 times a day lasting a few minutes. Seeing a cardiologist and currently wearing a Holter monitor.  [No episodes] : No hypoglycemic episodes since the last visit. [Does not check] : Patient does not check blood glucose regularly [Regular podiatrist visits] : Patient did not have regular podiatrist visit [Understanding of foot care] : Patient expressed understanding of foot care [Retinopathy] : Retinopathy [Most Recent A1C: ___] : Most recent A1C was [unfilled] [Target A1C:  ___] : Target A1C is [unfilled] [Target goal met] : A1C target goal met [Neuropathy] :  neuropathy [Moderate Intensity] : Patient is currently on moderate intensity statin  therapy [EyeExamDate] : 07/24 [Does not check BP] : The patient is not checking blood pressure [<140/90] : Target blood pressure is <140/90 [Doing Well] : Patient is doing well [Managed with medications] : managed with  medication [Moderate Intensity Therapy] : Patient is currently on moderate intensity statin  therapy

## 2024-09-13 NOTE — REVIEW OF SYSTEMS
[Chest Pain] : no chest pain [Palpitations] : palpitations [Claudication] : no  leg claudication [Lower Ext Edema] : no lower extremity edema [Orthopena] : no orthopnea [Paroxysmal Nocturnal Dyspnea] : no paroxysmal nocturnal dyspnea [Joint Pain] : no joint pain [Joint Stiffness] : no joint stiffness [Muscle Pain] : no muscle pain [Muscle Weakness] : no muscle weakness [Back Pain] : no back pain [Joint Swelling] : no joint swelling [Negative] : Heme/Lymph [FreeTextEntry9] : Numbness is right foot at night

## 2024-09-13 NOTE — HEALTH RISK ASSESSMENT
[Yes] : Yes [No falls in past year] : Patient reported no falls in the past year [0] : 2) Feeling down, depressed, or hopeless: Not at all (0) [PHQ-2 Negative - No further assessment needed] : PHQ-2 Negative - No further assessment needed [[id number]] : 0

## 2024-09-16 LAB
ALBUMIN SERPL ELPH-MCNC: 4.5 G/DL
ALP BLD-CCNC: 123 U/L
ALT SERPL-CCNC: 29 U/L
ANION GAP SERPL CALC-SCNC: 14 MMOL/L
AST SERPL-CCNC: 34 U/L
BILIRUB SERPL-MCNC: 0.8 MG/DL
BUN SERPL-MCNC: 30 MG/DL
CALCIUM SERPL-MCNC: 9.9 MG/DL
CHLORIDE SERPL-SCNC: 101 MMOL/L
CHOLEST SERPL-MCNC: 102 MG/DL
CO2 SERPL-SCNC: 23 MMOL/L
CREAT SERPL-MCNC: 1.32 MG/DL
EGFR: 57 ML/MIN/1.73M2
ESTIMATED AVERAGE GLUCOSE: 131 MG/DL
GLUCOSE SERPL-MCNC: 105 MG/DL
HBA1C MFR BLD HPLC: 6.2 %
HDLC SERPL-MCNC: 35 MG/DL
LDLC SERPL CALC-MCNC: 46 MG/DL
NONHDLC SERPL-MCNC: 67 MG/DL
POTASSIUM SERPL-SCNC: 4.2 MMOL/L
PROT SERPL-MCNC: 6.9 G/DL
SODIUM SERPL-SCNC: 138 MMOL/L
TRIGL SERPL-MCNC: 112 MG/DL

## 2024-09-26 ENCOUNTER — RX RENEWAL (OUTPATIENT)
Age: 74
End: 2024-09-26

## 2024-10-07 ENCOUNTER — APPOINTMENT (OUTPATIENT)
Dept: ELECTROPHYSIOLOGY | Facility: CLINIC | Age: 74
End: 2024-10-07
Payer: MEDICARE

## 2024-10-07 VITALS
DIASTOLIC BLOOD PRESSURE: 80 MMHG | WEIGHT: 233 LBS | HEART RATE: 71 BPM | OXYGEN SATURATION: 93 % | BODY MASS INDEX: 34.51 KG/M2 | HEIGHT: 69 IN | SYSTOLIC BLOOD PRESSURE: 130 MMHG

## 2024-10-07 DIAGNOSIS — E66.09 OTHER OBESITY DUE TO EXCESS CALORIES: ICD-10-CM

## 2024-10-07 DIAGNOSIS — I44.1 ATRIOVENTRICULAR BLOCK, SECOND DEGREE: ICD-10-CM

## 2024-10-07 DIAGNOSIS — E11.42 TYPE 2 DIABETES MELLITUS WITH DIABETIC POLYNEUROPATHY: ICD-10-CM

## 2024-10-07 DIAGNOSIS — R00.2 PALPITATIONS: ICD-10-CM

## 2024-10-07 DIAGNOSIS — I25.10 ATHEROSCLEROTIC HEART DISEASE OF NATIVE CORONARY ARTERY W/OUT ANGINA PECTORIS: ICD-10-CM

## 2024-10-07 DIAGNOSIS — I10 ESSENTIAL (PRIMARY) HYPERTENSION: ICD-10-CM

## 2024-10-07 PROBLEM — I48.0 PAROXYSMAL A-FIB: Status: ACTIVE | Noted: 2024-10-07

## 2024-10-07 PROCEDURE — 93000 ELECTROCARDIOGRAM COMPLETE: CPT

## 2024-10-07 PROCEDURE — 99204 OFFICE O/P NEW MOD 45 MIN: CPT

## 2024-10-07 RX ORDER — GINKGO BILOBA LEAF EXTRACT 120 MG
120 CAPSULE ORAL
Refills: 0 | Status: ACTIVE | COMMUNITY

## 2024-10-07 RX ORDER — BLOOD SUGAR DIAGNOSTIC
100 STRIP MISCELLANEOUS
Refills: 0 | Status: ACTIVE | COMMUNITY

## 2024-10-07 RX ORDER — APIXABAN 5 MG/1
5 TABLET, FILM COATED ORAL
Qty: 60 | Refills: 2 | Status: ACTIVE | COMMUNITY
Start: 2024-10-07 | End: 1900-01-01

## 2024-10-07 RX ORDER — ATORVASTATIN CALCIUM 40 MG/1
40 TABLET, FILM COATED ORAL DAILY
Refills: 0 | Status: ACTIVE | COMMUNITY

## 2024-10-07 RX ORDER — DOXYCYCLINE HYCLATE 20 MG/1
20 TABLET ORAL DAILY
Refills: 0 | Status: ACTIVE | COMMUNITY

## 2024-10-18 ENCOUNTER — NON-APPOINTMENT (OUTPATIENT)
Age: 74
End: 2024-10-18

## 2024-10-21 ENCOUNTER — APPOINTMENT (OUTPATIENT)
Dept: DERMATOLOGY | Facility: CLINIC | Age: 74
End: 2024-10-21
Payer: MEDICARE

## 2024-10-21 DIAGNOSIS — D48.5 NEOPLASM OF UNCERTAIN BEHAVIOR OF SKIN: ICD-10-CM

## 2024-10-21 DIAGNOSIS — D22.9 MELANOCYTIC NEVI, UNSPECIFIED: ICD-10-CM

## 2024-10-21 PROCEDURE — 99213 OFFICE O/P EST LOW 20 MIN: CPT | Mod: 25

## 2024-10-21 PROCEDURE — 11102 TANGNTL BX SKIN SINGLE LES: CPT

## 2024-10-24 NOTE — ED ADULT TRIAGE NOTE - INTERNATIONAL TRAVEL
increases, your flow may increase.  This is your body's way of telling you, you need take things easier and rest more often.  Call your OB/ER if you are saturating more than one maxi pad in an hour & resting does help.    BREAST CARE  Take medications as recommended by your doctor or midwife for pain  If you develop a warm, red, tender area on your breast or develop a fever contact your lactation consultant. [phone].   For breastfeeding moms:  If you become engorged, feeding may be more difficult or painful for 1-2 days.  You may find it helpful to hand express some milk so that the infant can latch on more easily.   While breastfeeding, continue to take your prenatal vitamins as directed by your doctor or midwife.   Refer to the breastfeeding booklet in the  folder/binder for more information.  For any questions or concerns contact a Lactation Consultant.  Leave a message and your call will be returned.    For NON-breastfeeding moms:  You may apply ice packs to your breasts over you bra for twenty minutes at a time for comfort. Cabbage leaves may be applied to breasts, replace when wilted.  Avoid stimulation to your breasts, when showering allow the water to strike your back not your breasts.  Do not express milk or your body will make more.  Wear a good fitting bra until your milk dries, such as a sports bra.    INCISIONAL CARE / OSCAR CARE  Shower daily, cleansing incision and perineum with mild soap.  After shower pat the incision are dry and allow the area open to air.  If used, Steri-stipes should fall off by 2 weeks. Do not briskly pull at strips.  If used, Staples should be removed by the OB office by 1 week.  If used/ordered, an abdominal binder may provide support for your incision.     Use the oscar-bottle until bleeding stops each time you use the restroom.  Cleanse your perineum from front to back.  If used, stitches will dissolve in 4-6 weeks.  You may use a sitz bath or soak in a clean tub  No

## 2024-10-28 ENCOUNTER — RX RENEWAL (OUTPATIENT)
Age: 74
End: 2024-10-28

## 2024-10-28 LAB — CORE LAB BIOPSY: NORMAL

## 2024-10-29 ENCOUNTER — APPOINTMENT (OUTPATIENT)
Dept: ELECTROPHYSIOLOGY | Facility: CLINIC | Age: 74
End: 2024-10-29
Payer: MEDICARE

## 2024-10-29 VITALS
HEART RATE: 68 BPM | HEIGHT: 69 IN | RESPIRATION RATE: 16 BRPM | OXYGEN SATURATION: 99 % | DIASTOLIC BLOOD PRESSURE: 73 MMHG | SYSTOLIC BLOOD PRESSURE: 130 MMHG

## 2024-10-29 VITALS — BODY MASS INDEX: 34.85 KG/M2 | WEIGHT: 236 LBS

## 2024-10-29 DIAGNOSIS — E11.9 TYPE 2 DIABETES MELLITUS W/OUT COMPLICATIONS: ICD-10-CM

## 2024-10-29 DIAGNOSIS — Z95.3 PRESENCE OF XENOGENIC HEART VALVE: ICD-10-CM

## 2024-10-29 DIAGNOSIS — I48.0 PAROXYSMAL ATRIAL FIBRILLATION: ICD-10-CM

## 2024-10-29 PROCEDURE — 99214 OFFICE O/P EST MOD 30 MIN: CPT

## 2024-11-05 ENCOUNTER — RX RENEWAL (OUTPATIENT)
Age: 74
End: 2024-11-05

## 2024-11-13 ENCOUNTER — APPOINTMENT (OUTPATIENT)
Dept: INTERNAL MEDICINE | Facility: CLINIC | Age: 74
End: 2024-11-13
Payer: MEDICARE

## 2024-11-13 VITALS
BODY MASS INDEX: 36.57 KG/M2 | TEMPERATURE: 98.4 F | SYSTOLIC BLOOD PRESSURE: 121 MMHG | RESPIRATION RATE: 16 BRPM | OXYGEN SATURATION: 95 % | HEART RATE: 69 BPM | WEIGHT: 233 LBS | DIASTOLIC BLOOD PRESSURE: 70 MMHG | HEIGHT: 67 IN

## 2024-11-13 DIAGNOSIS — I48.0 PAROXYSMAL ATRIAL FIBRILLATION: ICD-10-CM

## 2024-11-13 DIAGNOSIS — G47.33 OBSTRUCTIVE SLEEP APNEA (ADULT) (PEDIATRIC): ICD-10-CM

## 2024-11-13 PROCEDURE — ZZZZZ: CPT

## 2024-11-13 PROCEDURE — 94727 GAS DIL/WSHOT DETER LNG VOL: CPT

## 2024-11-13 PROCEDURE — 94060 EVALUATION OF WHEEZING: CPT

## 2024-11-13 PROCEDURE — 94729 DIFFUSING CAPACITY: CPT

## 2024-11-13 PROCEDURE — 99204 OFFICE O/P NEW MOD 45 MIN: CPT | Mod: 25

## 2024-11-14 ENCOUNTER — APPOINTMENT (OUTPATIENT)
Dept: DERMATOLOGY | Facility: CLINIC | Age: 74
End: 2024-11-14
Payer: MEDICARE

## 2024-11-14 DIAGNOSIS — C44.310 BASAL CELL CARCINOMA OF SKIN OF UNSPECIFIED PARTS OF FACE: ICD-10-CM

## 2024-11-14 PROCEDURE — 17282 DSTR MAL LS F/E/E/N/L/M1.1-2: CPT

## 2024-11-15 ENCOUNTER — RX RENEWAL (OUTPATIENT)
Age: 74
End: 2024-11-15

## 2024-11-26 ENCOUNTER — APPOINTMENT (OUTPATIENT)
Dept: ELECTROPHYSIOLOGY | Facility: CLINIC | Age: 74
End: 2024-11-26

## 2024-11-27 DIAGNOSIS — S39.012S STRAIN OF MUSCLE, FASCIA AND TENDON OF LOWER BACK, SEQUELA: ICD-10-CM

## 2024-11-27 RX ORDER — DICLOFENAC SODIUM 75 MG/1
75 TABLET, DELAYED RELEASE ORAL
Qty: 14 | Refills: 1 | Status: ACTIVE | COMMUNITY
Start: 2024-11-27 | End: 1900-01-01

## 2024-12-11 ENCOUNTER — RX RENEWAL (OUTPATIENT)
Age: 74
End: 2024-12-11

## 2024-12-16 ENCOUNTER — NON-APPOINTMENT (OUTPATIENT)
Age: 74
End: 2024-12-16

## 2024-12-16 ENCOUNTER — APPOINTMENT (OUTPATIENT)
Dept: FAMILY MEDICINE | Facility: CLINIC | Age: 74
End: 2024-12-16
Payer: MEDICARE

## 2024-12-16 VITALS
HEART RATE: 86 BPM | SYSTOLIC BLOOD PRESSURE: 110 MMHG | BODY MASS INDEX: 35.31 KG/M2 | TEMPERATURE: 97.6 F | DIASTOLIC BLOOD PRESSURE: 60 MMHG | WEIGHT: 225 LBS | HEIGHT: 67 IN | OXYGEN SATURATION: 95 %

## 2024-12-16 DIAGNOSIS — E88.9 TYPE 2 DIABETES MELLITUS WITH OTHER SPECIFIED COMPLICATION: ICD-10-CM

## 2024-12-16 DIAGNOSIS — M48.061 SPINAL STENOSIS, LUMBAR REGION WITHOUT NEUROGENIC CLAUDICATION: ICD-10-CM

## 2024-12-16 DIAGNOSIS — B34.9 VIRAL INFECTION, UNSPECIFIED: ICD-10-CM

## 2024-12-16 DIAGNOSIS — E11.69 TYPE 2 DIABETES MELLITUS WITH OTHER SPECIFIED COMPLICATION: ICD-10-CM

## 2024-12-16 DIAGNOSIS — I48.0 PAROXYSMAL ATRIAL FIBRILLATION: ICD-10-CM

## 2024-12-16 DIAGNOSIS — E11.42 TYPE 2 DIABETES MELLITUS WITH DIABETIC POLYNEUROPATHY: ICD-10-CM

## 2024-12-16 DIAGNOSIS — I25.10 ATHEROSCLEROTIC HEART DISEASE OF NATIVE CORONARY ARTERY W/OUT ANGINA PECTORIS: ICD-10-CM

## 2024-12-16 DIAGNOSIS — N13.8 BENIGN PROSTATIC HYPERPLASIA WITH LOWER URINARY TRACT SYMPMS: ICD-10-CM

## 2024-12-16 DIAGNOSIS — N40.1 BENIGN PROSTATIC HYPERPLASIA WITH LOWER URINARY TRACT SYMPMS: ICD-10-CM

## 2024-12-16 DIAGNOSIS — E11.9 TYPE 2 DIABETES MELLITUS W/OUT COMPLICATIONS: ICD-10-CM

## 2024-12-16 DIAGNOSIS — I10 ESSENTIAL (PRIMARY) HYPERTENSION: ICD-10-CM

## 2024-12-16 PROCEDURE — 81003 URINALYSIS AUTO W/O SCOPE: CPT | Mod: QW

## 2024-12-16 PROCEDURE — 99214 OFFICE O/P EST MOD 30 MIN: CPT | Mod: 25

## 2024-12-16 PROCEDURE — 93000 ELECTROCARDIOGRAM COMPLETE: CPT

## 2024-12-16 PROCEDURE — G0439: CPT

## 2024-12-16 RX ORDER — BENZONATATE 100 MG/1
100 CAPSULE ORAL EVERY 8 HOURS
Qty: 40 | Refills: 0 | Status: COMPLETED | COMMUNITY
Start: 2024-12-16 | End: 2024-12-23

## 2024-12-18 LAB
ALBUMIN SERPL ELPH-MCNC: 3.6 G/DL
ALP BLD-CCNC: 122 U/L
ALT SERPL-CCNC: 44 U/L
ANION GAP SERPL CALC-SCNC: 14 MMOL/L
AST SERPL-CCNC: 43 U/L
BASOPHILS # BLD AUTO: 0.03 K/UL
BASOPHILS NFR BLD AUTO: 0.2 %
BILIRUB SERPL-MCNC: 0.6 MG/DL
BILIRUB UR QL STRIP: NORMAL
BUN SERPL-MCNC: 23 MG/DL
CALCIUM SERPL-MCNC: 9.2 MG/DL
CHLORIDE SERPL-SCNC: 97 MMOL/L
CHOLEST SERPL-MCNC: 102 MG/DL
CLARITY UR: CLEAR
CO2 SERPL-SCNC: 22 MMOL/L
COLLECTION METHOD: NORMAL
CREAT SERPL-MCNC: 1.47 MG/DL
EGFR: 50 ML/MIN/1.73M2
EOSINOPHIL # BLD AUTO: 0.13 K/UL
EOSINOPHIL NFR BLD AUTO: 0.9 %
GLUCOSE SERPL-MCNC: 107 MG/DL
GLUCOSE UR-MCNC: ABNORMAL
HCG UR QL: 0.2 EU/DL
HCT VFR BLD CALC: 33.2 %
HDLC SERPL-MCNC: 19 MG/DL
HGB BLD-MCNC: 10.4 G/DL
HGB UR QL STRIP.AUTO: NORMAL
IMM GRANULOCYTES NFR BLD AUTO: 0.8 %
KETONES UR-MCNC: NORMAL
LDLC SERPL CALC-MCNC: 47 MG/DL
LEUKOCYTE ESTERASE UR QL STRIP: NORMAL
LYMPHOCYTES # BLD AUTO: 1.23 K/UL
LYMPHOCYTES NFR BLD AUTO: 9 %
MAN DIFF?: NORMAL
MCHC RBC-ENTMCNC: 28.2 PG
MCHC RBC-ENTMCNC: 31.3 G/DL
MCV RBC AUTO: 90 FL
MONOCYTES # BLD AUTO: 0.91 K/UL
MONOCYTES NFR BLD AUTO: 6.6 %
NEUTROPHILS # BLD AUTO: 11.31 K/UL
NEUTROPHILS NFR BLD AUTO: 82.5 %
NITRITE UR QL STRIP: NORMAL
NONHDLC SERPL-MCNC: 83 MG/DL
PH UR STRIP: 5.5
PLATELET # BLD AUTO: 337 K/UL
POTASSIUM SERPL-SCNC: 4.4 MMOL/L
PROT SERPL-MCNC: 6.8 G/DL
PROT UR STRIP-MCNC: NORMAL
PSA FREE FLD-MCNC: NORMAL %
PSA FREE SERPL-MCNC: <0.01 NG/ML
PSA SERPL-MCNC: 0.01 NG/ML
RAPID RVP RESULT: DETECTED
RBC # BLD: 3.69 M/UL
RBC # FLD: 13.7 %
RSV RNA NPH QL NAA+NON-PROBE: DETECTED
SARS-COV-2 RNA RESP QL NAA+PROBE: NOT DETECTED
SODIUM SERPL-SCNC: 133 MMOL/L
SP GR UR STRIP: 1
T3FREE SERPL-MCNC: 3.51 PG/ML
T4 FREE SERPL-MCNC: 1.6 NG/DL
TRIGL SERPL-MCNC: 225 MG/DL
TSH SERPL-ACNC: 2.16 UIU/ML
WBC # FLD AUTO: 13.72 K/UL

## 2024-12-20 LAB
ESTIMATED AVERAGE GLUCOSE: 137 MG/DL
HBA1C MFR BLD HPLC: 6.4 %

## 2024-12-30 ENCOUNTER — APPOINTMENT (OUTPATIENT)
Dept: ELECTROPHYSIOLOGY | Facility: CLINIC | Age: 74
End: 2024-12-30
Payer: MEDICARE

## 2024-12-30 VITALS
SYSTOLIC BLOOD PRESSURE: 124 MMHG | BODY MASS INDEX: 32.96 KG/M2 | HEIGHT: 67 IN | DIASTOLIC BLOOD PRESSURE: 72 MMHG | OXYGEN SATURATION: 96 % | WEIGHT: 210 LBS | HEART RATE: 99 BPM

## 2024-12-30 DIAGNOSIS — I48.0 PAROXYSMAL ATRIAL FIBRILLATION: ICD-10-CM

## 2024-12-30 DIAGNOSIS — I10 ESSENTIAL (PRIMARY) HYPERTENSION: ICD-10-CM

## 2024-12-30 PROCEDURE — 99215 OFFICE O/P EST HI 40 MIN: CPT

## 2025-01-02 ENCOUNTER — RX RENEWAL (OUTPATIENT)
Age: 75
End: 2025-01-02

## 2025-01-10 ENCOUNTER — RX RENEWAL (OUTPATIENT)
Age: 75
End: 2025-01-10

## 2025-01-25 ENCOUNTER — RX RENEWAL (OUTPATIENT)
Age: 75
End: 2025-01-25

## 2025-01-31 ENCOUNTER — APPOINTMENT (OUTPATIENT)
Dept: NEUROSURGERY | Facility: CLINIC | Age: 75
End: 2025-01-31

## 2025-01-31 VITALS
WEIGHT: 195 LBS | SYSTOLIC BLOOD PRESSURE: 127 MMHG | OXYGEN SATURATION: 100 % | DIASTOLIC BLOOD PRESSURE: 69 MMHG | HEART RATE: 110 BPM | BODY MASS INDEX: 29.55 KG/M2 | HEIGHT: 68 IN

## 2025-01-31 PROBLEM — M62.830 BACK MUSCLE SPASM: Status: ACTIVE | Noted: 2025-01-31

## 2025-01-31 PROCEDURE — 99204 OFFICE O/P NEW MOD 45 MIN: CPT

## 2025-01-31 RX ORDER — DICLOFENAC SODIUM 10 MG/G
1 GEL TOPICAL DAILY
Qty: 1 | Refills: 0 | Status: ACTIVE | COMMUNITY
Start: 2025-01-31 | End: 1900-01-01

## 2025-01-31 RX ORDER — DIAZEPAM 5 MG/1
5 TABLET ORAL
Qty: 20 | Refills: 0 | Status: ACTIVE | COMMUNITY
Start: 2025-01-31 | End: 1900-01-01

## 2025-02-06 ENCOUNTER — APPOINTMENT (OUTPATIENT)
Dept: FAMILY MEDICINE | Facility: CLINIC | Age: 75
End: 2025-02-06
Payer: MEDICARE

## 2025-02-06 VITALS
HEIGHT: 68 IN | DIASTOLIC BLOOD PRESSURE: 60 MMHG | WEIGHT: 195 LBS | SYSTOLIC BLOOD PRESSURE: 90 MMHG | HEART RATE: 111 BPM | OXYGEN SATURATION: 95 % | TEMPERATURE: 97.7 F | BODY MASS INDEX: 29.55 KG/M2

## 2025-02-06 DIAGNOSIS — S39.012S STRAIN OF MUSCLE, FASCIA AND TENDON OF LOWER BACK, SEQUELA: ICD-10-CM

## 2025-02-06 DIAGNOSIS — R31.0 GROSS HEMATURIA: ICD-10-CM

## 2025-02-06 DIAGNOSIS — E11.9 TYPE 2 DIABETES MELLITUS W/OUT COMPLICATIONS: ICD-10-CM

## 2025-02-06 DIAGNOSIS — M62.830 MUSCLE SPASM OF BACK: ICD-10-CM

## 2025-02-06 LAB
BILIRUB UR QL STRIP: NORMAL
CLARITY UR: CLEAR
COLLECTION METHOD: NORMAL
GLUCOSE UR-MCNC: ABNORMAL
HCG UR QL: 0.2 EU/DL
HGB UR QL STRIP.AUTO: NORMAL
KETONES UR-MCNC: NORMAL
LEUKOCYTE ESTERASE UR QL STRIP: NORMAL
NITRITE UR QL STRIP: NORMAL
PH UR STRIP: 5
PROT UR STRIP-MCNC: ABNORMAL
SP GR UR STRIP: 1.01

## 2025-02-06 PROCEDURE — 81003 URINALYSIS AUTO W/O SCOPE: CPT | Mod: QW

## 2025-02-06 PROCEDURE — G2211 COMPLEX E/M VISIT ADD ON: CPT

## 2025-02-06 PROCEDURE — 99214 OFFICE O/P EST MOD 30 MIN: CPT

## 2025-02-09 LAB
ALBUMIN SERPL ELPH-MCNC: 3.7 G/DL
ALP BLD-CCNC: 116 U/L
ALT SERPL-CCNC: 16 U/L
ANION GAP SERPL CALC-SCNC: 19 MMOL/L
AST SERPL-CCNC: 17 U/L
BASOPHILS # BLD AUTO: 0.03 K/UL
BASOPHILS NFR BLD AUTO: 0.2 %
BILIRUB SERPL-MCNC: 0.8 MG/DL
BUN SERPL-MCNC: 61 MG/DL
CALCIUM SERPL-MCNC: 9.4 MG/DL
CHLORIDE SERPL-SCNC: 98 MMOL/L
CO2 SERPL-SCNC: 21 MMOL/L
CREAT SERPL-MCNC: 2.89 MG/DL
EGFR: 22 ML/MIN/1.73M2
EOSINOPHIL # BLD AUTO: 0.09 K/UL
EOSINOPHIL NFR BLD AUTO: 0.6 %
ESTIMATED AVERAGE GLUCOSE: 120 MG/DL
GLUCOSE SERPL-MCNC: 99 MG/DL
HBA1C MFR BLD HPLC: 5.8 %
HCT VFR BLD CALC: 24.9 %
HGB BLD-MCNC: 7.8 G/DL
IMM GRANULOCYTES NFR BLD AUTO: 0.5 %
LYMPHOCYTES # BLD AUTO: 0.84 K/UL
LYMPHOCYTES NFR BLD AUTO: 5.3 %
MAN DIFF?: NORMAL
MCHC RBC-ENTMCNC: 27.2 PG
MCHC RBC-ENTMCNC: 31.3 G/DL
MCV RBC AUTO: 86.8 FL
MONOCYTES # BLD AUTO: 0.84 K/UL
MONOCYTES NFR BLD AUTO: 5.3 %
NEUTROPHILS # BLD AUTO: 14.01 K/UL
NEUTROPHILS NFR BLD AUTO: 88.1 %
PLATELET # BLD AUTO: 238 K/UL
POTASSIUM SERPL-SCNC: 4.3 MMOL/L
PROT SERPL-MCNC: 6.9 G/DL
RBC # BLD: 2.87 M/UL
RBC # FLD: 15.8 %
SODIUM SERPL-SCNC: 137 MMOL/L
WBC # FLD AUTO: 15.89 K/UL

## 2025-02-10 ENCOUNTER — APPOINTMENT (OUTPATIENT)
Dept: FAMILY MEDICINE | Facility: CLINIC | Age: 75
End: 2025-02-10

## 2025-02-10 ENCOUNTER — INPATIENT (INPATIENT)
Facility: HOSPITAL | Age: 75
LOS: 6 days | Discharge: NURSING, REHAB OR LONG-TERM CARE | DRG: 379 | End: 2025-02-17
Attending: FAMILY MEDICINE | Admitting: INTERNAL MEDICINE
Payer: MEDICARE

## 2025-02-10 VITALS
HEART RATE: 92 BPM | TEMPERATURE: 100 F | SYSTOLIC BLOOD PRESSURE: 117 MMHG | DIASTOLIC BLOOD PRESSURE: 67 MMHG | OXYGEN SATURATION: 93 % | RESPIRATION RATE: 20 BRPM

## 2025-02-10 DIAGNOSIS — K92.2 GASTROINTESTINAL HEMORRHAGE, UNSPECIFIED: ICD-10-CM

## 2025-02-10 LAB
ALBUMIN SERPL ELPH-MCNC: 2.5 G/DL — LOW (ref 3.3–5)
ALP SERPL-CCNC: 117 U/L — SIGNIFICANT CHANGE UP (ref 40–120)
ALT FLD-CCNC: 22 U/L — SIGNIFICANT CHANGE UP (ref 12–78)
ANION GAP SERPL CALC-SCNC: 3 MMOL/L — LOW (ref 5–17)
ANION GAP SERPL CALC-SCNC: 5 MMOL/L — SIGNIFICANT CHANGE UP (ref 5–17)
APPEARANCE UR: CLEAR — SIGNIFICANT CHANGE UP
APTT BLD: 36.6 SEC — HIGH (ref 24.5–35.6)
AST SERPL-CCNC: 19 U/L — SIGNIFICANT CHANGE UP (ref 15–37)
BASOPHILS # BLD AUTO: 0.02 K/UL — SIGNIFICANT CHANGE UP (ref 0–0.2)
BASOPHILS NFR BLD AUTO: 0.1 % — SIGNIFICANT CHANGE UP (ref 0–2)
BILIRUB SERPL-MCNC: 0.6 MG/DL — SIGNIFICANT CHANGE UP (ref 0.2–1.2)
BILIRUB UR-MCNC: NEGATIVE — SIGNIFICANT CHANGE UP
BLD GP AB SCN SERPL QL: SIGNIFICANT CHANGE UP
BUN SERPL-MCNC: 57 MG/DL — HIGH (ref 7–23)
BUN SERPL-MCNC: 69 MG/DL — HIGH (ref 7–23)
CALCIUM SERPL-MCNC: 8.8 MG/DL — SIGNIFICANT CHANGE UP (ref 8.5–10.1)
CALCIUM SERPL-MCNC: 9.2 MG/DL — SIGNIFICANT CHANGE UP (ref 8.5–10.1)
CHLORIDE SERPL-SCNC: 106 MMOL/L — SIGNIFICANT CHANGE UP (ref 96–108)
CHLORIDE SERPL-SCNC: 108 MMOL/L — SIGNIFICANT CHANGE UP (ref 96–108)
CO2 SERPL-SCNC: 22 MMOL/L — SIGNIFICANT CHANGE UP (ref 22–31)
CO2 SERPL-SCNC: 24 MMOL/L — SIGNIFICANT CHANGE UP (ref 22–31)
COLOR SPEC: YELLOW — SIGNIFICANT CHANGE UP
CREAT SERPL-MCNC: 1.94 MG/DL — HIGH (ref 0.5–1.3)
CREAT SERPL-MCNC: 2.25 MG/DL — HIGH (ref 0.5–1.3)
DIFF PNL FLD: NEGATIVE — SIGNIFICANT CHANGE UP
EGFR: 30 ML/MIN/1.73M2 — LOW
EGFR: 36 ML/MIN/1.73M2 — LOW
EOSINOPHIL # BLD AUTO: 0.06 K/UL — SIGNIFICANT CHANGE UP (ref 0–0.5)
EOSINOPHIL NFR BLD AUTO: 0.4 % — SIGNIFICANT CHANGE UP (ref 0–6)
FLUAV AG NPH QL: SIGNIFICANT CHANGE UP
FLUBV AG NPH QL: SIGNIFICANT CHANGE UP
GLUCOSE BLDC GLUCOMTR-MCNC: 129 MG/DL — HIGH (ref 70–99)
GLUCOSE BLDC GLUCOMTR-MCNC: 99 MG/DL — SIGNIFICANT CHANGE UP (ref 70–99)
GLUCOSE SERPL-MCNC: 113 MG/DL — HIGH (ref 70–99)
GLUCOSE SERPL-MCNC: 148 MG/DL — HIGH (ref 70–99)
GLUCOSE UR QL: 500 MG/DL
HCT VFR BLD CALC: 22.6 % — LOW (ref 39–50)
HCT VFR BLD CALC: 23.2 % — LOW (ref 39–50)
HCT VFR BLD CALC: 25.4 % — LOW (ref 39–50)
HGB BLD-MCNC: 7.5 G/DL — LOW (ref 13–17)
HGB BLD-MCNC: 7.6 G/DL — LOW (ref 13–17)
HGB BLD-MCNC: 8.1 G/DL — LOW (ref 13–17)
IMM GRANULOCYTES NFR BLD AUTO: 0.7 % — SIGNIFICANT CHANGE UP (ref 0–0.9)
INR BLD: 1.49 RATIO — HIGH (ref 0.85–1.16)
KETONES UR-MCNC: NEGATIVE MG/DL — SIGNIFICANT CHANGE UP
LACTATE SERPL-SCNC: 1.1 MMOL/L — SIGNIFICANT CHANGE UP (ref 0.7–2)
LEUKOCYTE ESTERASE UR-ACNC: NEGATIVE — SIGNIFICANT CHANGE UP
LYMPHOCYTES # BLD AUTO: 0.59 K/UL — LOW (ref 1–3.3)
LYMPHOCYTES # BLD AUTO: 3.6 % — LOW (ref 13–44)
MCHC RBC-ENTMCNC: 26.5 PG — LOW (ref 27–34)
MCHC RBC-ENTMCNC: 27 PG — SIGNIFICANT CHANGE UP (ref 27–34)
MCHC RBC-ENTMCNC: 27.5 PG — SIGNIFICANT CHANGE UP (ref 27–34)
MCHC RBC-ENTMCNC: 31.9 G/DL — LOW (ref 32–36)
MCHC RBC-ENTMCNC: 32.8 G/DL — SIGNIFICANT CHANGE UP (ref 32–36)
MCHC RBC-ENTMCNC: 33.2 G/DL — SIGNIFICANT CHANGE UP (ref 32–36)
MCV RBC AUTO: 82.6 FL — SIGNIFICANT CHANGE UP (ref 80–100)
MCV RBC AUTO: 82.8 FL — SIGNIFICANT CHANGE UP (ref 80–100)
MCV RBC AUTO: 83 FL — SIGNIFICANT CHANGE UP (ref 80–100)
MONOCYTES # BLD AUTO: 0.77 K/UL — SIGNIFICANT CHANGE UP (ref 0–0.9)
MONOCYTES NFR BLD AUTO: 4.8 % — SIGNIFICANT CHANGE UP (ref 2–14)
NEUTROPHILS # BLD AUTO: 14.62 K/UL — HIGH (ref 1.8–7.4)
NEUTROPHILS NFR BLD AUTO: 90.4 % — HIGH (ref 43–77)
NITRITE UR-MCNC: NEGATIVE — SIGNIFICANT CHANGE UP
PH UR: 5 — SIGNIFICANT CHANGE UP (ref 5–8)
PLATELET # BLD AUTO: 209 K/UL — SIGNIFICANT CHANGE UP (ref 150–400)
PLATELET # BLD AUTO: 222 K/UL — SIGNIFICANT CHANGE UP (ref 150–400)
PLATELET # BLD AUTO: 242 K/UL — SIGNIFICANT CHANGE UP (ref 150–400)
POTASSIUM SERPL-MCNC: 4.3 MMOL/L — SIGNIFICANT CHANGE UP (ref 3.5–5.3)
POTASSIUM SERPL-MCNC: 4.5 MMOL/L — SIGNIFICANT CHANGE UP (ref 3.5–5.3)
POTASSIUM SERPL-SCNC: 4.3 MMOL/L — SIGNIFICANT CHANGE UP (ref 3.5–5.3)
POTASSIUM SERPL-SCNC: 4.5 MMOL/L — SIGNIFICANT CHANGE UP (ref 3.5–5.3)
PROT SERPL-MCNC: 7 GM/DL — SIGNIFICANT CHANGE UP (ref 6–8.3)
PROT UR-MCNC: SIGNIFICANT CHANGE UP MG/DL
PROTHROM AB SERPL-ACNC: 17.5 SEC — HIGH (ref 9.9–13.4)
RBC # BLD: 2.73 M/UL — LOW (ref 4.2–5.8)
RBC # BLD: 2.81 M/UL — LOW (ref 4.2–5.8)
RBC # BLD: 3.06 M/UL — LOW (ref 4.2–5.8)
RBC # FLD: 15.8 % — HIGH (ref 10.3–14.5)
RBC # FLD: 16.1 % — HIGH (ref 10.3–14.5)
RBC # FLD: 16.6 % — HIGH (ref 10.3–14.5)
RSV RNA NPH QL NAA+NON-PROBE: SIGNIFICANT CHANGE UP
SARS-COV-2 RNA SPEC QL NAA+PROBE: SIGNIFICANT CHANGE UP
SODIUM SERPL-SCNC: 133 MMOL/L — LOW (ref 135–145)
SODIUM SERPL-SCNC: 135 MMOL/L — SIGNIFICANT CHANGE UP (ref 135–145)
SP GR SPEC: >1.03 — HIGH (ref 1–1.03)
TROPONIN I, HIGH SENSITIVITY RESULT: 266.28 NG/L — HIGH
TROPONIN I, HIGH SENSITIVITY RESULT: 917.23 NG/L — HIGH
UROBILINOGEN FLD QL: 0.2 MG/DL — SIGNIFICANT CHANGE UP (ref 0.2–1)
WBC # BLD: 14.95 K/UL — HIGH (ref 3.8–10.5)
WBC # BLD: 16.17 K/UL — HIGH (ref 3.8–10.5)
WBC # BLD: 16.19 K/UL — HIGH (ref 3.8–10.5)
WBC # FLD AUTO: 14.95 K/UL — HIGH (ref 3.8–10.5)
WBC # FLD AUTO: 16.17 K/UL — HIGH (ref 3.8–10.5)
WBC # FLD AUTO: 16.19 K/UL — HIGH (ref 3.8–10.5)

## 2025-02-10 PROCEDURE — 71260 CT THORAX DX C+: CPT | Mod: 26

## 2025-02-10 PROCEDURE — 80053 COMPREHEN METABOLIC PANEL: CPT

## 2025-02-10 PROCEDURE — 88305 TISSUE EXAM BY PATHOLOGIST: CPT

## 2025-02-10 PROCEDURE — 93005 ELECTROCARDIOGRAM TRACING: CPT

## 2025-02-10 PROCEDURE — 84100 ASSAY OF PHOSPHORUS: CPT

## 2025-02-10 PROCEDURE — 88312 SPECIAL STAINS GROUP 1: CPT

## 2025-02-10 PROCEDURE — 85610 PROTHROMBIN TIME: CPT

## 2025-02-10 PROCEDURE — 36415 COLL VENOUS BLD VENIPUNCTURE: CPT

## 2025-02-10 PROCEDURE — C1751: CPT

## 2025-02-10 PROCEDURE — 36430 TRANSFUSION BLD/BLD COMPNT: CPT

## 2025-02-10 PROCEDURE — 93320 DOPPLER ECHO COMPLETE: CPT

## 2025-02-10 PROCEDURE — 82962 GLUCOSE BLOOD TEST: CPT

## 2025-02-10 PROCEDURE — 83036 HEMOGLOBIN GLYCOSYLATED A1C: CPT

## 2025-02-10 PROCEDURE — 97162 PT EVAL MOD COMPLEX 30 MIN: CPT | Mod: GP

## 2025-02-10 PROCEDURE — 74178 CT ABD&PLV WO CNTR FLWD CNTR: CPT | Mod: 26

## 2025-02-10 PROCEDURE — 86140 C-REACTIVE PROTEIN: CPT

## 2025-02-10 PROCEDURE — 99291 CRITICAL CARE FIRST HOUR: CPT

## 2025-02-10 PROCEDURE — 93010 ELECTROCARDIOGRAM REPORT: CPT

## 2025-02-10 PROCEDURE — 83540 ASSAY OF IRON: CPT

## 2025-02-10 PROCEDURE — 97116 GAIT TRAINING THERAPY: CPT | Mod: GP

## 2025-02-10 PROCEDURE — 99222 1ST HOSP IP/OBS MODERATE 55: CPT

## 2025-02-10 PROCEDURE — 97530 THERAPEUTIC ACTIVITIES: CPT | Mod: GP

## 2025-02-10 PROCEDURE — 83735 ASSAY OF MAGNESIUM: CPT

## 2025-02-10 PROCEDURE — 84165 PROTEIN E-PHORESIS SERUM: CPT

## 2025-02-10 PROCEDURE — 86334 IMMUNOFIX E-PHORESIS SERUM: CPT

## 2025-02-10 PROCEDURE — 82607 VITAMIN B-12: CPT

## 2025-02-10 PROCEDURE — 83550 IRON BINDING TEST: CPT

## 2025-02-10 PROCEDURE — 87040 BLOOD CULTURE FOR BACTERIA: CPT

## 2025-02-10 PROCEDURE — 36569 INSJ PICC 5 YR+ W/O IMAGING: CPT

## 2025-02-10 PROCEDURE — 85652 RBC SED RATE AUTOMATED: CPT

## 2025-02-10 PROCEDURE — 93306 TTE W/DOPPLER COMPLETE: CPT

## 2025-02-10 PROCEDURE — 87641 MR-STAPH DNA AMP PROBE: CPT

## 2025-02-10 PROCEDURE — 86923 COMPATIBILITY TEST ELECTRIC: CPT

## 2025-02-10 PROCEDURE — 93325 DOPPLER ECHO COLOR FLOW MAPG: CPT

## 2025-02-10 PROCEDURE — P9016: CPT

## 2025-02-10 PROCEDURE — 85027 COMPLETE CBC AUTOMATED: CPT

## 2025-02-10 PROCEDURE — 80048 BASIC METABOLIC PNL TOTAL CA: CPT

## 2025-02-10 PROCEDURE — 84484 ASSAY OF TROPONIN QUANT: CPT

## 2025-02-10 PROCEDURE — 71045 X-RAY EXAM CHEST 1 VIEW: CPT

## 2025-02-10 PROCEDURE — A9579: CPT

## 2025-02-10 PROCEDURE — 87640 STAPH A DNA AMP PROBE: CPT

## 2025-02-10 PROCEDURE — 82728 ASSAY OF FERRITIN: CPT

## 2025-02-10 PROCEDURE — 93312 ECHO TRANSESOPHAGEAL: CPT

## 2025-02-10 PROCEDURE — 85730 THROMBOPLASTIN TIME PARTIAL: CPT

## 2025-02-10 PROCEDURE — 85025 COMPLETE CBC W/AUTO DIFF WBC: CPT

## 2025-02-10 PROCEDURE — 71045 X-RAY EXAM CHEST 1 VIEW: CPT | Mod: 26

## 2025-02-10 PROCEDURE — 80069 RENAL FUNCTION PANEL: CPT

## 2025-02-10 PROCEDURE — 84155 ASSAY OF PROTEIN SERUM: CPT

## 2025-02-10 PROCEDURE — 72158 MRI LUMBAR SPINE W/O & W/DYE: CPT | Mod: MC

## 2025-02-10 RX ORDER — ATORVASTATIN CALCIUM 80 MG/1
40 TABLET, FILM COATED ORAL AT BEDTIME
Refills: 0 | Status: DISCONTINUED | OUTPATIENT
Start: 2025-02-10 | End: 2025-02-17

## 2025-02-10 RX ORDER — PIPERACILLIN-TAZO-DEXTROSE,ISO 3.375G/5
3.38 IV SOLUTION, PIGGYBACK PREMIX FROZEN(ML) INTRAVENOUS ONCE
Refills: 0 | Status: DISCONTINUED | OUTPATIENT
Start: 2025-02-10 | End: 2025-02-10

## 2025-02-10 RX ORDER — SODIUM CHLORIDE 9 G/1000ML
1000 INJECTION, SOLUTION INTRAVENOUS
Refills: 0 | Status: DISCONTINUED | OUTPATIENT
Start: 2025-02-10 | End: 2025-02-12

## 2025-02-10 RX ORDER — PIPERACILLIN-TAZO-DEXTROSE,ISO 3.375G/5
3.38 IV SOLUTION, PIGGYBACK PREMIX FROZEN(ML) INTRAVENOUS EVERY 8 HOURS
Refills: 0 | Status: DISCONTINUED | OUTPATIENT
Start: 2025-02-10 | End: 2025-02-12

## 2025-02-10 RX ORDER — LEVOTHYROXINE SODIUM 300 MCG
150 TABLET ORAL DAILY
Refills: 0 | Status: DISCONTINUED | OUTPATIENT
Start: 2025-02-10 | End: 2025-02-17

## 2025-02-10 RX ORDER — SODIUM CHLORIDE 9 G/1000ML
1000 INJECTION, SOLUTION INTRAVENOUS
Refills: 0 | Status: DISCONTINUED | OUTPATIENT
Start: 2025-02-10 | End: 2025-02-17

## 2025-02-10 RX ORDER — SODIUM CHLORIDE 9 G/1000ML
800 INJECTION, SOLUTION INTRAVENOUS ONCE
Refills: 0 | Status: COMPLETED | OUTPATIENT
Start: 2025-02-10 | End: 2025-02-10

## 2025-02-10 RX ORDER — NOREPINEPHRINE BITARTRATE 8 MG
0.05 SOLUTION INTRAVENOUS
Qty: 8 | Refills: 0 | Status: DISCONTINUED | OUTPATIENT
Start: 2025-02-10 | End: 2025-02-10

## 2025-02-10 RX ORDER — DEXTROSE 50 % IN WATER 50 %
15 SYRINGE (ML) INTRAVENOUS ONCE
Refills: 0 | Status: DISCONTINUED | OUTPATIENT
Start: 2025-02-10 | End: 2025-02-17

## 2025-02-10 RX ORDER — DEXTROSE 50 % IN WATER 50 %
25 SYRINGE (ML) INTRAVENOUS ONCE
Refills: 0 | Status: DISCONTINUED | OUTPATIENT
Start: 2025-02-10 | End: 2025-02-17

## 2025-02-10 RX ORDER — DEXTROSE 50 % IN WATER 50 %
12.5 SYRINGE (ML) INTRAVENOUS ONCE
Refills: 0 | Status: DISCONTINUED | OUTPATIENT
Start: 2025-02-10 | End: 2025-02-17

## 2025-02-10 RX ORDER — GABAPENTIN 400 MG/1
200 CAPSULE ORAL AT BEDTIME
Refills: 0 | Status: DISCONTINUED | OUTPATIENT
Start: 2025-02-10 | End: 2025-02-13

## 2025-02-10 RX ORDER — INSULIN LISPRO 100 U/ML
INJECTION, SOLUTION INTRAVENOUS; SUBCUTANEOUS
Refills: 0 | Status: DISCONTINUED | OUTPATIENT
Start: 2025-02-10 | End: 2025-02-17

## 2025-02-10 RX ORDER — ACETAMINOPHEN 500 MG/5ML
1000 LIQUID (ML) ORAL ONCE
Refills: 0 | Status: COMPLETED | OUTPATIENT
Start: 2025-02-10 | End: 2025-02-10

## 2025-02-10 RX ORDER — GLUCAGON 3 MG/1
1 POWDER NASAL ONCE
Refills: 0 | Status: DISCONTINUED | OUTPATIENT
Start: 2025-02-10 | End: 2025-02-17

## 2025-02-10 RX ORDER — PIPERACILLIN-TAZO-DEXTROSE,ISO 3.375G/5
3.38 IV SOLUTION, PIGGYBACK PREMIX FROZEN(ML) INTRAVENOUS ONCE
Refills: 0 | Status: COMPLETED | OUTPATIENT
Start: 2025-02-10 | End: 2025-02-10

## 2025-02-10 RX ADMIN — Medication 10 MG/HR: at 09:41

## 2025-02-10 RX ADMIN — ATORVASTATIN CALCIUM 40 MILLIGRAM(S): 80 TABLET, FILM COATED ORAL at 22:25

## 2025-02-10 RX ADMIN — SODIUM CHLORIDE 800 MILLILITER(S): 9 INJECTION, SOLUTION INTRAVENOUS at 14:55

## 2025-02-10 RX ADMIN — Medication 1000 MILLILITER(S): at 09:35

## 2025-02-10 RX ADMIN — Medication 200 GRAM(S): at 09:35

## 2025-02-10 RX ADMIN — Medication 5 MILLIGRAM(S): at 22:53

## 2025-02-10 RX ADMIN — Medication 80 MILLIGRAM(S): at 09:35

## 2025-02-10 RX ADMIN — Medication 1000 MILLILITER(S): at 11:08

## 2025-02-10 RX ADMIN — SODIUM CHLORIDE 125 MILLILITER(S): 9 INJECTION, SOLUTION INTRAVENOUS at 16:26

## 2025-02-10 RX ADMIN — GABAPENTIN 200 MILLIGRAM(S): 400 CAPSULE ORAL at 22:25

## 2025-02-10 RX ADMIN — Medication 400 MILLIGRAM(S): at 10:12

## 2025-02-10 RX ADMIN — Medication 25 GRAM(S): at 17:00

## 2025-02-10 NOTE — CONSULT NOTE ADULT - ASSESSMENT
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  Will hydrate and transfuse and hold antiplatelet and AC medications in face of severe life threatening bleed.  Continue statin therapy and once stable, will consider non invasive w/u   -AFib-elevated hollie vasc score-can not AC until source of anemia located and treated.   Patient may undergo endoscopy ASAP; will need antibiotic prophylaxis prior to procedure.  PAtient with high risk of stroke and is aware of risk of being off AC.    -CHF-patient with known HFpEF and AS; AS repaired and patient on farxiga- continue currant medications.   and get ECHO   -patient with low grade fever, elevated WBC with prior cath over 1 year ago with non obstructive CAD at the time of TAVR who presents with chest pains, n/v/ coffee ground emesis with severe anemia and suspected demand ischemia  -CAD-known non obstructive disease with elevated troponin under significant stress (anemia and hypotension).  Will hydrate and transfuse and hold antiplatelet and AC medications in face of severe life threatening bleed.  Continue statin therapy and once stable, will consider non invasive w/u   -AFib-elevated hollie vasc score-can not AC until source of anemia located and treated.   Patient may undergo endoscopy ASAP; will need antibiotic prophylaxis prior to procedure.  PAtient with high risk of stroke and is aware of risk of being off AC.    -CHF-patient with known HFpEF and AS; AS repaired and patient on farxiga; will get ECHO.  Farxiga held today and continue to hold until after procedure, then continue currant medications.

## 2025-02-10 NOTE — CONSULT NOTE ADULT - SUBJECTIVE AND OBJECTIVE BOX
CHIEF COMPLAINT: Patient is a 74y old  Male who presents with a chief complaint of Hypotension, anemia (10 Feb 2025 17:00)      HPI:  ICU H&P    S:    Pt seen and examined  74M  PMHx of diabetes, high blood pressure, aortic valve replacement, AF on eliquis, chronic back pain s/p epidurals on diclofenac, HLD, hypothyroid, NIDDM, sAS, CKD  Pt here for chest pain  Recent Dx anemia on blood work  ICU consult for hypotension    2/10: POCT+ stool, no gross blood, last EGD distant past; CT C/A/P done, no obv abnl found. Rec'ing blood, GI called. (10 Feb 2025 13:53)    2/10-patient well known to Dr GILES Heath; with history of HTN, HLD, DM, HFpEF and AS s/p TAVR (non obstructive CAD on cath-2023).  Post TARV, had heart block and then MCOT placed-eventually diagnosed with Afib and started on AC.          Today, patient presents to ER c/p CP/SOB and vomiting. Onset of symptoms began this morning with midsternal CP. + Troponin levels and EKG with sinus tachycardia with non specific st changes.  Patient took one aspirin at home and vomited. Pt reports emesis appeared dark in color. EMS gave pt Aspirin 324mg, Nitro x 1.  He has chronic back pain s/p epidurals on diclofenac.  Recent Dx anemia on blood work.  ICU consult for hypotension called by ER.  PAtient with low grade fever, elevated WBC and GUAIAC + stool.  Viral panel negative.          PMHx: PAST MEDICAL & SURGICAL HISTORY:  Hypertension      Diabetes mellitus      Obesity      Hypothyroidism      Prostate CA            Soc Hx:       Allergies: Allergies    No Known Allergies    Intolerances          REVIEW OF SYSTEMS:    CONSTITUTIONAL: weakness, fevers or chills  EYES/ENT: No visual changes;  No vertigo or throat pain   NECK: No pain or stiffness  RESPIRATORY: shortness of breath  CARDIOVASCULAR: chest pain and palpitations  GASTROINTESTINAL: abdominal or epigastric pain.  + nausea, vomiting, or hematemesis; No diarrhea or constipation. No melena or hematochezia.  GENITOURINARY: No dysuria, frequency or hematuria    Vital Signs Last 24 Hrs  T(C): 36.5 (10 Feb 2025 18:44), Max: 38.5 (10 Feb 2025 09:16)  T(F): 97.7 (10 Feb 2025 18:44), Max: 101.3 (10 Feb 2025 09:16)  HR: 82 (10 Feb 2025 18:44) (79 - 108)  BP: 92/58 (10 Feb 2025 18:44) (81/67 - 117/67)  BP(mean): 69 (10 Feb 2025 18:44) (63 - 76)  RR: 24 (10 Feb 2025 18:44) (18 - 27)  SpO2: 96% (10 Feb 2025 18:44) (92% - 100%)    Parameters below as of 10 Feb 2025 18:00  Patient On (Oxygen Delivery Method): nasal cannula  O2 Flow (L/min): 2      I&O's Summary    10 Feb 2025 07:01  -  10 Feb 2025 19:24  --------------------------------------------------------  IN: 1929 mL / OUT: 350 mL / NET: 1579 mL        CAPILLARY BLOOD GLUCOSE      POCT Blood Glucose.: 99 mg/dL (10 Feb 2025 16:19)      PHYSICAL EXAM:   Constitutional: NAD, awake and alert, well-developed  HEENT: PERR, EOMI, Normal Hearing, MMM  Neck: JVD  Respiratory: Breath sounds are clear bilaterally, No wheezing, rales or rhonchi  Cardiovascular: S1 and S2, regular rate and rhythm, Murmurs, gallops or rubs  Gastrointestinal: Bowel Sounds present, soft, tender, distended, no guarding, no rebound  Extremities: No peripheral edema  Vascular: 2+ peripheral pulses      MEDICATIONS:  MEDICATIONS  (STANDING):  atorvastatin 40 milliGRAM(s) Oral at bedtime  dextrose 5%. 1000 milliLiter(s) (50 mL/Hr) IV Continuous <Continuous>  dextrose 5%. 1000 milliLiter(s) (100 mL/Hr) IV Continuous <Continuous>  dextrose 50% Injectable 25 Gram(s) IV Push once  dextrose 50% Injectable 12.5 Gram(s) IV Push once  dextrose 50% Injectable 25 Gram(s) IV Push once  dextrose Oral Gel 15 Gram(s) Oral once  gabapentin 200 milliGRAM(s) Oral at bedtime  glucagon  Injectable 1 milliGRAM(s) IntraMuscular once  insulin lispro (ADMELOG) corrective regimen sliding scale   SubCutaneous Before meals and at bedtime  lactated ringers. 1000 milliLiter(s) (125 mL/Hr) IV Continuous <Continuous>  levothyroxine 150 MICROGram(s) Oral daily  pantoprazole Infusion 8 mG/Hr (10 mL/Hr) IV Continuous <Continuous>  piperacillin/tazobactam IVPB.. 3.375 Gram(s) IV Intermittent every 8 hours      LABS: All Labs Reviewed:                        7.6    16.19 )-----------( 222      ( 10 Feb 2025 16:58 )             23.2     02-10    135  |  108  |  57[H]  ----------------------------<  113[H]  4.5   |  24  |  1.94[H]    Ca    8.8      10 Feb 2025 16:58    TPro  7.0  /  Alb  2.5[L]  /  TBili  0.6  /  DBili  x   /  AST  19  /  ALT  22  /  AlkPhos  117  02-10    PT/INR - ( 10 Feb 2025 09:02 )   PT: 17.5 sec;   INR: 1.49 ratio         PTT - ( 10 Feb 2025 09:02 )  PTT:36.6 sec        Blood Culture:   lipid profile       RADIOLOGY:    EKG:    Telemetry:    ECHO:

## 2025-02-10 NOTE — ED PROVIDER NOTE - OBJECTIVE STATEMENT
73 yo M with PMHx of HTN, DM, prostate CA, hypthyroidism, BIBEMS w/CP . 73 yo M with PMHx of HTN, DM, prostate CA, hypthyroidism, BIBEMS w/CP . See PN for further details.

## 2025-02-10 NOTE — ED ADULT NURSE NOTE - OBJECTIVE STATEMENT
Pt presents to ER c/p CP/SOB and vomiting. Onset of symptoms began this morning with midsternal CP. Pt then took one aspirin and vomited. Pt reports emesis appeared dark in color. EMS gave pt Aspirin 324mg, Nitro x 1 and Zofran 4mg PTA. MD at bedside no other complaints

## 2025-02-10 NOTE — H&P ADULT - HISTORY OF PRESENT ILLNESS
ICU H&P    S:    Pt seen and examined  74M  PMHx of diabetes, high blood pressure, aortic valve replacement, AF on eliquis, chronic back pain s/p epidurals on diclofenac, HLD, hypothyroid, NIDDM, sAS, CKD  Pt here for chest pain  Recent Dx anemia on blood work  ICU consult for hypotension    2/10: POCT+ stool, no gross blood, last EGD distant past; CT C/A/P done, no obv abnl found. Rec'ing blood, GI called.

## 2025-02-10 NOTE — ED PROVIDER NOTE - CARE PLAN
Principal Discharge DX:	GI bleed  Secondary Diagnosis:	DARLINE (acute kidney injury)  Secondary Diagnosis:	Elevated troponin   1

## 2025-02-10 NOTE — ED PROVIDER NOTE - PROGRESS NOTE DETAILS
Raysa Aviles: Continued HPI --   Patient w/ a h/o TAVR p/w CP onset this morning. Patient called 911, received  and nitro from EMS and subsequently threw up. States his chest pain was relieved w/ nitro. Reports recent h/o LBP, received an epidural from [facility], well-appearing started on diclofenac. Since he was administered the epidural he has had a loss of appetite. Patient saw his PMD found him to be anemic on blood work. Was recently started on Eliquis for Afib. Denies abdominal pain, n/v/d, dark stools, cough, SOB, or fevers. Also notes he quit EtOH 5 months ago and used to drink 5 drinks/week. Raysa Moran:  Rectal Exam --   Brown stool, guaiac positive Gi Dr Staton  Cardio DR Mahamed Heath  pt with damion, gi bleed, elevated trop, likley strian, wbc 16 no source of fever and eelv wbc, awaiting ct chest/abd pel. MD WESLY I personally d/w dr castano, on call for [facility], for elevtaed trop and cp.   I personally d/w XOCHITL Uriarte for anemia , gib, on eliquis, diclofenac. Pt and wife updated. Pt consented to blood transfusion. Awaiting ct results. MD WESLY d/w icu PA juanita for admit. pt persistently hypotensive. MD WESLY

## 2025-02-10 NOTE — H&P ADULT - ASSESSMENT
A:    74M    Here for:    1. Shock, hypovolemic +/- other unclear etiology  2. DARLINE  3. Anemia, ? ABLA  4. DM  5. Febrile illness    This patient requires critical care for support of one or more vital organ systems with a high probability of imminent or life threatening deterioration in his/her condition    P:    Hypotensive despite IVF  Unclear etiology; appears dehydrated  Anemia recent Dx, no Hx of  POCT + stool but no BRBPR  On high dose diclofenac for some time, ? maybe bleeding ulcer    Fever, white count  No obv acute changes on CT C/A/P to explain urine OK, viral swab negative    GI consult, discussed  Cards consult  TTE  Give more fluid to make 30cc/kg; start pressors to support BP MAP > 65; continue evaluation for source  1u pRBC now, trend  Diet, NPO p MN until elucidate plan w/ GI RE: EGD  Empiric abx, sepsis bundle  Hold DOAC  DARLINE, some element of CKD per labs; obtain renal consult, ? NSAID induced, monitor UOP  Med rec  IS, OOB as able  Trend troponin, no evidence STEMI on EKG    Dispo: Accept to critical care.    Date of entry of this note is equal to the date of services rendered    TOTAL CRITICAL CARE TIME: 110 minutes   (EXCLUSIVE of any non bundled procedures)    Note: This is a critically ill patient. Time spent has been in salvage of life, limb and vital organ systems. This time INCLUDES time spent directly as this patient's bedside with evaluation and management, review of chart including review of laboratory and imaging studies, interpretation of vital signs and cardiac output measurements, any necessary ventilator management, and time spent discussing plan of care with patient and family, including goals of care discussion. This also includes time spent in multidisciplinary discussion with care team and various consultants to optimize treatment plan. This time may NOT include various procedures, which will be noted seperately.

## 2025-02-10 NOTE — PROGRESS NOTE ADULT - ASSESSMENT
Paitent with hx. DM, Tavr  last cath 1/2023 with non obstructive disease  presented with     new onset of chest pain     Anemia     fever     hypotension, r/o septic shock     DARLINE on ckd  plan    1. check culture  2. source infection not clear does have in wbc, on Zosyn, ? septic shock  3. NSTEMI - trend troponin, cant give bblocker due to bp or ac or aspirin due to anemia       will consult cardiologist(dr. lindsey), check echo  4. DARLINE hydration baseline creatinine 2024 was 1.3, no retention    patient critically in in ICU

## 2025-02-10 NOTE — CONSULT NOTE ADULT - SUBJECTIVE AND OBJECTIVE BOX
SUBJECTIVE:    CHIEF COMPLAINT:  Patient is a 74y old  Male who presents with a chief complaint of Hypotension, anemia (10 Feb 2025 16:23)      HPI:  ICU H&P    HPI 74M with PMHx of diabetes, high blood pressure, aortic valve replacement, AF on eliquis, chronic back pain s/p epidurals on diclofenac, HLD, hypothyroid, NIDDM, sAS, CKD, presents to ED with chest pain  Recent Dx anemia on blood work. Has received epidural for sever back pain for the last 2 months. Has been taking NSAIDs. No history of melena. Did see a couple of drops of blood in the urine. Just had BW which showed Acute CKD and anemia with normal MCV. Advised nephrology consult but this am had CP. in ED stool was Guiac positive. Hypotensive in the ED despite fluids and admitted to ICU       PAST MEDICAL & SURGICAL HISTORY:  Hypertension  Diabetes mellitus  Obesity  Hypothyroidism  Prostate CA    Social History:    Denies Smoking  Very little alcohol recently  No children   No drug use    FAMILY HISTORY:  Bladder CA mother and brother  ASHD mother and father  HTN mother, and father  Kidney Failure -  brother    MEDICATIONS:  MEDICATIONS  (STANDING):  atorvastatin 40 milliGRAM(s) Oral at bedtime  dextrose 5%. 1000 milliLiter(s) (50 mL/Hr) IV Continuous <Continuous>  dextrose 5%. 1000 milliLiter(s) (100 mL/Hr) IV Continuous <Continuous>  dextrose 50% Injectable 25 Gram(s) IV Push once  dextrose 50% Injectable 12.5 Gram(s) IV Push once  dextrose 50% Injectable 25 Gram(s) IV Push once  dextrose Oral Gel 15 Gram(s) Oral once  gabapentin 200 milliGRAM(s) Oral at bedtime  glucagon  Injectable 1 milliGRAM(s) IntraMuscular once  insulin lispro (ADMELOG) corrective regimen sliding scale   SubCutaneous Before meals and at bedtime  lactated ringers. 1000 milliLiter(s) (125 mL/Hr) IV Continuous <Continuous>  levothyroxine 150 MICROGram(s) Oral daily  pantoprazole Infusion 8 mG/Hr (10 mL/Hr) IV Continuous <Continuous>  piperacillin/tazobactam IVPB.. 3.375 Gram(s) IV Intermittent every 8 hours      Allergies  No Known Allergies    REVIEW OF SYSTEMS:  CONSTITUTIONAL: No weakness, fevers or chills  EYES/ENT: No visual changes;  No vertigo or throat pain   NECK: No pain or stiffness  RESPIRATORY: No cough, wheezing, hemoptysis; No shortness of breath  CARDIOVASCULAR: No chest pain or palpitations  GASTROINTESTINAL: No abdominal or epigastric pain. No nausea, vomiting, or hematemesis; No diarrhea or constipation. No melena or hematochezia.  GENITOURINARY: No dysuria, frequency or hematuria  NEUROLOGICAL: No numbness or weakness  SKIN: No itching, burning, rashes, or lesions   All other review of systems is negative unless indicated above  OBJECTIVE:    PHYSICAL EXAM:  Vital Signs Last 24 Hrs  T(C): 37 (10 Feb 2025 14:30), Max: 38.5 (10 Feb 2025 09:16)  T(F): 98.6 (10 Feb 2025 14:30), Max: 101.3 (10 Feb 2025 09:16)  HR: 81 (10 Feb 2025 16:00) (79 - 108)  BP: 90/60 (10 Feb 2025 16:00) (81/67 - 117/67)  BP(mean): 70 (10 Feb 2025 16:00) (63 - 76)  RR: 24 (10 Feb 2025 16:00) (18 - 25)  SpO2: 97% (10 Feb 2025 16:00) (92% - 100%)    Parameters below as of 10 Feb 2025 15:00  Patient On (Oxygen Delivery Method): nasal cannula  O2 Flow (L/min): 4    Constitutional: NAD, awake and alert, well-developed  HEENT: PERRLA, EOMI, Pharynx Clear  Neck: Supple, No JVD, No Lymphadenopathy  Respiratory: Breath sounds are clear bilaterally, No wheezing, rales or rhonchi  Cardiovascular: S1 and S2, regular rate and rhythm, no Murmurs, rubs or gallops  Gastrointestinal: Bowel Sounds present, soft, nontender. No Hepatosplenomegally. No masses  Extremities: Withou clubbing, cyanosis or edema  Vascular: 2+ peripheral pulses  Neurological: A/O x 3, no focal deficits  Musculoskeletal: FROM upper and lower extremities  Skin: No rashes    LABS:                         7.5    16.17 )-----------( 242      ( 10 Feb 2025 09:02 )             22.6     02-10    133[L]  |  106  |  69[H]  ----------------------------<  148[H]  4.3   |  22  |  2.25[H]    Ca    9.2      10 Feb 2025 09:02    TPro  7.0  /  Alb  2.5[L]  /  TBili  0.6  /  DBili  x   /  AST  19  /  ALT  22  /  AlkPhos  117  02-10    Urinalysis Basic - ( 10 Feb 2025 12:47 )  Color: Yellow / Appearance: Clear / SG: >1.030 / pH: x  Gluc: x / Ketone: Negative mg/dL  / Bili: Negative / Urobili: 0.2 mg/dL   Blood: x / Protein: Trace mg/dL / Nitrite: Negative   Leuk Esterase: Negative / RBC: x / WBC x   Sq Epi: x / Non Sq Epi: x / Bacteria: x    PT/INR - ( 10 Feb 2025 09:02 )   PT: 17.5 sec;   INR: 1.49 ratio    PTT - ( 10 Feb 2025 09:02 )  PTT:36.6 sec    CAPILLARY BLOOD GLUCOSE  POCT Blood Glucose.: 99 mg/dL (10 Feb 2025 16:19)   SUBJECTIVE:    CHIEF COMPLAINT:  Patient is a 74y old  Male who presents with a chief complaint of Hypotension, anemia (10 Feb 2025 16:23)      HPI:  ICU H&P    HPI 74M with PMHx of diabetes, high blood pressure, aortic valve replacement, AF on eliquis, chronic back pain s/p epidurals on diclofenac, HLD, hypothyroid, NIDDM, sAS, CKD, presents to ED with chest pain  Recent Dx anemia on blood work. Has received epidural for sever back pain for the last 2 months. Has been taking NSAIDs. No history of melena. Did see a couple of drops of blood in the urine. Just had BW which showed Acute CKD and anemia with normal MCV. Advised nephrology consult but this am had CP. in ED stool was Guiac positive. Hypotensive in the ED despite fluids and admitted to ICU       PAST MEDICAL & SURGICAL HISTORY:  Hypertension  Diabetes mellitus  Obesity  Hypothyroidism  Prostate CA    Social History:    Denies Smoking  Very little alcohol recently  No children   No drug use    FAMILY HISTORY:  Bladder CA mother and brother  ASHD mother and father  HTN mother, and father  Kidney Failure -  brother    MEDICATIONS:  MEDICATIONS  (STANDING):  atorvastatin 40 milliGRAM(s) Oral at bedtime  dextrose 5%. 1000 milliLiter(s) (50 mL/Hr) IV Continuous <Continuous>  dextrose 5%. 1000 milliLiter(s) (100 mL/Hr) IV Continuous <Continuous>  dextrose 50% Injectable 25 Gram(s) IV Push once  dextrose 50% Injectable 12.5 Gram(s) IV Push once  dextrose 50% Injectable 25 Gram(s) IV Push once  dextrose Oral Gel 15 Gram(s) Oral once  gabapentin 200 milliGRAM(s) Oral at bedtime  glucagon  Injectable 1 milliGRAM(s) IntraMuscular once  insulin lispro (ADMELOG) corrective regimen sliding scale   SubCutaneous Before meals and at bedtime  lactated ringers. 1000 milliLiter(s) (125 mL/Hr) IV Continuous <Continuous>  levothyroxine 150 MICROGram(s) Oral daily  pantoprazole Infusion 8 mG/Hr (10 mL/Hr) IV Continuous <Continuous>  piperacillin/tazobactam IVPB.. 3.375 Gram(s) IV Intermittent every 8 hours      Allergies  No Known Allergies    REVIEW OF SYSTEMS:  CONSTITUTIONAL: No weakness, fevers or chills  EYES/ENT: No visual changes;  No vertigo or throat pain   NECK: No pain or stiffness  RESPIRATORY: No cough, wheezing, hemoptysis; No shortness of breath  CARDIOVASCULAR: No chest pain or palpitations  GASTROINTESTINAL: No abdominal or epigastric pain. No nausea, vomiting, or hematemesis; No diarrhea or constipation. No melena or hematochezia.  GENITOURINARY: No dysuria, frequency or hematuria  NEUROLOGICAL: No numbness or weakness  SKIN: No itching, burning, rashes, or lesions   All other review of systems is negative unless indicated above  OBJECTIVE:    PHYSICAL EXAM:  Vital Signs Last 24 Hrs  T(C): 37 (10 Feb 2025 14:30), Max: 38.5 (10 Feb 2025 09:16)  T(F): 98.6 (10 Feb 2025 14:30), Max: 101.3 (10 Feb 2025 09:16)  HR: 81 (10 Feb 2025 16:00) (79 - 108)  BP: 90/60 (10 Feb 2025 16:00) (81/67 - 117/67)  BP(mean): 70 (10 Feb 2025 16:00) (63 - 76)  RR: 24 (10 Feb 2025 16:00) (18 - 25)  SpO2: 97% (10 Feb 2025 16:00) (92% - 100%)    Parameters below as of 10 Feb 2025 15:00  Patient On (Oxygen Delivery Method): nasal cannula  O2 Flow (L/min): 4    Constitutional: NAD, awake and alert, well-developed  HEENT: PERRLA, EOMI, Pharynx Clear  Neck: Supple, No JVD, No Lymphadenopathy  Respiratory: Breath sounds are clear bilaterally, No wheezing, rales or rhonchi  Cardiovascular: S1 and S2, regular rate and rhythm, no Murmurs, rubs or gallops  Gastrointestinal: Bowel Sounds present, soft, nontender. No Hepatosplenomegally. No masses  Extremities: Without clubbing, cyanosis or edema  Vascular: 2+ peripheral pulses  Neurological: A/O x 3, no focal deficits  Musculoskeletal: FROM upper and lower extremities  Skin: No rashes    LABS:                         7.5    16.17 )-----------( 242      ( 10 Feb 2025 09:02 )             22.6     02-10    133[L]  |  106  |  69[H]  ----------------------------<  148[H]  4.3   |  22  |  2.25[H]    Ca    9.2      10 Feb 2025 09:02    TPro  7.0  /  Alb  2.5[L]  /  TBili  0.6  /  DBili  x   /  AST  19  /  ALT  22  /  AlkPhos  117  02-10    Urinalysis Basic - ( 10 Feb 2025 12:47 )  Color: Yellow / Appearance: Clear / SG: >1.030 / pH: x  Gluc: x / Ketone: Negative mg/dL  / Bili: Negative / Urobili: 0.2 mg/dL   Blood: x / Protein: Trace mg/dL / Nitrite: Negative   Leuk Esterase: Negative / RBC: x / WBC x   Sq Epi: x / Non Sq Epi: x / Bacteria: x    PT/INR - ( 10 Feb 2025 09:02 )   PT: 17.5 sec;   INR: 1.49 ratio    PTT - ( 10 Feb 2025 09:02 )  PTT:36.6 sec    CAPILLARY BLOOD GLUCOSE  POCT Blood Glucose.: 99 mg/dL (10 Feb 2025 16:19)

## 2025-02-10 NOTE — ED PROVIDER NOTE - CLINICAL SUMMARY MEDICAL DECISION MAKING FREE TEXT BOX
Patient w/ multiple issues. Is febrile and tachycardic - concern for sepsis. Is pale and anemic w/ a guaiac positive rectal exam. Also has CP. Plan for labs including T+S, troponin, and UA, Protonix, PRBCs and CT c/a/p.

## 2025-02-10 NOTE — ED PROVIDER NOTE - PHYSICAL EXAMINATION
Constitutional: Pale, ill-appearing, NAD AOx3  Eyes: PERRL EOMI  Head: Normocephalic atraumatic  Mouth: MMM  Cardiac: Tachycardic  Resp: Lungs CTAB  GI: Abd s/nd/nt  Neuro: CN2-12 grossly intact, OQUENDO x 4  Skin: No visible rashes

## 2025-02-10 NOTE — PROGRESS NOTE ADULT - SUBJECTIVE AND OBJECTIVE BOX
Patient came to ED with Chest pain, on admission found to be anemic hgb 7.5,     HPI:       74M PMHx of diabetes,                             high blood pressure,                             TAVR                           , AF on eliquis                           , chronic back pain s/p epidurals recently on diclofenac,                             NIDDM,                             ckd creatinine 1.3  Pt here for chest pain  Recent Dx anemia on blood work  ICU consult for hypotension to 80s in bp, bp responded to fluid   CT C/A/P done, no obv abnl found. got 1 unit of blood  no chest pain now     PMH:      as above     ROS - chest pain, no chills, no abdominal pain, no gi bleeding, no hx. of ulcer     MEDICATIONS  (STANDING):    atorvastatin 40 milliGRAM(s) Oral at bedtime  dextrose 5%. 1000 milliLiter(s) (50 mL/Hr) IV Continuous <Continuous>  dextrose 5%. 1000 milliLiter(s) (100 mL/Hr) IV Continuous <Continuous>  dextrose 50% Injectable 25 Gram(s) IV Push once  dextrose 50% Injectable 12.5 Gram(s) IV Push once  dextrose 50% Injectable 25 Gram(s) IV Push once  dextrose Oral Gel 15 Gram(s) Oral once  gabapentin 200 milliGRAM(s) Oral at bedtime  glucagon  Injectable 1 milliGRAM(s) IntraMuscular once  insulin lispro (ADMELOG) corrective regimen sliding scale   SubCutaneous Before meals and at bedtime  lactated ringers. 1000 milliLiter(s) (125 mL/Hr) IV Continuous <Continuous>  levothyroxine 150 MICROGram(s) Oral daily  pantoprazole Infusion 8 mG/Hr (10 mL/Hr) IV Continuous <Continuous>  piperacillin/tazobactam IVPB.. 3.375 Gram(s) IV Intermittent every 8 hours    MEDICATIONS  (PRN):  zolpidem 5 milliGRAM(s) Oral at bedtime PRN Insomnia    Height (cm): 175 (02-10 @ 14:23)  Weight (kg): 89.4 (02-10 @ 09:16)  BMI (kg/m2): 29.2 (02-10 @ 14:23)    ICU Vital Signs Last 24 Hrs  T(C): 37 (10 Feb 2025 14:30), Max: 38.5 (10 Feb 2025 09:16)  T(F): 98.6 (10 Feb 2025 14:30), Max: 101.3 (10 Feb 2025 09:16)  HR: 83 (10 Feb 2025 15:15) (82 - 108)  BP: 91/63 (10 Feb 2025 15:15) (81/67 - 117/67)  BP(mean): 72 (10 Feb 2025 15:15) (63 - 76)  ABP: --  ABP(mean): --  RR: 25 (10 Feb 2025 15:15) (18 - 25)  SpO2: 94% (10 Feb 2025 15:15) (92% - 100%)    O2 Parameters below as of 10 Feb 2025 15:00  Patient On (Oxygen Delivery Method): nasal cannula  O2 Flow (L/min): 4    Physical Exam    General -  pale  HEENT - nc/at  neck supple  lungs clear  cv rrr  Renal - voiding creatinine 2.2  GI voiding  extremiteis warm    I&O's Summary                        7.5    16.17 )-----------( 242      ( 10 Feb 2025 09:02 )             22.6       02-10    133[L]  |  106  |  69[H]  ----------------------------<  148[H]  4.3   |  22  |  2.25[H]    Ca    9.2      10 Feb 2025 09:02    TPro  7.0  /  Alb  2.5[L]  /  TBili  0.6  /  DBili  x   /  AST  19  /  ALT  22  /  AlkPhos  117  02-10      Urinalysis Basic - ( 10 Feb 2025 12:47 )    Color: Yellow / Appearance: Clear / SG: >1.030 / pH: x  Gluc: x / Ketone: Negative mg/dL  / Bili: Negative / Urobili: 0.2 mg/dL   Blood: x / Protein: Trace mg/dL / Nitrite: Negative   Leuk Esterase: Negative / RBC: x / WBC x   Sq Epi: x / Non Sq Epi: x / Bacteria: x    DVT Prophylaxis:   held for anemia                                                               Sepsis Criteria Met - Yes    Labs, Notes, Orders, radiologic studies reviewed and care coordinated with multidisciplinary team

## 2025-02-10 NOTE — ED ADULT TRIAGE NOTE - CHIEF COMPLAINT QUOTE
Pt presents to ER c/p CP/SOB and vomiting. Onset of symptoms began this morning with midsternal CP. Pt then took one aspirin and vomited. Pt reports emesis appeared dark in color. EMS gave pt Aspirin 324mg, Nitro x 1 and Zofran 4mg PTA.

## 2025-02-10 NOTE — ED ADULT NURSE NOTE - NSFALLRISKINTERV_ED_ALL_ED
Assistance OOB with selected safe patient handling equipment if applicable/Assistance with ambulation/Communicate fall risk and risk factors to all staff, patient, and family/Monitor gait and stability/Provide visual cue: yellow wristband, yellow gown, etc/Reinforce activity limits and safety measures with patient and family/Call bell, personal items and telephone in reach/Instruct patient to call for assistance before getting out of bed/chair/stretcher/Non-slip footwear applied when patient is off stretcher/[place] to call system/Physically safe environment - no spills, clutter or unnecessary equipment/Purposeful Proactive Rounding/Room/bathroom lighting operational, light cord in reach

## 2025-02-10 NOTE — H&P ADULT - NSHPLABSRESULTS_GEN_ALL_CORE
LABS:    CBC Full  -  ( 10 Feb 2025 09:02 )  WBC Count : 16.17 K/uL  RBC Count : 2.73 M/uL  Hemoglobin : 7.5 g/dL  Hematocrit : 22.6 %  Platelet Count - Automated : 242 K/uL  Mean Cell Volume : 82.8 fl  Mean Cell Hemoglobin : 27.5 pg  Mean Cell Hemoglobin Concentration : 33.2 g/dL  Auto Neutrophil # : 14.62 K/uL  Auto Lymphocyte # : 0.59 K/uL  Auto Monocyte # : 0.77 K/uL  Auto Eosinophil # : 0.06 K/uL  Auto Basophil # : 0.02 K/uL  Auto Neutrophil % : 90.4 %  Auto Lymphocyte % : 3.6 %  Auto Monocyte % : 4.8 %  Auto Eosinophil % : 0.4 %  Auto Basophil % : 0.1 %    02-10    133[L]  |  106  |  69[H]  ----------------------------<  148[H]  4.3   |  22  |  2.25[H]    Ca    9.2      10 Feb 2025 09:02    TPro  7.0  /  Alb  2.5[L]  /  TBili  0.6  /  DBili  x   /  AST  19  /  ALT  22  /  AlkPhos  117  02-10    PT/INR - ( 10 Feb 2025 09:02 )   PT: 17.5 sec;   INR: 1.49 ratio         PTT - ( 10 Feb 2025 09:02 )  PTT:36.6 sec  Urinalysis Basic - ( 10 Feb 2025 12:47 )    Color: Yellow / Appearance: Clear / SG: >1.030 / pH: x  Gluc: x / Ketone: Negative mg/dL  / Bili: Negative / Urobili: 0.2 mg/dL   Blood: x / Protein: Trace mg/dL / Nitrite: Negative   Leuk Esterase: Negative / RBC: x / WBC x   Sq Epi: x / Non Sq Epi: x / Bacteria: x      CAPILLARY BLOOD GLUCOSE            LIVER FUNCTIONS - ( 10 Feb 2025 09:02 )  Alb: 2.5 g/dL / Pro: 7.0 gm/dL / ALK PHOS: 117 U/L / ALT: 22 U/L / AST: 19 U/L / GGT: x

## 2025-02-10 NOTE — PATIENT PROFILE ADULT - FALL HARM RISK - RISK INTERVENTIONS

## 2025-02-10 NOTE — CONSULT NOTE ADULT - ASSESSMENT
74M with h/o  diabetes, high blood pressure, aortic valve replacement, AF on eliquis, chronic back pain s/p epidurals on diclofenac, HLD, hypothyroid, NIDDM, sAS, CKD, presents to ED with chest pain    Assessment:  Acute Hypovolemic Shock  GI Bleed. - Upper vs Lower?  DARLINE  Anemia  DM  Febrile illness    This patient requires critical care for support of one or more vital organ systems with a high probability of imminent or life threatening deterioration in his/her condition    P:    Hypotensive despite IVF  Unclear etiology; appears dehydrated  Anemia recent Dx, no Hx of  POCT + stool but no BRBPR  On high dose diclofenac for some time, ? maybe bleeding ulcer    Fever, white count  No obv acute changes on CT C/A/P to explain urine OK, viral swab negative    GI consult, discussed  Cards consult  TTE  Give more fluid to make 30cc/kg; start pressors to support BP MAP > 65; continue evaluation for source  1u pRBC now, trend  Diet, NPO p MN until elucidate plan w/ GI RE: EGD  Empiric abx, sepsis bundle  Hold DOAC  DARLINE, some element of CKD per labs; obtain renal consult, ? NSAID induced, monitor UOP  Med rec  IS, OOB as able  Trend troponin, no evidence STEMI on EKG    Dispo: Accept to critical care.    Date of entry of this note is equal to the date of services rendered    TOTAL CRITICAL CARE TIME: 110 minutes   (EXCLUSIVE of any non bundled procedures)    Note: This is a critically ill patient. Time spent has been in salvage of life, limb and vital organ systems. This time INCLUDES time spent directly as this patient's bedside with evaluation and management, review of chart including review of laboratory and imaging studies, interpretation of vital signs and cardiac output measurements, any necessary ventilator management, and time spent discussing plan of care with patient and family, including goals of care discussion. This also includes time spent in multidisciplinary discussion with care team and various consultants to optimize treatment plan. This time may NOT include various procedures, which will be noted seperately.    74M with h/o  diabetes, high blood pressure, aortic valve replacement, AF on eliquis, chronic back pain s/p epidurals on diclofenac, HLD, hypothyroid, NIDDM, sAS, CKD, presents to ED with chest pain    Assessment:  Acute Hypovolemic Shock  GI Bleed. - Upper vs Lower?  DARLINE  Anemia  DM  Febrile illness  Hyponatremia  Chronic Low Back pain without radiculopathy    Plan:  Admit to ICU  IVF  S/p 1 unit PRBC's  Upper and possible lower endoscopy  Follow H/H  Nephrology Consult  Stop NSAIDs  Hold Eliquis  Follow Troponin  Fever, white count  No obv acute changes on CT C/A/P to explain urine OK, viral swab negative  GI consult  Cardiology consult  Empiric abx, sepsis bundle  OOB as able  GOC conversation at length  Requests DNR 74M with h/o  diabetes, high blood pressure, aortic valve replacement, AF on eliquis, chronic back pain s/p epidurals on diclofenac, HLD, hypothyroid, NIDDM, sAS, CKD, presents to ED with chest pain    Assessment:  Acute Hypovolemic Shock  GI Bleed. - Upper vs Lower?  DARLINE  Anemia  DM  Febrile illness  Hyponatremia  Chronic Low Back pain without radiculopathy    Plan:  Admit to ICU  IVF  S/p 1 unit PRBC's  Upper and possible lower endoscopy  Follow H/H  Nephrology Consult  Stop NSAIDs  Hold Eliquis  Follow Troponin  Fever, white count  No obv acute changes on CT C/A/P to explain urine OK, viral swab negative  GI consult  Cardiology consult  Empiric abx, sepsis bundle  OOB as able  GOC conversation at length  Requests DNR/ DNI. otherwise no other limitations to care

## 2025-02-10 NOTE — H&P ADULT - NSHPPHYSICALEXAM_GEN_ALL_CORE
PE:    Adult lying in bed  No JVD trachea midline  Normocephalic, atraumatic  S1S2+  CTA B/L  Abd soft NTND  No leg swelling/edema noted  Awake and alert  Skin pink, warm Cheiloplasty (Less Than 50%) Text: A decision was made to reconstruct the defect with a  cheiloplasty.  The defect was undermined extensively.  Additional obicularis oris muscle was excised with a 15 blade scalpel.  The defect was converted into a full thickness wedge, of less than 50% of the vertical height of the lip, to facilite a better cosmetic result.  Small vessels were then tied off with 5-0 monocyrl. The obicularis oris, superficial fascia, adipose and dermis were then reapproximated.  After the deeper layers were approximated the epidermis was reapproximated with particular care given to realign the vermilion border.

## 2025-02-10 NOTE — CONSULT NOTE ADULT - ASSESSMENT
74 year old male with history TAVR 1/2023, AF on Eliquis (started 2 mos ago), GERD on daily PPI, chronic back pain on high dose diclofenac, tumeric, with recent epidural injection with FOBT+ stool, no overt signs of GIB presenting to ED with chest pain, hypotension, fever, tachycardia, leukocytosis, lone episode "maroon/purple" emesis after chewing ASA at home.     Imp:  1. Symptomatic anemia 2/2 possible UGIB, history GERD on PPI on multiple blood thinning agents and DARLINE    Rec:  ::Resuscitate with PRBC, continue to trend HH  ::Continue hold AC  ::Pressors/IVF  ::PPI IV BID  ::2 x large bore IVs  ::Eventual EGD (possible colon) pending clinical stability and Dr. Porter discretion    Dr. Porter to resume care 2/11/25.

## 2025-02-11 ENCOUNTER — RESULT REVIEW (OUTPATIENT)
Age: 75
End: 2025-02-11

## 2025-02-11 LAB
-  STREPTOCOCCUS SP. (NOT GRP A, B OR S PNEUMONIAE): SIGNIFICANT CHANGE UP
A1C WITH ESTIMATED AVERAGE GLUCOSE RESULT: 5.9 % — HIGH (ref 4–5.6)
ANION GAP SERPL CALC-SCNC: 4 MMOL/L — LOW (ref 5–17)
APTT BLD: 32.2 SEC — SIGNIFICANT CHANGE UP (ref 24.5–35.6)
BASOPHILS # BLD AUTO: 0.03 K/UL — SIGNIFICANT CHANGE UP (ref 0–0.2)
BASOPHILS NFR BLD AUTO: 0.2 % — SIGNIFICANT CHANGE UP (ref 0–2)
BUN SERPL-MCNC: 46 MG/DL — HIGH (ref 7–23)
CALCIUM SERPL-MCNC: 8.5 MG/DL — SIGNIFICANT CHANGE UP (ref 8.5–10.1)
CHLORIDE SERPL-SCNC: 107 MMOL/L — SIGNIFICANT CHANGE UP (ref 96–108)
CO2 SERPL-SCNC: 25 MMOL/L — SIGNIFICANT CHANGE UP (ref 22–31)
CREAT SERPL-MCNC: 1.71 MG/DL — HIGH (ref 0.5–1.3)
EGFR: 41 ML/MIN/1.73M2 — LOW
EOSINOPHIL # BLD AUTO: 0.12 K/UL — SIGNIFICANT CHANGE UP (ref 0–0.5)
EOSINOPHIL NFR BLD AUTO: 0.9 % — SIGNIFICANT CHANGE UP (ref 0–6)
ESTIMATED AVERAGE GLUCOSE: 123 MG/DL — HIGH (ref 68–114)
GLUCOSE BLDC GLUCOMTR-MCNC: 196 MG/DL — HIGH (ref 70–99)
GLUCOSE BLDC GLUCOMTR-MCNC: 85 MG/DL — SIGNIFICANT CHANGE UP (ref 70–99)
GLUCOSE BLDC GLUCOMTR-MCNC: 94 MG/DL — SIGNIFICANT CHANGE UP (ref 70–99)
GLUCOSE BLDC GLUCOMTR-MCNC: 98 MG/DL — SIGNIFICANT CHANGE UP (ref 70–99)
GLUCOSE SERPL-MCNC: 101 MG/DL — HIGH (ref 70–99)
GRAM STN FLD: ABNORMAL
HCT VFR BLD CALC: 24.6 % — LOW (ref 39–50)
HCT VFR BLD CALC: 25.5 % — LOW (ref 39–50)
HGB BLD-MCNC: 8.1 G/DL — LOW (ref 13–17)
HGB BLD-MCNC: 8.3 G/DL — LOW (ref 13–17)
IMM GRANULOCYTES NFR BLD AUTO: 0.7 % — SIGNIFICANT CHANGE UP (ref 0–0.9)
INR BLD: 1.34 RATIO — HIGH (ref 0.85–1.16)
LYMPHOCYTES # BLD AUTO: 1.22 K/UL — SIGNIFICANT CHANGE UP (ref 1–3.3)
LYMPHOCYTES # BLD AUTO: 9 % — LOW (ref 13–44)
MAGNESIUM SERPL-MCNC: 1.3 MG/DL — LOW (ref 1.6–2.6)
MCHC RBC-ENTMCNC: 26.7 PG — LOW (ref 27–34)
MCHC RBC-ENTMCNC: 26.7 PG — LOW (ref 27–34)
MCHC RBC-ENTMCNC: 32.5 G/DL — SIGNIFICANT CHANGE UP (ref 32–36)
MCHC RBC-ENTMCNC: 32.9 G/DL — SIGNIFICANT CHANGE UP (ref 32–36)
MCV RBC AUTO: 81.2 FL — SIGNIFICANT CHANGE UP (ref 80–100)
MCV RBC AUTO: 82 FL — SIGNIFICANT CHANGE UP (ref 80–100)
METHOD TYPE: SIGNIFICANT CHANGE UP
MONOCYTES # BLD AUTO: 0.9 K/UL — SIGNIFICANT CHANGE UP (ref 0–0.9)
MONOCYTES NFR BLD AUTO: 6.7 % — SIGNIFICANT CHANGE UP (ref 2–14)
MRSA PCR RESULT.: SIGNIFICANT CHANGE UP
NEUTROPHILS # BLD AUTO: 11.15 K/UL — HIGH (ref 1.8–7.4)
NEUTROPHILS NFR BLD AUTO: 82.5 % — HIGH (ref 43–77)
NRBC # BLD AUTO: 0 K/UL — SIGNIFICANT CHANGE UP (ref 0–0)
NRBC # FLD: 0 K/UL — SIGNIFICANT CHANGE UP (ref 0–0)
NRBC BLD AUTO-RTO: 0 /100 WBCS — SIGNIFICANT CHANGE UP (ref 0–0)
PHOSPHATE SERPL-MCNC: 2.9 MG/DL — SIGNIFICANT CHANGE UP (ref 2.5–4.5)
PLATELET # BLD AUTO: 229 K/UL — SIGNIFICANT CHANGE UP (ref 150–400)
PLATELET # BLD AUTO: 234 K/UL — SIGNIFICANT CHANGE UP (ref 150–400)
PMV BLD: 9.8 FL — SIGNIFICANT CHANGE UP (ref 7–13)
POTASSIUM SERPL-MCNC: 4 MMOL/L — SIGNIFICANT CHANGE UP (ref 3.5–5.3)
POTASSIUM SERPL-SCNC: 4 MMOL/L — SIGNIFICANT CHANGE UP (ref 3.5–5.3)
PROTHROM AB SERPL-ACNC: 15.4 SEC — HIGH (ref 9.9–13.4)
RBC # BLD: 3.03 M/UL — LOW (ref 4.2–5.8)
RBC # BLD: 3.11 M/UL — LOW (ref 4.2–5.8)
RBC # FLD: 16.7 % — HIGH (ref 10.3–14.5)
RBC # FLD: 16.7 % — HIGH (ref 10.3–14.5)
S AUREUS DNA NOSE QL NAA+PROBE: SIGNIFICANT CHANGE UP
SODIUM SERPL-SCNC: 136 MMOL/L — SIGNIFICANT CHANGE UP (ref 135–145)
SPECIMEN SOURCE: SIGNIFICANT CHANGE UP
SPECIMEN SOURCE: SIGNIFICANT CHANGE UP
TROPONIN I, HIGH SENSITIVITY RESULT: 1106.92 NG/L — HIGH
TROPONIN I, HIGH SENSITIVITY RESULT: 1264.98 NG/L — HIGH
WBC # BLD: 11.45 K/UL — HIGH (ref 3.8–10.5)
WBC # BLD: 13.51 K/UL — HIGH (ref 3.8–10.5)
WBC # FLD AUTO: 11.45 K/UL — HIGH (ref 3.8–10.5)
WBC # FLD AUTO: 13.51 K/UL — HIGH (ref 3.8–10.5)

## 2025-02-11 PROCEDURE — 99223 1ST HOSP IP/OBS HIGH 75: CPT

## 2025-02-11 PROCEDURE — 93306 TTE W/DOPPLER COMPLETE: CPT | Mod: 26

## 2025-02-11 PROCEDURE — 99291 CRITICAL CARE FIRST HOUR: CPT

## 2025-02-11 PROCEDURE — 88305 TISSUE EXAM BY PATHOLOGIST: CPT | Mod: 26

## 2025-02-11 PROCEDURE — 88312 SPECIAL STAINS GROUP 1: CPT | Mod: 26

## 2025-02-11 PROCEDURE — 99232 SBSQ HOSP IP/OBS MODERATE 35: CPT

## 2025-02-11 PROCEDURE — 93010 ELECTROCARDIOGRAM REPORT: CPT

## 2025-02-11 RX ORDER — MAGNESIUM SULFATE 500 MG/ML
1 SYRINGE (ML) INJECTION ONCE
Refills: 0 | Status: COMPLETED | OUTPATIENT
Start: 2025-02-11 | End: 2025-02-11

## 2025-02-11 RX ORDER — POLYETHYLENE GLYCOL-3350 AND ELECTROLYTES 236; 6.74; 5.86; 2.97; 22.74 G/274.31G; G/274.31G; G/274.31G; G/274.31G; G/274.31G
2000 POWDER, FOR SOLUTION ORAL ONCE
Refills: 0 | Status: COMPLETED | OUTPATIENT
Start: 2025-02-11 | End: 2025-02-11

## 2025-02-11 RX ADMIN — Medication 10 MG/HR: at 02:44

## 2025-02-11 RX ADMIN — Medication 150 MICROGRAM(S): at 05:04

## 2025-02-11 RX ADMIN — ATORVASTATIN CALCIUM 40 MILLIGRAM(S): 80 TABLET, FILM COATED ORAL at 21:21

## 2025-02-11 RX ADMIN — Medication 25 GRAM(S): at 00:54

## 2025-02-11 RX ADMIN — GABAPENTIN 200 MILLIGRAM(S): 400 CAPSULE ORAL at 21:20

## 2025-02-11 RX ADMIN — Medication 25 GRAM(S): at 10:39

## 2025-02-11 RX ADMIN — SODIUM CHLORIDE 125 MILLILITER(S): 9 INJECTION, SOLUTION INTRAVENOUS at 02:12

## 2025-02-11 RX ADMIN — POLYETHYLENE GLYCOL-3350 AND ELECTROLYTES 2000 MILLILITER(S): 236; 6.74; 5.86; 2.97; 22.74 POWDER, FOR SOLUTION ORAL at 21:18

## 2025-02-11 RX ADMIN — INSULIN LISPRO 2: 100 INJECTION, SOLUTION INTRAVENOUS; SUBCUTANEOUS at 17:47

## 2025-02-11 RX ADMIN — Medication 25 GRAM(S): at 18:05

## 2025-02-11 RX ADMIN — Medication 10 MG/HR: at 16:55

## 2025-02-11 RX ADMIN — Medication 100 GRAM(S): at 07:22

## 2025-02-11 NOTE — PROGRESS NOTE ADULT - ASSESSMENT
patient with poor response to transfusion and continued climb in troponin level  1-CAD-suspect this is all demand ischemia-has non-obstructive CAD but with severe anemia with minimal response to blood; suggests active continued bleed.  Needed urgent endoscopy-procdure is low risk but patient is high risk, support with blood and fluids.  Treatment with statins only as antiplatelet medications contraindicated at this time.  Will get EKG today and ECHO  2-arrhythmia-elevated hollie vasc score-high risk for CVA-holding AC now day 2 for endoscopy.  Currently, patient in sinus rhythm on telemetry.    3-CHF-due to AS-had TAVR and on farxiga-now held for over 48 hours-patient needs endoscopy for treatment of bleed prior to initiation of AC and antiplatelet therapy.   Will restart farxiga after diagnosis and treatment of bleed.   patient with poor response to transfusion and continued climb in troponin level  1-CAD-suspect this is all demand ischemia-has non-obstructive CAD but with severe anemia with minimal response to blood; suggests active continued bleed.  Needed urgent endoscopy-procdure is low risk but patient is high risk, support with blood and fluids.  Treatment with statins only as antiplatelet medications contraindicated at this time.  Will get EKG today and ECHO  2-arrhythmia-elevated hollie vasc score-high risk for CVA-holding AC now day 2 for endoscopy.  Currently, patient in sinus rhythm on telemetry.    3-CHF-due to AS-had TAVR and on farxiga-now held for over 48 hours-patient needs endoscopy for treatment of bleed prior to initiation of AC and antiplatelet therapy.   Will restart farxiga after diagnosis and treatment of bleed.  4-valve-had TAVR- continue zosyn for antibiotic prophylaxis for planned endoscopy/colonoscopy

## 2025-02-11 NOTE — CONSULT NOTE ADULT - SUBJECTIVE AND OBJECTIVE BOX
NEPHROLOGY INTERVAL HPI/OVERNIGHT EVENTS:  [id number]  HPI:  ICU H&P    S:    Pt seen and examined  74M  PMHx of diabetes, high blood pressure, aortic valve replacement, AF on eliquis, chronic back pain s/p epidurals on diclofenac, HLD, hypothyroid, NIDDM, sAS, CKD  Pt here for chest pain  Recent Dx anemia on blood work  ICU consult for hypotension    2/10: POCT+ stool, no gross blood, last EGD distant past; CT C/A/P done, no obv abnl found. Rec'ing blood, GI called. (10 Feb 2025 13:53)      Patient is a 74y old  Male who presents with a chief complaint of Hypotension, anemia (11 Feb 2025 06:52)      PAST MEDICAL & SURGICAL HISTORY:  Hypertension      Diabetes mellitus      Obesity      Hypothyroidism      Prostate CA          FAMILY HISTORY:      MEDICATIONS  (STANDING):  atorvastatin 40 milliGRAM(s) Oral at bedtime  dextrose 5%. 1000 milliLiter(s) (50 mL/Hr) IV Continuous <Continuous>  dextrose 5%. 1000 milliLiter(s) (100 mL/Hr) IV Continuous <Continuous>  dextrose 50% Injectable 25 Gram(s) IV Push once  dextrose 50% Injectable 12.5 Gram(s) IV Push once  dextrose 50% Injectable 25 Gram(s) IV Push once  dextrose Oral Gel 15 Gram(s) Oral once  gabapentin 200 milliGRAM(s) Oral at bedtime  glucagon  Injectable 1 milliGRAM(s) IntraMuscular once  insulin lispro (ADMELOG) corrective regimen sliding scale   SubCutaneous Before meals and at bedtime  lactated ringers. 1000 milliLiter(s) (125 mL/Hr) IV Continuous <Continuous>  levothyroxine 150 MICROGram(s) Oral daily  pantoprazole Infusion 8 mG/Hr (10 mL/Hr) IV Continuous <Continuous>  piperacillin/tazobactam IVPB.. 3.375 Gram(s) IV Intermittent every 8 hours    MEDICATIONS  (PRN):  zolpidem 5 milliGRAM(s) Oral at bedtime PRN Insomnia      Allergies    No Known Allergies    Intolerances        I&O's Summary    10 Feb 2025 07:01  -  11 Feb 2025 07:00  --------------------------------------------------------  IN: 3233 mL / OUT: 2000 mL / NET: 1233 mL    11 Feb 2025 07:01  -  11 Feb 2025 10:18  --------------------------------------------------------  IN: 100 mL / OUT: 0 mL / NET: 100 mL        Home Medications:  atorvastatin 40 mg oral tablet: 1 tab(s) orally once a day (10 Feb 2025 13:45)  B-Complex 50 oral tablet: 1 tab(s) orally once a day (10 Feb 2025 13:45)  cholecalciferol 25 mcg (1000 intl units) oral tablet: 1 tab(s) orally once a day (10 Feb 2025 13:45)  Chondroitin-Glucosamine oral tablet: 1 tab(s) orally once a day (10 Feb 2025 13:45)  Co-Q10 100 mg oral capsule: 1 cap(s) orally once a day (10 Feb 2025 13:45)  cyanocobalamin 1000 mcg oral tablet: 1 tab(s) orally once a day (10 Feb 2025 13:45)  dapagliflozin 10 mg oral tablet: 1 tab(s) orally every 24 hours (10 Feb 2025 11:56)  diclofenac sodium 75 mg oral delayed release tablet: 1 tab(s) orally 2 times a day ***pt took up until 2/9- said he planned to stop taking*** (10 Feb 2025 13:45)  doxycycline 20 mg oral tablet: 1 tab(s) orally once a day (10 Feb 2025 11:56)  Eliquis 5 mg oral tablet: 1 tab(s) orally 2 times a day (10 Feb 2025 13:45)  finasteride 1 mg oral tablet: 1 tab(s) orally once a day (10 Feb 2025 11:56)  gabapentin 100 mg oral capsule: 2 cap(s) orally once a day (at bedtime) (10 Feb 2025 13:45)  Ginkgo Biloba 120 mg oral capsule: 1 cap(s) orally once a day (10 Feb 2025 13:45)  Januvia 50 mg oral tablet: 1 tab(s) orally once a day (10 Feb 2025 11:56)  levothyroxine 150 mcg (0.15 mg) oral tablet: 1 tab(s) orally once a day ***pts list states 175mcg- [facility] shows 150mcg filled on 11/15 x 90 days- called Dr Abdulaziz Jernigan&#x27;s office to verify dose and was verified at 150mcg*** (10 Feb 2025 13:45)  losartan-hydroCHLOROthiazide 100 mg-12.5 mg oral tablet: 1 tab(s) orally once a day (10 Feb 2025 11:56)  Melatonin 5 mg oral tablet: 2 tab(s) orally once a day (at bedtime) (10 Feb 2025 11:56)  MetFORMIN (Eqv-Glucophage XR) 500 mg oral tablet, extended release: 4 tab(s) orally once a day (in the morning) (10 Feb 2025 13:45)  Multiple Vitamins oral tablet: 1 tab(s) orally once a day (10 Feb 2025 13:45)  pantoprazole 40 mg oral delayed release tablet: 1 tab(s) orally once a day (10 Feb 2025 11:56)  PreserVision AREDS 2 oral capsule: 1 cap(s) orally 2 times a day (10 Feb 2025 13:45)  Turmeric 500 mg oral capsule: 1 cap(s) orally once a day (10 Feb 2025 13:45)  zolpidem 10 mg oral tablet: 0.5 tab(s) orally once a day (at bedtime) (10 Feb 2025 13:45)      Patient was seen and evaluated on dialysis.   Patient is tolerating the procedure well.   Continue dialysis:   Dialyzer:          QB:        QD:   Goal UF ___ over ___ Hours       Vital Signs Last 24 Hrs  T(C): 36.6 (11 Feb 2025 02:00), Max: 37.3 (10 Feb 2025 11:15)  T(F): 97.8 (11 Feb 2025 02:00), Max: 99.1 (10 Feb 2025 11:15)  HR: 81 (11 Feb 2025 09:00) (79 - 98)  BP: 105/62 (11 Feb 2025 09:00) (80/51 - 111/70)  BP(mean): 76 (11 Feb 2025 09:00) (61 - 83)  RR: 20 (11 Feb 2025 09:00) (18 - 30)  SpO2: 96% (11 Feb 2025 09:00) (92% - 100%)    Parameters below as of 11 Feb 2025 08:00  Patient On (Oxygen Delivery Method): nasal cannula  O2 Flow (L/min): 2    Daily     Daily   I&O's Summary    10 Feb 2025 07:01  -  11 Feb 2025 07:00  --------------------------------------------------------  IN: 3233 mL / OUT: 2000 mL / NET: 1233 mL    11 Feb 2025 07:01  -  11 Feb 2025 10:18  --------------------------------------------------------  IN: 100 mL / OUT: 0 mL / NET: 100 mL        PHYSICAL EXAM:  GEN: alert awake O X 3  HEENT: MMM  NECK supple no jvd  CV: RRR s1s2  LUNGS: b/l CTA  ABD: + soft,   EXT: no edema    LABS:                        8.3    13.51 )-----------( 234      ( 11 Feb 2025 05:03 )             25.5     02-11    136  |  107  |  46[H]  ----------------------------<  101[H]  4.0   |  25  |  1.71[H]    Ca    8.5      11 Feb 2025 05:03  Phos  2.9     02-11  Mg     1.3     02-11    TPro  7.0  /  Alb  2.5[L]  /  TBili  0.6  /  DBili  x   /  AST  19  /  ALT  22  /  AlkPhos  117  02-10    PT/INR - ( 11 Feb 2025 05:03 )   PT: 15.4 sec;   INR: 1.34 ratio         PTT - ( 11 Feb 2025 05:03 )  PTT:32.2 sec  Urinalysis Basic - ( 11 Feb 2025 05:03 )    Color: x / Appearance: x / SG: x / pH: x  Gluc: 101 mg/dL / Ketone: x  / Bili: x / Urobili: x   Blood: x / Protein: x / Nitrite: x   Leuk Esterase: x / RBC: x / WBC x   Sq Epi: x / Non Sq Epi: x / Bacteria: x      Magnesium: 1.3 mg/dL (02-11 @ 05:03)  Phosphorus: 2.9 mg/dL (02-11 @ 05:03)        Urinalysis with Rflx Culture (collected 02-10-25 @ 12:47)     NEPHROLOGY INTERVAL HPI/OVERNIGHT EVENTS:  [id number]  HPI:  73 yo with hx of ckd ( scr in 1.4 due to ? NSAID use )   pw CP ? hemetemesis   noted with anemia r/o ABLA for egd today  DARLINE with scr of 2.2 in setting of worsening anemia and recent diclofenac use   + OCB  admitted to ICU for hypotension   no hx of bph     pmhx/psurg hx   - DM   - CKD ( scr 1.4)   - NIDDM   - hypothy  - afib eliquiis  - AVR  - htn   - dm   - prostate ca       FAMILY HISTORY:      MEDICATIONS  (STANDING):  atorvastatin 40 milliGRAM(s) Oral at bedtime  dextrose 5%. 1000 milliLiter(s) (50 mL/Hr) IV Continuous <Continuous>  dextrose 5%. 1000 milliLiter(s) (100 mL/Hr) IV Continuous <Continuous>  dextrose 50% Injectable 25 Gram(s) IV Push once  dextrose 50% Injectable 12.5 Gram(s) IV Push once  dextrose 50% Injectable 25 Gram(s) IV Push once  dextrose Oral Gel 15 Gram(s) Oral once  gabapentin 200 milliGRAM(s) Oral at bedtime  glucagon  Injectable 1 milliGRAM(s) IntraMuscular once  insulin lispro (ADMELOG) corrective regimen sliding scale   SubCutaneous Before meals and at bedtime  lactated ringers. 1000 milliLiter(s) (125 mL/Hr) IV Continuous <Continuous>  levothyroxine 150 MICROGram(s) Oral daily  pantoprazole Infusion 8 mG/Hr (10 mL/Hr) IV Continuous <Continuous>  piperacillin/tazobactam IVPB.. 3.375 Gram(s) IV Intermittent every 8 hours    MEDICATIONS  (PRN):  zolpidem 5 milliGRAM(s) Oral at bedtime PRN Insomnia      Allergies    No Known Allergies    Intolerances        I&O's Summary    10 Feb 2025 07:01  -  11 Feb 2025 07:00  --------------------------------------------------------  IN: 3233 mL / OUT: 2000 mL / NET: 1233 mL    11 Feb 2025 07:01  -  11 Feb 2025 10:18  --------------------------------------------------------  IN: 100 mL / OUT: 0 mL / NET: 100 mL        Home Medications:  atorvastatin 40 mg oral tablet: 1 tab(s) orally once a day (10 Feb 2025 13:45)  B-Complex 50 oral tablet: 1 tab(s) orally once a day (10 Feb 2025 13:45)  cholecalciferol 25 mcg (1000 intl units) oral tablet: 1 tab(s) orally once a day (10 Feb 2025 13:45)  Chondroitin-Glucosamine oral tablet: 1 tab(s) orally once a day (10 Feb 2025 13:45)  Co-Q10 100 mg oral capsule: 1 cap(s) orally once a day (10 Feb 2025 13:45)  cyanocobalamin 1000 mcg oral tablet: 1 tab(s) orally once a day (10 Feb 2025 13:45)  dapagliflozin 10 mg oral tablet: 1 tab(s) orally every 24 hours (10 Feb 2025 11:56)  diclofenac sodium 75 mg oral delayed release tablet: 1 tab(s) orally 2 times a day ***pt took up until 2/9- said he planned to stop taking*** (10 Feb 2025 13:45)  doxycycline 20 mg oral tablet: 1 tab(s) orally once a day (10 Feb 2025 11:56)  Eliquis 5 mg oral tablet: 1 tab(s) orally 2 times a day (10 Feb 2025 13:45)  finasteride 1 mg oral tablet: 1 tab(s) orally once a day (10 Feb 2025 11:56)  gabapentin 100 mg oral capsule: 2 cap(s) orally once a day (at bedtime) (10 Feb 2025 13:45)  Ginkgo Biloba 120 mg oral capsule: 1 cap(s) orally once a day (10 Feb 2025 13:45)  Januvia 50 mg oral tablet: 1 tab(s) orally once a day (10 Feb 2025 11:56)  levothyroxine 150 mcg (0.15 mg) oral tablet: 1 tab(s) orally once a day ***pts list states 175mcg- [facility] shows 150mcg filled on 11/15 x 90 days- called Dr Abdulaziz Jernigan&#x27;s office to verify dose and was verified at 150mcg*** (10 Feb 2025 13:45)  losartan-hydroCHLOROthiazide 100 mg-12.5 mg oral tablet: 1 tab(s) orally once a day (10 Feb 2025 11:56)  Melatonin 5 mg oral tablet: 2 tab(s) orally once a day (at bedtime) (10 Feb 2025 11:56)  MetFORMIN (Eqv-Glucophage XR) 500 mg oral tablet, extended release: 4 tab(s) orally once a day (in the morning) (10 Feb 2025 13:45)  Multiple Vitamins oral tablet: 1 tab(s) orally once a day (10 Feb 2025 13:45)  pantoprazole 40 mg oral delayed release tablet: 1 tab(s) orally once a day (10 Feb 2025 11:56)  PreserVision AREDS 2 oral capsule: 1 cap(s) orally 2 times a day (10 Feb 2025 13:45)  Turmeric 500 mg oral capsule: 1 cap(s) orally once a day (10 Feb 2025 13:45)  zolpidem 10 mg oral tablet: 0.5 tab(s) orally once a day (at bedtime) (10 Feb 2025 13:45)          Vital Signs Last 24 Hrs  T(C): 36.6 (11 Feb 2025 02:00), Max: 37.3 (10 Feb 2025 11:15)  T(F): 97.8 (11 Feb 2025 02:00), Max: 99.1 (10 Feb 2025 11:15)  HR: 81 (11 Feb 2025 09:00) (79 - 98)  BP: 105/62 (11 Feb 2025 09:00) (80/51 - 111/70)  BP(mean): 76 (11 Feb 2025 09:00) (61 - 83)  RR: 20 (11 Feb 2025 09:00) (18 - 30)  SpO2: 96% (11 Feb 2025 09:00) (92% - 100%)    Parameters below as of 11 Feb 2025 08:00  Patient On (Oxygen Delivery Method): nasal cannula  O2 Flow (L/min): 2    Daily     Daily   I&O's Summary    10 Feb 2025 07:01  -  11 Feb 2025 07:00  --------------------------------------------------------  IN: 3233 mL / OUT: 2000 mL / NET: 1233 mL    11 Feb 2025 07:01  -  11 Feb 2025 10:18  --------------------------------------------------------  IN: 100 mL / OUT: 0 mL / NET: 100 mL        PHYSICAL EXAM:  GEN: alert awake O X 3  HEENT: MMM  NECK supple no jvd  CV: RRR s1s2  LUNGS: b/l CTA  ABD: +bs soft, nt/nd  EXT: no edema    LABS:                        8.3    13.51 )-----------( 234      ( 11 Feb 2025 05:03 )             25.5     02-11    136  |  107  |  46[H]  ----------------------------<  101[H]  4.0   |  25  |  1.71[H]    Ca    8.5      11 Feb 2025 05:03  Phos  2.9     02-11  Mg     1.3     02-11    TPro  7.0  /  Alb  2.5[L]  /  TBili  0.6  /  DBili  x   /  AST  19  /  ALT  22  /  AlkPhos  117  02-10    PT/INR - ( 11 Feb 2025 05:03 )   PT: 15.4 sec;   INR: 1.34 ratio         PTT - ( 11 Feb 2025 05:03 )  PTT:32.2 sec  Urinalysis Basic - ( 11 Feb 2025 05:03 )    Color: x / Appearance: x / SG: x / pH: x  Gluc: 101 mg/dL / Ketone: x  / Bili: x / Urobili: x   Blood: x / Protein: x / Nitrite: x   Leuk Esterase: x / RBC: x / WBC x   Sq Epi: x / Non Sq Epi: x / Bacteria: x      Magnesium: 1.3 mg/dL (02-11 @ 05:03)  Phosphorus: 2.9 mg/dL (02-11 @ 05:03)        Urinalysis with Rflx Culture (collected 02-10-25 @ 12:47)    UA no bld protein 500 glu

## 2025-02-11 NOTE — CONSULT NOTE ADULT - ASSESSMENT
73 yo with hx of ckd ( scr in 1.4 due to ? NSAID use )   pw CP ? hemetemesis   noted with anemia r/o ABLA for egd today  DARLINE with scr of 2.2 in setting of worsening anemia and recent diclofenac use   + OCB  + IVC   admitted to ICU for hypotension     rec  - DARLINE/CKD stage 3     improving,. monitor for SIOBHAN     fu trend of scr    arb/hctz/sglt2i on hold    metformin on hold    UA neg     KBUS upon clinical stability     CT with IVC no hydro     no nsaid   - ANemia r/o ABLA    for egd   dw dr audelia brown  75 yo with hx of ckd ( scr in 1.4 due to ? NSAID use )   pw CP ? hemetemesis   noted with anemia r/o ABLA for egd today  DARLINE with scr of 2.2 in setting of worsening anemia and recent diclofenac use   + OCB  + IVC   admitted to ICU for hypotension     rec  - DARLINE/CKD stage 3     improving,. monitor for SIOBHAN     fu trend of scr    arb/hctz/sglt2i on hold    metformin on hold    UA neg     KBUS upon clinical stability     CT with IVC no hydro     no nsaid   - ANemia r/o ABLA    for egd   - HFpEF with s/p TAVR    + trop ? demand ischemia     cards input noted    CAD with hx of non obs disease on cath within 1 year   dw dr audelia brown

## 2025-02-11 NOTE — PROGRESS NOTE ADULT - SUBJECTIVE AND OBJECTIVE BOX
SUBJECTIVE:    CHIEF COMPLAINT:  Patient is a 74y old  Male who presents with a chief complaint of Hypotension, anemia (11 Feb 2025 12:26)      Interval HPI and Overnight Events: Pt feeling better today. Tolerated diet last night. Was NPO this am for EGD. DARLINE improved with hydration. Troponin spiked. Seen by Nephrology and cardiology    REVIEW OF SYSTEMS:  CONSTITUTIONAL: No weakness, fevers or chills  EYES/ENT: No visual changes;  No vertigo or throat pain   NECK: No pain or stiffness  RESPIRATORY: No cough, wheezing, hemoptysis; No shortness of breath  CARDIOVASCULAR: No chest pain or palpitations  GASTROINTESTINAL: No abdominal or epigastric pain. No nausea, vomiting, or hematemesis; No diarrhea or constipation. No melena or hematochezia.  GENITOURINARY: No dysuria, frequency or hematuria  NEUROLOGICAL: No numbness or weakness  SKIN: No itching, burning, rashes, or lesions   All other review of systems is negative unless indicated above    OBJECTIVE    Vital Signs Last 24 Hrs  T(C): 36.8 (11 Feb 2025 12:15), Max: 37 (10 Feb 2025 14:30)  T(F): 98.3 (11 Feb 2025 12:15), Max: 98.6 (10 Feb 2025 14:30)  HR: 82 (11 Feb 2025 12:30) (77 - 91)  BP: 96/55 (11 Feb 2025 12:30) (80/45 - 111/70)  BP(mean): 68 (11 Feb 2025 12:30) (57 - 83)  RR: 20 (11 Feb 2025 12:30) (18 - 30)  SpO2: 98% (11 Feb 2025 12:30) (92% - 100%)    Parameters below as of 11 Feb 2025 08:00  Patient On (Oxygen Delivery Method): nasal cannula  O2 Flow (L/min): 2      MEDICATIONS  (STANDING):  atorvastatin 40 milliGRAM(s) Oral at bedtime  dextrose 5%. 1000 milliLiter(s) (50 mL/Hr) IV Continuous <Continuous>  dextrose 5%. 1000 milliLiter(s) (100 mL/Hr) IV Continuous <Continuous>  dextrose 50% Injectable 25 Gram(s) IV Push once  dextrose 50% Injectable 12.5 Gram(s) IV Push once  dextrose 50% Injectable 25 Gram(s) IV Push once  dextrose Oral Gel 15 Gram(s) Oral once  gabapentin 200 milliGRAM(s) Oral at bedtime  glucagon  Injectable 1 milliGRAM(s) IntraMuscular once  insulin lispro (ADMELOG) corrective regimen sliding scale   SubCutaneous Before meals and at bedtime  lactated ringers. 1000 milliLiter(s) (125 mL/Hr) IV Continuous <Continuous>  levothyroxine 150 MICROGram(s) Oral daily  pantoprazole Infusion 8 mG/Hr (10 mL/Hr) IV Continuous <Continuous>  piperacillin/tazobactam IVPB.. 3.375 Gram(s) IV Intermittent every 8 hours  polyethylene glycol/electrolyte Solution 2000 milliLiter(s) Oral once      LABS:                         8.3    13.51 )-----------( 234      ( 11 Feb 2025 05:03 )             25.5     02-11    136  |  107  |  46[H]  ----------------------------<  101[H]  4.0   |  25  |  1.71[H]    Ca    8.5      11 Feb 2025 05:03  Phos  2.9     02-11  Mg     1.3     02-11    TPro  7.0  /  Alb  2.5[L]  /  TBili  0.6  /  DBili  x   /  AST  19  /  ALT  22  /  AlkPhos  117  02-10    Urinalysis Basic - ( 11 Feb 2025 05:03 )  Color: x / Appearance: x / SG: x / pH: x  Gluc: 101 mg/dL / Ketone: x  / Bili: x / Urobili: x   Blood: x / Protein: x / Nitrite: x   Leuk Esterase: x / RBC: x / WBC x   Sq Epi: x / Non Sq Epi: x / Bacteria: x    PT/INR - ( 11 Feb 2025 05:03 )   PT: 15.4 sec;   INR: 1.34 ratio    PTT - ( 11 Feb 2025 05:03 )  PTT:32.2 sec    Specimen Source .Blood BLOOD   02-10 @ 09:32  Culture Results  Growth in aerobic bottle: Gram Positive Cocci in Pairs and Chains  Growth in anaerobic bottle: Gram Positive Cocci in Pairs and Chains[!]  Specimen Source .Blood BLOOD   02-10 @ 09:28  Culture Results  Growth in aerobic bottle: Gram Positive Cocci in Pairs and Chains  Growth in anaerobic bottle: Gram Positive Cocci in Pairs and Chains  Direct identification is available within approximately 3-5  hours either by Blood Panel Multiplexed PCR or Direct  MALDI-TOF. Details: [email or web address]    URINE CULTURE    02-10 @ 09:32    CULTURE RESULTS   Growth in aerobic bottle: Gram Positive Cocci in Pairs and Chains  Growth in anaerobic bottle: Gram Positive Cocci in Pairs and Chains[!]  URINE CULTURE    02-10 @ 09:28    CULTURE RESULTS   Growth in aerobic bottle: Gram Positive Cocci in Pairs and Chains  Growth in anaerobic bottle: Gram Positive Cocci in Pairs and Chains  Direct identification is available within approximately 3-5  hours either by Blood Panel Multiplexed PCR or Direct  MALDI-TOF. Details: [email or web address]    CAPILLARY BLOOD GLUCOSE  POCT Blood Glucose.: 98 mg/dL (11 Feb 2025 12:37)  POCT Blood Glucose.: 94 mg/dL (11 Feb 2025 08:29)  POCT Blood Glucose.: 129 mg/dL (10 Feb 2025 21:11)  POCT Blood Glucose.: 99 mg/dL (10 Feb 2025 16:19)

## 2025-02-11 NOTE — PROGRESS NOTE ADULT - SUBJECTIVE AND OBJECTIVE BOX
Interval History:     blood culture positive with strep species     creatinine better 1.7 improved from 2.7      troponin to 1200     temp to 97.8    HPI:       74M PMHx of diabetes,                             high blood pressure,                             TAVR                           , AF on eliquis                           , chronic back pain s/p epidurals recently on diclofenac,                             NIDDM,                             ckd creatinine 1.3,   Pt here for chest pain troponin to 1200  Recent Dx anemia on blood work  ICU consult for hypotension to 80s in bp, bp responded to fluid   CT C/A/P done, no obv abnl found. got 1 unit of blood  no chest pain now  now afebrile but blood culture as above  PMH:      as above     ROS - no chest pain no ills, no abdominal pain, no gi bleeding, no hx. of ulcer , history of weight loss      MEDICATIONS  (STANDING):  atorvastatin 40 milliGRAM(s) Oral at bedtime  dextrose 5%. 1000 milliLiter(s) (50 mL/Hr) IV Continuous <Continuous>  dextrose 5%. 1000 milliLiter(s) (100 mL/Hr) IV Continuous <Continuous>  dextrose 50% Injectable 25 Gram(s) IV Push once  dextrose 50% Injectable 12.5 Gram(s) IV Push once  dextrose 50% Injectable 25 Gram(s) IV Push once  dextrose Oral Gel 15 Gram(s) Oral once  gabapentin 200 milliGRAM(s) Oral at bedtime  glucagon  Injectable 1 milliGRAM(s) IntraMuscular once  insulin lispro (ADMELOG) corrective regimen sliding scale   SubCutaneous Before meals and at bedtime  lactated ringers. 1000 milliLiter(s) (125 mL/Hr) IV Continuous <Continuous>  levothyroxine 150 MICROGram(s) Oral daily  pantoprazole Infusion 8 mG/Hr (10 mL/Hr) IV Continuous <Continuous>  piperacillin/tazobactam IVPB.. 3.375 Gram(s) IV Intermittent every 8 hours    MEDICATIONS  (PRN):  zolpidem 5 milliGRAM(s) Oral at bedtime PRN Insomnia    Height (cm): 175 (02-10 @ 14:23)    ICU Vital Signs Last 24 Hrs  T(C): 36.6 (11 Feb 2025 02:00), Max: 37.3 (10 Feb 2025 11:15)  T(F): 97.8 (11 Feb 2025 02:00), Max: 99.1 (10 Feb 2025 11:15)  HR: 86 (11 Feb 2025 10:00) (79 - 97)  BP: 103/61 (11 Feb 2025 10:00) (80/51 - 111/70)  BP(mean): 73 (11 Feb 2025 10:00) (61 - 83)  ABP: --  ABP(mean): --  RR: 25 (11 Feb 2025 10:00) (18 - 30)  SpO2: 99% (11 Feb 2025 10:00) (92% - 100%)    O2 Parameters below as of 11 Feb 2025 08:00  Patient On (Oxygen Delivery Method): nasal cannula  O2 Flow (L/min): 2    Physical exam    general no distress  HEENT nc/at  neck supple  lungs clear   cv rrr  abdomen soft, non tender   gu voiding  extremteiis warm      I&O's Summary    10 Feb 2025 07:01  -  11 Feb 2025 07:00  --------------------------------------------------------  IN: 3233 mL / OUT: 2000 mL / NET: 1233 mL    11 Feb 2025 07:01  -  11 Feb 2025 11:08  --------------------------------------------------------  IN: 100 mL / OUT: 450 mL / NET: -350 mL                        8.3    13.51 )-----------( 234      ( 11 Feb 2025 05:03 )             25.5       02-11    136  |  107  |  46[H]  ----------------------------<  101[H]  4.0   |  25  |  1.71[H]    Ca    8.5      11 Feb 2025 05:03  Phos  2.9     02-11  Mg     1.3     02-11    TPro  7.0  /  Alb  2.5[L]  /  TBili  0.6  /  DBili  x   /  AST  19  /  ALT  22  /  AlkPhos  117  02-10    Urinalysis Basic - ( 11 Feb 2025 05:03 )    Color: x / Appearance: x / SG: x / pH: x  Gluc: 101 mg/dL / Ketone: x  / Bili: x / Urobili: x   Blood: x / Protein: x / Nitrite: x   Leuk Esterase: x / RBC: x / WBC x   Sq Epi: x / Non Sq Epi: x / Bacteria: x    DVT Prophylaxis:  venodynes                                                      Advanced Directives: DNR/DNI,      Sepsis Criteria Met - no    Labs, Notes, Orders, radiologic studies reviewed and care coordinated with multidisciplinary team

## 2025-02-11 NOTE — PROGRESS NOTE ADULT - ASSESSMENT
74M with h/o  diabetes, high blood pressure, aortic valve replacement, AF on eliquis, chronic back pain s/p epidurals on diclofenac, HLD, hypothyroid, NIDDM, sAS, CKD, presents to ED with chest pain    Assessment:  Acute Hypovolemic Shock - imporved with fluids  GI Bleed. - Upper endoscopy shows polp but no gastritis. Possible  Lower?  DARLINE - improving with hydration  Anemia - improved after 2 units PRBC's  DM  Febrile illness  Hyponatremia  Chronic Low Back pain without radiculopathy  Troponin spike - demand ischemia vs NSTEMI    Plan:  Continue ICU  IVF  S/p 2 units PRBC's  Colonoscopy  Follow H/H  Nephrology Consult appreciated  Stop NSAIDs Holding Metformin and SGLT2  Holding Eliquis  Fever, white count - Gm pos cocci in blood  GI  following  Cardiology consult appreciated  Empiric abx, sepsis bundle  OOB as able  GOC conversation at length yesterday  Requests DNR/ DNI, . otherwise no other limitations to care

## 2025-02-11 NOTE — PROGRESS NOTE ADULT - ASSESSMENT
Lortent with hx. DM, Tavr  last cath 1/2023 with non obstructive disease  presented with     new onset of chest pain     Anemia     fever     hypotension, septic shock, bacteremia     DARLINE on ckd  plan    1. on zosyn strep species in blood, ct no source, awaiting id of bug  2. Upper endoscopy today, may need lower  3. NSTEMI - trend troponin, cant give bblocker due to bp or ac or aspirin due to anemia       cardiology input appreciated, , check echo  4. DARLINE hydration baseline creatinine 2024 was 1.3, no retention improving with hydration    patient critically in in ICU

## 2025-02-12 ENCOUNTER — APPOINTMENT (OUTPATIENT)
Dept: NEUROSURGERY | Facility: CLINIC | Age: 75
End: 2025-02-12

## 2025-02-12 LAB
ALBUMIN SERPL ELPH-MCNC: 2.3 G/DL — LOW (ref 3.3–5)
ALP SERPL-CCNC: 92 U/L — SIGNIFICANT CHANGE UP (ref 40–120)
ALT FLD-CCNC: 25 U/L — SIGNIFICANT CHANGE UP (ref 12–78)
ANION GAP SERPL CALC-SCNC: 7 MMOL/L — SIGNIFICANT CHANGE UP (ref 5–17)
AST SERPL-CCNC: 30 U/L — SIGNIFICANT CHANGE UP (ref 15–37)
BILIRUB SERPL-MCNC: 1 MG/DL — SIGNIFICANT CHANGE UP (ref 0.2–1.2)
BUN SERPL-MCNC: 26 MG/DL — HIGH (ref 7–23)
CALCIUM SERPL-MCNC: 9.5 MG/DL — SIGNIFICANT CHANGE UP (ref 8.5–10.1)
CHLORIDE SERPL-SCNC: 108 MMOL/L — SIGNIFICANT CHANGE UP (ref 96–108)
CO2 SERPL-SCNC: 24 MMOL/L — SIGNIFICANT CHANGE UP (ref 22–31)
CREAT SERPL-MCNC: 1.22 MG/DL — SIGNIFICANT CHANGE UP (ref 0.5–1.3)
CRP SERPL-MCNC: 73.6 MG/ML — HIGH (ref 0–5)
CULTURE RESULTS: ABNORMAL
EGFR: 62 ML/MIN/1.73M2 — SIGNIFICANT CHANGE UP
ERYTHROCYTE [SEDIMENTATION RATE] IN BLOOD: 93 MM/HR — HIGH (ref 0–20)
GLUCOSE BLDC GLUCOMTR-MCNC: 134 MG/DL — HIGH (ref 70–99)
GLUCOSE BLDC GLUCOMTR-MCNC: 157 MG/DL — HIGH (ref 70–99)
GLUCOSE BLDC GLUCOMTR-MCNC: 227 MG/DL — HIGH (ref 70–99)
GLUCOSE BLDC GLUCOMTR-MCNC: 96 MG/DL — SIGNIFICANT CHANGE UP (ref 70–99)
GLUCOSE SERPL-MCNC: 89 MG/DL — SIGNIFICANT CHANGE UP (ref 70–99)
HCT VFR BLD CALC: 27 % — LOW (ref 39–50)
HGB BLD-MCNC: 8.8 G/DL — LOW (ref 13–17)
MAGNESIUM SERPL-MCNC: 1.6 MG/DL — SIGNIFICANT CHANGE UP (ref 1.6–2.6)
MCHC RBC-ENTMCNC: 26.5 PG — LOW (ref 27–34)
MCHC RBC-ENTMCNC: 32.6 G/DL — SIGNIFICANT CHANGE UP (ref 32–36)
MCV RBC AUTO: 81.3 FL — SIGNIFICANT CHANGE UP (ref 80–100)
NRBC # BLD AUTO: 0 K/UL — SIGNIFICANT CHANGE UP (ref 0–0)
NRBC # FLD: 0 K/UL — SIGNIFICANT CHANGE UP (ref 0–0)
NRBC BLD AUTO-RTO: 0 /100 WBCS — SIGNIFICANT CHANGE UP (ref 0–0)
ORGANISM # SPEC MICROSCOPIC CNT: ABNORMAL
ORGANISM # SPEC MICROSCOPIC CNT: SIGNIFICANT CHANGE UP
PHOSPHATE SERPL-MCNC: 3 MG/DL — SIGNIFICANT CHANGE UP (ref 2.5–4.5)
PLATELET # BLD AUTO: 253 K/UL — SIGNIFICANT CHANGE UP (ref 150–400)
PMV BLD: 9.9 FL — SIGNIFICANT CHANGE UP (ref 7–13)
POTASSIUM SERPL-MCNC: 3.9 MMOL/L — SIGNIFICANT CHANGE UP (ref 3.5–5.3)
POTASSIUM SERPL-SCNC: 3.9 MMOL/L — SIGNIFICANT CHANGE UP (ref 3.5–5.3)
PROT SERPL-MCNC: 6.5 GM/DL — SIGNIFICANT CHANGE UP (ref 6–8.3)
RBC # BLD: 3.32 M/UL — LOW (ref 4.2–5.8)
RBC # FLD: 16.8 % — HIGH (ref 10.3–14.5)
SODIUM SERPL-SCNC: 139 MMOL/L — SIGNIFICANT CHANGE UP (ref 135–145)
SPECIMEN SOURCE: SIGNIFICANT CHANGE UP
SURGICAL PATHOLOGY STUDY: SIGNIFICANT CHANGE UP
WBC # BLD: 12.33 K/UL — HIGH (ref 3.8–10.5)
WBC # FLD AUTO: 12.33 K/UL — HIGH (ref 3.8–10.5)

## 2025-02-12 PROCEDURE — 99232 SBSQ HOSP IP/OBS MODERATE 35: CPT

## 2025-02-12 PROCEDURE — 72158 MRI LUMBAR SPINE W/O & W/DYE: CPT | Mod: 26

## 2025-02-12 PROCEDURE — 99221 1ST HOSP IP/OBS SF/LOW 40: CPT

## 2025-02-12 PROCEDURE — 99222 1ST HOSP IP/OBS MODERATE 55: CPT

## 2025-02-12 RX ORDER — VANCOMYCIN HCL IN 5 % DEXTROSE 1.5G/250ML
750 PLASTIC BAG, INJECTION (ML) INTRAVENOUS EVERY 12 HOURS
Refills: 0 | Status: DISCONTINUED | OUTPATIENT
Start: 2025-02-12 | End: 2025-02-13

## 2025-02-12 RX ORDER — CEFTRIAXONE 500 MG/1
2000 INJECTION, POWDER, FOR SOLUTION INTRAMUSCULAR; INTRAVENOUS EVERY 24 HOURS
Refills: 0 | Status: DISCONTINUED | OUTPATIENT
Start: 2025-02-12 | End: 2025-02-17

## 2025-02-12 RX ADMIN — Medication 250 MILLIGRAM(S): at 10:45

## 2025-02-12 RX ADMIN — Medication 250 MILLIGRAM(S): at 22:17

## 2025-02-12 RX ADMIN — ATORVASTATIN CALCIUM 40 MILLIGRAM(S): 80 TABLET, FILM COATED ORAL at 22:12

## 2025-02-12 RX ADMIN — Medication 5 MILLIGRAM(S): at 22:12

## 2025-02-12 RX ADMIN — Medication 150 MICROGRAM(S): at 06:01

## 2025-02-12 RX ADMIN — INSULIN LISPRO 4: 100 INJECTION, SOLUTION INTRAVENOUS; SUBCUTANEOUS at 13:20

## 2025-02-12 RX ADMIN — CEFTRIAXONE 2000 MILLIGRAM(S): 500 INJECTION, POWDER, FOR SOLUTION INTRAMUSCULAR; INTRAVENOUS at 10:45

## 2025-02-12 RX ADMIN — GABAPENTIN 200 MILLIGRAM(S): 400 CAPSULE ORAL at 22:12

## 2025-02-12 RX ADMIN — Medication 25 GRAM(S): at 00:42

## 2025-02-12 RX ADMIN — INSULIN LISPRO 2: 100 INJECTION, SOLUTION INTRAVENOUS; SUBCUTANEOUS at 22:33

## 2025-02-12 NOTE — PROGRESS NOTE ADULT - ASSESSMENT
73 yo with hx of ckd ( scr in 1.4 due to ? NSAID use )   pw CP ? hemetemesis   noted with anemia r/o ABLA for egd today  DARLINE with scr of 2.2 in setting of worsening anemia and recent diclofenac use   + OCB  + IVC   admitted to ICU for hypotension     rec  - DARLINE/CKD stage 3     improving,. monitor for SIOBHAN     fu trend of scr    arb/hctz/sglt2i on hold    metformin on hold    UA neg     KBUS upon clinical stability     CT with IVC no hydro     no nsaid   - ANemia r/o ABLA    for egd   - HFpEF with s/p TAVR    + trop ? demand ischemia     cards input noted    CAD with hx of non obs disease on cath within 1 year   dw dr audelia brown  75 yo with hx of ckd ( scr in 1.4 due to ? NSAID use )   pw CP ? hemetemesis   noted with anemia r/o ABLA for egd today  DARLINE with scr of 2.2 in setting of worsening anemia and recent diclofenac use   + OCB  + IVC   admitted to ICU for hypotension     rec  - DARLINE/CKD stage 3     improving,. monitor for SIOBHAN     fu trend of scr    arb/hctz/sglt2i on hold    metformin on hold    UA neg     KBUS upon clinical stability     CT with IVC no hydro     no nsaid   - ANemia r/o ABLA    for egd   - HFpEF with s/p TAVR    + trop ? demand ischemia     cards input noted    CAD with hx of non obs disease on cath within 1 year   dw dr audelia brown     2/12 SY  --DARLINE/CKD : improved and stable.    Med list reviewed.    Not to resume HCTZ on d/c.,    BP remains borderline--continue to hold ARB and SGLT2i for now.  --HFpEF with hx of TAVR : clinically compensated.  --Anemia : EGD with benign polyps and Colon with internal hemorrhoids and minimal proctitis--cauterized.  for outpt capsule study.  --Pt again educated to avoid all NSAIDS.

## 2025-02-12 NOTE — PHYSICAL THERAPY INITIAL EVALUATION ADULT - ADDITIONAL COMMENTS
pt c/o recurrent lower back pain for which he has seen Neurosurgeon Dr Lizarraga, chiropractor, accupuncturist, and was scheduled to see Dr LUIS Singletary ( on advice of chiropractor) and missed follow up appt scheduled with Dr Lizarraga today ( 2/12) and will miss pain management appt tomorrow with Dr Cuevas ?

## 2025-02-12 NOTE — CONSULT NOTE ADULT - SUBJECTIVE AND OBJECTIVE BOX
Patient is a 74y old  Male who presents with a chief complaint of Hypotension, anemia     HPI:  75 y/o Male with h/o HTN, DM, prostate CA, hypothyroidism, AVR, A.Fib on eliquis, chronic back pain s/p epidurals on diclofenac, HLD, NIDDM, CKD was admitted on  for chest pain. In ER he was noted hypotensive, POCT+ stool, and met sepsis criteria. He was started on zosyn.   On  he was reported with bacteremia.    PAST MEDICAL HISTORY:  Diabetes mellitus   Hypertension   Hypothyroidism   Obesity   Prostate CA.  PSH: as above    Meds: per reconciliation sheet, noted below  MEDICATIONS  (STANDING):  atorvastatin 40 milliGRAM(s) Oral at bedtime  dextrose 5%. 1000 milliLiter(s) (50 mL/Hr) IV Continuous <Continuous>  dextrose 5%. 1000 milliLiter(s) (100 mL/Hr) IV Continuous <Continuous>  dextrose 50% Injectable 25 Gram(s) IV Push once  dextrose 50% Injectable 12.5 Gram(s) IV Push once  dextrose 50% Injectable 25 Gram(s) IV Push once  dextrose Oral Gel 15 Gram(s) Oral once  gabapentin 200 milliGRAM(s) Oral at bedtime  glucagon  Injectable 1 milliGRAM(s) IntraMuscular once  insulin lispro (ADMELOG) corrective regimen sliding scale   SubCutaneous Before meals and at bedtime  lactated ringers. 1000 milliLiter(s) (125 mL/Hr) IV Continuous <Continuous>  levothyroxine 150 MICROGram(s) Oral daily  pantoprazole Infusion 8 mG/Hr (10 mL/Hr) IV Continuous <Continuous>  piperacillin/tazobactam IVPB.. 3.375 Gram(s) IV Intermittent every 8 hours    MEDICATIONS  (PRN):  zolpidem 5 milliGRAM(s) Oral at bedtime PRN Insomnia    Allergies    No Known Allergies    Intolerances      Social: no smoking, no alcohol, no illegal drugs; no recent travel, no exposure to TB  FAMILY HISTORY:    no history of premature cardiovascular disease in first degree relatives    ROS: the patient is confused, limited, denies fever, no chills, no HA, no seizures, no dizziness, no sore throat, no nasal congestion, no blurry vision, had CP, no palpitations, no SOB, no cough, no abdominal pain, no diarrhea, no N/V, no dysuria, no leg pain, no claudication, no rash, no joint aches, no rectal pain or bleeding, no night sweats  All other systems reviewed and are negative    Vital Signs Last 24 Hrs  T(C): 36.3 (2025 00:41), Max: 36.8 (2025 12:15)  T(F): 97.3 (2025 00:41), Max: 98.3 (2025 12:15)  HR: 76 (2025 00:41) (71 - 86)  BP: 104/60 (2025 00:41) (80/45 - 105/62)  BP(mean): 65 (2025 18:45) (57 - 76)  RR: 18 (2025 00:41) (18 - 25)  SpO2: 100% (2025 00:41) (95% - 100%)    Parameters below as of 2025 18:45  Patient On (Oxygen Delivery Method): nasal cannula  O2 Flow (L/min): 2    Daily     Daily Weight in k (2025 00:41)    PE:    Constitutional:  No acute distress  HEENT: NC/AT, EOMI, PERRLA, conjunctivae clear; ears and nose atraumatic; pharynx benign  Neck: supple; thyroid not palpable  Back: no tenderness  Respiratory: respiratory effort normal; clear to auscultation  Cardiovascular: S1S2 regular, no murmurs  Abdomen: soft, not tender, not distended, positive BS; no liver or spleen organomegaly  Genitourinary: no suprapubic tenderness  Lymphatic: no LN palpable  Musculoskeletal: no muscle tenderness, no joint swelling or tenderness  Extremities: no pedal edema  Neurological/ Psychiatric: Alert, judgement and insight impaired; moving all extremities  Skin: no rashes; no palpable lesions    Labs: all available labs reviewed                        8.8    12.33 )-----------( 253      ( 2025 06:04 )             27.0     02-12    139  |  108  |  26[H]  ----------------------------<  89  3.9   |  24  |  1.22    Ca    9.5      2025 06:04  Phos  3.0     02-12  Mg     1.6     02-12    TPro  6.5  /  Alb  2.3[L]  /  TBili  1.0  /  DBili  x   /  AST  30  /  ALT  25  /  AlkPhos  92  02-12     LIVER FUNCTIONS - ( 2025 06:04 )  Alb: 2.3 g/dL / Pro: 6.5 gm/dL / ALK PHOS: 92 U/L / ALT: 25 U/L / AST: 30 U/L / GGT: x           Urinalysis with Rflx Culture (collected 10 Feb 2025 12:47)    Culture - Blood (collected 10 Feb 2025 09:32)  Source: .Blood BLOOD  Gram Stain (2025 13:01):    Growth in aerobic bottle: Gram Positive Cocci in Pairs and Chains    Growth in anaerobic bottle: Gram Positive Cocci in Pairs and Chains  Preliminary Report (2025 13:01):    Growth in aerobic bottle: Gram Positive Cocci in Pairs and Chains    Growth in anaerobic bottle: Gram Positive Cocci in Pairs and Chains    Culture - Blood (collected 10 Feb 2025 09:28)  Source: .Blood BLOOD  Gram Stain (2025 09:51):    Growth in aerobic bottle: Gram Positive Cocci in Pairs and Chains    Growth in anaerobic bottle: Gram Positive Cocci in Pairs and Chains  Preliminary Report (2025 23:36):    Growth in aerobic and anaerobic bottles: Streptococcus    salivarius/vestibularis group    Alpha hemolytic streptoccous (Not Streptococcus pneumoniae or    Enterococcus)    isolated from a single blood culture sets may represent    contamination. Contact theMicrobiology Department at [phone] if    susceptibility testing is needed.    Direct identification is available within approximately 3-5    hours either by Blood Panel Multiplexed PCR or Direct    MALDI-TOF. Details: [email or web address]  Organism: Blood Culture PCR (2025 10:48)  Organism: Blood Culture PCR (2025 10:48)      Method Type: PCR      -  Streptococcus sp. (Not Grp A, B or S pneumoniae): Detec    Radiology: all available radiological tests reviewed    < from: Xray Chest 1 View- PORTABLE-Urgent (02.10.25 @ 10:34) >  IMPRESSION: No acute finding.  < end of copied text >    < from: CT Abdomen and Pelvis w/wo IV Cont (02.10.25 @ 10:09) >  IMPRESSION:  *  No acute pathology.  *  No active GI bleeding.  < end of copied text >  < from: TTE Echo Complete w/ Contrast w/ Doppler (23 @ 19:34) >   1. Left ventricular ejection fraction, by visual estimation, is >75%.   2. Hyperdynamic global left ventricular systolic function.   3. Spectral Doppler shows impaired relaxation pattern of left ventricular myocardial filling (Grade I diastolic dysfunction).   4. There is moderate concentric left ventricular hypertrophy.   5. Mildly enlarged left atrium.   6. There is no evidence of pericardial effusion.   7. Mild mitral annular calcification.   8. Thickening of the anterior mitral valve leaflet.   9. Trace mitral valve regurgitation.  10. TAVR in the aortic position.  11. LVOT vmax at rest : 120 cm/s, LVOT vmax with valsalva : 157 cm/s. No   evidence of dynamic LVOTO. There is mild paravalvular regurgitation of the prosthetic AoV.  12. Mildly dilated pulmonary artery.  13. Adequate TR velocity was not obtained to accurately assess RVSP.  < end of copied text >    Advanced directives addressed: full resuscitation Patient is a 74y old  Male who presents with a chief complaint of Hypotension, anemia     HPI:  73 y/o Male with h/o HTN, DM, prostate CA, hypothyroidism, AVR, A.Fib on eliquis, chronic back pain s/p epidurals on diclofenac, HLD, NIDDM, CKD was admitted on  for chest pain. He developed low back pain in 2024 and since then he had acupuncture and spine steroids injections with partial improvement. He saw Dr. Lizarraga and had an MRI spine 3 weeks PTA. He reported low grade fever at home for last 2-3 weeks PTA. In ER he was noted hypotensive, POCT+ stool, and met sepsis criteria. He was started on zosyn.   On  he was reported with bacteremia.    PAST MEDICAL HISTORY:  Diabetes mellitus   Hypertension   Hypothyroidism   Obesity   Prostate CA.  PSH: as above    Meds: per reconciliation sheet, noted below  MEDICATIONS  (STANDING):  atorvastatin 40 milliGRAM(s) Oral at bedtime  dextrose 5%. 1000 milliLiter(s) (50 mL/Hr) IV Continuous <Continuous>  dextrose 5%. 1000 milliLiter(s) (100 mL/Hr) IV Continuous <Continuous>  dextrose 50% Injectable 25 Gram(s) IV Push once  dextrose 50% Injectable 12.5 Gram(s) IV Push once  dextrose 50% Injectable 25 Gram(s) IV Push once  dextrose Oral Gel 15 Gram(s) Oral once  gabapentin 200 milliGRAM(s) Oral at bedtime  glucagon  Injectable 1 milliGRAM(s) IntraMuscular once  insulin lispro (ADMELOG) corrective regimen sliding scale   SubCutaneous Before meals and at bedtime  lactated ringers. 1000 milliLiter(s) (125 mL/Hr) IV Continuous <Continuous>  levothyroxine 150 MICROGram(s) Oral daily  pantoprazole Infusion 8 mG/Hr (10 mL/Hr) IV Continuous <Continuous>  piperacillin/tazobactam IVPB.. 3.375 Gram(s) IV Intermittent every 8 hours    MEDICATIONS  (PRN):  zolpidem 5 milliGRAM(s) Oral at bedtime PRN Insomnia    Allergies    No Known Allergies    Intolerances      Social: no smoking, no alcohol, no illegal drugs; no recent travel, no exposure to TB  FAMILY HISTORY:    no history of premature cardiovascular disease in first degree relatives    ROS: the patient is confused, limited, denies fever, no chills, no HA, no seizures, no dizziness, no sore throat, no nasal congestion, no blurry vision, had CP, no palpitations, no SOB, no cough, no abdominal pain, no diarrhea, no N/V, no dysuria, no leg pain, no claudication, no rash, no joint aches, no rectal pain or bleeding, no night sweats  All other systems reviewed and are negative    Vital Signs Last 24 Hrs  T(C): 36.3 (2025 00:41), Max: 36.8 (2025 12:15)  T(F): 97.3 (2025 00:41), Max: 98.3 (2025 12:15)  HR: 76 (2025 00:41) (71 - 86)  BP: 104/60 (2025 00:41) (80/45 - 105/62)  BP(mean): 65 (2025 18:45) (57 - 76)  RR: 18 (2025 00:41) (18 - 25)  SpO2: 100% (2025 00:41) (95% - 100%)    Parameters below as of 2025 18:45  Patient On (Oxygen Delivery Method): nasal cannula  O2 Flow (L/min): 2    Daily     Daily Weight in k (2025 00:41)    PE:    Constitutional:  No acute distress  HEENT: NC/AT, EOMI, PERRLA, conjunctivae clear; ears and nose atraumatic; pharynx benign  Neck: supple; thyroid not palpable  Back: no tenderness  Respiratory: respiratory effort normal; clear to auscultation  Cardiovascular: S1S2 regular, no murmurs  Abdomen: soft, not tender, not distended, positive BS; no liver or spleen organomegaly  Genitourinary: no suprapubic tenderness  Lymphatic: no LN palpable  Musculoskeletal: no muscle tenderness, no joint swelling or tenderness  Extremities: no pedal edema  Neurological/ Psychiatric: Alert, judgement and insight impaired; moving all extremities  Skin: no rashes; no palpable lesions    Labs: all available labs reviewed                        8.8    12.33 )-----------( 253      ( 2025 06:04 )             27.0     02-12    139  |  108  |  26[H]  ----------------------------<  89  3.9   |  24  |  1.22    Ca    9.5      2025 06:04  Phos  3.0     02-12  Mg     1.6     02-12    TPro  6.5  /  Alb  2.3[L]  /  TBili  1.0  /  DBili  x   /  AST  30  /  ALT  25  /  AlkPhos  92  02-12     LIVER FUNCTIONS - ( 2025 06:04 )  Alb: 2.3 g/dL / Pro: 6.5 gm/dL / ALK PHOS: 92 U/L / ALT: 25 U/L / AST: 30 U/L / GGT: x           Urinalysis with Rflx Culture (collected 10 Feb 2025 12:47)    Culture - Blood (collected 10 Feb 2025 09:32)  Source: .Blood BLOOD  Gram Stain (2025 13:01):    Growth in aerobic bottle: Gram Positive Cocci in Pairs and Chains    Growth in anaerobic bottle: Gram Positive Cocci in Pairs and Chains  Preliminary Report (2025 13:01):    Growth in aerobic bottle: Gram Positive Cocci in Pairs and Chains    Growth in anaerobic bottle: Gram Positive Cocci in Pairs and Chains    Culture - Blood (collected 10 Feb 2025 09:28)  Source: .Blood BLOOD  Gram Stain (2025 09:51):    Growth in aerobic bottle: Gram Positive Cocci in Pairs and Chains    Growth in anaerobic bottle: Gram Positive Cocci in Pairs and Chains  Preliminary Report (2025 23:36):    Growth in aerobic and anaerobic bottles: Streptococcus    salivarius/vestibularis group    Alpha hemolytic streptoccous (Not Streptococcus pneumoniae or    Enterococcus)    isolated from a single blood culture sets may represent    contamination. Contact theMicrobiology Department at [phone] if    susceptibility testing is needed.    Direct identification is available within approximately 3-5    hours either by Blood Panel Multiplexed PCR or Direct    MALDI-TOF. Details: [email or web address]  Organism: Blood Culture PCR (2025 10:48)  Organism: Blood Culture PCR (2025 10:48)      Method Type: PCR      -  Streptococcus sp. (Not Grp A, B or S pneumoniae): Detec    Radiology: all available radiological tests reviewed    < from: Xray Chest 1 View- PORTABLE-Urgent (02.10.25 @ 10:34) >  IMPRESSION: No acute finding.  < end of copied text >    < from: CT Abdomen and Pelvis w/wo IV Cont (02.10.25 @ 10:09) >  IMPRESSION:  *  No acute pathology.  *  No active GI bleeding.  < end of copied text >  < from: TTE Echo Complete w/ Contrast w/ Doppler (23 @ 19:34) >   1. Left ventricular ejection fraction, by visual estimation, is >75%.   2. Hyperdynamic global left ventricular systolic function.   3. Spectral Doppler shows impaired relaxation pattern of left ventricular myocardial filling (Grade I diastolic dysfunction).   4. There is moderate concentric left ventricular hypertrophy.   5. Mildly enlarged left atrium.   6. There is no evidence of pericardial effusion.   7. Mild mitral annular calcification.   8. Thickening of the anterior mitral valve leaflet.   9. Trace mitral valve regurgitation.  10. TAVR in the aortic position.  11. LVOT vmax at rest : 120 cm/s, LVOT vmax with valsalva : 157 cm/s. No   evidence of dynamic LVOTO. There is mild paravalvular regurgitation of the prosthetic AoV.  12. Mildly dilated pulmonary artery.  13. Adequate TR velocity was not obtained to accurately assess RVSP.  < end of copied text >    Advanced directives addressed: full resuscitation

## 2025-02-12 NOTE — PHYSICAL THERAPY INITIAL EVALUATION ADULT - DIAGNOSIS, PT EVAL
stated
anemia, hypotension, s/p 2u PRBCs, chest pain, r/o demand ischemia vs NSTEMI, DM,chronic LBP, acute strepA bacteremia, r/o endocarditis, r/o discitis

## 2025-02-12 NOTE — DIETITIAN INITIAL EVALUATION ADULT - PERTINENT LABORATORY DATA
02-12    139  |  108  |  26[H]  ----------------------------<  89  3.9   |  24  |  1.22    Ca    9.5      12 Feb 2025 06:04  Phos  3.0     02-12  Mg     1.6     02-12    TPro  6.5  /  Alb  2.3[L]  /  TBili  1.0  /  DBili  x   /  AST  30  /  ALT  25  /  AlkPhos  92  02-12  POCT Blood Glucose.: 134 mg/dL (02-12-25 @ 08:58)  A1C with Estimated Average Glucose Result: 5.9 % (02-10-25 @ 16:58)

## 2025-02-12 NOTE — PROGRESS NOTE ADULT - SUBJECTIVE AND OBJECTIVE BOX
CHIEF COMPLAINT: Patient is a 74y old  Male who presents with a chief complaint of Gastrointestinal hemorrhage     (12 Feb 2025 10:14)      HPI:  ICU H&P    S:    Pt seen and examined  74M  PMHx of diabetes, high blood pressure, aortic valve replacement, AF on eliquis, chronic back pain s/p epidurals on diclofenac, HLD, hypothyroid, NIDDM, sAS, CKD  Pt here for chest pain  Recent Dx anemia on blood work  ICU consult for hypotension    2/10: POCT+ stool, no gross blood, last EGD distant past; CT C/A/P done, no obv abnl found. Rec'ing blood, GI called. (10 Feb 2025 13:53)    2/10-patient well known to Dr GILES Heath; with history of HTN, HLD, DM, HFpEF and AS s/p TAVR (non obstructive CAD on cath-2023).  Post TARV, had heart block and then MCOT placed-eventually diagnosed with Afib and started on AC.          Today, patient presents to ER c/p CP/SOB and vomiting. Onset of symptoms began this morning with midsternal CP. + Troponin levels and EKG with sinus tachycardia with non specific st changes.  Patient took one aspirin at home and vomited. Pt reports emesis appeared dark in color. EMS gave pt Aspirin 324mg, Nitro x 1.  He has chronic back pain s/p epidurals on diclofenac.  Recent Dx anemia on blood work.  ICU consult for hypotension called by ER.  PAtient with low grade fever, elevated WBC and GUAIAC + stool.  Viral panel negative.      2/11patient with low grade fever, elevated WBC with prior cath over 1 year ago with non-obstructive CAD at the time of TAVR who presents with chest pains, n/v/ coffee ground emesis with severe anemia and suspected demand ischemia  Patient with BP in the low 100's after 2 units of PRBC and fluids.  Hold antiplatelet and AC medications in face of severe life threatening bleed.  Patient with planne endoscopy today; will need antibiotic prophylaxis prior to procedure.  Currently on zosyn.   ECHO pending today.  H/H up to 8 and 25 (from 7 and 23/9) after 2 units of blood.  Creatinine improved and troponin climbed to 1200's.      2/12--patient with + blood cultures; streptococcus group a strep in multiple cultures; s/p EGD and colonoscopy-with no source of bleed.   AC held and transfused-stable H/H.         PMHx: PAST MEDICAL & SURGICAL HISTORY:  Hypertension      Diabetes mellitus      Obesity      Hypothyroidism      Prostate CA          Allergies: Allergies    No Known Allergies    Intolerances          REVIEW OF SYSTEMS:    CONSTITUTIONAL: No weakness, fevers or chills  EYES/ENT: No visual changes;  No vertigo or throat pain   NECK: No pain or stiffness  RESPIRATORY: No cough, wheezing, hemoptysis; No shortness of breath  CARDIOVASCULAR: No chest pain or palpitations  GASTROINTESTINAL: No abdominal or epigastric pain. No nausea, vomiting, or hematemesis; No diarrhea or constipation. No melena or hematochezia.  GENITOURINARY: No dysuria, frequency or hematuria  NEUROLOGICAL: No numbness or weakness  SKIN: No itching, burning, rashes, or lesions   All other review of systems is negative unless indicated above    Vital Signs Last 24 Hrs  T(C): 36.3 (12 Feb 2025 00:41), Max: 36.8 (11 Feb 2025 12:15)  T(F): 97.3 (12 Feb 2025 00:41), Max: 98.3 (11 Feb 2025 12:15)  HR: 76 (12 Feb 2025 00:41) (71 - 85)  BP: 104/60 (12 Feb 2025 00:41) (80/45 - 104/60)  BP(mean): 65 (11 Feb 2025 18:45) (57 - 73)  RR: 18 (12 Feb 2025 00:41) (18 - 25)  SpO2: 100% (12 Feb 2025 00:41) (95% - 100%)    Parameters below as of 11 Feb 2025 18:45  Patient On (Oxygen Delivery Method): nasal cannula  O2 Flow (L/min): 2      I&O's Summary    11 Feb 2025 07:01  -  12 Feb 2025 07:00  --------------------------------------------------------  IN: 100 mL / OUT: 1725 mL / NET: -1625 mL            PHYSICAL EXAM:   Constitutional: NAD, awake and alert, well-developed  HEENT: PERR, EOMI, Normal Hearing, MMM  Neck: Soft and supple, No LAD, No JVD  Respiratory: Breath sounds are clear bilaterally, No wheezing, rales or rhonchi  Cardiovascular: S1 and S2, regular rate and rhythm, no Murmurs, gallops or rubs  Gastrointestinal: Bowel Sounds present, soft, nontender, nondistended, no guarding, no rebound  Extremities: No peripheral edema  Vascular: 2+ peripheral pulses  Neurological: A/O x 3, no focal deficits  Musculoskeletal: 5/5 strength b/l upper and lower extremities  Skin: No rashes    MEDICATIONS:  MEDICATIONS  (STANDING):  atorvastatin 40 milliGRAM(s) Oral at bedtime  cefTRIAXone Injectable. 2000 milliGRAM(s) IV Push every 24 hours  dextrose 5%. 1000 milliLiter(s) (50 mL/Hr) IV Continuous <Continuous>  dextrose 5%. 1000 milliLiter(s) (100 mL/Hr) IV Continuous <Continuous>  dextrose 50% Injectable 25 Gram(s) IV Push once  dextrose 50% Injectable 12.5 Gram(s) IV Push once  dextrose 50% Injectable 25 Gram(s) IV Push once  dextrose Oral Gel 15 Gram(s) Oral once  gabapentin 200 milliGRAM(s) Oral at bedtime  glucagon  Injectable 1 milliGRAM(s) IntraMuscular once  insulin lispro (ADMELOG) corrective regimen sliding scale   SubCutaneous Before meals and at bedtime  lactated ringers. 1000 milliLiter(s) (125 mL/Hr) IV Continuous <Continuous>  levothyroxine 150 MICROGram(s) Oral daily  pantoprazole Infusion 8 mG/Hr (10 mL/Hr) IV Continuous <Continuous>  vancomycin  IVPB 750 milliGRAM(s) IV Intermittent every 12 hours      LABS: All Labs Reviewed:                        8.8    12.33 )-----------( 253      ( 12 Feb 2025 06:04 )             27.0     02-12    139  |  108  |  26[H]  ----------------------------<  89  3.9   |  24  |  1.22    Ca    9.5      12 Feb 2025 06:04  Phos  3.0     02-12  Mg     1.6     02-12    TPro  6.5  /  Alb  2.3[L]  /  TBili  1.0  /  DBili  x   /  AST  30  /  ALT  25  /  AlkPhos  92  02-12    PT/INR - ( 11 Feb 2025 05:03 )   PT: 15.4 sec;   INR: 1.34 ratio         PTT - ( 11 Feb 2025 05:03 )  PTT:32.2 sec      Blood Culture: Organism --  Gram Stain Blood -- Gram Stain   Growth in aerobic bottle: Gram Positive Cocci in Pairs and Chains  Growth in anaerobic bottle: Gram Positive Cocci in Pairs and Chains  Specimen Source .Blood BLOOD  Culture-Blood --    Organism Blood Culture PCR  Gram Stain Blood -- Gram Stain   Growth in aerobic bottle: Gram Positive Cocci in Pairs and Chains  Growth in anaerobic bottle: Gram Positive Cocci in Pairs and Chains  Specimen Source .Blood BLOOD  Culture-Blood --          BNP     RADIOLOGY:    EKG:      Telemetry:    ECHO:

## 2025-02-12 NOTE — PROGRESS NOTE ADULT - SUBJECTIVE AND OBJECTIVE BOX
SUBJECTIVE:    CHIEF COMPLAINT:  Patient is a 74y old  Male who presents with a chief complaint of Hypotension, anemia (12 Feb 2025 11:56)    Interval HPI and Overnight Events: Pt feeling better. Tolerated colonoscopy. No polyps  Mild radiation proctitis and hemorrhoid      REVIEW OF SYSTEMS:  CONSTITUTIONAL: No weakness, fevers or chills  EYES/ENT: No visual changes;  No vertigo or throat pain   NECK: No pain or stiffness  RESPIRATORY: No cough, wheezing, hemoptysis; No shortness of breath  CARDIOVASCULAR: No chest pain or palpitations  GASTROINTESTINAL: No abdominal or epigastric pain. No nausea, vomiting, or hematemesis; No diarrhea or constipation. No melena or hematochezia.  GENITOURINARY: No dysuria, frequency or hematuria  NEUROLOGICAL: No numbness or weakness  SKIN: No itching, burning, rashes, or lesions   All other review of systems is negative unless indicated above    OBJECTIVE    Vital Signs Last 24 Hrs  T(C): 36.4 (12 Feb 2025 13:15), Max: 36.6 (11 Feb 2025 18:45)  T(F): 97.5 (12 Feb 2025 13:15), Max: 97.8 (11 Feb 2025 18:45)  HR: 86 (12 Feb 2025 13:15) (71 - 86)  BP: 129/73 (12 Feb 2025 13:15) (91/55 - 129/73)  BP(mean): 65 (11 Feb 2025 18:45) (65 - 68)  RR: 18 (12 Feb 2025 13:15) (18 - 19)  SpO2: 99% (12 Feb 2025 13:15) (98% - 100%)    Parameters below as of 12 Feb 2025 13:15  Patient On (Oxygen Delivery Method): nasal cannula  O2 Flow (L/min): 2      MEDICATIONS  (STANDING):  atorvastatin 40 milliGRAM(s) Oral at bedtime  cefTRIAXone Injectable. 2000 milliGRAM(s) IV Push every 24 hours  dextrose 5%. 1000 milliLiter(s) (50 mL/Hr) IV Continuous <Continuous>  dextrose 5%. 1000 milliLiter(s) (100 mL/Hr) IV Continuous <Continuous>  dextrose 50% Injectable 25 Gram(s) IV Push once  dextrose 50% Injectable 12.5 Gram(s) IV Push once  dextrose 50% Injectable 25 Gram(s) IV Push once  dextrose Oral Gel 15 Gram(s) Oral once  gabapentin 200 milliGRAM(s) Oral at bedtime  glucagon  Injectable 1 milliGRAM(s) IntraMuscular once  insulin lispro (ADMELOG) corrective regimen sliding scale   SubCutaneous Before meals and at bedtime  lactated ringers. 1000 milliLiter(s) (125 mL/Hr) IV Continuous <Continuous>  levothyroxine 150 MICROGram(s) Oral daily  pantoprazole Infusion 8 mG/Hr (10 mL/Hr) IV Continuous <Continuous>  vancomycin  IVPB 750 milliGRAM(s) IV Intermittent every 12 hours      LABS:                         8.8    12.33 )-----------( 253      ( 12 Feb 2025 06:04 )             27.0     02-12    139  |  108  |  26[H]  ----------------------------<  89  3.9   |  24  |  1.22    Ca    9.5      12 Feb 2025 06:04  Phos  3.0     02-12  Mg     1.6     02-12    TPro  6.5  /  Alb  2.3[L]  /  TBili  1.0  /  DBili  x   /  AST  30  /  ALT  25  /  AlkPhos  92  02-12    Urinalysis Basic - ( 12 Feb 2025 06:04 )    Color: x / Appearance: x / SG: x / pH: x  Gluc: 89 mg/dL / Ketone: x  / Bili: x / Urobili: x   Blood: x / Protein: x / Nitrite: x   Leuk Esterase: x / RBC: x / WBC x   Sq Epi: x / Non Sq Epi: x / Bacteria: x    PT/INR - ( 11 Feb 2025 05:03 )   PT: 15.4 sec;   INR: 1.34 ratio    PTT - ( 11 Feb 2025 05:03 )  PTT:32.2 sec    Specimen Source .Blood BLOOD   02-10 @ 09:32  Culture Results  Growth in aerobic and anaerobic bottles: Streptococcus  salivarius/vestibularis group[!]  Specimen Source .Blood BLOOD   02-10 @ 09:28  Culture Results  Growth in aerobic and anaerobic bottles: Streptococcus  salivarius/vestibularis group  Alpha hemolytic streptoccous (Not Streptococcus pneumoniae or  Enterococcus)  isolated from a single blood culture sets may represent  contamination. Contact theMicrobiology Department at [phone] if  susceptibility testing is needed.  Direct identification is available within approximately 3-5  hours either by Blood Panel Multiplexed PCR or Direct  MALDI-TOF. Details: [email or web address]    CAPILLARY BLOOD GLUCOSE  POCT Blood Glucose.: 227 mg/dL (12 Feb 2025 12:53)  POCT Blood Glucose.: 134 mg/dL (12 Feb 2025 08:58)  POCT Blood Glucose.: 96 mg/dL (12 Feb 2025 06:07)  POCT Blood Glucose.: 85 mg/dL (11 Feb 2025 21:34)  POCT Blood Glucose.: 196 mg/dL (11 Feb 2025 16:57)

## 2025-02-12 NOTE — DIETITIAN INITIAL EVALUATION ADULT - PERTINENT MEDS FT
MEDICATIONS  (STANDING):  atorvastatin 40 milliGRAM(s) Oral at bedtime  cefTRIAXone Injectable. 2000 milliGRAM(s) IV Push every 24 hours  dextrose 5%. 1000 milliLiter(s) (50 mL/Hr) IV Continuous <Continuous>  dextrose 5%. 1000 milliLiter(s) (100 mL/Hr) IV Continuous <Continuous>  dextrose 50% Injectable 25 Gram(s) IV Push once  dextrose 50% Injectable 12.5 Gram(s) IV Push once  dextrose 50% Injectable 25 Gram(s) IV Push once  dextrose Oral Gel 15 Gram(s) Oral once  gabapentin 200 milliGRAM(s) Oral at bedtime  glucagon  Injectable 1 milliGRAM(s) IntraMuscular once  insulin lispro (ADMELOG) corrective regimen sliding scale   SubCutaneous Before meals and at bedtime  lactated ringers. 1000 milliLiter(s) (125 mL/Hr) IV Continuous <Continuous>  levothyroxine 150 MICROGram(s) Oral daily  pantoprazole Infusion 8 mG/Hr (10 mL/Hr) IV Continuous <Continuous>  vancomycin  IVPB 750 milliGRAM(s) IV Intermittent every 12 hours    MEDICATIONS  (PRN):  zolpidem 5 milliGRAM(s) Oral at bedtime PRN Insomnia

## 2025-02-12 NOTE — PROGRESS NOTE ADULT - SUBJECTIVE AND OBJECTIVE BOX
NEPHROLOGY INTERVAL HPI/OVERNIGHT EVENTS:    Date of Service: 25 @ 08:32      HPI:  73 yo with hx of ckd ( scr in 1.4 due to ? NSAID use )   pw CP ? hemetemesis   noted with anemia r/o ABLA for egd today  DARLINE with scr of 2.2 in setting of worsening anemia and recent diclofenac use   + OCB  admitted to ICU for hypotension   no hx of bph     pmhx/psurg hx   - DM   - CKD ( scr 1.4)   - NIDDM   - hypothy  - afib eliquiis  - AVR  - htn   - dm   - prostate ca     Home Medications:   * Patient Currently Takes Medications as of 10-Feb-2025 12:08 documented in Structured Notes  · 	dapagliflozin 10 mg oral tablet: Last Dose Taken:  , 1 tab(s) orally every 24 hours  · 	Eliquis 5 mg oral tablet: Last Dose Taken:  , 1 tab(s) orally 2 times a day  · 	Turmeric 500 mg oral capsule: Last Dose Taken:  , 1 cap(s) orally once a day  · 	Januvia 50 mg oral tablet: Last Dose Taken:  , 1 tab(s) orally once a day  · 	Co-Q10 100 mg oral capsule: Last Dose Taken:  , 1 cap(s) orally once a day  · 	pantoprazole 40 mg oral delayed release tablet: Last Dose Taken:  , 1 tab(s) orally once a day  · 	Chondroitin-Glucosamine oral tablet: Last Dose Taken:  , 1 tab(s) orally once a day  · 	Ginkgo Biloba 120 mg oral capsule: Last Dose Taken:  , 1 cap(s) orally once a day  · 	atorvastatin 40 mg oral tablet: Last Dose Taken:  , 1 tab(s) orally once a day  · 	Melatonin 5 mg oral tablet: Last Dose Taken:  , 2 tab(s) orally once a day (at bedtime)  · 	losartan-hydroCHLOROthiazide 100 mg-12.5 mg oral tablet: Last Dose Taken:  , 1 tab(s) orally once a day  · 	gabapentin 100 mg oral capsule: Last Dose Taken:  , 2 cap(s) orally once a day (at bedtime)  · 	Multiple Vitamins oral tablet: Last Dose Taken:  , 1 tab(s) orally once a day  · 	cholecalciferol 25 mcg (1000 intl units) oral tablet: Last Dose Taken:  , 1 tab(s) orally once a day  · 	zolpidem 10 mg oral tablet: Last Dose Taken:  , 0.5 tab(s) orally once a day (at bedtime)  · 	diclofenac sodium 75 mg oral delayed release tablet: Last Dose Taken:  , 1 tab(s) orally 2 times a day ***pt took up until - said he planned to stop taking***  · 	finasteride 1 mg oral tablet: Last Dose Taken:  , 1 tab(s) orally once a day  · 	cyanocobalamin 1000 mcg oral tablet: Last Dose Taken:  , 1 tab(s) orally once a day  · 	levothyroxine 150 mcg (0.15 mg) oral tablet: Last Dose Taken:  , 1 tab(s) orally once a day ***pts list states 175mcg- DrFirst shows 150mcg filled on 11/15 x 90 days- called Dr Abdulaziz Jernigan's office to verify dose and was verified at 150mcg***  · 	doxycycline 20 mg oral tablet: Last Dose Taken:  , 1 tab(s) orally once a day  · 	MetFORMIN (Eqv-Glucophage XR) 500 mg oral tablet, extended release: Last Dose Taken:  , 4 tab(s) orally once a day (in the morning)  · 	PreserVision AREDS 2 oral capsule: Last Dose Taken:  , 1 cap(s) orally 2 times a day  · 	B-Complex 50 oral tablet: Last Dose Taken:  , 1 tab(s) orally once a day    MEDICATIONS  (STANDING):  atorvastatin 40 milliGRAM(s) Oral at bedtime  dextrose 5%. 1000 milliLiter(s) (50 mL/Hr) IV Continuous <Continuous>  dextrose 5%. 1000 milliLiter(s) (100 mL/Hr) IV Continuous <Continuous>  dextrose 50% Injectable 25 Gram(s) IV Push once  dextrose 50% Injectable 12.5 Gram(s) IV Push once  dextrose 50% Injectable 25 Gram(s) IV Push once  dextrose Oral Gel 15 Gram(s) Oral once  gabapentin 200 milliGRAM(s) Oral at bedtime  glucagon  Injectable 1 milliGRAM(s) IntraMuscular once  insulin lispro (ADMELOG) corrective regimen sliding scale   SubCutaneous Before meals and at bedtime  lactated ringers. 1000 milliLiter(s) (125 mL/Hr) IV Continuous <Continuous>  levothyroxine 150 MICROGram(s) Oral daily  pantoprazole Infusion 8 mG/Hr (10 mL/Hr) IV Continuous <Continuous>  piperacillin/tazobactam IVPB.. 3.375 Gram(s) IV Intermittent every 8 hours    MEDICATIONS  (PRN):  zolpidem 5 milliGRAM(s) Oral at bedtime PRN Insomnia      Vital Signs Last 24 Hrs  T(C): 36.3 (2025 00:41), Max: 36.8 (2025 12:15)  T(F): 97.3 (2025 00:41), Max: 98.3 (2025 12:15)  HR: 76 (2025 00:41) (71 - 86)  BP: 104/60 (2025 00:41) (80/45 - 105/62)  BP(mean): 65 (2025 18:45) (57 - 76)  RR: 18 (2025 00:41) (18 - 25)  SpO2: 100% (2025 00:41) (95% - 100%)    Parameters below as of 2025 18:45  Patient On (Oxygen Delivery Method): nasal cannula  O2 Flow (L/min): 2    Daily     Daily Weight in k (2025 00:41)    -11 @ 07:01  -  02-12 @ 07:00  --------------------------------------------------------  IN: 100 mL / OUT: 1725 mL / NET: -1625 mL    PHYSICAL EXAM:  GENERAL:   CHEST/LUNG:   HEART:   ABDOMEN:   EXTREMITIES:   SKIN:     LABS:                        8.8    12.33 )-----------( 253      ( 2025 06:04 )             27.0     02-12    139  |  108  |  26[H]  ----------------------------<  89  3.9   |  24  |  1.22    Ca    9.5      2025 06:04  Phos  3.0     02-12  Mg     1.6     02-12    TPro  6.5  /  Alb  2.3[L]  /  TBili  1.0  /  DBili  x   /  AST  30  /  ALT  25  /  AlkPhos  92  02-    PT/INR - ( 2025 05:03 )   PT: 15.4 sec;   INR: 1.34 ratio         PTT - ( 2025 05:03 )  PTT:32.2 sec  Urinalysis Basic - ( 2025 06:04 )    Color: x / Appearance: x / SG: x / pH: x  Gluc: 89 mg/dL / Ketone: x  / Bili: x / Urobili: x   Blood: x / Protein: x / Nitrite: x   Leuk Esterase: x / RBC: x / WBC x   Sq Epi: x / Non Sq Epi: x / Bacteria: x      Magnesium: 1.6 mg/dL ( @ 06:04)  Phosphorus: 3.0 mg/dL ( @ 06:04)          RADIOLOGY & ADDITIONAL TESTS:   NEPHROLOGY INTERVAL HPI/OVERNIGHT EVENTS:    Date of Service: 25 @ 08:32    --Feeling fair.  no new complaints    HPI:  75 yo with hx of ckd ( scr in 1.4 due to ? NSAID use )   pw CP ? hemetemesis   noted with anemia r/o ABLA for egd today  DARLINE with scr of 2.2 in setting of worsening anemia and recent diclofenac use   + OCB  admitted to ICU for hypotension   no hx of bph     pmhx/psurg hx   - DM   - CKD ( scr 1.4)   - NIDDM   - hypothy  - afib eliquiis  - AVR  - htn   - dm   - prostate ca     Home Medications:   * Patient Currently Takes Medications as of 10-Feb-2025 12:08 documented in Structured Notes  · 	dapagliflozin 10 mg oral tablet: Last Dose Taken:  , 1 tab(s) orally every 24 hours  · 	Eliquis 5 mg oral tablet: Last Dose Taken:  , 1 tab(s) orally 2 times a day  · 	Turmeric 500 mg oral capsule: Last Dose Taken:  , 1 cap(s) orally once a day  · 	Januvia 50 mg oral tablet: Last Dose Taken:  , 1 tab(s) orally once a day  · 	Co-Q10 100 mg oral capsule: Last Dose Taken:  , 1 cap(s) orally once a day  · 	pantoprazole 40 mg oral delayed release tablet: Last Dose Taken:  , 1 tab(s) orally once a day  · 	Chondroitin-Glucosamine oral tablet: Last Dose Taken:  , 1 tab(s) orally once a day  · 	Ginkgo Biloba 120 mg oral capsule: Last Dose Taken:  , 1 cap(s) orally once a day  · 	atorvastatin 40 mg oral tablet: Last Dose Taken:  , 1 tab(s) orally once a day  · 	Melatonin 5 mg oral tablet: Last Dose Taken:  , 2 tab(s) orally once a day (at bedtime)  · 	losartan-hydroCHLOROthiazide 100 mg-12.5 mg oral tablet: Last Dose Taken:  , 1 tab(s) orally once a day  · 	gabapentin 100 mg oral capsule: Last Dose Taken:  , 2 cap(s) orally once a day (at bedtime)  · 	Multiple Vitamins oral tablet: Last Dose Taken:  , 1 tab(s) orally once a day  · 	cholecalciferol 25 mcg (1000 intl units) oral tablet: Last Dose Taken:  , 1 tab(s) orally once a day  · 	zolpidem 10 mg oral tablet: Last Dose Taken:  , 0.5 tab(s) orally once a day (at bedtime)  · 	diclofenac sodium 75 mg oral delayed release tablet: Last Dose Taken:  , 1 tab(s) orally 2 times a day ***pt took up until - said he planned to stop taking***  · 	finasteride 1 mg oral tablet: Last Dose Taken:  , 1 tab(s) orally once a day  · 	cyanocobalamin 1000 mcg oral tablet: Last Dose Taken:  , 1 tab(s) orally once a day  · 	levothyroxine 150 mcg (0.15 mg) oral tablet: Last Dose Taken:  , 1 tab(s) orally once a day ***pts list states 175mcg- DrFirst shows 150mcg filled on 11/15 x 90 days- called Dr Abdulaziz Jernigan's office to verify dose and was verified at 150mcg***  · 	doxycycline 20 mg oral tablet: Last Dose Taken:  , 1 tab(s) orally once a day  · 	MetFORMIN (Eqv-Glucophage XR) 500 mg oral tablet, extended release: Last Dose Taken:  , 4 tab(s) orally once a day (in the morning)  · 	PreserVision AREDS 2 oral capsule: Last Dose Taken:  , 1 cap(s) orally 2 times a day  · 	B-Complex 50 oral tablet: Last Dose Taken:  , 1 tab(s) orally once a day    MEDICATIONS  (STANDING):  atorvastatin 40 milliGRAM(s) Oral at bedtime  dextrose 5%. 1000 milliLiter(s) (50 mL/Hr) IV Continuous <Continuous>  dextrose 5%. 1000 milliLiter(s) (100 mL/Hr) IV Continuous <Continuous>  dextrose 50% Injectable 25 Gram(s) IV Push once  dextrose 50% Injectable 12.5 Gram(s) IV Push once  dextrose 50% Injectable 25 Gram(s) IV Push once  dextrose Oral Gel 15 Gram(s) Oral once  gabapentin 200 milliGRAM(s) Oral at bedtime  glucagon  Injectable 1 milliGRAM(s) IntraMuscular once  insulin lispro (ADMELOG) corrective regimen sliding scale   SubCutaneous Before meals and at bedtime  lactated ringers. 1000 milliLiter(s) (125 mL/Hr) IV Continuous <Continuous>  levothyroxine 150 MICROGram(s) Oral daily  pantoprazole Infusion 8 mG/Hr (10 mL/Hr) IV Continuous <Continuous>  piperacillin/tazobactam IVPB.. 3.375 Gram(s) IV Intermittent every 8 hours    MEDICATIONS  (PRN):  zolpidem 5 milliGRAM(s) Oral at bedtime PRN Insomnia      Vital Signs Last 24 Hrs  T(C): 36.3 (2025 00:41), Max: 36.8 (2025 12:15)  T(F): 97.3 (2025 00:41), Max: 98.3 (2025 12:15)  HR: 76 (2025 00:41) (71 - 86)  BP: 104/60 (2025 00:41) (80/45 - 105/62)  BP(mean): 65 (2025 18:45) (57 - 76)  RR: 18 (2025 00:41) (18 - 25)  SpO2: 100% (2025 00:41) (95% - 100%)    Parameters below as of 2025 18:45  Patient On (Oxygen Delivery Method): nasal cannula  O2 Flow (L/min): 2    Daily     Daily Weight in k (2025 00:41)    02-11 @ 07:01  -  02-12 @ 07:00  --------------------------------------------------------  IN: 100 mL / OUT: 1725 mL / NET: -1625 mL    PHYSICAL EXAM:  GENERAL: comfortable  CHEST/LUNG: clear to aus  HEART: S1S2 RRR  ABDOMEN: soft  EXTREMITIES: no edema  SKIN:     LABS:                        8.8    12.33 )-----------( 253      ( 2025 06:04 )             27.0     02-12    139  |  108  |  26[H]  ----------------------------<  89  3.9   |  24  |  1.22    Ca    9.5      2025 06:04  Phos  3.0     02-12  Mg     1.6     02-12    TPro  6.5  /  Alb  2.3[L]  /  TBili  1.0  /  DBili  x   /  AST  30  /  ALT  25  /  AlkPhos  92      PT/INR - ( 2025 05:03 )   PT: 15.4 sec;   INR: 1.34 ratio         PTT - ( 2025 05:03 )  PTT:32.2 sec  Urinalysis Basic - ( 2025 06:04 )    Color: x / Appearance: x / SG: x / pH: x  Gluc: 89 mg/dL / Ketone: x  / Bili: x / Urobili: x   Blood: x / Protein: x / Nitrite: x   Leuk Esterase: x / RBC: x / WBC x   Sq Epi: x / Non Sq Epi: x / Bacteria: x      Magnesium: 1.6 mg/dL ( @ 06:04)  Phosphorus: 3.0 mg/dL ( @ 06:04)          RADIOLOGY & ADDITIONAL TESTS:

## 2025-02-12 NOTE — PHYSICAL THERAPY INITIAL EVALUATION ADULT - PATIENT PROFILE REVIEW, REHAB EVAL
pt had ENDOSCOPIC workup for GI bleeding including colonoscopy this morning ,revealed internal hemorrhoids, minimal radiation proctitis cauterized with APC, OK to resume AC per Dr Porter/yes

## 2025-02-12 NOTE — PHYSICAL THERAPY INITIAL EVALUATION ADULT - GENERAL OBSERVATIONS, REHAB EVAL
Resting nearly flat in bed,awake,alert, Ox4, wife at bedside,pale ,demies lower back pain at rest in bed but reports that it recurs with movement out of bed, pt had colonoscopy this morning and only slep 90 mins last night, c/o feeling very tired ,reports he is scheduled to have repeat MRI this afternoon ( had prior MRI [facility] and has disc at bedside )

## 2025-02-12 NOTE — DIETITIAN INITIAL EVALUATION ADULT - ADD RECOMMEND
1) Recommend to liberalize diet to regular to maximize caloric and nutrient intake  2) Add ensure max BID (provides 150kcal, 30g protein)   3) Monitor bowel movements, if no BM for >3 days, consider implementing bowel regimen.  4) Encourage protein-rich foods, maximize food preferences  5) MVI w/ minerals daily to ensure 100% RDA met  6) Consider adding thiamine 100 mg daily 2/2 poor PO intake/ malnutrition  7) Monitor and optimize BG levels between 140-180 mg/dL by medical management  8) Monitor lytes/ min and replete prn.  9) Consider adding appetite stimulant such as Remeron or Marinol 2/2 chronically poor appetite/ PO intake  10) Confirm goals of care regarding nutrition support  RD will continue to monitor PO intake, labs, hydration, and wt prn.

## 2025-02-12 NOTE — CONSULT NOTE ADULT - CONSULT REQUESTED DATE/TIME
10-Feb-2025 13:44
10-Feb-2025 19:46
10-Feb-2025 17:00
12-Feb-2025 11:57
11-Feb-2025 10:18
12-Feb-2025 08:45

## 2025-02-12 NOTE — DIETITIAN INITIAL EVALUATION ADULT - REASON FOR ADMISSION
Gastrointestinal hemorrhage

## 2025-02-12 NOTE — CONSULT NOTE ADULT - ASSESSMENT
73 y/o Male with h/o HTN, DM, prostate CA, hypothyroidism, AVR, A.Fib on eliquis, chronic back pain s/p epidurals on diclofenac, HLD, NIDDM, CKD was admitted on 2/11 for chest pain. In ER he was noted hypotensive, POCT+ stool, and met sepsis criteria. He was started on zosyn.     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 AVR  #Hypotension   #Leukocytosis  #ARF on CRF stage 3  -cultures reviewed  -concern for infection with multiresistant organisms is raised. Prior cultures reviewed. An epidemiologic assessment was performed. The risk of the microorganism spread to family members, healthcare staff is low. Standard isolation in place at present time. Will reconsider isolation measures according to infection control policy, further culture results and other new information. The patient will be monitored closely, cultures collected as appropriate. Broad spectrum abx therapy was started.  -start ceftriaxone 2 gm IV qd and vancomycin 750 mg IV q12h  -reason for abx use and side effects reviewed with patient; monitor BMP and vancomycin trough levels   -obtain Echo  -cardiology evaluation ?ROVERTO  -old chart reviewed to assess prior cultures  -monitor temps  -f/u CBC  -supportive care  2. Other issues:   -care per medicine    D/w medicine team   73 y/o Male with h/o HTN, DM, prostate CA, hypothyroidism, AVR, A.Fib on eliquis, chronic back pain s/p epidurals on diclofenac, HLD, NIDDM, CKD was admitted on 2/11 for chest pain. He developed low back pain in Nov 2024 and since then he had acupuncture and spine steroids injections with partial improvement. He saw Dr. Lizarraga and had an MRI spine 3 weeks PTA. He reported low grade fever at home for last 2-3 weeks PTA. In ER he was noted hypotensive, POCT+ stool, and met sepsis criteria. He was started on zosyn.     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 AVR  #Possible lumbar spine discitis / OM.  #Hypotension   #Leukocytosis  #ARF on CRF stage 3  -cultures reviewed  -concern for infection with multiresistant organisms is raised. Prior cultures reviewed. An epidemiologic assessment was performed. The risk of the microorganism spread to family members, healthcare staff is low. Standard isolation in place at present time. Will reconsider isolation measures according to infection control policy, further culture results and other new information. The patient will be monitored closely, cultures collected as appropriate. Broad spectrum abx therapy was started.  -start ceftriaxone 2 gm IV qd and vancomycin 750 mg IV q12h  -reason for abx use and side effects reviewed with patient; monitor BMP and vancomycin trough levels   -obtain Echo  -cardiology evaluation ?ROVERTO  -consider spine evaluation   -old chart reviewed to assess prior cultures  -monitor temps  -f/u CBC  -supportive care  2. Other issues:   -care per medicine    D/w medicine team  d/w Dr. Jernigan and Dr. Merida

## 2025-02-12 NOTE — DIETITIAN INITIAL EVALUATION ADULT - NSFNSGIIOFT_GEN_A_CORE
I&O's Detail    11 Feb 2025 07:01  -  12 Feb 2025 07:00  --------------------------------------------------------  IN:    IV PiggyBack: 100 mL  Total IN: 100 mL    OUT:    Voided (mL): 1725 mL  Total OUT: 1725 mL    Total NET: -1625 mL

## 2025-02-12 NOTE — PHYSICAL THERAPY INITIAL EVALUATION ADULT - PERTINENT HX OF CURRENT PROBLEM, REHAB EVAL
74M with PMH of diabetes, high blood pressure, TAVR , AF on eliquis, chronic lower back pain s/p epidurals in past on diclofenac, HLD, hypothyroid, NIDDM, sAS, CKD, presents to ED with chest pain  Recent Dx anemia on blood work. Has received epidural for sever back pain for the last 2 months. Has been taking NSAIDs. No history of melena. Did see a couple of drops of blood in the urine. Just had BW which showed Acute CKD and anemia with normal MCV. Advised nephrology consult but this am had CP. in ED stool was Guiac positive. Hypotensive in the ED despite fluids and admitted to ICU

## 2025-02-12 NOTE — DIETITIAN INITIAL EVALUATION ADULT - ORAL INTAKE PTA/DIET HISTORY
Lives at home w/ wife that cooks/grocery shops; however, mostly eats out. Endorses poor appetite and consuming </= 50% of ENN x 3 mo 2/2 chronic back pain. W/ T2DM - does NOT monitor BGL at home; on metformin, januvia and farxiga & is NOT on insulin. Does not follow specific diet at this time due to minimal PO intake.

## 2025-02-12 NOTE — CONSULT NOTE ADULT - SUBJECTIVE AND OBJECTIVE BOX
Neurosurgery:    75 y/o Male with h/o HTN, DM, prostate CA, hypothyroidism, AVR, A.Fib on eliquis, chronic back pain s/p epidurals on diclofenac, HLD, NIDDM, CKD was admitted on 2/11 for chest pain. He developed low back pain in Nov 2024 and since then he had acupuncture and spine steroids injections with partial improvement. He saw Dr. Lizarraga and had an MRI spine 3 weeks PTA. He reported low grade fever at home for last 2-3 weeks PTA. In ER he was noted hypotensive, POCT+ stool, and met sepsis criteria. He was started on zosyn.  On 2/12 he was reported with bacteremia.  Per pt report B & bladder remain intact no Loss of sensation.    MRI +/- of L spine ordered with request for quick sagittal survey to eval spinal bony and disc elements    PAST MEDICAL & SURGICAL HISTORY:  Hypertension  Diabetes mellitus  Obesity  Hypothyroidism  Prostate CA    FAMILY HISTORY:  Non- contrib    Social Hx:    Pt reports no Active Tob / EtOH    Allergies  No Known Allergies    MEDICATIONS  (STANDING):  atorvastatin 40 milliGRAM(s) Oral at bedtime  cefTRIAXone Injectable. 2000 milliGRAM(s) IV Push every 24 hours  dextrose 5%. 1000 milliLiter(s) (50 mL/Hr) IV Continuous <Continuous>  Gabapentin 200 milliGRAM(s) Oral at bedtime  Glucagon  Injectable 1 milliGRAM(s) IntraMuscular once  insulin lispro (ADMELOG) corrective regimen sliding scale   SubCutaneous Before meals and at bedtime  lactated ringers. 1000 milliLiter(s) (125 mL/Hr) IV Continuous <Continuous>  levothyroxine 150 MICROGram(s) Oral daily  pantoprazole Infusion 8 mG/Hr (10 mL/Hr) IV Continuous <Continuous>  vancomycin  IVPB 750 milliGRAM(s) IV Intermittent every 12 hours     ROS: Pertinent positives in HPI, all other ROS were reviewed and are negative.     Review of Symptoms: + back pain    Vital Signs Last 24 Hrs  T(C): 36.3 (12 Feb 2025 00:41), Max: 36.8 (11 Feb 2025 12:15)  T(F): 97.3 (12 Feb 2025 00:41), Max: 98.3 (11 Feb 2025 12:15)  HR: 76 (12 Feb 2025 00:41) (71 - 85)  BP: 104/60 (12 Feb 2025 00:41) (80/45 - 104/60)  BP(mean): 65 (11 Feb 2025 18:45) (57 - 73)  RR: 18 (12 Feb 2025 00:41) (18 - 25)  SpO2: 100% (12 Feb 2025 00:41) (95% - 100%)    Parameters below as of 11 Feb 2025 18:45  Patient On (Oxygen Delivery Method): nasal cannula  O2 Flow (L/min): 2      Labs:                        8.8    12.33 )-----------( 253      ( 12 Feb 2025 06:04 )             27.0     02-12    139  |  108  |  26[H]  ----------------------------<  89  3.9   |  24  |  1.22    Ca    9.5      12 Feb 2025 06:04  Phos  3.0     02-12  Mg     1.6     02-12    TPro  6.5  /  Alb  2.3[L]  /  TBili  1.0  /  DBili  x   /  AST  30  /  ALT  25  /  AlkPhos  92  02-12    PT/INR - ( 11 Feb 2025 05:03 )   PT: 15.4 sec;   INR: 1.34 ratio    PTT - ( 11 Feb 2025 05:03 )  PTT:32.2 sec    MR Lumbar spine: pending    Physical Exam:  Constitutional: Awake / alert  HEENT: PERRLA, EOMI  Neck: Supple  Respiratory: Breath sounds are clear bilaterally  Cardiovascular: S1 and S2, regular rhythm  Gastrointestinal: Soft, NT/ND  Extremities:  no edema  Musculoskeletal: no abnormal movements  Skin: No rashes    Neurological Exam:  HF: A x O x 3, follows commands, normal affect, speech fluent  CN: PERRL, EOMI, no NLFD, tongue midline  Motor: Strength 5/5 in all 4 ext, normal bulk and tone  LE's:  RLE IP 4-/5  LLE IP 4-/5  Quad b/l 4-/5  Hams b/l 4/5  DF/PF 5/5  + clonus 4-6 beats both LE"s  No Hoffmans b/l, No hypereflexia.  Sens: Intact to light touch b/l.  No groin numbness or loss of sensation  Reflexes: Symmetric and normal, no clonus, no Winter's   Coord:  No FNFA, dysmetria, KIMBERLEY intact   Gait/Balance: Cannot test    A/P:   75 y/o Male with h/o HTN, DM, prostate CA, hypothyroidism, AVR, A.Fib on eliquis, chronic back pain s/p epidurals on diclofenac, HLD, NIDDM, CKD was admitted on 2/11 for chest pain. He developed low back pain in Nov 2024 and since then he had acupuncture and spine steroids injections with partial improvement. He saw Dr. Lizarraga and had an MRI spine 3 weeks PTA. He reported low grade fever at home for last 2-3 weeks PTA. In ER he was noted hypotensive, POCT+ stool, and met sepsis criteria. He was started on zosyn.  On 2/12 he was reported with bacteremia.  Per pt report B & bladder remain intact no Loss of sensation.    - MR of Lumbar spine with and without ordered will review once complete  - Recommend saggital spine survey  - [facility] outpt MR study reviewed by Dr. Lizarraga - will comment and compare prior to new studies  - Will provider further input accordingly  - Pt with Hx of TAVR , + blood cultures, multiple spinal injections for pain  - Endoscopy and colonoscopy wnl, no abnormalities  - recommend repeat ESR / CRP studies  - ID / medicine notes appreciated  - DVT ppx both chemical and mechanical  - d/w   Dr. Lizarraga - neurosurgery spine service

## 2025-02-12 NOTE — PROGRESS NOTE ADULT - ASSESSMENT
patient with multiple issues  1-fever, group a strep and TAVR-will need ROVERTO for evauatiojn of endocarditis-set up to tomorrow morning.   Patient to be NPO after midnight tonight.  Patient has been off SGLT 2 for over 4 days and tolerated EGD and colonoscopy- can tolerate ROVERTO  [id number] with no source seen; d/w GI and ok'ed to resume AC; would do so with 1/2 dose of eliquis 2.5mg BID

## 2025-02-12 NOTE — PROGRESS NOTE ADULT - ASSESSMENT
74M with h/o  diabetes, high blood pressure, aortic valve replacement, AF on eliquis, chronic back pain s/p epidurals on diclofenac, HLD, hypothyroid, NIDDM, sAS, CKD, presents to ED with chest pain    Assessment:  Acute Hypovolemic Shock resolved with fluids  Possible GI Bleed. - Upper endoscopy and colonoscopy without obvious bleeding source   DARLINE - improving with hydration  Anemia - improved after 2 units PRBC's  DM  Febrile illness  Hyponatremia  Chronic Low Back pain without radiculopathy  Troponin spike - demand ischemia vs NSTEMI  Acute Strep Bacteremia    Plan:  Now on telemetry  S/p 2 units PRBC's  Follow H/H  Nephrology following  GI following. May need outpt. pill cam  Stop NSAIDs Holding Metformin and SGLT2  Holding Eliquis  Cardiology following. Will need ROVERTO - scheduled for am  ID consult appreciated. Now also on Vancomycin  OOB , PT  Neurosurgery consult appreciated. Repeat MRI to r/o bone infection  DNR/ DNI,  otherwise no other limitations to care

## 2025-02-12 NOTE — CONSULT NOTE ADULT - CONSULT REASON
Family medicine consult
damion/ckd
bacteremia
Lumbago with pain limiting ROM, + blood cultures
anemia
afib/GIB

## 2025-02-12 NOTE — PROGRESS NOTE ADULT - SUBJECTIVE AND OBJECTIVE BOX
Colon to TI:  Internal hemorrhoids  Minimal radiation procitits -- cauterized with APC  Otherwise normal    Rec:  Should have outpatient capsule endo  OK for antiplatelets/anticoag as needed

## 2025-02-12 NOTE — DIETITIAN INITIAL EVALUATION ADULT - OTHER INFO
74M with PMHx of diabetes, high blood pressure, aortic valve replacement, AF on eliquis, chronic back pain s/p epidurals on diclofenac, HLD, hypothyroid, NIDDM, sAS, CKD, presents to ED with chest pain.  Admit for acute hypovolemic shock, sepsis/ bacteremia with strep spp ?source, possible GIB     As per GI note on 2/11 - w/ internal hemorrhoids & minimal radiation procitits. W/ T2DM - POCTs mostly on lower end & 1 POCT elevated (96, 85, 98, 196) and hgb A1C of 5.9% on 2/10/25 - indicates good glycemic control for age. Previously NPO for colonoscopy, now advanced to DASH/TLC diet & consuming breakfast at time of visit ([place] toast, potatoes, fruit). Reports UBW of ~233# x 3 mo; bed scale wt of 215# taken by RD on 2/12/24. Unintentional wt loss of 18# / 7.7% wt loss x 3 mo; severe & clinically significant. Appears slightly overweight; however, NFPE reveals mild-mod muscle/ fat wasting. Recommend to liberalize diet to regular to maximize caloric and nutrient intake 2/2 malnutrition; BGL on lower end. Add ensure max BID (provides 150kcal, 30g protein) to optimize nutritional needs - pt is receptive. Monitor and optimize BG levels between 140-180 mg/dL by medical management. Encourage protein-rich foods, maximize food preferences. RD provided verbal nutrition education on sick day management and importance of monitoring BGL/treatment of hypogylcemia - pt is receptive. See below for other recommendations.

## 2025-02-13 ENCOUNTER — RESULT REVIEW (OUTPATIENT)
Age: 75
End: 2025-02-13

## 2025-02-13 LAB
-  CEFTRIAXONE: SIGNIFICANT CHANGE UP
-  PENICILLIN: SIGNIFICANT CHANGE UP
-  VANCOMYCIN: SIGNIFICANT CHANGE UP
ALBUMIN SERPL ELPH-MCNC: 2.2 G/DL — LOW (ref 3.3–5)
ANION GAP SERPL CALC-SCNC: 6 MMOL/L — SIGNIFICANT CHANGE UP (ref 5–17)
BUN SERPL-MCNC: 16 MG/DL — SIGNIFICANT CHANGE UP (ref 7–23)
CALCIUM SERPL-MCNC: 9.1 MG/DL — SIGNIFICANT CHANGE UP (ref 8.5–10.1)
CHLORIDE SERPL-SCNC: 107 MMOL/L — SIGNIFICANT CHANGE UP (ref 96–108)
CO2 SERPL-SCNC: 24 MMOL/L — SIGNIFICANT CHANGE UP (ref 22–31)
CREAT SERPL-MCNC: 1.02 MG/DL — SIGNIFICANT CHANGE UP (ref 0.5–1.3)
CULTURE RESULTS: ABNORMAL
EGFR: 77 ML/MIN/1.73M2 — SIGNIFICANT CHANGE UP
GLUCOSE BLDC GLUCOMTR-MCNC: 114 MG/DL — HIGH (ref 70–99)
GLUCOSE BLDC GLUCOMTR-MCNC: 156 MG/DL — HIGH (ref 70–99)
GLUCOSE BLDC GLUCOMTR-MCNC: 167 MG/DL — HIGH (ref 70–99)
GLUCOSE BLDC GLUCOMTR-MCNC: 179 MG/DL — HIGH (ref 70–99)
GLUCOSE SERPL-MCNC: 110 MG/DL — HIGH (ref 70–99)
HCT VFR BLD CALC: 27.2 % — LOW (ref 39–50)
HGB BLD-MCNC: 8.8 G/DL — LOW (ref 13–17)
MCHC RBC-ENTMCNC: 26.3 PG — LOW (ref 27–34)
MCHC RBC-ENTMCNC: 32.4 G/DL — SIGNIFICANT CHANGE UP (ref 32–36)
MCV RBC AUTO: 81.4 FL — SIGNIFICANT CHANGE UP (ref 80–100)
METHOD TYPE: SIGNIFICANT CHANGE UP
NRBC # BLD AUTO: 0 K/UL — SIGNIFICANT CHANGE UP (ref 0–0)
NRBC # FLD: 0 K/UL — SIGNIFICANT CHANGE UP (ref 0–0)
NRBC BLD AUTO-RTO: 0 /100 WBCS — SIGNIFICANT CHANGE UP (ref 0–0)
ORGANISM # SPEC MICROSCOPIC CNT: ABNORMAL
ORGANISM # SPEC MICROSCOPIC CNT: SIGNIFICANT CHANGE UP
PHOSPHATE SERPL-MCNC: 2.9 MG/DL — SIGNIFICANT CHANGE UP (ref 2.5–4.5)
PLATELET # BLD AUTO: 265 K/UL — SIGNIFICANT CHANGE UP (ref 150–400)
PMV BLD: 9.6 FL — SIGNIFICANT CHANGE UP (ref 7–13)
POTASSIUM SERPL-MCNC: 3.6 MMOL/L — SIGNIFICANT CHANGE UP (ref 3.5–5.3)
POTASSIUM SERPL-SCNC: 3.6 MMOL/L — SIGNIFICANT CHANGE UP (ref 3.5–5.3)
PROT SERPL-MCNC: 5.8 G/DL — LOW (ref 6–8.3)
RBC # BLD: 3.34 M/UL — LOW (ref 4.2–5.8)
RBC # FLD: 16.5 % — HIGH (ref 10.3–14.5)
SODIUM SERPL-SCNC: 137 MMOL/L — SIGNIFICANT CHANGE UP (ref 135–145)
SPECIMEN SOURCE: SIGNIFICANT CHANGE UP
WBC # BLD: 13.2 K/UL — HIGH (ref 3.8–10.5)
WBC # FLD AUTO: 13.2 K/UL — HIGH (ref 3.8–10.5)

## 2025-02-13 PROCEDURE — 93312 ECHO TRANSESOPHAGEAL: CPT | Mod: 26

## 2025-02-13 PROCEDURE — 99232 SBSQ HOSP IP/OBS MODERATE 35: CPT

## 2025-02-13 PROCEDURE — 93325 DOPPLER ECHO COLOR FLOW MAPG: CPT | Mod: 26

## 2025-02-13 PROCEDURE — 93320 DOPPLER ECHO COMPLETE: CPT | Mod: 26

## 2025-02-13 RX ORDER — LOSARTAN POTASSIUM 100 MG/1
50 TABLET, FILM COATED ORAL DAILY
Refills: 0 | Status: DISCONTINUED | OUTPATIENT
Start: 2025-02-13 | End: 2025-02-17

## 2025-02-13 RX ORDER — ACETYLCYSTEINE 200 MG/ML
13 INHALANT RESPIRATORY (INHALATION) ONCE
Refills: 0 | Status: DISCONTINUED | OUTPATIENT
Start: 2025-02-13 | End: 2025-02-13

## 2025-02-13 RX ORDER — ACETAMINOPHEN 500 MG/5ML
650 LIQUID (ML) ORAL EVERY 4 HOURS
Refills: 0 | Status: DISCONTINUED | OUTPATIENT
Start: 2025-02-13 | End: 2025-02-17

## 2025-02-13 RX ORDER — APIXABAN 2.5 MG/1
2.5 TABLET, FILM COATED ORAL
Refills: 0 | Status: DISCONTINUED | OUTPATIENT
Start: 2025-02-13 | End: 2025-02-17

## 2025-02-13 RX ORDER — GABAPENTIN 400 MG/1
200 CAPSULE ORAL THREE TIMES A DAY
Refills: 0 | Status: DISCONTINUED | OUTPATIENT
Start: 2025-02-13 | End: 2025-02-17

## 2025-02-13 RX ADMIN — INSULIN LISPRO 2: 100 INJECTION, SOLUTION INTRAVENOUS; SUBCUTANEOUS at 21:04

## 2025-02-13 RX ADMIN — APIXABAN 2.5 MILLIGRAM(S): 2.5 TABLET, FILM COATED ORAL at 21:04

## 2025-02-13 RX ADMIN — GABAPENTIN 200 MILLIGRAM(S): 400 CAPSULE ORAL at 21:04

## 2025-02-13 RX ADMIN — INSULIN LISPRO 2: 100 INJECTION, SOLUTION INTRAVENOUS; SUBCUTANEOUS at 18:24

## 2025-02-13 RX ADMIN — Medication 150 MICROGRAM(S): at 05:16

## 2025-02-13 RX ADMIN — CEFTRIAXONE 2000 MILLIGRAM(S): 500 INJECTION, POWDER, FOR SOLUTION INTRAMUSCULAR; INTRAVENOUS at 16:03

## 2025-02-13 RX ADMIN — Medication 650 MILLIGRAM(S): at 18:23

## 2025-02-13 RX ADMIN — Medication 5 MILLIGRAM(S): at 21:04

## 2025-02-13 RX ADMIN — ATORVASTATIN CALCIUM 40 MILLIGRAM(S): 80 TABLET, FILM COATED ORAL at 21:04

## 2025-02-13 NOTE — PROGRESS NOTE ADULT - ASSESSMENT
75 yo with hx of ckd ( scr in 1.4 due to ? NSAID use )   pw CP ? hemetemesis   noted with anemia r/o ABLA for egd today  DARLINE with scr of 2.2 in setting of worsening anemia and recent diclofenac use   + OCB  + IVC   admitted to ICU for hypotension     rec  - DARLINE/CKD stage 3     improving,. monitor for SIOBHAN     fu trend of scr    arb/hctz/sglt2i on hold    metformin on hold    UA neg     KBUS upon clinical stability     CT with IVC no hydro     no nsaid   - ANemia r/o ABLA    for egd   - HFpEF with s/p TAVR    + trop ? demand ischemia     cards input noted    CAD with hx of non obs disease on cath within 1 year   dw dr audelia brown     2/12 SY  --DARLINE/CKD : improved and stable.    Med list reviewed.    Not to resume HCTZ on d/c.,    BP remains borderline--continue to hold ARB and SGLT2i for now.  --HFpEF with hx of TAVR : clinically compensated.  --Anemia : EGD with benign polyps and Colon with internal hemorrhoids and minimal proctitis--cauterized.  for outpt capsule study.  --Pt again educated to avoid all NSAIDS.    2/13 SY  --DARLINE/CKD : improved and stable.  --HTN : BP now improved and stable.    Restart Losartan without HCTZ at 50 mg daily.    Not to resume HCTZ on d/c.,    SGLT2i on hold for now.  --HFpEF with hx of TAVR : clinically compensated.  --Anemia : EGD with benign polyps and Colon with internal hemorrhoids and minimal proctitis--cauterized.  for outpt capsule study.  --Strep bacteremia with prosthetic valve endocarditis : On Ceftriaxone per ID.  --Pt again educated to avoid all NSAIDS.  Explained even with renal recovery this time, repeated injury would lead to CKD in the future.

## 2025-02-13 NOTE — PROGRESS NOTE ADULT - ASSESSMENT
A/P:   75 y/o Male with h/o HTN, DM, prostate CA, hypothyroidism, AVR, A.Fib on eliquis, chronic back pain s/p epidurals on diclofenac, HLD, NIDDM, CKD was admitted on 2/11 for chest pain. He developed low back pain in Nov 2024 and since then he had acupuncture and spine steroids injections with partial improvement. He saw Dr. Lizarraga and had an MRI spine 3 weeks PTA. He reported low grade fever at home for last 2-3 weeks PTA. In ER he was noted hypotensive, POCT+ stool, and met sepsis criteria. He was started on zosyn.  On 2/12 he was reported with bacteremia.     #lumbar OM/discitis  #prosthetic bacterial endocarditis  #sepsis/ bacteremia     - MRI L-spine reviewed by Dr. Lizarraga and compared to prior outpatient MRI at [facility]. Stable chronic degenerative changes throughout the lumbar spine with new mild enhancement at L5-S1, likely suggestive of discitis.   - Pt with Hx of TAVR , ROVERTO +aortic valve vegetation. +BC  - No acute neurosurgical interventions indicated at this time for lumbar OM/discitis. Continue medical management with antibiotics as per ID recommendations   - ID / medicine recs appreciated  - DVT ppx both chemical and mechanical  - Will sign off at this time and can be reconsulted as needed  - Follow outpatient in 4 weeks with Dr. Lizarraga    Discussed with Dr. Lizarraga

## 2025-02-13 NOTE — PRE-OP CHECKLIST - NOTHING BY MOUTH SINCE
Consult Service: Gastroenterology      PATIENT INFORMATION      Linda Mason 72 y.o. female MRN: [id number]  Unit/Bed#: E5 -01 Encounter: [id number]  PCP: Pastor Kearns MD  Date of Admission:  5/16/2024  Date of Consultation: 05/16/24  Requesting Physician: Brook Frazier MD       ASSESSMENTS & PLAN   Linda Mason is a 72 y.o. old female with PMH of COPD, positive cologuard, FH of CRC in brother, CKD, GERD, HTN presenting for outpatient colonoscopy, found to have RLE edema requiring hospital admission. GI consulted for inpt colonoscopy given need for colonoscopy and mobility difficulties.     Colon Cancer Screening  Positive cologuard, FH of CRC  Unable to complete prep outpt due to ambulation difficulties in s/o RLE Edema  Appreciate primary team recs regarding work up for leg swelling  If cleared from primary team standpoint, tentative plan for colonoscopy tomorrow  Clear liquids today, NPO at midnight, repeat bowel prep starting ~4 PM     REASON FOR CONSULTATION      Colon Cancer Screening    HISTORY OF PRESENT ILLNESS      Linda Mason is a 72 y.o. female with PMH of COPD, positive cologuard, FH of CRC in brother, CKD, GERD, HTN presenting for outpatient colonoscopy, found to have RLE edema requiring hospital admission. GI consulted for inpt colonoscopy given need for colonoscopy and mobility difficulties.     Pt has had LE swelling for the past few weeks, initially L sided which has resolved and now R sided. Reports difficulty with ambulation due to this, and because of this only drank half her bowel prep at home. Has not had unintentional weight loss, abdominal pain, change in bowel habits, blood in stool, nausea, emesis. Had PET/CT scan 12/2023 for evaluation of lung nodule, noted intense short segment of activity in IC region. On 5/7/24 had repeat study showing nonspecific decreasing intensity in this same region.     REVIEW OF SYSTEMS     A thorough 12-point review of systems has been  conducted. Pertinent positives and negatives are mentioned in the history of present illness.       PAST MEDICAL & SURGICAL HISTORY      Past Medical History:   Diagnosis Date    MAINOR (acute kidney injury) ([facility]) 7/9/2019    Anxiety and depression 7/8/2019    Anxiety and depression     COPD (chronic obstructive pulmonary disease) ([facility])     Hypertension     MRSA (methicillin resistant Staphylococcus aureus)     Obesity 2/12/2013    Type II diabetes mellitus with manifestations ([facility]) 1/11/2021       Past Surgical History:   Procedure Laterality Date    BREAST CYST EXCISION Right     many years ago    CHOLECYSTECTOMY      onset: 8/29/11    EYE SURGERY      stigmatism    VAGINAL HYSTERECTOMY      ovaries intact age 30         MEDICATIONS & ALLERGIES       Medications:   Prior to Admission medications    Medication Sig Start Date End Date Taking? Authorizing Provider   albuterol (PROVENTIL HFA,VENTOLIN HFA) 90 mcg/act inhaler Inhale 2 puffs every 6 (six) hours as needed for wheezing 5/23/23   Pastor Kearns MD   benzonatate (TESSALON PERLES) 100 mg capsule Take 1 capsule (100 mg total) by mouth 3 (three) times a day as needed for cough  Patient not taking: Reported on 2/27/2024 5/23/23   Pastor Kearns MD   Calcium Carbonate-Vit D-Min (Caltrate 600+D Plus Minerals) 600-800 MG-UNIT TABS Take 1 tablet by mouth 2 (two) times a day with meals 5/23/23   Pastor Kearns MD   clonazePAM (KlonoPIN) 0.5 mg tablet TAKE ONE TABLET BY MOUTH ONCE EVERY DAY 6/24/22   Pastor Kearns MD   clotrimazole-betamethasone (LOTRISONE) 1-0.05 % cream Apply topically 2 (two) times a day 6/20/23   Pastor Kearns MD   colchicine (COLCRYS) 0.6 mg tablet TAKE ONE TABLET BY MOUTH THREE TIMES DAILY FOR THREE DAYS THEN ONE TABLET DAILY FOR 7 DAYS 3/14/22   Pastor Kearns MD   cyclobenzaprine (FLEXERIL) 5 mg tablet TAKE ONE TABLET BY MOUTH TWICE DAILY IN THE MORNING AND EVENING with meals 7/18/22   Pastor Kearns MD   docusate sodium (COLACE) 50 mg capsule Take 50 mg by  mouth 2 (two) times a day    Historical Provider, MD   escitalopram (LEXAPRO) 10 mg tablet Take 1 tablet (10 mg total) by mouth daily 5/23/23   Pastor Kearns MD   fluticasone (FLONASE) 50 mcg/act nasal spray 1 spray into each nostril 2 (two) times a day 2/27/24   Lavern Anderson MD   Fluticasone-Salmeterol (Advair Diskus) 500-50 mcg/dose inhaler Inhale 1 puff 2 (two) times a day Rinse mouth after use.    Historical Provider, MD   ipratropium-albuterol (DUO-NEB) 0.5-2.5 mg/3 mL nebulizer solution Take 3 mL by nebulization every 6 (six) hours as needed for wheezing or shortness of breath 1/5/23   Pastor Kearns MD   levothyroxine 25 mcg tablet Take 1 tablet (25 mcg total) by mouth daily 5/23/23   Pastor Kearns MD   lidocaine (LIDODERM) 5 % Apply 1 patch topically daily Remove & Discard patch within 12 hours or as directed by MD 3/14/22   Pastor Kearns MD   Magnesium Oxide 400 MG CAPS Take 400 mg by mouth in the morning    Historical Provider, MD   ofloxacin (FLOXIN) 0.3 % otic solution Administer 5 drops into the left ear 2 (two) times a day  Patient not taking: Reported on 2/27/2024 3/14/22   Pastor Kearns MD   ondansetron (Zofran) 4 mg tablet Take 4 mg by mouth every 8 (eight) hours as needed for nausea or vomiting    Historical Provider, MD   polyethylene glycol (Golytely) 4000 mL solution Take 4,000 mL by mouth once for 1 dose Take 4000 mL by mouth once for 1 dose. Use as directed 2/6/24 2/6/24  OTTONIEL Mendoza   potassium chloride (KLOR-CON) 8 MEQ tablet Take 1 tablet (8 mEq total) by mouth in the morning 5/23/23   Pastor Kearns MD   valsartan-hydrochlorothiazide (DIOVAN-HCT) 160-12.5 MG per tablet Take 1 tablet by mouth daily 5/23/23   Pastor Kearns MD   vitamin B-12 (VITAMIN B-12) 1,000 mcg tablet Take 1 tablet (1,000 mcg total) by mouth daily  Patient not taking: Reported on 2/27/2024 5/23/23   Pastor Kearns MD   zolpidem (AMBIEN) 10 mg tablet Take 1 tablet (10 mg total) by mouth daily at bedtime as  needed for sleep 22   Pastor Kearns MD       Allergies:   Allergies   Allergen Reactions    Bactrim [Sulfamethoxazole-Trimethoprim] GI Intolerance         SOCIAL HISTORY      Marital Status: Single    Substance Use History:   Social History     Substance and Sexual Activity   Alcohol Use No     Social History     Tobacco Use   Smoking Status Former    Current packs/day: 0.00    Average packs/day: 2.0 packs/day for 17.0 years (34.0 ttl pk-yrs)    Types: Cigarettes    Start date: 1998    Quit date: 2015    Years since quittin.5   Smokeless Tobacco Never     Social History     Substance and Sexual Activity   Drug Use No         FAMILY HISTORY      Non-Contributory      PHYSICAL EXAM     Vitals:   Blood Pressure: 127/71 (24 1143)  Pulse: 96 (24 1143)  Temperature: 98.4 °F (36.9 °C) (24 1143)  SpO2: 90 % (24 1143)    Physical Exam:   GENERAL: NAD  HEENT:  NC/AT, No Scleral Icterus  CARDIAC:  Regular Rate  PULMONARY:  Non-Labored Breathing, No Respiratory Distress  ABDOMEN:  Soft, No Rebound/Guarding/Rigidity, No Organomegaly, No Tenderness  EXTREMITIES:  No Edema, Cyanosis, or Clubbing  NEUROLOGIC:  Alert  SKIN:  No Rashes or Erythema    ADDITIONAL DATA     Lab Results:       Lab Units 24  1200 23  16423  16523  1000   SODIUM mmol/L 137 138 138 136 136   POTASSIUM mmol/L 4.5 4.5 4.9 4.3 4.7   CHLORIDE mmol/L 98 97 97 96 104   CO2 mmol/L 32 33* 36* 33* 30   BUN mg/dL 29* 28* 27* 17 31*   CREATININE mg/dL 1.60* 1.41* 1.99* 1.30 1.43*   GLUCOSE RANDOM mg/dL 81 68 92 88 88   CALCIUM mg/dL 9.2 9.3 9.3 9.3 9.3   MAGNESIUM mg/dL 2.3  --  2.4  --   --             Lab Units 24  1200 23  1659 22  1007   TOTAL PROTEIN g/dL 6.6 7.1 7.2 7.8   ALBUMIN g/dL 3.9 4.2 4.2 3.6   TOTAL BILIRUBIN mg/dL 0.46 0.40 0.52 0.47   AST U/L 16 18 19 14   ALT U/L 13 14 14 19   ALK PHOS U/L 94 91 94 94           Lab Units 24  1200  "11/30/23  1913 08/24/23  1659 05/24/22  1007   WBC Thousand/uL 3.54* 5.58 4.72 4.49   HEMOGLOBIN g/dL 12.5 13.5 13.3 14.0   HEMATOCRIT % 39.7 45.0 42.4 44.0   PLATELETS Thousands/uL 215 270 254 238   MCV fL 87 90 89 88       Lab Results   Component Value Date    FERRITIN 124 01/13/2021       No results found for: \"INR\", \"PROTIME\"      Microbiology Results:        Imaging:  No results found.  Narrative/Impressions - 3 day look back     EKG, Pathology, and Other Studies Reviewed on Admission:   EKG: Reviewed    PRIOR GI PROCEDURES      Counseling / Coordination of Care Time: 30 total mins spent n consult. Greater than 50% of total time spent on patient counseling and coordination of care.    ...............................................................................................................................................  Hans Stock M.D.  PGY-5 Gastroenterology Fellow  [facility]  Available on [employer]  [email or web address]  Recommendations not final until attending attestation    " 12-Feb-2025 18:00

## 2025-02-13 NOTE — PROGRESS NOTE ADULT - SUBJECTIVE AND OBJECTIVE BOX
HPI:  75 y/o Male with h/o HTN, DM, prostate CA, hypothyroidism, AVR, A.Fib on eliquis, chronic back pain s/p epidurals on diclofenac, HLD, NIDDM, CKD was admitted on 2/11 for chest pain. He developed low back pain in Nov 2024 and since then he had acupuncture and spine steroids injections with partial improvement. He saw Dr. Lizarraga and had an MRI spine 3 weeks PTA. He reported low grade fever at home for last 2-3 weeks PTA. In ER he was noted hypotensive, POCT+ stool, and met sepsis criteria. He was started on zosyn.  On 2/12 he was reported with bacteremia.  Per pt report bowel/ bladder remain intact no Loss of sensation.    2/13- Pt seen and examined at bedside. Went for ROVERTO this morning. Reports persistent back pain without changes. Pain has been ongoing since November 2024. Denies radicular leg pain, weakness, paresthesias, bowel/bladder dysfunction.       atorvastatin 40 milliGRAM(s) Oral at bedtime  cefTRIAXone Injectable. 2000 milliGRAM(s) IV Push every 24 hours  dextrose 5%. 1000 milliLiter(s) IV Continuous <Continuous>  dextrose 5%. 1000 milliLiter(s) IV Continuous <Continuous>  dextrose 50% Injectable 25 Gram(s) IV Push once  dextrose 50% Injectable 12.5 Gram(s) IV Push once  dextrose 50% Injectable 25 Gram(s) IV Push once  dextrose Oral Gel 15 Gram(s) Oral once  gabapentin 200 milliGRAM(s) Oral at bedtime  glucagon  Injectable 1 milliGRAM(s) IntraMuscular once  insulin lispro (ADMELOG) corrective regimen sliding scale   SubCutaneous Before meals and at bedtime  levothyroxine 150 MICROGram(s) Oral daily  losartan 50 milliGRAM(s) Oral daily  pantoprazole Infusion 8 mG/Hr IV Continuous <Continuous>  zolpidem 5 milliGRAM(s) Oral at bedtime PRN        Physical Exam:  Constitutional: Awake / alert  HEENT: PERRL, EOMI  Neck: Supple  Respiratory: Respirations non-labored  Cardiovascular: regular rate/ rhythm  Gastrointestinal: Soft, NT/ND  Extremities:  no edema  Musculoskeletal: no abnormal movements  Skin: No rashes    Neurological Exam:  HF: AA& O x 3, follows commands, normal affect, speech fluent  CN: PERRL, EOMI, no NLFD, tongue midline  Motor: Strength 5/5 in all 4 ext, normal bulk and tone  LE's:  RLE IP 4-/5  LLE IP 4-/5  Quad b/l 4-/5  Hams b/l 4/5  DF/PF 5/5  + clonus 4-6 beats both LE"s  No Hoffmans b/l, No hypereflexia.  Sens: Intact to light touch b/l.  No groin numbness or loss of sensation  Reflexes: Symmetric and normal, no clonus, no Winter's   Coord:  No FNFA, dysmetria, KIMBERLEY intact   Gait/Balance: Cannot test        Vital Signs Last 24 Hrs  T(C): 37.4 (13 Feb 2025 07:22), Max: 37.4 (13 Feb 2025 07:22)  T(F): 99.4 (13 Feb 2025 07:22), Max: 99.4 (13 Feb 2025 07:22)  HR: 84 (13 Feb 2025 08:43) (83 - 90)  BP: 112/70 (13 Feb 2025 08:43) (111/72 - 144/73)  BP(mean): --  RR: 18 (13 Feb 2025 08:43) (18 - 18)  SpO2: 92% (13 Feb 2025 08:43) (92% - 99%)    Parameters below as of 13 Feb 2025 08:43  Patient On (Oxygen Delivery Method): room air                              8.8    13.20 )-----------( 265      ( 13 Feb 2025 06:31 )             27.2       02-13    137  |  107  |  16  ----------------------------<  110[H]  3.6   |  24  |  1.02    Ca    9.1      13 Feb 2025 06:31  Phos  2.9     02-13  Mg     1.6     02-12    TPro  x   /  Alb  2.2[L]  /  TBili  x   /  DBili  x   /  AST  x   /  ALT  x   /  AlkPhos  x   02-13          Radiology:  MR Lumbar Spine w/wo IV Cont (02.12.25 @ 18:01)   IMPRESSION:  Degenerative changes with severe spinal stenosis L4-5. Enhancement with   abnormal signal at the L3-4 and L5-S1 disc space is likely related to   osteomyelitis in view of the history of bacteremia. Small left paraspinal   phlegmonous collection L3-4. Mild dural enhancement L5-S1.

## 2025-02-13 NOTE — PROGRESS NOTE ADULT - ASSESSMENT
73 y/o Male with h/o HTN, DM, prostate CA, hypothyroidism, AVR, A.Fib on eliquis, chronic back pain s/p epidurals on diclofenac, HLD, NIDDM, CKD was admitted on 2/11 for chest pain. He developed low back pain in Nov 2024 and since then he had acupuncture and spine steroids injections with partial improvement. He saw Dr. Lizarraga and had an MRI spine 3 weeks PTA. He reported low grade fever at home for last 2-3 weeks PTA. In ER he was noted hypotensive, POCT+ stool, and met sepsis criteria. He was started on zosyn.     #Sepsis/ bacteremia with strep spp ?source   #Subacute prosthetic bacterial endocarditis. AVR  #Possible lumbar spine discitis / OM.  #Hypotension   #Leukocytosis  #ARF on CRF stage 3  -cultures reviewed  -organism noted sensitive to PCN  -on ceftriaxone 2 gm IV qd and vancomycin 750 mg IV q12h # 2  -tolerating abx well so far; no side effects noted  -d/c vancomycin  -Echo and ROVERTO noted  -cardiology evaluation appreciated   -consider spine evaluation   -continue abx coverage with ceftriaxone  -repeat BC x 2 in AM  -monitor temps  -f/u CBC  -supportive care  2. Other issues:   -care per medicine    D/w medicine team  d/w Dr. Merida

## 2025-02-13 NOTE — PROCEDURAL SAFETY CHECKLIST WITH OR WITHOUT SEDATION - NSPOSTCOMMENTFT_GEN_ALL_CORE
Brief at 0734. Time Out at 0743. Anesthesia at 0746. Probe in at 0748. Bubble study at  Probe out at  Anesthesia end at Brief at 0734. Time Out at 0743. Anesthesia at 0746. Probe in at 0748. Bubble study at 0754.  Probe out at 0759.  Anesthesia end at 0808

## 2025-02-13 NOTE — PROGRESS NOTE ADULT - ASSESSMENT
74M with h/o  diabetes, high blood pressure, aortic valve replacement, AF on eliquis, chronic back pain s/p epidurals on diclofenac, HLD, hypothyroid, NIDDM, sAS, CKD, presents to ED with chest pain    Assessment:  Acute Hypovolemic Shock resolved with fluids  Possible GI Bleed. - Upper endoscopy and colonoscopy without obvious bleeding source   DARLINE - imresolved with hydration  Anemia - improved after 2 units PRBC's  DM  Acute Endocarditis of TAVR  Osteomyelitis of LS Spine  Chronic Low Back pain without radiculopathy  Troponin spike - demand ischemia vs NSTEMI  Acute Strep Bacteremia    Plan:  D/c telemetry  S/p 2 units PRBC's  Follow H/H  Nephrology following  GI following. May need outpt. pill cam  Stop NSAIDs Holding Metformin and SGLT2  Restart   Eliquis at half dose  Cardiology following  ID following   OOB , PT  Neurosurgery following  Need negative blood cultures prior to discharge  Probable Home on Monday with PICC and Rocephin Q day x 6 weeks  DNR/ DNI,  otherwise no other limitations to care 74M with h/o  diabetes, high blood pressure, aortic valve replacement, AF on eliquis, chronic back pain s/p epidurals on diclofenac, HLD, hypothyroid, NIDDM, sAS, CKD, presents to ED with chest pain    Assessment:  Acute Hypovolemic Shock resolved with fluids  Possible GI Bleed. - Upper endoscopy and colonoscopy without obvious bleeding source   DARLINE - imresolved with hydration  Anemia - improved after 2 units PRBC's  DM  Acute Endocarditis of TAVR  Osteomyelitis of LS Spine  Chronic Low Back pain without radiculopathy  Troponin spike - demand ischemia vs NSTEMI  Acute Strep Bacteremia    Plan:  D/c telemetry  S/p 2 units PRBC's  Follow H/H  Nephrology following  GI following. May need outpt. pill cam  Stop NSAIDs Holding Metformin and SGLT2  Restart   Eliquis at half dose  Cardiology following  ID following   OOB , PT  Neurosurgery following  Need negative blood cultures prior to discharge  Probable Home on Monday with PICC and Rocephin Q day x 6 weeks  Rolling Walker:  Patient requires rolling walker due to Osteomyelitis of LS Spine  for safe ambulation at discharge.  DNR/ DNI,  otherwise no other limitations to care

## 2025-02-13 NOTE — PROGRESS NOTE ADULT - SUBJECTIVE AND OBJECTIVE BOX
NEPHROLOGY INTERVAL HPI/OVERNIGHT EVENTS:    Date of Service: 25 @ 09:38    --Post ROVERTO--c/w a vegetation of aortic prosthesis.  No new complaints.  --Feeling fair.  no new complaints    HPI:  75 yo with hx of ckd ( scr in 1.4 due to ? NSAID use )   pw CP ? hemetemesis   noted with anemia r/o ABLA for egd today  DARLINE with scr of 2.2 in setting of worsening anemia and recent diclofenac use   + OCB  admitted to ICU for hypotension   no hx of bph     pmhx/psurg hx   - DM   - CKD ( scr 1.4)   - NIDDM   - hypothy  - afib eliquiis  - AVR  - htn   - dm   - prostate ca     Home Medications:   * Patient Currently Takes Medications as of 10-Feb-2025 12:08 documented in Structured Notes  · 	dapagliflozin 10 mg oral tablet: Last Dose Taken:  , 1 tab(s) orally every 24 hours  · 	Eliquis 5 mg oral tablet: Last Dose Taken:  , 1 tab(s) orally 2 times a day  · 	Turmeric 500 mg oral capsule: Last Dose Taken:  , 1 cap(s) orally once a day  · 	Januvia 50 mg oral tablet: Last Dose Taken:  , 1 tab(s) orally once a day  · 	Co-Q10 100 mg oral capsule: Last Dose Taken:  , 1 cap(s) orally once a day  · 	pantoprazole 40 mg oral delayed release tablet: Last Dose Taken:  , 1 tab(s) orally once a day  · 	Chondroitin-Glucosamine oral tablet: Last Dose Taken:  , 1 tab(s) orally once a day  · 	Ginkgo Biloba 120 mg oral capsule: Last Dose Taken:  , 1 cap(s) orally once a day  · 	atorvastatin 40 mg oral tablet: Last Dose Taken:  , 1 tab(s) orally once a day  · 	Melatonin 5 mg oral tablet: Last Dose Taken:  , 2 tab(s) orally once a day (at bedtime)  · 	losartan-hydroCHLOROthiazide 100 mg-12.5 mg oral tablet: Last Dose Taken:  , 1 tab(s) orally once a day  · 	gabapentin 100 mg oral capsule: Last Dose Taken:  , 2 cap(s) orally once a day (at bedtime)  · 	Multiple Vitamins oral tablet: Last Dose Taken:  , 1 tab(s) orally once a day  · 	cholecalciferol 25 mcg (1000 intl units) oral tablet: Last Dose Taken:  , 1 tab(s) orally once a day  · 	zolpidem 10 mg oral tablet: Last Dose Taken:  , 0.5 tab(s) orally once a day (at bedtime)  · 	diclofenac sodium 75 mg oral delayed release tablet: Last Dose Taken:  , 1 tab(s) orally 2 times a day ***pt took up until - said he planned to stop taking***  · 	finasteride 1 mg oral tablet: Last Dose Taken:  , 1 tab(s) orally once a day  · 	cyanocobalamin 1000 mcg oral tablet: Last Dose Taken:  , 1 tab(s) orally once a day  · 	levothyroxine 150 mcg (0.15 mg) oral tablet: Last Dose Taken:  , 1 tab(s) orally once a day ***pts list states 175mcg- DrFirst shows 150mcg filled on 11/15 x 90 days- called Dr Abdulaziz Jernigan's office to verify dose and was verified at 150mcg***  · 	doxycycline 20 mg oral tablet: Last Dose Taken:  , 1 tab(s) orally once a day  · 	MetFORMIN (Eqv-Glucophage XR) 500 mg oral tablet, extended release: Last Dose Taken:  , 4 tab(s) orally once a day (in the morning)  · 	PreserVision AREDS 2 oral capsule: Last Dose Taken:  , 1 cap(s) orally 2 times a day  · 	B-Complex 50 oral tablet: Last Dose Taken:  , 1 tab(s) orally once a day    MEDICATIONS  (STANDING):  atorvastatin 40 milliGRAM(s) Oral at bedtime  cefTRIAXone Injectable. 2000 milliGRAM(s) IV Push every 24 hours  dextrose 5%. 1000 milliLiter(s) (50 mL/Hr) IV Continuous <Continuous>  dextrose 5%. 1000 milliLiter(s) (100 mL/Hr) IV Continuous <Continuous>  dextrose 50% Injectable 25 Gram(s) IV Push once  dextrose 50% Injectable 12.5 Gram(s) IV Push once  dextrose 50% Injectable 25 Gram(s) IV Push once  dextrose Oral Gel 15 Gram(s) Oral once  gabapentin 200 milliGRAM(s) Oral at bedtime  glucagon  Injectable 1 milliGRAM(s) IntraMuscular once  insulin lispro (ADMELOG) corrective regimen sliding scale   SubCutaneous Before meals and at bedtime  levothyroxine 150 MICROGram(s) Oral daily  pantoprazole Infusion 8 mG/Hr (10 mL/Hr) IV Continuous <Continuous>    MEDICATIONS  (PRN):  zolpidem 5 milliGRAM(s) Oral at bedtime PRN Insomnia    Vital Signs Last 24 Hrs  T(C): 37.4 (2025 07:22), Max: 37.4 (2025 07:22)  T(F): 99.4 (2025 07:22), Max: 99.4 (2025 07:22)  HR: 84 (2025 08:43) (83 - 90)  BP: 112/70 (2025 08:43) (111/72 - 144/73)  BP(mean): --  RR: 18 (2025 08:43) (18 - 18)  SpO2: 92% (:43) (92% - 99%)    Parameters below as of 2025 08:43  Patient On (Oxygen Delivery Method): room air      Daily Weight in k.5 (2025 04:14)    12 @ 07:01  -  02-13 @ 07:00  --------------------------------------------------------  IN: 0 mL / OUT: 300 mL / NET: -300 mL    PHYSICAL EXAM:  GENERAL: no distress  CHEST/LUNG: clear to aus  HEART: S1S2 RRR  ABDOMEN: soft  EXTREMITIES: no edema  SKIN:     LABS:                        8.8    13.20 )-----------( 265      ( 2025 06:31 )             27.2     02-13    137  |  107  |  16  ----------------------------<  110[H]  3.6   |  24  |  1.02    Ca    9.1      2025 06:31  Phos  2.9     02-13  Mg     1.6     -12    TPro  x   /  Alb  2.2[L]  /  TBili  x   /  DBili  x   /  AST  x   /  ALT  x   /  AlkPhos  x         Urinalysis Basic - ( 2025 06:31 )    Color: x / Appearance: x / SG: x / pH: x  Gluc: 110 mg/dL / Ketone: x  / Bili: x / Urobili: x   Blood: x / Protein: x / Nitrite: x   Leuk Esterase: x / RBC: x / WBC x   Sq Epi: x / Non Sq Epi: x / Bacteria: x      Phosphorus: 2.9 mg/dL ( @ 06:31)          RADIOLOGY & ADDITIONAL TESTS:

## 2025-02-13 NOTE — PACU DISCHARGE NOTE - COMMENTS
Patient s/p ROVERTO. VS Stable. Patient denies pain. NPO until 0950 this morning. Patient placed on remote telemetry monitor and confirmed capture with TIMO Flores tele tech. Report given to BRAD Velazquez on 3E. Patient pending transport to E at this time

## 2025-02-13 NOTE — PROGRESS NOTE ADULT - SUBJECTIVE AND OBJECTIVE BOX
Date of service: 02-13-25 @ 10:15    Lying in bed in NAD  Has low grad efever  s/p ROVERTO  Reported with new paravalvular aortic vegetation  Has low back pain    ROS: denies dizziness, no HA, no SOB or cough, no abdominal pain, no diarrhea or constipation; no dysuria, no legs pain, no rashes    MEDICATIONS  (STANDING):  atorvastatin 40 milliGRAM(s) Oral at bedtime  cefTRIAXone Injectable. 2000 milliGRAM(s) IV Push every 24 hours  dextrose 5%. 1000 milliLiter(s) (50 mL/Hr) IV Continuous <Continuous>  dextrose 5%. 1000 milliLiter(s) (100 mL/Hr) IV Continuous <Continuous>  dextrose 50% Injectable 25 Gram(s) IV Push once  dextrose 50% Injectable 12.5 Gram(s) IV Push once  dextrose 50% Injectable 25 Gram(s) IV Push once  dextrose Oral Gel 15 Gram(s) Oral once  gabapentin 200 milliGRAM(s) Oral at bedtime  glucagon  Injectable 1 milliGRAM(s) IntraMuscular once  insulin lispro (ADMELOG) corrective regimen sliding scale   SubCutaneous Before meals and at bedtime  levothyroxine 150 MICROGram(s) Oral daily  losartan 50 milliGRAM(s) Oral daily  pantoprazole Infusion 8 mG/Hr (10 mL/Hr) IV Continuous <Continuous>    Vital Signs Last 24 Hrs  T(C): 37.4 (13 Feb 2025 07:22), Max: 37.4 (13 Feb 2025 07:22)  T(F): 99.4 (13 Feb 2025 07:22), Max: 99.4 (13 Feb 2025 07:22)  HR: 84 (13 Feb 2025 08:43) (83 - 90)  BP: 112/70 (13 Feb 2025 08:43) (111/72 - 144/73)  BP(mean): --  RR: 18 (13 Feb 2025 08:43) (18 - 18)  SpO2: 92% (13 Feb 2025 08:43) (92% - 99%)    Parameters below as of 13 Feb 2025 08:43  Patient On (Oxygen Delivery Method): room air     Physical exam:    Constitutional:  No acute distress  HEENT: NC/AT, EOMI, PERRLA, conjunctivae clear; ears and nose atraumatic; pharynx benign  Neck: supple; thyroid not palpable  Back: no tenderness  Respiratory: respiratory effort normal; clear to auscultation  Cardiovascular: S1S2 regular, no murmurs  Abdomen: soft, not tender, not distended, positive BS; no liver or spleen organomegaly  Genitourinary: no suprapubic tenderness  Lymphatic: no LN palpable  Musculoskeletal: no muscle tenderness, no joint swelling or tenderness  Extremities: no pedal edema  Neurological/ Psychiatric: Alert, judgement and insight impaired; moving all extremities  Skin: no rashes; no palpable lesions    Labs: reviewed                        8.8    13.20 )-----------( 265      ( 13 Feb 2025 06:31 )             27.2     02-13    137  |  107  |  16  ----------------------------<  110[H]  3.6   |  24  |  1.02    Ca    9.1      13 Feb 2025 06:31  Phos  2.9     02-13  Mg     1.6     02-12    TPro  x   /  Alb  2.2[L]  /  TBili  x   /  DBili  x   /  AST  x   /  ALT  x   /  AlkPhos  x   02-13    C-Reactive Protein: 73.6 mg/mL (02-12-25 @ 13:56)                        8.8    12.33 )-----------( 253      ( 12 Feb 2025 06:04 )             27.0     02-12    139  |  108  |  26[H]  ----------------------------<  89  3.9   |  24  |  1.22    Ca    9.5      12 Feb 2025 06:04  Phos  3.0     02-12  Mg     1.6     02-12    TPro  6.5  /  Alb  2.3[L]  /  TBili  1.0  /  DBili  x   /  AST  30  /  ALT  25  /  AlkPhos  92  02-12     LIVER FUNCTIONS - ( 12 Feb 2025 06:04 )  Alb: 2.3 g/dL / Pro: 6.5 gm/dL / ALK PHOS: 92 U/L / ALT: 25 U/L / AST: 30 U/L / GGT: x           Urinalysis with Rflx Culture (collected 10 Feb 2025 12:47)    Culture - Blood (collected 10 Feb 2025 09:32)  Source: .Blood BLOOD  Gram Stain (11 Feb 2025 13:01):    Growth in aerobic bottle: Gram Positive Cocci in Pairs and Chains    Growth in anaerobic bottle: Gram Positive Cocci in Pairs and Chains  Final Report (13 Feb 2025 06:33):    Growth in aerobic and anaerobic bottles: Streptococcus    salivarius/vestibularis group  Organism: Streptococcus salivarius/vestibularis group (13 Feb 2025 06:33)  Organism: Streptococcus salivarius/vestibularis group (13 Feb 2025 06:33)      Method Type: BRIANA      -  Ceftriaxone: S <=0.25      -  Penicillin: S 0.06      -  Vancomycin: S 0.5    Culture - Blood (collected 10 Feb 2025 09:28)  Source: .Blood BLOOD  Gram Stain (11 Feb 2025 09:51):    Growth in aerobic bottle: Gram Positive Cocci in Pairs and Chains    Growth in anaerobic bottle: Gram Positive Cocci in Pairs and Chains  Final Report (12 Feb 2025 17:14):    Growth in aerobic and anaerobic bottles: Streptococcus    salivarius/vestibularis group "Susceptibilities not performed"    Direct identification is available within approximately 3-5    hours either by Blood Panel Multiplexed PCR or Direct    MALDI-TOF. Details: [email or web address]  Organism: Blood Culture PCR (12 Feb 2025 17:14)  Organism: Blood Culture PCR (12 Feb 2025 17:14)      Method Type: PCR      -  Streptococcus sp. (Not Grp A, B or S pneumoniae): Detec    < from: Xray Chest 1 View- PORTABLE-Urgent (02.10.25 @ 10:34) >  IMPRESSION: No acute finding.  < end of copied text >    < from: CT Abdomen and Pelvis w/wo IV Cont (02.10.25 @ 10:09) >  IMPRESSION:  *  No acute pathology.  *  No active GI bleeding.  < end of copied text >  < from: TTE Echo Complete w/ Contrast w/ Doppler (01.11.23 @ 19:34) >   1. Left ventricular ejection fraction, by visual estimation, is >75%.   2. Hyperdynamic global left ventricular systolic function.   3. Spectral Doppler shows impaired relaxation pattern of left ventricular myocardial filling (Grade I diastolic dysfunction).   4. There is moderate concentric left ventricular hypertrophy.   5. Mildly enlarged left atrium.   6. There is no evidence of pericardial effusion.   7. Mild mitral annular calcification.   8. Thickening of the anterior mitral valve leaflet.   9. Trace mitral valve regurgitation.  10. TAVR in the aortic position.  11. LVOT vmax at rest : 120 cm/s, LVOT vmax with valsalva : 157 cm/s. No   evidence of dynamic LVOTO. There is mild paravalvular regurgitation of the prosthetic AoV.  12. Mildly dilated pulmonary artery.  13. Adequate TR velocity was not obtained to accurately assess RVSP.  < end of copied text >    Advanced directives addressed: full resuscitation

## 2025-02-13 NOTE — PROGRESS NOTE ADULT - SUBJECTIVE AND OBJECTIVE BOX
Patient is a 74y old  Male who presents with a chief complaint of Hypotension, anemia (13 Feb 2025 11:59)      Subective:  No complaints  However, bacteremic with strep with osteo and endocardiitis    PAST MEDICAL & SURGICAL HISTORY:  Hypertension      Diabetes mellitus      Obesity      Hypothyroidism      Prostate CA          MEDICATIONS  (STANDING):  acetylcysteine IVPB 13 Gram(s) IV Intermittent once  apixaban 2.5 milliGRAM(s) Oral two times a day  atorvastatin 40 milliGRAM(s) Oral at bedtime  cefTRIAXone Injectable. 2000 milliGRAM(s) IV Push every 24 hours  dextrose 5%. 1000 milliLiter(s) (50 mL/Hr) IV Continuous <Continuous>  dextrose 5%. 1000 milliLiter(s) (100 mL/Hr) IV Continuous <Continuous>  dextrose 50% Injectable 25 Gram(s) IV Push once  dextrose 50% Injectable 12.5 Gram(s) IV Push once  dextrose 50% Injectable 25 Gram(s) IV Push once  dextrose Oral Gel 15 Gram(s) Oral once  gabapentin 200 milliGRAM(s) Oral three times a day  glucagon  Injectable 1 milliGRAM(s) IntraMuscular once  insulin lispro (ADMELOG) corrective regimen sliding scale   SubCutaneous Before meals and at bedtime  levothyroxine 150 MICROGram(s) Oral daily  losartan 50 milliGRAM(s) Oral daily  pantoprazole    Tablet 40 milliGRAM(s) Oral before breakfast    MEDICATIONS  (PRN):  acetaminophen     Tablet .. 650 milliGRAM(s) Oral every 4 hours PRN Temp greater or equal to 38C (100.4F), Mild Pain (1 - 3), Moderate Pain (4 - 6)  zolpidem 5 milliGRAM(s) Oral at bedtime PRN Insomnia      REVIEW OF SYSTEMS:    RESPIRATORY: No shortness of breath  CARDIOVASCULAR: No chest pain  All other review of systems is negative unless indicated above.    Vital Signs Last 24 Hrs  T(C): 36.4 (13 Feb 2025 16:41), Max: 37.4 (13 Feb 2025 07:22)  T(F): 97.5 (13 Feb 2025 16:41), Max: 99.4 (13 Feb 2025 07:22)  HR: 97 (13 Feb 2025 16:41) (84 - 97)  BP: 118/74 (13 Feb 2025 16:41) (111/72 - 144/73)  BP(mean): --  RR: 18 (13 Feb 2025 16:41) (18 - 18)  SpO2: 95% (13 Feb 2025 16:41) (92% - 96%)    Parameters below as of 13 Feb 2025 16:41  Patient On (Oxygen Delivery Method): room air        PHYSICAL EXAM:    Constitutional: NAD, well-developed  Respiratory: CTAB  Cardiovascular: S1 and S2, RRR  Gastrointestinal: BS+, soft, NT/ND  Extremities: No peripheral edema  Psychiatric: Normal mood, normal affect    LABS:                        8.8    13.20 )-----------( 265      ( 13 Feb 2025 06:31 )             27.2     02-13    137  |  107  |  16  ----------------------------<  110[H]  3.6   |  24  |  1.02    Ca    9.1      13 Feb 2025 06:31  Phos  2.9     02-13  Mg     1.6     02-12    TPro  5.8[L]  /  Alb  2.2[L]  /  TBili  x   /  DBili  x   /  AST  x   /  ALT  x   /  AlkPhos  x   02-13      LIVER FUNCTIONS - ( 13 Feb 2025 06:31 )  Alb: 2.2 g/dL / Pro: 5.8 g/dL / ALK PHOS: x     / ALT: x     / AST: x     / GGT: x             RADIOLOGY & ADDITIONAL STUDIES:

## 2025-02-13 NOTE — PROGRESS NOTE ADULT - SUBJECTIVE AND OBJECTIVE BOX
SUBJECTIVE:    CHIEF COMPLAINT:  Patient is a 74y old  Male who presents with a chief complaint of Hypotension, anemia (13 Feb 2025 11:39)      Interval HPI and Overnight Events: Tolerated ROVERTO, Tolerated MRI    REVIEW OF SYSTEMS:  CONSTITUTIONAL: No weakness, fevers or chills  EYES/ENT: No visual changes;  No vertigo or throat pain   NECK: No pain or stiffness  RESPIRATORY: No cough, wheezing, hemoptysis; No shortness of breath  CARDIOVASCULAR: No chest pain or palpitations  GASTROINTESTINAL: No abdominal or epigastric pain. No nausea, vomiting, or hematemesis; No diarrhea or constipation. No melena or hematochezia.  GENITOURINARY: No dysuria, frequency or hematuria  NEUROLOGICAL: No numbness or weakness  SKIN: No itching, burning, rashes, or lesions   All other review of systems is negative unless indicated above    OBJECTIVE    Vital Signs Last 24 Hrs  T(C): 37.4 (13 Feb 2025 07:22), Max: 37.4 (13 Feb 2025 07:22)  T(F): 99.4 (13 Feb 2025 07:22), Max: 99.4 (13 Feb 2025 07:22)  HR: 84 (13 Feb 2025 08:43) (83 - 90)  BP: 112/70 (13 Feb 2025 08:43) (111/72 - 144/73)  BP(mean): --  RR: 18 (13 Feb 2025 08:43) (18 - 18)  SpO2: 92% (13 Feb 2025 08:43) (92% - 99%)    Parameters below as of 13 Feb 2025 08:43  Patient On (Oxygen Delivery Method): room air        MEDICATIONS  (STANDING):  atorvastatin 40 milliGRAM(s) Oral at bedtime  cefTRIAXone Injectable. 2000 milliGRAM(s) IV Push every 24 hours  dextrose 5%. 1000 milliLiter(s) (50 mL/Hr) IV Continuous <Continuous>  dextrose 5%. 1000 milliLiter(s) (100 mL/Hr) IV Continuous <Continuous>  dextrose 50% Injectable 25 Gram(s) IV Push once  dextrose 50% Injectable 12.5 Gram(s) IV Push once  dextrose 50% Injectable 25 Gram(s) IV Push once  dextrose Oral Gel 15 Gram(s) Oral once  gabapentin 200 milliGRAM(s) Oral at bedtime  glucagon  Injectable 1 milliGRAM(s) IntraMuscular once  insulin lispro (ADMELOG) corrective regimen sliding scale   SubCutaneous Before meals and at bedtime  levothyroxine 150 MICROGram(s) Oral daily  losartan 50 milliGRAM(s) Oral daily  pantoprazole Infusion 8 mG/Hr (10 mL/Hr) IV Continuous <Continuous>      LABS:                         8.8    13.20 )-----------( 265      ( 13 Feb 2025 06:31 )             27.2     02-13    137  |  107  |  16  ----------------------------<  110[H]  3.6   |  24  |  1.02    Ca    9.1      13 Feb 2025 06:31  Phos  2.9     02-13  Mg     1.6     02-12    TPro  x   /  Alb  2.2[L]  /  TBili  x   /  DBili  x   /  AST  x   /  ALT  x   /  AlkPhos  x   02-13    Urinalysis Basic - ( 13 Feb 2025 06:31 )  Color: x / Appearance: x / SG: x / pH: x  Gluc: 110 mg/dL / Ketone: x  / Bili: x / Urobili: x   Blood: x / Protein: x / Nitrite: x   Leuk Esterase: x / RBC: x / WBC x   Sq Epi: x / Non Sq Epi: x / Bacteria: x    Specimen Source .Blood BLOOD   02-10 @ 09:32  Culture Results  Growth in aerobic and anaerobic bottles: Streptococcus  salivarius/vestibularis group[!]  Specimen Source .Blood BLOOD   02-10 @ 09:28  Culture Results  Growth in aerobic and anaerobic bottles: Streptococcus  salivarius/vestibularis group "Susceptibilities not performed"  Direct identification is available within approximately 3-5  hours either by Blood Panel Multiplexed PCR or Direct  MALDI-TOF. Details: [email or web address][!]    CAPILLARY BLOOD GLUCOSE  POCT Blood Glucose.: 114 mg/dL (13 Feb 2025 08:56)  POCT Blood Glucose.: 157 mg/dL (12 Feb 2025 22:25)  POCT Blood Glucose.: 156 mg/dL (12 Feb 2025 22:11)  POCT Blood Glucose.: 227 mg/dL (12 Feb 2025 12:53)    RADIOLOGY/EKG:  MR Lumbar Spine w/wo IV Cont (02.12.25 @ 18:01) >  IMPRESSION:    Degenerative changes with severe spinal stenosis L4-5. Enhancement with   abnormal signal at the L3-4 and L5-S1 disc space is likely related to   osteomyelitis in view of the history of bacteremia. Small left paraspinal   phlegmonous collection L3-4. Mild dural enhancement L5-S1.    ROVERTO W or WO Ultrasound Enhancing Agent (02.13.25 @ 07:34) >  Aortic Valve:  A Norman (TAVR) is present in the aortic position. The prosthetic valve has reduced leaflet opening and is well seated. Echo findings are consistent with a vegetation of the aortic prosthesis. There is trace paravalvular regurgitation (one paravalvular jet), originating at 1 o'clock and is directed laterally.

## 2025-02-14 ENCOUNTER — TRANSCRIPTION ENCOUNTER (OUTPATIENT)
Age: 75
End: 2025-02-14

## 2025-02-14 LAB
FERRITIN SERPL-MCNC: 548 NG/ML — HIGH (ref 30–400)
GLUCOSE BLDC GLUCOMTR-MCNC: 120 MG/DL — HIGH (ref 70–99)
GLUCOSE BLDC GLUCOMTR-MCNC: 185 MG/DL — HIGH (ref 70–99)
GLUCOSE BLDC GLUCOMTR-MCNC: 189 MG/DL — HIGH (ref 70–99)
HCT VFR BLD CALC: 27.7 % — LOW (ref 39–50)
HGB BLD-MCNC: 9 G/DL — LOW (ref 13–17)
IRON SATN MFR SERPL: 12 % — LOW (ref 16–55)
IRON SATN MFR SERPL: 23 UG/DL — LOW (ref 45–165)
MCHC RBC-ENTMCNC: 26.3 PG — LOW (ref 27–34)
MCHC RBC-ENTMCNC: 32.5 G/DL — SIGNIFICANT CHANGE UP (ref 32–36)
MCV RBC AUTO: 81 FL — SIGNIFICANT CHANGE UP (ref 80–100)
NRBC # BLD AUTO: 0 K/UL — SIGNIFICANT CHANGE UP (ref 0–0)
NRBC # FLD: 0 K/UL — SIGNIFICANT CHANGE UP (ref 0–0)
NRBC BLD AUTO-RTO: 0 /100 WBCS — SIGNIFICANT CHANGE UP (ref 0–0)
PLATELET # BLD AUTO: 266 K/UL — SIGNIFICANT CHANGE UP (ref 150–400)
PMV BLD: 9.9 FL — SIGNIFICANT CHANGE UP (ref 7–13)
RBC # BLD: 3.42 M/UL — LOW (ref 4.2–5.8)
RBC # FLD: 16.2 % — HIGH (ref 10.3–14.5)
TIBC SERPL-MCNC: 186 UG/DL — LOW (ref 220–430)
UIBC SERPL-MCNC: 163 UG/DL — SIGNIFICANT CHANGE UP (ref 110–370)
VIT B12 SERPL-MCNC: 902 PG/ML — SIGNIFICANT CHANGE UP (ref 232–1245)
WBC # BLD: 15.57 K/UL — HIGH (ref 3.8–10.5)
WBC # FLD AUTO: 15.57 K/UL — HIGH (ref 3.8–10.5)

## 2025-02-14 PROCEDURE — 99232 SBSQ HOSP IP/OBS MODERATE 35: CPT

## 2025-02-14 RX ORDER — LOSARTAN POTASSIUM 100 MG/1
1 TABLET, FILM COATED ORAL
Qty: 0 | Refills: 0 | DISCHARGE
Start: 2025-02-14

## 2025-02-14 RX ORDER — GINKGO BILOBA 60 MG
1 TABLET ORAL
Refills: 0 | DISCHARGE

## 2025-02-14 RX ORDER — ACETAMINOPHEN 500 MG/5ML
2 LIQUID (ML) ORAL
Refills: 0 | DISCHARGE
Start: 2025-02-14

## 2025-02-14 RX ORDER — CEFTRIAXONE 500 MG/1
2 INJECTION, POWDER, FOR SOLUTION INTRAMUSCULAR; INTRAVENOUS
Qty: 0 | Refills: 0 | DISCHARGE
Start: 2025-02-14

## 2025-02-14 RX ORDER — APIXABAN 2.5 MG/1
1 TABLET, FILM COATED ORAL
Qty: 60 | Refills: 0
Start: 2025-02-14 | End: 2025-03-15

## 2025-02-14 RX ORDER — DICLOFENAC SODIUM 75 MG/1
1 TABLET, DELAYED RELEASE ORAL
Refills: 0 | DISCHARGE

## 2025-02-14 RX ORDER — GABAPENTIN 400 MG/1
2 CAPSULE ORAL
Qty: 0 | Refills: 0 | DISCHARGE
Start: 2025-02-14

## 2025-02-14 RX ORDER — APIXABAN 2.5 MG/1
1 TABLET, FILM COATED ORAL
Refills: 0 | DISCHARGE

## 2025-02-14 RX ORDER — GABAPENTIN 400 MG/1
2 CAPSULE ORAL
Refills: 0 | DISCHARGE

## 2025-02-14 RX ORDER — OXYCODONE HYDROCHLORIDE 30 MG/1
5 TABLET ORAL ONCE
Refills: 0 | Status: DISCONTINUED | OUTPATIENT
Start: 2025-02-14 | End: 2025-02-14

## 2025-02-14 RX ADMIN — INSULIN LISPRO 2: 100 INJECTION, SOLUTION INTRAVENOUS; SUBCUTANEOUS at 17:54

## 2025-02-14 RX ADMIN — APIXABAN 2.5 MILLIGRAM(S): 2.5 TABLET, FILM COATED ORAL at 21:50

## 2025-02-14 RX ADMIN — ATORVASTATIN CALCIUM 40 MILLIGRAM(S): 80 TABLET, FILM COATED ORAL at 21:50

## 2025-02-14 RX ADMIN — LOSARTAN POTASSIUM 50 MILLIGRAM(S): 100 TABLET, FILM COATED ORAL at 05:58

## 2025-02-14 RX ADMIN — GABAPENTIN 200 MILLIGRAM(S): 400 CAPSULE ORAL at 05:57

## 2025-02-14 RX ADMIN — APIXABAN 2.5 MILLIGRAM(S): 2.5 TABLET, FILM COATED ORAL at 10:35

## 2025-02-14 RX ADMIN — Medication 5 MILLIGRAM(S): at 21:59

## 2025-02-14 RX ADMIN — Medication 650 MILLIGRAM(S): at 17:53

## 2025-02-14 RX ADMIN — Medication 150 MICROGRAM(S): at 05:57

## 2025-02-14 RX ADMIN — GABAPENTIN 200 MILLIGRAM(S): 400 CAPSULE ORAL at 17:53

## 2025-02-14 RX ADMIN — Medication 650 MILLIGRAM(S): at 11:34

## 2025-02-14 RX ADMIN — GABAPENTIN 200 MILLIGRAM(S): 400 CAPSULE ORAL at 21:50

## 2025-02-14 RX ADMIN — CEFTRIAXONE 2000 MILLIGRAM(S): 500 INJECTION, POWDER, FOR SOLUTION INTRAMUSCULAR; INTRAVENOUS at 10:35

## 2025-02-14 RX ADMIN — OXYCODONE HYDROCHLORIDE 5 MILLIGRAM(S): 30 TABLET ORAL at 21:50

## 2025-02-14 RX ADMIN — INSULIN LISPRO 2: 100 INJECTION, SOLUTION INTRAVENOUS; SUBCUTANEOUS at 21:51

## 2025-02-14 RX ADMIN — Medication 40 MILLIGRAM(S): at 05:56

## 2025-02-14 NOTE — PROGRESS NOTE ADULT - ASSESSMENT
74M with h/o  diabetes, high blood pressure, aortic valve replacement, AF on eliquis, chronic back pain s/p epidurals on diclofenac, HLD, hypothyroid, NIDDM, sAS, CKD, presents to ED with chest pain    Assessment:  Acute Hypovolemic Shock resolved with fluids  Possible GI Bleed. - Upper endoscopy and colonoscopy without obvious bleeding source   DARLINE - resolved with hydration  Anemia - improved after 2 units PRBC's and H/H slowly increasing  DM  Acute Endocarditis of TAVR  Osteomyelitis of LS Spine  Chronic Low Back pain without radiculopathy  Troponin spike - demand ischemia vs NSTEMI  Acute Streptococcus salivarius/vestibularis  Bacteremia    Plan:  D/c telemetry  S/p 2 units PRBC's  Nephrology following  GI following. May need outpt. pill cam  Off NSAIDs Holding Metformin and SGLT2  Back on ARB without Diuretic  Restarted   Eliquis at half dose  Cardiology following  ID following   OOB , PT  Neurosurgery following  Need negative blood cultures prior to discharge. 2 sets drawn today  Probable Home on Monday with PICC and Rocephin Q day x 6 weeks  Repeat ROVERTO after 6 weeks  Rolling Walker:  Patient requires rolling walker due to Osteomyelitis of LS Spine  for safe ambulation at discharge.  DNR/ DNI,  otherwise no other limitations to care

## 2025-02-14 NOTE — PROGRESS NOTE ADULT - ASSESSMENT
75 yo with hx of ckd ( scr in 1.4 due to ? NSAID use )   pw CP ? hemetemesis   noted with anemia r/o ABLA for egd today  DARLINE with scr of 2.2 in setting of worsening anemia and recent diclofenac use   + OCB  + IVC   admitted to ICU for hypotension     rec  - DARLINE/CKD stage 3     improving,. monitor for SIOBHAN     fu trend of scr    arb/hctz/sglt2i on hold    metformin on hold    UA neg     KBUS upon clinical stability     CT with IVC no hydro     no nsaid   - ANemia r/o ABLA    for egd   - HFpEF with s/p TAVR    + trop ? demand ischemia     cards input noted    CAD with hx of non obs disease on cath within 1 year   dw dr audelia brown     2/12 SY  --DARLINE/CKD : improved and stable.    Med list reviewed.    Not to resume HCTZ on d/c.,    BP remains borderline--continue to hold ARB and SGLT2i for now.  --HFpEF with hx of TAVR : clinically compensated.  --Anemia : EGD with benign polyps and Colon with internal hemorrhoids and minimal proctitis--cauterized.  for outpt capsule study.  --Pt again educated to avoid all NSAIDS.    2/13 SY  --DARLINE/CKD : improved and stable.  --HTN : BP now improved and stable.    Restart Losartan without HCTZ at 50 mg daily.    Not to resume HCTZ on d/c.,    SGLT2i on hold for now.  --HFpEF with hx of TAVR : clinically compensated.  --Anemia : EGD with benign polyps and Colon with internal hemorrhoids and minimal proctitis--cauterized.  for outpt capsule study.  --Strep bacteremia with prosthetic valve endocarditis : On Ceftriaxone per ID.  --Pt again educated to avoid all NSAIDS.  Explained even with renal recovery this time, repeated injury would lead to CKD in the future.    2/14 MK   - DARLINE/CKD resolved     nsaid avoidance   - HTN     losartan 50 mg restarted   - anemia s/p egd     guaic pos : radiation proctitis/ internal hemorrhoids     gi input noted, ? related to SBE   - SBE / avr vs discitis     abx as per ID     will sign off, please call with questions

## 2025-02-14 NOTE — PROGRESS NOTE ADULT - CARDIOVASCULAR
regular rate and rhythm/S1 S2 present/murmur
normal/regular rate and rhythm/S1 S2 present/no gallops/no rub/no murmur/no pedal edema
normal/regular rate and rhythm/S1 S2 present/no gallops/no rub/no murmur
normal/regular rate and rhythm/S1 S2 present/no gallops/no rub/no murmur

## 2025-02-14 NOTE — DISCHARGE NOTE PROVIDER - NSDCFUADDINST_GEN_ALL_CORE_FT
IV antibioitics for 6 weeks  Will need repeat ROVERTO after 6 weeks Dr Heath to arrange  Physical Therapy  Repeat kidney tests and CBC with Dr Jernigan in the office  Repeat MRI after 6 weeks with Dr Lizarraga  May need to schedule pill camera test with Dr Porter  Outpatient cardiac workup for demand ischemia with Dr Heath

## 2025-02-14 NOTE — DISCHARGE NOTE PROVIDER - NSDCCPCAREPLAN_GEN_ALL_CORE_FT
PRINCIPAL DISCHARGE DIAGNOSIS  Diagnosis: GI bleed  Assessment and Plan of Treatment:       SECONDARY DISCHARGE DIAGNOSES  Diagnosis: DARLINE (acute kidney injury)  Assessment and Plan of Treatment:     Diagnosis: Elevated troponin  Assessment and Plan of Treatment:     Diagnosis: Demand ischemia of myocardium  Assessment and Plan of Treatment:     Diagnosis: Endocarditis, valve  Assessment and Plan of Treatment:     Diagnosis: Acute osteomyelitis  Assessment and Plan of Treatment:     Diagnosis: Anemia due to acute blood loss  Assessment and Plan of Treatment:     Diagnosis: Diabetes type 2, controlled  Assessment and Plan of Treatment:      PRINCIPAL DISCHARGE DIAGNOSIS  Diagnosis: Endocarditis, valve  Assessment and Plan of Treatment: please complete total 6 weeks course of IV ceftriaxone  follow up with your cardiologist for ROVERTO in 6weeks to check for resolution of aortic valve vegetation  Follow up with cardiology for outpatient cardiac cath   Follow up with Dr Lizarraga in 4 weeks to check resolution of lumbar osteomyelitis/discitis   Follow up with GI for capsule endoscopy as outpatient      SECONDARY DISCHARGE DIAGNOSES  Diagnosis: DARLINE (acute kidney injury)  Assessment and Plan of Treatment:     Diagnosis: Elevated troponin  Assessment and Plan of Treatment:     Diagnosis: Demand ischemia of myocardium  Assessment and Plan of Treatment:     Diagnosis: Acute osteomyelitis  Assessment and Plan of Treatment:     Diagnosis: Anemia due to acute blood loss  Assessment and Plan of Treatment:     Diagnosis: Diabetes type 2, controlled  Assessment and Plan of Treatment:     Diagnosis: Endocarditis, valve  Assessment and Plan of Treatment:

## 2025-02-14 NOTE — PROGRESS NOTE ADULT - SUBJECTIVE AND OBJECTIVE BOX
Date of service: 02-14-25 @ 10:44    Lying in bed in NAD  Low grade fever  Has low back pain    ROS: denies dizziness, no HA, no SOB or cough, no abdominal pain, no diarrhea or constipation; no dysuria, no legs pain, no rashes    MEDICATIONS  (STANDING):  apixaban 2.5 milliGRAM(s) Oral two times a day  atorvastatin 40 milliGRAM(s) Oral at bedtime  cefTRIAXone Injectable. 2000 milliGRAM(s) IV Push every 24 hours  dextrose 5%. 1000 milliLiter(s) (50 mL/Hr) IV Continuous <Continuous>  dextrose 5%. 1000 milliLiter(s) (100 mL/Hr) IV Continuous <Continuous>  dextrose 50% Injectable 25 Gram(s) IV Push once  dextrose 50% Injectable 12.5 Gram(s) IV Push once  dextrose 50% Injectable 25 Gram(s) IV Push once  dextrose Oral Gel 15 Gram(s) Oral once  gabapentin 200 milliGRAM(s) Oral three times a day  glucagon  Injectable 1 milliGRAM(s) IntraMuscular once  insulin lispro (ADMELOG) corrective regimen sliding scale   SubCutaneous Before meals and at bedtime  levothyroxine 150 MICROGram(s) Oral daily  losartan 50 milliGRAM(s) Oral daily  pantoprazole    Tablet 40 milliGRAM(s) Oral before breakfast    Vital Signs Last 24 Hrs  T(C): 37 (14 Feb 2025 08:35), Max: 37.7 (14 Feb 2025 00:15)  T(F): 98.6 (14 Feb 2025 08:35), Max: 99.9 (14 Feb 2025 00:15)  HR: 96 (14 Feb 2025 08:35) (93 - 97)  BP: 112/59 (14 Feb 2025 08:35) (112/59 - 132/63)  BP(mean): --  RR: 18 (14 Feb 2025 08:35) (18 - 18)  SpO2: 93% (14 Feb 2025 08:35) (93% - 95%)    Parameters below as of 14 Feb 2025 08:35  Patient On (Oxygen Delivery Method): room air     Physical exam:    Constitutional:  No acute distress  HEENT: NC/AT, EOMI, PERRLA, conjunctivae clear; ears and nose atraumatic; pharynx benign  Neck: supple; thyroid not palpable  Back: no tenderness  Respiratory: respiratory effort normal; clear to auscultation  Cardiovascular: S1S2 regular, no murmurs  Abdomen: soft, not tender, not distended, positive BS; no liver or spleen organomegaly  Genitourinary: no suprapubic tenderness  Lymphatic: no LN palpable  Musculoskeletal: no muscle tenderness, no joint swelling or tenderness  Extremities: no pedal edema  Neurological/ Psychiatric: Alert, judgement and insight impaired; moving all extremities  Skin: no rashes; no palpable lesions    Labs: reviewed                        9.0    15.57 )-----------( 266      ( 14 Feb 2025 07:01 )             27.7     02-13    137  |  107  |  16  ----------------------------<  110[H]  3.6   |  24  |  1.02    Ca    9.1      13 Feb 2025 06:31  Phos  2.9     02-13    TPro  5.8[L]  /  Alb  2.2[L]  /  TBili  x   /  DBili  x   /  AST  x   /  ALT  x   /  AlkPhos  x   02-13    C-Reactive Protein: 73.6 mg/mL (02-12-25 @ 13:56)                        8.8    13.20 )-----------( 265      ( 13 Feb 2025 06:31 )             27.2     02-13    137  |  107  |  16  ----------------------------<  110[H]  3.6   |  24  |  1.02    Ca    9.1      13 Feb 2025 06:31  Phos  2.9     02-13  Mg     1.6     02-12    TPro  x   /  Alb  2.2[L]  /  TBili  x   /  DBili  x   /  AST  x   /  ALT  x   /  AlkPhos  x   02-13    C-Reactive Protein: 73.6 mg/mL (02-12-25 @ 13:56)                        8.8    12.33 )-----------( 253      ( 12 Feb 2025 06:04 )             27.0     02-12    139  |  108  |  26[H]  ----------------------------<  89  3.9   |  24  |  1.22    Ca    9.5      12 Feb 2025 06:04  Phos  3.0     02-12  Mg     1.6     02-12    TPro  6.5  /  Alb  2.3[L]  /  TBili  1.0  /  DBili  x   /  AST  30  /  ALT  25  /  AlkPhos  92  02-12     LIVER FUNCTIONS - ( 12 Feb 2025 06:04 )  Alb: 2.3 g/dL / Pro: 6.5 gm/dL / ALK PHOS: 92 U/L / ALT: 25 U/L / AST: 30 U/L / GGT: x           Urinalysis with Rflx Culture (collected 10 Feb 2025 12:47)    Culture - Blood (collected 10 Feb 2025 09:32)  Source: .Blood BLOOD  Gram Stain (11 Feb 2025 13:01):    Growth in aerobic bottle: Gram Positive Cocci in Pairs and Chains    Growth in anaerobic bottle: Gram Positive Cocci in Pairs and Chains  Final Report (13 Feb 2025 06:33):    Growth in aerobic and anaerobic bottles: Streptococcus    salivarius/vestibularis group  Organism: Streptococcus salivarius/vestibularis group (13 Feb 2025 06:33)  Organism: Streptococcus salivarius/vestibularis group (13 Feb 2025 06:33)      Method Type: BRIANA      -  Ceftriaxone: S <=0.25      -  Penicillin: S 0.06      -  Vancomycin: S 0.5    Culture - Blood (collected 10 Feb 2025 09:28)  Source: .Blood BLOOD  Gram Stain (11 Feb 2025 09:51):    Growth in aerobic bottle: Gram Positive Cocci in Pairs and Chains    Growth in anaerobic bottle: Gram Positive Cocci in Pairs and Chains  Final Report (12 Feb 2025 17:14):    Growth in aerobic and anaerobic bottles: Streptococcus    salivarius/vestibularis group "Susceptibilities not performed"    Direct identification is available within approximately 3-5    hours either by Blood Panel Multiplexed PCR or Direct    MALDI-TOF. Details: [email or web address]  Organism: Blood Culture PCR (12 Feb 2025 17:14)  Organism: Blood Culture PCR (12 Feb 2025 17:14)      Method Type: PCR      -  Streptococcus sp. (Not Grp A, B or S pneumoniae): Detec    < from: Xray Chest 1 View- PORTABLE-Urgent (02.10.25 @ 10:34) >  IMPRESSION: No acute finding.  < end of copied text >    < from: CT Abdomen and Pelvis w/wo IV Cont (02.10.25 @ 10:09) >  IMPRESSION:  *  No acute pathology.  *  No active GI bleeding.  < end of copied text >  < from: TTE Echo Complete w/ Contrast w/ Doppler (01.11.23 @ 19:34) >   1. Left ventricular ejection fraction, by visual estimation, is >75%.   2. Hyperdynamic global left ventricular systolic function.   3. Spectral Doppler shows impaired relaxation pattern of left ventricular myocardial filling (Grade I diastolic dysfunction).   4. There is moderate concentric left ventricular hypertrophy.   5. Mildly enlarged left atrium.   6. There is no evidence of pericardial effusion.   7. Mild mitral annular calcification.   8. Thickening of the anterior mitral valve leaflet.   9. Trace mitral valve regurgitation.  10. TAVR in the aortic position.  11. LVOT vmax at rest : 120 cm/s, LVOT vmax with valsalva : 157 cm/s. No   evidence of dynamic LVOTO. There is mild paravalvular regurgitation of the prosthetic AoV.  12. Mildly dilated pulmonary artery.  13. Adequate TR velocity was not obtained to accurately assess RVSP.  < end of copied text >    Advanced directives addressed: full resuscitation

## 2025-02-14 NOTE — PROGRESS NOTE ADULT - ASSESSMENT
patient with GIB, strep septicemia and endocarditis  1-infection-PICC line and 6 weeks of ABx; will need repeat ROVERTO at that time to reassess AV  2-arrhythmia-patient with afib-off AC; as per GI, reinitiate AC with eliquis 2.5mg BID  3-valve-AS s/p TAVR-with elevated gradients, may need re-evaluation and if stenosis is severe-surgical removal and replacement may be warranted in the future  4-CAD-+ troponin levels-will need w/u as outpatient after reolution of current issues-suspect demand ischemia but will need eventaul catheterization

## 2025-02-14 NOTE — DISCHARGE NOTE PROVIDER - NSDCMRMEDTOKEN_GEN_ALL_CORE_FT
acetaminophen 325 mg oral tablet: 2 tab(s) orally every 4 hours As needed Temp greater or equal to 38C (100.4F), Mild Pain (1 - 3), Moderate Pain (4 - 6)  apixaban 2.5 mg oral tablet: 1 tab(s) orally 2 times a day  atorvastatin 40 mg oral tablet: 1 tab(s) orally once a day  B-Complex 50 oral tablet: 1 tab(s) orally once a day  cefTRIAXone: 1 gram(s) intravenous once a day  cholecalciferol 25 mcg (1000 intl units) oral tablet: 1 tab(s) orally once a day  Chondroitin-Glucosamine oral tablet: 1 tab(s) orally once a day  Co-Q10 100 mg oral capsule: 1 cap(s) orally once a day  cyanocobalamin 1000 mcg oral tablet: 1 tab(s) orally once a day  dapagliflozin 10 mg oral tablet: 1 tab(s) orally every 24 hours  finasteride 1 mg oral tablet: 1 tab(s) orally once a day  gabapentin 100 mg oral capsule: 2 cap(s) orally 3 times a day  Januvia 50 mg oral tablet: 1 tab(s) orally once a day  levothyroxine 150 mcg (0.15 mg) oral tablet: 1 tab(s) orally once a day ***pts list states 175mcg- DrFirst shows 150mcg filled on 11/15 x 90 days- called Dr Abdulaziz Jernigan&#x27;s office to verify dose and was verified at 150mcg***  losartan 50 mg oral tablet: 1 tab(s) orally once a day  Melatonin 5 mg oral tablet: 2 tab(s) orally once a day (at bedtime)  MetFORMIN (Eqv-Glucophage XR) 500 mg oral tablet, extended release: 4 tab(s) orally once a day (in the morning)  Multiple Vitamins oral tablet: 1 tab(s) orally once a day  pantoprazole 40 mg oral delayed release tablet: 1 tab(s) orally once a day  PreserVision AREDS 2 oral capsule: 1 cap(s) orally 2 times a day  zolpidem 10 mg oral tablet: 0.5 tab(s) orally once a day (at bedtime)   acetaminophen 325 mg oral tablet: 2 tab(s) orally every 4 hours As needed Temp greater or equal to 38C (100.4F), Mild Pain (1 - 3), Moderate Pain (4 - 6)  apixaban 2.5 mg oral tablet: 1 tab(s) orally 2 times a day  atorvastatin 40 mg oral tablet: 1 tab(s) orally once a day  B-Complex 50 oral tablet: 1 tab(s) orally once a day  cefTRIAXone: 2 gram(s) intravenous once a day last day march 28th  cholecalciferol 25 mcg (1000 intl units) oral tablet: 1 tab(s) orally once a day  Chondroitin-Glucosamine oral tablet: 1 tab(s) orally once a day  Co-Q10 100 mg oral capsule: 1 cap(s) orally once a day  cyanocobalamin 1000 mcg oral tablet: 1 tab(s) orally once a day  dapagliflozin 10 mg oral tablet: 1 tab(s) orally every 24 hours  ferrous sulfate 324 mg (65 mg elemental iron) oral delayed release tablet: 1 tab(s) orally once a day  finasteride 1 mg oral tablet: 1 tab(s) orally once a day  gabapentin 100 mg oral capsule: 2 cap(s) orally 3 times a day  Januvia 50 mg oral tablet: 1 tab(s) orally once a day  levothyroxine 150 mcg (0.15 mg) oral tablet: 1 tab(s) orally once a day ***pts list states 175mcg- DrFirst shows 150mcg filled on 11/15 x 90 days- called Dr Abdulaziz Jernigan&#x27;s office to verify dose and was verified at 150mcg***  losartan 50 mg oral tablet: 1 tab(s) orally once a day  Melatonin 5 mg oral tablet: 2 tab(s) orally once a day (at bedtime)  MetFORMIN (Eqv-Glucophage XR) 500 mg oral tablet, extended release: 4 tab(s) orally once a day (in the morning)  Multiple Vitamins oral tablet: 1 tab(s) orally once a day  Narcan 4 mg/0.1 mL nasal spray: 1 spray(s) intranasally once as needed for  opioid overdose  pantoprazole 40 mg oral delayed release tablet: 1 tab(s) orally once a day  polyethylene glycol 3350 oral powder for reconstitution: 17 gram(s) orally once a day  PreserVision AREDS 2 oral capsule: 1 cap(s) orally 2 times a day  senna leaf extract oral tablet: 2 tab(s) orally once a day (at bedtime) As needed Constipation  traMADol 50 mg oral tablet: 1 tab(s) orally every 6 hours As needed Severe Pain (7 - 10)  zolpidem 10 mg oral tablet: 0.5 tab(s) orally once a day (at bedtime)   acetaminophen 325 mg oral tablet: 2 tab(s) orally every 6 hours As needed Temp greater or equal to 38C (100.4F), Mild Pain (1 - 3)  atorvastatin 40 mg oral tablet: 1 tab(s) orally once a day (at bedtime)  B-Complex 50 oral tablet: 1 tab(s) orally once a day  bisacodyl 10 mg rectal suppository: 1 suppository(ies) rectally prn as needed for  constipation if no relief from MOM  cefTRIAXone 2 g injection: 2 gram(s) intravenously once a day through 4/7 /25 weekly cbc cmp sed rate crp  cholecalciferol 25 mcg (1000 intl units) oral tablet: 1 tab(s) orally once a day  Chondroitin-Glucosamine: 1 tab(s) orally once a day  Co-Q10 100 mg oral capsule: 1 cap(s) orally once a day  cyanocobalamin 1000 mcg oral tablet: 1 tab(s) orally once a day  dapagliflozin 10 mg oral tablet: 1 tab(s) orally once a day  ferrous sulfate 325 mg (65 mg elemental iron) oral delayed release tablet: 1 tab(s) orally once a day  finasteride 1 mg oral tablet: 1 tab(s) orally once a day  Januvia 50 mg oral tablet: 1 tab(s) orally once a day  levothyroxine 150 mcg (0.15 mg) oral tablet: 1 tab(s) orally once a day  Melatonin 5 mg oral tablet: 2 tab(s) orally once a day (at bedtime)  MetFORMIN (Eqv-Glumetza) 500 mg oral tablet, extended release: 4 tab(s) orally once a day in the morning  Multiple Vitamins oral tablet: 1 tab(s) orally once a day  pantoprazole 40 mg oral delayed release tablet: 1 tab(s) orally once a day (before a meal)  polyethylene glycol 3350 oral powder for reconstitution: 17 gram(s) orally once a day  PreserVision AREDS 2 oral capsule: 1 cap(s) orally 2 times a day  senna leaf extract oral tablet: 2 tab(s) orally once a day (at bedtime)  traMADol 50 mg oral tablet: 1 tab(s) orally every 6 hours As needed Severe Pain (7 - 10)  zolpidem 10 mg oral tablet: 1 tab(s) orally once a day (at bedtime) as needed for  insomnia

## 2025-02-14 NOTE — DISCHARGE NOTE PROVIDER - CARE PROVIDERS DIRECT ADDRESSES
[email or web address]

## 2025-02-14 NOTE — DISCHARGE NOTE PROVIDER - PROVIDER TOKENS
PROVIDER:[TOKEN:[[id number]]],PROVIDER:[TOKEN:[[id number]]],PROVIDER:[TOKEN:[[id number]]],PROVIDER:[TOKEN:[[id number]]],PROVIDER:[TOKEN:[[id number]]]

## 2025-02-14 NOTE — DISCHARGE NOTE PROVIDER - HOSPITAL COURSE
Patient admitted with hypotension and acute anemia. Admitted to ICU. Acute kidney injury and dehydration treated with IVF. Stool positive for occult blood. Seen by Nephrology. Received 2 units of PRBC's. Seen by GI and underwent upper and lower endoscopy without obvious sourse of bleeding. Positive troponins and seen by cardiology. Believed secondary to demand ischemia. Improved after hydration and transfusion. Admitted with fever. Blood cultures positive gram positive bacteria and treated with Vancomycin and Rocephin. Seen by ID. Streptococcus species siolated and vancomycin discontinued. Underwent ROVERTO which showed vegitation to the TAVR.  Eliquis restarted at half dose due to presumed GI Bleed.  MRI spine showed probably osteomyelitis to LS spine and seen by Neurosurgery who recommended conservative management. DM controlled with mediations. Arrangement made for 6 weeks of Home Rocephin via PICC line. H/H remained stable but patient may require out patient Pill Camera test. His condition on discharge is improved. Patient admitted with hypotension and acute anemia. Admitted to ICU. Acute kidney injury and dehydration treated with IVF. Stool positive for occult blood. Seen by Nephrology. Received 2 units of PRBC's. Seen by GI and underwent upper and lower endoscopy without obvious sourse of bleeding. Positive troponins and seen by cardiology. Believed secondary to demand ischemia. Improved after hydration and transfusion. Admitted with fever. Blood cultures positive gram positive bacteria and treated with Vancomycin and Rocephin. Seen by ID. Streptococcus species siolated and vancomycin discontinued. Underwent ROVERTO which showed vegitation to the TAVR.  Eliquis restarted at half dose due to presumed GI Bleed.  MRI spine showed probably osteomyelitis to LS spine and seen by Neurosurgery who recommended conservative management. DM controlled with mediations. Arrangement made for 6 weeks of  IV rocephin via PICC line. H/H remained stable but patient may require out patient Pill Camera test. His condition on discharge is improved.    Patient is requesting to be released from [facility] when he is back at this physical baseline and to continue remaining course of IV rocephin  from home   Last day of6 weeks course of  IV rocephin will be march 28th 2025      Patient admitted with hypotension and acute anemia. Admitted to ICU. Acute kidney injury and dehydration treated with IVF. Stool positive for occult blood. Seen by Nephrology. Received 2 units of PRBC's. Seen by GI and underwent upper and lower endoscopy without obvious sourse of bleeding. Positive troponins and seen by cardiology. Believed secondary to demand ischemia. Improved after hydration and transfusion. Admitted with fever. Blood cultures positive gram positive bacteria and treated with Vancomycin and Rocephin. Seen by ID. Streptococcus species siolated and vancomycin discontinued. Underwent ROVERTO which showed vegitation to the TAVR.  Eliquis restarted at half dose due to presumed GI Bleed.  MRI spine showed probably osteomyelitis to LS spine and seen by Neurosurgery who recommended conservative management. DM controlled with mediations. Arrangement made for 6 weeks of  IV rocephin via PICC line. H/H remained stable but patient may require out patient Pill Camera test. His condition on discharge is improved.    Patient is requesting to be released from [facility] when he is back at this physical baseline and to continue remaining course of IV rocephin  from home   Last day of6 weeks course of  IV rocephin will be march 28th 2025     Assessment:  Acute Hypovolemic Shock resolved with fluids  Possible GI Bleed. - Upper endoscopy and colonoscopy without obvious bleeding source   DARLINE - resolved with hydration  Acute blood loss Anemia  and iron deficiency anemia- improved after 2 units PRBC's and H/H monitor at rehab   DM  Acute Endocarditis of TAVR  Osteomyelitis of LS SpineChronic Low Back pain without radiculopathy  Troponin spike - demand ischemia vs NSTEMI  Acute Streptococcus salivarius/vestibularis  Bacteremia    Plan:  monitor Hb/Hct at rehab   D/c telemetry  s/p vancomycin 750 mg IV q12h # 2  -on ceftriaxone 2 gm IV qd # 5 complete 6 weeks course   cultures reviewed  -organism noted sensitive to PCN  repeat BC x 2 are negative to date  -plan for PICC line today  S/p 2 units PRBC's  Nephrology following  GI following. May need outpt. pill cam  upper endoscopy gastric polyps   colonoscopy- internal hemorrhois, angioectasia rectum Off NSAIDs Holding Metformin and SGLT2  Back on ARB without Diuretic  Restarted   Eliquis at half dose  Cardiology consult appreciated- would need ROVERTO in 6weeks repeated, cardiac cath as outpatient  ID consult appreciated  Neuro surg consult- no acute neurosurg intervention for lumbar OM /discitis- f/u dr vaughn in 4weeks  OOB , PT  Neurosurgery consult appreciated    negative blood cultures prior to discharge 2/14  Discharge today to rehab  with PICC and Rocephin Q day x 6 weeks  Repeat ROVERTO after 6 weeks  Rolling Walker:  Patient requires rolling walker due to Osteomyelitis of LS Spine  for safe ambulation at discharge.  DNR/ DNI,  otherwise no other limitations to care  total discharge time spent 47mins   please see my progress note 2/17 for physical exam

## 2025-02-14 NOTE — DISCHARGE NOTE PROVIDER - CARE PROVIDER_API CALL
Abdulaziz Jernigan  [facility] Medicine  [address], [address]  [place] [address]  Phone: [phone]  Fax: [phone]  Follow Up Time:     Mahamed Heath  Cardiovascular Disease  [address], [address]  [place] [address]  Phone: [phone]  Fax: [phone]  Follow Up Time:     Lizbeth Redd  Nephrology  [address], [address]  [place] [address]  Phone: [phone]  Fax: [phone]  Follow Up Time:     Gualberto Porter  Gastroenterology  [address], [address]  [place] [address]  Phone: [phone]  Fax: [phone]  Follow Up Time:     Berny Lizarraga [facility]  Neurosurgery  [address]  [place] [address]  Phone: [phone]  Fax: [phone]  Follow Up Time:

## 2025-02-14 NOTE — DISCHARGE NOTE PROVIDER - PROVIDER RX CONTACT NUMBER
Per Salo Galloway PA-C, the patient was informed that his CT Chest was positive for bronchiolitis. He was given contact information for Pulmonologist, Manjit Page M.D..   He was also informed that there was a small upper paratracheal node and akin [phone]

## 2025-02-14 NOTE — PROGRESS NOTE ADULT - SUBJECTIVE AND OBJECTIVE BOX
SUBJECTIVE:    CHIEF COMPLAINT:  Patient is a 74y old  Male who presents with a chief complaint of Hypotension, anemia (14 Feb 2025 10:43)    Interval HPI and Overnight Events: No events overnight. No complaints. Resting comfortably    REVIEW OF SYSTEMS:  CONSTITUTIONAL: No weakness, fevers or chills  EYES/ENT: No visual changes;  No vertigo or throat pain   NECK: No pain or stiffness  RESPIRATORY: No cough, wheezing, hemoptysis; No shortness of breath  CARDIOVASCULAR: No chest pain or palpitations  GASTROINTESTINAL: No abdominal or epigastric pain. No nausea, vomiting, or hematemesis; No diarrhea or constipation. No melena or hematochezia.  GENITOURINARY: No dysuria, frequency or hematuria  NEUROLOGICAL: No numbness or weakness  SKIN: No itching, burning, rashes, or lesions   All other review of systems is negative unless indicated above    OBJECTIVE    Vital Signs Last 24 Hrs  T(C): 37 (14 Feb 2025 08:35), Max: 37.7 (14 Feb 2025 00:15)  T(F): 98.6 (14 Feb 2025 08:35), Max: 99.9 (14 Feb 2025 00:15)  HR: 96 (14 Feb 2025 08:35) (93 - 97)  BP: 112/59 (14 Feb 2025 08:35) (112/59 - 132/63)  BP(mean): --  RR: 18 (14 Feb 2025 08:35) (18 - 18)  SpO2: 93% (14 Feb 2025 08:35) (93% - 95%)    Parameters below as of 14 Feb 2025 08:35  Patient On (Oxygen Delivery Method): room air        MEDICATIONS  (STANDING):  apixaban 2.5 milliGRAM(s) Oral two times a day  atorvastatin 40 milliGRAM(s) Oral at bedtime  cefTRIAXone Injectable. 2000 milliGRAM(s) IV Push every 24 hours  dextrose 5%. 1000 milliLiter(s) (50 mL/Hr) IV Continuous <Continuous>  dextrose 5%. 1000 milliLiter(s) (100 mL/Hr) IV Continuous <Continuous>  dextrose 50% Injectable 25 Gram(s) IV Push once  dextrose 50% Injectable 12.5 Gram(s) IV Push once  dextrose 50% Injectable 25 Gram(s) IV Push once  dextrose Oral Gel 15 Gram(s) Oral once  gabapentin 200 milliGRAM(s) Oral three times a day  glucagon  Injectable 1 milliGRAM(s) IntraMuscular once  insulin lispro (ADMELOG) corrective regimen sliding scale   SubCutaneous Before meals and at bedtime  levothyroxine 150 MICROGram(s) Oral daily  losartan 50 milliGRAM(s) Oral daily  pantoprazole    Tablet 40 milliGRAM(s) Oral before breakfast      LABS:                         9.0    15.57 )-----------( 266      ( 14 Feb 2025 07:01 )             27.7     02-13    137  |  107  |  16  ----------------------------<  110[H]  3.6   |  24  |  1.02    Ca    9.1      13 Feb 2025 06:31  Phos  2.9     02-13    TPro  5.8[L]  /  Alb  2.2[L]  /  TBili  x   /  DBili  x   /  AST  x   /  ALT  x   /  AlkPhos  x   02-13    Urinalysis Basic - ( 13 Feb 2025 06:31 )  Color: x / Appearance: x / SG: x / pH: x  Gluc: 110 mg/dL / Ketone: x  / Bili: x / Urobili: x   Blood: x / Protein: x / Nitrite: x   Leuk Esterase: x / RBC: x / WBC x   Sq Epi: x / Non Sq Epi: x / Bacteria: x    Specimen Source .Blood BLOOD   02-10 @ 09:32  Culture Results  Growth in aerobic and anaerobic bottles: Streptococcus  salivarius/vestibularis group[!]  Specimen Source .Blood BLOOD   02-10 @ 09:28  Culture Results  Growth in aerobic and anaerobic bottles: Streptococcus  salivarius/vestibularis group "Susceptibilities not performed"  Direct identification is available within approximately 3-5  hours either by Blood Panel Multiplexed PCR or Direct  MALDI-TOF. Details: [email or web address]    CAPILLARY BLOOD GLUCOSE  POCT Blood Glucose.: 120 mg/dL (14 Feb 2025 09:28)  POCT Blood Glucose.: 179 mg/dL (13 Feb 2025 20:40)  POCT Blood Glucose.: 167 mg/dL (13 Feb 2025 17:58)

## 2025-02-14 NOTE — PROGRESS NOTE ADULT - ASSESSMENT
73 y/o Male with h/o HTN, DM, prostate CA, hypothyroidism, AVR, A.Fib on eliquis, chronic back pain s/p epidurals on diclofenac, HLD, NIDDM, CKD was admitted on 2/11 for chest pain. He developed low back pain in Nov 2024 and since then he had acupuncture and spine steroids injections with partial improvement. He saw Dr. Lizarraga and had an MRI spine 3 weeks PTA. He reported low grade fever at home for last 2-3 weeks PTA. In ER he was noted hypotensive, POCT+ stool, and met sepsis criteria. He was started on zosyn.     #Sepsis/ bacteremia with strep spp ?source - heart valve vs back  #Subacute prosthetic bacterial endocarditis. AVR  #Possible lumbar spine discitis / OM.  #Hypotension resolving  #Leukocytosis  #ARF on CRF stage 3  -cultures reviewed  -organism noted sensitive to PCN  -s/p vancomycin 750 mg IV q12h # 2  -on ceftriaxone 2 gm IV qd # 3  -tolerating abx well so far; no side effects noted  -Echo and ROVERTO noted  -cardiology evaluation appreciated   -spine evaluation appreciated - conservative care at present time  -continue abx coverage with ceftriaxone  -repeat BC x 2 collected  -f/u cultures  -monitor temps  -f/u CBC  -supportive care  2. Other issues:   -care per medicine    D/w medicine team  d/w Dr. Jernigan

## 2025-02-14 NOTE — PROGRESS NOTE ADULT - SUBJECTIVE AND OBJECTIVE BOX
NEPHROLOGY INTERVAL HPI/OVERNIGHT EVENTS:  25 @ 13:46     no c/o  --Post ROVERTO--c/w a vegetation of aortic prosthesis.  No new complaints.  --Feeling fair.  no new complaints    HPI:  75 yo with hx of ckd ( scr in 1.4 due to ? NSAID use )   pw CP ? hemetemesis   noted with anemia r/o ABLA for egd today  DARLINE with scr of 2.2 in setting of worsening anemia and recent diclofenac use   + OCB  admitted to ICU for hypotension   no hx of bph     pmhx/psurg hx   - DM   - CKD ( scr 1.4)   - NIDDM   - hypothy  - afib eliquiis  - AVR  - htn   - dm   - prostate ca               MEDICATIONS  (STANDING):  apixaban 2.5 milliGRAM(s) Oral two times a day  atorvastatin 40 milliGRAM(s) Oral at bedtime  cefTRIAXone Injectable. 2000 milliGRAM(s) IV Push every 24 hours  dextrose 5%. 1000 milliLiter(s) (50 mL/Hr) IV Continuous <Continuous>  dextrose 5%. 1000 milliLiter(s) (100 mL/Hr) IV Continuous <Continuous>  dextrose 50% Injectable 25 Gram(s) IV Push once  dextrose 50% Injectable 12.5 Gram(s) IV Push once  dextrose 50% Injectable 25 Gram(s) IV Push once  dextrose Oral Gel 15 Gram(s) Oral once  gabapentin 200 milliGRAM(s) Oral three times a day  glucagon  Injectable 1 milliGRAM(s) IntraMuscular once  insulin lispro (ADMELOG) corrective regimen sliding scale   SubCutaneous Before meals and at bedtime  levothyroxine 150 MICROGram(s) Oral daily  losartan 50 milliGRAM(s) Oral daily  pantoprazole    Tablet 40 milliGRAM(s) Oral before breakfast    MEDICATIONS  (PRN):  acetaminophen     Tablet .. 650 milliGRAM(s) Oral every 4 hours PRN Temp greater or equal to 38C (100.4F), Mild Pain (1 - 3), Moderate Pain (4 - 6)  zolpidem 5 milliGRAM(s) Oral at bedtime PRN Insomnia      Allergies    No Known Allergies    Intolerances        I&O's Detail    2025 07:01  -  2025 07:00  --------------------------------------------------------  IN:  Total IN: 0 mL    OUT:    Voided (mL): 475 mL  Total OUT: 475 mL    Total NET: -475 mL        Vital Signs Last 24 Hrs  T(C): 37 (2025 08:35), Max: 37.7 (2025 00:15)  T(F): 98.6 (2025 08:35), Max: 99.9 (2025 00:15)  HR: 96 (2025 08:35) (93 - 97)  BP: 112/59 (2025 08:35) (112/59 - 132/63)  BP(mean): --  RR: 18 (2025 08:35) (18 - 18)  SpO2: 93% (2025 08:35) (93% - 95%)    Parameters below as of 2025 08:35  Patient On (Oxygen Delivery Method): room air      Daily     Daily Weight in k.7 (2025 00:47)    PHYSICAL EXAM:  General: alert. awake Ox3  HEENT: MMM  CV: s1s2 rrr  LUNGS: B/L CTA  EXT: no edema    LABS:                        9.0    15.57 )-----------( 266      ( 2025 07:01 )             27.7     02-13    137  |  107  |  16  ----------------------------<  110[H]  3.6   |  24  |  1.02    Ca    9.1      2025 06:31  Phos  2.9     02-13    TPro  5.8[L]  /  Alb  2.2[L]  /  TBili  x   /  DBili  x   /  AST  x   /  ALT  x   /  AlkPhos  x   02-13      Urinalysis Basic - ( 2025 06:31 )    Color: x / Appearance: x / SG: x / pH: x  Gluc: 110 mg/dL / Ketone: x  / Bili: x / Urobili: x   Blood: x / Protein: x / Nitrite: x   Leuk Esterase: x / RBC: x / WBC x   Sq Epi: x / Non Sq Epi: x / Bacteria: x

## 2025-02-14 NOTE — DISCHARGE NOTE PROVIDER - NSDCFUSCHEDAPPT_GEN_ALL_CORE_FT
Abdulaziz Jernigan Physician [facility]  [facility] [address] [facility]  Scheduled Appointment: 03/17/2025    Benito Jones  [place] Physician [facility] [address]  Scheduled Appointment: 04/07/2025

## 2025-02-14 NOTE — PROGRESS NOTE ADULT - SUBJECTIVE AND OBJECTIVE BOX
CHIEF COMPLAINT: Patient is a 74y old  Male who presents with a chief complaint of Hypotension, anemia (13 Feb 2025 18:31)      HPI:  ICU H&P    S:    Pt seen and examined  74M  PMHx of diabetes, high blood pressure, aortic valve replacement, AF on eliquis, chronic back pain s/p epidurals on diclofenac, HLD, hypothyroid, NIDDM, sAS, CKD  Pt here for chest pain  Recent Dx anemia on blood work  ICU consult for hypotension    2/10: POCT+ stool, no gross blood, last EGD distant past; CT C/A/P done, no obv abnl found. Rec'ing blood, GI called. (10 Feb 2025 13:53)    2/10-patient well known to Dr GILES Heath; with history of HTN, HLD, DM, HFpEF and AS s/p TAVR (non obstructive CAD on cath-2023).  Post TARV, had heart block and then MCOT placed-eventually diagnosed with Afib and started on AC.          Today, patient presents to ER c/p CP/SOB and vomiting. Onset of symptoms began this morning with midsternal CP. + Troponin levels and EKG with sinus tachycardia with non specific st changes.  Patient took one aspirin at home and vomited. Pt reports emesis appeared dark in color. EMS gave pt Aspirin 324mg, Nitro x 1.  He has chronic back pain s/p epidurals on diclofenac.  Recent Dx anemia on blood work.  ICU consult for hypotension called by ER.  PAtient with low grade fever, elevated WBC and GUAIAC + stool.  Viral panel negative.      2/11patient with low grade fever, elevated WBC with prior cath over 1 year ago with non-obstructive CAD at the time of TAVR who presents with chest pains, n/v/ coffee ground emesis with severe anemia and suspected demand ischemia  Patient with BP in the low 100's after 2 units of PRBC and fluids.  Hold antiplatelet and AC medications in face of severe life threatening bleed.  Patient with planne endoscopy today; will need antibiotic prophylaxis prior to procedure.  Currently on zosyn.   ECHO pending today.  H/H up to 8 and 25 (from 7 and 23/9) after 2 units of blood.  Creatinine improved and troponin climbed to 1200's.      2/12--patient with + blood cultures; streptococcus group a strep in multiple cultures; s/p EGD and colonoscopy-with no source of bleed.   AC held and transfused-stable H/H.       2/13-patient with ROVERTO; suspected small vegetation of AV; patient tolerated procedure well; ID service notified of reuslts    2/14-patient resting comfortably with stable (but declined h/h) after GI w/u with no source of bleeding and with strep salivarious septicemia and suspected endocarditis post ROVERTO.        PMHx: PAST MEDICAL & SURGICAL HISTORY:  Hypertension      Diabetes mellitus      Obesity      Hypothyroidism      Prostate CA          Allergies: Allergies    No Known Allergies    Intolerances          REVIEW OF SYSTEMS:    CONSTITUTIONAL: No weakness, fevers or chills  EYES/ENT: No visual changes;  No vertigo or throat pain   NECK: No pain or stiffness  RESPIRATORY: No cough, wheezing, hemoptysis; No shortness of breath  CARDIOVASCULAR: No chest pain or palpitations  GASTROINTESTINAL: No abdominal or epigastric pain. No nausea, vomiting, or hematemesis; No diarrhea or constipation. No melena or hematochezia.  GENITOURINARY: No dysuria, frequency or hematuria  NEUROLOGICAL: No numbness or weakness  SKIN: No itching, burning, rashes, or lesions   All other review of systems is negative unless indicated above    Vital Signs Last 24 Hrs  T(C): 36.8 (14 Feb 2025 00:47), Max: 37.7 (14 Feb 2025 00:15)  T(F): 98.2 (14 Feb 2025 00:47), Max: 99.9 (14 Feb 2025 00:15)  HR: 93 (14 Feb 2025 00:15) (84 - 97)  BP: 132/63 (14 Feb 2025 00:15) (111/72 - 132/63)  BP(mean): --  RR: 18 (14 Feb 2025 00:15) (18 - 18)  SpO2: 95% (14 Feb 2025 00:15) (92% - 96%)    Parameters below as of 14 Feb 2025 00:15  Patient On (Oxygen Delivery Method): room air        I&O's Summary    12 Feb 2025 07:01  -  13 Feb 2025 07:00  --------------------------------------------------------  IN: 0 mL / OUT: 300 mL / NET: -300 mL    13 Feb 2025 07:01  -  14 Feb 2025 06:19  --------------------------------------------------------  IN: 0 mL / OUT: 475 mL / NET: -475 mL            PHYSICAL EXAM:   Constitutional: NAD, awake and alert, well-developed  HEENT: PERR, EOMI, Normal Hearing, MMM  Neck: Soft and supple, No LAD, No JVD  Respiratory: Breath sounds are clear bilaterally, No wheezing, rales or rhonchi  Cardiovascular: S1 and S2, regular rate and rhythm, no Murmurs, gallops or rubs  Gastrointestinal: Bowel Sounds present, soft, nontender, nondistended, no guarding, no rebound  Extremities: No peripheral edema  Vascular: 2+ peripheral pulses  Neurological: A/O x 3, no focal deficits  Musculoskeletal: 5/5 strength b/l upper and lower extremities  Skin: No rashes    MEDICATIONS:  MEDICATIONS  (STANDING):  apixaban 2.5 milliGRAM(s) Oral two times a day  atorvastatin 40 milliGRAM(s) Oral at bedtime  cefTRIAXone Injectable. 2000 milliGRAM(s) IV Push every 24 hours  dextrose 5%. 1000 milliLiter(s) (50 mL/Hr) IV Continuous <Continuous>  dextrose 5%. 1000 milliLiter(s) (100 mL/Hr) IV Continuous <Continuous>  dextrose 50% Injectable 25 Gram(s) IV Push once  dextrose 50% Injectable 12.5 Gram(s) IV Push once  dextrose 50% Injectable 25 Gram(s) IV Push once  dextrose Oral Gel 15 Gram(s) Oral once  gabapentin 200 milliGRAM(s) Oral three times a day  glucagon  Injectable 1 milliGRAM(s) IntraMuscular once  insulin lispro (ADMELOG) corrective regimen sliding scale   SubCutaneous Before meals and at bedtime  levothyroxine 150 MICROGram(s) Oral daily  losartan 50 milliGRAM(s) Oral daily  pantoprazole    Tablet 40 milliGRAM(s) Oral before breakfast      LABS: All Labs Reviewed:                        8.8    13.20 )-----------( 265      ( 13 Feb 2025 06:31 )             27.2     02-13    137  |  107  |  16  ----------------------------<  110[H]  3.6   |  24  |  1.02    Ca    9.1      13 Feb 2025 06:31  Phos  2.9     02-13    TPro  5.8[L]  /  Alb  2.2[L]  /  TBili  x   /  DBili  x   /  AST  x   /  ALT  x   /  AlkPhos  x   02-13          Blood Culture: Organism Streptococcus salivarius/vestibularis group  Gram Stain Blood -- Gram Stain   Growth in aerobic bottle: Gram Positive Cocci in Pairs and Chains  Growth in anaerobic bottle: Gram Positive Cocci in Pairs and Chains  Specimen Source .Blood BLOOD  Culture-Blood --    Organism Blood Culture PCR  Gram Stain Blood -- Gram Stain   Growth in aerobic bottle: Gram Positive Cocci in Pairs and Chains  Growth in anaerobic bottle: Gram Positive Cocci in Pairs and Chains  Specimen Source .Blood BLOOD  Culture-Blood --          BNP     RADIOLOGY:    EKG:      Telemetry:    ECHO:

## 2025-02-14 NOTE — DISCHARGE NOTE PROVIDER - DETAILS OF MALNUTRITION DIAGNOSIS/DIAGNOSES
This patient has been assessed with a concern for Malnutrition and was treated during this hospitalization for the following Nutrition diagnosis/diagnoses:     -  02/12/2025: Severe protein-calorie malnutrition

## 2025-02-14 NOTE — DISCHARGE NOTE PROVIDER - NSDCFUADDAPPT_GEN_ALL_CORE_FT
See Dr Jernigan in the office within 7 days of discharge  See Dr Heath in the office within 2-3 weeks  Follow up with Dr Redd or Anna within 1-2 months See Dr Brown in the office within 7 days of discharge  See Dr Heath in the office within 2-3 weeks  Follow up with Dr Trujillo or Anna within 1-2 months  Follow up with Dr Lizarraga in 4 weeks  APPTS ARE READY TO BE MADE: [X] YES    Best Family or Patient Contact (if needed):    Additional Information about above appointments (if needed):    1:Dr brown  2:Dr césar Irby  3: Dr trujillo  4.Dr ayers  5.Dr Lizarraga neurosurgery   See Dr Brown in the office within 7 days of discharge  See Dr Heath in the office within 2-3 weeks  Follow up with Dr Trujillo or Anna within 1-2 months  Follow up with Dr Lizarraga in 4 weeks  APPTS ARE READY TO BE MADE: [X] YES    Best Family or Patient Contact (if needed):    Additional Information about above appointments (if needed):    1:Dr brown  2:Dr césar Irby  3: Dr trujillo  4.Dr ayers  5.Dr Lizarraga neurosurgery      Patient is being discharged to rehab. Caregiver will arrange follow up.

## 2025-02-15 LAB
GLUCOSE BLDC GLUCOMTR-MCNC: 119 MG/DL — HIGH (ref 70–99)
GLUCOSE BLDC GLUCOMTR-MCNC: 127 MG/DL — HIGH (ref 70–99)
GLUCOSE BLDC GLUCOMTR-MCNC: 151 MG/DL — HIGH (ref 70–99)
GLUCOSE BLDC GLUCOMTR-MCNC: 213 MG/DL — HIGH (ref 70–99)

## 2025-02-15 PROCEDURE — 99233 SBSQ HOSP IP/OBS HIGH 50: CPT

## 2025-02-15 RX ADMIN — Medication 650 MILLIGRAM(S): at 16:12

## 2025-02-15 RX ADMIN — Medication 40 MILLIGRAM(S): at 06:26

## 2025-02-15 RX ADMIN — GABAPENTIN 200 MILLIGRAM(S): 400 CAPSULE ORAL at 21:40

## 2025-02-15 RX ADMIN — Medication 650 MILLIGRAM(S): at 08:41

## 2025-02-15 RX ADMIN — ATORVASTATIN CALCIUM 40 MILLIGRAM(S): 80 TABLET, FILM COATED ORAL at 21:40

## 2025-02-15 RX ADMIN — Medication 150 MICROGRAM(S): at 06:26

## 2025-02-15 RX ADMIN — INSULIN LISPRO 2: 100 INJECTION, SOLUTION INTRAVENOUS; SUBCUTANEOUS at 21:39

## 2025-02-15 RX ADMIN — INSULIN LISPRO 4: 100 INJECTION, SOLUTION INTRAVENOUS; SUBCUTANEOUS at 12:13

## 2025-02-15 RX ADMIN — CEFTRIAXONE 2000 MILLIGRAM(S): 500 INJECTION, POWDER, FOR SOLUTION INTRAMUSCULAR; INTRAVENOUS at 08:42

## 2025-02-15 RX ADMIN — GABAPENTIN 200 MILLIGRAM(S): 400 CAPSULE ORAL at 06:25

## 2025-02-15 RX ADMIN — LOSARTAN POTASSIUM 50 MILLIGRAM(S): 100 TABLET, FILM COATED ORAL at 06:26

## 2025-02-15 RX ADMIN — APIXABAN 2.5 MILLIGRAM(S): 2.5 TABLET, FILM COATED ORAL at 08:41

## 2025-02-15 RX ADMIN — Medication 5 MILLIGRAM(S): at 21:40

## 2025-02-15 RX ADMIN — Medication 650 MILLIGRAM(S): at 09:45

## 2025-02-15 RX ADMIN — GABAPENTIN 200 MILLIGRAM(S): 400 CAPSULE ORAL at 13:59

## 2025-02-15 RX ADMIN — APIXABAN 2.5 MILLIGRAM(S): 2.5 TABLET, FILM COATED ORAL at 21:40

## 2025-02-15 NOTE — PROGRESS NOTE ADULT - SUBJECTIVE AND OBJECTIVE BOX
Date of service: 02-15-25 @ 10:02    Lying in bed in NAD  Weak looking  Had an episode for fever to 100.8F this AM  [employer] warm  No chills    ROS: denies dizziness, no HA, no SOB or cough, no abdominal pain, no diarrhea or constipation; no dysuria, no legs pain, no rashes    MEDICATIONS  (STANDING):  apixaban 2.5 milliGRAM(s) Oral two times a day  atorvastatin 40 milliGRAM(s) Oral at bedtime  cefTRIAXone Injectable. 2000 milliGRAM(s) IV Push every 24 hours  dextrose 5%. 1000 milliLiter(s) (50 mL/Hr) IV Continuous <Continuous>  dextrose 5%. 1000 milliLiter(s) (100 mL/Hr) IV Continuous <Continuous>  dextrose 50% Injectable 25 Gram(s) IV Push once  dextrose 50% Injectable 12.5 Gram(s) IV Push once  dextrose 50% Injectable 25 Gram(s) IV Push once  dextrose Oral Gel 15 Gram(s) Oral once  gabapentin 200 milliGRAM(s) Oral three times a day  glucagon  Injectable 1 milliGRAM(s) IntraMuscular once  insulin lispro (ADMELOG) corrective regimen sliding scale   SubCutaneous Before meals and at bedtime  levothyroxine 150 MICROGram(s) Oral daily  losartan 50 milliGRAM(s) Oral daily  pantoprazole    Tablet 40 milliGRAM(s) Oral before breakfast    Vital Signs Last 24 Hrs  T(C): 38.2 (15 Feb 2025 08:14), Max: 38.2 (15 Feb 2025 08:14)  T(F): 100.8 (15 Feb 2025 08:14), Max: 100.8 (15 Feb 2025 08:14)  HR: 100 (15 Feb 2025 08:14) (79 - 100)  BP: 102/43 (15 Feb 2025 08:14) (102/43 - 107/66)  BP(mean): --  RR: 18 (15 Feb 2025 08:14) (17 - 19)  SpO2: 93% (15 Feb 2025 08:14) (93% - 96%)    Parameters below as of 15 Feb 2025 08:14  Patient On (Oxygen Delivery Method): room air     Physical exam:    Constitutional:  No acute distress  HEENT: NC/AT, EOMI, PERRLA, conjunctivae clear; ears and nose atraumatic; pharynx benign  Neck: supple; thyroid not palpable  Back: no tenderness  Respiratory: respiratory effort normal; clear to auscultation  Cardiovascular: S1S2 regular, no murmurs  Abdomen: soft, not tender, not distended, positive BS; no liver or spleen organomegaly  Genitourinary: no suprapubic tenderness  Lymphatic: no LN palpable  Musculoskeletal: no muscle tenderness, no joint swelling or tenderness  Extremities: no pedal edema  Neurological/ Psychiatric: Alert, judgement and insight impaired; moving all extremities  Skin: no rashes; no palpable lesions    Labs: reviewed                        9.0    15.57 )-----------( 266      ( 14 Feb 2025 07:01 )             27.7     02-13    137  |  107  |  16  ----------------------------<  110[H]  3.6   |  24  |  1.02    Ca    9.1      13 Feb 2025 06:31  Phos  2.9     02-13    TPro  5.8[L]  /  Alb  2.2[L]  /  TBili  x   /  DBili  x   /  AST  x   /  ALT  x   /  AlkPhos  x   02-13    C-Reactive Protein: 73.6 mg/mL (02-12-25 @ 13:56)                        8.8    13.20 )-----------( 265      ( 13 Feb 2025 06:31 )             27.2     02-13    137  |  107  |  16  ----------------------------<  110[H]  3.6   |  24  |  1.02    Ca    9.1      13 Feb 2025 06:31  Phos  2.9     02-13  Mg     1.6     02-12    TPro  x   /  Alb  2.2[L]  /  TBili  x   /  DBili  x   /  AST  x   /  ALT  x   /  AlkPhos  x   02-13    C-Reactive Protein: 73.6 mg/mL (02-12-25 @ 13:56)                        8.8    12.33 )-----------( 253      ( 12 Feb 2025 06:04 )             27.0     02-12    139  |  108  |  26[H]  ----------------------------<  89  3.9   |  24  |  1.22    Ca    9.5      12 Feb 2025 06:04  Phos  3.0     02-12  Mg     1.6     02-12    TPro  6.5  /  Alb  2.3[L]  /  TBili  1.0  /  DBili  x   /  AST  30  /  ALT  25  /  AlkPhos  92  02-12     LIVER FUNCTIONS - ( 12 Feb 2025 06:04 )  Alb: 2.3 g/dL / Pro: 6.5 gm/dL / ALK PHOS: 92 U/L / ALT: 25 U/L / AST: 30 U/L / GGT: x           Urinalysis with Rflx Culture (collected 10 Feb 2025 12:47)    Culture - Blood (collected 10 Feb 2025 09:32)  Source: .Blood BLOOD  Gram Stain (11 Feb 2025 13:01):    Growth in aerobic bottle: Gram Positive Cocci in Pairs and Chains    Growth in anaerobic bottle: Gram Positive Cocci in Pairs and Chains  Final Report (13 Feb 2025 06:33):    Growth in aerobic and anaerobic bottles: Streptococcus    salivarius/vestibularis group  Organism: Streptococcus salivarius/vestibularis group (13 Feb 2025 06:33)  Organism: Streptococcus salivarius/vestibularis group (13 Feb 2025 06:33)      Method Type: BRIANA      -  Ceftriaxone: S <=0.25      -  Penicillin: S 0.06      -  Vancomycin: S 0.5    Culture - Blood (collected 10 Feb 2025 09:28)  Source: .Blood BLOOD  Gram Stain (11 Feb 2025 09:51):    Growth in aerobic bottle: Gram Positive Cocci in Pairs and Chains    Growth in anaerobic bottle: Gram Positive Cocci in Pairs and Chains  Final Report (12 Feb 2025 17:14):    Growth in aerobic and anaerobic bottles: Streptococcus    salivarius/vestibularis group "Susceptibilities not performed"    Direct identification is available within approximately 3-5    hours either by Blood Panel Multiplexed PCR or Direct    MALDI-TOF. Details: [email or web address]  Organism: Blood Culture PCR (12 Feb 2025 17:14)  Organism: Blood Culture PCR (12 Feb 2025 17:14)      Method Type: PCR      -  Streptococcus sp. (Not Grp A, B or S pneumoniae): Detec    < from: Xray Chest 1 View- PORTABLE-Urgent (02.10.25 @ 10:34) >  IMPRESSION: No acute finding.  < end of copied text >    < from: CT Abdomen and Pelvis w/wo IV Cont (02.10.25 @ 10:09) >  IMPRESSION:  *  No acute pathology.  *  No active GI bleeding.  < end of copied text >  < from: TTE Echo Complete w/ Contrast w/ Doppler (01.11.23 @ 19:34) >   1. Left ventricular ejection fraction, by visual estimation, is >75%.   2. Hyperdynamic global left ventricular systolic function.   3. Spectral Doppler shows impaired relaxation pattern of left ventricular myocardial filling (Grade I diastolic dysfunction).   4. There is moderate concentric left ventricular hypertrophy.   5. Mildly enlarged left atrium.   6. There is no evidence of pericardial effusion.   7. Mild mitral annular calcification.   8. Thickening of the anterior mitral valve leaflet.   9. Trace mitral valve regurgitation.  10. TAVR in the aortic position.  11. LVOT vmax at rest : 120 cm/s, LVOT vmax with valsalva : 157 cm/s. No   evidence of dynamic LVOTO. There is mild paravalvular regurgitation of the prosthetic AoV.  12. Mildly dilated pulmonary artery.  13. Adequate TR velocity was not obtained to accurately assess RVSP.  < end of copied text >    Advanced directives addressed: full resuscitation

## 2025-02-15 NOTE — PROGRESS NOTE ADULT - SUBJECTIVE AND OBJECTIVE BOX
Patient is a 74y old  Male who presents with a chief complaint of Hypotension, anemia (14 Feb 2025 10:43)    Interval HPI and Overnight Events: fever 100.8, reports chronic lower back pain, denies sob, no CP    REVIEW OF SYSTEMS:  CONSTITUTIONAL: No weakness, fevers or chills  EYES/ENT: No visual changes;  No vertigo or throat pain   NECK: No pain or stiffness  RESPIRATORY: No cough, wheezing, hemoptysis; No shortness of breath  CARDIOVASCULAR: No chest pain or palpitations  GASTROINTESTINAL: No abdominal or epigastric pain. No nausea, vomiting, or hematemesis; No diarrhea or constipation. No melena or hematochezia.  GENITOURINARY: No dysuria, frequency or hematuria  NEUROLOGICAL: No numbness or weakness  SKIN: No itching, burning, rashes, or lesions   All other review of systems is negative unless indicated above    Physical exam:    Constitutional:  No acute distress  HEENT: NC/AT, EOMI, PERRLA, conjunctivae clear; ears and nose atraumatic; pharynx benign  Neck: supple; thyroid not palpable  Back: no tenderness  Respiratory: respiratory effort normal; clear to auscultation  Cardiovascular: S1S2 regular, positive systolic murmurs  Abdomen: soft, not tender, not distended, positive BS; no liver or spleen organomegaly  Genitourinary: no suprapubic tenderness  Lymphatic: no LN palpable  Musculoskeletal: no muscle tenderness, no joint swelling or tenderness  Extremities: no pedal edema  Neurological/ Psychiatric: Alert, judgement and insight impaired; moving all extremities  Skin: no rashes; no palpable lesions      labs reviewed

## 2025-02-15 NOTE — PROGRESS NOTE ADULT - ASSESSMENT
75 y/o Male with h/o HTN, DM, prostate CA, hypothyroidism, AVR, A.Fib on eliquis, chronic back pain s/p epidurals on diclofenac, HLD, NIDDM, CKD was admitted on 2/11 for chest pain. He developed low back pain in Nov 2024 and since then he had acupuncture and spine steroids injections with partial improvement. He saw Dr. Lizarraga and had an MRI spine 3 weeks PTA. He reported low grade fever at home for last 2-3 weeks PTA. In ER he was noted hypotensive, POCT+ stool, and met sepsis criteria. He was started on zosyn.     #Sepsis/ bacteremia with strep spp ?source - heart valve vs back  #Subacute prosthetic bacterial endocarditis. AVR  #Possible lumbar spine discitis / OM.  #New low grade fever  #Leukocytosis  #ARF on CRF stage 3  -cultures reviewed  -organism noted sensitive to PCN  -s/p vancomycin 750 mg IV q12h # 2  -on ceftriaxone 2 gm IV qd # 4  -tolerating abx well so far; no side effects noted  -Echo and ROVERTO noted  -cardiology evaluation appreciated   -spine evaluation appreciated - conservative care at present time  -continue abx coverage   -repeat BC x 2 collected and pending   -f/u cultures  -monitor temps  -f/u CBC  -supportive care  2. Other issues:   -care per medicine    D/w medicine team

## 2025-02-16 LAB
ANION GAP SERPL CALC-SCNC: 7 MMOL/L — SIGNIFICANT CHANGE UP (ref 5–17)
BASOPHILS # BLD AUTO: 0.05 K/UL — SIGNIFICANT CHANGE UP (ref 0–0.2)
BASOPHILS NFR BLD AUTO: 0.3 % — SIGNIFICANT CHANGE UP (ref 0–2)
BUN SERPL-MCNC: 26 MG/DL — HIGH (ref 7–23)
CALCIUM SERPL-MCNC: 8.9 MG/DL — SIGNIFICANT CHANGE UP (ref 8.5–10.1)
CHLORIDE SERPL-SCNC: 102 MMOL/L — SIGNIFICANT CHANGE UP (ref 96–108)
CO2 SERPL-SCNC: 22 MMOL/L — SIGNIFICANT CHANGE UP (ref 22–31)
CREAT SERPL-MCNC: 1.26 MG/DL — SIGNIFICANT CHANGE UP (ref 0.5–1.3)
EGFR: 60 ML/MIN/1.73M2 — SIGNIFICANT CHANGE UP
EOSINOPHIL # BLD AUTO: 0.12 K/UL — SIGNIFICANT CHANGE UP (ref 0–0.5)
EOSINOPHIL NFR BLD AUTO: 0.7 % — SIGNIFICANT CHANGE UP (ref 0–6)
GLUCOSE BLDC GLUCOMTR-MCNC: 110 MG/DL — HIGH (ref 70–99)
GLUCOSE BLDC GLUCOMTR-MCNC: 118 MG/DL — HIGH (ref 70–99)
GLUCOSE BLDC GLUCOMTR-MCNC: 149 MG/DL — HIGH (ref 70–99)
GLUCOSE BLDC GLUCOMTR-MCNC: 164 MG/DL — HIGH (ref 70–99)
GLUCOSE SERPL-MCNC: 119 MG/DL — HIGH (ref 70–99)
HCT VFR BLD CALC: 26.7 % — LOW (ref 39–50)
HGB BLD-MCNC: 8.6 G/DL — LOW (ref 13–17)
IMM GRANULOCYTES # BLD AUTO: 0.12 K/UL — HIGH (ref 0–0.07)
IMM GRANULOCYTES NFR BLD AUTO: 0.7 % — SIGNIFICANT CHANGE UP (ref 0–0.9)
LYMPHOCYTES # BLD AUTO: 1.76 K/UL — SIGNIFICANT CHANGE UP (ref 1–3.3)
LYMPHOCYTES NFR BLD AUTO: 10 % — LOW (ref 13–44)
MCHC RBC-ENTMCNC: 26.4 PG — LOW (ref 27–34)
MCHC RBC-ENTMCNC: 32.2 G/DL — SIGNIFICANT CHANGE UP (ref 32–36)
MCV RBC AUTO: 81.9 FL — SIGNIFICANT CHANGE UP (ref 80–100)
MONOCYTES # BLD AUTO: 1.12 K/UL — HIGH (ref 0–0.9)
MONOCYTES NFR BLD AUTO: 6.4 % — SIGNIFICANT CHANGE UP (ref 2–14)
NEUTROPHILS # BLD AUTO: 14.38 K/UL — HIGH (ref 1.8–7.4)
NEUTROPHILS NFR BLD AUTO: 81.9 % — HIGH (ref 43–77)
NRBC # BLD AUTO: 0 K/UL — SIGNIFICANT CHANGE UP (ref 0–0)
NRBC # FLD: 0 K/UL — SIGNIFICANT CHANGE UP (ref 0–0)
NRBC BLD AUTO-RTO: 0 /100 WBCS — SIGNIFICANT CHANGE UP (ref 0–0)
PLATELET # BLD AUTO: 238 K/UL — SIGNIFICANT CHANGE UP (ref 150–400)
PMV BLD: 10.5 FL — SIGNIFICANT CHANGE UP (ref 7–13)
POTASSIUM SERPL-MCNC: 3.6 MMOL/L — SIGNIFICANT CHANGE UP (ref 3.5–5.3)
POTASSIUM SERPL-SCNC: 3.6 MMOL/L — SIGNIFICANT CHANGE UP (ref 3.5–5.3)
RBC # BLD: 3.26 M/UL — LOW (ref 4.2–5.8)
RBC # FLD: 16.9 % — HIGH (ref 10.3–14.5)
SODIUM SERPL-SCNC: 131 MMOL/L — LOW (ref 135–145)
WBC # BLD: 17.55 K/UL — HIGH (ref 3.8–10.5)
WBC # FLD AUTO: 17.55 K/UL — HIGH (ref 3.8–10.5)

## 2025-02-16 PROCEDURE — 99232 SBSQ HOSP IP/OBS MODERATE 35: CPT

## 2025-02-16 RX ORDER — POLYETHYLENE GLYCOL 3350 17 G/17G
17 POWDER, FOR SOLUTION ORAL DAILY
Refills: 0 | Status: DISCONTINUED | OUTPATIENT
Start: 2025-02-16 | End: 2025-02-17

## 2025-02-16 RX ORDER — TRAMADOL HYDROCHLORIDE 50 MG/1
50 TABLET, FILM COATED ORAL EVERY 6 HOURS
Refills: 0 | Status: DISCONTINUED | OUTPATIENT
Start: 2025-02-16 | End: 2025-02-17

## 2025-02-16 RX ORDER — SENNA 187 MG
2 TABLET ORAL AT BEDTIME
Refills: 0 | Status: DISCONTINUED | OUTPATIENT
Start: 2025-02-16 | End: 2025-02-17

## 2025-02-16 RX ADMIN — APIXABAN 2.5 MILLIGRAM(S): 2.5 TABLET, FILM COATED ORAL at 08:55

## 2025-02-16 RX ADMIN — Medication 5 MILLIGRAM(S): at 22:53

## 2025-02-16 RX ADMIN — APIXABAN 2.5 MILLIGRAM(S): 2.5 TABLET, FILM COATED ORAL at 21:35

## 2025-02-16 RX ADMIN — Medication 650 MILLIGRAM(S): at 20:26

## 2025-02-16 RX ADMIN — TRAMADOL HYDROCHLORIDE 50 MILLIGRAM(S): 50 TABLET, FILM COATED ORAL at 17:54

## 2025-02-16 RX ADMIN — INSULIN LISPRO 2: 100 INJECTION, SOLUTION INTRAVENOUS; SUBCUTANEOUS at 18:11

## 2025-02-16 RX ADMIN — TRAMADOL HYDROCHLORIDE 50 MILLIGRAM(S): 50 TABLET, FILM COATED ORAL at 22:53

## 2025-02-16 RX ADMIN — GABAPENTIN 200 MILLIGRAM(S): 400 CAPSULE ORAL at 05:59

## 2025-02-16 RX ADMIN — GABAPENTIN 200 MILLIGRAM(S): 400 CAPSULE ORAL at 13:33

## 2025-02-16 RX ADMIN — CEFTRIAXONE 2000 MILLIGRAM(S): 500 INJECTION, POWDER, FOR SOLUTION INTRAMUSCULAR; INTRAVENOUS at 08:55

## 2025-02-16 RX ADMIN — GABAPENTIN 200 MILLIGRAM(S): 400 CAPSULE ORAL at 21:35

## 2025-02-16 RX ADMIN — Medication 150 MICROGRAM(S): at 05:59

## 2025-02-16 RX ADMIN — TRAMADOL HYDROCHLORIDE 50 MILLIGRAM(S): 50 TABLET, FILM COATED ORAL at 16:54

## 2025-02-16 RX ADMIN — POLYETHYLENE GLYCOL 3350 17 GRAM(S): 17 POWDER, FOR SOLUTION ORAL at 16:54

## 2025-02-16 RX ADMIN — LOSARTAN POTASSIUM 50 MILLIGRAM(S): 100 TABLET, FILM COATED ORAL at 05:59

## 2025-02-16 RX ADMIN — Medication 40 MILLIGRAM(S): at 06:00

## 2025-02-16 RX ADMIN — ATORVASTATIN CALCIUM 40 MILLIGRAM(S): 80 TABLET, FILM COATED ORAL at 21:35

## 2025-02-16 NOTE — PROGRESS NOTE ADULT - SUBJECTIVE AND OBJECTIVE BOX
Patient is a 74y old  Male who presents with a chief complaint of Hypotension, anemia (2025 10:43)    Interval HPI and Overnight Events: fever 100.8, reports chronic lower back pain, denies sob, no CP    REVIEW OF SYSTEMS:  CONSTITUTIONAL: No weakness, fevers or chills  EYES/ENT: No visual changes;  No vertigo or throat pain   NECK: No pain or stiffness  RESPIRATORY: No cough, wheezing, hemoptysis; No shortness of breath  CARDIOVASCULAR: No chest pain or palpitations  GASTROINTESTINAL: No abdominal or epigastric pain. No nausea, vomiting, or hematemesis; No diarrhea or constipation. No melena or hematochezia.  GENITOURINARY: No dysuria, frequency or hematuria  NEUROLOGICAL: No numbness or weakness  SKIN: No itching, burning, rashes, or lesions   All other review of systems is negative unless indicated above      PHYSICAL EXAM:    Daily     Daily Weight in k.3 (2025 05:29)    ICU Vital Signs Last 24 Hrs  T(C): 36.3 (2025 08:00), Max: 38 (15 Feb 2025 16:20)  T(F): 97.3 (2025 08:00), Max: 100.4 (15 Feb 2025 16:20)  HR: 100 (2025 08:00) (64 - 100)  BP: 102/59 (2025 08:00) (102/59 - 119/58)  BP(mean): --  ABP: --  ABP(mean): --  RR: 18 (2025 08:00) (18 - 18)  SpO2: 93% (2025 08:00) (92% - 93%)    O2 Parameters below as of 2025 08:00  Patient On (Oxygen Delivery Method): room air  Physical exam:Constitutional:  No acute distress  HEENT: NC/AT, EOMI, PERRLA, conjunctivae clear; ears and nose atraumatic; pharynx benign  Neck: supple; thyroid not palpable  Back: no tenderness  Respiratory: respiratory effort normal; clear to auscultation  Cardiovascular: S1S2 regular, positive systolic murmurs  Abdomen: soft, not tender, not distended, positive BS; no liver or spleen organomegaly  Genitourinary: no suprapubic tenderness  Lymphatic: no LN palpable  Musculoskeletal: no muscle tenderness, no joint swelling or tenderness  Extremities: no pedal edema  Neurological/ Psychiatric: Alert, judgement and insight impaired; moving all extremities  Skin: no rashes; no palpable lesions      labs reviewed                                        8.6    17.55 )-----------( 238      ( 2025 06:23 )             26.7       CBC Full  -  ( 2025 06:23 )  WBC Count : 17.55 K/uL  RBC Count : 3.26 M/uL  Hemoglobin : 8.6 g/dL  Hematocrit : 26.7 %  Platelet Count - Automated : 238 K/uL  Mean Cell Volume : 81.9 fl  Mean Cell Hemoglobin : 26.4 pg  Mean Cell Hemoglobin Concentration : 32.2 g/dL  Auto Neutrophil # : 14.38 K/uL  Auto Lymphocyte # : 1.76 K/uL  Auto Monocyte # : 1.12 K/uL  Auto Eosinophil # : 0.12 K/uL  Auto Basophil # : 0.05 K/uL  Auto Neutrophil % : 81.9 %  Auto Lymphocyte % : 10.0 %  Auto Monocyte % : 6.4 %  Auto Eosinophil % : 0.7 %  Auto Basophil % : 0.3 %      0216    131[L]  |  102  |  26[H]  ----------------------------<  119[H]  3.6   |  22  |  1.26    Ca    8.9      2025 06:23                      Urinalysis Basic - ( 2025 06:23 )    Color: x / Appearance: x / SG: x / pH: x  Gluc: 119 mg/dL / Ketone: x  / Bili: x / Urobili: x   Blood: x / Protein: x / Nitrite: x   Leuk Esterase: x / RBC: x / WBC x   Sq Epi: x / Non Sq Epi: x / Bacteria: x            MEDICATIONS  (STANDING):  apixaban 2.5 milliGRAM(s) Oral two times a day  atorvastatin 40 milliGRAM(s) Oral at bedtime  cefTRIAXone Injectable. 2000 milliGRAM(s) IV Push every 24 hours  dextrose 5%. 1000 milliLiter(s) (50 mL/Hr) IV Continuous <Continuous>  dextrose 5%. 1000 milliLiter(s) (100 mL/Hr) IV Continuous <Continuous>  dextrose 50% Injectable 25 Gram(s) IV Push once  dextrose 50% Injectable 12.5 Gram(s) IV Push once  dextrose 50% Injectable 25 Gram(s) IV Push once  dextrose Oral Gel 15 Gram(s) Oral once  gabapentin 200 milliGRAM(s) Oral three times a day  glucagon  Injectable 1 milliGRAM(s) IntraMuscular once  insulin lispro (ADMELOG) corrective regimen sliding scale   SubCutaneous Before meals and at bedtime  levothyroxine 150 MICROGram(s) Oral daily  losartan 50 milliGRAM(s) Oral daily  pantoprazole    Tablet 40 milliGRAM(s) Oral before breakfast

## 2025-02-16 NOTE — PROGRESS NOTE ADULT - ASSESSMENT
73 y/o Male with h/o HTN, DM, prostate CA, hypothyroidism, AVR, A.Fib on eliquis, chronic back pain s/p epidurals on diclofenac, HLD, NIDDM, CKD was admitted on 2/11 for chest pain. He developed low back pain in Nov 2024 and since then he had acupuncture and spine steroids injections with partial improvement. He saw Dr. Lizarraga and had an MRI spine 3 weeks PTA. He reported low grade fever at home for last 2-3 weeks PTA. In ER he was noted hypotensive, POCT+ stool, and met sepsis criteria. He was started on zosyn.     #Sepsis/ bacteremia with strep spp. Likely source - heart valve vs back  #Subacute prosthetic bacterial endocarditis. AVR  #Probable lumbar spine discitis / OM.  #Low grade fever  #Leukocytosis  #ARF on CRF stage 3  -cultures reviewed  -organism noted sensitive to PCN  -s/p vancomycin 750 mg IV q12h # 2  -on ceftriaxone 2 gm IV qd # 5  -tolerating abx well so far; no side effects noted  -Echo and ROVERTO noted  -cardiology evaluation appreciated   -spine evaluation appreciated - conservative care at present time  -continue abx coverage   -repeat BC x 2 are negative to date  -plan for PICC line in AM  -PT to evaluate mobility  -f/u cultures  -monitor temps  -f/u CBC  -supportive care  2. Other issues:   -care per medicine    D/w medicine team

## 2025-02-16 NOTE — PROGRESS NOTE ADULT - ASSESSMENT
74M with h/o  diabetes, high blood pressure, aortic valve replacement, AF on eliquis, chronic back pain s/p epidurals on diclofenac, HLD, hypothyroid, NIDDM, sAS, CKD, presents to ED with chest pain    Assessment:  Acute Hypovolemic Shock resolved with fluids  Possible GI Bleed. - Upper endoscopy and colonoscopy without obvious bleeding source   DARLINE - resolved with hydration  Anemia - improved after 2 units PRBC's and H/H slowly increasing  DM  Acute Endocarditis of TAVR  Osteomyelitis of LS SpineChronic Low Back pain without radiculopathy  Troponin spike - demand ischemia vs NSTEMI  Acute Streptococcus salivarius/vestibularis  Bacteremia    Plan:  D/c telemetry  s/p vancomycin 750 mg IV q12h # 2  -on ceftriaxone 2 gm IV qd # 5  cultures reviewed  -organism noted sensitive to PCN  repeat BC x 2 are negative to date  -plan for PICC line in AM  S/p 2 units PRBC's  Nephrology following  GI following. May need outpt. pill cam  Off NSAIDs Holding Metformin and SGLT2  Back on ARB without Diuretic  Restarted   Eliquis at half dose  Cardiology following  ID following   OOB , PT  Neurosurgery following  Need negative blood cultures prior to discharge. 2 sets drawn today  Probable Home on Monday with PICC and Rocephin Q day x 6 weeks  Repeat ROVERTO after 6 weeks  Rolling Walker:  Patient requires rolling walker due to Osteomyelitis of LS Spine  for safe ambulation at discharge.  DNR/ DNI,  otherwise no other limitations to care

## 2025-02-16 NOTE — PROGRESS NOTE ADULT - SUBJECTIVE AND OBJECTIVE BOX
Consult to be dictated.npc   Date of service: 02-16-25 @ 12:04    Lying in bed in NAD  Weak looking  Has low back pain  Noted with worsening leukocytosis  No diarrhea reported  Low grade fever    ROS: denies dizziness, no HA, no SOB or cough, no abdominal pain, no diarrhea or constipation; no dysuria, no legs pain, no rashes    MEDICATIONS  (STANDING):  apixaban 2.5 milliGRAM(s) Oral two times a day  atorvastatin 40 milliGRAM(s) Oral at bedtime  cefTRIAXone Injectable. 2000 milliGRAM(s) IV Push every 24 hours  dextrose 5%. 1000 milliLiter(s) (50 mL/Hr) IV Continuous <Continuous>  dextrose 5%. 1000 milliLiter(s) (100 mL/Hr) IV Continuous <Continuous>  dextrose 50% Injectable 25 Gram(s) IV Push once  dextrose 50% Injectable 12.5 Gram(s) IV Push once  dextrose 50% Injectable 25 Gram(s) IV Push once  dextrose Oral Gel 15 Gram(s) Oral once  gabapentin 200 milliGRAM(s) Oral three times a day  glucagon  Injectable 1 milliGRAM(s) IntraMuscular once  insulin lispro (ADMELOG) corrective regimen sliding scale   SubCutaneous Before meals and at bedtime  levothyroxine 150 MICROGram(s) Oral daily  losartan 50 milliGRAM(s) Oral daily  pantoprazole    Tablet 40 milliGRAM(s) Oral before breakfast    Vital Signs Last 24 Hrs  T(C): 36.3 (16 Feb 2025 08:00), Max: 38 (15 Feb 2025 16:20)  T(F): 97.3 (16 Feb 2025 08:00), Max: 100.4 (15 Feb 2025 16:20)  HR: 100 (16 Feb 2025 08:00) (64 - 100)  BP: 102/59 (16 Feb 2025 08:00) (102/59 - 119/58)  BP(mean): --  RR: 18 (16 Feb 2025 08:00) (18 - 18)  SpO2: 93% (16 Feb 2025 08:00) (92% - 93%)    Parameters below as of 16 Feb 2025 08:00  Patient On (Oxygen Delivery Method): room air     Physical exam:    Constitutional:  No acute distress  HEENT: NC/AT, EOMI, PERRLA, conjunctivae clear; ears and nose atraumatic; pharynx benign  Neck: supple; thyroid not palpable  Back: no tenderness  Respiratory: respiratory effort normal; clear to auscultation  Cardiovascular: S1S2 regular, no murmurs  Abdomen: soft, not tender, not distended, positive BS; no liver or spleen organomegaly  Genitourinary: no suprapubic tenderness  Lymphatic: no LN palpable  Musculoskeletal: no muscle tenderness, no joint swelling or tenderness  Extremities: no pedal edema  Neurological/ Psychiatric: Alert, judgement and insight impaired; moving all extremities  Skin: no rashes; no palpable lesions    Labs: reviewed                        8.6    17.55 )-----------( 238      ( 16 Feb 2025 06:23 )             26.7     02-16    131[L]  |  102  |  26[H]  ----------------------------<  119[H]  3.6   |  22  |  1.26    Ca    8.9      16 Feb 2025 06:23    Ferritin: 548 ng/mL (02-14-25 @ 07:01)  C-Reactive Protein: 73.6 mg/mL (02-12-25 @ 13:56)                        9.0    15.57 )-----------( 266      ( 14 Feb 2025 07:01 )             27.7     02-13    137  |  107  |  16  ----------------------------<  110[H]  3.6   |  24  |  1.02    Ca    9.1      13 Feb 2025 06:31  Phos  2.9     02-13    TPro  5.8[L]  /  Alb  2.2[L]  /  TBili  x   /  DBili  x   /  AST  x   /  ALT  x   /  AlkPhos  x   02-13    C-Reactive Protein: 73.6 mg/mL (02-12-25 @ 13:56)                        8.8    13.20 )-----------( 265      ( 13 Feb 2025 06:31 )             27.2     02-13    137  |  107  |  16  ----------------------------<  110[H]  3.6   |  24  |  1.02    Ca    9.1      13 Feb 2025 06:31  Phos  2.9     02-13  Mg     1.6     02-12    TPro  x   /  Alb  2.2[L]  /  TBili  x   /  DBili  x   /  AST  x   /  ALT  x   /  AlkPhos  x   02-13    C-Reactive Protein: 73.6 mg/mL (02-12-25 @ 13:56)                        8.8    12.33 )-----------( 253      ( 12 Feb 2025 06:04 )             27.0     02-12    139  |  108  |  26[H]  ----------------------------<  89  3.9   |  24  |  1.22    Ca    9.5      12 Feb 2025 06:04  Phos  3.0     02-12  Mg     1.6     02-12    TPro  6.5  /  Alb  2.3[L]  /  TBili  1.0  /  DBili  x   /  AST  30  /  ALT  25  /  AlkPhos  92  02-12     LIVER FUNCTIONS - ( 12 Feb 2025 06:04 )  Alb: 2.3 g/dL / Pro: 6.5 gm/dL / ALK PHOS: 92 U/L / ALT: 25 U/L / AST: 30 U/L / GGT: x           Urinalysis with Rflx Culture (collected 10 Feb 2025 12:47)    Culture - Blood (collected 10 Feb 2025 09:32)  Source: .Blood BLOOD  Gram Stain (11 Feb 2025 13:01):    Growth in aerobic bottle: Gram Positive Cocci in Pairs and Chains    Growth in anaerobic bottle: Gram Positive Cocci in Pairs and Chains  Final Report (13 Feb 2025 06:33):    Growth in aerobic and anaerobic bottles: Streptococcus    salivarius/vestibularis group  Organism: Streptococcus salivarius/vestibularis group (13 Feb 2025 06:33)  Organism: Streptococcus salivarius/vestibularis group (13 Feb 2025 06:33)      Method Type: BRIANA      -  Ceftriaxone: S <=0.25      -  Penicillin: S 0.06      -  Vancomycin: S 0.5    Culture - Blood (collected 10 Feb 2025 09:28)  Source: .Blood BLOOD  Gram Stain (11 Feb 2025 09:51):    Growth in aerobic bottle: Gram Positive Cocci in Pairs and Chains    Growth in anaerobic bottle: Gram Positive Cocci in Pairs and Chains  Final Report (12 Feb 2025 17:14):    Growth in aerobic and anaerobic bottles: Streptococcus    salivarius/vestibularis group "Susceptibilities not performed"    Direct identification is available within approximately 3-5    hours either by Blood Panel Multiplexed PCR or Direct    MALDI-TOF. Details: [email or web address]  Organism: Blood Culture PCR (12 Feb 2025 17:14)  Organism: Blood Culture PCR (12 Feb 2025 17:14)      Method Type: PCR      -  Streptococcus sp. (Not Grp A, B or S pneumoniae): Detec    Culture - Blood (collected 14 Feb 2025 07:04)  Source: .Blood BLOOD  Preliminary Report (15 Feb 2025 13:01):    No growth at 24 hours    Culture - Blood (collected 14 Feb 2025 07:01)  Source: .Blood BLOOD  Preliminary Report (15 Feb 2025 13:01):    No growth at 24 hours    < from: Xray Chest 1 View- PORTABLE-Urgent (02.10.25 @ 10:34) >  IMPRESSION: No acute finding.  < end of copied text >    < from: CT Abdomen and Pelvis w/wo IV Cont (02.10.25 @ 10:09) >  IMPRESSION:  *  No acute pathology.  *  No active GI bleeding.  < end of copied text >  < from: TTE Echo Complete w/ Contrast w/ Doppler (01.11.23 @ 19:34) >   1. Left ventricular ejection fraction, by visual estimation, is >75%.   2. Hyperdynamic global left ventricular systolic function.   3. Spectral Doppler shows impaired relaxation pattern of left ventricular myocardial filling (Grade I diastolic dysfunction).   4. There is moderate concentric left ventricular hypertrophy.   5. Mildly enlarged left atrium.   6. There is no evidence of pericardial effusion.   7. Mild mitral annular calcification.   8. Thickening of the anterior mitral valve leaflet.   9. Trace mitral valve regurgitation.  10. TAVR in the aortic position.  11. LVOT vmax at rest : 120 cm/s, LVOT vmax with valsalva : 157 cm/s. No   evidence of dynamic LVOTO. There is mild paravalvular regurgitation of the prosthetic AoV.  12. Mildly dilated pulmonary artery.  13. Adequate TR velocity was not obtained to accurately assess RVSP.  < end of copied text >    Advanced directives addressed: full resuscitation

## 2025-02-17 ENCOUNTER — TRANSCRIPTION ENCOUNTER (OUTPATIENT)
Age: 75
End: 2025-02-17

## 2025-02-17 VITALS
SYSTOLIC BLOOD PRESSURE: 119 MMHG | RESPIRATION RATE: 18 BRPM | OXYGEN SATURATION: 94 % | TEMPERATURE: 99 F | DIASTOLIC BLOOD PRESSURE: 71 MMHG | HEART RATE: 100 BPM

## 2025-02-17 LAB
GLUCOSE BLDC GLUCOMTR-MCNC: 100 MG/DL — HIGH (ref 70–99)
GLUCOSE BLDC GLUCOMTR-MCNC: 131 MG/DL — HIGH (ref 70–99)

## 2025-02-17 PROCEDURE — 71045 X-RAY EXAM CHEST 1 VIEW: CPT | Mod: 26,76

## 2025-02-17 PROCEDURE — 99239 HOSP IP/OBS DSCHRG MGMT >30: CPT

## 2025-02-17 RX ORDER — POLYETHYLENE GLYCOL 3350 17 G/17G
17 POWDER, FOR SOLUTION ORAL
Qty: 0 | Refills: 0 | DISCHARGE
Start: 2025-02-17

## 2025-02-17 RX ORDER — SENNA 187 MG
2 TABLET ORAL
Qty: 0 | Refills: 0 | DISCHARGE
Start: 2025-02-17

## 2025-02-17 RX ORDER — TRAMADOL HYDROCHLORIDE 50 MG/1
1 TABLET, FILM COATED ORAL
Refills: 0 | DISCHARGE
Start: 2025-02-17

## 2025-02-17 RX ADMIN — CEFTRIAXONE 2000 MILLIGRAM(S): 500 INJECTION, POWDER, FOR SOLUTION INTRAMUSCULAR; INTRAVENOUS at 10:29

## 2025-02-17 RX ADMIN — Medication 40 MILLIGRAM(S): at 05:44

## 2025-02-17 RX ADMIN — GABAPENTIN 200 MILLIGRAM(S): 400 CAPSULE ORAL at 05:44

## 2025-02-17 RX ADMIN — Medication 150 MICROGRAM(S): at 05:44

## 2025-02-17 RX ADMIN — TRAMADOL HYDROCHLORIDE 50 MILLIGRAM(S): 50 TABLET, FILM COATED ORAL at 13:03

## 2025-02-17 RX ADMIN — TRAMADOL HYDROCHLORIDE 50 MILLIGRAM(S): 50 TABLET, FILM COATED ORAL at 05:46

## 2025-02-17 RX ADMIN — POLYETHYLENE GLYCOL 3350 17 GRAM(S): 17 POWDER, FOR SOLUTION ORAL at 10:29

## 2025-02-17 RX ADMIN — GABAPENTIN 200 MILLIGRAM(S): 400 CAPSULE ORAL at 13:02

## 2025-02-17 RX ADMIN — TRAMADOL HYDROCHLORIDE 50 MILLIGRAM(S): 50 TABLET, FILM COATED ORAL at 14:03

## 2025-02-17 RX ADMIN — LOSARTAN POTASSIUM 50 MILLIGRAM(S): 100 TABLET, FILM COATED ORAL at 05:44

## 2025-02-17 RX ADMIN — APIXABAN 2.5 MILLIGRAM(S): 2.5 TABLET, FILM COATED ORAL at 10:29

## 2025-02-17 NOTE — PROGRESS NOTE ADULT - ASSESSMENT
74M with h/o  diabetes, high blood pressure, aortic valve replacement, AF on eliquis, chronic back pain s/p epidurals on diclofenac, HLD, hypothyroid, NIDDM, sAS, CKD, presents to ED with chest pain    Assessment:  Acute Hypovolemic Shock resolved with fluids  Possible GI Bleed. - Upper endoscopy and colonoscopy without obvious bleeding source   DARLINE - resolved with hydration  Anemia - improved after 2 units PRBC's and H/H monitor at reha  DM  Acute Endocarditis of TAVR  Osteomyelitis of LS SpineChronic Low Back pain without radiculopathy  Troponin spike - demand ischemia vs NSTEMI  Acute Streptococcus salivarius/vestibularis  Bacteremia    Plan:  monitor Hb/Hct at rehab   D/c telemetry  s/p vancomycin 750 mg IV q12h # 2  -on ceftriaxone 2 gm IV qd # 5 complete 6 weeks course   cultures reviewed  -organism noted sensitive to PCN  repeat BC x 2 are negative to date  -plan for PICC line in AM  S/p 2 units PRBC's  Nephrology following  GI following. May need outpt. pill cam  upper endoscopy gastric polyps   colonoscopy- internal hemorrhois, angioectasia rectum   Off NSAIDs Holding Metformin and SGLT2  Back on ARB without Diuretic  Restarted   Eliquis at half dose  Cardiology consult appreciated- would need ROVERTO in 6weeks repeated, cardiac cath as outpatient  ID consult appreciated  Neuro surg consult- no acute neurosurg intervention for lumbar OM /discitis- f/u dr vaughn in 4weeks  OOB , PT  Neurosurgery consult appreciated    negative blood cultures prior to discharge 2/14  Discharge today to rehab  with PICC and Rocephin Q day x 6 weeks  Repeat ROVERTO after 6 weeks  Rolling Walker:  Patient requires rolling walker due to Osteomyelitis of LS Spine  for safe ambulation at discharge.  DNR/ DNI,  otherwise no other limitations to care  total discharge time spent 47mins

## 2025-02-17 NOTE — PROGRESS NOTE ADULT - REASON FOR ADMISSION
Hypotension, anemia

## 2025-02-17 NOTE — PROGRESS NOTE ADULT - ASSESSMENT
patient with GIB, strep septicemia and endocarditis  1-infection-PICC line and 6 weeks of ABx; will need repeat ROVERTO at that time to reassess AV  2-arrhythmia-patient with afib-off AC; as per GI, reinitiate AC with Eliquis 2.5mg BID  3-valve-AS s/p TAVR-with elevated gradients, may need re-evaluation and if stenosis is severe-surgical removal and replacement may be warranted in the future  4-CAD-+ troponin levels-will need w/u as outpatient after resolution of current issues-suspect demand ischemia but may need eventual catheterization    2/17/25:  Resting comfortably this AM.  Remaining afebrile since Saturday afternoon.  Tolerating current meds and antibiotic.  Will need 6 week so IV antibiotics and then another ROVERTO.  No new cardiac symptoms so far.  For possible discharge soon with PICC line and home antibiotics.  Continue as per medicine and ID etal.  Will follow as treatment progresses and as an outpt after discharge

## 2025-02-17 NOTE — DISCHARGE NOTE NURSING/CASE MANAGEMENT/SOCIAL WORK - FINANCIAL ASSISTANCE
[facility] provides services at a reduced cost to those who are determined to be eligible through [facility]’s financial assistance program. Information regarding [facility]’s financial assistance program can be found by going to [email or web address] or by calling [phone].

## 2025-02-17 NOTE — DISCHARGE NOTE NURSING/CASE MANAGEMENT/SOCIAL WORK - PATIENT PORTAL LINK FT
You can access the FollowMyHealth Patient Portal offered by [facility] by registering at the following website: [email or web address]. By joining [facility]’s FollowMyHealth portal, you will also be able to view your health information using other applications (apps) compatible with our system.

## 2025-02-17 NOTE — DISCHARGE NOTE NURSING/CASE MANAGEMENT/SOCIAL WORK - NSDCFUADDAPPT_GEN_ALL_CORE_FT
See Dr Brown in the office within 7 days of discharge  See Dr Heath in the office within 2-3 weeks  Follow up with Dr Trujillo or Anna within 1-2 months  Follow up with Dr Lizarraga in 4 weeks  APPTS ARE READY TO BE MADE: [X] YES    Best Family or Patient Contact (if needed):    Additional Information about above appointments (if needed):    1:Dr brown  2:Dr césar Irby  3: Dr trujillo  4.Dr ayers  5.Dr Lizarraga neurosurgery   See Dr Brown in the office within 7 days of discharge  See Dr Heath in the office within 2-3 weeks  Follow up with Dr Trujillo or Anna within 1-2 months  Follow up with Dr Lizarraga in 4 weeks  APPTS ARE READY TO BE MADE: [X] YES    Best Family or Patient Contact (if needed):    Additional Information about above appointments (if needed):    1:Dr brown  2:Dr césar Irby follow up appt is on 2/24/25 at 11:45am   3: Dr trujillo  4.Dr ayers  5.Dr Lizarraga neurosurgery

## 2025-02-17 NOTE — PROGRESS NOTE ADULT - TIME BILLING
as above
face to face evaluation and review of data and plan
sa
face to face evaluation and review of data and plan

## 2025-02-17 NOTE — PROGRESS NOTE ADULT - PROVIDER SPECIALTY LIST ADULT
Cardiology
Hospitalist
Infectious Disease
Nephrology
Nephrology
Family Medicine
Gastroenterology
Gastroenterology
Infectious Disease
Neurosurgery
Critical Care
Critical Care
Family Medicine
Family Medicine
Gastroenterology
Hospitalist
Infectious Disease
Nephrology
Cardiology
Infectious Disease
Infectious Disease
Cardiology
Hospitalist
Cardiology
Family Medicine

## 2025-02-17 NOTE — PROGRESS NOTE ADULT - NUTRITIONAL ASSESSMENT
This patient has been assessed with a concern for Malnutrition and has been determined to have a diagnosis/diagnoses of Severe protein-calorie malnutrition.    This patient is being managed with:   Diet DASH/TLC-  Sodium & Cholesterol Restricted  Entered: Feb 12 2025  8:17AM    The following pending diet order is being considered for treatment of Severe protein-calorie malnutrition:  Diet Regular-  Supplement Feeding Modality:  Oral  Ensure Max Cans or Servings Per Day:  2       Frequency:  Two Times a day  Entered: Feb 12 2025 10:37AM  

## 2025-02-17 NOTE — PROGRESS NOTE ADULT - ASSESSMENT
73 y/o Male with h/o HTN, DM, prostate CA, hypothyroidism, AVR, A.Fib on eliquis, chronic back pain s/p epidurals on diclofenac, HLD, NIDDM, CKD was admitted on 2/11 for chest pain. He developed low back pain in Nov 2024 and since then he had acupuncture and spine steroids injections with partial improvement. He saw Dr. Lizarraga and had an MRI spine 3 weeks PTA. He reported low grade fever at home for last 2-3 weeks PTA. In ER he was noted hypotensive, POCT+ stool, and met sepsis criteria. He was started on zosyn.     #Sepsis/ bacteremia with strep spp. Likely source - heart valve vs back  #Subacute prosthetic bacterial endocarditis. AVR  #Probable lumbar spine discitis / OM.  #Low grade fever  #Leukocytosis  #ARF on CRF stage 3  -cultures reviewed  -has persistent back pain; poorly ambulatory   -s/p vancomycin 750 mg IV q12h # 2  -on ceftriaxone 2 gm IV qd # 6  -tolerating abx well so far; no side effects noted  -Echo and ROVERTO noted  -cardiology evaluation appreciated   -spine evaluation appreciated - conservative care at present time  -continue abx coverage   -repeat BC x 2 are negative to date  -plan for PICC line   -may need rehabilitation   -f/u cultures  -monitor temps  -f/u CBC  -supportive care  2. Other issues:   -care per medicine    D/w medicine team

## 2025-02-17 NOTE — PROGRESS NOTE ADULT - SUBJECTIVE AND OBJECTIVE BOX
CHIEF COMPLAINT: Patient is a 74y old  Male who presents with a chief complaint of Hypotension, anemia (2025 18:31)      HPI:  Pt seen and examined  74M  PMHx of diabetes, high blood pressure, aortic valve replacement, AF on eliquis, chronic back pain s/p epidurals on diclofenac, HLD, hypothyroid, NIDDM, sAS, CKD  Pt here for chest pain  Recent Dx anemia on blood work  ICU consult for hypotension    2/10: POCT+ stool, no gross blood, last EGD distant past; CT C/A/P done, no obv abnl found. Rec'ing blood, GI called. (10 Feb 2025 13:53)    2/10-patient well known to Dr GILES Heath; with history of HTN, HLD, DM, HFpEF and AS s/p TAVR (non obstructive CAD on cath-).  Post TARV, had heart block and then MCOT placed-eventually diagnosed with Afib and started on AC.          Today, patient presents to ER c/p CP/SOB and vomiting. Onset of symptoms began this morning with midsternal CP. + Troponin levels and EKG with sinus tachycardia with non specific st changes.  Patient took one aspirin at home and vomited. Pt reports emesis appeared dark in color. EMS gave pt Aspirin 324mg, Nitro x 1.  He has chronic back pain s/p epidurals on diclofenac.  Recent Dx anemia on blood work.  ICU consult for hypotension called by ER.  PAtient with low grade fever, elevated WBC and GUAIAC + stool.  Viral panel negative.      patient with low grade fever, elevated WBC with prior cath over 1 year ago with non-obstructive CAD at the time of TAVR who presents with chest pains, n/v/ coffee ground emesis with severe anemia and suspected demand ischemia  Patient with BP in the low 100's after 2 units of PRBC and fluids.  Hold antiplatelet and AC medications in face of severe life threatening bleed.  Patient with planne endoscopy today; will need antibiotic prophylaxis prior to procedure.  Currently on zosyn.   ECHO pending today.  H/H up to 8 and 25 (from 7 and ) after 2 units of blood.  Creatinine improved and troponin climbed to 1200's.      --patient with + blood cultures; streptococcus group a strep in multiple cultures; s/p EGD and colonoscopy-with no source of bleed.   AC held and transfused-stable H/H.       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      25:  Resting comfortably this AM.  Remaining afebrile since Saturday afternoon.  Tolerating current meds and antibiotic.  Will need 6 week so IV antibiotics and then another ROVERTO.  No new cardiac symptoms so far.  For possible discharge soon with PICC line and home antibiotics.        PMHx: PAST MEDICAL & SURGICAL HISTORY:  Hypertension  Diabetes mellitus  Obesity  Hypothyroidism  Prostate CA      Allergies: Allergies  No Known Allergies      REVIEW OF SYSTEMS:  CONSTITUTIONAL: No weakness, fevers or chills  EYES/ENT: No visual changes;  No vertigo or throat pain   NECK: No pain or stiffness  RESPIRATORY: No cough, wheezing, hemoptysis; No shortness of breath  CARDIOVASCULAR: No chest pain or palpitations  GASTROINTESTINAL: No abdominal or epigastric pain. No nausea, vomiting, or hematemesis; No diarrhea or constipation. No melena or hematochezia.  GENITOURINARY: No dysuria, frequency or hematuria  NEUROLOGICAL: No numbness or weakness  SKIN: No itching, burning, rashes, or lesions   All other review of systems is negative unless indicated above      Vital Signs Last 24 Hrs  T(C): 36.7 (2025 01:15), Max: 37 (2025 16:07)  T(F): 98 (2025 01:15), Max: 98.6 (2025 16:07)  HR: 85 (2025 01:15) (85 - 100)  BP: 115/64 (2025 01:15) (100/55 - 115/64)  RR: 18 (2025 01:15) (18 - 18)  SpO2: 95% (2025 01:15) (91% - 95%)    Parameters below as of 2025 01:15  Patient On (Oxygen Delivery Method): room air      I&O's Summary  2025 07:01  -  2025 07:00  --------------------------------------------------------  IN: 0 mL / OUT: 300 mL / NET: -300 mL    2025 07:01  -  2025 06:19  --------------------------------------------------------  IN: 0 mL / OUT: 475 mL / NET: -475 mL        PHYSICAL EXAM:   Constitutional: NAD, awake and alert, well-developed  HEENT: PERR, EOMI, Normal Hearing, MMM  Neck: Soft and supple, No LAD, No JVD  Respiratory: Breath sounds are clear bilaterally, No wheezing, rales or rhonchi  Cardiovascular: S1 and S2, regular rate and rhythm, no Murmurs, gallops or rubs  Gastrointestinal: Bowel Sounds present, soft, nontender, nondistended, no guarding, no rebound  Extremities: No peripheral edema  Vascular: 2+ peripheral pulses  Neurological: A/O x 3, no focal deficits      MEDICATIONS  (STANDING):  apixaban 2.5 milliGRAM(s) Oral two times a day  atorvastatin 40 milliGRAM(s) Oral at bedtime  cefTRIAXone Injectable. 2000 milliGRAM(s) IV Push every 24 hours  dextrose 5%. 1000 milliLiter(s) (50 mL/Hr) IV Continuous <Continuous>  dextrose 5%. 1000 milliLiter(s) (100 mL/Hr) IV Continuous <Continuous>  dextrose 50% Injectable 25 Gram(s) IV Push once  dextrose 50% Injectable 12.5 Gram(s) IV Push once  dextrose 50% Injectable 25 Gram(s) IV Push once  dextrose Oral Gel 15 Gram(s) Oral once  gabapentin 200 milliGRAM(s) Oral three times a day  glucagon  Injectable 1 milliGRAM(s) IntraMuscular once  insulin lispro (ADMELOG) corrective regimen sliding scale   SubCutaneous Before meals and at bedtime  levothyroxine 150 MICROGram(s) Oral daily  losartan 50 milliGRAM(s) Oral daily  pantoprazole    Tablet 40 milliGRAM(s) Oral before breakfast  polyethylene glycol 3350 17 Gram(s) Oral daily    MEDICATIONS  (PRN):  acetaminophen     Tablet .. 650 milliGRAM(s) Oral every 4 hours PRN Temp greater or equal to 38C (100.4F), Mild Pain (1 - 3), Moderate Pain (4 - 6)  senna 2 Tablet(s) Oral at bedtime PRN Constipation  traMADol 50 milliGRAM(s) Oral every 6 hours PRN Severe Pain (7 - 10)  zolpidem 5 milliGRAM(s) Oral at bedtime PRN Insomnia      LABS: All Labs Reviewed:                        8.6    17.55 )-----------( 238      ( 2025 06:23 )             26.7                         8.8    13.20 )-----------( 265      ( 2025 06:31 )             27.2     02-16    131[L]  |  102  |  26[H]  ----------------------------<  119[H]  3.6   |  22  |  1.26    Ca    8.9      2025 06:23      02-13    137  |  107  |  16  ----------------------------<  110[H]  3.6   |  24  |  1.02    Ca    9.1      2025 06:31  Phos  2.9     -  TPro  5.8[L]  /  Alb  2.2[L]  /  TBili  x   /  DBili  x   /  AST  x   /  ALT  x   /  AlkPhos  x   -      Blood Culture - Blood in AM (25 @ 07:04)   Specimen Source: .Blood BLOOD  Culture Results:   No growth at 48 Hours      Blood Culture: 2/10/25: Organism Streptococcus salivarius/vestibularis group  Gram Stain Blood -- Gram Stain   Growth in aerobic bottle: Gram Positive Cocci in Pairs and Chains  Growth in anaerobic bottle: Gram Positive Cocci in Pairs and Chains  Specimen Source .Blood BLOOD  Culture-Blood --    Troponin I, High Sensitivity (25 @ 14:01): 1106.92  Troponin I, High Sensitivity (25 @ 05:03): 1264.98      RADIOLOGY:  Reviewed in EMR    EK25:  Sinus rhythm hico9tm degree A-V block  Septal infarct (cited on or before 2023)       Telemetry:  NSR    ECHO:   ROVERTO: 25:  CONCLUSIONS:   1. Left ventricular systolic function is normal.   2. Normal biventricular systolic function.   3. Normal left and right atrial size.   4. No thrombus seen in the left atrial, left atrial appendage, right atrial or right atrial appendage.   5. No evidence of an intracardiac shunt.   6. Structurally normal mitral valve with normal leaflet excursion.   7. No pericardial effusion seen.   8. There is no significant plaque seen in the visualized portions of the descending aorta and aortic arch.   9. Agitated saline injection was negative for intracardiac shunt.  10. No evidence of left atrial or left atrial appendage thrombus.  11. No evidence of tricuspid vegetation.  12. There is no evidence of any mitral valve vegetations.  ________________________________________________________________________________________  FINDINGS:  Left Ventricle:  Left ventricular systolic function is normal.     Right Ventricle:  The right ventricular cavity is normal in size, with normal wall thickness and right ventricular systolic function is normal.     Left Atrium:  There is no evidence of left atrial or left atrial appendage thrombus.     Interatrial Septum:  Interatrial septum is aneurysmal. Agitated saline injection was negative for intracardiac shunt.     Aortic Valve:  A Norman (TAVR) is present in the aortic position. The prosthetic valve has reduced leaflet opening and is well seated. Echo findings are consistent with a vegetation of the aortic prosthesis. There is trace paravalvular regurgitation (one paravalvular jet), originating at 1 o'clock and is directed laterally.     Mitral Valve:  Structurally normal mitral valve with normal leaflet excursion. There is no evidence of any mitral valve vegetations. There is mild mitral regurgitation.     Tricuspid Valve:  There is no evidence of tricuspid valve vegetation.     Pulmonic Valve:  The pulmonic valve was not well visualized.     Pericardium:  No pericardial effusion seen.  ________________________________________________________________________________________  Electronically signed on 2025 at 8:11:27 AM by Silviano Merida      TTE: 25:  CONCLUSIONS:   1. Left ventricular systolic function is normal with an ejection fraction of 57 % by Parham's method of disks.   2. Normal right ventricular cavity size and normal right ventricular systolic function.   3. Left atrium is mildly dilated.   4. A Transcatheter deployed (TAVR) valve replacement is present in the aortic position The prosthetic valve is well seated. 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 Mild intravalvularregurgitation   5. Mild to moderate mitral regurgitation.   6. Trace tricuspid regurgitation.   7. Small right pleural effusion noted.  ________________________________________________________________________________________  FINDINGS:  Left Ventricle:  The left ventricular cavity is normal in size. Left ventricular systolic function is normal with a calculated ejection fraction of 57 % by the Parham's biplane method of disks. No left ventricular hypertrophy. There is normal left ventricular diastolic function.     Right Ventricle:  The right ventricular cavity is normal in size and right ventricular systolic function is normal. Tricuspid annular plane systolic excursion (TAPSE) is 2.4 cm (normal >=1.7 cm).     Left Atrium:  The left atrium is mildly dilated.     Right Atrium:  The right atrium is normal in size with an indexed volume of 13.15 ml/m² and an indexed area of 6.50 cm²/m².     Interatrial Septum:  The interatrial septum appears intact.     Aortic Valve:  A a transcatheter deployed (TAVR) is present in the aortic position. The prosthetic valve is well seated. The peak transaortic velocity is 3.97 m/s, peak transaortic gradient is 63.0 mmHg and mean transaortic gradient is 45.0 mmHg with an LVOT/aortic valve VTI ratio of 0.25. The effective orifice area is estimated at 0.99 cm² by the continuity equation. There is mild intravalvular regurgitation. 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     Mitral Valve:  Structurally normal mitral valve with normal leaflet excursion. There is mild posterior calcification of the mitral valve annulus. The transmitral peak gradient is 5.7 mmHg and mean gradient is 3.00 mmHg. There is mild to moderate mitral regurgitation. Mitral inflow is variable.     Tricuspid Valve:  Structurally normal tricuspid valve with normal leaflet excursion. The tricuspid valve leaflets appear normal. There is trace tricuspid regurgitation. Estimated pulmonary artery systolic pressure is 21 mmHg, consistent with normal pulmonary artery pressure.     Pulmonic Valve:  Structurally normal pulmonic valve with normal leaflet excursion.     Aorta:  The proximal aorta, aortic root and aortic arch are not well visualized. The ascending aorta is normal in size, measuring 3.54 cm (indexed 1.74 cm/m²).     Pericardium:  No pericardial effusion seen.     Pleura:  Small right pleural effusion noted.     Systemic Veins:  The inferior vena cava is normal in size measuring 2.01 cmin diameter, (normal <2.1cm) with normal inspiratory collapse (normal >50%) consistent with normal right atrial pressure (~3, range 0-5mmHg).  ________________________________________________________________________________________  Electronically signed on 2025 at 11:48:39 AM by Cristina Yoo MD   CHIEF COMPLAINT: Patient is a 74y old  Male who presents with a chief complaint of Hypotension, anemia (2025 18:31)      HPI:  Pt seen and examined  74M  PMHx of diabetes, high blood pressure, aortic valve replacement, AF on eliquis, chronic back pain s/p epidurals on diclofenac, HLD, hypothyroid, NIDDM, sAS, CKD  Pt here for chest pain  Recent Dx anemia on blood work  ICU consult for hypotension    2/10: POCT+ stool, no gross blood, last EGD distant past; CT C/A/P done, no obv abnl found. Rec'ing blood, GI called. (10 Feb 2025 13:53)    2/10-patient well known to Dr GILES Heath; with history of HTN, HLD, DM, HFpEF and AS s/p TAVR (non obstructive CAD on cath-).  Post TARV, had heart block and then MCOT placed-eventually diagnosed with Afib and started on AC.          Today, patient presents to ER c/p CP/SOB and vomiting. Onset of symptoms began this morning with midsternal CP. + Troponin levels and EKG with sinus tachycardia with non specific st changes.  Patient took one aspirin at home and vomited. Pt reports emesis appeared dark in color. EMS gave pt Aspirin 324mg, Nitro x 1.  He has chronic back pain s/p epidurals on diclofenac.  Recent Dx anemia on blood work.  ICU consult for hypotension called by ER.  PAtient with low grade fever, elevated WBC and GUAIAC + stool.  Viral panel negative.      patient with low grade fever, elevated WBC with prior cath over 1 year ago with non-obstructive CAD at the time of TAVR who presents with chest pains, n/v/ coffee ground emesis with severe anemia and suspected demand ischemia  Patient with BP in the low 100's after 2 units of PRBC and fluids.  Hold antiplatelet and AC medications in face of severe life threatening bleed.  Patient with planne endoscopy today; will need antibiotic prophylaxis prior to procedure.  Currently on zosyn.   ECHO pending today.  H/H up to 8 and 25 (from 7 and ) after 2 units of blood.  Creatinine improved and troponin climbed to 1200's.      --patient with + blood cultures; streptococcus group a strep in multiple cultures; s/p EGD and colonoscopy-with no source of bleed.   AC held and transfused-stable H/H.       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      25:  Resting comfortably this AM.  Remaining afebrile since Saturday afternoon.  Tolerating current meds and antibiotic.  Will need 6 week so IV antibiotics and then another ROVERTO.  No new cardiac symptoms so far.  For possible discharge soon with PICC line and home antibiotics.        PMHx: PAST MEDICAL & SURGICAL HISTORY:  Hypertension  Diabetes mellitus  Obesity  Hypothyroidism  Prostate CA      Allergies: Allergies  No Known Allergies      REVIEW OF SYSTEMS:  CONSTITUTIONAL: No weakness, fevers or chills  EYES/ENT: No visual changes;  No vertigo or throat pain   NECK: No pain or stiffness  RESPIRATORY: No cough, wheezing, hemoptysis; No shortness of breath  CARDIOVASCULAR: No chest pain or palpitations  GASTROINTESTINAL: No abdominal or epigastric pain. No nausea, vomiting, or hematemesis; No diarrhea or constipation. No melena or hematochezia.  GENITOURINARY: No dysuria, frequency or hematuria  NEUROLOGICAL: No numbness or weakness  SKIN: No itching, burning, rashes, or lesions   All other review of systems is negative unless indicated above      Vital Signs Last 24 Hrs  T(C): 36.7 (2025 01:15), Max: 37 (2025 16:07)  T(F): 98 (2025 01:15), Max: 98.6 (2025 16:07)  HR: 85 (2025 01:15) (85 - 100)  BP: 115/64 (2025 01:15) (100/55 - 115/64)  RR: 18 (2025 01:15) (18 - 18)  SpO2: 95% (2025 01:15) (91% - 95%)    Parameters below as of 2025 01:15  Patient On (Oxygen Delivery Method): room air      I&O's Summary  2025 07:01  -  2025 07:00  --------------------------------------------------------  IN: 0 mL / OUT: 300 mL / NET: -300 mL    2025 07:01  -  2025 06:19  --------------------------------------------------------  IN: 0 mL / OUT: 475 mL / NET: -475 mL        PHYSICAL EXAM:   Constitutional: NAD, awake and alert, well-developed  HEENT: PERR, EOMI, Normal Hearing, MMM  Neck: Soft and supple, No LAD, No JVD  Respiratory: Breath sounds are clear bilaterally, No wheezing, rales or rhonchi  Cardiovascular: S1 and S2, regular rate and rhythm, no Murmurs, gallops or rubs  Gastrointestinal: Bowel Sounds present, soft, nontender, nondistended, no guarding, no rebound  Extremities: No peripheral edema  Vascular: 2+ peripheral pulses  Neurological: A/O x 3, no focal deficits      MEDICATIONS  (STANDING):  apixaban 2.5 milliGRAM(s) Oral two times a day  atorvastatin 40 milliGRAM(s) Oral at bedtime  cefTRIAXone Injectable. 2000 milliGRAM(s) IV Push every 24 hours  dextrose 5%. 1000 milliLiter(s) (50 mL/Hr) IV Continuous <Continuous>  dextrose 5%. 1000 milliLiter(s) (100 mL/Hr) IV Continuous <Continuous>  dextrose 50% Injectable 25 Gram(s) IV Push once  dextrose 50% Injectable 12.5 Gram(s) IV Push once  dextrose 50% Injectable 25 Gram(s) IV Push once  dextrose Oral Gel 15 Gram(s) Oral once  gabapentin 200 milliGRAM(s) Oral three times a day  glucagon  Injectable 1 milliGRAM(s) IntraMuscular once  insulin lispro (ADMELOG) corrective regimen sliding scale   SubCutaneous Before meals and at bedtime  levothyroxine 150 MICROGram(s) Oral daily  losartan 50 milliGRAM(s) Oral daily  pantoprazole    Tablet 40 milliGRAM(s) Oral before breakfast  polyethylene glycol 3350 17 Gram(s) Oral daily    MEDICATIONS  (PRN):  acetaminophen     Tablet .. 650 milliGRAM(s) Oral every 4 hours PRN Temp greater or equal to 38C (100.4F), Mild Pain (1 - 3), Moderate Pain (4 - 6)  senna 2 Tablet(s) Oral at bedtime PRN Constipation  traMADol 50 milliGRAM(s) Oral every 6 hours PRN Severe Pain (7 - 10)  zolpidem 5 milliGRAM(s) Oral at bedtime PRN Insomnia      LABS: All Labs Reviewed:                        8.6    17.55 )-----------( 238      ( 2025 06:23 )             26.7                         8.8    13.20 )-----------( 265      ( 2025 06:31 )             27.2     02-16    131[L]  |  102  |  26[H]  ----------------------------<  119[H]  3.6   |  22  |  1.26    Ca    8.9      2025 06:23      02-13    137  |  107  |  16  ----------------------------<  110[H]  3.6   |  24  |  1.02    Ca    9.1      2025 06:31  Phos  2.9     -  TPro  5.8[L]  /  Alb  2.2[L]  /  TBili  x   /  DBili  x   /  AST  x   /  ALT  x   /  AlkPhos  x   -      Blood Culture - Blood in AM (25 @ 07:04)   Specimen Source: .Blood BLOOD  Culture Results:   No growth at 48 Hours      Blood Culture: 2/10/25: Organism Streptococcus salivarius/vestibularis group  Gram Stain Blood -- Gram Stain   Growth in aerobic bottle: Gram Positive Cocci in Pairs and Chains  Growth in anaerobic bottle: Gram Positive Cocci in Pairs and Chains  Specimen Source .Blood BLOOD  Culture-Blood --    Troponin I, High Sensitivity (25 @ 14:01): 1106.92  Troponin I, High Sensitivity (25 @ 05:03): 1264.98      RADIOLOGY:  Reviewed in EMR    EK25:  Sinus rhythm ectt4bs degree A-V block  Septal infarct (cited on or before 2023)       Telemetry:  NSR    ECHO:   ROVERTO: 25:  CONCLUSIONS:   1. Left ventricular systolic function is normal.   2. Normal biventricular systolic function.   3. Normal left and right atrial size.   4. No thrombus seen in the left atrial, left atrial appendage, right atrial or right atrial appendage.   5. No evidence of an intracardiac shunt.   6. Structurally normal mitral valve with normal leaflet excursion.   7. No pericardial effusion seen.   8. There is no significant plaque seen in the visualized portions of the descending aorta and aortic arch.   9. Agitated saline injection was negative for intracardiac shunt.  10. No evidence of left atrial or left atrial appendage thrombus.  11. No evidence of tricuspid vegetation.  12. There is no evidence of any mitral valve vegetations.  ________________________________________________________________________________________  FINDINGS:  Left Ventricle:  Left ventricular systolic function is normal.     Right Ventricle:  The right ventricular cavity is normal in size, with normal wall thickness and right ventricular systolic function is normal.     Left Atrium:  There is no evidence of left atrial or left atrial appendage thrombus.     Interatrial Septum:  Interatrial septum is aneurysmal. Agitated saline injection was negative for intracardiac shunt.     Aortic Valve:  A Norman (TAVR) is present in the aortic position. The prosthetic valve has reduced leaflet opening and is well seated. Echo findings are consistent with a vegetation of the aortic prosthesis. There is trace paravalvular regurgitation (one paravalvular jet), originating at 1 o'clock and is directed laterally.     Mitral Valve:  Structurally normal mitral valve with normal leaflet excursion. There is no evidence of any mitral valve vegetations. There is mild mitral regurgitation.     Tricuspid Valve:  There is no evidence of tricuspid valve vegetation.     Pulmonic Valve:  The pulmonic valve was not well visualized.     Pericardium:  No pericardial effusion seen.  ________________________________________________________________________________________  Electronically signed on 2025 at 8:11:27 AM by Silviano Merida      TTE: 25:  CONCLUSIONS:   1. Left ventricular systolic function is normal with an ejection fraction of 57 % by Parham's method of disks.   2. Normal right ventricular cavity size and normal right ventricular systolic function.   3. Left atrium is mildly dilated.   4. A Transcatheter deployed (TAVR) valve replacement is present in the aortic position The prosthetic valve is well seated. 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 Mild intravalvularregurgitation   5. Mild to moderate mitral regurgitation.   6. Trace tricuspid regurgitation.   7. Small right pleural effusion noted.  ________________________________________________________________________________________  FINDINGS:  Left Ventricle:  The left ventricular cavity is normal in size. Left ventricular systolic function is normal with a calculated ejection fraction of 57 % by the Parham's biplane method of disks. No left ventricular hypertrophy. There is normal left ventricular diastolic function.     Right Ventricle:  The right ventricular cavity is normal in size and right ventricular systolic function is normal. Tricuspid annular plane systolic excursion (TAPSE) is 2.4 cm (normal >=1.7 cm).     Left Atrium:  The left atrium is mildly dilated.     Right Atrium:  The right atrium is normal in size with an indexed volume of 13.15 ml/m² and an indexed area of 6.50 cm²/m².     Interatrial Septum:  The interatrial septum appears intact.     Aortic Valve:  A a transcatheter deployed (TAVR) is present in the aortic position. The prosthetic valve is well seated. The peak transaortic velocity is 3.97 m/s, peak transaortic gradient is 63.0 mmHg and mean transaortic gradient is 45.0 mmHg with an LVOT/aortic valve VTI ratio of 0.25. The effective orifice area is estimated at 0.99 cm² by the continuity equation. There is mild intravalvular regurgitation. 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     Mitral Valve:  Structurally normal mitral valve with normal leaflet excursion. There is mild posterior calcification of the mitral valve annulus. The transmitral peak gradient is 5.7 mmHg and mean gradient is 3.00 mmHg. There is mild to moderate mitral regurgitation. Mitral inflow is variable.     Tricuspid Valve:  Structurally normal tricuspid valve with normal leaflet excursion. The tricuspid valve leaflets appear normal. There is trace tricuspid regurgitation. Estimated pulmonary artery systolic pressure is 21 mmHg, consistent with normal pulmonary artery pressure.     Pulmonic Valve:  Structurally normal pulmonic valve with normal leaflet excursion.     Aorta:  The proximal aorta, aortic root and aortic arch are not well visualized. The ascending aorta is normal in size, measuring 3.54 cm (indexed 1.74 cm/m²).     Pericardium:  No pericardial effusion seen.     Pleura:  Small right pleural effusion noted.     Systemic Veins:  The inferior vena cava is normal in size measuring 2.01 cmin diameter, (normal <2.1cm) with normal inspiratory collapse (normal >50%) consistent with normal right atrial pressure (~3, range 0-5mmHg).  ________________________________________________________________________________________  Electronically signed on 2025 at 11:48:39 AM by Cristina Yoo MD

## 2025-02-17 NOTE — ADVANCED PRACTICE NURSE CONSULT - ASSESSMENT
Procedure Note: Vascular Access  Procedure Name: RUE PICC Placement  Time out: Patient’s first and last name, , MRN, procedure and correct site confirmed prior to start of procedure.  Procedure Date and Time: 2025   1410  Informed Consent: Benefits, risks, and possible complications of procedure explained to patient.  Patient verbalized understanding and gave written consent.  Local Anesthesia: 1% Lidocaine subdermal  Procedure findings and Details:  Successful placement of RUE 4 Fr Single Lumen PICC (Xcela PICC with PASV Valve Technology Lot # [id number]) via basilic vein inserted under ultrasound guidance.  PICC line trimmed to 44cm.   Follow up: CXR ordered to confirm tip placement.  Report given to District RN Marv SONG   Procedure Note: Vascular Access  Procedure Name: RUE PICC Placement  Time out: Patient’s first and last name, , MRN, procedure and correct site confirmed prior to start of procedure.  Procedure Date and Time: 2025   1410  Informed Consent: Benefits, risks, and possible complications of procedure explained to patient.  Patient verbalized understanding and gave written consent.  Local Anesthesia: 1% Lidocaine subdermal  Procedure findings and Details:  Successful placement of RUE 4 Fr Single Lumen PICC (Xcela PICC with PASV Valve Technology Lot # [id number]) via basilic vein inserted under ultrasound guidance.  PICC line trimmed to 44cm.   Follow up: CXR ordered to confirm tip placement.  Report given to District RN Marv SONG    Addendum:  First X-Ray showed PICC line in Right IJ,  PICC line repositioned X-Ray showed PICC to be going in correct direction.  Awaiting Radiologist's reading.

## 2025-02-17 NOTE — PROCEDURAL SAFETY CHECKLIST WITH OR WITHOUT SEDATION - NSPRESEDATIONFT_GEN_ALL_CORE
Physician confirms case reviewed for anesthesia consultation.
Physician confirms case reviewed for anesthesia consultation.

## 2025-02-17 NOTE — DISCHARGE NOTE NURSING/CASE MANAGEMENT/SOCIAL WORK - NSDCPNINST_GEN_ALL_CORE
Thank you for choosing [facility]. It was our pleasure caring for you. For a complete copy of medical records please visit : [email or web address]

## 2025-02-17 NOTE — PROCEDURAL SAFETY CHECKLIST WITH OR WITHOUT SEDATION - ARE WE READY FOR THE DEBRIEF?
yes
yes
Alar Island Pedicle Flap Text: The defect edges were debeveled with a #15 scalpel blade. Given the location of the defect, shape of the defect and the proximity to the alar rim an island pedicle advancement flap was deemed most appropriate. Using a sterile surgical marker, an appropriate advancement flap was drawn incorporating the defect, outlining the appropriate donor tissue and placing the expected incisions within the nasal ala running parallel to the alar rim. The area thus outlined was incised with a #15 scalpel blade. The skin margins were undermined minimally to an appropriate distance in all directions around the primary defect and laterally outward around the island pedicle utilizing iris scissors.  There was minimal undermining beneath the pedicle flap. Following this, the designed flap was carried over into the primary defect and sutured into place.

## 2025-02-17 NOTE — PROGRESS NOTE ADULT - SUBJECTIVE AND OBJECTIVE BOX
Patient is a 74y old  Male who presents with a chief complaint of Hypotension, anemia (2025 10:43)    Interval HPI and Overnight Events: afebrile  reports chronic lower back pain, denies sob, no CP    REVIEW OF SYSTEMS:  CONSTITUTIONAL: No weakness, fevers or chills  EYES/ENT: No visual changes;  No vertigo or throat pain   NECK: No pain or stiffness  RESPIRATORY: No cough, wheezing, hemoptysis; No shortness of breath  CARDIOVASCULAR: No chest pain or palpitations  GASTROINTESTINAL: No abdominal or epigastric pain. No nausea, vomiting, or hematemesis; No diarrhea or constipation. No melena or hematochezia.  GENITOURINARY: No dysuria, frequency or hematuria  NEUROLOGICAL: No numbness or weakness  SKIN: No itching, burning, rashes, or lesions   All other review of systems is negative unless indicated above    PHYSICAL EXAM:    Daily     Daily Weight in k.7 (2025 01:15)    ICU Vital Signs Last 24 Hrs  T(C): 37.1 (2025 08:23), Max: 37.1 (2025 08:23)  T(F): 98.8 (2025 08:23), Max: 98.8 (2025 08:23)  HR: 85 (2025 08:23) (85 - 90)  BP: 97/50 (2025 08:23) (97/50 - 115/64)  BP(mean): --  ABP: --  ABP(mean): --  RR: 18 (2025 08:23) (18 - 18)  SpO2: 94% (2025 08:23) (91% - 95%)    O2 Parameters below as of 2025 08:23  Patient On (Oxygen Delivery Method): room air    PHYSICAL EXAM:HEENT: NC/AT, EOMI, PERRLA, conjunctivae clear; ears and nose atraumatic; pharynx benign  Neck: supple; thyroid not palpable  Back: no tenderness  Respiratory: respiratory effort normal; clear to auscultation  Cardiovascular: S1S2 regular, positive systolic murmurs  Abdomen: soft, not tender, not distended, positive BS; no liver or spleen organomegaly  Genitourinary: no suprapubic tenderness  Lymphatic: no LN palpable  Musculoskeletal: no muscle tenderness, no joint swelling or tenderness  Extremities: no pedal edema  Neurological/ Psychiatric: Alert, judgement and insight impaired; moving all extremities  Skin: no rashes; no palpable lesions                                        8.6    17.55 )-----------( 238      ( 2025 06:23 )             26.7       CBC Full  -  ( 2025 06:23 )  WBC Count : 17.55 K/uL  RBC Count : 3.26 M/uL  Hemoglobin : 8.6 g/dL  Hematocrit : 26.7 %  Platelet Count - Automated : 238 K/uL  Mean Cell Volume : 81.9 fl  Mean Cell Hemoglobin : 26.4 pg  Mean Cell Hemoglobin Concentration : 32.2 g/dL  Auto Neutrophil # : 14.38 K/uL  Auto Lymphocyte # : 1.76 K/uL  Auto Monocyte # : 1.12 K/uL  Auto Eosinophil # : 0.12 K/uL  Auto Basophil # : 0.05 K/uL  Auto Neutrophil % : 81.9 %  Auto Lymphocyte % : 10.0 %  Auto Monocyte % : 6.4 %  Auto Eosinophil % : 0.7 %  Auto Basophil % : 0.3 %      16    131[L]  |  102  |  26[H]  ----------------------------<  119[H]  3.6   |  22  |  1.26    Ca    8.9      2025 06:23                      Urinalysis Basic - ( 2025 06:23 )    Color: x / Appearance: x / SG: x / pH: x  Gluc: 119 mg/dL / Ketone: x  / Bili: x / Urobili: x   Blood: x / Protein: x / Nitrite: x   Leuk Esterase: x / RBC: x / WBC x   Sq Epi: x / Non Sq Epi: x / Bacteria: x            MEDICATIONS  (STANDING):  apixaban 2.5 milliGRAM(s) Oral two times a day  atorvastatin 40 milliGRAM(s) Oral at bedtime  cefTRIAXone Injectable. 2000 milliGRAM(s) IV Push every 24 hours  dextrose 5%. 1000 milliLiter(s) (50 mL/Hr) IV Continuous <Continuous>  dextrose 5%. 1000 milliLiter(s) (100 mL/Hr) IV Continuous <Continuous>  dextrose 50% Injectable 25 Gram(s) IV Push once  dextrose 50% Injectable 12.5 Gram(s) IV Push once  dextrose 50% Injectable 25 Gram(s) IV Push once  dextrose Oral Gel 15 Gram(s) Oral once  gabapentin 200 milliGRAM(s) Oral three times a day  glucagon  Injectable 1 milliGRAM(s) IntraMuscular once  insulin lispro (ADMELOG) corrective regimen sliding scale   SubCutaneous Before meals and at bedtime  levothyroxine 150 MICROGram(s) Oral daily  losartan 50 milliGRAM(s) Oral daily  pantoprazole    Tablet 40 milliGRAM(s) Oral before breakfast  polyethylene glycol 3350 17 Gram(s) Oral daily

## 2025-02-17 NOTE — PROGRESS NOTE ADULT - SUBJECTIVE AND OBJECTIVE BOX
Date of service: 02-17-25 @ 12:01    Lying in bed in NAD  Weak looking  Has low back pain    ROS: no fever or chills; denies dizziness, no HA, no SOB or cough, no abdominal pain, no diarrhea or constipation; no dysuria, no legs pain, no rashes    MEDICATIONS  (STANDING):  apixaban 2.5 milliGRAM(s) Oral two times a day  atorvastatin 40 milliGRAM(s) Oral at bedtime  cefTRIAXone Injectable. 2000 milliGRAM(s) IV Push every 24 hours  dextrose 5%. 1000 milliLiter(s) (50 mL/Hr) IV Continuous <Continuous>  dextrose 5%. 1000 milliLiter(s) (100 mL/Hr) IV Continuous <Continuous>  dextrose 50% Injectable 25 Gram(s) IV Push once  dextrose 50% Injectable 12.5 Gram(s) IV Push once  dextrose 50% Injectable 25 Gram(s) IV Push once  dextrose Oral Gel 15 Gram(s) Oral once  gabapentin 200 milliGRAM(s) Oral three times a day  glucagon  Injectable 1 milliGRAM(s) IntraMuscular once  insulin lispro (ADMELOG) corrective regimen sliding scale   SubCutaneous Before meals and at bedtime  levothyroxine 150 MICROGram(s) Oral daily  losartan 50 milliGRAM(s) Oral daily  pantoprazole    Tablet 40 milliGRAM(s) Oral before breakfast  polyethylene glycol 3350 17 Gram(s) Oral daily    Vital Signs Last 24 Hrs  T(C): 37.1 (17 Feb 2025 08:23), Max: 37.1 (17 Feb 2025 08:23)  T(F): 98.8 (17 Feb 2025 08:23), Max: 98.8 (17 Feb 2025 08:23)  HR: 85 (17 Feb 2025 08:23) (85 - 90)  BP: 97/50 (17 Feb 2025 08:23) (97/50 - 115/64)  BP(mean): --  RR: 18 (17 Feb 2025 08:23) (18 - 18)  SpO2: 94% (17 Feb 2025 08:23) (91% - 95%)    Parameters below as of 17 Feb 2025 08:23  Patient On (Oxygen Delivery Method): room air     Physical exam:    Constitutional:  No acute distress  HEENT: NC/AT, EOMI, PERRLA, conjunctivae clear; ears and nose atraumatic; pharynx benign  Neck: supple; thyroid not palpable  Back: no tenderness  Respiratory: respiratory effort normal; clear to auscultation  Cardiovascular: S1S2 regular, no murmurs  Abdomen: soft, not tender, not distended, positive BS; no liver or spleen organomegaly  Genitourinary: no suprapubic tenderness  Lymphatic: no LN palpable  Musculoskeletal: no muscle tenderness, no joint swelling or tenderness  Extremities: no pedal edema  Neurological/ Psychiatric: Alert, judgement and insight impaired; moving all extremities  Skin: no rashes; no palpable lesions    Labs: reviewed                        8.6    17.55 )-----------( 238      ( 16 Feb 2025 06:23 )             26.7     02-16    131[L]  |  102  |  26[H]  ----------------------------<  119[H]  3.6   |  22  |  1.26    Ca    8.9      16 Feb 2025 06:23    Ferritin: 548 ng/mL (02-14-25 @ 07:01)  C-Reactive Protein: 73.6 mg/mL (02-12-25 @ 13:56)                        9.0    15.57 )-----------( 266      ( 14 Feb 2025 07:01 )             27.7     02-13    137  |  107  |  16  ----------------------------<  110[H]  3.6   |  24  |  1.02    Ca    9.1      13 Feb 2025 06:31  Phos  2.9     02-13    TPro  5.8[L]  /  Alb  2.2[L]  /  TBili  x   /  DBili  x   /  AST  x   /  ALT  x   /  AlkPhos  x   02-13    C-Reactive Protein: 73.6 mg/mL (02-12-25 @ 13:56)                        8.8    13.20 )-----------( 265      ( 13 Feb 2025 06:31 )             27.2     02-13    137  |  107  |  16  ----------------------------<  110[H]  3.6   |  24  |  1.02    Ca    9.1      13 Feb 2025 06:31  Phos  2.9     02-13  Mg     1.6     02-12    TPro  x   /  Alb  2.2[L]  /  TBili  x   /  DBili  x   /  AST  x   /  ALT  x   /  AlkPhos  x   02-13    C-Reactive Protein: 73.6 mg/mL (02-12-25 @ 13:56)                        8.8    12.33 )-----------( 253      ( 12 Feb 2025 06:04 )             27.0     02-12    139  |  108  |  26[H]  ----------------------------<  89  3.9   |  24  |  1.22    Ca    9.5      12 Feb 2025 06:04  Phos  3.0     02-12  Mg     1.6     02-12    TPro  6.5  /  Alb  2.3[L]  /  TBili  1.0  /  DBili  x   /  AST  30  /  ALT  25  /  AlkPhos  92  02-12     LIVER FUNCTIONS - ( 12 Feb 2025 06:04 )  Alb: 2.3 g/dL / Pro: 6.5 gm/dL / ALK PHOS: 92 U/L / ALT: 25 U/L / AST: 30 U/L / GGT: x           Urinalysis with Rflx Culture (collected 10 Feb 2025 12:47)    Culture - Blood (collected 10 Feb 2025 09:32)  Source: .Blood BLOOD  Gram Stain (11 Feb 2025 13:01):    Growth in aerobic bottle: Gram Positive Cocci in Pairs and Chains    Growth in anaerobic bottle: Gram Positive Cocci in Pairs and Chains  Final Report (13 Feb 2025 06:33):    Growth in aerobic and anaerobic bottles: Streptococcus    salivarius/vestibularis group  Organism: Streptococcus salivarius/vestibularis group (13 Feb 2025 06:33)  Organism: Streptococcus salivarius/vestibularis group (13 Feb 2025 06:33)      Method Type: BRIANA      -  Ceftriaxone: S <=0.25      -  Penicillin: S 0.06      -  Vancomycin: S 0.5    Culture - Blood (collected 10 Feb 2025 09:28)  Source: .Blood BLOOD  Gram Stain (11 Feb 2025 09:51):    Growth in aerobic bottle: Gram Positive Cocci in Pairs and Chains    Growth in anaerobic bottle: Gram Positive Cocci in Pairs and Chains  Final Report (12 Feb 2025 17:14):    Growth in aerobic and anaerobic bottles: Streptococcus    salivarius/vestibularis group "Susceptibilities not performed"    Direct identification is available within approximately 3-5    hours either by Blood Panel Multiplexed PCR or Direct    MALDI-TOF. Details: [email or web address]  Organism: Blood Culture PCR (12 Feb 2025 17:14)  Organism: Blood Culture PCR (12 Feb 2025 17:14)      Method Type: PCR      -  Streptococcus sp. (Not Grp A, B or S pneumoniae): Detec    Culture - Blood (collected 14 Feb 2025 07:04)  Source: .Blood BLOOD  Preliminary Report (15 Feb 2025 13:01):    No growth at 24 hours    Culture - Blood (collected 14 Feb 2025 07:01)  Source: .Blood BLOOD  Preliminary Report (15 Feb 2025 13:01):    No growth at 24 hours    < from: Xray Chest 1 View- PORTABLE-Urgent (02.10.25 @ 10:34) >  IMPRESSION: No acute finding.  < end of copied text >    < from: CT Abdomen and Pelvis w/wo IV Cont (02.10.25 @ 10:09) >  IMPRESSION:  *  No acute pathology.  *  No active GI bleeding.  < end of copied text >  < from: TTE Echo Complete w/ Contrast w/ Doppler (01.11.23 @ 19:34) >   1. Left ventricular ejection fraction, by visual estimation, is >75%.   2. Hyperdynamic global left ventricular systolic function.   3. Spectral Doppler shows impaired relaxation pattern of left ventricular myocardial filling (Grade I diastolic dysfunction).   4. There is moderate concentric left ventricular hypertrophy.   5. Mildly enlarged left atrium.   6. There is no evidence of pericardial effusion.   7. Mild mitral annular calcification.   8. Thickening of the anterior mitral valve leaflet.   9. Trace mitral valve regurgitation.  10. TAVR in the aortic position.  11. LVOT vmax at rest : 120 cm/s, LVOT vmax with valsalva : 157 cm/s. No   evidence of dynamic LVOTO. There is mild paravalvular regurgitation of the prosthetic AoV.  12. Mildly dilated pulmonary artery.  13. Adequate TR velocity was not obtained to accurately assess RVSP.  < end of copied text >    Advanced directives addressed: full resuscitation

## 2025-02-18 LAB
% ALBUMIN: 44 % — SIGNIFICANT CHANGE UP
% ALPHA 1: 8.2 % — SIGNIFICANT CHANGE UP
% ALPHA 2: 16.4 % — SIGNIFICANT CHANGE UP
% BETA: 13.3 % — SIGNIFICANT CHANGE UP
% GAMMA: 18.1 % — SIGNIFICANT CHANGE UP
% M SPIKE: SIGNIFICANT CHANGE UP
ALBUMIN SERPL ELPH-MCNC: 2.6 G/DL — LOW (ref 3.6–5.5)
ALBUMIN/GLOB SERPL ELPH: 0.8 RATIO — SIGNIFICANT CHANGE UP
ALPHA1 GLOB SERPL ELPH-MCNC: 0.5 G/DL — HIGH (ref 0.1–0.4)
ALPHA2 GLOB SERPL ELPH-MCNC: 1 G/DL — SIGNIFICANT CHANGE UP (ref 0.5–1)
B-GLOBULIN SERPL ELPH-MCNC: 0.8 G/DL — SIGNIFICANT CHANGE UP (ref 0.5–1)
GAMMA GLOBULIN: 1 G/DL — SIGNIFICANT CHANGE UP (ref 0.6–1.6)
INTERPRETATION SERPL IFE-IMP: SIGNIFICANT CHANGE UP
M-SPIKE: SIGNIFICANT CHANGE UP (ref 0–0)
PROT PATTERN SERPL ELPH-IMP: SIGNIFICANT CHANGE UP
PROT SERPL-MCNC: 5.8 G/DL — LOW (ref 6–8.3)

## 2025-02-19 ENCOUNTER — NON-APPOINTMENT (OUTPATIENT)
Age: 75
End: 2025-02-19

## 2025-02-19 LAB
CULTURE RESULTS: SIGNIFICANT CHANGE UP
CULTURE RESULTS: SIGNIFICANT CHANGE UP
SPECIMEN SOURCE: SIGNIFICANT CHANGE UP
SPECIMEN SOURCE: SIGNIFICANT CHANGE UP

## 2025-02-19 RX ORDER — TRAMADOL HYDROCHLORIDE 50 MG/1
1 TABLET, FILM COATED ORAL
Refills: 0 | DISCHARGE
Start: 2025-02-19 | End: 2025-03-06

## 2025-02-20 ENCOUNTER — INPATIENT (INPATIENT)
Facility: HOSPITAL | Age: 75
LOS: 3 days | Discharge: ANOTHER HOSPITAL | DRG: 299 | End: 2025-02-24
Attending: FAMILY MEDICINE | Admitting: FAMILY MEDICINE
Payer: MEDICARE

## 2025-02-20 VITALS
HEART RATE: 104 BPM | WEIGHT: 229.94 LBS | DIASTOLIC BLOOD PRESSURE: 71 MMHG | SYSTOLIC BLOOD PRESSURE: 124 MMHG | OXYGEN SATURATION: 95 % | TEMPERATURE: 98 F | RESPIRATION RATE: 18 BRPM

## 2025-02-20 LAB
ALBUMIN SERPL ELPH-MCNC: 2 G/DL — LOW (ref 3.3–5)
ALP SERPL-CCNC: 130 U/L — HIGH (ref 40–120)
ALT FLD-CCNC: 63 U/L — SIGNIFICANT CHANGE UP (ref 12–78)
ANION GAP SERPL CALC-SCNC: 7 MMOL/L — SIGNIFICANT CHANGE UP (ref 5–17)
ANISOCYTOSIS BLD QL: SLIGHT — SIGNIFICANT CHANGE UP
APTT BLD: 35.2 SEC — SIGNIFICANT CHANGE UP (ref 24.5–35.6)
AST SERPL-CCNC: 78 U/L — HIGH (ref 15–37)
BASOPHILS # BLD AUTO: 0.06 K/UL — SIGNIFICANT CHANGE UP (ref 0–0.2)
BASOPHILS # BLD MANUAL: 0 K/UL — SIGNIFICANT CHANGE UP (ref 0–0.2)
BASOPHILS NFR BLD AUTO: 0.3 % — SIGNIFICANT CHANGE UP (ref 0–2)
BASOPHILS NFR BLD MANUAL: 0 % — SIGNIFICANT CHANGE UP (ref 0–2)
BILIRUB SERPL-MCNC: 0.6 MG/DL — SIGNIFICANT CHANGE UP (ref 0.2–1.2)
BUN SERPL-MCNC: 31 MG/DL — HIGH (ref 7–23)
CALCIUM SERPL-MCNC: 9 MG/DL — SIGNIFICANT CHANGE UP (ref 8.5–10.1)
CHLORIDE SERPL-SCNC: 99 MMOL/L — SIGNIFICANT CHANGE UP (ref 96–108)
CO2 SERPL-SCNC: 24 MMOL/L — SIGNIFICANT CHANGE UP (ref 22–31)
CREAT SERPL-MCNC: 1.05 MG/DL — SIGNIFICANT CHANGE UP (ref 0.5–1.3)
CRP SERPL-MCNC: 206 MG/ML — HIGH (ref 0–5)
EGFR: 74 ML/MIN/1.73M2 — SIGNIFICANT CHANGE UP
EOSINOPHIL # BLD AUTO: 0.05 K/UL — SIGNIFICANT CHANGE UP (ref 0–0.5)
EOSINOPHIL # BLD MANUAL: 0 K/UL — SIGNIFICANT CHANGE UP (ref 0–0.5)
EOSINOPHIL NFR BLD AUTO: 0.2 % — SIGNIFICANT CHANGE UP (ref 0–6)
EOSINOPHIL NFR BLD MANUAL: 0 % — SIGNIFICANT CHANGE UP (ref 0–6)
ERYTHROCYTE [SEDIMENTATION RATE] IN BLOOD: 135 MM/HR — HIGH (ref 0–20)
GLUCOSE SERPL-MCNC: 113 MG/DL — HIGH (ref 70–99)
HCT VFR BLD CALC: 23.5 % — LOW (ref 39–50)
HGB BLD-MCNC: 7.6 G/DL — LOW (ref 13–17)
HYPOCHROMIA BLD QL: SLIGHT — SIGNIFICANT CHANGE UP
IMM GRANULOCYTES # BLD AUTO: 0.16 K/UL — HIGH (ref 0–0.07)
IMM GRANULOCYTES NFR BLD AUTO: 0.7 % — SIGNIFICANT CHANGE UP (ref 0–0.9)
INR BLD: 1.4 RATIO — HIGH (ref 0.85–1.16)
LACTATE SERPL-SCNC: 1.1 MMOL/L — SIGNIFICANT CHANGE UP (ref 0.7–2)
LYMPHOCYTES # BLD AUTO: 0.88 K/UL — LOW (ref 1–3.3)
LYMPHOCYTES # BLD MANUAL: 0.7 K/UL — LOW (ref 1–3.3)
LYMPHOCYTES NFR BLD AUTO: 3.8 % — LOW (ref 13–44)
LYMPHOCYTES NFR BLD MANUAL: 3 % — LOW (ref 13–44)
MAGNESIUM SERPL-MCNC: 2.1 MG/DL — SIGNIFICANT CHANGE UP (ref 1.6–2.6)
MANUAL REACTIVE LYMPHOCYTES #: 0.23 K/UL — SIGNIFICANT CHANGE UP (ref 0–0.63)
MANUAL SMEAR VERIFICATION: SIGNIFICANT CHANGE UP
MCHC RBC-ENTMCNC: 26.6 PG — LOW (ref 27–34)
MCHC RBC-ENTMCNC: 32.3 G/DL — SIGNIFICANT CHANGE UP (ref 32–36)
MCV RBC AUTO: 82.2 FL — SIGNIFICANT CHANGE UP (ref 80–100)
MONOCYTES # BLD AUTO: 1.3 K/UL — HIGH (ref 0–0.9)
MONOCYTES # BLD MANUAL: 1.39 K/UL — HIGH (ref 0–0.9)
MONOCYTES NFR BLD AUTO: 5.6 % — SIGNIFICANT CHANGE UP (ref 2–14)
MONOCYTES NFR BLD MANUAL: 6 % — SIGNIFICANT CHANGE UP (ref 2–14)
NEUTROPHILS # BLD AUTO: 20.77 K/UL — HIGH (ref 1.8–7.4)
NEUTROPHILS # BLD MANUAL: 20.9 K/UL — HIGH (ref 1.8–7.4)
NEUTROPHILS NFR BLD AUTO: 89.4 % — HIGH (ref 43–77)
NEUTROPHILS NFR BLD MANUAL: 90 % — HIGH (ref 43–77)
NRBC # BLD AUTO: 0 K/UL — SIGNIFICANT CHANGE UP (ref 0–0)
NRBC # FLD: 0 K/UL — SIGNIFICANT CHANGE UP (ref 0–0)
NRBC BLD AUTO-RTO: 0 /100 WBCS — SIGNIFICANT CHANGE UP (ref 0–0)
PLAT MORPH BLD: NORMAL — SIGNIFICANT CHANGE UP
PLATELET # BLD AUTO: 276 K/UL — SIGNIFICANT CHANGE UP (ref 150–400)
PMV BLD: 10.3 FL — SIGNIFICANT CHANGE UP (ref 7–13)
POTASSIUM SERPL-MCNC: 4.5 MMOL/L — SIGNIFICANT CHANGE UP (ref 3.5–5.3)
POTASSIUM SERPL-SCNC: 4.5 MMOL/L — SIGNIFICANT CHANGE UP (ref 3.5–5.3)
PROT SERPL-MCNC: 6.7 GM/DL — SIGNIFICANT CHANGE UP (ref 6–8.3)
PROTHROM AB SERPL-ACNC: 16.5 SEC — HIGH (ref 9.9–13.4)
RBC # BLD: 2.86 M/UL — LOW (ref 4.2–5.8)
RBC # FLD: 16.9 % — HIGH (ref 10.3–14.5)
RBC BLD AUTO: ABNORMAL
SODIUM SERPL-SCNC: 130 MMOL/L — LOW (ref 135–145)
VARIANT LYMPHS # BLD: 1 % — SIGNIFICANT CHANGE UP (ref 0–6)
VARIANT LYMPHS NFR BLD MANUAL: 1 % — SIGNIFICANT CHANGE UP (ref 0–6)
WBC # BLD: 23.22 K/UL — HIGH (ref 3.8–10.5)
WBC # FLD AUTO: 23.22 K/UL — HIGH (ref 3.8–10.5)

## 2025-02-20 PROCEDURE — 87070 CULTURE OTHR SPECIMN AEROBIC: CPT

## 2025-02-20 PROCEDURE — 74018 RADEX ABDOMEN 1 VIEW: CPT

## 2025-02-20 PROCEDURE — 82803 BLOOD GASES ANY COMBINATION: CPT

## 2025-02-20 PROCEDURE — 93005 ELECTROCARDIOGRAM TRACING: CPT

## 2025-02-20 PROCEDURE — 83735 ASSAY OF MAGNESIUM: CPT

## 2025-02-20 PROCEDURE — 88311 DECALCIFY TISSUE: CPT

## 2025-02-20 PROCEDURE — 87116 MYCOBACTERIA CULTURE: CPT

## 2025-02-20 PROCEDURE — 86920 COMPATIBILITY TEST SPIN: CPT

## 2025-02-20 PROCEDURE — 83605 ASSAY OF LACTIC ACID: CPT

## 2025-02-20 PROCEDURE — 73630 X-RAY EXAM OF FOOT: CPT | Mod: 26,LT

## 2025-02-20 PROCEDURE — 87075 CULTR BACTERIA EXCEPT BLOOD: CPT

## 2025-02-20 PROCEDURE — 86880 COOMBS TEST DIRECT: CPT

## 2025-02-20 PROCEDURE — 87015 SPECIMEN INFECT AGNT CONCNTJ: CPT

## 2025-02-20 PROCEDURE — 97116 GAIT TRAINING THERAPY: CPT | Mod: GP

## 2025-02-20 PROCEDURE — 86921 COMPATIBILITY TEST INCUBATE: CPT

## 2025-02-20 PROCEDURE — 99285 EMERGENCY DEPT VISIT HI MDM: CPT | Mod: FS

## 2025-02-20 PROCEDURE — 84484 ASSAY OF TROPONIN QUANT: CPT

## 2025-02-20 PROCEDURE — 85027 COMPLETE CBC AUTOMATED: CPT

## 2025-02-20 PROCEDURE — 87640 STAPH A DNA AMP PROBE: CPT

## 2025-02-20 PROCEDURE — C1757: CPT

## 2025-02-20 PROCEDURE — 36415 COLL VENOUS BLD VENIPUNCTURE: CPT

## 2025-02-20 PROCEDURE — 93971 EXTREMITY STUDY: CPT | Mod: 26,LT

## 2025-02-20 PROCEDURE — 87641 MR-STAPH DNA AMP PROBE: CPT

## 2025-02-20 PROCEDURE — 86901 BLOOD TYPING SEROLOGIC RH(D): CPT

## 2025-02-20 PROCEDURE — 86077 PHYS BLOOD BANK SERV XMATCH: CPT

## 2025-02-20 PROCEDURE — 85610 PROTHROMBIN TIME: CPT

## 2025-02-20 PROCEDURE — 0241U: CPT

## 2025-02-20 PROCEDURE — 80053 COMPREHEN METABOLIC PANEL: CPT

## 2025-02-20 PROCEDURE — 36600 WITHDRAWAL OF ARTERIAL BLOOD: CPT

## 2025-02-20 PROCEDURE — 85730 THROMBOPLASTIN TIME PARTIAL: CPT

## 2025-02-20 PROCEDURE — 71045 X-RAY EXAM CHEST 1 VIEW: CPT

## 2025-02-20 PROCEDURE — 82962 GLUCOSE BLOOD TEST: CPT

## 2025-02-20 PROCEDURE — 86922 COMPATIBILITY TEST ANTIGLOB: CPT

## 2025-02-20 PROCEDURE — 73590 X-RAY EXAM OF LOWER LEG: CPT | Mod: 26,LT

## 2025-02-20 PROCEDURE — 87206 SMEAR FLUORESCENT/ACID STAI: CPT

## 2025-02-20 PROCEDURE — 86870 RBC ANTIBODY IDENTIFICATION: CPT

## 2025-02-20 PROCEDURE — 87529 HSV DNA AMP PROBE: CPT

## 2025-02-20 PROCEDURE — P9016: CPT

## 2025-02-20 PROCEDURE — 93923 UPR/LXTR ART STDY 3+ LVLS: CPT

## 2025-02-20 PROCEDURE — 86900 BLOOD TYPING SEROLOGIC ABO: CPT

## 2025-02-20 PROCEDURE — A9579: CPT

## 2025-02-20 PROCEDURE — 84100 ASSAY OF PHOSPHORUS: CPT

## 2025-02-20 PROCEDURE — 97162 PT EVAL MOD COMPLEX 30 MIN: CPT | Mod: GP

## 2025-02-20 PROCEDURE — 99223 1ST HOSP IP/OBS HIGH 75: CPT

## 2025-02-20 PROCEDURE — 83036 HEMOGLOBIN GLYCOSYLATED A1C: CPT

## 2025-02-20 PROCEDURE — 73706 CT ANGIO LWR EXTR W/O&W/DYE: CPT | Mod: MC,50

## 2025-02-20 PROCEDURE — 87798 DETECT AGENT NOS DNA AMP: CPT

## 2025-02-20 PROCEDURE — 87102 FUNGUS ISOLATION CULTURE: CPT

## 2025-02-20 PROCEDURE — 0001U RBC DNA HEA 35 AG 11 BLD GRP: CPT

## 2025-02-20 PROCEDURE — 36430 TRANSFUSION BLD/BLD COMPNT: CPT

## 2025-02-20 PROCEDURE — 80048 BASIC METABOLIC PNL TOTAL CA: CPT

## 2025-02-20 PROCEDURE — 72158 MRI LUMBAR SPINE W/O & W/DYE: CPT | Mod: MC

## 2025-02-20 PROCEDURE — 88304 TISSUE EXAM BY PATHOLOGIST: CPT

## 2025-02-20 PROCEDURE — 86850 RBC ANTIBODY SCREEN: CPT

## 2025-02-20 PROCEDURE — 97163 PT EVAL HIGH COMPLEX 45 MIN: CPT | Mod: GP

## 2025-02-20 RX ORDER — LEVOTHYROXINE SODIUM 300 MCG
150 TABLET ORAL DAILY
Refills: 0 | Status: DISCONTINUED | OUTPATIENT
Start: 2025-02-20 | End: 2025-02-24

## 2025-02-20 RX ORDER — FERROUS SULFATE 137(45) MG
325 TABLET, EXTENDED RELEASE ORAL DAILY
Refills: 0 | Status: DISCONTINUED | OUTPATIENT
Start: 2025-02-20 | End: 2025-02-24

## 2025-02-20 RX ORDER — NALOXONE HYDROCHLORIDE 0.4 MG/ML
0.4 INJECTION, SOLUTION INTRAMUSCULAR; INTRAVENOUS; SUBCUTANEOUS ONCE
Refills: 0 | Status: DISCONTINUED | OUTPATIENT
Start: 2025-02-20 | End: 2025-02-24

## 2025-02-20 RX ORDER — APIXABAN 2.5 MG/1
2.5 TABLET, FILM COATED ORAL
Refills: 0 | Status: DISCONTINUED | OUTPATIENT
Start: 2025-02-20 | End: 2025-02-21

## 2025-02-20 RX ORDER — GABAPENTIN 400 MG/1
200 CAPSULE ORAL THREE TIMES A DAY
Refills: 0 | Status: DISCONTINUED | OUTPATIENT
Start: 2025-02-20 | End: 2025-02-24

## 2025-02-20 RX ORDER — ACETAMINOPHEN 500 MG/5ML
650 LIQUID (ML) ORAL ONCE
Refills: 0 | Status: COMPLETED | OUTPATIENT
Start: 2025-02-20 | End: 2025-02-20

## 2025-02-20 RX ORDER — POLYETHYLENE GLYCOL 3350 17 G/17G
17 POWDER, FOR SOLUTION ORAL DAILY
Refills: 0 | Status: DISCONTINUED | OUTPATIENT
Start: 2025-02-20 | End: 2025-02-24

## 2025-02-20 RX ORDER — TRAMADOL HYDROCHLORIDE 50 MG/1
50 TABLET, FILM COATED ORAL EVERY 6 HOURS
Refills: 0 | Status: DISCONTINUED | OUTPATIENT
Start: 2025-02-20 | End: 2025-02-24

## 2025-02-20 RX ORDER — METFORMIN HYDROCHLORIDE 850 MG/1
1000 TABLET ORAL
Refills: 0 | Status: DISCONTINUED | OUTPATIENT
Start: 2025-02-20 | End: 2025-02-21

## 2025-02-20 RX ORDER — CEFTRIAXONE 500 MG/1
2000 INJECTION, POWDER, FOR SOLUTION INTRAMUSCULAR; INTRAVENOUS EVERY 24 HOURS
Refills: 0 | Status: DISCONTINUED | OUTPATIENT
Start: 2025-02-20 | End: 2025-02-20

## 2025-02-20 RX ORDER — CEFTRIAXONE 500 MG/1
2000 INJECTION, POWDER, FOR SOLUTION INTRAMUSCULAR; INTRAVENOUS EVERY 24 HOURS
Refills: 0 | Status: DISCONTINUED | OUTPATIENT
Start: 2025-02-20 | End: 2025-02-21

## 2025-02-20 RX ORDER — ATORVASTATIN CALCIUM 80 MG/1
40 TABLET, FILM COATED ORAL AT BEDTIME
Refills: 0 | Status: DISCONTINUED | OUTPATIENT
Start: 2025-02-20 | End: 2025-02-24

## 2025-02-20 RX ORDER — MELATONIN 5 MG
3 TABLET ORAL AT BEDTIME
Refills: 0 | Status: DISCONTINUED | OUTPATIENT
Start: 2025-02-20 | End: 2025-02-24

## 2025-02-20 RX ORDER — B1/B2/B3/B5/B6/B12/VIT C/FOLIC 500-0.5 MG
1 TABLET ORAL DAILY
Refills: 0 | Status: DISCONTINUED | OUTPATIENT
Start: 2025-02-20 | End: 2025-02-24

## 2025-02-20 RX ORDER — LOSARTAN POTASSIUM 100 MG/1
50 TABLET, FILM COATED ORAL DAILY
Refills: 0 | Status: DISCONTINUED | OUTPATIENT
Start: 2025-02-20 | End: 2025-02-22

## 2025-02-20 RX ORDER — SENNA 187 MG
2 TABLET ORAL AT BEDTIME
Refills: 0 | Status: DISCONTINUED | OUTPATIENT
Start: 2025-02-20 | End: 2025-02-24

## 2025-02-20 RX ORDER — ONDANSETRON HCL/PF 4 MG/2 ML
4 VIAL (ML) INJECTION EVERY 8 HOURS
Refills: 0 | Status: DISCONTINUED | OUTPATIENT
Start: 2025-02-20 | End: 2025-02-24

## 2025-02-20 RX ORDER — DAPAGLIFLOZIN 5 MG/1
10 TABLET, FILM COATED ORAL EVERY 24 HOURS
Refills: 0 | Status: DISCONTINUED | OUTPATIENT
Start: 2025-02-20 | End: 2025-02-21

## 2025-02-20 RX ORDER — MAGNESIUM, ALUMINUM HYDROXIDE 200-200 MG
30 TABLET,CHEWABLE ORAL EVERY 4 HOURS
Refills: 0 | Status: DISCONTINUED | OUTPATIENT
Start: 2025-02-20 | End: 2025-02-24

## 2025-02-20 RX ORDER — ACETAMINOPHEN 500 MG/5ML
650 LIQUID (ML) ORAL EVERY 6 HOURS
Refills: 0 | Status: DISCONTINUED | OUTPATIENT
Start: 2025-02-20 | End: 2025-02-24

## 2025-02-20 RX ADMIN — Medication 650 MILLIGRAM(S): at 20:00

## 2025-02-20 NOTE — ED ADULT NURSE NOTE - NSFALLHARMRISKINTERV_ED_ALL_ED

## 2025-02-20 NOTE — H&P ADULT - ASSESSMENT
73 y/o male with a PMHx of HTN, A-FIB, HLD, chronic back pain and DM presents to the ED c/o a rash on his left leg knee radiating towards his foot.    Assessment:  Osteomyelitis Spine  Endocarditis TAVR  Worsening Leukocytosis  Hyponatremia  Anemia  Type 2 DM  Purpuric Rash to L leg - possible vasculitis  HTN  Malnutrition    Plan:  Admit to medical bed  Continue IV Rocephin vis PICC line  ID consult  Vascular Surgery Consult  Physical Therapy  Social work consult for SNF rehab return  liberalize sodium intake  NS IV  Encourage PO intake  Transfuse PRBC's  follow lytes and CBC  Pain control

## 2025-02-20 NOTE — H&P ADULT - NSHPPHYSICALEXAM_GEN_ALL_CORE
Vital Signs Last 24 Hrs  T(C): 36.9 (20 Feb 2025 15:37), Max: 36.9 (20 Feb 2025 15:37)  T(F): 98.4 (20 Feb 2025 15:37), Max: 98.4 (20 Feb 2025 15:37)  HR: 104 (20 Feb 2025 15:37) (104 - 104)  BP: 124/71 (20 Feb 2025 15:37) (124/71 - 124/71)  BP(mean): --  RR: 18 (20 Feb 2025 15:37) (18 - 18)  SpO2: 95% (20 Feb 2025 15:37) (95% - 95%)    Parameters below as of 20 Feb 2025 15:37  Patient On (Oxygen Delivery Method): room air    Constitutional: NAD, awake and alert, well-developed  HEENT: PERRLA, EOMI, Pharynx Clear  Neck: Supple, No JVD, No Lymphadenopathy  Respiratory: Breath sounds are clear bilaterally, No wheezing, rales or rhonchi  Cardiovascular: S1 and S2, regular rate and rhythm, no Murmurs, rubs or gallops  Gastrointestinal: Bowel Sounds present, soft, nontender. No Hepatosplenomegaly. No masses  Extremities: Left leg mottled with purpuric lesions to foot and shin. Small possibly early lesions at level of knee. No rash to thigh or to right leg. No clubbing, cyanosis or edema  Vascular: 2+ peripheral pulses  Neurological: A/O x 3, no focal deficits. Does have some mild memory deficits.  Musculoskeletal: Pain on ROM left lower extremity  Skin: No rashes

## 2025-02-20 NOTE — ED ADULT TRIAGE NOTE - CHIEF COMPLAINT QUOTE
Pt BIBEMS from rehab complaining of multiple medical complants. As Per EMS rehab staff noted rash on LLE approx 1 pm today. Pt also endorsing thigh and back pain. PICC line present R arm. Pt A & O x4, GCS 15

## 2025-02-20 NOTE — ED PROVIDER NOTE - OBJECTIVE STATEMENT
75 y/o male with a PMHx of HTN, A-FIB, HLD, chronic back pain and DM presents to the ED c/o a rash on his left leg knee radiating towards his foot. Pt noticed yesterday his left thigh got puffy, as well as a rash on the left knee radiating down to his toes. Pt said two days ago his right leg was hurting, then today the pain began on the left leg. Pt reports he's never had redness like this, and his wife noticed the redness around 1:30pm today. Pt says he can turn his body but not easily. Pt is complaining of knee pain when inspecting the leg.

## 2025-02-20 NOTE — ED PROVIDER NOTE - PHYSICAL EXAMINATION
Gen:  Well appearing in NAD  Head:  NC/AT  HEENT: pupils perrl,no pharyngeal erythema, uvula midline  Cardiac: S1S2, RRR  Abd: Soft, non tender  Resp: No distress, CTA   musculoskeletal:: no deformities, no swelling, strength +5/+5  Skin: warm and dry as visualized, rash non blanching epidermidis, macular, papular from below the knee to the foot.  Neuro: OQUENDO, aao x 4  Psych:alert, cooperative, appropriate mood and affect for situation

## 2025-02-20 NOTE — ED PROVIDER NOTE - CARE PLAN
1 Principal Discharge DX:	Petechial rash   Principal Discharge DX:	Petechial rash  Secondary Diagnosis:	Anemia requiring transfusions

## 2025-02-20 NOTE — ED PROVIDER NOTE - ATTENDING APP SHARED VISIT CONTRIBUTION OF CARE
I, Dr. Nell Mejia DO, personally saw the patient with ROMAN.  I have personally performed a face to face diagnostic evaluation on this patient.  I have reviewed the ROMAN note and agree with the history, exam, and plan of care, except as noted.  I personally saw the patient and performed a substantive portion of the visit including all aspects of the medical decision making.  LIANNE Marlow DO

## 2025-02-20 NOTE — H&P ADULT - HISTORY OF PRESENT ILLNESS
73 y/o male with a PMHx of HTN, A-FIB, HLD, chronic back pain and DM presents to the ED c/o a rash on his left leg knee radiating towards his foot. Pt noticed  his left thigh got puffy, as well as a rash on the left knee radiating down to his toes. Pt said two days PTA his right leg was hurting, then today the pain began on the left leg. Pt reports he's never had redness like this, and his wife noticed the redness around 1:30pm on day of admission. Pt says he can turn his body but not easily. Pt is complaining of knee pain when inspecting the leg. Pt recently discharged to SNF rehab after treatment for infected TAVR and Osteomyelitis of spine.    Active Problems  Abnormal metabolic state in diabetes mellitus (250.80,783.9) (E11.69,E88.9)  Acute bilateral low back pain with bilateral sciatica (724.2,724.3) (M54.42,M54.41)  Acute viral syndrome (079.99) (B34.9)  Alopecia (704.00) (L65.9)  Atrioventricular block, Mobitz type 1, Wenckebach (426.13) (I44.1)  Back muscle spasm (724.8) (M62.830)  Basal cell carcinoma (BCC) of face (173.31) (C44.310)  Benign localized hyperplasia of prostate with urinary obstruction (600.21,599.69)  (N40.1,N13.8)  BMI 36.0-36.9,adult (V85.36) (Z68.36)  Chronic bilateral low back pain without sciatica (724.2,338.29) (M54.50,G89.29)  Chronic GERD (530.81) (K21.9)  Coronary artery disease (414.00) (I25.10)  Counseling for travel (V65.49) (Z71.84)  Diabetic peripheral neuropathy (250.60,357.2) (E11.42)  Generalized anxiety disorder (300.02) (F41.1)  Hyperlipidemia (272.4) (E78.5)  Hypertension (401.9) (I10)  Hypothyroidism (244.9) (E03.9)  Intermittent palpitations (785.1) (R00.2)  Low back strain, sequela (905.7) (S39.012S)  Mobitz type 1 second degree AV block (426.13) (I44.1)  Multiple benign melanocytic nevi (216.9) (D22.9)  Neoplasm of uncertain behavior of skin (238.2) (D48.5)  Non-insulin dependent type 2 diabetes mellitus (250.00) (E11.9)  Obesity due to excess calories (278.00) (E66.09)  Obstructive sleep apnea of adult (327.23) (G47.33)  Paronychia of finger of right hand (681.02) (L03.011)  Paroxysmal A-fib (427.31) (I48.0)  Prerenal azotemia (790.6) (R79.89)  Prophylactic vaccination against streptococcus pneumoniae and influenza (V06.6) (Z23)  Prostate cancer (185) (C61)  S/p TAVR (transcatheter aortic valve replacement), bioprosthetic (V42.2) (Z95.3)  Screening for viral disease (V73.99) (Z11.59)  Seborrheic keratoses (702.19) (L82.1)  Severe aortic stenosis (424.1) (I35.0)  Spinal stenosis of lumbar region, unspecified whether neurogenic claudication present  (724.02) (M48.061)  Strain of right knee, initial encounter (844.9) (S86.911A)  Urinary frequency (788.41) (R35.0)  ?  Past Medical History  History of Encounter for cosmetic procedure (V50.1) (Z41.1)  History of arthritis (V13.4) (Z87.39)  History of bronchitis (V12.69) (Z87.09)  History of elevated prostate specific antigen (PSA) (V13.89) (Z87.898)  History of elevated prostate specific antigen (PSA) (V13.89) (Z87.898)  History of lentigo (V13.3) (Z87.2)  History of Insect bite of other part of head, initial encounter (910.4,E906.4)  (S00.86XA,W57.XXXA)  History of Muscle weakness of lower extremity (728.87) (M62.81)  History of Near syncope (780.2) (R55)  History of Venomous sting, intentional self-harm, initial encounter (989.5,E950.9)  (T63.92XA)  Viral URI with cough (465.9) (J06.9)  ?  Surgical History  History of Surgery Excision Lipoma  History of Transcatheter aortic valve replacement  ?  Family History  Family history of cardiac disorder (V17.49) (Z82.49) : Father, Brother  Family history of hyperlipidemia (V18.19) (Z83.438) : Father  Family history of malignant neoplasm of urinary bladder (V16.52) (Z80.52) : Mother,  Brother  Family history of malignant neoplasm of uterus (V16.49) (Z80.49) : Sister  Family history of thyroid disease (V18.19) (Z83.49) : Sister  Family history of type 2 diabetes mellitus (V18.0) (Z83.3) : Brother    Social History  Moderate alcohol use  Never a smoker  No illicit drug use  Occupation retired, real estate

## 2025-02-20 NOTE — ED PROVIDER NOTE - PROGRESS NOTE DETAILS
signed Drea Garcia PA-C  Pt with petechial rash and pain/tenderness of LLE. 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 Pt and family says rash appeared suddenly in the past day or two. No significant findings on sono. PMD Delon in ED to see pt, admit to his service. Dr Marlow spoke to infectious disease, recommend admission for evaluation of possible septic emboli. Pt agrees with admission and plan of care. signed Drea Garcia PA-C   WBC increased to 23 from 17 and Hgb decreased to 7.6 from 8.6. Discussed with Dr Jernigan and pt and pt consented for 1unit PRBC. Consent signed by pt and witnessed by BRAD Motta, consent placed in chart. Pt also consented to guaiac, brown stool guaiac negative. Lot [id number] exp 8/31/25 QC pass.

## 2025-02-20 NOTE — H&P ADULT - NSHPLABSRESULTS_GEN_ALL_CORE
.                      7.6    23.22 )-----------( 276      ( 20 Feb 2025 18:10 )             23.5     130[L]  |  99  |  31[H]  ----------------------------<  113[H]  4.5   |  24  |  1.05    Ca    9.0      20 Feb 2025 18:10  Mg     2.1     02-20    TPro  6.7  /  Alb  2.0[L]  /  TBili  0.6  /  DBili  x   /  AST  78[H]  /  ALT  63  /  AlkPhos  130[H]  02-20    Urinalysis Basic - ( 20 Feb 2025 18:10 )  Color: x / Appearance: x / SG: x / pH: x  Gluc: 113 mg/dL / Ketone: x  / Bili: x / Urobili: x   Blood: x / Protein: x / Nitrite: x   Leuk Esterase: x / RBC: x / WBC x   Sq Epi: x / Non Sq Epi: x / Bacteria: x      PT/INR - ( 20 Feb 2025 19:09 )   PT: 16.5 sec;   INR: 1.40 ratio    PTT - ( 20 Feb 2025 19:09 )  PTT:35.2 sec

## 2025-02-20 NOTE — ED ADULT NURSE NOTE - OBJECTIVE STATEMENT
Pt. with PMH pericarditis on long term IV ABX admitted to  2/10/25-2/17/25. presents to ED rehab facility with c/o R thigh and L lower extremity pain and swelling. new ecchymosis to L LE.    Assessment:   General: Well appearing, VSS,  Nuro: AOX4, Mood and behavior appropriate to situation.    Skin: Intact    Head & Neck: WDL  CARD:  Denies CP, peripheral pulses equal and palpable, cap refill WDL. No edema noted.   Respiratory: No respiratory distress, unlabored, normal rate and rhythm. Denies SOB, RICE,   GI: Abdomen soft no rebound tenderness or guarding.   : No reports of dysuria, frequency or urgency.   Musculoskeletal: significantly reduced ROM and mobility.   Safety: Pt. was instructed to call for assistance before ambulating and avoid sudden movements, Bed in low position with side rails up. Undressed and in a hospital gown and Non-slip socks.

## 2025-02-20 NOTE — H&P ADULT - NSHPREVIEWOFSYSTEMS_GEN_ALL_CORE
CONSTITUTIONAL: No fevers or chills  EYES/ENT: No visual changes;  No vertigo or throat pain   NECK: No pain or stiffness  RESPIRATORY: No cough, wheezing, hemoptysis; No shortness of breath  CARDIOVASCULAR: No chest pain or palpitations  GASTROINTESTINAL: No abdominal or epigastric pain. No nausea, vomiting, or hematemesis; No diarrhea or constipation. No melena or hematochezia.  GENITOURINARY: No dysuria, frequency or hematuria  NEUROLOGICAL: No numbness or weakness  SKIN: rash to left lower leg  Back: pain on ROM back   All other review of systems is negative unless indicated above

## 2025-02-20 NOTE — ED PROVIDER NOTE - CLINICAL SUMMARY MEDICAL DECISION MAKING FREE TEXT BOX
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 and osteomyelitis to the LS spine Rocephin for 6 weeks via PICC line(last day march 28 2025). today noticed painful erythemas non blanching macular papular rash to LLE extremity with tenderness and hotness to touch. Will plan for basic labs as rash non blanching epidermidis, macular, papular from below the knee to the foot.. Will call ID for consult

## 2025-02-21 LAB
A1C WITH ESTIMATED AVERAGE GLUCOSE RESULT: 5.9 % — HIGH (ref 4–5.6)
ALLERGY+IMMUNOLOGY DIAG STUDY NOTE: SIGNIFICANT CHANGE UP
ANION GAP SERPL CALC-SCNC: 9 MMOL/L — SIGNIFICANT CHANGE UP (ref 5–17)
BLD GP AB SCN SERPL QL: SIGNIFICANT CHANGE UP
BUN SERPL-MCNC: 27 MG/DL — HIGH (ref 7–23)
CALCIUM SERPL-MCNC: 9.2 MG/DL — SIGNIFICANT CHANGE UP (ref 8.5–10.1)
CHLORIDE SERPL-SCNC: 100 MMOL/L — SIGNIFICANT CHANGE UP (ref 96–108)
CO2 SERPL-SCNC: 24 MMOL/L — SIGNIFICANT CHANGE UP (ref 22–31)
CREAT SERPL-MCNC: 0.96 MG/DL — SIGNIFICANT CHANGE UP (ref 0.5–1.3)
DIR ANTIGLOB POLYSPECIFIC INTERPRETATION: SIGNIFICANT CHANGE UP
EGFR: 83 ML/MIN/1.73M2 — SIGNIFICANT CHANGE UP
ESTIMATED AVERAGE GLUCOSE: 123 MG/DL — HIGH (ref 68–114)
FLUAV AG NPH QL: SIGNIFICANT CHANGE UP
FLUBV AG NPH QL: SIGNIFICANT CHANGE UP
GLUCOSE BLDC GLUCOMTR-MCNC: 101 MG/DL — HIGH (ref 70–99)
GLUCOSE BLDC GLUCOMTR-MCNC: 108 MG/DL — HIGH (ref 70–99)
GLUCOSE BLDC GLUCOMTR-MCNC: 116 MG/DL — HIGH (ref 70–99)
GLUCOSE BLDC GLUCOMTR-MCNC: 90 MG/DL — SIGNIFICANT CHANGE UP (ref 70–99)
GLUCOSE SERPL-MCNC: 90 MG/DL — SIGNIFICANT CHANGE UP (ref 70–99)
HCT VFR BLD CALC: 23 % — LOW (ref 39–50)
HGB BLD-MCNC: 7.4 G/DL — LOW (ref 13–17)
MCHC RBC-ENTMCNC: 26.3 PG — LOW (ref 27–34)
MCHC RBC-ENTMCNC: 32.2 G/DL — SIGNIFICANT CHANGE UP (ref 32–36)
MCV RBC AUTO: 81.9 FL — SIGNIFICANT CHANGE UP (ref 80–100)
NRBC # BLD AUTO: 0 K/UL — SIGNIFICANT CHANGE UP (ref 0–0)
NRBC # FLD: 0 K/UL — SIGNIFICANT CHANGE UP (ref 0–0)
NRBC BLD AUTO-RTO: 0 /100 WBCS — SIGNIFICANT CHANGE UP (ref 0–0)
PLATELET # BLD AUTO: 268 K/UL — SIGNIFICANT CHANGE UP (ref 150–400)
PMV BLD: 9.7 FL — SIGNIFICANT CHANGE UP (ref 7–13)
POTASSIUM SERPL-MCNC: 3.8 MMOL/L — SIGNIFICANT CHANGE UP (ref 3.5–5.3)
POTASSIUM SERPL-SCNC: 3.8 MMOL/L — SIGNIFICANT CHANGE UP (ref 3.5–5.3)
RBC # BLD: 2.81 M/UL — LOW (ref 4.2–5.8)
RBC # FLD: 16.8 % — HIGH (ref 10.3–14.5)
RSV RNA NPH QL NAA+NON-PROBE: SIGNIFICANT CHANGE UP
SARS-COV-2 RNA SPEC QL NAA+PROBE: SIGNIFICANT CHANGE UP
SODIUM SERPL-SCNC: 133 MMOL/L — LOW (ref 135–145)
WBC # BLD: 18.24 K/UL — HIGH (ref 3.8–10.5)
WBC # FLD AUTO: 18.24 K/UL — HIGH (ref 3.8–10.5)

## 2025-02-21 PROCEDURE — 73706 CT ANGIO LWR EXTR W/O&W/DYE: CPT | Mod: 26,50

## 2025-02-21 PROCEDURE — 99233 SBSQ HOSP IP/OBS HIGH 50: CPT

## 2025-02-21 PROCEDURE — 93923 UPR/LXTR ART STDY 3+ LVLS: CPT | Mod: 26

## 2025-02-21 RX ORDER — FERROUS SULFATE 137(45) MG
1 TABLET, EXTENDED RELEASE ORAL
Qty: 0 | Refills: 0 | DISCHARGE

## 2025-02-21 RX ORDER — GLUCOSAMINE HCL/CHONDROITIN SU 500-400 MG
1 CAPSULE ORAL
Refills: 0 | DISCHARGE

## 2025-02-21 RX ORDER — HEPARIN SODIUM 1000 [USP'U]/ML
4000 INJECTION INTRAVENOUS; SUBCUTANEOUS EVERY 6 HOURS
Refills: 0 | Status: DISCONTINUED | OUTPATIENT
Start: 2025-02-21 | End: 2025-02-21

## 2025-02-21 RX ORDER — HEPARIN SODIUM 1000 [USP'U]/ML
8000 INJECTION INTRAVENOUS; SUBCUTANEOUS ONCE
Refills: 0 | Status: DISCONTINUED | OUTPATIENT
Start: 2025-02-21 | End: 2025-02-21

## 2025-02-21 RX ORDER — HEPARIN SODIUM 1000 [USP'U]/ML
INJECTION INTRAVENOUS; SUBCUTANEOUS
Qty: 25000 | Refills: 0 | Status: DISCONTINUED | OUTPATIENT
Start: 2025-02-21 | End: 2025-02-22

## 2025-02-21 RX ORDER — HEPARIN SODIUM 1000 [USP'U]/ML
INJECTION INTRAVENOUS; SUBCUTANEOUS
Qty: 25000 | Refills: 0 | Status: DISCONTINUED | OUTPATIENT
Start: 2025-02-21 | End: 2025-02-21

## 2025-02-21 RX ORDER — HEPARIN SODIUM 1000 [USP'U]/ML
4000 INJECTION INTRAVENOUS; SUBCUTANEOUS EVERY 6 HOURS
Refills: 0 | Status: DISCONTINUED | OUTPATIENT
Start: 2025-02-21 | End: 2025-02-24

## 2025-02-21 RX ORDER — DAPTOMYCIN 500 MG/10ML
750 INJECTION, POWDER, LYOPHILIZED, FOR SOLUTION INTRAVENOUS EVERY 24 HOURS
Refills: 0 | Status: DISCONTINUED | OUTPATIENT
Start: 2025-02-22 | End: 2025-02-24

## 2025-02-21 RX ORDER — DAPTOMYCIN 500 MG/10ML
INJECTION, POWDER, LYOPHILIZED, FOR SOLUTION INTRAVENOUS
Refills: 0 | Status: DISCONTINUED | OUTPATIENT
Start: 2025-02-21 | End: 2025-02-24

## 2025-02-21 RX ORDER — DAPTOMYCIN 500 MG/10ML
750 INJECTION, POWDER, LYOPHILIZED, FOR SOLUTION INTRAVENOUS ONCE
Refills: 0 | Status: COMPLETED | OUTPATIENT
Start: 2025-02-21 | End: 2025-02-21

## 2025-02-21 RX ORDER — HEPARIN SODIUM 1000 [USP'U]/ML
8000 INJECTION INTRAVENOUS; SUBCUTANEOUS EVERY 6 HOURS
Refills: 0 | Status: DISCONTINUED | OUTPATIENT
Start: 2025-02-21 | End: 2025-02-21

## 2025-02-21 RX ORDER — NALOXONE HYDROCHLORIDE 0.4 MG/ML
1 INJECTION, SOLUTION INTRAMUSCULAR; INTRAVENOUS; SUBCUTANEOUS
Qty: 0 | Refills: 0 | DISCHARGE

## 2025-02-21 RX ORDER — HEPARIN SODIUM 1000 [USP'U]/ML
8000 INJECTION INTRAVENOUS; SUBCUTANEOUS EVERY 6 HOURS
Refills: 0 | Status: DISCONTINUED | OUTPATIENT
Start: 2025-02-21 | End: 2025-02-24

## 2025-02-21 RX ADMIN — Medication 650 MILLIGRAM(S): at 02:10

## 2025-02-21 RX ADMIN — Medication 650 MILLIGRAM(S): at 02:45

## 2025-02-21 RX ADMIN — Medication 75 MILLILITER(S): at 00:03

## 2025-02-21 RX ADMIN — DAPAGLIFLOZIN 10 MILLIGRAM(S): 5 TABLET, FILM COATED ORAL at 00:12

## 2025-02-21 RX ADMIN — Medication 40 MILLIGRAM(S): at 05:24

## 2025-02-21 RX ADMIN — Medication 650 MILLIGRAM(S): at 19:35

## 2025-02-21 RX ADMIN — ATORVASTATIN CALCIUM 40 MILLIGRAM(S): 80 TABLET, FILM COATED ORAL at 00:03

## 2025-02-21 RX ADMIN — POLYETHYLENE GLYCOL 3350 17 GRAM(S): 17 POWDER, FOR SOLUTION ORAL at 09:22

## 2025-02-21 RX ADMIN — Medication 325 MILLIGRAM(S): at 09:22

## 2025-02-21 RX ADMIN — DAPTOMYCIN 750 MILLIGRAM(S): 500 INJECTION, POWDER, LYOPHILIZED, FOR SOLUTION INTRAVENOUS at 15:26

## 2025-02-21 RX ADMIN — TRAMADOL HYDROCHLORIDE 50 MILLIGRAM(S): 50 TABLET, FILM COATED ORAL at 06:39

## 2025-02-21 RX ADMIN — METFORMIN HYDROCHLORIDE 1000 MILLIGRAM(S): 850 TABLET ORAL at 00:04

## 2025-02-21 RX ADMIN — TRAMADOL HYDROCHLORIDE 50 MILLIGRAM(S): 50 TABLET, FILM COATED ORAL at 00:09

## 2025-02-21 RX ADMIN — Medication 150 MICROGRAM(S): at 05:24

## 2025-02-21 RX ADMIN — Medication 650 MILLIGRAM(S): at 09:21

## 2025-02-21 RX ADMIN — HEPARIN SODIUM 1800 UNIT(S)/HR: 1000 INJECTION INTRAVENOUS; SUBCUTANEOUS at 19:34

## 2025-02-21 RX ADMIN — TRAMADOL HYDROCHLORIDE 50 MILLIGRAM(S): 50 TABLET, FILM COATED ORAL at 06:14

## 2025-02-21 RX ADMIN — HEPARIN SODIUM 1800 UNIT(S)/HR: 1000 INJECTION INTRAVENOUS; SUBCUTANEOUS at 18:56

## 2025-02-21 RX ADMIN — CEFTRIAXONE 2000 MILLIGRAM(S): 500 INJECTION, POWDER, FOR SOLUTION INTRAMUSCULAR; INTRAVENOUS at 00:03

## 2025-02-21 RX ADMIN — LOSARTAN POTASSIUM 50 MILLIGRAM(S): 100 TABLET, FILM COATED ORAL at 09:22

## 2025-02-21 RX ADMIN — GABAPENTIN 200 MILLIGRAM(S): 400 CAPSULE ORAL at 15:31

## 2025-02-21 RX ADMIN — APIXABAN 2.5 MILLIGRAM(S): 2.5 TABLET, FILM COATED ORAL at 00:03

## 2025-02-21 RX ADMIN — Medication 1 TABLET(S): at 09:21

## 2025-02-21 RX ADMIN — Medication 75 MILLILITER(S): at 21:00

## 2025-02-21 RX ADMIN — TRAMADOL HYDROCHLORIDE 50 MILLIGRAM(S): 50 TABLET, FILM COATED ORAL at 01:29

## 2025-02-21 RX ADMIN — GABAPENTIN 200 MILLIGRAM(S): 400 CAPSULE ORAL at 00:04

## 2025-02-21 RX ADMIN — APIXABAN 2.5 MILLIGRAM(S): 2.5 TABLET, FILM COATED ORAL at 09:21

## 2025-02-21 RX ADMIN — GABAPENTIN 200 MILLIGRAM(S): 400 CAPSULE ORAL at 05:23

## 2025-02-21 RX ADMIN — Medication 2 TABLET(S): at 00:04

## 2025-02-21 NOTE — DIETITIAN INITIAL EVALUATION ADULT - PERTINENT MEDS FT
MEDICATIONS  (STANDING):  apixaban 2.5 milliGRAM(s) Oral two times a day  atorvastatin 40 milliGRAM(s) Oral at bedtime  cefTRIAXone Injectable. 2000 milliGRAM(s) IV Push every 24 hours  dapagliflozin 10 milliGRAM(s) Oral every 24 hours  ferrous    sulfate 325 milliGRAM(s) Oral daily  gabapentin 200 milliGRAM(s) Oral three times a day  levothyroxine 150 MICROGram(s) Oral daily  losartan 50 milliGRAM(s) Oral daily  multivitamin 1 Tablet(s) Oral daily  naloxone Injectable 0.4 milliGRAM(s) IV Push once  pantoprazole    Tablet 40 milliGRAM(s) Oral before breakfast  polyethylene glycol 3350 17 Gram(s) Oral daily  senna 2 Tablet(s) Oral at bedtime  sodium chloride 0.9%. 1000 milliLiter(s) (75 mL/Hr) IV Continuous <Continuous>    MEDICATIONS  (PRN):  acetaminophen     Tablet .. 650 milliGRAM(s) Oral every 6 hours PRN Temp greater or equal to 38C (100.4F), Mild Pain (1 - 3)  aluminum hydroxide/magnesium hydroxide/simethicone Suspension 30 milliLiter(s) Oral every 4 hours PRN Dyspepsia  melatonin 3 milliGRAM(s) Oral at bedtime PRN Insomnia  ondansetron Injectable 4 milliGRAM(s) IV Push every 8 hours PRN Nausea and/or Vomiting  traMADol 50 milliGRAM(s) Oral every 6 hours PRN Severe Pain (7 - 10)  zolpidem 5 milliGRAM(s) Oral at bedtime PRN Insomnia

## 2025-02-21 NOTE — DIETITIAN INITIAL EVALUATION ADULT - DIET TYPE
Liberalize diet to regular to maximize caloric and nutrient intake; w/ fluid restriction 1000 mL per MD/supplement (specify)

## 2025-02-21 NOTE — CONSULT NOTE ADULT - SUBJECTIVE AND OBJECTIVE BOX
73 y/o Male with h/o HTN, DM, prostate CA, hypothyroidism, AVR, A.Fib on eliquis, chronic back pain s/p epidurals on diclofenac, HLD, NIDDM, CKD recent hospitalization on  for sepsis with strep viridans, subacute AV prosthetic bacterial endocarditis, probable lumbar spine discitis admitted for LE rash.   He reports his left thigh got puffy, as well as a rash on the left knee radiating down to his toes. Pt said two days PTA his right leg was hurting, then today the pain began on the left leg.   No calf pain, he was ambulatory before  no rest pain in LEs  no non healing wounds  no previous Angio or any vascular intervention in LEs    Vitals:  T(C): 36.5 ( @ 09:00), Max: 37.8 ( @ 19:00)  HR: 94 ( @ 09:00) (87 - 105)  BP: 121/70 ( @ 09:00) (108/67 - 124/71)  RR: 19 ( @ 09:00) (18 - 19)  SpO2: 94% ( @ 09:00) (92% - 97%)     @ 07:01  -   @ 07:00  --------------------------------------------------------  IN:    sodium chloride 0.9%: 450 mL  Total IN: 450 mL    OUT:    Voided (mL): 600 mL  Total OUT: 600 mL    Total NET: -150 mL       @ 07:01  -   @ 13:10  --------------------------------------------------------  IN:    Oral Fluid: 350 mL  Total IN: 350 mL    OUT:  Total OUT: 0 mL    Total NET: 350 mL          Physical Exam:  General: AAOx3, Well developed, NAD  Chest: Normal respiratory effort  Heart: RRR  Abdomen: Soft, NTND, no masses  Extremities: Warm  RLE: sensory motor intact, palpable PT/DP  LLE: edema, rash, sensory motor intact, dopplerable DP and PT, palp femoral          @ 07:17                    7.4  CBC: 18.24>)-------(<268                     23.0                 133 | 100 | 27    CMP:  ----------------------< 90               3.8 | 24 | 0.96                      Ca:9.2  Phos:-  Mg:-               -|      |-        LFTs:  ------|-|-----             -|      |-   @ 18:10                    7.6  CBC: 23.22>)-------(<276                     23.5                 130 | 99 | 31    CMP:  ----------------------< 113               4.5 | 24 | 1.05                      Ca:9.0  Phos:-  M.1               0.6|      |78        LFTs:  ------|130|-----             -|      |-      Current Inpatient Medications:  acetaminophen     Tablet .. 650 milliGRAM(s) Oral every 6 hours PRN  aluminum hydroxide/magnesium hydroxide/simethicone Suspension 30 milliLiter(s) Oral every 4 hours PRN  apixaban 2.5 milliGRAM(s) Oral two times a day  atorvastatin 40 milliGRAM(s) Oral at bedtime  dapagliflozin 10 milliGRAM(s) Oral every 24 hours  DAPTOmycin IVPB      DAPTOmycin IVPB 750 milliGRAM(s) IV Intermittent once  ferrous    sulfate 325 milliGRAM(s) Oral daily  gabapentin 200 milliGRAM(s) Oral three times a day  levothyroxine 150 MICROGram(s) Oral daily  losartan 50 milliGRAM(s) Oral daily  melatonin 3 milliGRAM(s) Oral at bedtime PRN  multivitamin 1 Tablet(s) Oral daily  naloxone Injectable 0.4 milliGRAM(s) IV Push once  ondansetron Injectable 4 milliGRAM(s) IV Push every 8 hours PRN  pantoprazole    Tablet 40 milliGRAM(s) Oral before breakfast  polyethylene glycol 3350 17 Gram(s) Oral daily  senna 2 Tablet(s) Oral at bedtime  sodium chloride 0.9%. 1000 milliLiter(s) (75 mL/Hr) IV Continuous <Continuous>  traMADol 50 milliGRAM(s) Oral every 6 hours PRN  zolpidem 5 milliGRAM(s) Oral at bedtime PRN    < from: US Duplex Venous Lower Ext Ltd, Left (25 @ 16:52) >  No evidence of left lower extremity deep venous thrombosis.    < end of copied text >     73 y/o Male with h/o HTN, DM, prostate CA, hypothyroidism, AVR, A.Fib on eliquis, chronic back pain s/p epidurals on diclofenac, HLD, NIDDM, CKD recent hospitalization on  for sepsis with strep viridans, subacute AV prosthetic bacterial endocarditis, probable lumbar spine discitis admitted for LE rash.   He reports his left thigh got puffy, as well as a rash on the left knee radiating down to his toes. Pt said two days PTA his right leg was hurting, then today the pain began on the left leg.   No calf pain, he was ambulatory before  no rest pain in LEs  no non healing wounds  no previous Angio or any vascular intervention in LEs    Vitals:  T(C): 36.5 ( @ 09:00), Max: 37.8 ( @ 19:00)  HR: 94 ( @ 09:00) (87 - 105)  BP: 121/70 ( @ 09:00) (108/67 - 124/71)  RR: 19 ( @ 09:00) (18 - 19)  SpO2: 94% ( @ 09:00) (92% - 97%)     @ 07:01  -   @ 07:00  --------------------------------------------------------  IN:    sodium chloride 0.9%: 450 mL  Total IN: 450 mL    OUT:    Voided (mL): 600 mL  Total OUT: 600 mL    Total NET: -150 mL       @ 07:01  -   @ 13:10  --------------------------------------------------------  IN:    Oral Fluid: 350 mL  Total IN: 350 mL    OUT:  Total OUT: 0 mL    Total NET: 350 mL          Physical Exam:  General: AAOx3, Well developed, NAD  Chest: Normal respiratory effort  Heart: RRR  Abdomen: Soft, NTND, no masses  Extremities: Warm  RLE: sensory motor intact, palpable PT/DP  LLE: edema, rash, sensory intact, motor diminished compared to RLE, wiggles toes, moves ankle, not able flex/extent the knee or hip , dopplerable DP and PT, dopplerable Popliteal, palp femoral         02- @ 07:17                    7.4  CBC: 18.24>)-------(<268                     23.0                 133 | 100 | 27    CMP:  ----------------------< 90               3.8 | 24 | 0.96                      Ca:9.2  Phos:-  Mg:-               -|      |-        LFTs:  ------|-|-----             -|      |-  - @ 18:10                    7.6  CBC: 23.22>)-------(<276                     23.5                 130 | 99 | 31    CMP:  ----------------------< 113               4.5 | 24 | 1.05                      Ca:9.0  Phos:-  M.1               0.6|      |78        LFTs:  ------|130|-----             -|      |-      Current Inpatient Medications:  acetaminophen     Tablet .. 650 milliGRAM(s) Oral every 6 hours PRN  aluminum hydroxide/magnesium hydroxide/simethicone Suspension 30 milliLiter(s) Oral every 4 hours PRN  apixaban 2.5 milliGRAM(s) Oral two times a day  atorvastatin 40 milliGRAM(s) Oral at bedtime  dapagliflozin 10 milliGRAM(s) Oral every 24 hours  DAPTOmycin IVPB      DAPTOmycin IVPB 750 milliGRAM(s) IV Intermittent once  ferrous    sulfate 325 milliGRAM(s) Oral daily  gabapentin 200 milliGRAM(s) Oral three times a day  levothyroxine 150 MICROGram(s) Oral daily  losartan 50 milliGRAM(s) Oral daily  melatonin 3 milliGRAM(s) Oral at bedtime PRN  multivitamin 1 Tablet(s) Oral daily  naloxone Injectable 0.4 milliGRAM(s) IV Push once  ondansetron Injectable 4 milliGRAM(s) IV Push every 8 hours PRN  pantoprazole    Tablet 40 milliGRAM(s) Oral before breakfast  polyethylene glycol 3350 17 Gram(s) Oral daily  senna 2 Tablet(s) Oral at bedtime  sodium chloride 0.9%. 1000 milliLiter(s) (75 mL/Hr) IV Continuous <Continuous>  traMADol 50 milliGRAM(s) Oral every 6 hours PRN  zolpidem 5 milliGRAM(s) Oral at bedtime PRN    < from: US Duplex Venous Lower Ext Ltd, Left (25 @ 16:52) >  No evidence of left lower extremity deep venous thrombosis.    < end of copied text >

## 2025-02-21 NOTE — DIETITIAN NUTRITION RISK NOTIFICATION - TREATMENT: THE FOLLOWING DIET HAS BEEN RECOMMENDED
Diet, Regular:   Consistent Carbohydrate {Evening Snack} (CSTCHOSN)  1000mL Fluid Restriction ([id number]) (02-20-25 @ 20:59) [Active]

## 2025-02-21 NOTE — PHARMACOTHERAPY INTERVENTION NOTE - COMMENTS
Medication reconciliation completed.  Reviewed Medication list and confirmed med allergies with list from Care Facility "[facility]"; confirmed with Dr. First Medcarlos eduardo.

## 2025-02-21 NOTE — PATIENT PROFILE ADULT - VISION (WITH CORRECTIVE LENSES IF THE PATIENT USUALLY WEARS THEM):
The patient is a 18y Male complaining of suicidal thoughts. Partially impaired: cannot see medication labels or newsprint, but can see obstacles in path, and the surrounding layout; can count fingers at arm's length

## 2025-02-21 NOTE — PHYSICAL THERAPY INITIAL EVALUATION ADULT - MODALITIES TREATMENT COMMENTS
pt left in bed supine post Eval; bed alarm on; spouse present; callbell in reach; pt instructed not to get up alone; call nursing for assist; mark well; denied pain

## 2025-02-21 NOTE — DIETITIAN INITIAL EVALUATION ADULT - NS FNS DIET ORDER
Diet, Regular:   Consistent Carbohydrate {Evening Snack} (CSTCHOSN)  1000mL Fluid Restriction ([id number]) (02-20-25 @ 20:59)

## 2025-02-21 NOTE — DIETITIAN INITIAL EVALUATION ADULT - PERTINENT LABORATORY DATA
02-21    133[L]  |  100  |  27[H]  ----------------------------<  90  3.8   |  24  |  0.96    Ca    9.2      21 Feb 2025 07:17  Mg     2.1     02-20    TPro  6.7  /  Alb  2.0[L]  /  TBili  0.6  /  DBili  x   /  AST  78[H]  /  ALT  63  /  AlkPhos  130[H]  02-20  POCT Blood Glucose.: 90 mg/dL (02-21-25 @ 08:05)  A1C with Estimated Average Glucose Result: 5.9 % (02-10-25 @ 16:58)

## 2025-02-21 NOTE — DIETITIAN INITIAL EVALUATION ADULT - ADD RECOMMEND
1. Recommend to liberalize diet to regular to maximize caloric and nutrient intake w/ fluid restriction 1000mL per MD  2. Add high-protein berry smoothie (370 kcal, 26g protein)  3. MVI w/ minerals daily to ensure 100% RDA met  4. Consider adding thiamine 100 mg daily 2/2 poor PO intake/ malnutrition  5. Monitor bowel movements, if no BM for >3 days, consider implementing stronger bowel regimen.  6. Monitor daily lytes/min and replete prn  7. Monitor and optimize BG levels between 140-180 mg/dL by medical management   8. Obtain weekly wt to track/trend changes  9. Encourage protein-rich foods, maximize food preferences  10. Confirm goals of care regarding nutrition support  RD will continue to monitor PO intake, labs, hydration, and wt prn.

## 2025-02-21 NOTE — DIETITIAN INITIAL EVALUATION ADULT - NSFNSGIIOFT_GEN_A_CORE
I&O's Detail    20 Feb 2025 07:01  -  21 Feb 2025 07:00  --------------------------------------------------------  IN:    sodium chloride 0.9%: 450 mL  Total IN: 450 mL    OUT:    Voided (mL): 600 mL  Total OUT: 600 mL    Total NET: -150 mL      21 Feb 2025 07:01  -  21 Feb 2025 12:32  --------------------------------------------------------  IN:    Oral Fluid: 350 mL  Total IN: 350 mL    OUT:  Total OUT: 0 mL    Total NET: 350 mL

## 2025-02-21 NOTE — PROGRESS NOTE ADULT - SUBJECTIVE AND OBJECTIVE BOX
SUBJECTIVE:    CHIEF COMPLAINT:  Patient is a 74y old  Male who presents with a chief complaint of Pain to leg, rash, osteomyelitis (20 Feb 2025 20:26)      HPI:   75 y/o male with a PMHx of HTN, A-FIB, HLD, chronic back pain and DM presents to the ED c/o a rash on his left leg knee radiating towards his foot. Pt noticed  his left thigh got puffy, as well as a rash on the left knee radiating down to his toes. Pt said two days PTA his right leg was hurting, then today the pain began on the left leg. Pt reports he's never had redness like this, and his wife noticed the redness around 1:30pm on day of admission. Pt says he can turn his body but not easily. Pt is complaining of knee pain when inspecting the leg. Pt recently discharged to SNF rehab after treatment for infected TAVR and Osteomyelitis of spine.    Active Problems  Abnormal metabolic state in diabetes mellitus (250.80,783.9) (E11.69,E88.9)  Acute bilateral low back pain with bilateral sciatica (724.2,724.3) (M54.42,M54.41)  Acute viral syndrome (079.99) (B34.9)  Alopecia (704.00) (L65.9)  Atrioventricular block, Mobitz type 1, Wenckebach (426.13) (I44.1)  Back muscle spasm (724.8) (M62.830)  Basal cell carcinoma (BCC) of face (173.31) (C44.310)  Benign localized hyperplasia of prostate with urinary obstruction (600.21,599.69)  (N40.1,N13.8)  BMI 36.0-36.9,adult (V85.36) (Z68.36)  Chronic bilateral low back pain without sciatica (724.2,338.29) (M54.50,G89.29)  Chronic GERD (530.81) (K21.9)  Coronary artery disease (414.00) (I25.10)  Counseling for travel (V65.49) (Z71.84)  Diabetic peripheral neuropathy (250.60,357.2) (E11.42)  Generalized anxiety disorder (300.02) (F41.1)  Hyperlipidemia (272.4) (E78.5)  Hypertension (401.9) (I10)  Hypothyroidism (244.9) (E03.9)  Intermittent palpitations (785.1) (R00.2)  Low back strain, sequela (905.7) (S39.012S)  Mobitz type 1 second degree AV block (426.13) (I44.1)  Multiple benign melanocytic nevi (216.9) (D22.9)  Neoplasm of uncertain behavior of skin (238.2) (D48.5)  Non-insulin dependent type 2 diabetes mellitus (250.00) (E11.9)  Obesity due to excess calories (278.00) (E66.09)  Obstructive sleep apnea of adult (327.23) (G47.33)  Paronychia of finger of right hand (681.02) (L03.011)  Paroxysmal A-fib (427.31) (I48.0)  Prerenal azotemia (790.6) (R79.89)  Prophylactic vaccination against streptococcus pneumoniae and influenza (V06.6) (Z23)  Prostate cancer (185) (C61)  S/p TAVR (transcatheter aortic valve replacement), bioprosthetic (V42.2) (Z95.3)  Screening for viral disease (V73.99) (Z11.59)  Seborrheic keratoses (702.19) (L82.1)  Severe aortic stenosis (424.1) (I35.0)  Spinal stenosis of lumbar region, unspecified whether neurogenic claudication present  (724.02) (M48.061)  Strain of right knee, initial encounter (844.9) (S86.911A)  Urinary frequency (788.41) (R35.0)    Past Medical History  History of Encounter for cosmetic procedure (V50.1) (Z41.1)  History of arthritis (V13.4) (Z87.39)  History of bronchitis (V12.69) (Z87.09)  History of elevated prostate specific antigen (PSA) (V13.89) (Z87.898)  History of elevated prostate specific antigen (PSA) (V13.89) (Z87.898)  History of lentigo (V13.3) (Z87.2)  History of Insect bite of other part of head, initial encounter (910.4,E906.4)  (S00.86XA,W57.XXXA)  History of Muscle weakness of lower extremity (728.87) (M62.81)  History of Near syncope (780.2) (R55)  History of Venomous sting, intentional self-harm, initial encounter (989.5,E950.9)  (T63.92XA)  Viral URI with cough (465.9) (J06.9)    Surgical History  History of Surgery Excision Lipoma  History of Transcatheter aortic valve replacement    Family History  Family history of cardiac disorder (V17.49) (Z82.49) : Father, Brother  Family history of hyperlipidemia (V18.19) (Z83.438) : Father  Family history of malignant neoplasm of urinary bladder (V16.52) (Z80.52) : Mother,  Brother  Family history of malignant neoplasm of uterus (V16.49) (Z80.49) : Sister  Family history of thyroid disease (V18.19) (Z83.49) : Sister  Family history of type 2 diabetes mellitus (V18.0) (Z83.3) : Brother    Social History  Moderate alcohol use  Never a smoker  No illicit drug use  Occupation retired, real estate    Interval HPI and Overnight Events: Pt states foot feels a little better. Back feels a little better. Has not gotten out of bed yet.    REVIEW OF SYSTEMS:  CONSTITUTIONAL: No weakness, fevers or chills  EYES/ENT: No visual changes;  No vertigo or throat pain   NECK: No pain or stiffness  RESPIRATORY: No cough, wheezing, hemoptysis; No shortness of breath  CARDIOVASCULAR: No chest pain or palpitations  GASTROINTESTINAL: No abdominal or epigastric pain. No nausea, vomiting, or hematemesis; No diarrhea or constipation. No melena or hematochezia.  GENITOURINARY: No dysuria, frequency or hematuria  NEUROLOGICAL: No numbness or weakness  SKIN: No itching, burning, rashes, or lesions   All other review of systems is negative unless indicated above    OBJECTIVE    Vital Signs Last 24 Hrs  T(C): 37.1 (21 Feb 2025 00:00), Max: 37.8 (20 Feb 2025 19:00)  T(F): 98.8 (21 Feb 2025 00:00), Max: 100.1 (20 Feb 2025 19:00)  HR: 87 (21 Feb 2025 00:00) (87 - 105)  BP: 116/61 (21 Feb 2025 00:00) (108/67 - 124/71)  BP(mean): 82 (20 Feb 2025 21:16) (82 - 85)  RR: 19 (21 Feb 2025 00:00) (18 - 19)  SpO2: 94% (21 Feb 2025 00:00) (92% - 97%)    Parameters below as of 21 Feb 2025 00:00  Patient On (Oxygen Delivery Method): room air    MEDICATIONS  (STANDING):  apixaban 2.5 milliGRAM(s) Oral two times a day  atorvastatin 40 milliGRAM(s) Oral at bedtime  cefTRIAXone Injectable. 2000 milliGRAM(s) IV Push every 24 hours  dapagliflozin 10 milliGRAM(s) Oral every 24 hours  ferrous    sulfate 325 milliGRAM(s) Oral daily  gabapentin 200 milliGRAM(s) Oral three times a day  levothyroxine 150 MICROGram(s) Oral daily  losartan 50 milliGRAM(s) Oral daily  metFORMIN 1000 milliGRAM(s) Oral two times a day  multivitamin 1 Tablet(s) Oral daily  naloxone Injectable 0.4 milliGRAM(s) IV Push once  pantoprazole    Tablet 40 milliGRAM(s) Oral before breakfast  polyethylene glycol 3350 17 Gram(s) Oral daily  senna 2 Tablet(s) Oral at bedtime  sodium chloride 0.9%. 1000 milliLiter(s) (75 mL/Hr) IV Continuous <Continuous>      LABS:                         7.4    18.24 )-----------( 268      ( 21 Feb 2025 07:17 )             23.0     02-21    133[L]  |  100  |  27[H]  ----------------------------<  90  3.8   |  24  |  0.96    Ca    9.2      21 Feb 2025 07:17  Mg     2.1     02-20    TPro  6.7  /  Alb  2.0[L]  /  TBili  0.6  /  DBili  x   /  AST  78[H]  /  ALT  63  /  AlkPhos  130[H]  02-20    Urinalysis Basic - ( 21 Feb 2025 07:17 )  Color: x / Appearance: x / SG: x / pH: x  Gluc: 90 mg/dL / Ketone: x  / Bili: x / Urobili: x   Blood: x / Protein: x / Nitrite: x   Leuk Esterase: x / RBC: x / WBC x   Sq Epi: x / Non Sq Epi: x / Bacteria: x    PT/INR - ( 20 Feb 2025 19:09 )   PT: 16.5 sec;   INR: 1.40 ratio    PTT - ( 20 Feb 2025 19:09 )  PTT:35.2 sec    CAPILLARY BLOOD GLUCOSE  POCT Blood Glucose.: 90 mg/dL (21 Feb 2025 08:05)  POCT Blood Glucose.: 108 mg/dL (21 Feb 2025 00:05)    Venous Doppler - No evidence of DVT

## 2025-02-21 NOTE — PROGRESS NOTE ADULT - ASSESSMENT
73 y/o male with a PMHx of HTN, A-FIB, HLD, chronic back pain and DM presents to the ED c/o a rash on his left leg knee radiating towards his foot.    Assessment:  Osteomyelitis Spine  Endocarditis TAVR  Worsening Leukocytosis - slightly better today  Hyponatremia - improved  Anemia  Type 2 DM  Purpuric Rash to L leg - possible vasculitis?  HTN  Malnutrition    Plan:  Continue IV Rocephin vis PICC line  Await ID consult  Await Vascular Surgery Consult  Await Physical Therapy  Await Social work consult for SNF rehab return  liberalized sodium intake  PO Fluid restriction  Continue NS IV  Encourage PO intake  Await Transfusion PRBC's - difficult crossmatch. Awaiting blood supply  follow lytes and CBC  Pain control

## 2025-02-21 NOTE — PATIENT PROFILE ADULT - FALL HARM RISK - RISK INTERVENTIONS

## 2025-02-21 NOTE — CONSULT NOTE ADULT - SUBJECTIVE AND OBJECTIVE BOX
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 Pt noticed  his left thigh got puffy, as well as a rash on the left knee radiating down to his toes. Pt said two days PTA his right leg was hurting, then today the pain began on the left leg. Pt reports he's never had redness like this, and his wife noticed the redness around 1:30pm on day of admission. Pt says he can turn his body but not easily. Pt is complaining of knee pain when inspecting the leg. Pt recently discharged to [facility] rehab on abx treatment for infected TAVR and Osteomyelitis of spine.    Active Problems  Abnormal metabolic state in diabetes mellitus (250.80,783.9) (E11.69,E88.9)  Acute bilateral low back pain with bilateral sciatica (724.2,724.3) (M54.42,M54.41)  Acute viral syndrome (079.99) (B34.9)  Alopecia (704.00) (L65.9)  Atrioventricular block, Mobitz type 1, Wenckebach (426.13) (I44.1)  Back muscle spasm (724.8) (M62.830)  Basal cell carcinoma (BCC) of face (173.31) (C44.310)  Benign localized hyperplasia of prostate with urinary obstruction (600.21,599.69)  (N40.1,N13.8)  BMI 36.0-36.9,adult (V85.36) (Z68.36)  Chronic bilateral low back pain without sciatica (724.2,338.29) (M54.50,G89.29)  Chronic GERD (530.81) (K21.9)  Coronary artery disease (414.00) (I25.10)  Counseling for travel (V65.49) (Z71.84)  Diabetic peripheral neuropathy (250.60,357.2) (E11.42)  Generalized anxiety disorder (300.02) (F41.1)  Hyperlipidemia (272.4) (E78.5)  Hypertension (401.9) (I10)  Hypothyroidism (244.9) (E03.9)  Intermittent palpitations (785.1) (R00.2)  Low back strain, sequela (905.7) (S39.012S)  Mobitz type 1 second degree AV block (426.13) (I44.1)  Multiple benign melanocytic nevi (216.9) (D22.9)  Neoplasm of uncertain behavior of skin (238.2) (D48.5)  Non-insulin dependent type 2 diabetes mellitus (250.00) (E11.9)  Obesity due to excess calories (278.00) (E66.09)  Obstructive sleep apnea of adult (327.23) (G47.33)  Paronychia of finger of right hand (681.02) (L03.011)  Paroxysmal A-fib (427.31) (I48.0)  Prerenal azotemia (790.6) (R79.89)  Prophylactic vaccination against streptococcus pneumoniae and influenza (V06.6) (Z23)  Prostate cancer (185) (C61)  S/p TAVR (transcatheter aortic valve replacement), bioprosthetic (V42.2) (Z95.3)  Screening for viral disease (V73.99) (Z11.59)  Seborrheic keratoses (702.19) (L82.1)  Severe aortic stenosis (424.1) (I35.0)  Spinal stenosis of lumbar region, unspecified whether neurogenic claudication present  (724.02) (M48.061)  Strain of right knee, initial encounter (844.9) (S86.911A)  Urinary frequency (788.41) (R35.0)    Past Medical History  History of Encounter for cosmetic procedure (V50.1) (Z41.1)  History of arthritis (V13.4) (Z87.39)  History of bronchitis (V12.69) (Z87.09)  History of elevated prostate specific antigen (PSA) (V13.89) (Z87.898)  History of elevated prostate specific antigen (PSA) (V13.89) (Z87.898)  History of lentigo (V13.3) (Z87.2)  History of Insect bite of other part of head, initial encounter (910.4,E906.4)  (S00.86XA,W57.XXXA)  History of Muscle weakness of lower extremity (728.87) (M62.81)  History of Near syncope (780.2) (R55)  History of Venomous sting, intentional self-harm, initial encounter (989.5,E950.9)  (T63.92XA)  Viral URI with cough (465.9) (J06.9)    Surgical History  History of Surgery Excision Lipoma  History of Transcatheter aortic valve replacement    Meds: per reconciliation sheet, noted below  MEDICATIONS  (STANDING):  apixaban 2.5 milliGRAM(s) Oral two times a day  atorvastatin 40 milliGRAM(s) Oral at bedtime  cefTRIAXone Injectable. 2000 milliGRAM(s) IV Push every 24 hours  dapagliflozin 10 milliGRAM(s) Oral every 24 hours  ferrous    sulfate 325 milliGRAM(s) Oral daily  gabapentin 200 milliGRAM(s) Oral three times a day  levothyroxine 150 MICROGram(s) Oral daily  losartan 50 milliGRAM(s) Oral daily  multivitamin 1 Tablet(s) Oral daily  naloxone Injectable 0.4 milliGRAM(s) IV Push once  pantoprazole    Tablet 40 milliGRAM(s) Oral before breakfast  polyethylene glycol 3350 17 Gram(s) Oral daily  senna 2 Tablet(s) Oral at bedtime  sodium chloride 0.9%. 1000 milliLiter(s) (75 mL/Hr) IV Continuous <Continuous>    MEDICATIONS  (PRN):  acetaminophen     Tablet .. 650 milliGRAM(s) Oral every 6 hours PRN Temp greater or equal to 38C (100.4F), Mild Pain (1 - 3)  aluminum hydroxide/magnesium hydroxide/simethicone Suspension 30 milliLiter(s) Oral every 4 hours PRN Dyspepsia  melatonin 3 milliGRAM(s) Oral at bedtime PRN Insomnia  ondansetron Injectable 4 milliGRAM(s) IV Push every 8 hours PRN Nausea and/or Vomiting  traMADol 50 milliGRAM(s) Oral every 6 hours PRN Severe Pain (7 - 10)  zolpidem 5 milliGRAM(s) Oral at bedtime PRN Insomnia    Allergies    No Known Allergies    Intolerances      Social: no smoking, no alcohol, no illegal drugs; no recent travel, no exposure to TB  FAMILY HISTORY:    no history of premature cardiovascular disease in first degree relatives    ROS: the patient denies fever, no chills, no HA, no seizures, no dizziness, no sore throat, no nasal congestion, no blurry vision, no CP, no palpitations, no SOB, no cough, no abdominal pain, no diarrhea, no N/V, no dysuria, has left leg pain, no claudication, no rash, no joint aches, no rectal pain or bleeding, no night sweats  All other systems reviewed and are negative    Vital Signs Last 24 Hrs  T(C): 36.5 (21 Feb 2025 09:00), Max: 37.8 (20 Feb 2025 19:00)  T(F): 97.7 (21 Feb 2025 09:00), Max: 100.1 (20 Feb 2025 19:00)  HR: 94 (21 Feb 2025 09:00) (87 - 105)  BP: 121/70 (21 Feb 2025 09:00) (108/67 - 124/71)  BP(mean): 82 (20 Feb 2025 21:16) (82 - 85)  RR: 19 (21 Feb 2025 09:00) (18 - 19)  SpO2: 94% (21 Feb 2025 09:00) (92% - 97%)    Parameters below as of 21 Feb 2025 09:00  Patient On (Oxygen Delivery Method): room air    PE:    Constitutional:  No acute distress  HEENT: NC/AT, EOMI, PERRLA, conjunctivae clear; ears and nose atraumatic; pharynx benign  Neck: supple; thyroid not palpable  Back: no tenderness  Respiratory: respiratory effort normal; clear to auscultation  Cardiovascular: S1S2 regular, no murmurs  Abdomen: soft, not tender, not distended, positive BS; no liver or spleen organomegaly  Genitourinary: no suprapubic tenderness  Lymphatic: no LN palpable  Musculoskeletal: no muscle tenderness, no joint swelling or tenderness  Extremities: no pedal edema  PICC site clear  Left leg tenderness, petechial rash   Neurological/ Psychiatric: AxOx3, judgement and insight normal; moving all extremities  Skin: no rashes; no palpable lesions    Labs: all available labs reviewed                        7.4    18.24 )-----------( 268      ( 21 Feb 2025 07:17 )             23.0     02-21    133[L]  |  100  |  27[H]  ----------------------------<  90  3.8   |  24  |  0.96    Ca    9.2      21 Feb 2025 07:17  Mg     2.1     02-20    TPro  6.7  /  Alb  2.0[L]  /  TBili  0.6  /  DBili  x   /  AST  78[H]  /  ALT  63  /  AlkPhos  130[H]  02-20     LIVER FUNCTIONS - ( 20 Feb 2025 18:10 )  Alb: 2.0 g/dL / Pro: 6.7 gm/dL / ALK PHOS: 130 U/L / ALT: 63 U/L / AST: 78 U/L / GGT: x                           Radiology: all available radiological tests reviewed    Advanced directives addressed: full resuscitation Patient is a 74y old  Male who presents with a chief complaint of Pain to leg, rash, osteomyelitis    HPI:  73 y/o Male with h/o HTN, DM, prostate CA, hypothyroidism, AVR, A.Fib on eliquis, chronic back pain s/p epidurals on diclofenac, HLD, NIDDM, CKD recent hospitalization on 2/11 for sepsis with strep viridans, subacute AV prosthetic bacterial endocarditis, probable lumbar spine discitis / OM treated with ceftriaxone 2 gm IV qd via PICC line was admitted on 2/20 rash on his left leg knee extending towards his foot. Pt noticed  his left thigh got puffy, as well as a rash on the left knee radiating down to his toes. Pt said two days PTA his right leg was hurting, then today the pain began on the left leg. Pt reports he's never had redness like this, and his wife noticed the redness around 1:30pm on day of admission. Pt says he can turn his body but not easily. Pt is complaining of knee pain when inspecting the leg. Pt recently discharged to SNF rehab on abx treatment for infected TAVR and Osteomyelitis of spine.    Active Problems  Abnormal metabolic state in diabetes mellitus (250.80,783.9) (E11.69,E88.9)  Acute bilateral low back pain with bilateral sciatica (724.2,724.3) (M54.42,M54.41)  Acute viral syndrome (079.99) (B34.9)  Alopecia (704.00) (L65.9)  Atrioventricular block, Mobitz type 1, Wenckebach (426.13) (I44.1)  Back muscle spasm (724.8) (M62.830)  Basal cell carcinoma (BCC) of face (173.31) (C44.310)  Benign localized hyperplasia of prostate with urinary obstruction (600.21,599.69)  (N40.1,N13.8)  BMI 36.0-36.9,adult (V85.36) (Z68.36)  Chronic bilateral low back pain without sciatica (724.2,338.29) (M54.50,G89.29)  Chronic GERD (530.81) (K21.9)  Coronary artery disease (414.00) (I25.10)  Counseling for travel (V65.49) (Z71.84)  Diabetic peripheral neuropathy (250.60,357.2) (E11.42)  Generalized anxiety disorder (300.02) (F41.1)  Hyperlipidemia (272.4) (E78.5)  Hypertension (401.9) (I10)  Hypothyroidism (244.9) (E03.9)  Intermittent palpitations (785.1) (R00.2)  Low back strain, sequela (905.7) (S39.012S)  Mobitz type 1 second degree AV block (426.13) (I44.1)  Multiple benign melanocytic nevi (216.9) (D22.9)  Neoplasm of uncertain behavior of skin (238.2) (D48.5)  Non-insulin dependent type 2 diabetes mellitus (250.00) (E11.9)  Obesity due to excess calories (278.00) (E66.09)  Obstructive sleep apnea of adult (327.23) (G47.33)  Paronychia of finger of right hand (681.02) (L03.011)  Paroxysmal A-fib (427.31) (I48.0)  Prerenal azotemia (790.6) (R79.89)  Prophylactic vaccination against streptococcus pneumoniae and influenza (V06.6) (Z23)  Prostate cancer (185) (C61)  S/p TAVR (transcatheter aortic valve replacement), bioprosthetic (V42.2) (Z95.3)  Screening for viral disease (V73.99) (Z11.59)  Seborrheic keratoses (702.19) (L82.1)  Severe aortic stenosis (424.1) (I35.0)  Spinal stenosis of lumbar region, unspecified whether neurogenic claudication present  (724.02) (M48.061)  Strain of right knee, initial encounter (844.9) (S86.911A)  Urinary frequency (788.41) (R35.0)    Past Medical History  History of Encounter for cosmetic procedure (V50.1) (Z41.1)  History of arthritis (V13.4) (Z87.39)  History of bronchitis (V12.69) (Z87.09)  History of elevated prostate specific antigen (PSA) (V13.89) (Z87.898)  History of elevated prostate specific antigen (PSA) (V13.89) (Z87.898)  History of lentigo (V13.3) (Z87.2)  History of Insect bite of other part of head, initial encounter (910.4,E906.4)  (S00.86XA,W57.XXXA)  History of Muscle weakness of lower extremity (728.87) (M62.81)  History of Near syncope (780.2) (R55)  History of Venomous sting, intentional self-harm, initial encounter (989.5,E950.9)  (T63.92XA)  Viral URI with cough (465.9) (J06.9)    Surgical History  History of Surgery Excision Lipoma  History of Transcatheter aortic valve replacement    Meds: per reconciliation sheet, noted below  MEDICATIONS  (STANDING):  apixaban 2.5 milliGRAM(s) Oral two times a day  atorvastatin 40 milliGRAM(s) Oral at bedtime  cefTRIAXone Injectable. 2000 milliGRAM(s) IV Push every 24 hours  dapagliflozin 10 milliGRAM(s) Oral every 24 hours  ferrous    sulfate 325 milliGRAM(s) Oral daily  gabapentin 200 milliGRAM(s) Oral three times a day  levothyroxine 150 MICROGram(s) Oral daily  losartan 50 milliGRAM(s) Oral daily  multivitamin 1 Tablet(s) Oral daily  naloxone Injectable 0.4 milliGRAM(s) IV Push once  pantoprazole    Tablet 40 milliGRAM(s) Oral before breakfast  polyethylene glycol 3350 17 Gram(s) Oral daily  senna 2 Tablet(s) Oral at bedtime  sodium chloride 0.9%. 1000 milliLiter(s) (75 mL/Hr) IV Continuous <Continuous>    MEDICATIONS  (PRN):  acetaminophen     Tablet .. 650 milliGRAM(s) Oral every 6 hours PRN Temp greater or equal to 38C (100.4F), Mild Pain (1 - 3)  aluminum hydroxide/magnesium hydroxide/simethicone Suspension 30 milliLiter(s) Oral every 4 hours PRN Dyspepsia  melatonin 3 milliGRAM(s) Oral at bedtime PRN Insomnia  ondansetron Injectable 4 milliGRAM(s) IV Push every 8 hours PRN Nausea and/or Vomiting  traMADol 50 milliGRAM(s) Oral every 6 hours PRN Severe Pain (7 - 10)  zolpidem 5 milliGRAM(s) Oral at bedtime PRN Insomnia    Allergies    No Known Allergies    Intolerances      Social: no smoking, no alcohol, no illegal drugs; no recent travel, no exposure to TB  FAMILY HISTORY:    no history of premature cardiovascular disease in first degree relatives    ROS: the patient denies fever, no chills, no HA, no seizures, no dizziness, no sore throat, no nasal congestion, no blurry vision, no CP, no palpitations, no SOB, no cough, no abdominal pain, no diarrhea, no N/V, no dysuria, has left leg pain, no claudication, no rash, no joint aches, no rectal pain or bleeding, no night sweats  All other systems reviewed and are negative    Vital Signs Last 24 Hrs  T(C): 36.5 (21 Feb 2025 09:00), Max: 37.8 (20 Feb 2025 19:00)  T(F): 97.7 (21 Feb 2025 09:00), Max: 100.1 (20 Feb 2025 19:00)  HR: 94 (21 Feb 2025 09:00) (87 - 105)  BP: 121/70 (21 Feb 2025 09:00) (108/67 - 124/71)  BP(mean): 82 (20 Feb 2025 21:16) (82 - 85)  RR: 19 (21 Feb 2025 09:00) (18 - 19)  SpO2: 94% (21 Feb 2025 09:00) (92% - 97%)    Parameters below as of 21 Feb 2025 09:00  Patient On (Oxygen Delivery Method): room air    PE:    Constitutional:  No acute distress  HEENT: NC/AT, EOMI, PERRLA, conjunctivae clear; ears and nose atraumatic; pharynx benign  Neck: supple; thyroid not palpable  Back: no tenderness  Respiratory: respiratory effort normal; clear to auscultation  Cardiovascular: S1S2 regular, no murmurs  Abdomen: soft, not tender, not distended, positive BS; no liver or spleen organomegaly  Genitourinary: no suprapubic tenderness  Lymphatic: no LN palpable  Musculoskeletal: no muscle tenderness, no joint swelling or tenderness  Extremities: no pedal edema  PICC site clear  Left foot, ankle, lower leg rash with petechia, tender, edema; no discharge  Neurological/ Psychiatric: AxOx3, judgement and insight normal; moving all extremities  Skin: no rashes; no palpable lesions    Labs: all available labs reviewed                        7.4    18.24 )-----------( 268      ( 21 Feb 2025 07:17 )             23.0     02-21    133[L]  |  100  |  27[H]  ----------------------------<  90  3.8   |  24  |  0.96    Ca    9.2      21 Feb 2025 07:17  Mg     2.1     02-20    TPro  6.7  /  Alb  2.0[L]  /  TBili  0.6  /  DBili  x   /  AST  78[H]  /  ALT  63  /  AlkPhos  130[H]  02-20     LIVER FUNCTIONS - ( 20 Feb 2025 18:10 )  Alb: 2.0 g/dL / Pro: 6.7 gm/dL / ALK PHOS: 130 U/L / ALT: 63 U/L / AST: 78 U/L / GGT: x                           Radiology: all available radiological tests reviewed    Advanced directives addressed: full resuscitation

## 2025-02-21 NOTE — CONSULT NOTE ADULT - ASSESSMENT
73 y/o Male with h/o HTN, DM, prostate CA, hypothyroidism, AVR, A.Fib on eliquis, chronic back pain s/p epidurals on diclofenac, HLD, NIDDM, CKD recent hospitalization on 2/11 for sepsis with strep viridans, subacute AV prosthetic bacterial endocarditis, probable lumbar spine discitis / OM treated with ceftriaxone 2 gm IV qd via PICC line was admitted on 2/20 rash on his left leg knee extending towards his foot. Pt noticed  his left thigh got puffy, as well as a rash on the left knee radiating down to his toes. Pt said two days PTA his right leg was hurting, then today the pain began on the left leg. Pt reports he's never had redness like this, and his wife noticed the redness around 1:30pm on day of admission. Pt says he can turn his body but not easily. Pt is complaining of knee pain when inspecting the leg. Pt recently discharged to SNF rehab on abx treatment for infected TAVR and Osteomyelitis of spine.    #New left leg skin rash ?vasculitis /peripheral septic emboli ?allergic  #Subacute AV prosthetic bacterial endocarditis. AVR s/p sepsis with strep viridans group  #Probable lumbar spine discitis / OM.  #Low grade fever  #Leukocytosis  #CRF stage 3  -obtain BC x 2  -on ceftriaxone 2 gm IV qd since 2/11  -tolerating abx well so far  -obtain repeat Echo   -cardiology evaluation   -continue abx coverage   -PICC line care  -f/u cultures  -monitor temps  -f/u CBC  -supportive care  2. Other issues:   -care per medicine    D/w medicine team and Dr. Jernigan   75 y/o Male with h/o HTN, DM, prostate CA, hypothyroidism, AVR, A.Fib on eliquis, chronic back pain s/p epidurals on diclofenac, HLD, NIDDM, CKD recent hospitalization on 2/11 for sepsis with strep viridans, subacute AV prosthetic bacterial endocarditis, probable lumbar spine discitis / OM treated with ceftriaxone 2 gm IV qd via PICC line was admitted on 2/20 rash on his left leg knee extending towards his foot. Pt noticed  his left thigh got puffy, as well as a rash on the left knee radiating down to his toes. Pt said two days PTA his right leg was hurting, then today the pain began on the left leg. Pt reports he's never had redness like this, and his wife noticed the redness around 1:30pm on day of admission. Pt says he can turn his body but not easily. Pt is complaining of knee pain when inspecting the leg. Pt recently discharged to SNF rehab on abx treatment for infected TAVR and Osteomyelitis of spine.    #New left leg skin rash. Possible fixed drug reaction. ?vasculitis /peripheral septic emboli ?allergic  #Subacute AV prosthetic bacterial endocarditis. AVR s/p sepsis with strep viridans group  #Probable lumbar spine discitis / OM.  #Low grade fever  #Leukocytosis  #CRF stage 3  -obtain BC x 2  -on ceftriaxone 2 gm IV qd since 2/11  -concern for new infection or abx related side effect. Prior cultures reviewed. An epidemiologic assessment was performed. The risk of the microorganism spread to family members, healthcare staff is low. Standard isolation in place at present time. Abx choice is difficult since concern for new infection is raised while on IV abx with ceftriaxone. On the other hand, he can have an unusual side effected related to treatement with ceftriaxone. The patient will be monitored closely, and new blood cultures collected.  -change abx to daptomycin 750 mg IV qd first dose now  -reason for abx use and side effects reviewed with patient; monitor BMP and vancomycin trough levels   -obtain repeat Echo   -cardiology evaluation   -continue abx coverage   -PICC line care  -f/u cultures  -monitor temps  -f/u CBC  -supportive care  2. Other issues:   -care per medicine    D/w medicine team and Dr. Jernigan

## 2025-02-21 NOTE — PATIENT PROFILE ADULT - PATIENT'S SEXUAL ORIENTATION
Called patient to check and see if he was able to get labs completed anywhere, he had checked around to see if he could do cash pay labs. Patient is concerned about going in to the hospital due to having senior [facility] plus, he will continue trying but he is also coming back to the [place] area in 2 weeks.   Weight today 263  Over the weekend he carefully shopped for very low sodium items and states he had increased urine output, states breathing is getting better, will continue to try to see if someone will order a BMP for him, advised him to call his insurance to see who would be considered in network where he currently is.   
Heterosexual

## 2025-02-21 NOTE — CONSULT NOTE ADULT - ASSESSMENT
75 y/o Male with h/o HTN, DM, prostate CA, hypothyroidism, AVR, A.Fib on eliquis, chronic back pain s/p epidurals on diclofenac, HLD, NIDDM, CKD recent hospitalization on 2/11 for sepsis with strep viridans, subacute AV prosthetic bacterial endocarditis, probable lumbar spine discitis admitted for LE rash.   He reports his left thigh got puffy, as well as a rash on the left knee radiating down to his toes. Pt said two days PTA his right leg was hurting, then today the pain began on the left leg.   No calf pain, he was ambulatory before  no rest pain in LEs  no non healing wounds  no previous Angio or any vascular intervention in LEs    exam:  RLE: sensory motor intact, palpable PT/DP  LLE: edema, rash, sensory motor intact, dopplerable DP and PT, palp femoral     V dup:  No evidence of left lower extremity deep venous thrombosis.      Recs:  - Non invasive study of LEs (AC/PVR) considering pulse disparity on exam   - rest of care per primary team   - vascular surgery will follow along  73 y/o Male with h/o HTN, DM, prostate CA, hypothyroidism, AVR, A.Fib on eliquis, chronic back pain s/p epidurals on diclofenac, HLD, NIDDM, CKD recent hospitalization on 2/11 for sepsis with strep viridans, subacute AV prosthetic bacterial endocarditis, probable lumbar spine discitis admitted for LE rash.   He reports his left thigh got puffy, as well as a rash on the left knee radiating down to his toes. Pt said two days PTA his right leg was hurting, then today the pain began on the left leg.   No calf pain, he was ambulatory before  no rest pain in LEs  no non healing wounds  no previous Angio or any vascular intervention in LEs    exam:  Extremities: Warm  RLE: sensory motor intact, palpable PT/DP  LLE: edema, rash, sensory intact, motor diminished compared to RLE, wiggles toes, moves ankle, not able flex/extent the knee or hip , dopplerable DP and PT, dopplerable Popliteal, palp femoral     V dup:  No evidence of left lower extremity deep venous thrombosis.      Recs:  - Non invasive study of LEs (AC/PVR) considering pulse disparity on exam   - rest of care per primary team   - vascular surgery will follow along

## 2025-02-21 NOTE — DIETITIAN INITIAL EVALUATION ADULT - OTHER INFO
75 y/o M w/ a PMHx of HTN, A-FIB, HLD, chronic back pain and DM presents to the ED c/o a rash on his left leg knee radiating towards his foot. Pt noticed  his left thigh got puffy, as well as a rash on the left knee radiating down to his toes. Pt said two days PTA his right leg was hurting, then today the pain began on the left leg. Pt reports he's never had redness like this, and his wife noticed the redness around 1:30pm on day of admission. Pt says he can turn his body but not easily. Pt is complaining of knee pain when inspecting the leg. Pt recently discharged to SNF rehab after treatment for infected TAVR and Osteomyelitis of spine. Noted w/ hyponatremia, Purpuric Rash to L leg - possible vasculitis? Pt is DNR/DNI; feeding tube not addressed in [facility]. Admitting diagnosis: petechial rash, anemia    Pt previously seen by RD services (2/12) met criteria for PCM. Pt reports not much change in appetite upon admission. does report some confusion; would ? accuracy of information obtained. Reports #. RD obtained bedscale wt 210# on 2/21 w/ 2+ edema skewing wt/appearance. Wt history reviewed: 215# on 2/12/25 (taken by RD). 5# wt loss / 2.32% x 1.5 wk - sev clin sig. Pt appears overweight, NFPE reveal mild-mod muscle/fat wasting. Body habitus could be masking additional loss. Recommend to liberalize diet to regular to maximize caloric and nutrient intake; continue w/ fluid restriction per MD. POCTs WNL. Pt agreeable to trial high-protein berry smoothie (370 kcal, 26g protein). Monitor and optimize BG levels between 140-180 mg/dL by medical management. Confirm goals of care regarding nutrition support. Please see additional recommendations below.

## 2025-02-21 NOTE — DIETITIAN INITIAL EVALUATION ADULT - ORAL INTAKE PTA/DIET HISTORY
Pt from [facility]; reports was only there a few days after previous hospital stay. Prior was living at home with his wife. Reports often would eat out. Appetite/PO intake has been variable for a few months. C/o some N/V PTA. Pt likely meeting <75% ENN > 1 month.      Per previous RD note (2/12): "Lives at home w/ wife that cooks/grocery shops; however, mostly eats out. Endorses poor appetite and consuming </= 50% of ENN x 3 mo 2/2 chronic back pain. W/ T2DM - does NOT monitor BGL at home; on metformin, januvia and farxiga & is NOT on insulin. Does not follow specific diet at this time due to minimal PO intake."

## 2025-02-22 ENCOUNTER — RESULT REVIEW (OUTPATIENT)
Age: 75
End: 2025-02-22

## 2025-02-22 LAB
ALBUMIN SERPL ELPH-MCNC: 2 G/DL — LOW (ref 3.3–5)
ALP SERPL-CCNC: 155 U/L — HIGH (ref 40–120)
ALT FLD-CCNC: 80 U/L — HIGH (ref 12–78)
ANION GAP SERPL CALC-SCNC: 6 MMOL/L — SIGNIFICANT CHANGE UP (ref 5–17)
ANION GAP SERPL CALC-SCNC: 9 MMOL/L — SIGNIFICANT CHANGE UP (ref 5–17)
APTT BLD: 41.9 SEC — HIGH (ref 24.5–35.6)
APTT BLD: 49.1 SEC — HIGH (ref 24.5–35.6)
APTT BLD: 94 SEC — HIGH (ref 24.5–35.6)
APTT BLD: >200 SEC — CRITICAL HIGH (ref 24.5–35.6)
AST SERPL-CCNC: 75 U/L — HIGH (ref 15–37)
BASE EXCESS BLDA CALC-SCNC: -1.3 MMOL/L — SIGNIFICANT CHANGE UP (ref -2–3)
BILIRUB SERPL-MCNC: 0.6 MG/DL — SIGNIFICANT CHANGE UP (ref 0.2–1.2)
BLOOD GAS COMMENTS ARTERIAL: SIGNIFICANT CHANGE UP
BUN SERPL-MCNC: 24 MG/DL — HIGH (ref 7–23)
BUN SERPL-MCNC: 26 MG/DL — HIGH (ref 7–23)
CALCIUM SERPL-MCNC: 8.8 MG/DL — SIGNIFICANT CHANGE UP (ref 8.5–10.1)
CALCIUM SERPL-MCNC: 8.9 MG/DL — SIGNIFICANT CHANGE UP (ref 8.5–10.1)
CHLORIDE SERPL-SCNC: 102 MMOL/L — SIGNIFICANT CHANGE UP (ref 96–108)
CHLORIDE SERPL-SCNC: 102 MMOL/L — SIGNIFICANT CHANGE UP (ref 96–108)
CO2 SERPL-SCNC: 23 MMOL/L — SIGNIFICANT CHANGE UP (ref 22–31)
CO2 SERPL-SCNC: 25 MMOL/L — SIGNIFICANT CHANGE UP (ref 22–31)
CREAT SERPL-MCNC: 1.05 MG/DL — SIGNIFICANT CHANGE UP (ref 0.5–1.3)
CREAT SERPL-MCNC: 1.07 MG/DL — SIGNIFICANT CHANGE UP (ref 0.5–1.3)
EGFR: 73 ML/MIN/1.73M2 — SIGNIFICANT CHANGE UP
EGFR: 74 ML/MIN/1.73M2 — SIGNIFICANT CHANGE UP
GLUCOSE BLDC GLUCOMTR-MCNC: 143 MG/DL — HIGH (ref 70–99)
GLUCOSE BLDC GLUCOMTR-MCNC: 286 MG/DL — HIGH (ref 70–99)
GLUCOSE BLDC GLUCOMTR-MCNC: 293 MG/DL — HIGH (ref 70–99)
GLUCOSE SERPL-MCNC: 103 MG/DL — HIGH (ref 70–99)
GLUCOSE SERPL-MCNC: 139 MG/DL — HIGH (ref 70–99)
GRAM STN FLD: ABNORMAL
HCO3 BLDA-SCNC: 23 MMOL/L — SIGNIFICANT CHANGE UP (ref 21–28)
HCT VFR BLD CALC: 24 % — LOW (ref 39–50)
HCT VFR BLD CALC: 25.7 % — LOW (ref 39–50)
HCT VFR BLD CALC: 25.8 % — LOW (ref 39–50)
HCT VFR BLD CALC: 26.5 % — LOW (ref 39–50)
HGB BLD-MCNC: 7.9 G/DL — LOW (ref 13–17)
HGB BLD-MCNC: 8.3 G/DL — LOW (ref 13–17)
HGB BLD-MCNC: 8.4 G/DL — LOW (ref 13–17)
HGB BLD-MCNC: 8.5 G/DL — LOW (ref 13–17)
HSV+VZV DNA SPEC QL NAA+PROBE: SIGNIFICANT CHANGE UP
INR BLD: 1.39 RATIO — HIGH (ref 0.85–1.16)
LACTATE SERPL-SCNC: 0.6 MMOL/L — LOW (ref 0.7–2)
MAGNESIUM SERPL-MCNC: 2.3 MG/DL — SIGNIFICANT CHANGE UP (ref 1.6–2.6)
MCHC RBC-ENTMCNC: 26.8 PG — LOW (ref 27–34)
MCHC RBC-ENTMCNC: 26.8 PG — LOW (ref 27–34)
MCHC RBC-ENTMCNC: 27 PG — SIGNIFICANT CHANGE UP (ref 27–34)
MCHC RBC-ENTMCNC: 27.2 PG — SIGNIFICANT CHANGE UP (ref 27–34)
MCHC RBC-ENTMCNC: 32.1 G/DL — SIGNIFICANT CHANGE UP (ref 32–36)
MCHC RBC-ENTMCNC: 32.3 G/DL — SIGNIFICANT CHANGE UP (ref 32–36)
MCHC RBC-ENTMCNC: 32.6 G/DL — SIGNIFICANT CHANGE UP (ref 32–36)
MCHC RBC-ENTMCNC: 32.9 G/DL — SIGNIFICANT CHANGE UP (ref 32–36)
MCV RBC AUTO: 81.9 FL — SIGNIFICANT CHANGE UP (ref 80–100)
MCV RBC AUTO: 82.2 FL — SIGNIFICANT CHANGE UP (ref 80–100)
MCV RBC AUTO: 83.6 FL — SIGNIFICANT CHANGE UP (ref 80–100)
MCV RBC AUTO: 84.3 FL — SIGNIFICANT CHANGE UP (ref 80–100)
NIGHT BLUE STAIN TISS: SIGNIFICANT CHANGE UP
NRBC # BLD AUTO: 0 K/UL — SIGNIFICANT CHANGE UP (ref 0–0)
NRBC # FLD: 0 K/UL — SIGNIFICANT CHANGE UP (ref 0–0)
NRBC BLD AUTO-RTO: 0 /100 WBCS — SIGNIFICANT CHANGE UP (ref 0–0)
PCO2 BLDA: 35 MMHG — SIGNIFICANT CHANGE UP (ref 35–48)
PH BLDA: 7.42 — SIGNIFICANT CHANGE UP (ref 7.35–7.45)
PHOSPHATE SERPL-MCNC: 3.8 MG/DL — SIGNIFICANT CHANGE UP (ref 2.5–4.5)
PLATELET # BLD AUTO: 279 K/UL — SIGNIFICANT CHANGE UP (ref 150–400)
PLATELET # BLD AUTO: 335 K/UL — SIGNIFICANT CHANGE UP (ref 150–400)
PLATELET # BLD AUTO: 344 K/UL — SIGNIFICANT CHANGE UP (ref 150–400)
PLATELET # BLD AUTO: 367 K/UL — SIGNIFICANT CHANGE UP (ref 150–400)
PMV BLD: 10 FL — SIGNIFICANT CHANGE UP (ref 7–13)
PMV BLD: 9.3 FL — SIGNIFICANT CHANGE UP (ref 7–13)
PMV BLD: 9.5 FL — SIGNIFICANT CHANGE UP (ref 7–13)
PMV BLD: 9.7 FL — SIGNIFICANT CHANGE UP (ref 7–13)
PO2 BLDA: 119 MMHG — HIGH (ref 83–108)
POTASSIUM SERPL-MCNC: 3.9 MMOL/L — SIGNIFICANT CHANGE UP (ref 3.5–5.3)
POTASSIUM SERPL-MCNC: 4.2 MMOL/L — SIGNIFICANT CHANGE UP (ref 3.5–5.3)
POTASSIUM SERPL-SCNC: 3.9 MMOL/L — SIGNIFICANT CHANGE UP (ref 3.5–5.3)
POTASSIUM SERPL-SCNC: 4.2 MMOL/L — SIGNIFICANT CHANGE UP (ref 3.5–5.3)
PROT SERPL-MCNC: 6.8 GM/DL — SIGNIFICANT CHANGE UP (ref 6–8.3)
PROTHROM AB SERPL-ACNC: 16.4 SEC — HIGH (ref 9.9–13.4)
RBC # BLD: 2.93 M/UL — LOW (ref 4.2–5.8)
RBC # BLD: 3.05 M/UL — LOW (ref 4.2–5.8)
RBC # BLD: 3.14 M/UL — LOW (ref 4.2–5.8)
RBC # BLD: 3.17 M/UL — LOW (ref 4.2–5.8)
RBC # FLD: 16.4 % — HIGH (ref 10.3–14.5)
RBC # FLD: 16.4 % — HIGH (ref 10.3–14.5)
RBC # FLD: 16.5 % — HIGH (ref 10.3–14.5)
RBC # FLD: 17.1 % — HIGH (ref 10.3–14.5)
SAO2 % BLDA: 99 % — HIGH (ref 94–98)
SODIUM SERPL-SCNC: 133 MMOL/L — LOW (ref 135–145)
SODIUM SERPL-SCNC: 134 MMOL/L — LOW (ref 135–145)
SPECIMEN SOURCE: SIGNIFICANT CHANGE UP
TROPONIN I, HIGH SENSITIVITY RESULT: 247.17 NG/L — HIGH
WBC # BLD: 12.38 K/UL — HIGH (ref 3.8–10.5)
WBC # BLD: 14.1 K/UL — HIGH (ref 3.8–10.5)
WBC # BLD: 14.6 K/UL — HIGH (ref 3.8–10.5)
WBC # BLD: 17.89 K/UL — HIGH (ref 3.8–10.5)
WBC # FLD AUTO: 12.38 K/UL — HIGH (ref 3.8–10.5)
WBC # FLD AUTO: 14.1 K/UL — HIGH (ref 3.8–10.5)
WBC # FLD AUTO: 14.6 K/UL — HIGH (ref 3.8–10.5)
WBC # FLD AUTO: 17.89 K/UL — HIGH (ref 3.8–10.5)

## 2025-02-22 PROCEDURE — 99233 SBSQ HOSP IP/OBS HIGH 50: CPT

## 2025-02-22 PROCEDURE — 99232 SBSQ HOSP IP/OBS MODERATE 35: CPT

## 2025-02-22 PROCEDURE — 88304 TISSUE EXAM BY PATHOLOGIST: CPT | Mod: 26

## 2025-02-22 PROCEDURE — 93010 ELECTROCARDIOGRAM REPORT: CPT

## 2025-02-22 PROCEDURE — 34201 REMOVAL OF ARTERY CLOT: CPT | Mod: GC,LT

## 2025-02-22 PROCEDURE — 71045 X-RAY EXAM CHEST 1 VIEW: CPT | Mod: 26

## 2025-02-22 PROCEDURE — 88311 DECALCIFY TISSUE: CPT | Mod: 26

## 2025-02-22 RX ORDER — HEPARIN SODIUM 1000 [USP'U]/ML
1500 INJECTION INTRAVENOUS; SUBCUTANEOUS
Qty: 25000 | Refills: 0 | Status: DISCONTINUED | OUTPATIENT
Start: 2025-02-22 | End: 2025-02-24

## 2025-02-22 RX ORDER — HEPARIN SODIUM 1000 [USP'U]/ML
4000 INJECTION INTRAVENOUS; SUBCUTANEOUS EVERY 6 HOURS
Refills: 0 | Status: DISCONTINUED | OUTPATIENT
Start: 2025-02-22 | End: 2025-02-24

## 2025-02-22 RX ORDER — GLUCAGON 3 MG/1
1 POWDER NASAL ONCE
Refills: 0 | Status: DISCONTINUED | OUTPATIENT
Start: 2025-02-22 | End: 2025-02-24

## 2025-02-22 RX ORDER — ONDANSETRON HCL/PF 4 MG/2 ML
4 VIAL (ML) INJECTION ONCE
Refills: 0 | Status: DISCONTINUED | OUTPATIENT
Start: 2025-02-22 | End: 2025-02-22

## 2025-02-22 RX ORDER — DEXTROSE 50 % IN WATER 50 %
12.5 SYRINGE (ML) INTRAVENOUS ONCE
Refills: 0 | Status: DISCONTINUED | OUTPATIENT
Start: 2025-02-22 | End: 2025-02-24

## 2025-02-22 RX ORDER — SODIUM CHLORIDE 9 G/1000ML
1000 INJECTION, SOLUTION INTRAVENOUS
Refills: 0 | Status: DISCONTINUED | OUTPATIENT
Start: 2025-02-22 | End: 2025-02-24

## 2025-02-22 RX ORDER — LOSARTAN POTASSIUM 100 MG/1
50 TABLET, FILM COATED ORAL DAILY
Refills: 0 | Status: DISCONTINUED | OUTPATIENT
Start: 2025-02-22 | End: 2025-02-23

## 2025-02-22 RX ORDER — DEXTROSE 50 % IN WATER 50 %
25 SYRINGE (ML) INTRAVENOUS ONCE
Refills: 0 | Status: DISCONTINUED | OUTPATIENT
Start: 2025-02-22 | End: 2025-02-24

## 2025-02-22 RX ORDER — HEPARIN SODIUM 1000 [USP'U]/ML
8000 INJECTION INTRAVENOUS; SUBCUTANEOUS EVERY 6 HOURS
Refills: 0 | Status: DISCONTINUED | OUTPATIENT
Start: 2025-02-22 | End: 2025-02-24

## 2025-02-22 RX ORDER — FENTANYL CITRATE-0.9 % NACL/PF 100MCG/2ML
25 SYRINGE (ML) INTRAVENOUS
Refills: 0 | Status: DISCONTINUED | OUTPATIENT
Start: 2025-02-22 | End: 2025-02-22

## 2025-02-22 RX ORDER — HYDROMORPHONE/SOD CHLOR,ISO/PF 2 MG/10 ML
0.5 SYRINGE (ML) INJECTION
Refills: 0 | Status: DISCONTINUED | OUTPATIENT
Start: 2025-02-22 | End: 2025-02-22

## 2025-02-22 RX ORDER — INSULIN LISPRO 100 U/ML
INJECTION, SOLUTION INTRAVENOUS; SUBCUTANEOUS
Refills: 0 | Status: DISCONTINUED | OUTPATIENT
Start: 2025-02-22 | End: 2025-02-24

## 2025-02-22 RX ORDER — ACETAMINOPHEN 500 MG/5ML
1000 LIQUID (ML) ORAL ONCE
Refills: 0 | Status: DISCONTINUED | OUTPATIENT
Start: 2025-02-22 | End: 2025-02-22

## 2025-02-22 RX ORDER — HEPARIN SODIUM 1000 [USP'U]/ML
8000 INJECTION INTRAVENOUS; SUBCUTANEOUS ONCE
Refills: 0 | Status: DISCONTINUED | OUTPATIENT
Start: 2025-02-22 | End: 2025-02-22

## 2025-02-22 RX ORDER — INSULIN LISPRO 100 U/ML
INJECTION, SOLUTION INTRAVENOUS; SUBCUTANEOUS AT BEDTIME
Refills: 0 | Status: DISCONTINUED | OUTPATIENT
Start: 2025-02-22 | End: 2025-02-24

## 2025-02-22 RX ORDER — DEXTROSE 50 % IN WATER 50 %
15 SYRINGE (ML) INTRAVENOUS ONCE
Refills: 0 | Status: DISCONTINUED | OUTPATIENT
Start: 2025-02-22 | End: 2025-02-24

## 2025-02-22 RX ADMIN — ATORVASTATIN CALCIUM 40 MILLIGRAM(S): 80 TABLET, FILM COATED ORAL at 22:06

## 2025-02-22 RX ADMIN — Medication 650 MILLIGRAM(S): at 14:55

## 2025-02-22 RX ADMIN — Medication 2 TABLET(S): at 22:07

## 2025-02-22 RX ADMIN — HEPARIN SODIUM 1500 UNIT(S)/HR: 1000 INJECTION INTRAVENOUS; SUBCUTANEOUS at 02:37

## 2025-02-22 RX ADMIN — GABAPENTIN 200 MILLIGRAM(S): 400 CAPSULE ORAL at 14:56

## 2025-02-22 RX ADMIN — TRAMADOL HYDROCHLORIDE 50 MILLIGRAM(S): 50 TABLET, FILM COATED ORAL at 02:35

## 2025-02-22 RX ADMIN — HEPARIN SODIUM 1700 UNIT(S)/HR: 1000 INJECTION INTRAVENOUS; SUBCUTANEOUS at 19:34

## 2025-02-22 RX ADMIN — HEPARIN SODIUM 1500 UNIT(S)/HR: 1000 INJECTION INTRAVENOUS; SUBCUTANEOUS at 11:56

## 2025-02-22 RX ADMIN — INSULIN LISPRO 6: 100 INJECTION, SOLUTION INTRAVENOUS; SUBCUTANEOUS at 17:18

## 2025-02-22 RX ADMIN — GABAPENTIN 200 MILLIGRAM(S): 400 CAPSULE ORAL at 22:06

## 2025-02-22 RX ADMIN — HEPARIN SODIUM 0 UNIT(S)/HR: 1000 INJECTION INTRAVENOUS; SUBCUTANEOUS at 01:26

## 2025-02-22 RX ADMIN — Medication 650 MILLIGRAM(S): at 15:25

## 2025-02-22 RX ADMIN — HEPARIN SODIUM 4000 UNIT(S): 1000 INJECTION INTRAVENOUS; SUBCUTANEOUS at 18:43

## 2025-02-22 RX ADMIN — HEPARIN SODIUM 1700 UNIT(S)/HR: 1000 INJECTION INTRAVENOUS; SUBCUTANEOUS at 18:33

## 2025-02-22 RX ADMIN — DAPTOMYCIN 130 MILLIGRAM(S): 500 INJECTION, POWDER, LYOPHILIZED, FOR SOLUTION INTRAVENOUS at 16:06

## 2025-02-22 RX ADMIN — INSULIN LISPRO 2: 100 INJECTION, SOLUTION INTRAVENOUS; SUBCUTANEOUS at 22:09

## 2025-02-22 NOTE — CONSULT NOTE ADULT - ASSESSMENT
patient with calcification and PVD with loss of circulation with distal reconstitution of flow.  I do not know criteria used for evaluating thrombus vs embolus but this patient's on embolic source would be endocarditis.  TTE with contrast again r/o LV thrombus and with repeat ROVERTO during the week to look for worsening vegetation from endocarditis; ID to send blood cultures but if clear, would suggest vegetation is not growing and responding to antibiotics.    Low likelihood of cardioembolic issue other than endocarditis and if vegetation grows, would be indication to remove valve surgically patient with calcification and PVD with loss of circulation with distal reconstitution of flow.  I do not know criteria used for evaluating thrombus vs embolus but this patient's on embolic source would be endocarditis.  Patient taken to OR urgently to restore circulation and will do repeat ROVERTO during the week to look for worsening vegetation from endocarditis; ID to send blood cultures but if clear, would suggest vegetation is not growing and responding to antibiotics.    Low likelihood of cardioembolic issue other than endocarditis and if vegetation grows, would be indication to remove valve surgically

## 2025-02-22 NOTE — BRIEF OPERATIVE NOTE - NSICDXBRIEFPOSTOP_GEN_ALL_CORE_FT
POST-OP DIAGNOSIS:  Acute lower limb ischemia 23-Feb-2025 02:18:40 Due to septic embolism Salazar Sands

## 2025-02-22 NOTE — PROGRESS NOTE ADULT - SUBJECTIVE AND OBJECTIVE BOX
HPI:  75 y/o male with a PMHx of HTN, A-FIB, HLD, chronic back pain and DM presents to the ED c/o a rash on his left leg knee radiating towards his foot. Pt noticed his left thigh got puffy, as well as a rash on the left knee radiating down to his toes. Pt said two days PTA his right leg was hurting, then today the pain began on the left leg. Pt reports he's never had redness like this, and his wife noticed the redness around 1:30pm on day of admission. Pt says he can turn his body but not easily. Pt is complaining of knee pain when inspecting the leg. Pt recently discharged to SNF rehab after treatment for infected TAVR and Osteomyelitis of spine.    Pt admitted to medicine. During w/u pt had admission pt found to have     OVERNIGHT EVENTS / SUBJECTIVE: Patient seen and examined at bedside.     OBJECTIVE:    VITAL SIGNS:  ICU Vital Signs Last 24 Hrs  T(C): 36.5 (22 Feb 2025 15:41), Max: 38.4 (21 Feb 2025 19:37)  T(F): 97.7 (22 Feb 2025 15:41), Max: 101.1 (21 Feb 2025 19:37)  HR: 88 (22 Feb 2025 16:00) (79 - 120)  BP: 112/70 (22 Feb 2025 16:00) (109/59 - 126/70)  BP(mean): 83 (22 Feb 2025 16:00) (82 - 86)  ABP: --  ABP(mean): --  RR: 16 (22 Feb 2025 11:34) (14 - 19)  SpO2: 100% (22 Feb 2025 16:00) (94% - 100%)    O2 Parameters below as of 22 Feb 2025 11:34  Patient On (Oxygen Delivery Method): nasal cannula              02-21 @ 07:01  -  02-22 @ 07:00  --------------------------------------------------------  IN: 350 mL / OUT: 600 mL / NET: -250 mL    02-22 @ 07:01 - 02-22 @ 18:19  --------------------------------------------------------  IN: 215 mL / OUT: 350 mL / NET: -135 mL      CAPILLARY BLOOD GLUCOSE      POCT Blood Glucose.: 293 mg/dL (22 Feb 2025 17:09)      PHYSICAL EXAM:    General: NAD  HEENT: NC/AT; PERRL, clear conjunctiva  Neck: supple  Respiratory: CTA b/l  Cardiovascular: +S1/S2; RRR  Abdomen: soft, NT/ND; +BS x4  Extremities: WWP, 2+ peripheral pulses b/l; no LE edema  Skin: normal color and turgor; no rash  Neurological:    MEDICATIONS:  MEDICATIONS  (STANDING):  atorvastatin 40 milliGRAM(s) Oral at bedtime  DAPTOmycin IVPB      DAPTOmycin IVPB 750 milliGRAM(s) IV Intermittent every 24 hours  dextrose 5%. 1000 milliLiter(s) (50 mL/Hr) IV Continuous <Continuous>  dextrose 5%. 1000 milliLiter(s) (100 mL/Hr) IV Continuous <Continuous>  dextrose 50% Injectable 25 Gram(s) IV Push once  dextrose 50% Injectable 12.5 Gram(s) IV Push once  dextrose 50% Injectable 25 Gram(s) IV Push once  ferrous    sulfate 325 milliGRAM(s) Oral daily  gabapentin 200 milliGRAM(s) Oral three times a day  glucagon  Injectable 1 milliGRAM(s) IntraMuscular once  heparin  Infusion. 1500 Unit(s)/Hr (15 mL/Hr) IV Continuous <Continuous>  insulin lispro (ADMELOG) corrective regimen sliding scale   SubCutaneous three times a day before meals  insulin lispro (ADMELOG) corrective regimen sliding scale   SubCutaneous at bedtime  levothyroxine 150 MICROGram(s) Oral daily  losartan 50 milliGRAM(s) Oral daily  multivitamin 1 Tablet(s) Oral daily  naloxone Injectable 0.4 milliGRAM(s) IV Push once  pantoprazole    Tablet 40 milliGRAM(s) Oral before breakfast  polyethylene glycol 3350 17 Gram(s) Oral daily  senna 2 Tablet(s) Oral at bedtime  sodium chloride 0.9%. 1000 milliLiter(s) (75 mL/Hr) IV Continuous <Continuous>    MEDICATIONS  (PRN):  acetaminophen     Tablet .. 650 milliGRAM(s) Oral every 6 hours PRN Temp greater or equal to 38C (100.4F), Mild Pain (1 - 3)  aluminum hydroxide/magnesium hydroxide/simethicone Suspension 30 milliLiter(s) Oral every 4 hours PRN Dyspepsia  dextrose Oral Gel 15 Gram(s) Oral once PRN Blood Glucose LESS THAN 70 milliGRAM(s)/deciliter  heparin   Injectable 8000 Unit(s) IV Push every 6 hours PRN For aPTT less than 40  heparin   Injectable 4000 Unit(s) IV Push every 6 hours PRN For aPTT between 40 - 57  heparin   Injectable 8000 Unit(s) IV Push every 6 hours PRN For aPTT less than 40  heparin   Injectable 4000 Unit(s) IV Push every 6 hours PRN For aPTT between 40 - 57  melatonin 3 milliGRAM(s) Oral at bedtime PRN Insomnia  ondansetron Injectable 4 milliGRAM(s) IV Push every 8 hours PRN Nausea and/or Vomiting  traMADol 50 milliGRAM(s) Oral every 6 hours PRN Severe Pain (7 - 10)  zolpidem 5 milliGRAM(s) Oral at bedtime PRN Insomnia      ALLERGIES:  Allergies    No Known Allergies    Intolerances        LABS:                        8.4    12.38 )-----------( 344      ( 22 Feb 2025 17:59 )             25.8     Hemoglobin: 8.4 g/dL (02-22 @ 17:59)  Hemoglobin: 8.5 g/dL (02-22 @ 11:00)  Hemoglobin: 7.9 g/dL (02-22 @ 06:32)  Hemoglobin: 8.3 g/dL (02-22 @ 00:47)  Hemoglobin: 7.4 g/dL (02-21 @ 07:17)    CBC Full  -  ( 22 Feb 2025 17:59 )  WBC Count : 12.38 K/uL  RBC Count : 3.14 M/uL  Hemoglobin : 8.4 g/dL  Hematocrit : 25.8 %  Platelet Count - Automated : 344 K/uL  Mean Cell Volume : 82.2 fl  Mean Cell Hemoglobin : 26.8 pg  Mean Cell Hemoglobin Concentration : 32.6 g/dL  Auto Neutrophil # : x  Auto Lymphocyte # : x  Auto Monocyte # : x  Auto Eosinophil # : x  Auto Basophil # : x  Auto Neutrophil % : x  Auto Lymphocyte % : x  Auto Monocyte % : x  Auto Eosinophil % : x  Auto Basophil % : x    02-22    134[L]  |  102  |  26[H]  ----------------------------<  139[H]  4.2   |  23  |  1.07    Ca    8.9      22 Feb 2025 11:00  Phos  3.8     02-22  Mg     2.3     02-22    TPro  6.8  /  Alb  2.0[L]  /  TBili  0.6  /  DBili  x   /  AST  75[H]  /  ALT  80[H]  /  AlkPhos  155[H]  02-22    Creatinine Trend: 1.07<--, 1.05<--, 0.96<--, 1.05<--, 1.26<--, 1.02<--  LIVER FUNCTIONS - ( 22 Feb 2025 11:00 )  Alb: 2.0 g/dL / Pro: 6.8 gm/dL / ALK PHOS: 155 U/L / ALT: 80 U/L / AST: 75 U/L / GGT: x           PT/INR - ( 22 Feb 2025 11:00 )   PT: 16.4 sec;   INR: 1.39 ratio         PTT - ( 22 Feb 2025 11:00 )  PTT:94.0 sec    hs Troponin:    ABG - ( 22 Feb 2025 11:58 )  pH, Arterial: 7.42  pH, Blood: x     /  pCO2: 35    /  pO2: 119   / HCO3: 23    / Base Excess: -1.3  /  SaO2: 99        11:58 - ABG - pH: 7.42  |  pCO2: 35    |  pO2: 119   | Lactate:       | BE: -1.3       Urinalysis Basic - ( 22 Feb 2025 11:00 )    Color: x / Appearance: x / SG: x / pH: x  Gluc: 139 mg/dL / Ketone: x  / Bili: x / Urobili: x   Blood: x / Protein: x / Nitrite: x   Leuk Esterase: x / RBC: x / WBC x   Sq Epi: x / Non Sq Epi: x / Bacteria: x    CSF:    EKG:   MICROBIOLOGY:    Culture - Blood (collected 20 Feb 2025 18:10)  Source: .Blood None  Preliminary Report (22 Feb 2025 02:02):    No growth at 24 hours    Culture - Blood (collected 20 Feb 2025 18:10)  Source: .Blood None  Preliminary Report (22 Feb 2025 02:02):    No growth at 24 hours      IMAGING:      Labs, imaging, EKG personally reviewed    RADIOLOGY & ADDITIONAL TESTS: Reviewed. HPI:  73 y/o male with a PMHx of HTN, A-FIB, HLD, chronic back pain and DM presents to the ED c/o a rash on his left leg knee radiating towards his foot. Pt noticed his left thigh got puffy, as well as a rash on the left knee radiating down to his toes. Pt said two days PTA his right leg was hurting, then today the pain began on the left leg. Pt reports he's never had redness like this, and his wife noticed the redness around 1:30pm on day of admission. Pt says he can turn his body but not easily. Pt is complaining of knee pain when inspecting the leg. Pt recently discharged to SNF rehab after treatment for infected TAVR and Osteomyelitis of spine.    Pt admitted to medicine. During w/u pt had CTA of b/l LE showing complete occlusion of L SFA 2/2 likely embolus. Pt taken to OR early this AM for emergent L fem embolectomy.    OVERNIGHT EVENTS / SUBJECTIVE: Patient seen and examined at bedside.     Pt returns to SICU somewhat groggy, denies any pain or complaints at this time.    OBJECTIVE:    VITAL SIGNS:  ICU Vital Signs Last 24 Hrs  T(C): 36.5 (22 Feb 2025 15:41), Max: 38.4 (21 Feb 2025 19:37)  T(F): 97.7 (22 Feb 2025 15:41), Max: 101.1 (21 Feb 2025 19:37)  HR: 88 (22 Feb 2025 16:00) (79 - 120)  BP: 112/70 (22 Feb 2025 16:00) (109/59 - 126/70)  BP(mean): 83 (22 Feb 2025 16:00) (82 - 86)  ABP: --  ABP(mean): --  RR: 16 (22 Feb 2025 11:34) (14 - 19)  SpO2: 100% (22 Feb 2025 16:00) (94% - 100%)    O2 Parameters below as of 22 Feb 2025 11:34  Patient On (Oxygen Delivery Method): nasal cannula              02-21 @ 07:01  -  02-22 @ 07:00  --------------------------------------------------------  IN: 350 mL / OUT: 600 mL / NET: -250 mL    02-22 @ 07:01  - 02-22 @ 18:19  --------------------------------------------------------  IN: 215 mL / OUT: 350 mL / NET: -135 mL      CAPILLARY BLOOD GLUCOSE      POCT Blood Glucose.: 293 mg/dL (22 Feb 2025 17:09)      PHYSICAL EXAM:    General: NAD  HEENT: NC/AT; PERRL, clear conjunctiva  Neck: supple  Respiratory: CTA b/l  Cardiovascular: +S1/S2; RRR  Abdomen: soft, NT/ND  Extremities: Warm, LLE mildly edematous  Skin: normal color and turgor; petechial rash lower left leg  Neurological: A&Ox3    MEDICATIONS:  MEDICATIONS  (STANDING):  atorvastatin 40 milliGRAM(s) Oral at bedtime  DAPTOmycin IVPB      DAPTOmycin IVPB 750 milliGRAM(s) IV Intermittent every 24 hours  dextrose 5%. 1000 milliLiter(s) (50 mL/Hr) IV Continuous <Continuous>  dextrose 5%. 1000 milliLiter(s) (100 mL/Hr) IV Continuous <Continuous>  dextrose 50% Injectable 25 Gram(s) IV Push once  dextrose 50% Injectable 12.5 Gram(s) IV Push once  dextrose 50% Injectable 25 Gram(s) IV Push once  ferrous    sulfate 325 milliGRAM(s) Oral daily  gabapentin 200 milliGRAM(s) Oral three times a day  glucagon  Injectable 1 milliGRAM(s) IntraMuscular once  heparin  Infusion. 1500 Unit(s)/Hr (15 mL/Hr) IV Continuous <Continuous>  insulin lispro (ADMELOG) corrective regimen sliding scale   SubCutaneous three times a day before meals  insulin lispro (ADMELOG) corrective regimen sliding scale   SubCutaneous at bedtime  levothyroxine 150 MICROGram(s) Oral daily  losartan 50 milliGRAM(s) Oral daily  multivitamin 1 Tablet(s) Oral daily  naloxone Injectable 0.4 milliGRAM(s) IV Push once  pantoprazole    Tablet 40 milliGRAM(s) Oral before breakfast  polyethylene glycol 3350 17 Gram(s) Oral daily  senna 2 Tablet(s) Oral at bedtime  sodium chloride 0.9%. 1000 milliLiter(s) (75 mL/Hr) IV Continuous <Continuous>    MEDICATIONS  (PRN):  acetaminophen     Tablet .. 650 milliGRAM(s) Oral every 6 hours PRN Temp greater or equal to 38C (100.4F), Mild Pain (1 - 3)  aluminum hydroxide/magnesium hydroxide/simethicone Suspension 30 milliLiter(s) Oral every 4 hours PRN Dyspepsia  dextrose Oral Gel 15 Gram(s) Oral once PRN Blood Glucose LESS THAN 70 milliGRAM(s)/deciliter  heparin   Injectable 8000 Unit(s) IV Push every 6 hours PRN For aPTT less than 40  heparin   Injectable 4000 Unit(s) IV Push every 6 hours PRN For aPTT between 40 - 57  heparin   Injectable 8000 Unit(s) IV Push every 6 hours PRN For aPTT less than 40  heparin   Injectable 4000 Unit(s) IV Push every 6 hours PRN For aPTT between 40 - 57  melatonin 3 milliGRAM(s) Oral at bedtime PRN Insomnia  ondansetron Injectable 4 milliGRAM(s) IV Push every 8 hours PRN Nausea and/or Vomiting  traMADol 50 milliGRAM(s) Oral every 6 hours PRN Severe Pain (7 - 10)  zolpidem 5 milliGRAM(s) Oral at bedtime PRN Insomnia      ALLERGIES:  Allergies    No Known Allergies    Intolerances        LABS:                        8.4    12.38 )-----------( 344      ( 22 Feb 2025 17:59 )             25.8     Hemoglobin: 8.4 g/dL (02-22 @ 17:59)  Hemoglobin: 8.5 g/dL (02-22 @ 11:00)  Hemoglobin: 7.9 g/dL (02-22 @ 06:32)  Hemoglobin: 8.3 g/dL (02-22 @ 00:47)  Hemoglobin: 7.4 g/dL (02-21 @ 07:17)    CBC Full  -  ( 22 Feb 2025 17:59 )  WBC Count : 12.38 K/uL  RBC Count : 3.14 M/uL  Hemoglobin : 8.4 g/dL  Hematocrit : 25.8 %  Platelet Count - Automated : 344 K/uL  Mean Cell Volume : 82.2 fl  Mean Cell Hemoglobin : 26.8 pg  Mean Cell Hemoglobin Concentration : 32.6 g/dL  Auto Neutrophil # : x  Auto Lymphocyte # : x  Auto Monocyte # : x  Auto Eosinophil # : x  Auto Basophil # : x  Auto Neutrophil % : x  Auto Lymphocyte % : x  Auto Monocyte % : x  Auto Eosinophil % : x  Auto Basophil % : x    02-22    134[L]  |  102  |  26[H]  ----------------------------<  139[H]  4.2   |  23  |  1.07    Ca    8.9      22 Feb 2025 11:00  Phos  3.8     02-22  Mg     2.3     02-22    TPro  6.8  /  Alb  2.0[L]  /  TBili  0.6  /  DBili  x   /  AST  75[H]  /  ALT  80[H]  /  AlkPhos  155[H]  02-22    Creatinine Trend: 1.07<--, 1.05<--, 0.96<--, 1.05<--, 1.26<--, 1.02<--  LIVER FUNCTIONS - ( 22 Feb 2025 11:00 )  Alb: 2.0 g/dL / Pro: 6.8 gm/dL / ALK PHOS: 155 U/L / ALT: 80 U/L / AST: 75 U/L / GGT: x           PT/INR - ( 22 Feb 2025 11:00 )   PT: 16.4 sec;   INR: 1.39 ratio         PTT - ( 22 Feb 2025 11:00 )  PTT:94.0 sec    hs Troponin:    ABG - ( 22 Feb 2025 11:58 )  pH, Arterial: 7.42  pH, Blood: x     /  pCO2: 35    /  pO2: 119   / HCO3: 23    / Base Excess: -1.3  /  SaO2: 99        11:58 - ABG - pH: 7.42  |  pCO2: 35    |  pO2: 119   | Lactate:       | BE: -1.3       Urinalysis Basic - ( 22 Feb 2025 11:00 )    Color: x / Appearance: x / SG: x / pH: x  Gluc: 139 mg/dL / Ketone: x  / Bili: x / Urobili: x   Blood: x / Protein: x / Nitrite: x   Leuk Esterase: x / RBC: x / WBC x   Sq Epi: x / Non Sq Epi: x / Bacteria: x    CSF:    EKG:   MICROBIOLOGY:    Culture - Blood (collected 20 Feb 2025 18:10)  Source: .Blood None  Preliminary Report (22 Feb 2025 02:02):    No growth at 24 hours    Culture - Blood (collected 20 Feb 2025 18:10)  Source: .Blood None  Preliminary Report (22 Feb 2025 02:02):    No growth at 24 hours      IMAGING:      Labs, imaging, EKG personally reviewed    RADIOLOGY & ADDITIONAL TESTS: Reviewed.

## 2025-02-22 NOTE — PROGRESS NOTE ADULT - SUBJECTIVE AND OBJECTIVE BOX
Reviewed chart and interum events this am while pt was in OR    SUBJECTIVE:    CHIEF COMPLAINT:  Patient is a 74y old  Male who presents with a chief complaint of Pain to leg, rash, osteomyelitis (22 Feb 2025 07:59)      HPI:   75 y/o male with a PMHx of HTN, A-FIB, HLD, chronic back pain and DM presents to the ED c/o a rash on his left leg knee radiating towards his foot. Pt noticed  his left thigh got puffy, as well as a rash on the left knee radiating down to his toes. Pt said two days PTA his right leg was hurting, then today the pain began on the left leg. Pt reports he's never had redness like this, and his wife noticed the redness around 1:30pm on day of admission. Pt says he can turn his body but not easily. Pt is complaining of knee pain when inspecting the leg. Pt recently discharged to SNF rehab after treatment for infected TAVR and Osteomyelitis of spine.    Active Problems  Abnormal metabolic state in diabetes mellitus (250.80,783.9) (E11.69,E88.9)  Acute bilateral low back pain with bilateral sciatica (724.2,724.3) (M54.42,M54.41)  Acute viral syndrome (079.99) (B34.9)  Alopecia (704.00) (L65.9)  Atrioventricular block, Mobitz type 1, Wenckebach (426.13) (I44.1)  Back muscle spasm (724.8) (M62.830)  Basal cell carcinoma (BCC) of face (173.31) (C44.310)  Benign localized hyperplasia of prostate with urinary obstruction (600.21,599.69)  (N40.1,N13.8)  BMI 36.0-36.9,adult (V85.36) (Z68.36)  Chronic bilateral low back pain without sciatica (724.2,338.29) (M54.50,G89.29)  Chronic GERD (530.81) (K21.9)  Coronary artery disease (414.00) (I25.10)  Counseling for travel (V65.49) (Z71.84)  Diabetic peripheral neuropathy (250.60,357.2) (E11.42)  Generalized anxiety disorder (300.02) (F41.1)  Hyperlipidemia (272.4) (E78.5)  Hypertension (401.9) (I10)  Hypothyroidism (244.9) (E03.9)  Intermittent palpitations (785.1) (R00.2)  Low back strain, sequela (905.7) (S39.012S)  Mobitz type 1 second degree AV block (426.13) (I44.1)  Multiple benign melanocytic nevi (216.9) (D22.9)  Neoplasm of uncertain behavior of skin (238.2) (D48.5)  Non-insulin dependent type 2 diabetes mellitus (250.00) (E11.9)  Obesity due to excess calories (278.00) (E66.09)  Obstructive sleep apnea of adult (327.23) (G47.33)  Paronychia of finger of right hand (681.02) (L03.011)  Paroxysmal A-fib (427.31) (I48.0)  Prerenal azotemia (790.6) (R79.89)  Prophylactic vaccination against streptococcus pneumoniae and influenza (V06.6) (Z23)  Prostate cancer (185) (C61)  S/p TAVR (transcatheter aortic valve replacement), bioprosthetic (V42.2) (Z95.3)  Screening for viral disease (V73.99) (Z11.59)  Seborrheic keratoses (702.19) (L82.1)  Severe aortic stenosis (424.1) (I35.0)  Spinal stenosis of lumbar region, unspecified whether neurogenic claudication present  (724.02) (M48.061)  Strain of right knee, initial encounter (844.9) (S86.911A)  Urinary frequency (788.41) (R35.0)  ?  Past Medical History  History of Encounter for cosmetic procedure (V50.1) (Z41.1)  History of arthritis (V13.4) (Z87.39)  History of bronchitis (V12.69) (Z87.09)  History of elevated prostate specific antigen (PSA) (V13.89) (Z87.898)  History of elevated prostate specific antigen (PSA) (V13.89) (Z87.898)  History of lentigo (V13.3) (Z87.2)  History of Insect bite of other part of head, initial encounter (910.4,E906.4)  (S00.86XA,W57.XXXA)  History of Muscle weakness of lower extremity (728.87) (M62.81)  History of Near syncope (780.2) (R55)  History of Venomous sting, intentional self-harm, initial encounter (989.5,E950.9)  (T63.92XA)  Viral URI with cough (465.9) (J06.9)  ?  Surgical History  History of Surgery Excision Lipoma  History of Transcatheter aortic valve replacement  ?  Family History  Family history of cardiac disorder (V17.49) (Z82.49) : Father, Brother  Family history of hyperlipidemia (V18.19) (Z83.438) : Father  Family history of malignant neoplasm of urinary bladder (V16.52) (Z80.52) : Mother,  Brother  Family history of malignant neoplasm of uterus (V16.49) (Z80.49) : Sister  Family history of thyroid disease (V18.19) (Z83.49) : Sister  Family history of type 2 diabetes mellitus (V18.0) (Z83.3) : Brother    Social History  Moderate alcohol use  Never a smoker  No illicit drug use  Occupation retired, real estate        MEDICATIONS:  MEDICATIONS  (STANDING):  atorvastatin 40 milliGRAM(s) Oral at bedtime  DAPTOmycin IVPB      DAPTOmycin IVPB 750 milliGRAM(s) IV Intermittent every 24 hours  dextrose 5%. 1000 milliLiter(s) (50 mL/Hr) IV Continuous <Continuous>  dextrose 5%. 1000 milliLiter(s) (100 mL/Hr) IV Continuous <Continuous>  dextrose 50% Injectable 25 Gram(s) IV Push once  dextrose 50% Injectable 12.5 Gram(s) IV Push once  dextrose 50% Injectable 25 Gram(s) IV Push once  ferrous    sulfate 325 milliGRAM(s) Oral daily  gabapentin 200 milliGRAM(s) Oral three times a day  glucagon  Injectable 1 milliGRAM(s) IntraMuscular once  heparin  Infusion. 1500 Unit(s)/Hr (15 mL/Hr) IV Continuous <Continuous>  insulin lispro (ADMELOG) corrective regimen sliding scale   SubCutaneous three times a day before meals  insulin lispro (ADMELOG) corrective regimen sliding scale   SubCutaneous at bedtime  levothyroxine 150 MICROGram(s) Oral daily  losartan 50 milliGRAM(s) Oral daily  multivitamin 1 Tablet(s) Oral daily  naloxone Injectable 0.4 milliGRAM(s) IV Push once  pantoprazole    Tablet 40 milliGRAM(s) Oral before breakfast  polyethylene glycol 3350 17 Gram(s) Oral daily  senna 2 Tablet(s) Oral at bedtime  sodium chloride 0.9%. 1000 milliLiter(s) (75 mL/Hr) IV Continuous <Continuous>      Allergies  No Known Allergies    PHYSICAL EXAM:  Vital Signs Last 24 Hrs  T(C): 36.2 (22 Feb 2025 10:10), Max: 38.4 (21 Feb 2025 19:37)  T(F): 97.1 (22 Feb 2025 10:10), Max: 101.1 (21 Feb 2025 19:37)  HR: 85 (22 Feb 2025 12:00) (81 - 120)  BP: 117/71 (22 Feb 2025 12:00) (105/57 - 126/70)  BP(mean): 85 (22 Feb 2025 12:00) (85 - 86)  RR: 16 (22 Feb 2025 11:34) (14 - 19)  SpO2: 99% (22 Feb 2025 12:00) (94% - 99%)      LABS:                         8.5    17.89 )-----------( 367      ( 22 Feb 2025 11:00 )             26.5     02-22    134[L]  |  102  |  26[H]  ----------------------------<  139[H]  4.2   |  23  |  1.07    Ca    8.9      22 Feb 2025 11:00  Phos  3.8     02-22  Mg     2.3     02-22    TPro  6.8  /  Alb  2.0[L]  /  TBili  0.6  /  DBili  x   /  AST  75[H]  /  ALT  80[H]  /  AlkPhos  155[H]  02-22    Urinalysis Basic - ( 22 Feb 2025 11:00 )  Color: x / Appearance: x / SG: x / pH: x  Gluc: 139 mg/dL / Ketone: x  / Bili: x / Urobili: x   Blood: x / Protein: x / Nitrite: x   Leuk Esterase: x / RBC: x / WBC x   Sq Epi: x / Non Sq Epi: x / Bacteria: x      PT/INR - ( 22 Feb 2025 11:00 )   PT: 16.4 sec;   INR: 1.39 ratio    PTT - ( 22 Feb 2025 11:00 )  PTT:94.0 sec    Specimen Source .Blood None   02-20 @ 18:10  Culture Results  No growth at 24 hours    URINE CULTURE    02-20 @ 18:10    CULTURE RESULTS   No growth at 24 hours    ABG - ( 22 Feb 2025 11:58 )  pH, Arterial: 7.42  pH, Blood: x     /  pCO2: 35    /  pO2: 119   / HCO3: 23    / Base Excess: -1.3  /  SaO2: 99        CAPILLARY BLOOD GLUCOSE  POCT Blood Glucose.: 143 mg/dL (22 Feb 2025 10:20)  POCT Blood Glucose.: 101 mg/dL (21 Feb 2025 19:30)    RADIOLOGY  CT Angio Lower Extremity w/ IV Cont, Bilateral (02.21.25 @ 18:13) >  IMPRESSION:    1. Complete occlusion of the proximal left SFA with extension of a long 4   cm presumed embolus rather than thrombus into the profunda. There is   reconstitution distally.    2. Diffusely diseased trifurcation vessels bilaterally but probably   patent with three-vessel runoff to the feet/ankles on both sides.

## 2025-02-22 NOTE — BRIEF OPERATIVE NOTE - NSICDXBRIEFPROCEDURE_GEN_ALL_CORE_FT
PROCEDURES:  Thrombectomy of left femoral vessel 23-Feb-2025 02:15:48  Salazar Sands  Endarterectomy of left common femoral artery 23-Feb-2025 02:16:14  Salazar Sands  Endarterectomy of left superficial femoral artery 23-Feb-2025 02:16:36  Salazar Sands

## 2025-02-22 NOTE — PROGRESS NOTE ADULT - SUBJECTIVE AND OBJECTIVE BOX
CTA w c/f Complete occlusion of the proximal left SFA with extension of a long 4 cm presumed embolus rather than thrombus into the profunda.   Pain in LLE slightly increased this am, LLE warm dopplerable signals, but w diminished motor, on hep gtt, completed one unit pRBC transfusion   NPO for OR    Vitals:  T(C): 37.3 (02-22 @ 05:40), Max: 38.4 (02-21 @ 19:37)  HR: 89 (02-22 @ 05:40) (88 - 120)  BP: 124/66 (02-22 @ 05:40) (105/57 - 124/66)  RR: 17 (02-22 @ 05:40) (17 - 19)  SpO2: 97% (02-22 @ 05:40) (94% - 97%)    02-20 @ 07:01  -  02-21 @ 07:00  --------------------------------------------------------  IN:    sodium chloride 0.9%: 450 mL  Total IN: 450 mL    OUT:    Voided (mL): 600 mL  Total OUT: 600 mL    Total NET: -150 mL      02-21 @ 07:01  -  02-22 @ 06:46  --------------------------------------------------------  IN:    Oral Fluid: 350 mL  Total IN: 350 mL    OUT:    Voided (mL): 600 mL  Total OUT: 600 mL    Total NET: -250 mL      Physical Exam:  General: AAOx3, Well developed, NAD  Chest: Normal respiratory effort  Heart: RRR  Abdomen: Soft, NTND, no masses  Extremities: Warm  RLE: sensory motor intact, palpable PT/DP  LLE: edema, rash, sensory intact, motor diminished compared to RLE, wiggles toes, slightly moves ankle, not able flex/extent the knee or hip , dopplerable DP and PT, dopplerable Popliteal, palp femoral     02-22 @ 00:47                    8.3  CBC: 14.10>)-------(<279                     25.7                 - | - | -    CMP:  ----------------------< -               - | - | -                      Ca:-  Phos:-  Mg:-               -|      |-        LFTs:  ------|-|-----             -|      |-  02-21 @ 07:17                    7.4  CBC: 18.24>)-------(<268                     23.0                 133 | 100 | 27    CMP:  ----------------------< 90               3.8 | 24 | 0.96                      Ca:9.2  Phos:-  Mg:-               -|      |-        LFTs:  ------|-|-----             -|      |-      Culture - Blood (collected 02-20-25 @ 18:10)  Source: .Blood None  Preliminary Report (02-22-25 @ 02:02):    No growth at 24 hours    Culture - Blood (collected 02-20-25 @ 18:10)  Source: .Blood None  Preliminary Report (02-22-25 @ 02:02):    No growth at 24 hours      Current Inpatient Medications:  acetaminophen     Tablet .. 650 milliGRAM(s) Oral every 6 hours PRN  aluminum hydroxide/magnesium hydroxide/simethicone Suspension 30 milliLiter(s) Oral every 4 hours PRN  atorvastatin 40 milliGRAM(s) Oral at bedtime  DAPTOmycin IVPB      DAPTOmycin IVPB 750 milliGRAM(s) IV Intermittent every 24 hours  ferrous    sulfate 325 milliGRAM(s) Oral daily  gabapentin 200 milliGRAM(s) Oral three times a day  heparin   Injectable 8000 Unit(s) IV Push every 6 hours PRN  heparin   Injectable 4000 Unit(s) IV Push every 6 hours PRN  heparin  Infusion.  Unit(s)/Hr (18 mL/Hr) IV Continuous <Continuous>  levothyroxine 150 MICROGram(s) Oral daily  losartan 50 milliGRAM(s) Oral daily  melatonin 3 milliGRAM(s) Oral at bedtime PRN  multivitamin 1 Tablet(s) Oral daily  naloxone Injectable 0.4 milliGRAM(s) IV Push once  ondansetron Injectable 4 milliGRAM(s) IV Push every 8 hours PRN  pantoprazole    Tablet 40 milliGRAM(s) Oral before breakfast  polyethylene glycol 3350 17 Gram(s) Oral daily  senna 2 Tablet(s) Oral at bedtime  sodium chloride 0.9%. 1000 milliLiter(s) (75 mL/Hr) IV Continuous <Continuous>  traMADol 50 milliGRAM(s) Oral every 6 hours PRN  zolpidem 5 milliGRAM(s) Oral at bedtime PRN      < from: CT Angio Lower Extremity w/ IV Cont, Bilateral (02.21.25 @ 18:13) >  ACC: [id number] EXAM:  CT ANGIO LWR EXT (W)AW IC BI   ORDERED BY: NATHALY AGUILAR     PROCEDURE DATE:  02/21/2025          INTERPRETATION:  CT ANGIOGRAM OF THE PELVIS AND BILATERAL LOWER   EXTREMITIES:      CLINICAL INFORMATION: Limb ischemia.    COMPARISON: None.    Technique: CT angiogram images of the pelvis and bilateral lower   extremities performed. Sagittal and coronal 2-D reconstructions and 3-D   volume rendered reconstructions of the a lower extremity arterial system   obtained. Delayed venous phase images also obtained.    CONTRAST/COMPLICATIONS:  IV Contrast: Omnipaque 350  125 cc administered   0 cc discarded  Oral Contrast: NONE    Visualized portions of the lower abdominal aorta  -No aneurysm or stenoses.    Right side:  --Scattered atherosclerotic calcifications to a relatively mild degree.  -Common iliac artery (LESLIE): Widely patent with no significant stenosis.  -External iliac artery (EIA): Widely patent with no significant stenosis.  -Internal iliac artery (IIA): Patent.  -Common femoral artery (CFA): Widely patent with no significant stenosis.  -Profunda: Widely patent with no significant stenosis.  -Superficial femoral artery (SFA): Widely patent with no significant   stenosis.  -Popliteal artery (PA): Widely patent with no significant stenosis.  -Below the knee runoff:  -Anterior tibial artery (PAZ): Diffusely diseased with extensive   calcified plaque, difficult to evaluate but probably patent to the ankle.  -Tibioperoneal trunk: Patent.  -Posterior tibial artery (PTA): Diffusely diseased with extensive   calcified plaque, difficult to evaluate but probably patent to the ankle.  -Peroneal artery: Diffusely diseased with extensive calcified plaque,   difficult to evaluate but probably patent to the ankle.    Left side:  -Common iliac artery (LESLIE): Widely patent with no significant stenosis.  -External iliac artery (EIA): Widely patent with no significant stenosis.  -Internal iliac artery (IIA): Patent.  -Common femoral artery (CFA): Widely patent with no significant stenosis.  -Profunda: Widely patent with no significant stenosis.  -Superficial femoral artery (SFA): A 4 cm long filling defect completely   occludes the proximal SFA, extending into the proximal profunda, coronal   series 603 image 37, axial series 3 image 57. There is reconstitution of   the SFA.  -Popliteal artery (PA): Widely patent with no significant stenosis.  -Below the knee runoff:  -Anterior tibial artery (PAZ): Diffusely diseased with extensive   calcified plaque,difficult to evaluate but probably patent to the ankle.  -Tibioperoneal trunk: Patent.  -Posterior tibial artery (PTA): Diffusely diseased with extensive   calcified plaque, difficult to evaluate but probably patent to the ankle.  -Peroneal artery: Diffusely diseased with extensive calcified plaque,   difficult to evaluate but probably patent to the ankle.    Visualized portions of the lower abdomen and pelvis show no acute   abnormalities. There are fiducial markers in the prostate.    IMPRESSION:    1. Complete occlusion of the proximal left SFA with extension of a long 4   cm presumed embolus rather than thrombus into the profunda. There is   reconstitution distally.    2. Diffusely diseased trifurcation vessels bilaterally but probably   patent with three-vessel runoff to the feet/ankles on both sides.    Report discussed with Dr. Zarco at approximately 1845 hours EST      < end of copied text >  < from: US Physiol Extremity Lower 3+ Level, BI (02.21.25 @ 12:03) >      INTERPRETATION:  Clinical Information: Peripheral vascular disease.   vasculitis/pulse disparity    Technique: Bilateral lower extremity ABIs/PVR    Comparison: Same day CTA    Findings/  Impression:  Right lower extremity: The ankle brachial index is 1.45. TBI is 1.17 with   digital pressures of 129 mmHg. The pulse waveforms are normal.    Left lower extremity: The ankle brachial index is 0.62. TBI is 0 with   digital pressures of 0 mmHg. The pulse waveforms are diffusely diminished   throughout the thigh and leg and nonpulsatile in the digit.. Findings   compatible with left SFA occlusion on CT angiography.    < end of copied text >

## 2025-02-22 NOTE — CONSULT NOTE ADULT - SUBJECTIVE AND OBJECTIVE BOX
CHIEF COMPLAINT: Patient is a 74y old  Male who presents with a chief complaint of Pain to leg, rash, osteomyelitis (22 Feb 2025 06:44)      HPI:   75 y/o male with a PMHx of HTN, A-FIB, HLD, chronic back pain and DM presents to the ED c/o a rash on his left leg knee radiating towards his foot. Pt noticed  his left thigh got puffy, as well as a rash on the left knee radiating down to his toes. Pt said two days PTA his right leg was hurting, then today the pain began on the left leg. Pt reports he's never had redness like this, and his wife noticed the redness around 1:30pm on day of admission. Pt says he can turn his body but not easily. Pt is complaining of knee pain when inspecting the leg. Pt recently discharged to SNF rehab after treatment for infected TAVR and Osteomyelitis of spine.    2/22-patient readmitted with leg pains and rash; initially thought to be due to antibiotic side effect.  CTA of legs show occlusion of flow in left SFA-described and an embolus rather than a thrombus.  PAtient with signficant PVD and calcifications.  Vascular requesting ROVERTO to r/o cardiac source of emboli.   ROVERTO done 2/13/2025 with completely normal LV function with no clot in LV, noral CHUY with no thrombus and no tumors and no PFO.   Patient is on AC for PAFib (which he is not in currently).  Patient had vegetation.     Vascular requesting repeat ROVERTO    Active Problems  Abnormal metabolic state in diabetes mellitus (250.80,783.9) (E11.69,E88.9)  Acute bilateral low back pain with bilateral sciatica (724.2,724.3) (M54.42,M54.41)  Acute viral syndrome (079.99) (B34.9)  Alopecia (704.00) (L65.9)  Atrioventricular block, Mobitz type 1, Wenckebach (426.13) (I44.1)  Back muscle spasm (724.8) (M62.830)  Basal cell carcinoma (BCC) of face (173.31) (C44.310)  Benign localized hyperplasia of prostate with urinary obstruction (600.21,599.69)  (N40.1,N13.8)  BMI 36.0-36.9,adult (V85.36) (Z68.36)  Chronic bilateral low back pain without sciatica (724.2,338.29) (M54.50,G89.29)  Chronic GERD (530.81) (K21.9)  Coronary artery disease (414.00) (I25.10)  Counseling for travel (V65.49) (Z71.84)  Diabetic peripheral neuropathy (250.60,357.2) (E11.42)  Generalized anxiety disorder (300.02) (F41.1)  Hyperlipidemia (272.4) (E78.5)  Hypertension (401.9) (I10)  Hypothyroidism (244.9) (E03.9)  Intermittent palpitations (785.1) (R00.2)  Low back strain, sequela (905.7) (S39.012S)  Mobitz type 1 second degree AV block (426.13) (I44.1)  Multiple benign melanocytic nevi (216.9) (D22.9)  Neoplasm of uncertain behavior of skin (238.2) (D48.5)  Non-insulin dependent type 2 diabetes mellitus (250.00) (E11.9)  Obesity due to excess calories (278.00) (E66.09)  Obstructive sleep apnea of adult (327.23) (G47.33)  Paronychia of finger of right hand (681.02) (L03.011)  Paroxysmal A-fib (427.31) (I48.0)  Prerenal azotemia (790.6) (R79.89)  Prophylactic vaccination against streptococcus pneumoniae and influenza (V06.6) (Z23)  Prostate cancer (185) (C61)  S/p TAVR (transcatheter aortic valve replacement), bioprosthetic (V42.2) (Z95.3)  Screening for viral disease (V73.99) (Z11.59)  Seborrheic keratoses (702.19) (L82.1)  Severe aortic stenosis (424.1) (I35.0)  Spinal stenosis of lumbar region, unspecified whether neurogenic claudication present  (724.02) (M48.061)  Strain of right knee, initial encounter (844.9) (S86.911A)  Urinary frequency (788.41) (R35.0)  ?  Past Medical History  History of Encounter for cosmetic procedure (V50.1) (Z41.1)  History of arthritis (V13.4) (Z87.39)  History of bronchitis (V12.69) (Z87.09)  History of elevated prostate specific antigen (PSA) (V13.89) (Z87.898)  History of elevated prostate specific antigen (PSA) (V13.89) (Z87.898)  History of lentigo (V13.3) (Z87.2)  History of Insect bite of other part of head, initial encounter (910.4,E906.4)  (S00.86XA,W57.XXXA)  History of Muscle weakness of lower extremity (728.87) (M62.81)  History of Near syncope (780.2) (R55)  History of Venomous sting, intentional self-harm, initial encounter (989.5,E950.9)  (T63.92XA)  Viral URI with cough (465.9) (J06.9)  ?  Surgical History  History of Surgery Excision Lipoma  History of Transcatheter aortic valve replacement  ?  Family History  Family history of cardiac disorder (V17.49) (Z82.49) : Father, Brother  Family history of hyperlipidemia (V18.19) (Z83.438) : Father  Family history of malignant neoplasm of urinary bladder (V16.52) (Z80.52) : Mother,  Brother  Family history of malignant neoplasm of uterus (V16.49) (Z80.49) : Sister  Family history of thyroid disease (V18.19) (Z83.49) : Sister  Family history of type 2 diabetes mellitus (V18.0) (Z83.3) : Brother    Social History  Moderate alcohol use  Never a smoker  No illicit drug use  Occupation retired, real estate        (20 Feb 2025 20:26)      PMHx: PAST MEDICAL & SURGICAL HISTORY:  Hypertension      Diabetes mellitus      Obesity      Hypothyroidism      Prostate CA            Soc Hx:       Allergies: Allergies    No Known Allergies    Intolerances          REVIEW OF SYSTEMS:    CONSTITUTIONAL: No weakness, fevers or chills  EYES/ENT: No visual changes;  No vertigo or throat pain   NECK: No pain or stiffness  RESPIRATORY: No cough, wheezing, hemoptysis; No shortness of breath  CARDIOVASCULAR: No chest pain or palpitations  GASTROINTESTINAL: No abdominal or epigastric pain. No nausea, vomiting, or hematemesis; No diarrhea or constipation. No melena or hematochezia.  GENITOURINARY: No dysuria, frequency or hematuria  NEUROLOGICAL: No numbness or weakness  SKIN: No itching, burning, rashes, or lesions   All other review of systems is negative unless indicated above    Vital Signs Last 24 Hrs  T(C): 37.3 (22 Feb 2025 05:40), Max: 38.4 (21 Feb 2025 19:37)  T(F): 99.1 (22 Feb 2025 05:40), Max: 101.1 (21 Feb 2025 19:37)  HR: 89 (22 Feb 2025 05:40) (88 - 120)  BP: 124/66 (22 Feb 2025 05:40) (105/57 - 124/66)  BP(mean): --  RR: 17 (22 Feb 2025 05:40) (17 - 19)  SpO2: 97% (22 Feb 2025 05:40) (94% - 97%)    Parameters below as of 22 Feb 2025 05:40  Patient On (Oxygen Delivery Method): room air        I&O's Summary    21 Feb 2025 07:01  -  22 Feb 2025 07:00  --------------------------------------------------------  IN: 350 mL / OUT: 600 mL / NET: -250 mL        CAPILLARY BLOOD GLUCOSE      POCT Blood Glucose.: 101 mg/dL (21 Feb 2025 19:30)  POCT Blood Glucose.: 116 mg/dL (21 Feb 2025 12:24)  POCT Blood Glucose.: 90 mg/dL (21 Feb 2025 08:05)      PHYSICAL EXAM:   Constitutional: NAD, awake and alert, well-developed  HEENT: PERR, EOMI, Normal Hearing, MMM  Neck: Soft and supple, No LAD, No JVD  Respiratory: Breath sounds are clear bilaterally, No wheezing, rales or rhonchi  Cardiovascular: S1 and S2, regular rate and rhythm, no Murmurs, gallops or rubs  Gastrointestinal: Bowel Sounds present, soft, nontender, nondistended, no guarding, no rebound  Extremities: No peripheral edema  Vascular: 2+ peripheral pulses  Neurological: A/O x 3, no focal deficits  Musculoskeletal: 5/5 strength b/l upper and lower extremities  Skin: No rashes    MEDICATIONS:  MEDICATIONS  (STANDING):  atorvastatin 40 milliGRAM(s) Oral at bedtime  DAPTOmycin IVPB      DAPTOmycin IVPB 750 milliGRAM(s) IV Intermittent every 24 hours  ferrous    sulfate 325 milliGRAM(s) Oral daily  gabapentin 200 milliGRAM(s) Oral three times a day  heparin  Infusion.  Unit(s)/Hr (18 mL/Hr) IV Continuous <Continuous>  levothyroxine 150 MICROGram(s) Oral daily  losartan 50 milliGRAM(s) Oral daily  multivitamin 1 Tablet(s) Oral daily  naloxone Injectable 0.4 milliGRAM(s) IV Push once  pantoprazole    Tablet 40 milliGRAM(s) Oral before breakfast  polyethylene glycol 3350 17 Gram(s) Oral daily  senna 2 Tablet(s) Oral at bedtime  sodium chloride 0.9%. 1000 milliLiter(s) (75 mL/Hr) IV Continuous <Continuous>      LABS: All Labs Reviewed:                        7.9    14.60 )-----------( 335      ( 22 Feb 2025 06:32 )             24.0     02-22    133[L]  |  102  |  24[H]  ----------------------------<  103[H]  3.9   |  25  |  1.05    Ca    8.8      22 Feb 2025 06:32  Mg     2.1     02-20    TPro  6.7  /  Alb  2.0[L]  /  TBili  0.6  /  DBili  x   /  AST  78[H]  /  ALT  63  /  AlkPhos  130[H]  02-20    PT/INR - ( 20 Feb 2025 19:09 )   PT: 16.5 sec;   INR: 1.40 ratio         PTT - ( 22 Feb 2025 00:47 )  PTT:>200.0 sec        Blood Culture: Organism --  Gram Stain Blood -- Gram Stain --  Specimen Source .Blood None  Culture-Blood --      lipid profile       RADIOLOGY:    EKG:    Telemetry:    ECHO:  limited study-with difinity- no LV thrombus      CHIEF COMPLAINT: Patient is a 74y old  Male who presents with a chief complaint of Pain to leg, rash, osteomyelitis (22 Feb 2025 06:44)      HPI:   73 y/o male with a PMHx of HTN, A-FIB, HLD, chronic back pain and DM presents to the ED c/o a rash on his left leg knee radiating towards his foot. Pt noticed  his left thigh got puffy, as well as a rash on the left knee radiating down to his toes. Pt said two days PTA his right leg was hurting, then today the pain began on the left leg. Pt reports he's never had redness like this, and his wife noticed the redness around 1:30pm on day of admission. Pt says he can turn his body but not easily. Pt is complaining of knee pain when inspecting the leg. Pt recently discharged to SNF rehab after treatment for infected TAVR and Osteomyelitis of spine.    2/21-patient readmitted with leg pains and rash; initially thought to be due to antibiotic side effect.  CTA of legs show occlusion of flow in left SFA-described and an embolus rather than a thrombus.  PAtient with signficant PVD and calcifications.  Vascular requesting ROVERTO to r/o cardiac source of emboli.   ROVERTO done 2/13/2025 with completely normal LV function with no clot in LV, normal CHUY with no thrombus and no tumors and no PFO.   Patient is on AC for PAFib (which he is not in currently).  Patient had vegetation.     Vascular requesting repeat ROVERTO.    2/22-patient not in room; vascular felt this to be an emergency and took patient to OR.   TTE with contrast not done.        Active Problems  Abnormal metabolic state in diabetes mellitus (250.80,783.9) (E11.69,E88.9)  Acute bilateral low back pain with bilateral sciatica (724.2,724.3) (M54.42,M54.41)  Acute viral syndrome (079.99) (B34.9)  Alopecia (704.00) (L65.9)  Atrioventricular block, Mobitz type 1, Wenckebach (426.13) (I44.1)  Back muscle spasm (724.8) (M62.830)  Basal cell carcinoma (BCC) of face (173.31) (C44.310)  Benign localized hyperplasia of prostate with urinary obstruction (600.21,599.69)  (N40.1,N13.8)  BMI 36.0-36.9,adult (V85.36) (Z68.36)  Chronic bilateral low back pain without sciatica (724.2,338.29) (M54.50,G89.29)  Chronic GERD (530.81) (K21.9)  Coronary artery disease (414.00) (I25.10)  Counseling for travel (V65.49) (Z71.84)  Diabetic peripheral neuropathy (250.60,357.2) (E11.42)  Generalized anxiety disorder (300.02) (F41.1)  Hyperlipidemia (272.4) (E78.5)  Hypertension (401.9) (I10)  Hypothyroidism (244.9) (E03.9)  Intermittent palpitations (785.1) (R00.2)  Low back strain, sequela (905.7) (S39.012S)  Mobitz type 1 second degree AV block (426.13) (I44.1)  Multiple benign melanocytic nevi (216.9) (D22.9)  Neoplasm of uncertain behavior of skin (238.2) (D48.5)  Non-insulin dependent type 2 diabetes mellitus (250.00) (E11.9)  Obesity due to excess calories (278.00) (E66.09)  Obstructive sleep apnea of adult (327.23) (G47.33)  Paronychia of finger of right hand (681.02) (L03.011)  Paroxysmal A-fib (427.31) (I48.0)  Prerenal azotemia (790.6) (R79.89)  Prophylactic vaccination against streptococcus pneumoniae and influenza (V06.6) (Z23)  Prostate cancer (185) (C61)  S/p TAVR (transcatheter aortic valve replacement), bioprosthetic (V42.2) (Z95.3)  Screening for viral disease (V73.99) (Z11.59)  Seborrheic keratoses (702.19) (L82.1)  Severe aortic stenosis (424.1) (I35.0)  Spinal stenosis of lumbar region, unspecified whether neurogenic claudication present  (724.02) (M48.061)  Strain of right knee, initial encounter (844.9) (S86.911A)  Urinary frequency (788.41) (R35.0)  ?  Past Medical History  History of Encounter for cosmetic procedure (V50.1) (Z41.1)  History of arthritis (V13.4) (Z87.39)  History of bronchitis (V12.69) (Z87.09)  History of elevated prostate specific antigen (PSA) (V13.89) (Z87.898)  History of elevated prostate specific antigen (PSA) (V13.89) (Z87.898)  History of lentigo (V13.3) (Z87.2)  History of Insect bite of other part of head, initial encounter (910.4,E906.4)  (S00.86XA,W57.XXXA)  History of Muscle weakness of lower extremity (728.87) (M62.81)  History of Near syncope (780.2) (R55)  History of Venomous sting, intentional self-harm, initial encounter (989.5,E950.9)  (T63.92XA)  Viral URI with cough (465.9) (J06.9)  ?  Surgical History  History of Surgery Excision Lipoma  History of Transcatheter aortic valve replacement  ?  Family History  Family history of cardiac disorder (V17.49) (Z82.49) : Father, Brother  Family history of hyperlipidemia (V18.19) (Z83.438) : Father  Family history of malignant neoplasm of urinary bladder (V16.52) (Z80.52) : Mother,  Brother  Family history of malignant neoplasm of uterus (V16.49) (Z80.49) : Sister  Family history of thyroid disease (V18.19) (Z83.49) : Sister  Family history of type 2 diabetes mellitus (V18.0) (Z83.3) : Brother    Social History  Moderate alcohol use  Never a smoker  No illicit drug use  Occupation retired, real estate        (20 Feb 2025 20:26)      PMHx: PAST MEDICAL & SURGICAL HISTORY:  Hypertension      Diabetes mellitus      Obesity      Hypothyroidism      Prostate CA            Soc Hx:       Allergies: Allergies    No Known Allergies    Intolerances          REVIEW OF SYSTEMS:    CONSTITUTIONAL: No weakness, fevers or chills  EYES/ENT: No visual changes;  No vertigo or throat pain   NECK: No pain or stiffness  RESPIRATORY: No cough, wheezing, hemoptysis; No shortness of breath  CARDIOVASCULAR: No chest pain or palpitations  GASTROINTESTINAL: No abdominal or epigastric pain. No nausea, vomiting, or hematemesis; No diarrhea or constipation. No melena or hematochezia.  GENITOURINARY: No dysuria, frequency or hematuria  NEUROLOGICAL: No numbness or weakness  SKIN: No itching, burning, rashes, or lesions   All other review of systems is negative unless indicated above    Vital Signs Last 24 Hrs  T(C): 37.3 (22 Feb 2025 05:40), Max: 38.4 (21 Feb 2025 19:37)  T(F): 99.1 (22 Feb 2025 05:40), Max: 101.1 (21 Feb 2025 19:37)  HR: 89 (22 Feb 2025 05:40) (88 - 120)  BP: 124/66 (22 Feb 2025 05:40) (105/57 - 124/66)  BP(mean): --  RR: 17 (22 Feb 2025 05:40) (17 - 19)  SpO2: 97% (22 Feb 2025 05:40) (94% - 97%)    Parameters below as of 22 Feb 2025 05:40  Patient On (Oxygen Delivery Method): room air        I&O's Summary    21 Feb 2025 07:01  -  22 Feb 2025 07:00  --------------------------------------------------------  IN: 350 mL / OUT: 600 mL / NET: -250 mL        CAPILLARY BLOOD GLUCOSE      POCT Blood Glucose.: 101 mg/dL (21 Feb 2025 19:30)  POCT Blood Glucose.: 116 mg/dL (21 Feb 2025 12:24)  POCT Blood Glucose.: 90 mg/dL (21 Feb 2025 08:05)      PHYSICAL EXAM:   Constitutional: NAD, awake and alert, well-developed  HEENT: PERR, EOMI, Normal Hearing, MMM  Neck: Soft and supple, No LAD, No JVD  Respiratory: Breath sounds are clear bilaterally, No wheezing, rales or rhonchi  Cardiovascular: S1 and S2, regular rate and rhythm, no Murmurs, gallops or rubs  Gastrointestinal: Bowel Sounds present, soft, nontender, nondistended, no guarding, no rebound  Extremities: No peripheral edema  Vascular: 2+ peripheral pulses  Neurological: A/O x 3, no focal deficits  Musculoskeletal: 5/5 strength b/l upper and lower extremities  Skin: No rashes    MEDICATIONS:  MEDICATIONS  (STANDING):  atorvastatin 40 milliGRAM(s) Oral at bedtime  DAPTOmycin IVPB      DAPTOmycin IVPB 750 milliGRAM(s) IV Intermittent every 24 hours  ferrous    sulfate 325 milliGRAM(s) Oral daily  gabapentin 200 milliGRAM(s) Oral three times a day  heparin  Infusion.  Unit(s)/Hr (18 mL/Hr) IV Continuous <Continuous>  levothyroxine 150 MICROGram(s) Oral daily  losartan 50 milliGRAM(s) Oral daily  multivitamin 1 Tablet(s) Oral daily  naloxone Injectable 0.4 milliGRAM(s) IV Push once  pantoprazole    Tablet 40 milliGRAM(s) Oral before breakfast  polyethylene glycol 3350 17 Gram(s) Oral daily  senna 2 Tablet(s) Oral at bedtime  sodium chloride 0.9%. 1000 milliLiter(s) (75 mL/Hr) IV Continuous <Continuous>      LABS: All Labs Reviewed:                        7.9    14.60 )-----------( 335      ( 22 Feb 2025 06:32 )             24.0     02-22    133[L]  |  102  |  24[H]  ----------------------------<  103[H]  3.9   |  25  |  1.05    Ca    8.8      22 Feb 2025 06:32  Mg     2.1     02-20    TPro  6.7  /  Alb  2.0[L]  /  TBili  0.6  /  DBili  x   /  AST  78[H]  /  ALT  63  /  AlkPhos  130[H]  02-20    PT/INR - ( 20 Feb 2025 19:09 )   PT: 16.5 sec;   INR: 1.40 ratio         PTT - ( 22 Feb 2025 00:47 )  PTT:>200.0 sec        Blood Culture: Organism --  Gram Stain Blood -- Gram Stain --  Specimen Source .Blood None  Culture-Blood --      lipid profile       RADIOLOGY:    EKG:    Telemetry:    ECHO:

## 2025-02-22 NOTE — PROGRESS NOTE ADULT - ASSESSMENT
75 y/o Male with h/o HTN, DM, prostate CA, hypothyroidism, AVR, A.Fib on eliquis, chronic back pain s/p epidurals on diclofenac, HLD, NIDDM, CKD recent hospitalization on 2/11 for sepsis with strep viridans, subacute AV prosthetic bacterial endocarditis, probable lumbar spine discitis admitted for LE rash.     CTA w Complete occlusion of the proximal left SFA with extension of a long 4  cm presumed embolus rather than thrombus into the profunda. There is  reconstitution distally.    Plan:  OR urgently for L femoral embolectomy  NPO, f/u labs  c/w Hep gtt  c/w IV ABx  rest of care per primary team

## 2025-02-22 NOTE — PROVIDER CONTACT NOTE (CRITICAL VALUE NOTIFICATION) - SITUATION
resident aware, Heparin drip orders obtained on arrival to unit, will follow protocol resident aware, states value "trending down",  current Heparin drip orders also obtained on arrival to unit, will follow protocol

## 2025-02-22 NOTE — PROGRESS NOTE ADULT - ATTENDING COMMENTS
pt with left CFA & profunda occlusion , his left foot is warm he is weak proximally in both legs left worse than right . he doesn have some distal weakness with plantar flexion on left . proximal weakness not from arterial occlusion likely from spinal infection previous MRI with phlegmon . he reports worsening back pain would rescan MRI  , plan is for left lower extremity embolectomy , don't believe fasciotomy is necessary given leg/ foot is warm with good distal perfusion distal to occlusion , but will need to monitor . in regards to skin changes on dorsum of skin this is likely micro emboli will need to monitor as well , this skin may become necrotic . will continue heparin gtt

## 2025-02-22 NOTE — PROGRESS NOTE ADULT - ASSESSMENT
73 y/o male with a PMHx of HTN, A-FIB, HLD, chronic back pain and DM presents to the ED c/o a rash on his left leg knee radiating towards his foot.    Assessment:  Osteomyelitis Spine  Endocarditis TAVR  Leukocytosis  Hyponatremia - improved  Anemia  Type 2 DM  Purpuric Rash to L leg  Common Femoral Thombosis with embolization  HTN  Malnutrition    Plan:  POD#0 s/p Endarterectomy  .IV Rocephin  PICC line changed to Daptomycin  ID consult appreciated  Vascular Surgery Consult appreciated  Physical Therapy   Social work consult for SNF rehab return  liberalized sodium intake  PO Fluid restriction  Continue IVF  Encourage PO intake  Transfusion PRBC's - difficult crossmatch. Awaiting blood supply  follow lytes and CBC  Pain control  Repeat ROVERTO to see if vegitation is worse  Cardiology following  Discussed with wife at length  PT is DNR/DNI  73 y/o male with a PMHx of HTN, A-FIB, HLD, chronic back pain and DM presents to the ED c/o a rash on his left leg knee radiating towards his foot.    Assessment:  Osteomyelitis Spine  Endocarditis TAVR  Leukocytosis  Hyponatremia - improved  Anemia  Type 2 DM  Purpuric Rash to L leg  Common Femoral Thombosis with embolization  HTN  Malnutrition    Plan:  POD#0 s/p Endarterectomy  .IV Rocephin  PICC line changed to Daptomycin  ID consult appreciated  Vascular Surgery Consult appreciated  Physical Therapy   Social work consult for SNF rehab return  liberalized sodium intake  PO Fluid restriction  Continue IVF  Encourage PO intake  Transfusion PRBC's - difficult crossmatch. Awaiting blood supply  follow lytes and CBC  Pain control  Repeat ROVERTO to see if vegitation is worse  Cardiology following  IV Heparin  Follow for risk of eDKA since Farxiga stopped less than 3 days prior to surgery  Discussed with wife at length  PT is DNR/DNI

## 2025-02-23 LAB
ANION GAP SERPL CALC-SCNC: 8 MMOL/L — SIGNIFICANT CHANGE UP (ref 5–17)
APTT BLD: 74.7 SEC — HIGH (ref 24.5–35.6)
APTT BLD: 76.2 SEC — HIGH (ref 24.5–35.6)
BUN SERPL-MCNC: 39 MG/DL — HIGH (ref 7–23)
CALCIUM SERPL-MCNC: 9 MG/DL — SIGNIFICANT CHANGE UP (ref 8.5–10.1)
CHLORIDE SERPL-SCNC: 104 MMOL/L — SIGNIFICANT CHANGE UP (ref 96–108)
CO2 SERPL-SCNC: 23 MMOL/L — SIGNIFICANT CHANGE UP (ref 22–31)
CREAT SERPL-MCNC: 1.21 MG/DL — SIGNIFICANT CHANGE UP (ref 0.5–1.3)
EGFR: 63 ML/MIN/1.73M2 — SIGNIFICANT CHANGE UP
GLUCOSE BLDC GLUCOMTR-MCNC: 172 MG/DL — HIGH (ref 70–99)
GLUCOSE BLDC GLUCOMTR-MCNC: 199 MG/DL — HIGH (ref 70–99)
GLUCOSE BLDC GLUCOMTR-MCNC: 236 MG/DL — HIGH (ref 70–99)
GLUCOSE BLDC GLUCOMTR-MCNC: 243 MG/DL — HIGH (ref 70–99)
GLUCOSE SERPL-MCNC: 209 MG/DL — HIGH (ref 70–99)
HCT VFR BLD CALC: 27.7 % — LOW (ref 39–50)
HGB BLD-MCNC: 8.7 G/DL — LOW (ref 13–17)
MAGNESIUM SERPL-MCNC: 2.6 MG/DL — SIGNIFICANT CHANGE UP (ref 1.6–2.6)
MCHC RBC-ENTMCNC: 26.4 PG — LOW (ref 27–34)
MCHC RBC-ENTMCNC: 31.4 G/DL — LOW (ref 32–36)
MCV RBC AUTO: 83.9 FL — SIGNIFICANT CHANGE UP (ref 80–100)
MRSA PCR RESULT.: SIGNIFICANT CHANGE UP
NRBC # BLD AUTO: 0 K/UL — SIGNIFICANT CHANGE UP (ref 0–0)
NRBC # FLD: 0 K/UL — SIGNIFICANT CHANGE UP (ref 0–0)
NRBC BLD AUTO-RTO: 0 /100 WBCS — SIGNIFICANT CHANGE UP (ref 0–0)
PHOSPHATE SERPL-MCNC: 3.2 MG/DL — SIGNIFICANT CHANGE UP (ref 2.5–4.5)
PLATELET # BLD AUTO: 346 K/UL — SIGNIFICANT CHANGE UP (ref 150–400)
PMV BLD: 9.8 FL — SIGNIFICANT CHANGE UP (ref 7–13)
POTASSIUM SERPL-MCNC: 4.3 MMOL/L — SIGNIFICANT CHANGE UP (ref 3.5–5.3)
POTASSIUM SERPL-SCNC: 4.3 MMOL/L — SIGNIFICANT CHANGE UP (ref 3.5–5.3)
RBC # BLD: 3.3 M/UL — LOW (ref 4.2–5.8)
RBC # FLD: 16.5 % — HIGH (ref 10.3–14.5)
S AUREUS DNA NOSE QL NAA+PROBE: SIGNIFICANT CHANGE UP
SODIUM SERPL-SCNC: 135 MMOL/L — SIGNIFICANT CHANGE UP (ref 135–145)
WBC # BLD: 13.62 K/UL — HIGH (ref 3.8–10.5)
WBC # FLD AUTO: 13.62 K/UL — HIGH (ref 3.8–10.5)

## 2025-02-23 PROCEDURE — 99233 SBSQ HOSP IP/OBS HIGH 50: CPT

## 2025-02-23 PROCEDURE — 93010 ELECTROCARDIOGRAM REPORT: CPT

## 2025-02-23 PROCEDURE — 99232 SBSQ HOSP IP/OBS MODERATE 35: CPT

## 2025-02-23 PROCEDURE — 74018 RADEX ABDOMEN 1 VIEW: CPT | Mod: 26

## 2025-02-23 RX ORDER — SODIUM CHLORIDE 9 G/1000ML
1000 INJECTION, SOLUTION INTRAVENOUS
Refills: 0 | Status: DISCONTINUED | OUTPATIENT
Start: 2025-02-23 | End: 2025-02-24

## 2025-02-23 RX ORDER — SODIUM CHLORIDE 9 G/1000ML
500 INJECTION, SOLUTION INTRAVENOUS ONCE
Refills: 0 | Status: COMPLETED | OUTPATIENT
Start: 2025-02-23 | End: 2025-02-23

## 2025-02-23 RX ORDER — ACETAMINOPHEN 500 MG/5ML
1000 LIQUID (ML) ORAL ONCE
Refills: 0 | Status: COMPLETED | OUTPATIENT
Start: 2025-02-23 | End: 2025-02-23

## 2025-02-23 RX ADMIN — Medication 5 MILLIGRAM(S): at 22:49

## 2025-02-23 RX ADMIN — TRAMADOL HYDROCHLORIDE 50 MILLIGRAM(S): 50 TABLET, FILM COATED ORAL at 13:06

## 2025-02-23 RX ADMIN — Medication 150 MICROGRAM(S): at 06:07

## 2025-02-23 RX ADMIN — POLYETHYLENE GLYCOL 3350 17 GRAM(S): 17 POWDER, FOR SOLUTION ORAL at 10:07

## 2025-02-23 RX ADMIN — Medication 400 MILLIGRAM(S): at 16:25

## 2025-02-23 RX ADMIN — DAPTOMYCIN 130 MILLIGRAM(S): 500 INJECTION, POWDER, LYOPHILIZED, FOR SOLUTION INTRAVENOUS at 13:41

## 2025-02-23 RX ADMIN — HEPARIN SODIUM 1700 UNIT(S)/HR: 1000 INJECTION INTRAVENOUS; SUBCUTANEOUS at 07:14

## 2025-02-23 RX ADMIN — HEPARIN SODIUM 1700 UNIT(S)/HR: 1000 INJECTION INTRAVENOUS; SUBCUTANEOUS at 19:03

## 2025-02-23 RX ADMIN — ATORVASTATIN CALCIUM 40 MILLIGRAM(S): 80 TABLET, FILM COATED ORAL at 21:04

## 2025-02-23 RX ADMIN — Medication 325 MILLIGRAM(S): at 10:07

## 2025-02-23 RX ADMIN — TRAMADOL HYDROCHLORIDE 50 MILLIGRAM(S): 50 TABLET, FILM COATED ORAL at 03:07

## 2025-02-23 RX ADMIN — Medication 650 MILLIGRAM(S): at 06:11

## 2025-02-23 RX ADMIN — Medication 2 TABLET(S): at 21:04

## 2025-02-23 RX ADMIN — INSULIN LISPRO 2: 100 INJECTION, SOLUTION INTRAVENOUS; SUBCUTANEOUS at 12:36

## 2025-02-23 RX ADMIN — HEPARIN SODIUM 1700 UNIT(S)/HR: 1000 INJECTION INTRAVENOUS; SUBCUTANEOUS at 19:17

## 2025-02-23 RX ADMIN — GABAPENTIN 200 MILLIGRAM(S): 400 CAPSULE ORAL at 21:04

## 2025-02-23 RX ADMIN — Medication 75 MILLILITER(S): at 06:05

## 2025-02-23 RX ADMIN — Medication 40 MILLIGRAM(S): at 06:07

## 2025-02-23 RX ADMIN — HEPARIN SODIUM 1700 UNIT(S)/HR: 1000 INJECTION INTRAVENOUS; SUBCUTANEOUS at 06:16

## 2025-02-23 RX ADMIN — GABAPENTIN 200 MILLIGRAM(S): 400 CAPSULE ORAL at 06:07

## 2025-02-23 RX ADMIN — TRAMADOL HYDROCHLORIDE 50 MILLIGRAM(S): 50 TABLET, FILM COATED ORAL at 21:04

## 2025-02-23 RX ADMIN — INSULIN LISPRO 4: 100 INJECTION, SOLUTION INTRAVENOUS; SUBCUTANEOUS at 16:44

## 2025-02-23 RX ADMIN — INSULIN LISPRO 2: 100 INJECTION, SOLUTION INTRAVENOUS; SUBCUTANEOUS at 08:02

## 2025-02-23 RX ADMIN — TRAMADOL HYDROCHLORIDE 50 MILLIGRAM(S): 50 TABLET, FILM COATED ORAL at 02:07

## 2025-02-23 RX ADMIN — TRAMADOL HYDROCHLORIDE 50 MILLIGRAM(S): 50 TABLET, FILM COATED ORAL at 12:36

## 2025-02-23 RX ADMIN — HEPARIN SODIUM 1700 UNIT(S)/HR: 1000 INJECTION INTRAVENOUS; SUBCUTANEOUS at 11:31

## 2025-02-23 RX ADMIN — SODIUM CHLORIDE 500 MILLILITER(S): 9 INJECTION, SOLUTION INTRAVENOUS at 09:39

## 2025-02-23 RX ADMIN — Medication 1 TABLET(S): at 10:07

## 2025-02-23 RX ADMIN — HEPARIN SODIUM 1700 UNIT(S)/HR: 1000 INJECTION INTRAVENOUS; SUBCUTANEOUS at 03:14

## 2025-02-23 RX ADMIN — TRAMADOL HYDROCHLORIDE 50 MILLIGRAM(S): 50 TABLET, FILM COATED ORAL at 22:00

## 2025-02-23 RX ADMIN — SODIUM CHLORIDE 75 MILLILITER(S): 9 INJECTION, SOLUTION INTRAVENOUS at 19:03

## 2025-02-23 RX ADMIN — Medication 1000 MILLIGRAM(S): at 16:55

## 2025-02-23 RX ADMIN — GABAPENTIN 200 MILLIGRAM(S): 400 CAPSULE ORAL at 13:42

## 2025-02-23 NOTE — PROGRESS NOTE ADULT - SUBJECTIVE AND OBJECTIVE BOX
SURGERY DAILY PROGRESS NOTE:     Subjective:  Patient seen and examined at bedside during am rounds. AVSS. Pain is improved, sensation has returned to cherelle    Objective:    MEDICATIONS  (STANDING):  atorvastatin 40 milliGRAM(s) Oral at bedtime  DAPTOmycin IVPB      DAPTOmycin IVPB 750 milliGRAM(s) IV Intermittent every 24 hours  dextrose 5%. 1000 milliLiter(s) (50 mL/Hr) IV Continuous <Continuous>  dextrose 5%. 1000 milliLiter(s) (100 mL/Hr) IV Continuous <Continuous>  dextrose 50% Injectable 25 Gram(s) IV Push once  dextrose 50% Injectable 12.5 Gram(s) IV Push once  dextrose 50% Injectable 25 Gram(s) IV Push once  ferrous    sulfate 325 milliGRAM(s) Oral daily  gabapentin 200 milliGRAM(s) Oral three times a day  glucagon  Injectable 1 milliGRAM(s) IntraMuscular once  heparin  Infusion. 1500 Unit(s)/Hr (15 mL/Hr) IV Continuous <Continuous>  insulin lispro (ADMELOG) corrective regimen sliding scale   SubCutaneous three times a day before meals  insulin lispro (ADMELOG) corrective regimen sliding scale   SubCutaneous at bedtime  levothyroxine 150 MICROGram(s) Oral daily  losartan 50 milliGRAM(s) Oral daily  multivitamin 1 Tablet(s) Oral daily  naloxone Injectable 0.4 milliGRAM(s) IV Push once  pantoprazole    Tablet 40 milliGRAM(s) Oral before breakfast  polyethylene glycol 3350 17 Gram(s) Oral daily  senna 2 Tablet(s) Oral at bedtime  sodium chloride 0.9%. 1000 milliLiter(s) (75 mL/Hr) IV Continuous <Continuous>    MEDICATIONS  (PRN):  acetaminophen     Tablet .. 650 milliGRAM(s) Oral every 6 hours PRN Temp greater or equal to 38C (100.4F), Mild Pain (1 - 3)  aluminum hydroxide/magnesium hydroxide/simethicone Suspension 30 milliLiter(s) Oral every 4 hours PRN Dyspepsia  dextrose Oral Gel 15 Gram(s) Oral once PRN Blood Glucose LESS THAN 70 milliGRAM(s)/deciliter  heparin   Injectable 8000 Unit(s) IV Push every 6 hours PRN For aPTT less than 40  heparin   Injectable 4000 Unit(s) IV Push every 6 hours PRN For aPTT between 40 - 57  heparin   Injectable 8000 Unit(s) IV Push every 6 hours PRN For aPTT less than 40  heparin   Injectable 4000 Unit(s) IV Push every 6 hours PRN For aPTT between 40 - 57  melatonin 3 milliGRAM(s) Oral at bedtime PRN Insomnia  ondansetron Injectable 4 milliGRAM(s) IV Push every 8 hours PRN Nausea and/or Vomiting  traMADol 50 milliGRAM(s) Oral every 6 hours PRN Severe Pain (7 - 10)  zolpidem 5 milliGRAM(s) Oral at bedtime PRN Insomnia      Vital Signs Last 24 Hrs  T(C): 36.6 (22 Feb 2025 20:54), Max: 36.6 (22 Feb 2025 20:54)  T(F): 97.8 (22 Feb 2025 20:54), Max: 97.8 (22 Feb 2025 20:54)  HR: 60 (23 Feb 2025 04:00) (60 - 90)  BP: 101/57 (23 Feb 2025 04:00) (94/60 - 126/70)  BP(mean): 71 (23 Feb 2025 04:00) (70 - 86)  RR: 16 (23 Feb 2025 04:00) (14 - 19)  SpO2: 99% (23 Feb 2025 04:00) (96% - 100%)    Parameters below as of 22 Feb 2025 20:00  Patient On (Oxygen Delivery Method): nasal cannula  O2 Flow (L/min): 2        PHYSICAL EXAM   General: AAOx3, Well developed, NAD  Chest: Normal respiratory effort  Heart: RRR  Abdomen: Soft, NTND, no masses  Extremities: Warm  RLE: sensory motor intact, palpable PT/DP  LLE: edema, rash, sensory intact, motor diminished  the knee or hip ,palpable DP and PT,   I&O's Detail    21 Feb 2025 07:01  -  22 Feb 2025 07:00  --------------------------------------------------------  IN:    Oral Fluid: 350 mL  Total IN: 350 mL    OUT:    Voided (mL): 600 mL  Total OUT: 600 mL    Total NET: -250 mL      22 Feb 2025 07:01  -  23 Feb 2025 05:46  --------------------------------------------------------  IN:    Heparin Infusion: 15 mL    Other (mL): 200 mL  Total IN: 215 mL    OUT:    Indwelling Catheter - Urethral (mL): 650 mL  Total OUT: 650 mL    Total NET: -435 mL          Daily     Daily     LABS:                        8.4    12.38 )-----------( 344      ( 22 Feb 2025 17:59 )             25.8     02-22    134[L]  |  102  |  26[H]  ----------------------------<  139[H]  4.2   |  23  |  1.07    Ca    8.9      22 Feb 2025 11:00  Phos  3.8     02-22  Mg     2.3     02-22    TPro  6.8  /  Alb  2.0[L]  /  TBili  0.6  /  DBili  x   /  AST  75[H]  /  ALT  80[H]  /  AlkPhos  155[H]  02-22    PT/INR - ( 22 Feb 2025 11:00 )   PT: 16.4 sec;   INR: 1.39 ratio         PTT - ( 23 Feb 2025 01:42 )  PTT:74.7 sec  Urinalysis Basic - ( 22 Feb 2025 11:00 )    Color: x / Appearance: x / SG: x / pH: x  Gluc: 139 mg/dL / Ketone: x  / Bili: x / Urobili: x   Blood: x / Protein: x / Nitrite: x   Leuk Esterase: x / RBC: x / WBC x   Sq Epi: x / Non Sq Epi: x / Bacteria: x        RADIOLOGY & ADDITIONAL STUDIES:    ASSESSMENT/PLAN:

## 2025-02-23 NOTE — PROGRESS NOTE ADULT - ASSESSMENT
75 y/o Male with h/o HTN, DM, prostate CA, hypothyroidism, AVR, A.Fib on eliquis, chronic back pain s/p epidurals on diclofenac, HLD, NIDDM, CKD recent hospitalization on 2/11 for sepsis with strep viridans, subacute AV prosthetic bacterial endocarditis, probable lumbar spine discitis admitted for LE rash.     CTA w Complete occlusion of the proximal left SFA with extension of a long 4  cm presumed embolus rather than thrombus into the profunda. There is  reconstitution distally.    s/p thromboembolectomy, palpable pulses   Plan:  Regular diet  c/w Hep gtt  c/w IV ABx  FU cardiology  FU critical care

## 2025-02-23 NOTE — PROGRESS NOTE ADULT - SUBJECTIVE AND OBJECTIVE BOX
SUBJECTIVE:    CHIEF COMPLAINT:  Patient is a 74y old  Male who presents with a chief complaint of Pain to leg, rash, osteomyelitis (23 Feb 2025 08:18)      HPI:   75 y/o male with a PMHx of HTN, A-FIB, HLD, chronic back pain and DM presents to the ED c/o a rash on his left leg knee radiating towards his foot. Pt noticed  his left thigh got puffy, as well as a rash on the left knee radiating down to his toes. Pt said two days PTA his right leg was hurting, then today the pain began on the left leg. Pt reports he's never had redness like this, and his wife noticed the redness around 1:30pm on day of admission. Pt says he can turn his body but not easily. Pt is complaining of knee pain when inspecting the leg. Pt recently discharged to SNF rehab after treatment for infected TAVR and Osteomyelitis of spine.    Active Problems  Abnormal metabolic state in diabetes mellitus (250.80,783.9) (E11.69,E88.9)  Acute bilateral low back pain with bilateral sciatica (724.2,724.3) (M54.42,M54.41)  Acute viral syndrome (079.99) (B34.9)  Alopecia (704.00) (L65.9)  Atrioventricular block, Mobitz type 1, Wenckebach (426.13) (I44.1)  Back muscle spasm (724.8) (M62.830)  Basal cell carcinoma (BCC) of face (173.31) (C44.310)  Benign localized hyperplasia of prostate with urinary obstruction (600.21,599.69)  (N40.1,N13.8)  BMI 36.0-36.9,adult (V85.36) (Z68.36)  Chronic bilateral low back pain without sciatica (724.2,338.29) (M54.50,G89.29)  Chronic GERD (530.81) (K21.9)  Coronary artery disease (414.00) (I25.10)  Counseling for travel (V65.49) (Z71.84)  Diabetic peripheral neuropathy (250.60,357.2) (E11.42)  Generalized anxiety disorder (300.02) (F41.1)  Hyperlipidemia (272.4) (E78.5)  Hypertension (401.9) (I10)  Hypothyroidism (244.9) (E03.9)  Intermittent palpitations (785.1) (R00.2)  Low back strain, sequela (905.7) (S39.012S)  Mobitz type 1 second degree AV block (426.13) (I44.1)  Multiple benign melanocytic nevi (216.9) (D22.9)  Neoplasm of uncertain behavior of skin (238.2) (D48.5)  Non-insulin dependent type 2 diabetes mellitus (250.00) (E11.9)  Obesity due to excess calories (278.00) (E66.09)  Obstructive sleep apnea of adult (327.23) (G47.33)  Paronychia of finger of right hand (681.02) (L03.011)  Paroxysmal A-fib (427.31) (I48.0)  Prerenal azotemia (790.6) (R79.89)  Prophylactic vaccination against streptococcus pneumoniae and influenza (V06.6) (Z23)  Prostate cancer (185) (C61)  S/p TAVR (transcatheter aortic valve replacement), bioprosthetic (V42.2) (Z95.3)  Screening for viral disease (V73.99) (Z11.59)  Seborrheic keratoses (702.19) (L82.1)  Severe aortic stenosis (424.1) (I35.0)  Spinal stenosis of lumbar region, unspecified whether neurogenic claudication present  (724.02) (M48.061)  Strain of right knee, initial encounter (844.9) (S86.911A)  Urinary frequency (788.41) (R35.0)  ?  Past Medical History  History of Encounter for cosmetic procedure (V50.1) (Z41.1)  History of arthritis (V13.4) (Z87.39)  History of bronchitis (V12.69) (Z87.09)  History of elevated prostate specific antigen (PSA) (V13.89) (Z87.898)  History of elevated prostate specific antigen (PSA) (V13.89) (Z87.898)  History of lentigo (V13.3) (Z87.2)  History of Insect bite of other part of head, initial encounter (910.4,E906.4)  (S00.86XA,W57.XXXA)  History of Muscle weakness of lower extremity (728.87) (M62.81)  History of Near syncope (780.2) (R55)  History of Venomous sting, intentional self-harm, initial encounter (989.5,E950.9)  (T63.92XA)  Viral URI with cough (465.9) (J06.9)  ?  Surgical History  History of Surgery Excision Lipoma  History of Transcatheter aortic valve replacement  ?  Family History  Family history of cardiac disorder (V17.49) (Z82.49) : Father, Brother  Family history of hyperlipidemia (V18.19) (Z83.438) : Father  Family history of malignant neoplasm of urinary bladder (V16.52) (Z80.52) : Mother,  Brother  Family history of malignant neoplasm of uterus (V16.49) (Z80.49) : Sister  Family history of thyroid disease (V18.19) (Z83.49) : Sister  Family history of type 2 diabetes mellitus (V18.0) (Z83.3) : Brother    Social History  Moderate alcohol use  Never a smoker  No illicit drug use  Occupation retired, real estate        (20 Feb 2025 20:26)      Interval HPI and Overnight Events: Pt  tolerated endarterectomy. Clot showing Gm positive cocci. Vascular surgery states patient has weakness to legs and thigh but good pulses. Seen by cardiology who believes pt is having emboli from TAVR vegitation    REVIEW OF SYSTEMS:  CONSTITUTIONAL: No weakness, fevers or chills  EYES/ENT: No visual changes;  No vertigo or throat pain   NECK: No pain or stiffness  RESPIRATORY: No cough, wheezing, hemoptysis; No shortness of breath  CARDIOVASCULAR: No chest pain or palpitations  GASTROINTESTINAL: No abdominal or epigastric pain. No nausea, vomiting, or hematemesis; No diarrhea or constipation. No melena or hematochezia.  GENITOURINARY: No dysuria, frequency or hematuria  NEUROLOGICAL: No numbness or weakness  SKIN: No itching, burning, rashes, or lesions   All other review of systems is negative unless indicated above    OBJECTIVE    Vital Signs Last 24 Hrs  T(C): 36.8 (23 Feb 2025 06:00), Max: 36.8 (23 Feb 2025 06:00)  T(F): 98.3 (23 Feb 2025 06:00), Max: 98.3 (23 Feb 2025 06:00)  HR: 77 (23 Feb 2025 08:00) (60 - 90)  BP: 95/65 (23 Feb 2025 08:00) (94/60 - 126/70)  BP(mean): 75 (23 Feb 2025 08:00) (70 - 86)  RR: 16 (23 Feb 2025 08:00) (14 - 19)  SpO2: 100% (23 Feb 2025 08:00) (96% - 100%)    Parameters below as of 22 Feb 2025 20:00  Patient On (Oxygen Delivery Method): nasal cannula  O2 Flow (L/min): 2      MEDICATIONS  (STANDING):  acetaminophen   IVPB .. 1000 milliGRAM(s) IV Intermittent once  atorvastatin 40 milliGRAM(s) Oral at bedtime  DAPTOmycin IVPB      DAPTOmycin IVPB 750 milliGRAM(s) IV Intermittent every 24 hours  dextrose 5%. 1000 milliLiter(s) (100 mL/Hr) IV Continuous <Continuous>  dextrose 5%. 1000 milliLiter(s) (50 mL/Hr) IV Continuous <Continuous>  dextrose 50% Injectable 25 Gram(s) IV Push once  dextrose 50% Injectable 12.5 Gram(s) IV Push once  dextrose 50% Injectable 25 Gram(s) IV Push once  ferrous    sulfate 325 milliGRAM(s) Oral daily  gabapentin 200 milliGRAM(s) Oral three times a day  glucagon  Injectable 1 milliGRAM(s) IntraMuscular once  heparin  Infusion. 1500 Unit(s)/Hr (15 mL/Hr) IV Continuous <Continuous>  insulin lispro (ADMELOG) corrective regimen sliding scale   SubCutaneous three times a day before meals  insulin lispro (ADMELOG) corrective regimen sliding scale   SubCutaneous at bedtime  levothyroxine 150 MICROGram(s) Oral daily  losartan 50 milliGRAM(s) Oral daily  multivitamin 1 Tablet(s) Oral daily  naloxone Injectable 0.4 milliGRAM(s) IV Push once  pantoprazole    Tablet 40 milliGRAM(s) Oral before breakfast  polyethylene glycol 3350 17 Gram(s) Oral daily  senna 2 Tablet(s) Oral at bedtime      LABS:                         8.7    13.62 )-----------( 346      ( 23 Feb 2025 06:02 )             27.7     02-23    135  |  104  |  39[H]  ----------------------------<  209[H]  4.3   |  23  |  1.21    Ca    9.0      23 Feb 2025 06:02  Phos  3.2     02-23  Mg     2.6     02-23    TPro  6.8  /  Alb  2.0[L]  /  TBili  0.6  /  DBili  x   /  AST  75[H]  /  ALT  80[H]  /  AlkPhos  155[H]  02-22    Urinalysis Basic - ( 23 Feb 2025 06:02 )    Color: x / Appearance: x / SG: x / pH: x  Gluc: 209 mg/dL / Ketone: x  / Bili: x / Urobili: x   Blood: x / Protein: x / Nitrite: x   Leuk Esterase: x / RBC: x / WBC x   Sq Epi: x / Non Sq Epi: x / Bacteria: x      PT/INR - ( 22 Feb 2025 11:00 )   PT: 16.4 sec;   INR: 1.39 ratio    PTT - ( 23 Feb 2025 01:42 )  PTT:74.7 sec    Specimen Source Tissue Left femoral plaque   02-22 @ 07:42  Culture Results--  Specimen Source .Blood None   02-20 @ 18:10  Culture Results  No growth at 48 Hours    ABG - ( 22 Feb 2025 11:58 )  pH, Arterial: 7.42  pH, Blood: x     /  pCO2: 35    /  pO2: 119   / HCO3: 23    / Base Excess: -1.3  /  SaO2: 99        CAPILLARY BLOOD GLUCOSE  POCT Blood Glucose.: 199 mg/dL (23 Feb 2025 07:59)  POCT Blood Glucose.: 286 mg/dL (22 Feb 2025 22:05)  POCT Blood Glucose.: 293 mg/dL (22 Feb 2025 17:09)  POCT Blood Glucose.: 143 mg/dL (22 Feb 2025 10:20)

## 2025-02-23 NOTE — PROGRESS NOTE ADULT - SUBJECTIVE AND OBJECTIVE BOX
CHIEF COMPLAINT: Patient is a 74y old  Male who presents with a chief complaint of Pain to leg, rash, osteomyelitis (23 Feb 2025 05:46)      HPI:   75 y/o male with a PMHx of HTN, A-FIB, HLD, chronic back pain and DM presents to the ED c/o a rash on his left leg knee radiating towards his foot. Pt noticed  his left thigh got puffy, as well as a rash on the left knee radiating down to his toes. Pt said two days PTA his right leg was hurting, then today the pain began on the left leg. Pt reports he's never had redness like this, and his wife noticed the redness around 1:30pm on day of admission. Pt says he can turn his body but not easily. Pt is complaining of knee pain when inspecting the leg. Pt recently discharged to SNF rehab after treatment for infected TAVR and Osteomyelitis of spine.    2/21-patient readmitted with leg pains and rash; initially thought to be due to antibiotic side effect.  CTA of legs show occlusion of flow in left SFA-described and an embolus rather than a thrombus.  PAtient with signficant PVD and calcifications.  Vascular requesting ROVERTO to r/o cardiac source of emboli.   ROVERTO done 2/13/2025 with completely normal LV function with no clot in LV, normal CHUY with no thrombus and no tumors and no PFO.   Patient is on AC for PAFib (which he is not in currently).  Patient had vegetation.     Vascular requesting repeat ROVERTO.    2/22-patient not in room; vascular felt this to be an emergency and took patient to OR.   TTE with contrast not done.        2/23-patient POD-1-removal of clot revealed G+ cocci clot-suggestive of embolized endocarditic lesion; prior ROVERTO with endocarditis seen and patient in sinus rhythm with no LV or LA thrombus.  No reason to suspect embolic clot.   On exam today, rash appears to be purpuric lesions c/w with Janeway spots and not rash due to antibiotic (patient not allergic to PCN).  Currently, blood cultures from 2/20 are NGTD.         Active Problems  Abnormal metabolic state in diabetes mellitus (250.80,783.9) (E11.69,E88.9)  Acute bilateral low back pain with bilateral sciatica (724.2,724.3) (M54.42,M54.41)  Acute viral syndrome (079.99) (B34.9)  Alopecia (704.00) (L65.9)  Atrioventricular block, Mobitz type 1, Wenckebach (426.13) (I44.1)  Back muscle spasm (724.8) (M62.830)  Basal cell carcinoma (BCC) of face (173.31) (C44.310)  Benign localized hyperplasia of prostate with urinary obstruction (600.21,599.69)  (N40.1,N13.8)  BMI 36.0-36.9,adult (V85.36) (Z68.36)  Chronic bilateral low back pain without sciatica (724.2,338.29) (M54.50,G89.29)  Chronic GERD (530.81) (K21.9)  Coronary artery disease (414.00) (I25.10)  Counseling for travel (V65.49) (Z71.84)  Diabetic peripheral neuropathy (250.60,357.2) (E11.42)  Generalized anxiety disorder (300.02) (F41.1)  Hyperlipidemia (272.4) (E78.5)  Hypertension (401.9) (I10)  Hypothyroidism (244.9) (E03.9)  Intermittent palpitations (785.1) (R00.2)  Low back strain, sequela (905.7) (S39.012S)  Mobitz type 1 second degree AV block (426.13) (I44.1)  Multiple benign melanocytic nevi (216.9) (D22.9)  Neoplasm of uncertain behavior of skin (238.2) (D48.5)  Non-insulin dependent type 2 diabetes mellitus (250.00) (E11.9)  Obesity due to excess calories (278.00) (E66.09)  Obstructive sleep apnea of adult (327.23) (G47.33)  Paronychia of finger of right hand (681.02) (L03.011)  Paroxysmal A-fib (427.31) (I48.0)  Prerenal azotemia (790.6) (R79.89)  Prophylactic vaccination against streptococcus pneumoniae and influenza (V06.6) (Z23)  Prostate cancer (185) (C61)  S/p TAVR (transcatheter aortic valve replacement), bioprosthetic (V42.2) (Z95.3)  Screening for viral disease (V73.99) (Z11.59)  Seborrheic keratoses (702.19) (L82.1)  Severe aortic stenosis (424.1) (I35.0)  Spinal stenosis of lumbar region, unspecified whether neurogenic claudication present  (724.02) (M48.061)  Strain of right knee, initial encounter (844.9) (S86.911A)  Urinary frequency (788.41) (R35.0)  ?  Past Medical History  History of Encounter for cosmetic procedure (V50.1) (Z41.1)  History of arthritis (V13.4) (Z87.39)  History of bronchitis (V12.69) (Z87.09)  History of elevated prostate specific antigen (PSA) (V13.89) (Z87.898)  History of elevated prostate specific antigen (PSA) (V13.89) (Z87.898)  History of lentigo (V13.3) (Z87.2)  History of Insect bite of other part of head, initial encounter (910.4,E906.4)  (S00.86XA,W57.XXXA)  History of Muscle weakness of lower extremity (728.87) (M62.81)  History of Near syncope (780.2) (R55)  History of Venomous sting, intentional self-harm, initial encounter (989.5,E950.9)  (T63.92XA)  Viral URI with cough (465.9) (J06.9)  ?  Surgical History  History of Surgery Excision Lipoma  History of Transcatheter aortic valve replacement  ?  Family History  Family history of cardiac disorder (V17.49) (Z82.49) : Father, Brother  Family history of hyperlipidemia (V18.19) (Z83.438) : Father  Family history of malignant neoplasm of urinary bladder (V16.52) (Z80.52) : Mother,  Brother  Family history of malignant neoplasm of uterus (V16.49) (Z80.49) : Sister  Family history of thyroid disease (V18.19) (Z83.49) : Sister  Family history of type 2 diabetes mellitus (V18.0) (Z83.3) : Brother    Social History  Moderate alcohol use  Never a smoker  No illicit drug use  Occupation retired, real estate        (20 Feb 2025 20:26)      PMHx: PAST MEDICAL & SURGICAL HISTORY:  Hypertension      Diabetes mellitus      Obesity      Hypothyroidism      Prostate CA          Allergies: Allergies    No Known Allergies    Intolerances          REVIEW OF SYSTEMS:    CONSTITUTIONAL: No weakness, fevers or chills  EYES/ENT: No visual changes;  No vertigo or throat pain   NECK: No pain or stiffness  RESPIRATORY: No cough, wheezing, hemoptysis; No shortness of breath  CARDIOVASCULAR: No chest pain or palpitations  GASTROINTESTINAL: No abdominal or epigastric pain. No nausea, vomiting, or hematemesis; No diarrhea or constipation. No melena or hematochezia.  GENITOURINARY: No dysuria, frequency or hematuria  NEUROLOGICAL: numbness or weakness  SKIN:  rashes, or lesions       Vital Signs Last 24 Hrs  T(C): 36.8 (23 Feb 2025 06:00), Max: 36.8 (23 Feb 2025 06:00)  T(F): 98.3 (23 Feb 2025 06:00), Max: 98.3 (23 Feb 2025 06:00)  HR: 74 (23 Feb 2025 06:00) (60 - 90)  BP: 102/60 (23 Feb 2025 06:00) (94/60 - 126/70)  BP(mean): 72 (23 Feb 2025 06:00) (70 - 86)  RR: 18 (23 Feb 2025 06:00) (14 - 19)  SpO2: 97% (23 Feb 2025 06:00) (96% - 100%)    Parameters below as of 22 Feb 2025 20:00  Patient On (Oxygen Delivery Method): nasal cannula  O2 Flow (L/min): 2      I&O's Summary    22 Feb 2025 07:01  -  23 Feb 2025 07:00  --------------------------------------------------------  IN: 1174 mL / OUT: 650 mL / NET: 524 mL            PHYSICAL EXAM:   Constitutional: NAD, awake and alert, well-developed  HEENT: PERR, EOMI, Normal Hearing, MMM  Neck: JVD  Respiratory: Breath sounds are clear bilaterally, No wheezing, rales or rhonchi  Cardiovascular: S1 and S2, regular rate and rhythm, Murmurs, gallops or rubs  Gastrointestinal: Bowel Sounds present, soft, nontender, nondistended, no guarding, no rebound  Extremities: No peripheral edema  Vascular: 2+ peripheral pulses  Neurological: A/O x 3, no focal deficits  Musculoskeletal: 5/5 strength b/l upper and lower extremities  Skin:  rashes    MEDICATIONS:  MEDICATIONS  (STANDING):  atorvastatin 40 milliGRAM(s) Oral at bedtime  DAPTOmycin IVPB      DAPTOmycin IVPB 750 milliGRAM(s) IV Intermittent every 24 hours  dextrose 5%. 1000 milliLiter(s) (50 mL/Hr) IV Continuous <Continuous>  dextrose 5%. 1000 milliLiter(s) (100 mL/Hr) IV Continuous <Continuous>  dextrose 50% Injectable 25 Gram(s) IV Push once  dextrose 50% Injectable 12.5 Gram(s) IV Push once  dextrose 50% Injectable 25 Gram(s) IV Push once  ferrous    sulfate 325 milliGRAM(s) Oral daily  gabapentin 200 milliGRAM(s) Oral three times a day  glucagon  Injectable 1 milliGRAM(s) IntraMuscular once  heparin  Infusion. 1500 Unit(s)/Hr (15 mL/Hr) IV Continuous <Continuous>  insulin lispro (ADMELOG) corrective regimen sliding scale   SubCutaneous three times a day before meals  insulin lispro (ADMELOG) corrective regimen sliding scale   SubCutaneous at bedtime  levothyroxine 150 MICROGram(s) Oral daily  losartan 50 milliGRAM(s) Oral daily  multivitamin 1 Tablet(s) Oral daily  naloxone Injectable 0.4 milliGRAM(s) IV Push once  pantoprazole    Tablet 40 milliGRAM(s) Oral before breakfast  polyethylene glycol 3350 17 Gram(s) Oral daily  senna 2 Tablet(s) Oral at bedtime  sodium chloride 0.9%. 1000 milliLiter(s) (75 mL/Hr) IV Continuous <Continuous>      LABS: All Labs Reviewed:                        8.7    13.62 )-----------( 346      ( 23 Feb 2025 06:02 )             27.7     02-23    135  |  104  |  39[H]  ----------------------------<  209[H]  4.3   |  23  |  1.21    Ca    9.0      23 Feb 2025 06:02  Phos  3.2     02-23  Mg     2.6     02-23    TPro  6.8  /  Alb  2.0[L]  /  TBili  0.6  /  DBili  x   /  AST  75[H]  /  ALT  80[H]  /  AlkPhos  155[H]  02-22    PT/INR - ( 22 Feb 2025 11:00 )   PT: 16.4 sec;   INR: 1.39 ratio         PTT - ( 23 Feb 2025 01:42 )  PTT:74.7 sec      Blood Culture: Organism --  Gram Stain Blood -- Gram Stain   Few polymorphonuclear leukocytes per low power field  Moderate Gram Positive Cocci in Pairs and Chains per oil power field  Specimen Source Tissue Left femoral plaque  Culture-Blood --    Organism --  Gram Stain Blood -- Gram Stain --  Specimen Source .Blood None  Culture-Blood --      ABG - ( 22 Feb 2025 11:58 )  pH, Arterial: 7.42  pH, Blood: x     /  pCO2: 35    /  pO2: 119   / HCO3: 23    / Base Excess: -1.3  /  SaO2: 99                  BNP     RADIOLOGY:    EKG:      Telemetry:    ECHO:

## 2025-02-23 NOTE — PROGRESS NOTE ADULT - ASSESSMENT
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  -continue ABx, may consider placing back on ceftriaxone and i do not thick this is drug rash related.    -Dr GILES Heath to take over on monday

## 2025-02-23 NOTE — PROGRESS NOTE ADULT - ASSESSMENT
73 y/o male with a PMHx of HTN, A-FIB, HLD, chronic back pain and DM presents to the ED c/o a rash on his left leg knee radiating towards his foot.    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 Awaiting blood supply  follow lytes and CBC  Pain control  Cardiology considering repeat ROVERTO to see if vegetation is worse  Cardiology  to discuss with CTS regarding need to remove TAVR  IV Heparin  Will order repeat MRI LS Spine with contrast  Follow for risk of eDKA since Farxiga stopped less than 3 days prior to surgery  Discussed with patient at length  PT is DNR/DNI  73 y/o male with a PMHx of HTN, A-FIB, HLD, chronic back pain and DM presents to the ED c/o a rash on his left leg knee radiating towards his foot.    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  Clot positive for GM positive cocci  HTN  Malnutrition  Bilateral L>R leg weakness - may be deconditioning vs pain vs worsening osteo or hopefully not abscess    Plan:  POD#1 s/p Endarterectomy  .IV Rocephin via  PICC line changed to Daptomycin  ID following  Vascular Surgery following  Physical Therapy  Social work consult for SNF rehab return  liberalized sodium intake  Continue IVF  Encourage PO intake  follow lytes and CBC  Pain control  Cardiology considering repeat ROVERTO to see if vegetation is worse  Cardiology  to discuss with CTS regarding need to remove TAVR  IV Heparin  Will order repeat MRI LS Spine with contrast  Follow for risk of eDKA since Farxiga stopped less than 3 days prior to surgery  Discussed with patient at length  PT is DNR/DNI

## 2025-02-23 NOTE — PROGRESS NOTE ADULT - ASSESSMENT
73 y/o male with a PMHx of HTN, A-FIB, HLD, chronic back pain and DM, s/p TAVR in 2023, recent admission at  for strep bacteremia, lumbar OM and endocarditis of TAVR valve d/c'd to [facility] w/ PICC line on long-term abx presenting to  with LLE rash, LLE pain, found to have complete occlusion of L SFA on CTA suspect 2/2 septic embolus, taken to OR emergently by vascular s/p L fem embolectomy, admitted to SICU for close neurovascular monitoring.     Problems:  #Acute limb ischemia s/p L femoral embolectomy  #Endocarditis of TAVR  #Osteomyelitis of Spine  #T2DM  #HTN    Plan:  - Mentation intact, c/w gabapentin, pain control prn, LLE weakness 2/2 ischemia from clot v discitis? function improving s/p embolectomy favoring ischemic etiology, will obtain MRI L-spine for further evaluation  - BP soft this AM responded well to 500cc bolus, dc home losartan, c/w hep gtt, ROVERTO 2/13 with aortic prosthesis vegetation, cards consulted may need repeat ROVERTO this week pending further discussion with ID and CTS, c/w q2h neurovascular checks, vascular recs appreciated  - On room air  - Consistent carb diet, PPI, bowel regimen  - Stable renal indices, c/w IVF for now, cont to monitor  - CTX switched to daptomycin by ID (?abx rxn, low suspicion), blood cultures ngtd  - FS/ISS qac/qhs, c/w home synthroid  - DVT ppx hep gtt    Dispo: SICU, q2h neurochecks, c/w abx, f/u blood cultures, hep gtt, DNR/DNI

## 2025-02-23 NOTE — PROGRESS NOTE ADULT - SUBJECTIVE AND OBJECTIVE BOX
OVERNIGHT EVENTS / SUBJECTIVE: Patient seen and examined at bedside.     OBJECTIVE:    VITAL SIGNS:  ICU Vital Signs Last 24 Hrs  T(C): 36.8 (23 Feb 2025 06:00), Max: 36.8 (23 Feb 2025 06:00)  T(F): 98.3 (23 Feb 2025 06:00), Max: 98.3 (23 Feb 2025 06:00)  HR: 74 (23 Feb 2025 06:00) (60 - 90)  BP: 102/60 (23 Feb 2025 06:00) (94/60 - 126/70)  BP(mean): 72 (23 Feb 2025 06:00) (70 - 86)  ABP: --  ABP(mean): --  RR: 18 (23 Feb 2025 06:00) (14 - 19)  SpO2: 97% (23 Feb 2025 06:00) (96% - 100%)    O2 Parameters below as of 22 Feb 2025 20:00  Patient On (Oxygen Delivery Method): nasal cannula  O2 Flow (L/min): 2            02-22 @ 07:01  -  02-23 @ 07:00  --------------------------------------------------------  IN: 1174 mL / OUT: 650 mL / NET: 524 mL      CAPILLARY BLOOD GLUCOSE      POCT Blood Glucose.: 199 mg/dL (23 Feb 2025 07:59)      PHYSICAL EXAM:    General: NAD  HEENT: NC/AT; PERRL, clear conjunctiva  Neck: supple  Respiratory: CTA b/l  Cardiovascular: +S1/S2; RRR  Abdomen: soft, NT/ND; +BS x4  Extremities: WWP, 2+ peripheral pulses b/l; no LE edema  Skin: normal color and turgor; no rash  Neurological:    MEDICATIONS:  MEDICATIONS  (STANDING):  atorvastatin 40 milliGRAM(s) Oral at bedtime  DAPTOmycin IVPB      DAPTOmycin IVPB 750 milliGRAM(s) IV Intermittent every 24 hours  dextrose 5%. 1000 milliLiter(s) (50 mL/Hr) IV Continuous <Continuous>  dextrose 5%. 1000 milliLiter(s) (100 mL/Hr) IV Continuous <Continuous>  dextrose 50% Injectable 25 Gram(s) IV Push once  dextrose 50% Injectable 12.5 Gram(s) IV Push once  dextrose 50% Injectable 25 Gram(s) IV Push once  ferrous    sulfate 325 milliGRAM(s) Oral daily  gabapentin 200 milliGRAM(s) Oral three times a day  glucagon  Injectable 1 milliGRAM(s) IntraMuscular once  heparin  Infusion. 1500 Unit(s)/Hr (15 mL/Hr) IV Continuous <Continuous>  insulin lispro (ADMELOG) corrective regimen sliding scale   SubCutaneous three times a day before meals  insulin lispro (ADMELOG) corrective regimen sliding scale   SubCutaneous at bedtime  levothyroxine 150 MICROGram(s) Oral daily  losartan 50 milliGRAM(s) Oral daily  multivitamin 1 Tablet(s) Oral daily  naloxone Injectable 0.4 milliGRAM(s) IV Push once  pantoprazole    Tablet 40 milliGRAM(s) Oral before breakfast  polyethylene glycol 3350 17 Gram(s) Oral daily  senna 2 Tablet(s) Oral at bedtime  sodium chloride 0.9%. 1000 milliLiter(s) (75 mL/Hr) IV Continuous <Continuous>    MEDICATIONS  (PRN):  acetaminophen     Tablet .. 650 milliGRAM(s) Oral every 6 hours PRN Temp greater or equal to 38C (100.4F), Mild Pain (1 - 3)  aluminum hydroxide/magnesium hydroxide/simethicone Suspension 30 milliLiter(s) Oral every 4 hours PRN Dyspepsia  dextrose Oral Gel 15 Gram(s) Oral once PRN Blood Glucose LESS THAN 70 milliGRAM(s)/deciliter  heparin   Injectable 8000 Unit(s) IV Push every 6 hours PRN For aPTT less than 40  heparin   Injectable 4000 Unit(s) IV Push every 6 hours PRN For aPTT between 40 - 57  heparin   Injectable 8000 Unit(s) IV Push every 6 hours PRN For aPTT less than 40  heparin   Injectable 4000 Unit(s) IV Push every 6 hours PRN For aPTT between 40 - 57  melatonin 3 milliGRAM(s) Oral at bedtime PRN Insomnia  ondansetron Injectable 4 milliGRAM(s) IV Push every 8 hours PRN Nausea and/or Vomiting  traMADol 50 milliGRAM(s) Oral every 6 hours PRN Severe Pain (7 - 10)  zolpidem 5 milliGRAM(s) Oral at bedtime PRN Insomnia      ALLERGIES:  Allergies    No Known Allergies    Intolerances        LABS:                        8.7    13.62 )-----------( 346      ( 23 Feb 2025 06:02 )             27.7     Hemoglobin: 8.7 g/dL (02-23 @ 06:02)  Hemoglobin: 8.4 g/dL (02-22 @ 17:59)  Hemoglobin: 8.5 g/dL (02-22 @ 11:00)  Hemoglobin: 7.9 g/dL (02-22 @ 06:32)  Hemoglobin: 8.3 g/dL (02-22 @ 00:47)    CBC Full  -  ( 23 Feb 2025 06:02 )  WBC Count : 13.62 K/uL  RBC Count : 3.30 M/uL  Hemoglobin : 8.7 g/dL  Hematocrit : 27.7 %  Platelet Count - Automated : 346 K/uL  Mean Cell Volume : 83.9 fl  Mean Cell Hemoglobin : 26.4 pg  Mean Cell Hemoglobin Concentration : 31.4 g/dL  Auto Neutrophil # : x  Auto Lymphocyte # : x  Auto Monocyte # : x  Auto Eosinophil # : x  Auto Basophil # : x  Auto Neutrophil % : x  Auto Lymphocyte % : x  Auto Monocyte % : x  Auto Eosinophil % : x  Auto Basophil % : x    02-23    135  |  104  |  39[H]  ----------------------------<  209[H]  4.3   |  23  |  1.21    Ca    9.0      23 Feb 2025 06:02  Phos  3.2     02-23  Mg     2.6     02-23    TPro  6.8  /  Alb  2.0[L]  /  TBili  0.6  /  DBili  x   /  AST  75[H]  /  ALT  80[H]  /  AlkPhos  155[H]  02-22    Creatinine Trend: 1.21<--, 1.07<--, 1.05<--, 0.96<--, 1.05<--, 1.26<--  LIVER FUNCTIONS - ( 22 Feb 2025 11:00 )  Alb: 2.0 g/dL / Pro: 6.8 gm/dL / ALK PHOS: 155 U/L / ALT: 80 U/L / AST: 75 U/L / GGT: x           PT/INR - ( 22 Feb 2025 11:00 )   PT: 16.4 sec;   INR: 1.39 ratio         PTT - ( 23 Feb 2025 01:42 )  PTT:74.7 sec    hs Troponin:    ABG - ( 22 Feb 2025 11:58 )  pH, Arterial: 7.42  pH, Blood: x     /  pCO2: 35    /  pO2: 119   / HCO3: 23    / Base Excess: -1.3  /  SaO2: 99                  11:58 - ABG - pH: 7.42  |  pCO2: 35    |  pO2: 119   | Lactate:       | BE: -1.3       Urinalysis Basic - ( 23 Feb 2025 06:02 )    Color: x / Appearance: x / SG: x / pH: x  Gluc: 209 mg/dL / Ketone: x  / Bili: x / Urobili: x   Blood: x / Protein: x / Nitrite: x   Leuk Esterase: x / RBC: x / WBC x   Sq Epi: x / Non Sq Epi: x / Bacteria: x      CSF:                      EKG:   MICROBIOLOGY:    Culture - Acid Fast - Tissue w/Smear (collected 22 Feb 2025 07:42)  Source: Tissue Left femoral plaque    Culture - Tissue with Gram Stain (collected 22 Feb 2025 07:42)  Source: Tissue Left femoral plaque  Gram Stain (22 Feb 2025 19:34):    Few polymorphonuclear leukocytes per low power field    Moderate Gram Positive Cocci in Pairs and Chains per oil power field    Culture - Blood (collected 20 Feb 2025 18:10)  Source: .Blood None  Preliminary Report (23 Feb 2025 02:01):    No growth at 48 Hours    Culture - Blood (collected 20 Feb 2025 18:10)  Source: .Blood None  Preliminary Report (23 Feb 2025 02:01):    No growth at 48 Hours      IMAGING:      Labs, imaging, EKG personally reviewed    RADIOLOGY & ADDITIONAL TESTS: Reviewed. OVERNIGHT EVENTS / SUBJECTIVE: Patient seen and examined at bedside.     No acute events overnight. OR culture w/ GPCs, suspicious for septic embolus. BP soft this AM, responded well to 500cc bolus. Afebrile.    OBJECTIVE:    VITAL SIGNS:  ICU Vital Signs Last 24 Hrs  T(C): 36.8 (23 Feb 2025 06:00), Max: 36.8 (23 Feb 2025 06:00)  T(F): 98.3 (23 Feb 2025 06:00), Max: 98.3 (23 Feb 2025 06:00)  HR: 74 (23 Feb 2025 06:00) (60 - 90)  BP: 102/60 (23 Feb 2025 06:00) (94/60 - 126/70)  BP(mean): 72 (23 Feb 2025 06:00) (70 - 86)  ABP: --  ABP(mean): --  RR: 18 (23 Feb 2025 06:00) (14 - 19)  SpO2: 97% (23 Feb 2025 06:00) (96% - 100%)    O2 Parameters below as of 22 Feb 2025 20:00  Patient On (Oxygen Delivery Method): nasal cannula  O2 Flow (L/min): 2            02-22 @ 07:01  -  02-23 @ 07:00  --------------------------------------------------------  IN: 1174 mL / OUT: 650 mL / NET: 524 mL      CAPILLARY BLOOD GLUCOSE      POCT Blood Glucose.: 199 mg/dL (23 Feb 2025 07:59)      PHYSICAL EXAM:    General: NAD  HEENT: NC/AT; PERRL, clear conjunctiva  Neck: supple  Respiratory: CTA b/l  Cardiovascular: +S1/S2; RRR  Abdomen: soft, NT/ND  Extremities: Warm, LLE mildly edematous  Skin: normal color and turgor; rash left lower leg ?janeway lesion  Neurological: A&Ox3, decreased strength LLE improving from yesterday    MEDICATIONS:  MEDICATIONS  (STANDING):  atorvastatin 40 milliGRAM(s) Oral at bedtime  DAPTOmycin IVPB      DAPTOmycin IVPB 750 milliGRAM(s) IV Intermittent every 24 hours  dextrose 5%. 1000 milliLiter(s) (50 mL/Hr) IV Continuous <Continuous>  dextrose 5%. 1000 milliLiter(s) (100 mL/Hr) IV Continuous <Continuous>  dextrose 50% Injectable 25 Gram(s) IV Push once  dextrose 50% Injectable 12.5 Gram(s) IV Push once  dextrose 50% Injectable 25 Gram(s) IV Push once  ferrous    sulfate 325 milliGRAM(s) Oral daily  gabapentin 200 milliGRAM(s) Oral three times a day  glucagon  Injectable 1 milliGRAM(s) IntraMuscular once  heparin  Infusion. 1500 Unit(s)/Hr (15 mL/Hr) IV Continuous <Continuous>  insulin lispro (ADMELOG) corrective regimen sliding scale   SubCutaneous three times a day before meals  insulin lispro (ADMELOG) corrective regimen sliding scale   SubCutaneous at bedtime  levothyroxine 150 MICROGram(s) Oral daily  losartan 50 milliGRAM(s) Oral daily  multivitamin 1 Tablet(s) Oral daily  naloxone Injectable 0.4 milliGRAM(s) IV Push once  pantoprazole    Tablet 40 milliGRAM(s) Oral before breakfast  polyethylene glycol 3350 17 Gram(s) Oral daily  senna 2 Tablet(s) Oral at bedtime  sodium chloride 0.9%. 1000 milliLiter(s) (75 mL/Hr) IV Continuous <Continuous>    MEDICATIONS  (PRN):  acetaminophen     Tablet .. 650 milliGRAM(s) Oral every 6 hours PRN Temp greater or equal to 38C (100.4F), Mild Pain (1 - 3)  aluminum hydroxide/magnesium hydroxide/simethicone Suspension 30 milliLiter(s) Oral every 4 hours PRN Dyspepsia  dextrose Oral Gel 15 Gram(s) Oral once PRN Blood Glucose LESS THAN 70 milliGRAM(s)/deciliter  heparin   Injectable 8000 Unit(s) IV Push every 6 hours PRN For aPTT less than 40  heparin   Injectable 4000 Unit(s) IV Push every 6 hours PRN For aPTT between 40 - 57  heparin   Injectable 8000 Unit(s) IV Push every 6 hours PRN For aPTT less than 40  heparin   Injectable 4000 Unit(s) IV Push every 6 hours PRN For aPTT between 40 - 57  melatonin 3 milliGRAM(s) Oral at bedtime PRN Insomnia  ondansetron Injectable 4 milliGRAM(s) IV Push every 8 hours PRN Nausea and/or Vomiting  traMADol 50 milliGRAM(s) Oral every 6 hours PRN Severe Pain (7 - 10)  zolpidem 5 milliGRAM(s) Oral at bedtime PRN Insomnia      ALLERGIES:  Allergies    No Known Allergies    Intolerances        LABS:                        8.7    13.62 )-----------( 346      ( 23 Feb 2025 06:02 )             27.7     Hemoglobin: 8.7 g/dL (02-23 @ 06:02)  Hemoglobin: 8.4 g/dL (02-22 @ 17:59)  Hemoglobin: 8.5 g/dL (02-22 @ 11:00)  Hemoglobin: 7.9 g/dL (02-22 @ 06:32)  Hemoglobin: 8.3 g/dL (02-22 @ 00:47)    CBC Full  -  ( 23 Feb 2025 06:02 )  WBC Count : 13.62 K/uL  RBC Count : 3.30 M/uL  Hemoglobin : 8.7 g/dL  Hematocrit : 27.7 %  Platelet Count - Automated : 346 K/uL  Mean Cell Volume : 83.9 fl  Mean Cell Hemoglobin : 26.4 pg  Mean Cell Hemoglobin Concentration : 31.4 g/dL  Auto Neutrophil # : x  Auto Lymphocyte # : x  Auto Monocyte # : x  Auto Eosinophil # : x  Auto Basophil # : x  Auto Neutrophil % : x  Auto Lymphocyte % : x  Auto Monocyte % : x  Auto Eosinophil % : x  Auto Basophil % : x    02-23    135  |  104  |  39[H]  ----------------------------<  209[H]  4.3   |  23  |  1.21    Ca    9.0      23 Feb 2025 06:02  Phos  3.2     02-23  Mg     2.6     02-23    TPro  6.8  /  Alb  2.0[L]  /  TBili  0.6  /  DBili  x   /  AST  75[H]  /  ALT  80[H]  /  AlkPhos  155[H]  02-22    Creatinine Trend: 1.21<--, 1.07<--, 1.05<--, 0.96<--, 1.05<--, 1.26<--  LIVER FUNCTIONS - ( 22 Feb 2025 11:00 )  Alb: 2.0 g/dL / Pro: 6.8 gm/dL / ALK PHOS: 155 U/L / ALT: 80 U/L / AST: 75 U/L / GGT: x           PT/INR - ( 22 Feb 2025 11:00 )   PT: 16.4 sec;   INR: 1.39 ratio         PTT - ( 23 Feb 2025 01:42 )  PTT:74.7 sec    hs Troponin:    ABG - ( 22 Feb 2025 11:58 )  pH, Arterial: 7.42  pH, Blood: x     /  pCO2: 35    /  pO2: 119   / HCO3: 23    / Base Excess: -1.3  /  SaO2: 99                  11:58 - ABG - pH: 7.42  |  pCO2: 35    |  pO2: 119   | Lactate:       | BE: -1.3       Urinalysis Basic - ( 23 Feb 2025 06:02 )    Color: x / Appearance: x / SG: x / pH: x  Gluc: 209 mg/dL / Ketone: x  / Bili: x / Urobili: x   Blood: x / Protein: x / Nitrite: x   Leuk Esterase: x / RBC: x / WBC x   Sq Epi: x / Non Sq Epi: x / Bacteria: x      CSF:                      EKG:   MICROBIOLOGY:    Culture - Acid Fast - Tissue w/Smear (collected 22 Feb 2025 07:42)  Source: Tissue Left femoral plaque    Culture - Tissue with Gram Stain (collected 22 Feb 2025 07:42)  Source: Tissue Left femoral plaque  Gram Stain (22 Feb 2025 19:34):    Few polymorphonuclear leukocytes per low power field    Moderate Gram Positive Cocci in Pairs and Chains per oil power field    Culture - Blood (collected 20 Feb 2025 18:10)  Source: .Blood None  Preliminary Report (23 Feb 2025 02:01):    No growth at 48 Hours    Culture - Blood (collected 20 Feb 2025 18:10)  Source: .Blood None  Preliminary Report (23 Feb 2025 02:01):    No growth at 48 Hours      IMAGING:      Labs, imaging, EKG personally reviewed    RADIOLOGY & ADDITIONAL TESTS: Reviewed.

## 2025-02-24 ENCOUNTER — INPATIENT (INPATIENT)
Facility: HOSPITAL | Age: 75
LOS: 9 days | Discharge: NURSING, REHAB OR LONG-TERM CARE | DRG: 290 | End: 2025-03-06
Attending: THORACIC SURGERY (CARDIOTHORACIC VASCULAR SURGERY) | Admitting: THORACIC SURGERY (CARDIOTHORACIC VASCULAR SURGERY)
Payer: MEDICARE

## 2025-02-24 ENCOUNTER — TRANSCRIPTION ENCOUNTER (OUTPATIENT)
Age: 75
End: 2025-02-24

## 2025-02-24 ENCOUNTER — RESULT REVIEW (OUTPATIENT)
Age: 75
End: 2025-02-24

## 2025-02-24 VITALS — HEIGHT: 68 IN | WEIGHT: 227.74 LBS

## 2025-02-24 VITALS — TEMPERATURE: 100 F

## 2025-02-24 DIAGNOSIS — I33.9 ACUTE AND SUBACUTE ENDOCARDITIS, UNSPECIFIED: ICD-10-CM

## 2025-02-24 LAB
A1C WITH ESTIMATED AVERAGE GLUCOSE RESULT: 5.7 % — HIGH (ref 4–5.6)
ALBUMIN SERPL ELPH-MCNC: 1.9 G/DL — LOW (ref 3.3–5)
ALBUMIN SERPL ELPH-MCNC: 2.5 G/DL — LOW (ref 3.3–5.2)
ALP SERPL-CCNC: 171 U/L — HIGH (ref 40–120)
ALP SERPL-CCNC: 177 U/L — HIGH (ref 40–120)
ALT FLD-CCNC: 65 U/L — HIGH
ALT FLD-CCNC: 84 U/L — HIGH (ref 12–78)
ANION GAP SERPL CALC-SCNC: 10 MMOL/L — SIGNIFICANT CHANGE UP (ref 5–17)
ANION GAP SERPL CALC-SCNC: 6 MMOL/L — SIGNIFICANT CHANGE UP (ref 5–17)
APTT BLD: 35.3 SEC — SIGNIFICANT CHANGE UP (ref 24.5–35.6)
APTT BLD: 76.1 SEC — HIGH (ref 24.5–35.6)
AST SERPL-CCNC: 60 U/L — HIGH
AST SERPL-CCNC: 76 U/L — HIGH (ref 15–37)
BASOPHILS # BLD AUTO: 0.05 K/UL — SIGNIFICANT CHANGE UP (ref 0–0.2)
BASOPHILS NFR BLD AUTO: 0.4 % — SIGNIFICANT CHANGE UP (ref 0–2)
BILIRUB SERPL-MCNC: 0.3 MG/DL — LOW (ref 0.4–2)
BILIRUB SERPL-MCNC: 0.4 MG/DL — SIGNIFICANT CHANGE UP (ref 0.2–1.2)
BLD GP AB SCN SERPL QL: SIGNIFICANT CHANGE UP
BUN SERPL-MCNC: 28.3 MG/DL — HIGH (ref 8–20)
BUN SERPL-MCNC: 34 MG/DL — HIGH (ref 7–23)
CALCIUM SERPL-MCNC: 8.1 MG/DL — LOW (ref 8.4–10.5)
CALCIUM SERPL-MCNC: 8.7 MG/DL — SIGNIFICANT CHANGE UP (ref 8.5–10.1)
CHLORIDE SERPL-SCNC: 102 MMOL/L — SIGNIFICANT CHANGE UP (ref 96–108)
CHLORIDE SERPL-SCNC: 99 MMOL/L — SIGNIFICANT CHANGE UP (ref 96–108)
CO2 SERPL-SCNC: 24 MMOL/L — SIGNIFICANT CHANGE UP (ref 22–29)
CO2 SERPL-SCNC: 26 MMOL/L — SIGNIFICANT CHANGE UP (ref 22–31)
CREAT SERPL-MCNC: 0.94 MG/DL — SIGNIFICANT CHANGE UP (ref 0.5–1.3)
CREAT SERPL-MCNC: 1.07 MG/DL — SIGNIFICANT CHANGE UP (ref 0.5–1.3)
EGFR: 73 ML/MIN/1.73M2 — SIGNIFICANT CHANGE UP
EGFR: 85 ML/MIN/1.73M2 — SIGNIFICANT CHANGE UP
EGFR: 85 ML/MIN/1.73M2 — SIGNIFICANT CHANGE UP
EOSINOPHIL # BLD AUTO: 0.09 K/UL — SIGNIFICANT CHANGE UP (ref 0–0.5)
EOSINOPHIL NFR BLD AUTO: 0.8 % — SIGNIFICANT CHANGE UP (ref 0–6)
ESTIMATED AVERAGE GLUCOSE: 117 MG/DL — HIGH (ref 68–114)
GLUCOSE BLDC GLUCOMTR-MCNC: 123 MG/DL — HIGH (ref 70–99)
GLUCOSE BLDC GLUCOMTR-MCNC: 174 MG/DL — HIGH (ref 70–99)
GLUCOSE BLDC GLUCOMTR-MCNC: 199 MG/DL — HIGH (ref 70–99)
GLUCOSE SERPL-MCNC: 111 MG/DL — HIGH (ref 70–99)
GLUCOSE SERPL-MCNC: 146 MG/DL — HIGH (ref 70–99)
HCT VFR BLD CALC: 25.1 % — LOW (ref 39–50)
HCT VFR BLD CALC: 25.2 % — LOW (ref 39–50)
HGB BLD-MCNC: 7.8 G/DL — LOW (ref 13–17)
HGB BLD-MCNC: 7.9 G/DL — LOW (ref 13–17)
IMM GRANULOCYTES # BLD AUTO: 0.12 K/UL — HIGH (ref 0–0.07)
IMM GRANULOCYTES NFR BLD AUTO: 1.1 % — HIGH (ref 0–0.9)
INR BLD: 1.16 RATIO — SIGNIFICANT CHANGE UP (ref 0.85–1.16)
LYMPHOCYTES # BLD AUTO: 1.23 K/UL — SIGNIFICANT CHANGE UP (ref 1–3.3)
LYMPHOCYTES NFR BLD AUTO: 10.9 % — LOW (ref 13–44)
MAGNESIUM SERPL-MCNC: 2.4 MG/DL — SIGNIFICANT CHANGE UP (ref 1.6–2.6)
MCHC RBC-ENTMCNC: 26.4 PG — LOW (ref 27–34)
MCHC RBC-ENTMCNC: 26.4 PG — LOW (ref 27–34)
MCHC RBC-ENTMCNC: 31.1 G/DL — LOW (ref 32–36)
MCHC RBC-ENTMCNC: 31.3 G/DL — LOW (ref 32–36)
MCV RBC AUTO: 84.3 FL — SIGNIFICANT CHANGE UP (ref 80–100)
MCV RBC AUTO: 84.8 FL — SIGNIFICANT CHANGE UP (ref 80–100)
MONOCYTES # BLD AUTO: 0.52 K/UL — SIGNIFICANT CHANGE UP (ref 0–0.9)
MONOCYTES NFR BLD AUTO: 4.6 % — SIGNIFICANT CHANGE UP (ref 2–14)
MRSA PCR RESULT.: SIGNIFICANT CHANGE UP
NEUTROPHILS # BLD AUTO: 9.3 K/UL — HIGH (ref 1.8–7.4)
NEUTROPHILS NFR BLD AUTO: 82.2 % — HIGH (ref 43–77)
NRBC # BLD AUTO: 0 K/UL — SIGNIFICANT CHANGE UP (ref 0–0)
NRBC # BLD AUTO: 0 K/UL — SIGNIFICANT CHANGE UP (ref 0–0)
NRBC # FLD: 0 K/UL — SIGNIFICANT CHANGE UP (ref 0–0)
NRBC # FLD: 0 K/UL — SIGNIFICANT CHANGE UP (ref 0–0)
NRBC BLD AUTO-RTO: 0 /100 WBCS — SIGNIFICANT CHANGE UP (ref 0–0)
NRBC BLD AUTO-RTO: 0 /100 WBCS — SIGNIFICANT CHANGE UP (ref 0–0)
NT-PROBNP SERPL-SCNC: 4021 PG/ML — HIGH (ref 0–300)
PA ADP PRP-ACNC: 267 PRU — SIGNIFICANT CHANGE UP (ref 182–335)
PHOSPHATE SERPL-MCNC: 2 MG/DL — LOW (ref 2.5–4.5)
PLATELET # BLD AUTO: 364 K/UL — SIGNIFICANT CHANGE UP (ref 150–400)
PLATELET # BLD AUTO: 394 K/UL — SIGNIFICANT CHANGE UP (ref 150–400)
PMV BLD: 9.8 FL — SIGNIFICANT CHANGE UP (ref 7–13)
PMV BLD: 9.8 FL — SIGNIFICANT CHANGE UP (ref 7–13)
POTASSIUM SERPL-MCNC: 3.5 MMOL/L — SIGNIFICANT CHANGE UP (ref 3.5–5.3)
POTASSIUM SERPL-MCNC: 4.1 MMOL/L — SIGNIFICANT CHANGE UP (ref 3.5–5.3)
POTASSIUM SERPL-SCNC: 3.5 MMOL/L — SIGNIFICANT CHANGE UP (ref 3.5–5.3)
POTASSIUM SERPL-SCNC: 4.1 MMOL/L — SIGNIFICANT CHANGE UP (ref 3.5–5.3)
PREALB SERPL-MCNC: 12 MG/DL — LOW (ref 18–38)
PROT SERPL-MCNC: 6.1 G/DL — LOW (ref 6.6–8.7)
PROT SERPL-MCNC: 6.2 GM/DL — SIGNIFICANT CHANGE UP (ref 6–8.3)
PROTHROM AB SERPL-ACNC: 13.1 SEC — SIGNIFICANT CHANGE UP (ref 9.9–13.4)
RBC # BLD: 2.96 M/UL — LOW (ref 4.2–5.8)
RBC # BLD: 2.99 M/UL — LOW (ref 4.2–5.8)
RBC # FLD: 16.9 % — HIGH (ref 10.3–14.5)
RBC # FLD: 16.9 % — HIGH (ref 10.3–14.5)
S AUREUS DNA NOSE QL NAA+PROBE: SIGNIFICANT CHANGE UP
SODIUM SERPL-SCNC: 133 MMOL/L — LOW (ref 135–145)
SODIUM SERPL-SCNC: 134 MMOL/L — LOW (ref 135–145)
TSH SERPL-MCNC: 0.73 UIU/ML — SIGNIFICANT CHANGE UP (ref 0.27–4.2)
WBC # BLD: 11.31 K/UL — HIGH (ref 3.8–10.5)
WBC # BLD: 12.81 K/UL — HIGH (ref 3.8–10.5)
WBC # FLD AUTO: 11.31 K/UL — HIGH (ref 3.8–10.5)
WBC # FLD AUTO: 12.81 K/UL — HIGH (ref 3.8–10.5)

## 2025-02-24 PROCEDURE — 71045 X-RAY EXAM CHEST 1 VIEW: CPT | Mod: 26

## 2025-02-24 PROCEDURE — 74018 RADEX ABDOMEN 1 VIEW: CPT | Mod: 26

## 2025-02-24 PROCEDURE — 99238 HOSP IP/OBS DSCHRG MGMT 30/<: CPT

## 2025-02-24 PROCEDURE — 72158 MRI LUMBAR SPINE W/O & W/DYE: CPT | Mod: 26

## 2025-02-24 PROCEDURE — 93306 TTE W/DOPPLER COMPLETE: CPT | Mod: 26

## 2025-02-24 PROCEDURE — 99232 SBSQ HOSP IP/OBS MODERATE 35: CPT

## 2025-02-24 RX ORDER — ATORVASTATIN CALCIUM 80 MG/1
40 TABLET, FILM COATED ORAL AT BEDTIME
Refills: 0 | Status: DISCONTINUED | OUTPATIENT
Start: 2025-02-24 | End: 2025-03-06

## 2025-02-24 RX ORDER — INSULIN LISPRO 100 U/ML
INJECTION, SOLUTION INTRAVENOUS; SUBCUTANEOUS AT BEDTIME
Refills: 0 | Status: DISCONTINUED | OUTPATIENT
Start: 2025-02-24 | End: 2025-03-01

## 2025-02-24 RX ORDER — DEXTROSE 50 % IN WATER 50 %
25 SYRINGE (ML) INTRAVENOUS ONCE
Refills: 0 | Status: DISCONTINUED | OUTPATIENT
Start: 2025-02-24 | End: 2025-02-26

## 2025-02-24 RX ORDER — GLUCAGON 3 MG/1
1 POWDER NASAL ONCE
Refills: 0 | Status: DISCONTINUED | OUTPATIENT
Start: 2025-02-24 | End: 2025-02-26

## 2025-02-24 RX ORDER — HEPARIN SODIUM 1000 [USP'U]/ML
1700 INJECTION INTRAVENOUS; SUBCUTANEOUS
Qty: 25000 | Refills: 0 | Status: DISCONTINUED | OUTPATIENT
Start: 2025-02-24 | End: 2025-02-25

## 2025-02-24 RX ORDER — FERROUS SULFATE 137(45) MG
1 TABLET, EXTENDED RELEASE ORAL
Refills: 0 | DISCHARGE

## 2025-02-24 RX ORDER — FERROUS SULFATE 137(45) MG
1 TABLET, EXTENDED RELEASE ORAL
Qty: 0 | Refills: 0 | DISCHARGE
Start: 2025-02-24

## 2025-02-24 RX ORDER — INSULIN LISPRO 100 U/ML
INJECTION, SOLUTION INTRAVENOUS; SUBCUTANEOUS
Refills: 0 | Status: DISCONTINUED | OUTPATIENT
Start: 2025-02-24 | End: 2025-03-01

## 2025-02-24 RX ORDER — ACETAMINOPHEN 500 MG/5ML
650 LIQUID (ML) ORAL EVERY 8 HOURS
Refills: 0 | Status: DISCONTINUED | OUTPATIENT
Start: 2025-02-24 | End: 2025-03-06

## 2025-02-24 RX ORDER — DEXTROSE 50 % IN WATER 50 %
12.5 SYRINGE (ML) INTRAVENOUS ONCE
Refills: 0 | Status: DISCONTINUED | OUTPATIENT
Start: 2025-02-24 | End: 2025-02-26

## 2025-02-24 RX ORDER — SOD PHOS DI, MONO/K PHOS MONO 250 MG
1 TABLET ORAL ONCE
Refills: 0 | Status: COMPLETED | OUTPATIENT
Start: 2025-02-24 | End: 2025-02-24

## 2025-02-24 RX ORDER — HEPARIN SODIUM 1000 [USP'U]/ML
15 INJECTION INTRAVENOUS; SUBCUTANEOUS
Qty: 0 | Refills: 0 | DISCHARGE
Start: 2025-02-24

## 2025-02-24 RX ORDER — MELATONIN 5 MG
5 TABLET ORAL AT BEDTIME
Refills: 0 | Status: DISCONTINUED | OUTPATIENT
Start: 2025-02-24 | End: 2025-03-06

## 2025-02-24 RX ORDER — TRAMADOL HYDROCHLORIDE 50 MG/1
25 TABLET, FILM COATED ORAL EVERY 6 HOURS
Refills: 0 | Status: DISCONTINUED | OUTPATIENT
Start: 2025-02-24 | End: 2025-03-02

## 2025-02-24 RX ORDER — GLUCOSAMINE HCL/CHONDROITIN SU 500-400 MG
1 CAPSULE ORAL
Refills: 0 | DISCHARGE

## 2025-02-24 RX ORDER — SODIUM CHLORIDE 9 G/1000ML
1000 INJECTION, SOLUTION INTRAVENOUS
Refills: 0 | Status: DISCONTINUED | OUTPATIENT
Start: 2025-02-24 | End: 2025-02-26

## 2025-02-24 RX ORDER — SOD PHOS DI, MONO/K PHOS MONO 250 MG
2 TABLET ORAL ONCE
Refills: 0 | Status: COMPLETED | OUTPATIENT
Start: 2025-02-24 | End: 2025-02-24

## 2025-02-24 RX ORDER — DAPTOMYCIN 500 MG/10ML
750 INJECTION, POWDER, LYOPHILIZED, FOR SOLUTION INTRAVENOUS
Qty: 0 | Refills: 0 | DISCHARGE
Start: 2025-02-24

## 2025-02-24 RX ORDER — NALOXONE HYDROCHLORIDE 0.4 MG/ML
1 INJECTION, SOLUTION INTRAMUSCULAR; INTRAVENOUS; SUBCUTANEOUS
Qty: 1 | Refills: 0
Start: 2025-02-24

## 2025-02-24 RX ORDER — SALINE 7; 19 G/118ML; G/118ML
1 ENEMA RECTAL
Refills: 0 | DISCHARGE

## 2025-02-24 RX ORDER — MAGNESIUM HYDROXIDE 400 MG/5ML
30 SUSPENSION ORAL
Refills: 0 | DISCHARGE

## 2025-02-24 RX ORDER — SENNA 187 MG
2 TABLET ORAL
Qty: 0 | Refills: 0 | DISCHARGE
Start: 2025-02-24

## 2025-02-24 RX ORDER — INSULIN LISPRO 100 U/ML
0 INJECTION, SOLUTION INTRAVENOUS; SUBCUTANEOUS
Qty: 0 | Refills: 0 | DISCHARGE
Start: 2025-02-24

## 2025-02-24 RX ORDER — UBIDECARENONE 100 MG
1 CAPSULE ORAL
Refills: 0 | DISCHARGE

## 2025-02-24 RX ORDER — METFORMIN HYDROCHLORIDE 850 MG/1
4 TABLET ORAL
Refills: 0 | DISCHARGE

## 2025-02-24 RX ORDER — TRAMADOL HYDROCHLORIDE 50 MG/1
1 TABLET, FILM COATED ORAL
Qty: 0 | Refills: 0 | DISCHARGE
Start: 2025-02-24

## 2025-02-24 RX ORDER — B1/B2/B3/B5/B6/B12/VIT C/FOLIC 500-0.5 MG
1 TABLET ORAL DAILY
Refills: 0 | Status: DISCONTINUED | OUTPATIENT
Start: 2025-02-24 | End: 2025-03-06

## 2025-02-24 RX ORDER — SENNA 187 MG
2 TABLET ORAL AT BEDTIME
Refills: 0 | Status: DISCONTINUED | OUTPATIENT
Start: 2025-02-24 | End: 2025-03-06

## 2025-02-24 RX ORDER — BISACODYL 5 MG
10 TABLET, DELAYED RELEASE (ENTERIC COATED) ORAL DAILY
Refills: 0 | Status: DISCONTINUED | OUTPATIENT
Start: 2025-02-24 | End: 2025-02-24

## 2025-02-24 RX ORDER — DAPTOMYCIN 500 MG/10ML
750 INJECTION, POWDER, LYOPHILIZED, FOR SOLUTION INTRAVENOUS EVERY 24 HOURS
Refills: 0 | Status: DISCONTINUED | OUTPATIENT
Start: 2025-02-25 | End: 2025-02-25

## 2025-02-24 RX ORDER — LEVOTHYROXINE SODIUM 300 MCG
150 TABLET ORAL DAILY
Refills: 0 | Status: DISCONTINUED | OUTPATIENT
Start: 2025-02-24 | End: 2025-03-06

## 2025-02-24 RX ORDER — KETOROLAC TROMETHAMINE 30 MG/ML
15 INJECTION, SOLUTION INTRAMUSCULAR; INTRAVENOUS EVERY 6 HOURS
Refills: 0 | Status: DISCONTINUED | OUTPATIENT
Start: 2025-02-24 | End: 2025-02-24

## 2025-02-24 RX ORDER — HEPARIN SODIUM 1000 [USP'U]/ML
8000 INJECTION INTRAVENOUS; SUBCUTANEOUS EVERY 6 HOURS
Refills: 0 | Status: DISCONTINUED | OUTPATIENT
Start: 2025-02-24 | End: 2025-02-25

## 2025-02-24 RX ORDER — TRAMADOL HYDROCHLORIDE 50 MG/1
50 TABLET, FILM COATED ORAL EVERY 6 HOURS
Refills: 0 | Status: DISCONTINUED | OUTPATIENT
Start: 2025-02-24 | End: 2025-03-03

## 2025-02-24 RX ORDER — FERROUS SULFATE 137(45) MG
325 TABLET, EXTENDED RELEASE ORAL DAILY
Refills: 0 | Status: DISCONTINUED | OUTPATIENT
Start: 2025-02-24 | End: 2025-03-06

## 2025-02-24 RX ORDER — BISACODYL 5 MG
10 TABLET, DELAYED RELEASE (ENTERIC COATED) ORAL DAILY
Refills: 0 | Status: DISCONTINUED | OUTPATIENT
Start: 2025-02-24 | End: 2025-03-06

## 2025-02-24 RX ORDER — CYANOCOBALAMIN 1000 UG/ML
1 INJECTION INTRAMUSCULAR; SUBCUTANEOUS
Refills: 0 | DISCHARGE

## 2025-02-24 RX ORDER — DEXTROSE 50 % IN WATER 50 %
15 SYRINGE (ML) INTRAVENOUS ONCE
Refills: 0 | Status: DISCONTINUED | OUTPATIENT
Start: 2025-02-24 | End: 2025-02-26

## 2025-02-24 RX ORDER — NALOXONE HYDROCHLORIDE 0.4 MG/ML
0 INJECTION, SOLUTION INTRAMUSCULAR; INTRAVENOUS; SUBCUTANEOUS
Qty: 0 | Refills: 0 | DISCHARGE
Start: 2025-02-24

## 2025-02-24 RX ORDER — HEPARIN SODIUM 1000 [USP'U]/ML
4000 INJECTION INTRAVENOUS; SUBCUTANEOUS EVERY 6 HOURS
Refills: 0 | Status: DISCONTINUED | OUTPATIENT
Start: 2025-02-24 | End: 2025-02-25

## 2025-02-24 RX ORDER — POLYETHYLENE GLYCOL 3350 17 G/17G
17 POWDER, FOR SOLUTION ORAL
Qty: 0 | Refills: 0 | DISCHARGE
Start: 2025-02-24

## 2025-02-24 RX ORDER — POLYETHYLENE GLYCOL 3350 17 G/17G
17 POWDER, FOR SOLUTION ORAL DAILY
Refills: 0 | Status: DISCONTINUED | OUTPATIENT
Start: 2025-02-24 | End: 2025-03-06

## 2025-02-24 RX ORDER — ACETAMINOPHEN 500 MG/5ML
2 LIQUID (ML) ORAL
Qty: 0 | Refills: 0 | DISCHARGE
Start: 2025-02-24

## 2025-02-24 RX ADMIN — SODIUM CHLORIDE 75 MILLILITER(S): 9 INJECTION, SOLUTION INTRAVENOUS at 05:38

## 2025-02-24 RX ADMIN — ATORVASTATIN CALCIUM 40 MILLIGRAM(S): 80 TABLET, FILM COATED ORAL at 21:29

## 2025-02-24 RX ADMIN — Medication 1 PACKET(S): at 15:37

## 2025-02-24 RX ADMIN — TRAMADOL HYDROCHLORIDE 50 MILLIGRAM(S): 50 TABLET, FILM COATED ORAL at 18:20

## 2025-02-24 RX ADMIN — TRAMADOL HYDROCHLORIDE 50 MILLIGRAM(S): 50 TABLET, FILM COATED ORAL at 09:50

## 2025-02-24 RX ADMIN — Medication 5 MILLIGRAM(S): at 21:29

## 2025-02-24 RX ADMIN — HEPARIN SODIUM 1700 UNIT(S)/HR: 1000 INJECTION INTRAVENOUS; SUBCUTANEOUS at 21:08

## 2025-02-24 RX ADMIN — DAPTOMYCIN 130 MILLIGRAM(S): 500 INJECTION, POWDER, LYOPHILIZED, FOR SOLUTION INTRAVENOUS at 13:23

## 2025-02-24 RX ADMIN — Medication 2 TABLET(S): at 21:29

## 2025-02-24 RX ADMIN — POLYETHYLENE GLYCOL 3350 17 GRAM(S): 17 POWDER, FOR SOLUTION ORAL at 10:19

## 2025-02-24 RX ADMIN — Medication 40 MILLIGRAM(S): at 10:19

## 2025-02-24 RX ADMIN — Medication 3 MILLILITER(S): at 21:26

## 2025-02-24 RX ADMIN — INSULIN LISPRO 2: 100 INJECTION, SOLUTION INTRAVENOUS; SUBCUTANEOUS at 11:28

## 2025-02-24 RX ADMIN — GABAPENTIN 200 MILLIGRAM(S): 400 CAPSULE ORAL at 05:38

## 2025-02-24 RX ADMIN — Medication 10 MILLIGRAM(S): at 23:43

## 2025-02-24 RX ADMIN — GABAPENTIN 200 MILLIGRAM(S): 400 CAPSULE ORAL at 13:23

## 2025-02-24 RX ADMIN — Medication 325 MILLIGRAM(S): at 10:20

## 2025-02-24 RX ADMIN — DAPTOMYCIN 130 MILLIGRAM(S): 500 INJECTION, POWDER, LYOPHILIZED, FOR SOLUTION INTRAVENOUS at 23:40

## 2025-02-24 RX ADMIN — INSULIN LISPRO 2: 100 INJECTION, SOLUTION INTRAVENOUS; SUBCUTANEOUS at 17:26

## 2025-02-24 RX ADMIN — Medication 1 TABLET(S): at 10:19

## 2025-02-24 RX ADMIN — TRAMADOL HYDROCHLORIDE 50 MILLIGRAM(S): 50 TABLET, FILM COATED ORAL at 08:52

## 2025-02-24 RX ADMIN — Medication 650 MILLIGRAM(S): at 08:50

## 2025-02-24 RX ADMIN — Medication 3 MILLIGRAM(S): at 02:25

## 2025-02-24 RX ADMIN — Medication 2 TABLET(S): at 10:31

## 2025-02-24 RX ADMIN — Medication 150 MICROGRAM(S): at 05:38

## 2025-02-24 RX ADMIN — TRAMADOL HYDROCHLORIDE 25 MILLIGRAM(S): 50 TABLET, FILM COATED ORAL at 21:29

## 2025-02-24 RX ADMIN — Medication 650 MILLIGRAM(S): at 07:51

## 2025-02-24 RX ADMIN — HEPARIN SODIUM 1700 UNIT(S)/HR: 1000 INJECTION INTRAVENOUS; SUBCUTANEOUS at 07:52

## 2025-02-24 RX ADMIN — TRAMADOL HYDROCHLORIDE 50 MILLIGRAM(S): 50 TABLET, FILM COATED ORAL at 17:18

## 2025-02-24 RX ADMIN — TRAMADOL HYDROCHLORIDE 25 MILLIGRAM(S): 50 TABLET, FILM COATED ORAL at 22:29

## 2025-02-24 NOTE — CONSULT NOTE ADULT - ASSESSMENT
Assessment: Patient is a 75 y/o male presenting as a tx for possible AVR due to bioprosthetic valve endocarditis. Vascular consulted for post op care.    Plan:  -No acute surgical intervention. L groin soft, previna in place. LLE with palp distal pulses, NV intact.   -Previna to come down POD5  -Continue heparin gtt  -Vascular to follow  -Remainder of care per primary    Discussed with attending surgeon Dr. Pinzon

## 2025-02-24 NOTE — CONSULT NOTE ADULT - SUBJECTIVE AND OBJECTIVE BOX
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 He was taken to the OR with vascular for presumed septic embolization causing L femoral artery occlusion and acute limb ischemia. He went for left fem thromboembolectomy on 2/22 with Dr. Pike. Vascular consulted for post operative care.    MEDICATIONS  (STANDING):  acetaminophen     Tablet .. 650 milliGRAM(s) Oral every 6 hours  atorvastatin 40 milliGRAM(s) Oral at bedtime  dextrose 5%. 1000 milliLiter(s) (100 mL/Hr) IV Continuous <Continuous>  dextrose 5%. 1000 milliLiter(s) (50 mL/Hr) IV Continuous <Continuous>  dextrose 50% Injectable 25 Gram(s) IV Push once  dextrose 50% Injectable 12.5 Gram(s) IV Push once  dextrose 50% Injectable 25 Gram(s) IV Push once  ferrous    sulfate 325 milliGRAM(s) Oral daily  glucagon  Injectable 1 milliGRAM(s) IntraMuscular once  heparin  Infusion. 1700 Unit(s)/Hr (17 mL/Hr) IV Continuous <Continuous>  insulin lispro (ADMELOG) corrective regimen sliding scale   SubCutaneous at bedtime  insulin lispro (ADMELOG) corrective regimen sliding scale   SubCutaneous three times a day before meals  levothyroxine 150 MICROGram(s) Oral daily  melatonin 5 milliGRAM(s) Oral at bedtime  multivitamin 1 Tablet(s) Oral daily  pantoprazole    Tablet 40 milliGRAM(s) Oral before breakfast  polyethylene glycol 3350 17 Gram(s) Oral daily  senna 2 Tablet(s) Oral at bedtime  sodium chloride 0.9% lock flush 3 milliLiter(s) IV Push every 8 hours    MEDICATIONS  (PRN):  bisacodyl Suppository 10 milliGRAM(s) Rectal daily PRN Constipation  dextrose Oral Gel 15 Gram(s) Oral once PRN Blood Glucose LESS THAN 70 milliGRAM(s)/deciliter  heparin   Injectable 8000 Unit(s) IV Push every 6 hours PRN For aPTT less than 40  heparin   Injectable 4000 Unit(s) IV Push every 6 hours PRN For aPTT between 40 - 57  traMADol 50 milliGRAM(s) Oral every 6 hours PRN Severe Pain (7 - 10)  traMADol 25 milliGRAM(s) Oral every 6 hours PRN Moderate Pain (4 - 6)                          7.9    11.31 )-----------( 394      ( 24 Feb 2025 20:30 )             25.2    02-24    133[L]  |  99  |  28.3[H]  ----------------------------<  111[H]  4.1   |  24.0  |  0.94    Ca    8.1[L]      24 Feb 2025 20:30  Phos  2.0     02-24  Mg     2.4     02-24    TPro  6.1[L]  /  Alb  2.5[L]  /  TBili  0.3[L]  /  DBili  x   /  AST  60[H]  /  ALT  65[H]  /  AlkPhos  177[H]  02-24    < from: MR Lumbar Spine w/wo IV Cont (02.24.25 @ 09:45) >  IMPRESSION:    1. L3-L4 suspected septic discitis appears largely similar to 2/12/2025   noting mildly increased fluid within the disc space and increased left   paraspinal infiltration and enhancement. No abscess collection has   developed    2. L5-S1 suspected discitis appears largely similar to 2/12/2025, noting   decreased enhancement in the left ventral epidural space. No abscess   collection has developed    3. Recommend continued imaging follow-up      < end of copied text >    < from: TTE W or WO Ultrasound Enhancing Agent (02.24.25 @ 21:10) >     CONCLUSIONS:      1. Left ventricular cavity is normal in size. Left ventricular systolic function is normal with an ejection fraction visually estimated at 55 to 60 %.   2. There is mild (grade 1) left ventricular diastolic dysfunction.   3. Normal right ventricular cavity size and normal right ventricular systolic function.   4. Left atrium is mildly dilated.   5. The right atrium is normal in size.   6. Thickened mitral valve leaflets.   7. There is mild calcification of the mitral valve annulus.   8. Trace mitral regurgitation.   9. A Norman TAVR (TAVR) valve replacement is present in the aortic position The prosthetic valve has normal leaflet excursion and is well seated with normal function The prosthetic aortic valve No vegetations seen.. Trace paravalvular regurgitation.  10. A TEEwould be necessary to rule out vegetation on TAVR.  11. No pericardial effusion seen.  12. Pulmonary artery systolic pressure could not be estimated.  13. Small right pleural effusion noted.      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 He was taken to the OR with vascular for presumed septic embolization causing L femoral artery occlusion and acute limb ischemia. He went for left fem thromboembolectomy on 2/22 with Dr. Pike. Vascular consulted for post operative care.    MEDICATIONS  (STANDING):  acetaminophen     Tablet .. 650 milliGRAM(s) Oral every 6 hours  atorvastatin 40 milliGRAM(s) Oral at bedtime  dextrose 5%. 1000 milliLiter(s) (100 mL/Hr) IV Continuous <Continuous>  dextrose 5%. 1000 milliLiter(s) (50 mL/Hr) IV Continuous <Continuous>  dextrose 50% Injectable 25 Gram(s) IV Push once  dextrose 50% Injectable 12.5 Gram(s) IV Push once  dextrose 50% Injectable 25 Gram(s) IV Push once  ferrous    sulfate 325 milliGRAM(s) Oral daily  glucagon  Injectable 1 milliGRAM(s) IntraMuscular once  heparin  Infusion. 1700 Unit(s)/Hr (17 mL/Hr) IV Continuous <Continuous>  insulin lispro (ADMELOG) corrective regimen sliding scale   SubCutaneous at bedtime  insulin lispro (ADMELOG) corrective regimen sliding scale   SubCutaneous three times a day before meals  levothyroxine 150 MICROGram(s) Oral daily  melatonin 5 milliGRAM(s) Oral at bedtime  multivitamin 1 Tablet(s) Oral daily  pantoprazole    Tablet 40 milliGRAM(s) Oral before breakfast  polyethylene glycol 3350 17 Gram(s) Oral daily  senna 2 Tablet(s) Oral at bedtime  sodium chloride 0.9% lock flush 3 milliLiter(s) IV Push every 8 hours    MEDICATIONS  (PRN):  bisacodyl Suppository 10 milliGRAM(s) Rectal daily PRN Constipation  dextrose Oral Gel 15 Gram(s) Oral once PRN Blood Glucose LESS THAN 70 milliGRAM(s)/deciliter  heparin   Injectable 8000 Unit(s) IV Push every 6 hours PRN For aPTT less than 40  heparin   Injectable 4000 Unit(s) IV Push every 6 hours PRN For aPTT between 40 - 57  traMADol 50 milliGRAM(s) Oral every 6 hours PRN Severe Pain (7 - 10)  traMADol 25 milliGRAM(s) Oral every 6 hours PRN Moderate Pain (4 - 6)                          7.9    11.31 )-----------( 394      ( 24 Feb 2025 20:30 )             25.2    02-24    133[L]  |  99  |  28.3[H]  ----------------------------<  111[H]  4.1   |  24.0  |  0.94    Ca    8.1[L]      24 Feb 2025 20:30  Phos  2.0     02-24  Mg     2.4     02-24    TPro  6.1[L]  /  Alb  2.5[L]  /  TBili  0.3[L]  /  DBili  x   /  AST  60[H]  /  ALT  65[H]  /  AlkPhos  177[H]  02-24    VITALS:   T(C): 37.2 (02-25-25 @ 00:00), Max: 37.5 (02-24-25 @ 18:04)  HR: 81 (02-25-25 @ 04:00) (77 - 100)  BP: 124/63 (02-25-25 @ 04:00) (101/69 - 132/89)  RR: 23 (02-25-25 @ 04:00) (18 - 28)  SpO2: 93% (02-25-25 @ 04:00) (92% - 97%)    GENERAL: NAD, lying in bed comfortably  HEAD:  Atraumatic, normocephalic  EYES: Conjunctiva and sclera clear  ENT: Moist mucous membranes  NECK: Supple, no JVD  HEART: Regular rate and rhythm  LUNGS: Unlabored respirations.   ABDOMEN: Soft, nontender, nondistended  EXTREMITIES: b/l LE edema, b/l palp DP/PT, L groin soft no ttp and previna in place  NERVOUS SYSTEM:  A&Ox3, no focal deficits   SKIN: No rashes or lesions    < from: MR Lumbar Spine w/wo IV Cont (02.24.25 @ 09:45) >  IMPRESSION:    1. L3-L4 suspected septic discitis appears largely similar to 2/12/2025   noting mildly increased fluid within the disc space and increased left   paraspinal infiltration and enhancement. No abscess collection has   developed    2. L5-S1 suspected discitis appears largely similar to 2/12/2025, noting   decreased enhancement in the left ventral epidural space. No abscess   collection has developed    3. Recommend continued imaging follow-up      < end of copied text >    < from: TTE W or WO Ultrasound Enhancing Agent (02.24.25 @ 21:10) >     CONCLUSIONS:      1. Left ventricular cavity is normal in size. Left ventricular systolic function is normal with an ejection fraction visually estimated at 55 to 60 %.   2. There is mild (grade 1) left ventricular diastolic dysfunction.   3. Normal right ventricular cavity size and normal right ventricular systolic function.   4. Left atrium is mildly dilated.   5. The right atrium is normal in size.   6. Thickened mitral valve leaflets.   7. There is mild calcification of the mitral valve annulus.   8. Trace mitral regurgitation.   9. A Norman TAVR (TAVR) valve replacement is present in the aortic position The prosthetic valve has normal leaflet excursion and is well seated with normal function The prosthetic aortic valve No vegetations seen.. Trace paravalvular regurgitation.  10. A TEEwould be necessary to rule out vegetation on TAVR.  11. No pericardial effusion seen.  12. Pulmonary artery systolic pressure could not be estimated.  13. Small right pleural effusion noted.      < end of copied text >

## 2025-02-24 NOTE — PHYSICAL THERAPY INITIAL EVALUATION ADULT - LEVEL OF INDEPENDENCE: SUPINE/SIT, REHAB EVAL
held transfer / gait assessment due to low H&H: 7.4/23.0; blood transfusion pending
moderate assist (50% patients effort)/maximum assist (25% patients effort)

## 2025-02-24 NOTE — PROGRESS NOTE ADULT - ASSESSMENT
73 y/o Male with h/o HTN, DM, prostate CA, hypothyroidism, AVR, A.Fib on eliquis, chronic back pain s/p epidurals on diclofenac, HLD, NIDDM, CKD recent hospitalization on 2/11 for sepsis with strep viridans, subacute AV prosthetic bacterial endocarditis, probable lumbar spine discitis / OM treated with ceftriaxone 2 gm IV qd via PICC line was admitted on 2/20 rash on his left leg knee extending towards his foot. Pt noticed  his left thigh got puffy, as well as a rash on the left knee radiating down to his toes. Pt said two days PTA his right leg was hurting, then today the pain began on the left leg. Pt reports he's never had redness like this, and his wife noticed the redness around 1:30pm on day of admission. Pt says he can turn his body but not easily. Pt is complaining of knee pain when inspecting the leg. Pt recently discharged to [facility] on abx treatment for infected TAVR and Osteomyelitis of spine.    #Left lower leg ischemia s/p emboli s/p thrombectomy and endarterectomy.  #Subacute AV prosthetic bacterial endocarditis. AVR s/p sepsis with strep viridans group  #Probable lumbar spine discitis / OM.  #Low grade fever  #Leukocytosis  #CRF stage 3  -BC x 2 noted  -s/p ceftriaxone 2 gm IV qd since 2/11 to 2/21  -on daptomycin 750 mg IV qd # 3  -tolerating abx well so far; no side effects noted  -vascular and cardiology evaluations appreciated  -plan for transfer to [facility] for further cardiac care  -continue abx coverage   -f/u cultures  -PICC line care  -f/u cultures  -monitor temps  -f/u CBC  -supportive care  2. Other issues:   -care per medicine    D/w medicine team and Dr. Jernigan

## 2025-02-24 NOTE — DISCHARGE NOTE PROVIDER - HOSPITAL COURSE
75 y/o male with a PMHx of HTN, A-FIB, HLD, GI Bloeed, TAVR, chronic back pain and DM presents to the ED c/o a rash on his left leg knee radiating towards his foot. Pt noticed  his left thigh got puffy, as well as a rash on the left knee radiating down to his toes. Pt said two days PTA his right leg was hurting, then today the pain began on the left leg. Pt reports he's never had redness like this, and his wife noticed the redness around 1:30pm on day of admission. Pt says he can turn his body but not easily. Pt is complaining of knee pain when inspecting the leg. Pt recently discharged to [facility] rehab after treatment for infected TAVR and Osteomyelitis of spine. Pt received transfusion and w/u for GI bleed with unremarkable colonoscopy and upper endiscopy. Underwent ROVERTO at that time which showed vegitation of TAVR. Plan at that time was for 6 weeks of IV antibiotics via PICC line and out patient pill camera study.    Since admission patient was seen by vascular surgery,  CTA showe clot to common femoral and distal embolism ID, and Cardiology. Underwent common femoral endarterectomy with clot retrieval. Goo blood flow to lower extremity s/p surgery. Foot appearance improving. Repeat blood cultures were negative but the excised clot was found to have gm positive cocci. Pt with weakness to legs. Sent for repeat MR with contrast. Results pending at this time. Other incidental problem is constipation and persistent back pain reacquiring tramadol for pain relief.  Pt received transfusion of 1 unit of PRBC's last night.    CTS consulted and recommended transfer of the patient to [facility] for evaluation of TAVR and possible surgical replacement. Condition at time of transfer is stable but guarded prognosis.  73 y/o male with a PMHx of HTN, A-FIB, HLD, GI Bleed, TAVR, chronic back pain and DM presents to the ED c/o a rash on his left leg knee radiating towards his foot. Pt noticed  his left thigh got puffy, as well as a rash on the left knee radiating down to his toes. Pt said two days PTA his right leg was hurting, then today the pain began on the left leg. Pt reports he's never had redness like this, and his wife noticed the redness around 1:30pm on day of admission. Pt says he can turn his body but not easily. Pt is complaining of knee pain when inspecting the leg. Pt recently discharged to SNF rehab after treatment for infected TAVR and Osteomyelitis of spine. Pt received transfusion and w/u for GI bleed with unremarkable colonoscopy and upper peniscopy. Underwent ROVERTO at that time which showed vegetation of TAVR. Plan at that time was for 6 weeks of IV antibiotics via PICC line and out patient pill camera study.    Since admission patient was seen by vascular surgery,  CTA showe clot to common femoral and distal embolism ID, and Cardiology. Underwent common femoral endarterectomy with clot retrieval. Goo blood flow to lower extremity s/p surgery. Foot appearance improving. Repeat blood cultures were negative but the excised clot was found to have gm positive cocci. Pt with weakness to legs. Sent for repeat MR with contrast. Results without abscess at this time. Other incidental problem is constipation and persistent back pain requiring tramadol for pain relief.  Pt received transfusion of 2 unit of PRBC's last night.    CTS consulted and recommended transfer of the patient to [facility] for evaluation of TAVR and possible surgical replacement. Condition at time of transfer is stable but guarded prognosis.    FYI patient had requested DNR/DNI on admission.  However he has rescinded this request until after possible cardiac surgery. Order for DNR was cancelled.

## 2025-02-24 NOTE — DISCHARGE NOTE NURSING/CASE MANAGEMENT/SOCIAL WORK - PATIENT PORTAL LINK FT
You can access the FollowMyHealth Patient Portal offered by [facility] by registering at the following website: [email or web address]. By joining [employer]’s FollowMyHealth portal, you will also be able to view your health information using other applications (apps) compatible with our system.

## 2025-02-24 NOTE — PROGRESS NOTE ADULT - SUBJECTIVE AND OBJECTIVE BOX
SURGERY DAILY PROGRESS NOTE:     Subjective:  Patient seen and examined at bedside during am rounds. AVSS. Pain is improved, sensation has returned to cherelle  [place] out  on IV ABx  on hep gtt        PHYSICAL EXAM   General: AAOx3, Well developed, NAD  Chest: Normal respiratory effort  Heart: RRR  Abdomen: Soft, NTND, no masses  Extremities: Warm  RLE: sensory motor intact, palpable PT/DP  LLE: edema, rash, sensory intact, motor diminished the knee or hip ,palpable DP and PT,   I&O's Detail      Vitals:  T(C): 36.8 ( @ 05:00), Max: 36.8 ( @ 05:00)  HR: 77 (:00) (69 - 90)  BP: 108/60 ( @ :00) (95/65 - 117/67)  RR: 21 ( @ 06:00) (14 - 23)  SpO2: 94% (:00) (92% - 100%)     @ 07:01  -   @ 07:00  --------------------------------------------------------  IN:    Heparin Infusion: 165.6 mL    Lactated Ringers: 729.2 mL    Oral Fluid: 720 mL  Total IN: 1614.8 mL    OUT:    Indwelling Catheter - Urethral (mL): 350 mL    Voided (mL): 700 mL  Total OUT: 1050 mL    Total NET: 564.8 mL           @ 06:23                    7.8  CBC: 12.81>)-------(<364                     25.1                 - | - | -    CMP:  ----------------------< -               - | - | -                      Ca:-  Phos:-  Mg:-               -|      |-        LFTs:  ------|-|-----             -|      |-   @ 06:02                    8.7  CBC: 13.62>)-------(<346                     27.7                 135 | 104 | 39    CMP:  ----------------------< 209               4.3 | 23 | 1.21                      Ca:9.0  Phos:3.2  M.6               -|      |-        LFTs:  ------|-|-----             -|      |-      Culture - Fungal, Other (collected 25 @ 07:42)  Source: .Other Left femoral plaque  Preliminary Report (25 @ 11:28):    Testing in progress    Culture - Acid Fast - Tissue w/Smear (collected 25 @ 07:42)  Source: Tissue Left femoral plaque    Culture - Tissue with Gram Stain (collected 25 @ 07:42)  Source: Tissue Left femoral plaque  Gram Stain (25 @ 19:34):    Few polymorphonuclear leukocytes per low power field    Moderate Gram Positive Cocci in Pairs and Chains per oil power field  Preliminary Report (25 @ 14:06):    No growth to date    Culture - Blood (collected 25 @ 18:10)  Source: .Blood None  Preliminary Report (25 @ 02:01):    No growth at 72 Hours    Culture - Blood (collected 25 @ 18:10)  Source: .Blood None  Preliminary Report (25 @ 02:01):    No growth at 72 Hours      Current Inpatient Medications:  acetaminophen     Tablet .. 650 milliGRAM(s) Oral every 6 hours PRN  aluminum hydroxide/magnesium hydroxide/simethicone Suspension 30 milliLiter(s) Oral every 4 hours PRN  atorvastatin 40 milliGRAM(s) Oral at bedtime  DAPTOmycin IVPB      DAPTOmycin IVPB 750 milliGRAM(s) IV Intermittent every 24 hours  dextrose 5%. 1000 milliLiter(s) (100 mL/Hr) IV Continuous <Continuous>  dextrose 5%. 1000 milliLiter(s) (50 mL/Hr) IV Continuous <Continuous>  dextrose 50% Injectable 25 Gram(s) IV Push once  dextrose 50% Injectable 12.5 Gram(s) IV Push once  dextrose 50% Injectable 25 Gram(s) IV Push once  dextrose Oral Gel 15 Gram(s) Oral once PRN  ferrous    sulfate 325 milliGRAM(s) Oral daily  gabapentin 200 milliGRAM(s) Oral three times a day  glucagon  Injectable 1 milliGRAM(s) IntraMuscular once  heparin   Injectable 8000 Unit(s) IV Push every 6 hours PRN  heparin   Injectable 4000 Unit(s) IV Push every 6 hours PRN  heparin   Injectable 8000 Unit(s) IV Push every 6 hours PRN  heparin   Injectable 4000 Unit(s) IV Push every 6 hours PRN  heparin  Infusion. 1500 Unit(s)/Hr (15 mL/Hr) IV Continuous <Continuous>  insulin lispro (ADMELOG) corrective regimen sliding scale   SubCutaneous three times a day before meals  insulin lispro (ADMELOG) corrective regimen sliding scale   SubCutaneous at bedtime  lactated ringers. 1000 milliLiter(s) (75 mL/Hr) IV Continuous <Continuous>  levothyroxine 150 MICROGram(s) Oral daily  melatonin 3 milliGRAM(s) Oral at bedtime PRN  multivitamin 1 Tablet(s) Oral daily  naloxone Injectable 0.4 milliGRAM(s) IV Push once  ondansetron Injectable 4 milliGRAM(s) IV Push every 8 hours PRN  pantoprazole    Tablet 40 milliGRAM(s) Oral before breakfast  polyethylene glycol 3350 17 Gram(s) Oral daily  senna 2 Tablet(s) Oral at bedtime  traMADol 50 milliGRAM(s) Oral every 6 hours PRN  zolpidem 5 milliGRAM(s) Oral at bedtime PRN

## 2025-02-24 NOTE — PROGRESS NOTE ADULT - ASSESSMENT
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 Pt noticed  his left thigh got puffy, as well as a rash on the left knee radiating down to his toes.     CTA LLE revealing complete occlusion of proximal L SFA     Admitted for  1. LLE pain   2. Occlusion proximal L SFA - suspected septic emboli?   3. 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 pain control IV tylenol, tramadol  CV: BP okay 110s/70s. in sinus rhythm on monitor 70s.   Pulm: on 2L nc sating 99%, trial no room air.    GI: PO diet. add PPI. add bowel regimen, pt states he hasnt had a BM in few days.  Nephro: Cr 1.07, dc IV fluids. voiding spontaneously. monitor I & Os. Trend renal fxn  Endo: check a1c. BGMs ac hs. ISS.   Vasc: + palpable DP pulse in LLE. L groin wound vac.   MSK:   ID: trend WBC. monitor temps. f/u blood and urine cultures.   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 Pt noticed  his left thigh got puffy, as well as a rash on the left knee radiating down to his toes.     CTA LLE revealing complete occlusion of proximal L SFA     Admitted for  1. LLE pain   2. Occlusion proximal L SFA - suspected septic emboli?   3. 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 pain control IV tylenol, tramadol  CV: BP okay 110s/70s. in sinus rhythm on monitor 70s. repeat TTE. cardiology following, plan for transfer to [facility] under CTS service for surgical eval of TAVR. accepting MD Dr. Rodriges. d/w CTS PA at [facility] [place]. pt agreeable to temporarily rescinding DNR for surgical evaluation. MOLST voided.    Pulm: on 2L nc sating 99%, trial no room air.    GI: PO diet. add PPI. add bowel regimen, pt states he hasnt had a BM in few days.  Nephro: Cr 1.07, dc IV fluids. voiding spontaneously. monitor I & Os. Trend renal fxn  Endo: check a1c. BGMs ac hs. ISS.   Vasc: + palpable DP pulse in LLE. L groin wound vac.   MSK: c/o lower back pain, feels weak in LE also, states he hasnt had a BM in a few days. doesnt feel the urge. f/u MR lumbar spine  ID: leukocytosis trending down. cont IV dapto 750mg qd #4 (was on CTX ) via RUE PICC. blood cx 2/20. trend WBC. monitor temps. f/u blood and urine cultures.   Heme: Hgb 8.7 -> 7.8. cont to trend repeat CBC 12:00 . DVT ppx - on hep gtt. SCDs  PT eval    Dispo: Awaiting transfer to [facility] for eval TAVR, cont IV daptomycin. f/u MRI lumbar spine. TTE    Will discuss with Dr. Sparrow, d/w Dr. Heath cardiology, d/w Dr. 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 Pt noticed  his left thigh got puffy, as well as a rash on the left knee radiating down to his toes.     CTA LLE revealing complete occlusion of proximal L SFA     Admitted for  1. LLE pain   2. Occlusion proximal L SFA - suspected septic emboli?   3. 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 pain control IV tylenol, tramadol  CV: BP okay 110s/70s. in sinus rhythm on monitor 70s. repeat TTE. cardiology following, plan for transfer to [facility] under CTS service for surgical eval of TAVR. accepting MD Dr. Rodriges. d/w CTS PA at [facility] [place]. pt agreeable to temporarily rescinding DNR for surgical evaluation. MOLST voided.    Pulm: on 2L nc sating 99%, trial no room air.    GI: PO diet. add PPI. add bowel regimen, pt states he hasnt had a BM in few days.  Nephro: Cr 1.07, dc IV fluids. voiding spontaneously. monitor I & Os. Trend renal fxn  Endo: check a1c. BGMs ac hs. ISS.   Vasc: + palpable DP pulse in LLE. L groin wound vac.   MSK: c/o lower back pain, feels weak in LE also, states he hasnt had a BM in a few days. doesnt feel the urge. f/u MR lumbar spine  ID: leukocytosis trending down. cont IV dapto 750mg qd #4 (was on CTX ) via RUE PICC. blood cx 2/20. trend WBC. monitor temps. f/u blood and urine cultures.   Heme: Hgb 8.7 -> 7.8. cont to trend repeat CBC 12:00 . DVT ppx - on hep gtt. SCDs  PT eval    Dispo: Awaiting transfer to [facility] for eval TAVR, cont IV daptomycin. f/u MRI lumbar spine. TTE    Will discuss with Dr. Sparrow, d/w Dr. Heath cardiology, d/w Dr. 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 Pt noticed  his left thigh got puffy, as well as a rash on the left knee radiating down to his toes.     CTA LLE revealing complete occlusion of proximal L SFA     Admitted for  1. LLE pain   2. Occlusion proximal L SFA - suspected septic emboli?   3. 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 pain control IV tylenol, tramadol  CV: BP okay 110s/70s. in sinus rhythm on monitor 70s. repeat TTE. cardiology following, plan for transfer to [facility] under CTS service for surgical eval of TAVR. accepting MD Dr. Rodriges. d/w CTS PA at [facility] [place]. pt agreeable to temporarily rescinding DNR for surgical evaluation. MOLST voided.    Pulm: on 2L nc sating 99%, trial no room air.    GI: PO diet. add PPI. add bowel regimen, pt states he hasnt had a BM in few days.  Nephro: Cr 1.07, dc IV fluids. voiding spontaneously. monitor I & Os. Trend renal fxn  Endo: check a1c. BGMs ac hs. ISS.   Vasc: + palpable DP pulse in LLE. L groin wound vac.   MSK: c/o lower back pain, feels weak in LE also, states he hasnt had a BM in a few days. doesnt feel the urge. f/u MR lumbar spine  ID: leukocytosis trending down. cont IV dapto 750mg qd #4 (was on CTX ) via RUE PICC. blood cx 2/20. trend WBC. monitor temps. f/u blood and urine cultures.   Heme: Hgb 8.7 -> 7.8. cont to trend. DVT ppx - on hep gtt. SCDs  PT eval    Dispo: Awaiting transfer to [facility] for eval TAVR, cont IV daptomycin. f/u MRI lumbar spine. TTE    Will discuss with Dr. Sparrow, d/w Dr. Heath cardiology, d/w Dr. Jernigan, d/w CTS PA [facility] [place]     updated wife Jennifer at bedside

## 2025-02-24 NOTE — PHYSICAL THERAPY INITIAL EVALUATION ADULT - PERTINENT HX OF CURRENT PROBLEM, REHAB EVAL
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 Pt noticed  his left thigh got puffy, as well as a rash on the left knee radiating down to his toes.   CTA LLE revealing complete occlusion of proximal L SFA
L LE rash

## 2025-02-24 NOTE — PROGRESS NOTE ADULT - PROVIDER SPECIALTY LIST ADULT
Critical Care
Vascular Surgery
Infectious Disease
SICU
Vascular Surgery
Vascular Surgery
Critical Care
Family Medicine
Cardiology
Family Medicine
Cardiology
Family Medicine

## 2025-02-24 NOTE — PHYSICAL THERAPY INITIAL EVALUATION ADULT - MANUAL MUSCLE TESTING RESULTS, REHAB EVAL
except L LE: hip/knee flex 4-/5; knee ext 4-/5; ankle PF/DF 4/5/no strength deficits were identified
BUEs grossly 3+/5, BLEs grossly 3-/5

## 2025-02-24 NOTE — PROGRESS NOTE ADULT - SUBJECTIVE AND OBJECTIVE BOX
CHIEF COMPLAINT: Patient is a 74y old  Male who presents with a chief complaint of Pain to leg, rash, osteomyelitis (2025 05:46)      HPI:   73 y/o male with a PMHx of HTN, A-FIB, HLD, chronic back pain and DM presents to the ED c/o a rash on his left leg knee radiating towards his foot. Pt noticed  his left thigh got puffy, as well as a rash on the left knee radiating down to his toes. Pt said two days PTA his right leg was hurting, then today the pain began on the left leg. Pt reports he's never had redness like this, and his wife noticed the redness around 1:30pm on day of admission. Pt says he can turn his body but not easily. Pt is complaining of knee pain when inspecting the leg. Pt recently discharged to SNF rehab after treatment for infected TAVR and Osteomyelitis of spine.    25-patient readmitted with leg pains and rash; initially thought to be due to antibiotic side effect.  CTA of legs show occlusion of flow in left SFA-described and an embolus rather than a thrombus.  PAtient with signficant PVD and calcifications.  Vascular requesting ROVERTO to r/o cardiac source of emboli.   ROVERTO done 2025 with completely normal LV function with no clot in LV, normal CHUY with no thrombus and no tumors and no PFO.   Patient is on AC for PAFib (which he is not in currently).  Patient had vegetation.     Vascular requesting repeat ROVERTO.    25-patient not in room; vascular felt this to be an emergency and took patient to OR.   TTE with contrast not done.        25-patient POD-1-removal of clot revealed G+ cocci clot-suggestive of embolized endocarditic lesion; prior ROVERTO with endocarditis seen and patient in sinus rhythm with no LV or LA thrombus.  No reason to suspect embolic clot.   On exam today, rash appears to be purpuric lesions c/w with Janeway spots and not rash due to antibiotic (patient not allergic to PCN).  Currently, blood cultures from  are NGTD.      25:  Recovering nicely from his peripheral embolectomy.  With the apparent TAVR vegetation now embolizing, will likely need a surgical AVR soon.  Nya contact Dr Rodriges for possible transfer to [facility] soon.        Active Problems  Abnormal metabolic state in diabetes mellitus (250.80,783.9) (E11.69,E88.9)  Acute bilateral low back pain with bilateral sciatica (724.2,724.3) (M54.42,M54.41)  Acute viral syndrome (079.99) (B34.9)  Alopecia (704.00) (L65.9)  Atrioventricular block, Mobitz type 1, Wenckebach (426.13) (I44.1)  Back muscle spasm (724.8) (M62.830)  Basal cell carcinoma (BCC) of face (173.31) (C44.310)  Benign localized hyperplasia of prostate with urinary obstruction (600.21,599.69)  (N40.1,N13.8)  BMI 36.0-36.9,adult (V85.36) (Z68.36)  Chronic bilateral low back pain without sciatica (724.2,338.29) (M54.50,G89.29)  Chronic GERD (530.81) (K21.9)  Coronary artery disease (414.00) (I25.10)  Counseling for travel (V65.49) (Z71.84)  Diabetic peripheral neuropathy (250.60,357.2) (E11.42)  Generalized anxiety disorder (300.02) (F41.1)  Hyperlipidemia (272.4) (E78.5)  Hypertension (401.9) (I10)  Hypothyroidism (244.9) (E03.9)  Intermittent palpitations (785.1) (R00.2)  Low back strain, sequela (905.7) (S39.012S)  Mobitz type 1 second degree AV block (426.13) (I44.1)  Multiple benign melanocytic nevi (216.9) (D22.9)  Neoplasm of uncertain behavior of skin (238.2) (D48.5)  Non-insulin dependent type 2 diabetes mellitus (250.00) (E11.9)  Obesity due to excess calories (278.00) (E66.09)  Obstructive sleep apnea of adult (327.23) (G47.33)  Paronychia of finger of right hand (681.02) (L03.011)  Paroxysmal A-fib (427.31) (I48.0)  Prerenal azotemia (790.6) (R79.89)  Prophylactic vaccination against streptococcus pneumoniae and influenza (V06.6) (Z23)  Prostate cancer (185) (C61)  S/p TAVR (transcatheter aortic valve replacement), bioprosthetic (V42.2) (Z95.3)  Screening for viral disease (V73.99) (Z11.59)  Seborrheic keratoses (702.19) (L82.1)  Severe aortic stenosis (424.1) (I35.0)  Spinal stenosis of lumbar region, unspecified whether neurogenic claudication present  (724.02) (M48.061)  Strain of right knee, initial encounter (844.9) (S86.911A)  Urinary frequency (788.41) (R35.0)  ?  Past Medical History  History of Encounter for cosmetic procedure (V50.1) (Z41.1)  History of arthritis (V13.4) (Z87.39)  History of bronchitis (V12.69) (Z87.09)  History of elevated prostate specific antigen (PSA) (V13.89) (Z87.898)  History of elevated prostate specific antigen (PSA) (V13.89) (Z87.898)  History of lentigo (V13.3) (Z87.2)  History of Insect bite of other part of head, initial encounter (910.4,E906.4)  (S00.86XA,W57.XXXA)  History of Muscle weakness of lower extremity (728.87) (M62.81)  History of Near syncope (780.2) (R55)  History of Venomous sting, intentional self-harm, initial encounter (989.5,E950.9)  (T63.92XA)  Viral URI with cough (465.9) (J06.9)  ?  Surgical History  History of Surgery Excision Lipoma  History of Transcatheter aortic valve replacement  ?  Family History  Family history of cardiac disorder (V17.49) (Z82.49) : Father, Brother  Family history of hyperlipidemia (V18.19) (Z83.438) : Father  Family history of malignant neoplasm of urinary bladder (V16.52) (Z80.52) : Mother,  Brother  Family history of malignant neoplasm of uterus (V16.49) (Z80.49) : Sister  Family history of thyroid disease (V18.19) (Z83.49) : Sister  Family history of type 2 diabetes mellitus (V18.0) (Z83.3) : Brother    Social History  Moderate alcohol use  Never a smoker  No illicit drug use  Occupation retired, real estate        (2025 20:26)      PMHx: PAST MEDICAL & SURGICAL HISTORY:  Hypertension  Diabetes mellitus  Obesity  Hypothyroidism  Prostate CA      Allergies: Allergies  No Known Allergies      REVIEW OF SYSTEMS:    CONSTITUTIONAL: No weakness, fevers or chills  EYES/ENT: No visual changes;  No vertigo or throat pain   NECK: No pain or stiffness  RESPIRATORY: No cough, wheezing, hemoptysis; No shortness of breath  CARDIOVASCULAR: No chest pain or palpitations  GASTROINTESTINAL: No abdominal or epigastric pain. No nausea, vomiting, or hematemesis; No diarrhea or constipation. No melena or hematochezia.  GENITOURINARY: No dysuria, frequency or hematuria  NEUROLOGICAL: numbness or weakness      Vital Signs Last 24 Hrs  T(C): 36.8 (2025 05:00), Max: 36.8 (2025 05:00)  T(F): 98.2 (2025 05:00), Max: 98.2 (2025 05:00)  HR: 77 (2025 06:00) (69 - 90)  BP: 108/60 (2025 06:00) (95/65 - 117/67)  BP(mean): 74 (2025 06:00) (55 - 91)  RR: 21 (2025 06:00) (14 - 23)  SpO2: 94% (2025 06:00) (92% - 100%): room air      I&O's Summary  2025 07:01  -  2025 07:00  --------------------------------------------------------  IN: 1174 mL / OUT: 650 mL / NET: 524 mL        PHYSICAL EXAM:   Constitutional: NAD, awake and alert, well-developed  HEENT: PERR, EOMI, Normal Hearing, MMM  Neck: JVD  Respiratory: Breath sounds are clear bilaterally, No wheezing, rales or rhonchi  Cardiovascular: S1 and S2, regular rate and rhythm, Murmurs, gallops or rubs  Gastrointestinal: Bowel Sounds present, soft, nontender, nondistended, no guarding, no rebound  Extremities: No peripheral edema  Vascular: 2+ peripheral pulses  Neurological: A/O x 3, no focal deficits      MEDICATIONS  (STANDING):  atorvastatin 40 milliGRAM(s) Oral at bedtime  DAPTOmycin IVPB      DAPTOmycin IVPB 750 milliGRAM(s) IV Intermittent every 24 hours  dextrose 5%. 1000 milliLiter(s) (100 mL/Hr) IV Continuous <Continuous>  dextrose 5%. 1000 milliLiter(s) (50 mL/Hr) IV Continuous <Continuous>  dextrose 50% Injectable 25 Gram(s) IV Push once  dextrose 50% Injectable 12.5 Gram(s) IV Push once  dextrose 50% Injectable 25 Gram(s) IV Push once  ferrous    sulfate 325 milliGRAM(s) Oral daily  gabapentin 200 milliGRAM(s) Oral three times a day  glucagon  Injectable 1 milliGRAM(s) IntraMuscular once  heparin  Infusion. 1500 Unit(s)/Hr (15 mL/Hr) IV Continuous <Continuous>  insulin lispro (ADMELOG) corrective regimen sliding scale   SubCutaneous three times a day before meals  insulin lispro (ADMELOG) corrective regimen sliding scale   SubCutaneous at bedtime  lactated ringers. 1000 milliLiter(s) (75 mL/Hr) IV Continuous <Continuous>  levothyroxine 150 MICROGram(s) Oral daily  multivitamin 1 Tablet(s) Oral daily  naloxone Injectable 0.4 milliGRAM(s) IV Push once  pantoprazole    Tablet 40 milliGRAM(s) Oral before breakfast  polyethylene glycol 3350 17 Gram(s) Oral daily  senna 2 Tablet(s) Oral at bedtime    MEDICATIONS  (PRN):  acetaminophen     Tablet .. 650 milliGRAM(s) Oral every 6 hours PRN Temp greater or equal to 38C (100.4F), Mild Pain (1 - 3)  aluminum hydroxide/magnesium hydroxide/simethicone Suspension 30 milliLiter(s) Oral every 4 hours PRN Dyspepsia  dextrose Oral Gel 15 Gram(s) Oral once PRN Blood Glucose LESS THAN 70 milliGRAM(s)/deciliter  heparin   Injectable 8000 Unit(s) IV Push every 6 hours PRN For aPTT less than 40  heparin   Injectable 4000 Unit(s) IV Push every 6 hours PRN For aPTT between 40 - 57  heparin   Injectable 8000 Unit(s) IV Push every 6 hours PRN For aPTT less than 40  heparin   Injectable 4000 Unit(s) IV Push every 6 hours PRN For aPTT between 40 - 57  melatonin 3 milliGRAM(s) Oral at bedtime PRN Insomnia  ondansetron Injectable 4 milliGRAM(s) IV Push every 8 hours PRN Nausea and/or Vomiting  traMADol 50 milliGRAM(s) Oral every 6 hours PRN Severe Pain (7 - 10)  zolpidem 5 milliGRAM(s) Oral at bedtime PRN Insomnia      LABS: All Labs Reviewed:                        7.8    12.81 )-----------( 364      ( 2025 06:23 )             25.1                         8.7    13.62 )-----------( 346      ( 2025 06:02 )             27.7        135  |  104  |  39[H]  ----------------------------<  209[H]  4.3   |  23  |  1.21    Ca    9.0      2025 06:02  Phos  3.2     02-23  Mg     2.6       TPro  6.8  /  Alb  2.0[L]  /  TBili  0.6  /  DBili  x   /  AST  75[H]  /  ALT  80[H]  /  AlkPhos  155[H]      135  |  104  |  39[H]  ----------------------------<  209[H]  4.3   |  23  |  1.21    Ca    9.0      2025 06:02  Phos  3.2     02-23  Mg     2.6         TPro  6.8  /  Alb  2.0[L]  /  TBili  0.6  /  DBili  x   /  AST  75[H]  /  ALT  80[H]  /  AlkPhos  155[H]      PT/INR - ( 2025 11:00 )   PT: 16.4 sec;   INR: 1.39 ratio      PTT - ( 2025 01:42 )  PTT:74.7 sec      Culture:   25:  Organism --  Gram Stain Tissue-- Gram Stain   Few polymorphonuclear leukocytes per low power field  Moderate Gram Positive Cocci in Pairs and Chains per oil power field  Specimen Source Tissue Left femoral plaque  Culture-Blood --    25:  Organism --  Gram Stain Blood -- Gram Stain --  Specimen Source .Blood None  Culture-Blood --Culture Results:   No growth at 72 Hours      ABG - ( 2025 11:58 )  pH, Arterial: 7.42  pH, Blood: x     /  pCO2: 35    /  pO2: 119   / HCO3: 23    / Base Excess: -1.3  /  SaO2: 99          BNP     RADIOLOGY:  Reviewed in EMR    EK25:  Sinus rhythm tprd7vl degree A-V block  Septal infarct (cited on or before 2023)      Telemetry:  NSR      ECHO:   ROVERTO: 25:  CONCLUSIONS:   1. Left ventricular systolic function is normal.   2. Normal biventricular systolic function.   3. Normal left and right atrial size.   4. No thrombus seen in the left atrial, left atrial appendage, right atrial or right atrial appendage.   5. No evidence of an intracardiac shunt.   6. Structurally normal mitral valve with normal leaflet excursion.   7. No pericardial effusion seen.   8. There is no significant plaque seen in the visualized portions of the descending aorta and aortic arch.   9. Agitated saline injection was negative for intracardiac shunt.  10. No evidence of left atrial or left atrial appendage thrombus.  11. No evidence of tricuspid vegetation.  12. There is no evidence of any mitral valve vegetations.  ________________________________________________________________________________________  FINDINGS:  Left Ventricle:  Left ventricular systolic function is normal.     Right Ventricle:  The right ventricular cavity is normal in size, with normal wall thickness and right ventricular systolic function is normal.     Left Atrium:  There is no evidence of left atrial or left atrial appendage thrombus.     Interatrial Septum:  Interatrial septum is aneurysmal. Agitated saline injection was negative for intracardiac shunt.     Aortic Valve:  A Norman (TAVR) is present in the aortic position. The prosthetic valve has reduced leaflet opening and is well seated. Echo findings are consistent with a vegetation of the aortic prosthesis. There is trace paravalvular regurgitation (one paravalvular jet), originating at 1 o'clock and is directed laterally.     Mitral Valve:  Structurally normal mitral valve with normal leaflet excursion. There is no evidence of any mitral valve vegetations. There is mild mitral regurgitation.     Tricuspid Valve:  There is no evidence of tricuspid valve vegetation.     Pulmonic Valve:  The pulmonic valve was not well visualized.     Pericardium:  No pericardial effusion seen.  ________________________________________________________________________________________  Electronically signed on 2025 at 8:11:27 AM by Silviano Merida      TTE: 25:  CONCLUSIONS:   1. Left ventricular systolic function is normal with an ejection fraction of 57 % by Parham's method of disks.   2. Normal right ventricular cavity size and normal right ventricular systolic function.   3. Left atrium is mildly dilated.   4. A Transcatheter deployed (TAVR) valve replacement is present in the aortic position The prosthetic valve is well seated. 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 Mild intravalvularregurgitation   5. Mild to moderate mitral regurgitation.   6. Trace tricuspid regurgitation.   7. Small right pleural effusion noted.  ________________________________________________________________________________________  FINDINGS:  Left Ventricle:  The left ventricular cavity is normal in size. Left ventricular systolic function is normal with a calculated ejection fraction of 57 % by the Parham's biplane method of disks. No left ventricular hypertrophy. There is normal left ventricular diastolic function.     Right Ventricle:  The right ventricular cavity is normal in size and right ventricular systolic function is normal. Tricuspid annular plane systolic excursion (TAPSE) is 2.4 cm (normal >=1.7 cm).     Left Atrium:  The left atrium is mildly dilated.     Right Atrium:  The right atrium is normal in size with an indexed volume of 13.15 ml/m² and an indexed area of 6.50 cm²/m².     Interatrial Septum:  The interatrial septum appears intact.     Aortic Valve:  A a transcatheter deployed (TAVR) is present in the aortic position. The prosthetic valve is well seated. The peak transaortic velocity is 3.97 m/s, peak transaortic gradient is 63.0 mmHg and mean transaortic gradient is 45.0 mmHg with an LVOT/aortic valve VTI ratio of 0.25. The effective orifice area is estimated at 0.99 cm² by the continuity equation. There is mild intravalvular regurgitation. 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     Mitral Valve:  Structurally normal mitral valve with normal leaflet excursion. There is mild posterior calcification of the mitral valve annulus. The transmitral peak gradient is 5.7 mmHg and mean gradient is 3.00 mmHg. There is mild to moderate mitral regurgitation. Mitral inflow is variable.     Tricuspid Valve:  Structurally normal tricuspid valve with normal leaflet excursion. The tricuspid valve leaflets appear normal. There is trace tricuspid regurgitation. Estimated pulmonary artery systolic pressure is 21 mmHg, consistent with normal pulmonary artery pressure.     Pulmonic Valve:  Structurally normal pulmonic valve with normal leaflet excursion.     Aorta:  The proximal aorta, aortic root and aortic arch are not well visualized. The ascending aorta is normal in size, measuring 3.54 cm (indexed 1.74 cm/m²).     Pericardium:  No pericardial effusion seen.     Pleura:  Small right pleural effusion noted.     Systemic Veins:  The inferior vena cava is normal in size measuring 2.01 cmin diameter, (normal <2.1cm) with normal inspiratory collapse (normal >50%) consistent with normal right atrial pressure (~3, range 0-5mmHg).  ________________________________________________________________________________________  Electronically signed on 2025 at 11:48:39 AM by Cristina Yoo MD

## 2025-02-24 NOTE — DISCHARGE NOTE PROVIDER - NSDCMRMEDTOKEN_GEN_ALL_CORE_FT
acetaminophen 325 mg oral tablet: 2 tab(s) orally every 6 hours As needed Temp greater or equal to 38C (100.4F), Mild Pain (1 - 3)  atorvastatin 40 mg oral tablet: 1 tab(s) orally once a day (at bedtime)  B-Complex 50 oral tablet: 1 tab(s) orally once a day  bisacodyl 10 mg rectal suppository: 1 suppository(ies) rectally prn as needed for  constipation if no relief from MOM  DAPTOmycin 500 mg intravenous injection: 500 milligram(s) intravenous once a day  ferrous sulfate 325 mg (65 mg elemental iron) oral tablet: 1 tab(s) orally once a day  heparin 100 units/mL-D5% intravenous solution: 15 milliliter(s) intravenous  insulin lispro 100 units/mL injectable solution: injectable  insulin lispro 100 units/mL injectable solution: injectable 4 times a day per sliding scale coverage  levothyroxine 150 mcg (0.15 mg) oral tablet: 1 tab(s) orally once a day  Melatonin 5 mg oral tablet: 2 tab(s) orally once a day (at bedtime)  Multiple Vitamins oral tablet: 1 tab(s) orally once a day  naloxone: prn overdose  pantoprazole 40 mg oral delayed release tablet: 1 tab(s) orally once a day (before a meal)  polyethylene glycol 3350 oral powder for reconstitution: 17 gram(s) orally once a day  PreserVision AREDS 2 oral capsule: 1 cap(s) orally 2 times a day  senna leaf extract oral tablet: 2 tab(s) orally once a day (at bedtime)  traMADol 50 mg oral tablet: 1 tab(s) orally every 6 hours As needed Severe Pain (7 - 10)  zolpidem 5 mg oral tablet: 1 tab(s) orally once a day (at bedtime) As needed Insomnia   acetaminophen 325 mg oral tablet: 2 tab(s) orally every 6 hours As needed Temp greater or equal to 38C (100.4F), Mild Pain (1 - 3)  atorvastatin 40 mg oral tablet: 1 tab(s) orally once a day (at bedtime)  B-Complex 50 oral tablet: 1 tab(s) orally once a day  bisacodyl 10 mg rectal suppository: 1 suppository(ies) rectally prn as needed for  constipation if no relief from MOM  DAPTOmycin 500 mg intravenous injection: 750 milligram(s) intravenous once a day x 6 weeks total  ferrous sulfate 325 mg (65 mg elemental iron) oral tablet: 1 tab(s) orally once a day  heparin 100 units/mL-D5% intravenous solution: 15 milliliter(s) intravenous every minute per wt based protocol  insulin lispro 100 units/mL injectable solution: injectable 4 times a day per sliding scale coverage  levothyroxine 150 mcg (0.15 mg) oral tablet: 1 tab(s) orally once a day  Melatonin 5 mg oral tablet: 2 tab(s) orally once a day (at bedtime)  Multiple Vitamins oral tablet: 1 tab(s) orally once a day  pantoprazole 40 mg oral delayed release tablet: 1 tab(s) orally once a day (before a meal)  polyethylene glycol 3350 oral powder for reconstitution: 17 gram(s) orally once a day  PreserVision AREDS 2 oral capsule: 1 cap(s) orally 2 times a day  senna leaf extract oral tablet: 2 tab(s) orally once a day (at bedtime)  traMADol 50 mg oral tablet: 1 tab(s) orally every 6 hours As needed Severe Pain (7 - 10)  zolpidem 5 mg oral tablet: 1 tab(s) orally once a day (at bedtime) As needed Insomnia

## 2025-02-24 NOTE — DISCHARGE NOTE PROVIDER - NSDCCPTREATMENT_GEN_ALL_CORE_FT
PRINCIPAL PROCEDURE  Procedure: Thrombectomy of left femoral vessel  Findings and Treatment:       SECONDARY PROCEDURE  Procedure: Embolization, artery  Findings and Treatment:     Procedure: Endarterectomy of left superficial femoral artery  Findings and Treatment:

## 2025-02-24 NOTE — DISCHARGE NOTE NURSING/CASE MANAGEMENT/SOCIAL WORK - FINANCIAL ASSISTANCE
[facility] provides services at a reduced cost to those who are determined to be eligible through [facility]’s financial assistance program. Information regarding [facility]’s financial assistance program can be found by going to [email or web address] or by calling [phone].

## 2025-02-24 NOTE — PROGRESS NOTE ADULT - ASSESSMENT
75 y/o Male with h/o HTN, DM, prostate CA, hypothyroidism, AVR, A.Fib on eliquis, chronic back pain s/p epidurals on diclofenac, HLD, NIDDM, CKD recent hospitalization on 2/11 for sepsis with strep viridans, subacute AV prosthetic bacterial endocarditis, probable lumbar spine discitis admitted for LE rash.   CTA w Complete occlusion of the proximal left SFA with extension of a long 4  cm presumed embolus rather than thrombus into the profunda. There is  reconstitution distally.    POD2  thromboembolectomy, palpable pulse    Plan:  Regular diet  c/w Hep gtt  c/w IV ABx  FU cardiology  Primary team care  can be DG from vascular standpoint    75 y/o Male with h/o HTN, DM, prostate CA, hypothyroidism, AVR, A.Fib on eliquis, chronic back pain s/p epidurals on diclofenac, HLD, NIDDM, CKD recent hospitalization on 2/11 for sepsis with strep viridans, subacute AV prosthetic bacterial endocarditis, probable lumbar spine discitis admitted for LE rash.   CTA w Complete occlusion of the proximal left SFA with extension of a long 4  cm presumed embolus rather than thrombus into the profunda. There is  reconstitution distally.    POD2  thromboembolectomy, palpable pulse    Plan:  Regular diet  can switch to DOAC whenever primary team and cardiology okay with it and no further interventions are planned  c/w IV ABx  F/U cardiology  Primary team care  can be DG from vascular standpoint

## 2025-02-24 NOTE — DISCHARGE NOTE PROVIDER - DETAILS OF MALNUTRITION DIAGNOSIS/DIAGNOSES
This patient has been assessed with a concern for Malnutrition and was treated during this hospitalization for the following Nutrition diagnosis/diagnoses:     -  02/21/2025: Severe protein-calorie malnutrition

## 2025-02-24 NOTE — DISCHARGE NOTE PROVIDER - NSDCCPCAREPLAN_GEN_ALL_CORE_FT
PRINCIPAL DISCHARGE DIAGNOSIS  Diagnosis: Thrombosis of left common femoral artery  Assessment and Plan of Treatment:       SECONDARY DISCHARGE DIAGNOSES  Diagnosis: Anemia requiring transfusions  Assessment and Plan of Treatment:     Diagnosis: GI bleed  Assessment and Plan of Treatment:     Diagnosis: Osteomyelitis of vertebra, multiple sites in spine  Assessment and Plan of Treatment:     Diagnosis: Infective endocarditis of aortic valve  Assessment and Plan of Treatment:     Diagnosis: Pain in back  Assessment and Plan of Treatment:     Diagnosis: Type 2 diabetes mellitus  Assessment and Plan of Treatment:

## 2025-02-24 NOTE — PHYSICAL THERAPY INITIAL EVALUATION ADULT - GENERAL OBSERVATIONS, REHAB EVAL
Pt rec'd supine in bed, Pt rec'd supine in bed, cooperative with PT, endorses chronic low back discomfort.

## 2025-02-24 NOTE — PROGRESS NOTE ADULT - SUBJECTIVE AND OBJECTIVE BOX
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 Pt noticed  his left thigh got puffy, as well as a rash on the left knee radiating down to his toes.   CTA LLE revealing complete occlusion of proximal L SFA   s/p L femoral thrombectomy, L CFA endarterectomy 2/22 2/24     PAST MEDICAL & SURGICAL HISTORY:  Hypertension  Diabetes mellitus  Obesity  Hypothyroidism  Prostate CA      Medications:  DAPTOmycin IVPB 750 milliGRAM(s) IV Intermittent every 24 hours  DAPTOmycin IVPB      acetaminophen     Tablet .. 650 milliGRAM(s) Oral every 6 hours PRN  gabapentin 200 milliGRAM(s) Oral three times a day  ketorolac   Injectable 15 milliGRAM(s) IV Push every 6 hours PRN  melatonin 3 milliGRAM(s) Oral at bedtime PRN  ondansetron Injectable 4 milliGRAM(s) IV Push every 8 hours PRN  traMADol 50 milliGRAM(s) Oral every 6 hours PRN  zolpidem 5 milliGRAM(s) Oral at bedtime PRN  heparin   Injectable 8000 Unit(s) IV Push every 6 hours PRN  heparin   Injectable 4000 Unit(s) IV Push every 6 hours PRN  heparin   Injectable 8000 Unit(s) IV Push every 6 hours PRN  heparin   Injectable 4000 Unit(s) IV Push every 6 hours PRN  heparin  Infusion. 1500 Unit(s)/Hr IV Continuous <Continuous>  aluminum hydroxide/magnesium hydroxide/simethicone Suspension 30 milliLiter(s) Oral every 4 hours PRN  pantoprazole    Tablet 40 milliGRAM(s) Oral before breakfast  polyethylene glycol 3350 17 Gram(s) Oral daily  senna 2 Tablet(s) Oral at bedtime  atorvastatin 40 milliGRAM(s) Oral at bedtime  dextrose 50% Injectable 25 Gram(s) IV Push once  dextrose 50% Injectable 12.5 Gram(s) IV Push once  dextrose 50% Injectable 25 Gram(s) IV Push once  dextrose Oral Gel 15 Gram(s) Oral once PRN  glucagon  Injectable 1 milliGRAM(s) IntraMuscular once  insulin lispro (ADMELOG) corrective regimen sliding scale   SubCutaneous three times a day before meals  insulin lispro (ADMELOG) corrective regimen sliding scale   SubCutaneous at bedtime  levothyroxine 150 MICROGram(s) Oral daily  dextrose 5%. 1000 milliLiter(s) IV Continuous <Continuous>  dextrose 5%. 1000 milliLiter(s) IV Continuous <Continuous>  ferrous    sulfate 325 milliGRAM(s) Oral daily  lactated ringers. 1000 milliLiter(s) IV Continuous <Continuous>  multivitamin 1 Tablet(s) Oral daily  potassium phosphate / sodium phosphate Tablet (K-PHOS No. 2) 2 Tablet(s) Oral once  naloxone Injectable 0.4 milliGRAM(s) IV Push once      ICU Vital Signs Last 24 Hrs  T(C): 36.8 (24 Feb 2025 05:00), Max: 36.8 (24 Feb 2025 05:00)  T(F): 98.2 (24 Feb 2025 05:00), Max: 98.2 (24 Feb 2025 05:00)  HR: 95 (24 Feb 2025 10:00) (69 - 95)  BP: 101/69 (24 Feb 2025 10:00) (97/54 - 117/67)  BP(mean): 79 (24 Feb 2025 10:00) (55 - 91)  ABP: --  ABP(mean): --  RR: 19 (24 Feb 2025 10:00) (14 - 27)  SpO2: 96% (24 Feb 2025 10:00) (92% - 100%)    O2 Parameters below as of 24 Feb 2025 10:00  Patient On (Oxygen Delivery Method): room air    ABG - ( 22 Feb 2025 11:58 )  pH, Arterial: 7.42  pH, Blood: x     /  pCO2: 35    /  pO2: 119   / HCO3: 23    / Base Excess: -1.3  /  SaO2: 99          I&O's Detail    23 Feb 2025 07:01  -  24 Feb 2025 07:00  --------------------------------------------------------  IN:    Heparin Infusion: 165.6 mL    Lactated Ringers: 729.2 mL    Oral Fluid: 720 mL  Total IN: 1614.8 mL    OUT:    Indwelling Catheter - Urethral (mL): 350 mL    Voided (mL): 700 mL  Total OUT: 1050 mL    Total NET: 564.8 mL      LABS:                        7.8    12.81 )-----------( 364      ( 24 Feb 2025 06:23 )             25.1     02-24    134[L]  |  102  |  34[H]  ----------------------------<  146[H]  3.5   |  26  |  1.07    Ca    8.7      24 Feb 2025 06:23  Phos  2.0     02-24  Mg     2.4     02-24    TPro  6.2  /  Alb  1.9[L]  /  TBili  0.4  /  DBili  x   /  AST  76[H]  /  ALT  84[H]  /  AlkPhos  171[H]  02-24    CAPILLARY BLOOD GLUCOSE  POCT Blood Glucose.: 123 mg/dL (24 Feb 2025 07:48)    PT/INR - ( 22 Feb 2025 11:00 )   PT: 16.4 sec;   INR: 1.39 ratio         PTT - ( 24 Feb 2025 06:23 )  PTT:76.1 sec  Urinalysis Basic - ( 24 Feb 2025 06:23 )    Color: x / Appearance: x / SG: x / pH: x  Gluc: 146 mg/dL / Ketone: x  / Bili: x / Urobili: x   Blood: x / Protein: x / Nitrite: x   Leuk Esterase: x / RBC: x / WBC x   Sq Epi: x / Non Sq Epi: x / Bacteria: x      CULTURES:  Culture Results:   No growth to date (02-22 @ 07:42)  Culture Results:   Testing in progress (02-22 @ 07:42)  Culture Results:   No growth at 72 Hours (02-20 @ 18:10)  Culture Results:   No growth at 72 Hours (02-20 @ 18:10)      Physical Examination:    General: No acute distress.    HEENT: dry lips  PULM: Clear to auscultation bilaterally, no crackle or wheezing  CVS: Regular rate and rhythm, + RUSB murmur  ABD: Soft, nondistended, nontender  EXT: Mild pitting pedal edema b/l. + palpable DP pulse in LLE. 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 Pt noticed  his left thigh got puffy, as well as a rash on the left knee radiating down to his toes.   CTA LLE revealing complete occlusion of proximal L SFA   s/p L femoral thrombectomy, L CFA endarterectomy 2/22 2/24     PAST MEDICAL & SURGICAL HISTORY:  Hypertension  Diabetes mellitus  Obesity  Hypothyroidism  Prostate CA      Medications:  DAPTOmycin IVPB 750 milliGRAM(s) IV Intermittent every 24 hours  DAPTOmycin IVPB      acetaminophen     Tablet .. 650 milliGRAM(s) Oral every 6 hours PRN  gabapentin 200 milliGRAM(s) Oral three times a day  ketorolac   Injectable 15 milliGRAM(s) IV Push every 6 hours PRN  melatonin 3 milliGRAM(s) Oral at bedtime PRN  ondansetron Injectable 4 milliGRAM(s) IV Push every 8 hours PRN  traMADol 50 milliGRAM(s) Oral every 6 hours PRN  zolpidem 5 milliGRAM(s) Oral at bedtime PRN  heparin   Injectable 8000 Unit(s) IV Push every 6 hours PRN  heparin   Injectable 4000 Unit(s) IV Push every 6 hours PRN  heparin   Injectable 8000 Unit(s) IV Push every 6 hours PRN  heparin   Injectable 4000 Unit(s) IV Push every 6 hours PRN  heparin  Infusion. 1500 Unit(s)/Hr IV Continuous <Continuous>  aluminum hydroxide/magnesium hydroxide/simethicone Suspension 30 milliLiter(s) Oral every 4 hours PRN  pantoprazole    Tablet 40 milliGRAM(s) Oral before breakfast  polyethylene glycol 3350 17 Gram(s) Oral daily  senna 2 Tablet(s) Oral at bedtime  atorvastatin 40 milliGRAM(s) Oral at bedtime  dextrose 50% Injectable 25 Gram(s) IV Push once  dextrose 50% Injectable 12.5 Gram(s) IV Push once  dextrose 50% Injectable 25 Gram(s) IV Push once  dextrose Oral Gel 15 Gram(s) Oral once PRN  glucagon  Injectable 1 milliGRAM(s) IntraMuscular once  insulin lispro (ADMELOG) corrective regimen sliding scale   SubCutaneous three times a day before meals  insulin lispro (ADMELOG) corrective regimen sliding scale   SubCutaneous at bedtime  levothyroxine 150 MICROGram(s) Oral daily  dextrose 5%. 1000 milliLiter(s) IV Continuous <Continuous>  dextrose 5%. 1000 milliLiter(s) IV Continuous <Continuous>  ferrous    sulfate 325 milliGRAM(s) Oral daily  lactated ringers. 1000 milliLiter(s) IV Continuous <Continuous>  multivitamin 1 Tablet(s) Oral daily  potassium phosphate / sodium phosphate Tablet (K-PHOS No. 2) 2 Tablet(s) Oral once  naloxone Injectable 0.4 milliGRAM(s) IV Push once      ICU Vital Signs Last 24 Hrs  T(C): 36.8 (24 Feb 2025 05:00), Max: 36.8 (24 Feb 2025 05:00)  T(F): 98.2 (24 Feb 2025 05:00), Max: 98.2 (24 Feb 2025 05:00)  HR: 95 (24 Feb 2025 10:00) (69 - 95)  BP: 101/69 (24 Feb 2025 10:00) (97/54 - 117/67)  BP(mean): 79 (24 Feb 2025 10:00) (55 - 91)  ABP: --  ABP(mean): --  RR: 19 (24 Feb 2025 10:00) (14 - 27)  SpO2: 96% (24 Feb 2025 10:00) (92% - 100%)    O2 Parameters below as of 24 Feb 2025 10:00  Patient On (Oxygen Delivery Method): room air    ABG - ( 22 Feb 2025 11:58 )  pH, Arterial: 7.42  pH, Blood: x     /  pCO2: 35    /  pO2: 119   / HCO3: 23    / Base Excess: -1.3  /  SaO2: 99          I&O's Detail    23 Feb 2025 07:01  -  24 Feb 2025 07:00  --------------------------------------------------------  IN:    Heparin Infusion: 165.6 mL    Lactated Ringers: 729.2 mL    Oral Fluid: 720 mL  Total IN: 1614.8 mL    OUT:    Indwelling Catheter - Urethral (mL): 350 mL    Voided (mL): 700 mL  Total OUT: 1050 mL    Total NET: 564.8 mL      LABS:                        7.8    12.81 )-----------( 364      ( 24 Feb 2025 06:23 )             25.1     02-24    134[L]  |  102  |  34[H]  ----------------------------<  146[H]  3.5   |  26  |  1.07    Ca    8.7      24 Feb 2025 06:23  Phos  2.0     02-24  Mg     2.4     02-24    TPro  6.2  /  Alb  1.9[L]  /  TBili  0.4  /  DBili  x   /  AST  76[H]  /  ALT  84[H]  /  AlkPhos  171[H]  02-24    CAPILLARY BLOOD GLUCOSE  POCT Blood Glucose.: 123 mg/dL (24 Feb 2025 07:48)    PT/INR - ( 22 Feb 2025 11:00 )   PT: 16.4 sec;   INR: 1.39 ratio         PTT - ( 24 Feb 2025 06:23 )  PTT:76.1 sec  Urinalysis Basic - ( 24 Feb 2025 06:23 )    Color: x / Appearance: x / SG: x / pH: x  Gluc: 146 mg/dL / Ketone: x  / Bili: x / Urobili: x   Blood: x / Protein: x / Nitrite: x   Leuk Esterase: x / RBC: x / WBC x   Sq Epi: x / Non Sq Epi: x / Bacteria: x      CULTURES:  Culture Results:   No growth to date (02-22 @ 07:42)  Culture Results:   Testing in progress (02-22 @ 07:42)  Culture Results:   No growth at 72 Hours (02-20 @ 18:10)  Culture Results:   No growth at 72 Hours (02-20 @ 18:10)      Physical Examination:    General: No acute distress.    HEENT: dry lips  PULM: Clear to auscultation bilaterally, no crackle or wheezing  CVS: Regular rate and rhythm, + RUSB murmur  ABD: Soft, nondistended, nontender  EXT: Mild pitting pedal edema b/l. + palpable DP pulse in LLE. 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 Pt noticed  his left thigh got puffy, as well as a rash on the left knee radiating down to his toes.   CTA LLE revealing complete occlusion of proximal L SFA   s/p L femoral thrombectomy, L CFA endarterectomy 2/22 2/24 pt c/o LLE pain and lower back pain, he also states that he hasnt had a bowel movement in a few days now. denies abd pain, denies paresthesias in LE    PAST MEDICAL & SURGICAL HISTORY:  Hypertension  Diabetes mellitus  Obesity  Hypothyroidism  Prostate CA      Medications:  DAPTOmycin IVPB 750 milliGRAM(s) IV Intermittent every 24 hours  DAPTOmycin IVPB      acetaminophen     Tablet .. 650 milliGRAM(s) Oral every 6 hours PRN  gabapentin 200 milliGRAM(s) Oral three times a day  ketorolac   Injectable 15 milliGRAM(s) IV Push every 6 hours PRN  melatonin 3 milliGRAM(s) Oral at bedtime PRN  ondansetron Injectable 4 milliGRAM(s) IV Push every 8 hours PRN  traMADol 50 milliGRAM(s) Oral every 6 hours PRN  zolpidem 5 milliGRAM(s) Oral at bedtime PRN  heparin   Injectable 8000 Unit(s) IV Push every 6 hours PRN  heparin   Injectable 4000 Unit(s) IV Push every 6 hours PRN  heparin   Injectable 8000 Unit(s) IV Push every 6 hours PRN  heparin   Injectable 4000 Unit(s) IV Push every 6 hours PRN  heparin  Infusion. 1500 Unit(s)/Hr IV Continuous <Continuous>  aluminum hydroxide/magnesium hydroxide/simethicone Suspension 30 milliLiter(s) Oral every 4 hours PRN  pantoprazole    Tablet 40 milliGRAM(s) Oral before breakfast  polyethylene glycol 3350 17 Gram(s) Oral daily  senna 2 Tablet(s) Oral at bedtime  atorvastatin 40 milliGRAM(s) Oral at bedtime  dextrose 50% Injectable 25 Gram(s) IV Push once  dextrose 50% Injectable 12.5 Gram(s) IV Push once  dextrose 50% Injectable 25 Gram(s) IV Push once  dextrose Oral Gel 15 Gram(s) Oral once PRN  glucagon  Injectable 1 milliGRAM(s) IntraMuscular once  insulin lispro (ADMELOG) corrective regimen sliding scale   SubCutaneous three times a day before meals  insulin lispro (ADMELOG) corrective regimen sliding scale   SubCutaneous at bedtime  levothyroxine 150 MICROGram(s) Oral daily  dextrose 5%. 1000 milliLiter(s) IV Continuous <Continuous>  dextrose 5%. 1000 milliLiter(s) IV Continuous <Continuous>  ferrous    sulfate 325 milliGRAM(s) Oral daily  lactated ringers. 1000 milliLiter(s) IV Continuous <Continuous>  multivitamin 1 Tablet(s) Oral daily  potassium phosphate / sodium phosphate Tablet (K-PHOS No. 2) 2 Tablet(s) Oral once  naloxone Injectable 0.4 milliGRAM(s) IV Push once      ICU Vital Signs Last 24 Hrs  T(C): 36.8 (24 Feb 2025 05:00), Max: 36.8 (24 Feb 2025 05:00)  T(F): 98.2 (24 Feb 2025 05:00), Max: 98.2 (24 Feb 2025 05:00)  HR: 95 (24 Feb 2025 10:00) (69 - 95)  BP: 101/69 (24 Feb 2025 10:00) (97/54 - 117/67)  BP(mean): 79 (24 Feb 2025 10:00) (55 - 91)  ABP: --  ABP(mean): --  RR: 19 (24 Feb 2025 10:00) (14 - 27)  SpO2: 96% (24 Feb 2025 10:00) (92% - 100%)    O2 Parameters below as of 24 Feb 2025 10:00  Patient On (Oxygen Delivery Method): room air    ABG - ( 22 Feb 2025 11:58 )  pH, Arterial: 7.42  pH, Blood: x     /  pCO2: 35    /  pO2: 119   / HCO3: 23    / Base Excess: -1.3  /  SaO2: 99          I&O's Detail    23 Feb 2025 07:01  -  24 Feb 2025 07:00  --------------------------------------------------------  IN:    Heparin Infusion: 165.6 mL    Lactated Ringers: 729.2 mL    Oral Fluid: 720 mL  Total IN: 1614.8 mL    OUT:    Indwelling Catheter - Urethral (mL): 350 mL    Voided (mL): 700 mL  Total OUT: 1050 mL    Total NET: 564.8 mL      LABS:                        7.8    12.81 )-----------( 364      ( 24 Feb 2025 06:23 )             25.1     02-24    134[L]  |  102  |  34[H]  ----------------------------<  146[H]  3.5   |  26  |  1.07    Ca    8.7      24 Feb 2025 06:23  Phos  2.0     02-24  Mg     2.4     02-24    TPro  6.2  /  Alb  1.9[L]  /  TBili  0.4  /  DBili  x   /  AST  76[H]  /  ALT  84[H]  /  AlkPhos  171[H]  02-24    CAPILLARY BLOOD GLUCOSE  POCT Blood Glucose.: 123 mg/dL (24 Feb 2025 07:48)    PT/INR - ( 22 Feb 2025 11:00 )   PT: 16.4 sec;   INR: 1.39 ratio         PTT - ( 24 Feb 2025 06:23 )  PTT:76.1 sec  Urinalysis Basic - ( 24 Feb 2025 06:23 )    Color: x / Appearance: x / SG: x / pH: x  Gluc: 146 mg/dL / Ketone: x  / Bili: x / Urobili: x   Blood: x / Protein: x / Nitrite: x   Leuk Esterase: x / RBC: x / WBC x   Sq Epi: x / Non Sq Epi: x / Bacteria: x      CULTURES:  Culture Results:   No growth to date (02-22 @ 07:42)  Culture Results:   Testing in progress (02-22 @ 07:42)  Culture Results:   No growth at 72 Hours (02-20 @ 18:10)  Culture Results:   No growth at 72 Hours (02-20 @ 18:10)      Physical Examination:    General: No acute distress.    HEENT: dry lips  PULM: Clear to auscultation bilaterally, no crackle or wheezing  CVS: Regular rate and rhythm, + RUSB murmur  ABD: Soft, nondistended, nontender  EXT: Mild pitting pedal edema b/l. + palpable DP pulse in LLE. L groin wound vac, soft, non tender   SKIN: warm, LLE with petechiae from foot to L knee  NEURO: Alert, oriented, interactive, strength 3/5 in LE b/l, sensation to light touch intact    DEVICES:   L groin wound vac    RADIOLOGY:

## 2025-02-24 NOTE — PROGRESS NOTE ADULT - REASON FOR ADMISSION
Pain to leg, rash, osteomyelitis

## 2025-02-24 NOTE — DISCHARGE NOTE PROVIDER - NSDCPNSUBOBJ_GEN_ALL_CORE
SUBJECTIVE:    CHIEF COMPLAINT:  Patient is a 74y old  Male who presents with a chief complaint of Pain to leg, rash, osteomyelitis (24 Feb 2025 10:58)      HPI:   75 y/o male with a PMHx of HTN, A-FIB, HLD, chronic back pain and DM presents to the ED c/o a rash on his left leg knee radiating towards his foot. Pt noticed  his left thigh got puffy, as well as a rash on the left knee radiating down to his toes. Pt said two days PTA his right leg was hurting, then today the pain began on the left leg. Pt reports he's never had redness like this, and his wife noticed the redness around 1:30pm on day of admission. Pt says he can turn his body but not easily. Pt is complaining of knee pain when inspecting the leg. Pt recently discharged to SNF rehab after treatment for infected TAVR and Osteomyelitis of spine.    Active Problems  Abnormal metabolic state in diabetes mellitus (250.80,783.9) (E11.69,E88.9)  Acute bilateral low back pain with bilateral sciatica (724.2,724.3) (M54.42,M54.41)  Acute viral syndrome (079.99) (B34.9)  Alopecia (704.00) (L65.9)  Atrioventricular block, Mobitz type 1, Wenckebach (426.13) (I44.1)  Back muscle spasm (724.8) (M62.830)  Basal cell carcinoma (BCC) of face (173.31) (C44.310)  Benign localized hyperplasia of prostate with urinary obstruction (600.21,599.69)  (N40.1,N13.8)  BMI 36.0-36.9,adult (V85.36) (Z68.36)  Chronic bilateral low back pain without sciatica (724.2,338.29) (M54.50,G89.29)  Chronic GERD (530.81) (K21.9)  Coronary artery disease (414.00) (I25.10)  Counseling for travel (V65.49) (Z71.84)  Diabetic peripheral neuropathy (250.60,357.2) (E11.42)  Generalized anxiety disorder (300.02) (F41.1)  Hyperlipidemia (272.4) (E78.5)  Hypertension (401.9) (I10)  Hypothyroidism (244.9) (E03.9)  Intermittent palpitations (785.1) (R00.2)  Low back strain, sequela (905.7) (S39.012S)  Mobitz type 1 second degree AV block (426.13) (I44.1)  Multiple benign melanocytic nevi (216.9) (D22.9)  Neoplasm of uncertain behavior of skin (238.2) (D48.5)  Non-insulin dependent type 2 diabetes mellitus (250.00) (E11.9)  Obesity due to excess calories (278.00) (E66.09)  Obstructive sleep apnea of adult (327.23) (G47.33)  Paronychia of finger of right hand (681.02) (L03.011)  Paroxysmal A-fib (427.31) (I48.0)  Prerenal azotemia (790.6) (R79.89)  Prophylactic vaccination against streptococcus pneumoniae and influenza (V06.6) (Z23)  Prostate cancer (185) (C61)  S/p TAVR (transcatheter aortic valve replacement), bioprosthetic (V42.2) (Z95.3)  Screening for viral disease (V73.99) (Z11.59)  Seborrheic keratoses (702.19) (L82.1)  Severe aortic stenosis (424.1) (I35.0)  Spinal stenosis of lumbar region, unspecified whether neurogenic claudication present  (724.02) (M48.061)  Strain of right knee, initial encounter (844.9) (S86.911A)  Urinary frequency (788.41) (R35.0)  ?  Past Medical History  History of Encounter for cosmetic procedure (V50.1) (Z41.1)  History of arthritis (V13.4) (Z87.39)  History of bronchitis (V12.69) (Z87.09)  History of elevated prostate specific antigen (PSA) (V13.89) (Z87.898)  History of elevated prostate specific antigen (PSA) (V13.89) (Z87.898)  History of lentigo (V13.3) (Z87.2)  History of Insect bite of other part of head, initial encounter (910.4,E906.4)  (S00.86XA,W57.XXXA)  History of Muscle weakness of lower extremity (728.87) (M62.81)  History of Near syncope (780.2) (R55)  History of Venomous sting, intentional self-harm, initial encounter (989.5,E950.9)  (T63.92XA)  Viral URI with cough (465.9) (J06.9)  ?  Surgical History  History of Surgery Excision Lipoma  History of Transcatheter aortic valve replacement  ?  Family History  Family history of cardiac disorder (V17.49) (Z82.49) : Father, Brother  Family history of hyperlipidemia (V18.19) (Z83.438) : Father  Family history of malignant neoplasm of urinary bladder (V16.52) (Z80.52) : Mother,  Brother  Family history of malignant neoplasm of uterus (V16.49) (Z80.49) : Sister  Family history of thyroid disease (V18.19) (Z83.49) : Sister  Family history of type 2 diabetes mellitus (V18.0) (Z83.3) : Brother    Social History  Moderate alcohol use  Never a smoker  No illicit drug use  Occupation retired, real estate    Interval HPI and Overnight Events: Tolerated transfusion. Pt feeling better. Foot appearance improved. Cardiology recommending transfer for possible Aortic Valve Replacement.    REVIEW OF SYSTEMS:  CONSTITUTIONAL: No weakness, fevers or chills  EYES/ENT: No visual changes;  No vertigo or throat pain   NECK: No pain or stiffness  RESPIRATORY: No cough, wheezing, hemoptysis; No shortness of breath  CARDIOVASCULAR: No chest pain or palpitations  GASTROINTESTINAL: No abdominal or epigastric pain. No nausea, vomiting, or hematemesis; No diarrhea or constipation. No melena or hematochezia.  GENITOURINARY: No dysuria, frequency or hematuria  NEUROLOGICAL: No numbness or weakness  SKIN: No itching, burning, rashes, or lesions   All other review of systems is negative unless indicated above    OBJECTIVE    PHYSICAL EXAM:  Vital Signs Last 24 Hrs  T(C): 36.8 (24 Feb 2025 12:11), Max: 36.8 (24 Feb 2025 05:00)  T(F): 98.3 (24 Feb 2025 12:11), Max: 98.3 (24 Feb 2025 12:11)  HR: 87 (24 Feb 2025 12:00) (69 - 95)  BP: 113/61 (24 Feb 2025 12:00) (97/54 - 113/61)  BP(mean): 78 (24 Feb 2025 12:00) (55 - 91)  RR: 18 (24 Feb 2025 12:00) (16 - 27)  SpO2: 96% (24 Feb 2025 12:00) (92% - 99%)    Parameters below as of 24 Feb 2025 12:00  Patient On (Oxygen Delivery Method): room air      Constitutional: NAD, awake and alert, well-developed  HEENT: PERR, EOMI, Normal Hearing, MMM  Neck: Soft and supple, No LAD, No JVD  Respiratory: Breath sounds are clear bilaterally, No wheezing, rales or rhonchi  Cardiovascular: S1 and S2, regular rate and rhythm, no Murmurs, gallops or rubs  Gastrointestinal: Bowel Sounds present, soft, nontender, nondistended, no guarding, no rebound  Extremities: No peripheral edema  Vascular: 2+ peripheral pulses  Neurological: A/O x 3, no focal deficits  Musculoskeletal: 5/5 strength b/l upper and lower extremities  Skin: No rashes    MEDICATIONS  (STANDING):  atorvastatin 40 milliGRAM(s) Oral at bedtime  DAPTOmycin IVPB      DAPTOmycin IVPB 750 milliGRAM(s) IV Intermittent every 24 hours  dextrose 5%. 1000 milliLiter(s) (50 mL/Hr) IV Continuous <Continuous>  dextrose 5%. 1000 milliLiter(s) (100 mL/Hr) IV Continuous <Continuous>  dextrose 50% Injectable 25 Gram(s) IV Push once  dextrose 50% Injectable 12.5 Gram(s) IV Push once  dextrose 50% Injectable 25 Gram(s) IV Push once  ferrous    sulfate 325 milliGRAM(s) Oral daily  gabapentin 200 milliGRAM(s) Oral three times a day  glucagon  Injectable 1 milliGRAM(s) IntraMuscular once  heparin  Infusion. 1500 Unit(s)/Hr (15 mL/Hr) IV Continuous <Continuous>  insulin lispro (ADMELOG) corrective regimen sliding scale   SubCutaneous three times a day before meals  insulin lispro (ADMELOG) corrective regimen sliding scale   SubCutaneous at bedtime  lactated ringers. 1000 milliLiter(s) (75 mL/Hr) IV Continuous <Continuous>  levothyroxine 150 MICROGram(s) Oral daily  multivitamin 1 Tablet(s) Oral daily  naloxone Injectable 0.4 milliGRAM(s) IV Push once  pantoprazole    Tablet 40 milliGRAM(s) Oral before breakfast  polyethylene glycol 3350 17 Gram(s) Oral daily  senna 2 Tablet(s) Oral at bedtime      LABS:                         7.8    12.81 )-----------( 364      ( 24 Feb 2025 06:23 )             25.1     02-24    134[L]  |  102  |  34[H]  ----------------------------<  146[H]  3.5   |  26  |  1.07    Ca    8.7      24 Feb 2025 06:23  Phos  2.0     02-24  Mg     2.4     02-24    TPro  6.2  /  Alb  1.9[L]  /  TBili  0.4  /  DBili  x   /  AST  76[H]  /  ALT  84[H]  /  AlkPhos  171[H]  02-24    Urinalysis Basic - ( 24 Feb 2025 06:23 )    Color: x / Appearance: x / SG: x / pH: x  Gluc: 146 mg/dL / Ketone: x  / Bili: x / Urobili: x   Blood: x / Protein: x / Nitrite: x   Leuk Esterase: x / RBC: x / WBC x   Sq Epi: x / Non Sq Epi: x / Bacteria: x      PTT - ( 24 Feb 2025 06:23 )  PTT:76.1 sec      Specimen Source Tissue Left femoral plaque   02-22 @ 07:42  Culture Results  No growth to date[!]  Specimen Source .Blood None   02-20 @ 18:10  Culture Results  No growth at 72 Hours            CAPILLARY BLOOD GLUCOSE      POCT Blood Glucose.: 199 mg/dL (24 Feb 2025 11:05)  POCT Blood Glucose.: 123 mg/dL (24 Feb 2025 07:48)  POCT Blood Glucose.: 243 mg/dL (23 Feb 2025 21:10)  POCT Blood Glucose.: 236 mg/dL (23 Feb 2025 16:42)        Assessment and Plan:   · Assessment	   75 y/o male with a PMHx of HTN, A-FIB, HLD, chronic back pain and DM presents to the ED c/o a rash on his left leg knee radiating towards his foot.    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  Clot positive for GM positive cocci  HTN  Malnutrition  Bilateral L>R leg weakness - may be deconditioning vs pain     Plan:  POD#2 s/p Endarterectomy  IV Rocephin via  PICC line changed to Daptomycin  ID following  Vascular Surgery following  Physical Therapy  Social work consult for SNF rehab return  liberalized sodium intake  Continue IVF  Encourage PO intake  follow lytes and CBC  Pain control  Cardiology discussed with CTS regarding need to remove TAVR - Plan for tranfer to [facility]  IV Heparin  MRI LS Spine with contrast - Disctitis and osteo but no abscess  Follow for risk of eDKA since Farxiga stopped less than 3 days prior to surgery  Discussed with patient and wife at length

## 2025-02-24 NOTE — PROGRESS NOTE ADULT - ASSESSMENT
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  -continue ABx, may consider placing back on ceftriaxone and i do not thick this is drug rash related.    -Dr GILES Heath to take over on Monday 2/24/25:  Recovering nicely from his peripheral embolectomy.  With the apparent TAVR vegetation now embolizing, will likely need a surgical AVR soon.  Eill contact Dr Rodriges for possible transfer to [facility] soon.

## 2025-02-24 NOTE — PHYSICAL THERAPY INITIAL EVALUATION ADULT - DIAGNOSIS, PT EVAL
s/p L femoral thrombectomy, L CFA endarterectomy
L LE Skin Rash; ? Vasculitis; ? Drug Reaction;  OM/Discitis L3-S1

## 2025-02-24 NOTE — DISCHARGE NOTE PROVIDER - NSDCFUSCHEDAPPT_GEN_ALL_CORE_FT
Abdulaziz Jernigan Physician [facility]  [facility] [address] [facility]  Scheduled Appointment: 03/17/2025    Benito Jones  [place] Physician [facility] [address]  Scheduled Appointment: 04/07/2025

## 2025-02-24 NOTE — PROGRESS NOTE ADULT - SUBJECTIVE AND OBJECTIVE BOX
Date of service: 02-24-25 @ 14:51    Lying in bed in NAD  Events noted  Had left lower leg acute ischemia and underwent surgery with Thrombectomy of left femoral vessel, Endarterectomy of left common femoral artery, and Endarterectomy of left superficial femoral artery on 23-Feb-2025  Left lower leg erythema is much improved    ROS: no fever or chills; denies dizziness, no HA, no SOB or cough, no abdominal pain, no diarrhea or constipation; no dysuria, no legs pain, no rashes    MEDICATIONS  (STANDING):  atorvastatin 40 milliGRAM(s) Oral at bedtime  DAPTOmycin IVPB      DAPTOmycin IVPB 750 milliGRAM(s) IV Intermittent every 24 hours  dextrose 5%. 1000 milliLiter(s) (50 mL/Hr) IV Continuous <Continuous>  dextrose 5%. 1000 milliLiter(s) (100 mL/Hr) IV Continuous <Continuous>  dextrose 50% Injectable 25 Gram(s) IV Push once  dextrose 50% Injectable 12.5 Gram(s) IV Push once  dextrose 50% Injectable 25 Gram(s) IV Push once  ferrous    sulfate 325 milliGRAM(s) Oral daily  gabapentin 200 milliGRAM(s) Oral three times a day  glucagon  Injectable 1 milliGRAM(s) IntraMuscular once  heparin  Infusion. 1500 Unit(s)/Hr (15 mL/Hr) IV Continuous <Continuous>  insulin lispro (ADMELOG) corrective regimen sliding scale   SubCutaneous three times a day before meals  insulin lispro (ADMELOG) corrective regimen sliding scale   SubCutaneous at bedtime  lactated ringers. 1000 milliLiter(s) (75 mL/Hr) IV Continuous <Continuous>  levothyroxine 150 MICROGram(s) Oral daily  multivitamin 1 Tablet(s) Oral daily  naloxone Injectable 0.4 milliGRAM(s) IV Push once  pantoprazole    Tablet 40 milliGRAM(s) Oral before breakfast  polyethylene glycol 3350 17 Gram(s) Oral daily  potassium phosphate / sodium phosphate Powder (PHOS-NaK) 1 Packet(s) Oral once  senna 2 Tablet(s) Oral at bedtime    Vital Signs Last 24 Hrs  T(C): 36.8 (24 Feb 2025 12:11), Max: 36.8 (24 Feb 2025 05:00)  T(F): 98.3 (24 Feb 2025 12:11), Max: 98.3 (24 Feb 2025 12:11)  HR: 87 (24 Feb 2025 12:00) (69 - 95)  BP: 113/61 (24 Feb 2025 12:00) (97/54 - 113/61)  BP(mean): 78 (24 Feb 2025 12:00) (68 - 91)  RR: 18 (24 Feb 2025 12:00) (16 - 27)  SpO2: 96% (24 Feb 2025 12:00) (92% - 98%)    Parameters below as of 24 Feb 2025 12:00  Patient On (Oxygen Delivery Method): room air     Physical exam:    Constitutional:  No acute distress  HEENT: NC/AT, EOMI, PERRLA, conjunctivae clear; ears and nose atraumatic; pharynx benign  Neck: supple; thyroid not palpable  Back: no tenderness  Respiratory: respiratory effort normal; clear to auscultation  Cardiovascular: S1S2 regular, no murmurs  Abdomen: soft, not tender, not distended, positive BS; no liver or spleen organomegaly  Genitourinary: no suprapubic tenderness  Lymphatic: no LN palpable  Musculoskeletal: no muscle tenderness, no joint swelling or tenderness  Extremities: no pedal edema  PICC site clear  Left foot, ankle, lower leg rash with petechia, tender, edema; no discharge  Neurological/ Psychiatric: AxOx3, judgement and insight normal; moving all extremities  Skin: no rashes; no palpable lesions    Labs: reviewed                        7.8    12.81 )-----------( 364      ( 24 Feb 2025 06:23 )             25.1     02-24    134[L]  |  102  |  34[H]  ----------------------------<  146[H]  3.5   |  26  |  1.07    Ca    8.7      24 Feb 2025 06:23  Phos  2.0     02-24  Mg     2.4     02-24    TPro  6.2  /  Alb  1.9[L]  /  TBili  0.4  /  DBili  x   /  AST  76[H]  /  ALT  84[H]  /  AlkPhos  171[H]  02-24    C-Reactive Protein: 206.0 mg/mL (02-20-25 @ 18:10)  Ferritin: 548 ng/mL (02-14-25 @ 07:01)                        7.4    18.24 )-----------( 268      ( 21 Feb 2025 07:17 )             23.0     02-21    133[L]  |  100  |  27[H]  ----------------------------<  90  3.8   |  24  |  0.96    Ca    9.2      21 Feb 2025 07:17  Mg     2.1     02-20    TPro  6.7  /  Alb  2.0[L]  /  TBili  0.6  /  DBili  x   /  AST  78[H]  /  ALT  63  /  AlkPhos  130[H]  02-20     LIVER FUNCTIONS - ( 20 Feb 2025 18:10 )  Alb: 2.0 g/dL / Pro: 6.7 gm/dL / ALK PHOS: 130 U/L / ALT: 63 U/L / AST: 78 U/L / GGT: x           Culture - Fungal, Other (collected 22 Feb 2025 07:42)  Source: .Other Left femoral plaque  Preliminary Report (23 Feb 2025 11:28):    Testing in progress    Culture - Acid Fast - Tissue w/Smear (collected 22 Feb 2025 07:42)  Source: Tissue Left femoral plaque    Culture - Tissue with Gram Stain (collected 22 Feb 2025 07:42)  Source: Tissue Left femoral plaque  Gram Stain (22 Feb 2025 19:34):    Few polymorphonuclear leukocytes per low power field    Moderate Gram Positive Cocci in Pairs and Chains per oil power field  Preliminary Report (23 Feb 2025 14:06):    No growth to date    Culture - Blood (collected 20 Feb 2025 18:10)  Source: .Blood None  Preliminary Report (24 Feb 2025 02:01):    No growth at 72 Hours    Culture - Blood (collected 20 Feb 2025 18:10)  Source: .Blood None  Preliminary Report (24 Feb 2025 02:01):    No growth at 72 Hours    Radiology: all available radiological tests reviewed    Advanced directives addressed: full resuscitation

## 2025-02-24 NOTE — PHYSICAL THERAPY INITIAL EVALUATION ADULT - LIVES WITH, PROFILE
2 story home/spouse
14 stairs inside with handrail; pt was at [facility] for KELLIE/spouse

## 2025-02-24 NOTE — PHYSICAL THERAPY INITIAL EVALUATION ADULT - CRITERIA FOR SKILLED THERAPEUTIC INTERVENTIONS
will attempt completion of Eval @ later date; pt with low H&H: 7.4/23.0; pt awaiting blood transfusion; further course of PT intervention pending
impairments found/functional limitations in following categories

## 2025-02-24 NOTE — PROGRESS NOTE ADULT - NUTRITIONAL ASSESSMENT
This patient has been assessed with a concern for Malnutrition and has been determined to have a diagnosis/diagnoses of Severe protein-calorie malnutrition.    This patient is being managed with:   Diet Consistent Carbohydrate w/Evening Snack-  Entered: Feb 22 2025 12:15PM    The following pending diet order is being considered for treatment of Severe protein-calorie malnutrition:  Diet Regular-  1000mL Fluid Restriction ([id number])  Entered: Feb 21 2025 12:43PM  
This patient has been assessed with a concern for Malnutrition and has been determined to have a diagnosis/diagnoses of Severe protein-calorie malnutrition.    This patient is being managed with:   Diet Consistent Carbohydrate w/Evening Snack-  Entered: Feb 22 2025 12:15PM    The following pending diet order is being considered for treatment of Severe protein-calorie malnutrition:  Diet Regular-  1000mL Fluid Restriction ([id number])  Entered: Feb 21 2025 12:43PM  
This patient has been assessed with a concern for Malnutrition and has been determined to have a diagnosis/diagnoses of Severe protein-calorie malnutrition.    This patient is being managed with:   Diet Consistent Carbohydrate w/Evening Snack-  Entered: Feb 22 2025 12:15PM    The following pending diet order is being considered for treatment of Severe protein-calorie malnutrition:  Diet Regular-  1000mL Fluid Restriction ([id number])  Entered: Feb 21 2025 12:43PM  
This patient has been assessed with a concern for Malnutrition and has been determined to have a diagnosis/diagnoses of Severe protein-calorie malnutrition.    This patient is being managed with:   Diet Consistent Carbohydrate w/Evening Snack-  Entered: Feb 22 2025 12:15PM    The following pending diet order is being considered for treatment of Severe protein-calorie malnutrition:  Diet Regular-  1000mL Fluid Restriction ([id number])  Entered: Feb 21 2025 12:43PM  
This patient has been assessed with a concern for Malnutrition and has been determined to have a diagnosis/diagnoses of Severe protein-calorie malnutrition.    This patient is being managed with:   Diet Consistent Carbohydrate w/Evening Snack-  Entered: Feb 22 2025 12:15PM    The following pending diet order is being considered for treatment of Severe protein-calorie malnutrition:  Diet Regular-  1000mL Fluid Restriction ([id number])  Entered: Feb 21 2025 12:43PM  
This patient has been assessed with a concern for Malnutrition and has been determined to have a diagnosis/diagnoses of Severe protein-calorie malnutrition.    This patient is being managed with:   Diet Consistent Carbohydrate w/Evening Snack-  Entered: Feb 22 2025 12:15PM    The following pending diet order is being considered for treatment of Severe protein-calorie malnutrition:  Diet Regular-  1000mL Fluid Restriction ([id number])  Entered: Feb 21 2025 12:43PM

## 2025-02-24 NOTE — DISCHARGE NOTE PROVIDER - CARE PROVIDER_API CALL
Abdulaziz Jernigan  [address], [address] [address]  Phone: [phone]  Fax: [phone]  Follow Up Time:     NOHEMY GARCIA  Phone: [phone]  Fax: [phone]  Follow Up Time:

## 2025-02-25 ENCOUNTER — NON-APPOINTMENT (OUTPATIENT)
Age: 75
End: 2025-02-25

## 2025-02-25 DIAGNOSIS — I48.20 CHRONIC ATRIAL FIBRILLATION, UNSPECIFIED: ICD-10-CM

## 2025-02-25 DIAGNOSIS — I35.8 OTHER NONRHEUMATIC AORTIC VALVE DISORDERS: ICD-10-CM

## 2025-02-25 DIAGNOSIS — E11.9 TYPE 2 DIABETES MELLITUS WITHOUT COMPLICATIONS: ICD-10-CM

## 2025-02-25 DIAGNOSIS — K92.2 GASTROINTESTINAL HEMORRHAGE, UNSPECIFIED: ICD-10-CM

## 2025-02-25 DIAGNOSIS — I10 ESSENTIAL (PRIMARY) HYPERTENSION: ICD-10-CM

## 2025-02-25 DIAGNOSIS — M46.26 OSTEOMYELITIS OF VERTEBRA, LUMBAR REGION: ICD-10-CM

## 2025-02-25 DIAGNOSIS — I74.3 EMBOLISM AND THROMBOSIS OF ARTERIES OF THE LOWER EXTREMITIES: ICD-10-CM

## 2025-02-25 DIAGNOSIS — M46.20 OSTEOMYELITIS OF VERTEBRA, SITE UNSPECIFIED: ICD-10-CM

## 2025-02-25 DIAGNOSIS — I38 ENDOCARDITIS, VALVE UNSPECIFIED: ICD-10-CM

## 2025-02-25 DIAGNOSIS — R78.81 BACTEREMIA: ICD-10-CM

## 2025-02-25 LAB
ALBUMIN SERPL ELPH-MCNC: 2.5 G/DL — LOW (ref 3.3–5.2)
ALP SERPL-CCNC: 152 U/L — HIGH (ref 40–120)
ALT FLD-CCNC: 56 U/L — HIGH
AMORPH CRY # UR COMP ASSIST: PRESENT
ANION GAP SERPL CALC-SCNC: 10 MMOL/L — SIGNIFICANT CHANGE UP (ref 5–17)
APPEARANCE UR: ABNORMAL
APTT BLD: 33.9 SEC — SIGNIFICANT CHANGE UP (ref 24.5–35.6)
APTT BLD: 55.5 SEC — HIGH (ref 24.5–35.6)
AST SERPL-CCNC: 47 U/L — HIGH
BACTERIA # UR AUTO: NEGATIVE /HPF — SIGNIFICANT CHANGE UP
BILIRUB SERPL-MCNC: 0.4 MG/DL — SIGNIFICANT CHANGE UP (ref 0.4–2)
BILIRUB UR-MCNC: NEGATIVE — SIGNIFICANT CHANGE UP
BUN SERPL-MCNC: 25.6 MG/DL — HIGH (ref 8–20)
CALCIUM SERPL-MCNC: 8.1 MG/DL — LOW (ref 8.4–10.5)
CAST: 4 /LPF — SIGNIFICANT CHANGE UP (ref 0–4)
CHLORIDE SERPL-SCNC: 99 MMOL/L — SIGNIFICANT CHANGE UP (ref 96–108)
CO2 SERPL-SCNC: 24 MMOL/L — SIGNIFICANT CHANGE UP (ref 22–29)
COLOR SPEC: YELLOW — SIGNIFICANT CHANGE UP
CREAT SERPL-MCNC: 1.02 MG/DL — SIGNIFICANT CHANGE UP (ref 0.5–1.3)
DIFF PNL FLD: ABNORMAL
EGFR: 77 ML/MIN/1.73M2 — SIGNIFICANT CHANGE UP
EGFR: 77 ML/MIN/1.73M2 — SIGNIFICANT CHANGE UP
GLUCOSE BLDC GLUCOMTR-MCNC: 114 MG/DL — HIGH (ref 70–99)
GLUCOSE BLDC GLUCOMTR-MCNC: 122 MG/DL — HIGH (ref 70–99)
GLUCOSE BLDC GLUCOMTR-MCNC: 127 MG/DL — HIGH (ref 70–99)
GLUCOSE BLDC GLUCOMTR-MCNC: 131 MG/DL — HIGH (ref 70–99)
GLUCOSE SERPL-MCNC: 98 MG/DL — SIGNIFICANT CHANGE UP (ref 70–99)
GLUCOSE UR QL: NEGATIVE MG/DL — SIGNIFICANT CHANGE UP
HCT VFR BLD CALC: 22.1 % — LOW (ref 39–50)
HCT VFR BLD CALC: 24 % — LOW (ref 39–50)
HCT VFR BLD CALC: 29.1 % — LOW (ref 39–50)
HGB BLD-MCNC: 7.1 G/DL — LOW (ref 13–17)
HGB BLD-MCNC: 7.5 G/DL — LOW (ref 13–17)
HGB BLD-MCNC: 9.6 G/DL — LOW (ref 13–17)
INR BLD: 1.21 RATIO — HIGH (ref 0.85–1.16)
KETONES UR-MCNC: NEGATIVE MG/DL — SIGNIFICANT CHANGE UP
LEUKOCYTE ESTERASE UR-ACNC: NEGATIVE — SIGNIFICANT CHANGE UP
MAGNESIUM SERPL-MCNC: 2 MG/DL — SIGNIFICANT CHANGE UP (ref 1.6–2.6)
MCHC RBC-ENTMCNC: 26.4 PG — LOW (ref 27–34)
MCHC RBC-ENTMCNC: 26.8 PG — LOW (ref 27–34)
MCHC RBC-ENTMCNC: 27.9 PG — SIGNIFICANT CHANGE UP (ref 27–34)
MCHC RBC-ENTMCNC: 31.3 G/DL — LOW (ref 32–36)
MCHC RBC-ENTMCNC: 32.1 G/DL — SIGNIFICANT CHANGE UP (ref 32–36)
MCHC RBC-ENTMCNC: 33 G/DL — SIGNIFICANT CHANGE UP (ref 32–36)
MCV RBC AUTO: 83.4 FL — SIGNIFICANT CHANGE UP (ref 80–100)
MCV RBC AUTO: 84.5 FL — SIGNIFICANT CHANGE UP (ref 80–100)
MCV RBC AUTO: 84.6 FL — SIGNIFICANT CHANGE UP (ref 80–100)
NITRITE UR-MCNC: NEGATIVE — SIGNIFICANT CHANGE UP
NRBC # BLD AUTO: 0 K/UL — SIGNIFICANT CHANGE UP (ref 0–0)
NRBC # FLD: 0 K/UL — SIGNIFICANT CHANGE UP (ref 0–0)
NRBC BLD AUTO-RTO: 0 /100 WBCS — SIGNIFICANT CHANGE UP (ref 0–0)
PH UR: 7 — SIGNIFICANT CHANGE UP (ref 5–8)
PLATELET # BLD AUTO: 303 K/UL — SIGNIFICANT CHANGE UP (ref 150–400)
PLATELET # BLD AUTO: 340 K/UL — SIGNIFICANT CHANGE UP (ref 150–400)
PLATELET # BLD AUTO: 373 K/UL — SIGNIFICANT CHANGE UP (ref 150–400)
PMV BLD: 9.1 FL — SIGNIFICANT CHANGE UP (ref 7–13)
PMV BLD: 9.2 FL — SIGNIFICANT CHANGE UP (ref 7–13)
PMV BLD: 9.4 FL — SIGNIFICANT CHANGE UP (ref 7–13)
POTASSIUM SERPL-MCNC: 4 MMOL/L — SIGNIFICANT CHANGE UP (ref 3.5–5.3)
POTASSIUM SERPL-SCNC: 4 MMOL/L — SIGNIFICANT CHANGE UP (ref 3.5–5.3)
PROT SERPL-MCNC: 5.8 G/DL — LOW (ref 6.6–8.7)
PROT UR-MCNC: 30 MG/DL
PROTHROM AB SERPL-ACNC: 14 SEC — HIGH (ref 9.9–13.4)
RBC # BLD: 2.65 M/UL — LOW (ref 4.2–5.8)
RBC # BLD: 2.84 M/UL — LOW (ref 4.2–5.8)
RBC # BLD: 3.44 M/UL — LOW (ref 4.2–5.8)
RBC # FLD: 16.9 % — HIGH (ref 10.3–14.5)
RBC # FLD: 17 % — HIGH (ref 10.3–14.5)
RBC # FLD: 17.1 % — HIGH (ref 10.3–14.5)
RBC CASTS # UR COMP ASSIST: 33 /HPF — HIGH (ref 0–4)
SODIUM SERPL-SCNC: 133 MMOL/L — LOW (ref 135–145)
SP GR SPEC: 1.02 — SIGNIFICANT CHANGE UP (ref 1–1.03)
SQUAMOUS # UR AUTO: 3 /HPF — SIGNIFICANT CHANGE UP (ref 0–5)
UROBILINOGEN FLD QL: 1 MG/DL — SIGNIFICANT CHANGE UP (ref 0.2–1)
WBC # BLD: 10.01 K/UL — SIGNIFICANT CHANGE UP (ref 3.8–10.5)
WBC # BLD: 10.86 K/UL — HIGH (ref 3.8–10.5)
WBC # BLD: 9.52 K/UL — SIGNIFICANT CHANGE UP (ref 3.8–10.5)
WBC # FLD AUTO: 10.01 K/UL — SIGNIFICANT CHANGE UP (ref 3.8–10.5)
WBC # FLD AUTO: 10.86 K/UL — HIGH (ref 3.8–10.5)
WBC # FLD AUTO: 9.52 K/UL — SIGNIFICANT CHANGE UP (ref 3.8–10.5)
WBC UR QL: 1 /HPF — SIGNIFICANT CHANGE UP (ref 0–5)

## 2025-02-25 PROCEDURE — 99292 CRITICAL CARE ADDL 30 MIN: CPT

## 2025-02-25 PROCEDURE — 99223 1ST HOSP IP/OBS HIGH 75: CPT

## 2025-02-25 PROCEDURE — G0545: CPT

## 2025-02-25 PROCEDURE — 99222 1ST HOSP IP/OBS MODERATE 55: CPT

## 2025-02-25 PROCEDURE — 93880 EXTRACRANIAL BILAT STUDY: CPT | Mod: 26

## 2025-02-25 PROCEDURE — 99223 1ST HOSP IP/OBS HIGH 75: CPT | Mod: FS

## 2025-02-25 PROCEDURE — 99291 CRITICAL CARE FIRST HOUR: CPT

## 2025-02-25 RX ORDER — HEPARIN SODIUM 1000 [USP'U]/ML
5000 INJECTION INTRAVENOUS; SUBCUTANEOUS EVERY 8 HOURS
Refills: 0 | Status: DISCONTINUED | OUTPATIENT
Start: 2025-02-25 | End: 2025-03-06

## 2025-02-25 RX ORDER — CEFTRIAXONE 500 MG/1
2000 INJECTION, POWDER, FOR SOLUTION INTRAMUSCULAR; INTRAVENOUS EVERY 24 HOURS
Refills: 0 | Status: DISCONTINUED | OUTPATIENT
Start: 2025-02-25 | End: 2025-03-06

## 2025-02-25 RX ADMIN — Medication 650 MILLIGRAM(S): at 21:49

## 2025-02-25 RX ADMIN — CEFTRIAXONE 2000 MILLIGRAM(S): 500 INJECTION, POWDER, FOR SOLUTION INTRAMUSCULAR; INTRAVENOUS at 13:30

## 2025-02-25 RX ADMIN — Medication 650 MILLIGRAM(S): at 05:11

## 2025-02-25 RX ADMIN — Medication 150 MICROGRAM(S): at 05:11

## 2025-02-25 RX ADMIN — Medication 325 MILLIGRAM(S): at 11:48

## 2025-02-25 RX ADMIN — TRAMADOL HYDROCHLORIDE 25 MILLIGRAM(S): 50 TABLET, FILM COATED ORAL at 19:19

## 2025-02-25 RX ADMIN — POLYETHYLENE GLYCOL 3350 17 GRAM(S): 17 POWDER, FOR SOLUTION ORAL at 11:48

## 2025-02-25 RX ADMIN — Medication 650 MILLIGRAM(S): at 12:48

## 2025-02-25 RX ADMIN — TRAMADOL HYDROCHLORIDE 25 MILLIGRAM(S): 50 TABLET, FILM COATED ORAL at 11:48

## 2025-02-25 RX ADMIN — Medication 650 MILLIGRAM(S): at 14:31

## 2025-02-25 RX ADMIN — Medication 2 TABLET(S): at 21:49

## 2025-02-25 RX ADMIN — HEPARIN SODIUM 4000 UNIT(S): 1000 INJECTION INTRAVENOUS; SUBCUTANEOUS at 05:10

## 2025-02-25 RX ADMIN — Medication 650 MILLIGRAM(S): at 13:31

## 2025-02-25 RX ADMIN — Medication 3 MILLILITER(S): at 06:15

## 2025-02-25 RX ADMIN — Medication 650 MILLIGRAM(S): at 06:32

## 2025-02-25 RX ADMIN — HEPARIN SODIUM 5000 UNIT(S): 1000 INJECTION INTRAVENOUS; SUBCUTANEOUS at 13:31

## 2025-02-25 RX ADMIN — HEPARIN SODIUM 1900 UNIT(S)/HR: 1000 INJECTION INTRAVENOUS; SUBCUTANEOUS at 05:10

## 2025-02-25 RX ADMIN — Medication 3 MILLILITER(S): at 21:44

## 2025-02-25 RX ADMIN — TRAMADOL HYDROCHLORIDE 25 MILLIGRAM(S): 50 TABLET, FILM COATED ORAL at 06:32

## 2025-02-25 RX ADMIN — Medication 5 MILLIGRAM(S): at 21:49

## 2025-02-25 RX ADMIN — HEPARIN SODIUM 5000 UNIT(S): 1000 INJECTION INTRAVENOUS; SUBCUTANEOUS at 21:49

## 2025-02-25 RX ADMIN — TRAMADOL HYDROCHLORIDE 25 MILLIGRAM(S): 50 TABLET, FILM COATED ORAL at 12:48

## 2025-02-25 RX ADMIN — Medication 1 TABLET(S): at 11:48

## 2025-02-25 RX ADMIN — TRAMADOL HYDROCHLORIDE 25 MILLIGRAM(S): 50 TABLET, FILM COATED ORAL at 18:19

## 2025-02-25 RX ADMIN — Medication 650 MILLIGRAM(S): at 11:48

## 2025-02-25 RX ADMIN — Medication 3 MILLILITER(S): at 13:41

## 2025-02-25 RX ADMIN — TRAMADOL HYDROCHLORIDE 25 MILLIGRAM(S): 50 TABLET, FILM COATED ORAL at 05:32

## 2025-02-25 RX ADMIN — ATORVASTATIN CALCIUM 40 MILLIGRAM(S): 80 TABLET, FILM COATED ORAL at 21:49

## 2025-02-25 RX ADMIN — Medication 40 MILLIGRAM(S): at 05:13

## 2025-02-25 NOTE — PROGRESS NOTE ADULT - SUBJECTIVE AND OBJECTIVE BOX
CRITICAL CARE ATTENDING - CTICU  ICU Vital Signs Last 24 Hrs  T(C): 36.7 (2025 19:00), Max: 37.2 (2025 00:00)  T(F): 98 (2025 19:00), Max: 99 (2025 00:00)  HR: 83 (2025 20:00) (73 - 97)  BP: 134/69 (2025 20:00) (112/64 - 134/69)  BP(mean): 89 (2025 20:00) (79 - 103)  ABP: --  ABP(mean): --  RR: 23 (2025 20:00) (14 - 28)  SpO2: 99% (:00) (89% - 100%)    O2 Parameters below as of :00  Patient On (Oxygen Delivery Method): room air  MEDICATIONS  (STANDING):  acetaminophen     Tablet .. 650 milliGRAM(s) Oral every 8 hours  atorvastatin 40 milliGRAM(s) Oral at bedtime  cefTRIAXone Injectable. 2000 milliGRAM(s) IV Push every 24 hours  ferrous    sulfate 325 milliGRAM(s) Oral daily  glucagon  Injectable 1 milliGRAM(s) IntraMuscular once  heparin   Injectable 5000 Unit(s) SubCutaneous every 8 hours  insulin lispro (ADMELOG) corrective regimen sliding scale   SubCutaneous at bedtime  insulin lispro (ADMELOG) corrective regimen sliding scale   SubCutaneous three times a day before meals  levothyroxine 150 MICROGram(s) Oral daily  melatonin 5 milliGRAM(s) Oral at bedtime  multivitamin 1 Tablet(s) Oral daily  pantoprazole    Tablet 40 milliGRAM(s) Oral before breakfast  polyethylene glycol 3350 17 Gram(s) Oral daily  senna 2 Tablet(s) Oral at bedtime  sodium chloride 0.9% lock flush 3 milliLiter(s) IV Push every 8 hours                        9.6    9.52  )-----------( 303      ( 2025 20:20 )             29.1   02-25    133[L]  |  99  |  25.6[H]  ----------------------------<  98  4.0   |  24.0  |  1.02    Ca    8.1[L]      2025 02:30  Phos  2.0     02-24  Mg     2.0     02-25    TPro  5.8[L]  /  Alb  2.5[L]  /  TBili  0.4  /  DBili  x   /  AST  47[H]  /  ALT  56[H]  /  AlkPhos  152[H]  02-25    PT/INR - ( 2025 02:30 )   PT: 14.0 sec;   INR: 1.21 ratio  PTT - ( 2025 11:10 )  PTT:33.9 sec    Daily Height in cm: 172.72 (2025 20:04)    Daily Weight in k.4 (2025 04:00)    I&O's Summary    2025 07:01  -  2025 07:00  --------------------------------------------------------  IN: 293 mL / OUT: 800 mL / NET: -507 mL    2025 07:01  -  2025 20:30  --------------------------------------------------------  IN: 688 mL / OUT: 1175 mL / NET: -487 mL      Critically Ill patient  : [ ] preoperative ,   [x ] post operative    Requires :  [ ] Arterial Line   [x ] Central Line - PICC [ ] PA catheter  [ ] IABP  [ ] ECMO  [ ] LVAD  [ ] Ventilator  [ ] pacemaker [ x]  NC   [ x] Pulse Oxymetry Monitoring  Bedside evaluation , monitoring , treatment of hemodynamics , fluids , IVP/ IVCD meds.    Diagnosis:     Admitted - prosthetic valve - TAVR - endocarditis     TAVR -     Strep Salivarius -  Bacteremia    L3 L4 L5 S1 - Osteomyelitis / Discitis     POD 5 - LCFA / LSFA thrombectomy / endarterectomy    Sepsis     Hypotension     Hypovolemia     Hemodynamic lability,  instability. Requires IVCD [ ] vasopressors [ ] inotropes  [ ] vasodilator  [x ]IVSS fluid  to maintain MAP, perfusion, C.I.     Prerenal Azotemia     Hyponatremia     Pre Op Evaluation AVR     ROVERTO    IVCD anticoagulation with [ x] Heparin  [ ] Argatroban for  ?  reason   - may discontinue     Requires bedside physical therapy, mobilization and total nursing home care.     Respiratory insuffiencey     Requires Supplemental Oxygen Therapy     Requires chest PT, pulmonary toilet,  suctioning to maintain SaO2,  patent airway and treat atelectasis.       Discussed with CT surgeon, Physician's Assistant - Nurse Practitioner- Critical care medicine team.   Discussed at  AM / PM rounds.   Chart, labs , films reviewed.    Cumulative Critical Care Time Given Today : 30 min ADS NP made aware, heparin d/c, will monitor aPTT and will let IR aware of results

## 2025-02-25 NOTE — H&P ADULT - NSHPREVIEWOFSYSTEMS_GEN_ALL_CORE
REVIEW OF SYSTEMS:  CONSTITUTIONAL: No weakness, fevers or chills  EYES/ENT: No visual changes;  No vertigo or throat pain   NECK: No pain or stiffness  RESPIRATORY: No cough, wheezing, hemoptysis; No shortness of breath  CARDIOVASCULAR: No chest pain or palpitations  GASTROINTESTINAL: No abdominal or epigastric pain. No nausea, vomiting, or hematemesis; No diarrhea or constipation. No melena or hematochezia.  GENITOURINARY: No dysuria, frequency or hematuria  NEUROLOGICAL: No numbness or weakness  SKIN: No itching, burning, rashes, or lesions   All other review of systems is negative unless indicated above REVIEW OF SYSTEMS:  CONSTITUTIONAL: No weakness, fevers or chills  EYES/ENT: No visual changes;  No vertigo or throat pain   NECK: No pain or stiffness  RESPIRATORY: No cough, wheezing, hemoptysis; No shortness of breath  CARDIOVASCULAR: No chest pain or palpitations  GASTROINTESTINAL: No abdominal or epigastric pain. No nausea, vomiting, or hematemesis; No diarrhea No melena or hematochezia.  GENITOURINARY: No dysuria, frequency or hematuria  NEUROLOGICAL: No numbness or weakness  SKIN: No itching, burning, rashes, or lesions   All other review of systems is negative unless indicated above

## 2025-02-25 NOTE — CONSULT NOTE ADULT - NS ATTEND AMEND GEN_ALL_CORE FT
NSGY Attg:    see above    patient seen and examined    agree with above  HF pain limited bilaterally   distally 5/5    plan of care determined for osteomyelitis   no drainable epidural abscess  agree with Dr. Lizarraga's assessment  no acute neurosurgical intervention  pain control  LSO when OOB  abx per ID

## 2025-02-25 NOTE — H&P ADULT - PROBLEM SELECTOR PLAN 6
- Presented to ED with a two-day history of a rash on his left leg and pain in left knee radiating towards his foot.  - CTA showed clot to common femoral and distal embolism.   - S/P common femoral endarterectomy with clot retrieval and good blood flow to lower extremity post surgery. Foot appearance improving. - Monitor glucose levels  - Continue ISS

## 2025-02-25 NOTE — H&P ADULT - PROBLEM SELECTOR PLAN 2
- Monitor glucose levels  - Continue ISS - Presented to ED with a two-day history of a rash on his left leg and pain in left knee radiating towards his foot.  - CTA showed clot to common femoral and distal embolism.   - S/P common femoral endarterectomy with clot retrieval and good blood flow to lower extremity post surgery. Foot appearance improving.  Vascular surgery following

## 2025-02-25 NOTE — CONSULT NOTE ADULT - SUBJECTIVE AND OBJECTIVE BOX
[facility]                                              CARDIOLOGY AT [facility]                                                   [address], [place] [address]                                             Telephone: [phone]. Fax:[phone]                                                       CARDIOLOGY CONSULTATION NOTE                                                                                             History obtained by: Patient and medical record  Community Cardiologist: ?Dr. Heath   obtained: Yes [  ] No [ x ]  Reason for Consultation: Endocarditis  Available out pt records reviewed: Yes [ x ] No [  ]    Chief complaint:    Patient is a 74y old  Male who presents with a chief complaint of Bioprosthetic AoV endocarditis (25 Feb 2025 00:03)      HPI:  73 y/o male with a PMHx of HTN, A-FIB, HLD, GI Bleed, S/P TAVR 2023, chronic back pain and DM initially presented to [facility] ED with a two-day history of a rash on his left leg and pain in left knee radiating towards his foot. Pt recently discharged to SN[facility] rehab after treatment for infected TAVR and Osteomyelitis of spine, strep salivarius bacteremia. Pt received transfusion and w/u for GI bleed with unremarkable colonoscopy and upper endoscopy. Underwent ROVERTO at that time which showed vegetation of TAVR. Plan at that time was for 6 weeks of IV antibiotics via PICC line and out patient pill camera study.    Since admission patient was seen by vascular surgery,  CTA showed clot to common femoral and distal embolism. S/P common femoral endarterectomy with clot retrieval and good blood flow to lower extremity post surgery. Repeat blood cultures were negative but the excised clot was found to have gram positive cocci. Pt with weakness to legs. Sent for repeat MR with contrast. Results without abscess at this time. Other incidental problem is constipation and persistent back pain requiring tramadol for pain relief.  Pt received transfusion of 2 unit of PRBC's 2/23.    Patient transfered to [facility] for evaluation of TAVR and possible surgical replacement. Condition at time of transfer is stable but guarded prognosis.    Patient had requested DNR/DNI on admission.  However he has rescinded this request until after possible cardiac surgery. Order for DNR was cancelled. (25 Feb 2025 00:03)    Cardiology consulted for ROVERTO to further evaluate endocarditis.       CARDIAC TESTING   ECHO:  < from: TTE W or WO Ultrasound Enhancing Agent (02.24.25 @ 21:10) >  CONCLUSIONS:      1. Left ventricular cavity is normal in size. Left ventricular systolic function is normal with an ejection fraction visually estimated at 55 to 60 %.   2. There is mild (grade 1) left ventricular diastolic dysfunction.   3. Normal right ventricular cavity size and normal right ventricular systolic function.   4. Left atrium is mildly dilated.   5. The right atrium is normal in size.   6. Thickened mitral valve leaflets.   7. There is mild calcification of the mitral valve annulus.   8. Trace mitral regurgitation.   9. A Norman TAVR (TAVR) valve replacement is present in the aortic position The prosthetic valve has normal leaflet excursion and is well seated with normal function The prosthetic aortic valve No vegetations seen.. Trace paravalvular regurgitation.  10. A TEEwould be necessary to rule out vegetation on TAVR.  11. No pericardial effusion seen.  12. Pulmonary artery systolic pressure could not be estimated.  13. Small right pleural effusion noted.    < end of copied text >      CATH:     < from: Cardiac Catheterization (01.04.23 @ 16:10) >   Angiographic Findings     Cardiac Arteries and Lesion Findings    LMCA: Separate ostia of LAD and Cx, no LM present    LAD: Mild diffuse atherosclerosis     Mid LAD: 30% stenosis.    LCx: Mild diffuse atherosclerosis     Prox CX: 20% stenosis.    RCA: Mild diffuse atherosclerosis     Impression     Diagnostic Conclusions     Non-obstructive coronary artery disease. Calcified aortic valve present.    < end of copied text >  < from: Structural Heart (01.11.23 @ 12:09) >  Conclusions     Successul NOMAN with a 29 mm Evolut FX THV via percutaneous right TF  access. There was trace PVL post NOMAN. No bradycardia or bundles with TVP removed at end of case. Following closure of the RFA access with 2 Proglides, infrarenal aortography demonstrated brisk runoff and no extravasation. Neurologically intact post NOMAN.    < end of copied text >      PAST MEDICAL HISTORY  Hypertension  Diabetes mellitus  Hypothyroid obesity  Obesity  Hypothyroidism  Prostate CA    PAST SURGICAL HISTORY      SOCIAL HISTORY:  Denies smoking/alcohol/drugs  CIGARETTES:     ALCOHOL:  DRUGS:    FAMILY HISTORY:    Family History of Cardiovascular Disease:  Yes [  ] No [  ]  Coronary Artery Disease in first degree relative: Yes [  ] No [  ]  Sudden Cardiac Death in First degree relative: Yes [  ] No [  ]    HOME MEDICATIONS:  acetaminophen 325 mg oral tablet: 2 tab(s) orally every 6 hours As needed Temp greater or equal to 38C (100.4F), Mild Pain (1 - 3) (24 Feb 2025 11:15)  atorvastatin 40 mg oral tablet: 1 tab(s) orally once a day (at bedtime) (21 Feb 2025 09:34)  B-Complex 50 oral tablet: 1 tab(s) orally once a day (21 Feb 2025 09:36)  bisacodyl 10 mg rectal suppository: 1 suppository(ies) rectally prn as needed for  constipation if no relief from MOM (21 Feb 2025 09:37)  DAPTOmycin 500 mg intravenous injection: 750 milligram(s) intravenous once a day x 6 weeks total (24 Feb 2025 11:26)  ferrous sulfate 325 mg (65 mg elemental iron) oral tablet: 1 tab(s) orally once a day (24 Feb 2025 11:15)  heparin 100 units/mL-D5% intravenous solution: 15 milliliter(s) intravenous every minute per wt based protocol (24 Feb 2025 11:26)  insulin lispro 100 units/mL injectable solution: injectable 4 times a day per sliding scale coverage (24 Feb 2025 11:15)  levothyroxine 150 mcg (0.15 mg) oral tablet: 1 tab(s) orally once a day (21 Feb 2025 09:39)  Melatonin 5 mg oral tablet: 2 tab(s) orally once a day (at bedtime) (21 Feb 2025 09:41)  Multiple Vitamins oral tablet: 1 tab(s) orally once a day (21 Feb 2025 09:41)  pantoprazole 40 mg oral delayed release tablet: 1 tab(s) orally once a day (before a meal) (24 Feb 2025 11:15)  polyethylene glycol 3350 oral powder for reconstitution: 17 gram(s) orally once a day (24 Feb 2025 11:15)  PreserVision AREDS 2 oral capsule: 1 cap(s) orally 2 times a day (21 Feb 2025 09:41)  senna leaf extract oral tablet: 2 tab(s) orally once a day (at bedtime) (24 Feb 2025 11:15)  traMADol 50 mg oral tablet: 1 tab(s) orally every 6 hours As needed Severe Pain (7 - 10) (24 Feb 2025 11:15)  zolpidem 5 mg oral tablet: 1 tab(s) orally once a day (at bedtime) As needed Insomnia (24 Feb 2025 11:15)      CURRENT CARDIAC MEDICATIONS:      CURRENT OTHER MEDICATIONS:  acetaminophen     Tablet .. 650 milliGRAM(s) Oral every 6 hours  melatonin 5 milliGRAM(s) Oral at bedtime  traMADol 50 milliGRAM(s) Oral every 6 hours PRN Severe Pain (7 - 10)  traMADol 25 milliGRAM(s) Oral every 6 hours PRN Moderate Pain (4 - 6)  bisacodyl Suppository 10 milliGRAM(s) Rectal daily PRN Constipation  pantoprazole    Tablet 40 milliGRAM(s) Oral before breakfast  polyethylene glycol 3350 17 Gram(s) Oral daily  senna 2 Tablet(s) Oral at bedtime  atorvastatin 40 milliGRAM(s) Oral at bedtime  DAPTOmycin IVPB 750 milliGRAM(s) IV Intermittent every 24 hours, Stop order after: 42 Days  dextrose 5%. 1000 milliLiter(s) (100 mL/Hr) IV Continuous <Continuous>  dextrose 5%. 1000 milliLiter(s) (50 mL/Hr) IV Continuous <Continuous>  dextrose 50% Injectable 25 Gram(s) IV Push once, Stop order after: 1 Doses  dextrose 50% Injectable 12.5 Gram(s) IV Push once, Stop order after: 1 Doses  dextrose 50% Injectable 25 Gram(s) IV Push once, Stop order after: 1 Doses  dextrose Oral Gel 15 Gram(s) Oral once, Stop order after: 1 Doses PRN Blood Glucose LESS THAN 70 milliGRAM(s)/deciliter  ferrous    sulfate 325 milliGRAM(s) Oral daily  glucagon  Injectable 1 milliGRAM(s) IntraMuscular once, Stop order after: 1 Doses  heparin   Injectable 8000 Unit(s) IV Push every 6 hours PRN For aPTT less than 40  heparin   Injectable 4000 Unit(s) IV Push every 6 hours PRN For aPTT between 40 - 57  heparin  Infusion. 1700 Unit(s)/Hr (17 mL/Hr) IV Continuous <Continuous>  insulin lispro (ADMELOG) corrective regimen sliding scale   SubCutaneous at bedtime  insulin lispro (ADMELOG) corrective regimen sliding scale   SubCutaneous three times a day before meals  levothyroxine 150 MICROGram(s) Oral daily  multivitamin 1 Tablet(s) Oral daily  sodium chloride 0.9% lock flush 3 milliLiter(s) IV Push every 8 hours      ALLERGIES:   No Known Allergies      REVIEW OF SYMPTOMS:   CONSTITUTIONAL: No fever, no chills, no weight loss, no weight gain, no fatigue   ENMT:  No vertigo; No sinus or throat pain  NECK: No pain or stiffness  CARDIOVASCULAR: No chest pain, no dyspnea, no syncope/presyncope, no palpitations, no dizziness, no orthopnea, no paroxsymal nocturnal dyspnea  RESPIRATORY: No shortness of breath, no cough, no wheezing  : No dysuria, no hematuria   GI: No dark color stool, no nausea, no diarrhea, no constipation, no abdominal pain   NEURO: No headache, no slurred speech   MUSCULOSKELETAL: No joint pain or swelling; No muscle, back, or extremity pain  PSYCH: No agitation, no anxiety  ALL OTHER REVIEW OF SYSTEMS ARE NEGATIVE.    VITAL SIGNS:  T(C): 37.2 (02-25-25 @ 00:00), Max: 37.5 (02-24-25 @ 18:04)  T(F): 99 (02-25-25 @ 00:00), Max: 99.5 (02-24-25 @ 18:04)  HR: 94 (02-24-25 @ 22:00) (69 - 100)  BP: 124/68 (02-24-25 @ 22:00) (97/54 - 130/73)  RR: 28 (02-24-25 @ 22:00) (16 - 28)  SpO2: 97% (02-24-25 @ 22:00) (92% - 98%)    INTAKE AND OUTPUT:     02-24 @ 07:01  -  02-25 @ 01:34  --------------------------------------------------------  IN: 151 mL / OUT: 200 mL / NET: -49 mL        PHYSICAL EXAM:  Constitutional: Comfortable. No acute distress.   HEENT: Atraumatic and normocephalic, neck is supple. No JVD. No carotid bruit.  CNS: A&Ox3. No focal deficits.   Respiratory: CTAB, unlabored   Cardiovascular: RRR normal S1 S2. No murmur. No rubs or gallop.  Gastrointestinal: Soft, non-tender. +Bowel sounds.   Extremities: 2+ Peripheral Pulses, No clubbing, cyanosis, or edema  Psychiatric: Calm. No agitation.   Skin: Warm and dry, no ulcers on extremities     LABS:                  7.9    11.31 )-----------( 394      ( 24 Feb 2025 20:30 )             25.2     02-24    133[L]  |  99  |  28.3[H]  ----------------------------<  111[H]  4.1   |  24.0  |  0.94    Ca    8.1[L]      24 Feb 2025 20:30  Phos  2.0     02-24  Mg     2.4     02-24    TPro  6.1[L]  /  Alb  2.5[L]  /  TBili  0.3[L]  /  DBili  x   /  AST  60[H]  /  ALT  65[H]  /  AlkPhos  177[H]  02-24    PT/INR - ( 24 Feb 2025 20:30 )   PT: 13.1 sec;   INR: 1.16 ratio         PTT - ( 24 Feb 2025 20:30 )  PTT:35.3 sec  Urinalysis Basic - ( 24 Feb 2025 20:30 )    Color: x / Appearance: x / SG: x / pH: x  Gluc: 111 mg/dL / Ketone: x  / Bili: x / Urobili: x   Blood: x / Protein: x / Nitrite: x   Leuk Esterase: x / RBC: x / WBC x   Sq Epi: x / Non Sq Epi: x / Bacteria: x      Thyroid Stimulating Hormone, Serum: 0.73 uIU/mL (02-24-25 @ 20:30)      INTERPRETATION OF TELEMETRY: SR    ECG: SR w/ 1st HB @ 65 bpm  Prior ECG: Yes [ x ] No [  ]    RADIOLOGY & ADDITIONAL STUDIES:   X-ray:    < from: Xray Chest 1 View-PORTABLE IMMEDIATE (Xray Chest 1 View-PORTABLE IMMEDIATE .) (02.22.25 @ 11:04) >  IMPRESSION:    No focal airspace opacity.    --- End of Report ---    < end of copied text >    CT scan:   MRI:   < from: MR Lumbar Spine w/wo IV Cont (02.24.25 @ 09:45) >  IMPRESSION:    1. L3-L4 suspected septic discitis appears largely similar to 2/12/2025   noting mildly increased fluid within the disc space and increased left   paraspinal infiltration and enhancement. No abscess collection has   developed    2. L5-S1 suspected discitis appears largely similar to 2/12/2025, noting   decreased enhancement in the left ventral epidural space. No abscess   collection has developed    3. Recommend continued imaging follow-up    --- End of Report ---    < end of copied text >    US:                                              [facility]                                              CARDIOLOGY AT [address], [place] [address]                                             Telephone: [phone]. Fax:[phone]                                                       CARDIOLOGY CONSULTATION NOTE                                                                                             History obtained by: Patient and medical record  Community Cardiologist:    obtained: Yes [  ] No [ x ]  Reason for Consultation: Endocarditis  Available out pt records reviewed: Yes [ x ] No [  ]    Chief complaint:    Patient is a 74y old  Male who presents with a chief complaint of Bioprosthetic AoV endocarditis (25 Feb 2025 00:03)      HPI:  75 y/o male with a PMHx of HTN, A-FIB, HLD, GI Bleed, S/P TAVR 2023, chronic back pain and DM initially presented to [facility] ED with a two-day history of a rash on his left leg and pain in left knee radiating towards his foot. Pt recently discharged to SNF rehab after treatment for infected TAVR and Osteomyelitis of spine, strep salivarius bacteremia. Pt received transfusion and w/u for GI bleed with unremarkable colonoscopy and upper endoscopy. Underwent ROVERTO at that time which showed vegetation of TAVR. Plan at that time was for 6 weeks of IV antibiotics via PICC line and out patient pill camera study.    Since admission patient was seen by vascular surgery,  CTA showed clot to common femoral and distal embolism. S/P common femoral endarterectomy with clot retrieval and good blood flow to lower extremity post surgery. Repeat blood cultures were negative but the excised clot was found to have gram positive cocci. Pt with weakness to legs. Sent for repeat MR with contrast. Results without abscess at this time. Other incidental problem is constipation and persistent back pain requiring tramadol for pain relief.  Pt received transfusion of 2 unit of PRBC's 2/23.    Patient transfered to [facility] for evaluation of TAVR and possible surgical replacement. Condition at time of transfer is stable but guarded prognosis.    Patient had requested DNR/DNI on admission.  However he has rescinded this request until after possible cardiac surgery. Order for DNR was cancelled. (25 Feb 2025 00:03)    Cardiology consulted for ROVERTO to further evaluate vegetation on TAVR valve.       CARDIAC TESTING   ECHO:  < from: TTE W or WO Ultrasound Enhancing Agent (02.24.25 @ 21:10) >  CONCLUSIONS:      1. Left ventricular cavity is normal in size. Left ventricular systolic function is normal with an ejection fraction visually estimated at 55 to 60 %.   2. There is mild (grade 1) left ventricular diastolic dysfunction.   3. Normal right ventricular cavity size and normal right ventricular systolic function.   4. Left atrium is mildly dilated.   5. The right atrium is normal in size.   6. Thickened mitral valve leaflets.   7. There is mild calcification of the mitral valve annulus.   8. Trace mitral regurgitation.   9. A Norman TAVR (TAVR) valve replacement is present in the aortic position The prosthetic valve has normal leaflet excursion and is well seated with normal function The prosthetic aortic valve No vegetations seen.. Trace paravalvular regurgitation.  10. A TEEwould be necessary to rule out vegetation on TAVR.  11. No pericardial effusion seen.  12. Pulmonary artery systolic pressure could not be estimated.  13. Small right pleural effusion noted.    < end of copied text >      CATH:     < from: Cardiac Catheterization (01.04.23 @ 16:10) >   Angiographic Findings     Cardiac Arteries and Lesion Findings    LMCA: Separate ostia of LAD and Cx, no LM present    LAD: Mild diffuse atherosclerosis     Mid LAD: 30% stenosis.    LCx: Mild diffuse atherosclerosis     Prox CX: 20% stenosis.    RCA: Mild diffuse atherosclerosis     Impression     Diagnostic Conclusions     Non-obstructive coronary artery disease. Calcified aortic valve present.    < end of copied text >  < from: Structural Heart (01.11.23 @ 12:09) >  Conclusions     Successul NOMAN with a 29 mm Evolut FX THV via percutaneous right TF  access. There was trace PVL post NOMAN. No bradycardia or bundles with TVP removed at end of case. Following closure of the RFA access with 2 Proglides, infrarenal aortography demonstrated brisk runoff and no extravasation. Neurologically intact post NOMAN.    < end of copied text >      PAST MEDICAL HISTORY  Hypertension  Diabetes mellitus  Hypothyroid obesity  Obesity  Hypothyroidism  Prostate CA    PAST SURGICAL HISTORY      SOCIAL HISTORY:  Denies smoking/alcohol/drugs  CIGARETTES:     ALCOHOL:  DRUGS:    FAMILY HISTORY:    Family History of Cardiovascular Disease:  Yes [  ] No [  ]  Coronary Artery Disease in first degree relative: Yes [  ] No [  ]  Sudden Cardiac Death in First degree relative: Yes [  ] No [  ]    HOME MEDICATIONS:  acetaminophen 325 mg oral tablet: 2 tab(s) orally every 6 hours As needed Temp greater or equal to 38C (100.4F), Mild Pain (1 - 3) (24 Feb 2025 11:15)  atorvastatin 40 mg oral tablet: 1 tab(s) orally once a day (at bedtime) (21 Feb 2025 09:34)  B-Complex 50 oral tablet: 1 tab(s) orally once a day (21 Feb 2025 09:36)  bisacodyl 10 mg rectal suppository: 1 suppository(ies) rectally prn as needed for  constipation if no relief from MOM (21 Feb 2025 09:37)  DAPTOmycin 500 mg intravenous injection: 750 milligram(s) intravenous once a day x 6 weeks total (24 Feb 2025 11:26)  ferrous sulfate 325 mg (65 mg elemental iron) oral tablet: 1 tab(s) orally once a day (24 Feb 2025 11:15)  heparin 100 units/mL-D5% intravenous solution: 15 milliliter(s) intravenous every minute per wt based protocol (24 Feb 2025 11:26)  insulin lispro 100 units/mL injectable solution: injectable 4 times a day per sliding scale coverage (24 Feb 2025 11:15)  levothyroxine 150 mcg (0.15 mg) oral tablet: 1 tab(s) orally once a day (21 Feb 2025 09:39)  Melatonin 5 mg oral tablet: 2 tab(s) orally once a day (at bedtime) (21 Feb 2025 09:41)  Multiple Vitamins oral tablet: 1 tab(s) orally once a day (21 Feb 2025 09:41)  pantoprazole 40 mg oral delayed release tablet: 1 tab(s) orally once a day (before a meal) (24 Feb 2025 11:15)  polyethylene glycol 3350 oral powder for reconstitution: 17 gram(s) orally once a day (24 Feb 2025 11:15)  PreserVision AREDS 2 oral capsule: 1 cap(s) orally 2 times a day (21 Feb 2025 09:41)  senna leaf extract oral tablet: 2 tab(s) orally once a day (at bedtime) (24 Feb 2025 11:15)  traMADol 50 mg oral tablet: 1 tab(s) orally every 6 hours As needed Severe Pain (7 - 10) (24 Feb 2025 11:15)  zolpidem 5 mg oral tablet: 1 tab(s) orally once a day (at bedtime) As needed Insomnia (24 Feb 2025 11:15)      CURRENT CARDIAC MEDICATIONS:      CURRENT OTHER MEDICATIONS:  acetaminophen     Tablet .. 650 milliGRAM(s) Oral every 6 hours  melatonin 5 milliGRAM(s) Oral at bedtime  traMADol 50 milliGRAM(s) Oral every 6 hours PRN Severe Pain (7 - 10)  traMADol 25 milliGRAM(s) Oral every 6 hours PRN Moderate Pain (4 - 6)  bisacodyl Suppository 10 milliGRAM(s) Rectal daily PRN Constipation  pantoprazole    Tablet 40 milliGRAM(s) Oral before breakfast  polyethylene glycol 3350 17 Gram(s) Oral daily  senna 2 Tablet(s) Oral at bedtime  atorvastatin 40 milliGRAM(s) Oral at bedtime  DAPTOmycin IVPB 750 milliGRAM(s) IV Intermittent every 24 hours, Stop order after: 42 Days  dextrose 5%. 1000 milliLiter(s) (100 mL/Hr) IV Continuous <Continuous>  dextrose 5%. 1000 milliLiter(s) (50 mL/Hr) IV Continuous <Continuous>  dextrose 50% Injectable 25 Gram(s) IV Push once, Stop order after: 1 Doses  dextrose 50% Injectable 12.5 Gram(s) IV Push once, Stop order after: 1 Doses  dextrose 50% Injectable 25 Gram(s) IV Push once, Stop order after: 1 Doses  dextrose Oral Gel 15 Gram(s) Oral once, Stop order after: 1 Doses PRN Blood Glucose LESS THAN 70 milliGRAM(s)/deciliter  ferrous    sulfate 325 milliGRAM(s) Oral daily  glucagon  Injectable 1 milliGRAM(s) IntraMuscular once, Stop order after: 1 Doses  heparin   Injectable 8000 Unit(s) IV Push every 6 hours PRN For aPTT less than 40  heparin   Injectable 4000 Unit(s) IV Push every 6 hours PRN For aPTT between 40 - 57  heparin  Infusion. 1700 Unit(s)/Hr (17 mL/Hr) IV Continuous <Continuous>  insulin lispro (ADMELOG) corrective regimen sliding scale   SubCutaneous at bedtime  insulin lispro (ADMELOG) corrective regimen sliding scale   SubCutaneous three times a day before meals  levothyroxine 150 MICROGram(s) Oral daily  multivitamin 1 Tablet(s) Oral daily  sodium chloride 0.9% lock flush 3 milliLiter(s) IV Push every 8 hours      ALLERGIES:   No Known Allergies      REVIEW OF SYMPTOMS:   CONSTITUTIONAL: No fever, no chills, no weight loss, no weight gain, no fatigue   ENMT:  No vertigo; No sinus or throat pain  NECK: No pain or stiffness  CARDIOVASCULAR: No chest pain, no dyspnea, no syncope/presyncope, no palpitations, no dizziness, no orthopnea, no paroxsymal nocturnal dyspnea  RESPIRATORY: No shortness of breath, no cough, no wheezing  : No dysuria, no hematuria   GI: No dark color stool, no nausea, no diarrhea, no constipation, no abdominal pain   NEURO: No headache, no slurred speech   MUSCULOSKELETAL: No joint pain or swelling; No muscle, back, or extremity pain  PSYCH: No agitation, no anxiety  ALL OTHER REVIEW OF SYSTEMS ARE NEGATIVE.    VITAL SIGNS:  T(C): 37.2 (02-25-25 @ 00:00), Max: 37.5 (02-24-25 @ 18:04)  T(F): 99 (02-25-25 @ 00:00), Max: 99.5 (02-24-25 @ 18:04)  HR: 94 (02-24-25 @ 22:00) (69 - 100)  BP: 124/68 (02-24-25 @ 22:00) (97/54 - 130/73)  RR: 28 (02-24-25 @ 22:00) (16 - 28)  SpO2: 97% (02-24-25 @ 22:00) (92% - 98%)    INTAKE AND OUTPUT:     02-24 @ 07:01  -  02-25 @ 01:34  --------------------------------------------------------  IN: 151 mL / OUT: 200 mL / NET: -49 mL        PHYSICAL EXAM:  Constitutional: Comfortable. No acute distress.   HEENT: Atraumatic and normocephalic, neck is supple. No JVD. No carotid bruit.  CNS: A&Ox3. No focal deficits.   Respiratory: CTAB, unlabored   Cardiovascular: RRR normal S1 S2. No murmur. No rubs or gallop.  Gastrointestinal: Soft, non-tender. +Bowel sounds.   Extremities: 2+ Peripheral Pulses, No clubbing, cyanosis, or edema  Psychiatric: Calm. No agitation.   Skin: Warm and dry, no ulcers on extremities     LABS:                  7.9    11.31 )-----------( 394      ( 24 Feb 2025 20:30 )             25.2     02-24    133[L]  |  99  |  28.3[H]  ----------------------------<  111[H]  4.1   |  24.0  |  0.94    Ca    8.1[L]      24 Feb 2025 20:30  Phos  2.0     02-24  Mg     2.4     02-24    TPro  6.1[L]  /  Alb  2.5[L]  /  TBili  0.3[L]  /  DBili  x   /  AST  60[H]  /  ALT  65[H]  /  AlkPhos  177[H]  02-24    PT/INR - ( 24 Feb 2025 20:30 )   PT: 13.1 sec;   INR: 1.16 ratio         PTT - ( 24 Feb 2025 20:30 )  PTT:35.3 sec  Urinalysis Basic - ( 24 Feb 2025 20:30 )    Color: x / Appearance: x / SG: x / pH: x  Gluc: 111 mg/dL / Ketone: x  / Bili: x / Urobili: x   Blood: x / Protein: x / Nitrite: x   Leuk Esterase: x / RBC: x / WBC x   Sq Epi: x / Non Sq Epi: x / Bacteria: x      Thyroid Stimulating Hormone, Serum: 0.73 uIU/mL (02-24-25 @ 20:30)      INTERPRETATION OF TELEMETRY: SR    ECG: SR w/ 1st HB @ 65 bpm  Prior ECG: Yes [ x ] No [  ]    RADIOLOGY & ADDITIONAL STUDIES:   X-ray:    < from: Xray Chest 1 View-PORTABLE IMMEDIATE (Xray Chest 1 View-PORTABLE IMMEDIATE .) (02.22.25 @ 11:04) >  IMPRESSION:    No focal airspace opacity.    --- End of Report ---    < end of copied text >    CT scan:   MRI:   < from: MR Lumbar Spine w/wo IV Cont (02.24.25 @ 09:45) >  IMPRESSION:    1. L3-L4 suspected septic discitis appears largely similar to 2/12/2025   noting mildly increased fluid within the disc space and increased left   paraspinal infiltration and enhancement. No abscess collection has   developed    2. L5-S1 suspected discitis appears largely similar to 2/12/2025, noting   decreased enhancement in the left ventral epidural space. No abscess   collection has developed    3. Recommend continued imaging follow-up    --- End of Report ---    < end of copied text >    US:

## 2025-02-25 NOTE — CONSULT NOTE ADULT - ASSESSMENT
75 y/o male with a PMHx of HTN, A-FIB, HLD, GI Bleed, S/P TAVR 2023, chronic back pain and DM initially presented to [facility] ED with a two-day history of a rash on his left leg and pain in left knee radiating towards his foot. Pt recently discharged to [facility] after treatment for infected TAVR and Osteomyelitis of spine, strep salivarius bacteremia. Pt received transfusion and w/u for GI bleed with unremarkable colonoscopy and upper endoscopy. Underwent ROVERTO at that time which showed vegetation of TAVR. Plan at that time was for 6 weeks of IV antibiotics via PICC line and out patient pill camera study.    Since admission patient was seen by vascular surgery,  CTA showed clot to common femoral and distal embolism. S/P common femoral endarterectomy with clot retrieval and good blood flow to lower extremity post surgery. Repeat blood cultures were negative but the excised clot was found to have gram positive cocci. Pt with weakness to legs. Sent for repeat MR with contrast. Results without abscess at this time. Other incidental problem is constipation and persistent back pain requiring tramadol for pain relief.  Pt received transfusion of 2 unit of PRBC's 2/23.    Patient transfered to [facility] for evaluation of TAVR and possible surgical replacement. Condition at time of transfer is stable but guarded prognosis.    Patient had requested DNR/DNI on admission.  However he has rescinded this request until after possible cardiac surgery. Order for DNR was cancelled. (25 Feb 2025 00:03)    Cardiology consulted for ROVERTO to further evaluate endocarditis.  73 y/o male with a PMHx of HTN, A-FIB, HLD, GI Bleed, S/P TAVR 2023, chronic back pain and DM initially presented to [facility] ED with a two-day history of a rash on his left leg and pain in left knee radiating towards his foot. Pt recently discharged to [facility] after treatment for infected TAVR and Osteomyelitis of spine, strep salivarius bacteremia. Pt received transfusion and w/u for GI bleed with unremarkable colonoscopy and upper endoscopy. Underwent ROVERTO at that time which showed vegetation of TAVR. Plan at that time was for 6 weeks of IV antibiotics via PICC line and out patient pill camera study.    Since admission patient was seen by vascular surgery,  CTA showed clot to common femoral and distal embolism. S/P common femoral endarterectomy with clot retrieval and good blood flow to lower extremity post surgery. Repeat blood cultures were negative but the excised clot was found to have gram positive cocci. Pt with weakness to legs. Sent for repeat MR with contrast. Results without abscess at this time. Other incidental problem is constipation and persistent back pain requiring tramadol for pain relief.  Pt received transfusion of 2 unit of PRBC's 2/23.    Patient transfered to [facility] for evaluation of TAVR and possible surgical replacement. Condition at time of transfer is stable but guarded prognosis.    Patient had requested DNR/DNI on admission.  However he has rescinded this request until after possible cardiac surgery. Order for DNR was cancelled. (25 Feb 2025 00:03)    Cardiology consulted for ROVERTO to further evaluate vegetation on TAVR valve.

## 2025-02-25 NOTE — H&P ADULT - PROBLEM SELECTOR PLAN 3
- S/P TAVR in 2023  - ROVERTO done 2/13 showed the prosthetic valve has reduced leaflet opening and is well seated. Echo findings are consistent with a vegetation of the aortic prosthesis. There is trace paravalvular regurgitation (one paravalvular jet), originating at 1 o'clock and is directed laterally.  - CTA showed clot to common femoral and distal embolism. S/P common femoral endarterectomy with clot retrieval and good blood flow to lower extremity post surgery. Foot appearance improving. Repeat blood cultures were negative but the excised clot was found to have gram positive cocci.  - Started heparin drip  - Possible ROVERTO in AM - H/o osteomyelitis  - Presently on Daptomycin and Tramadol for pain  ID consult pending   Will need to obtain neurosurgery consult

## 2025-02-25 NOTE — H&P ADULT - PROBLEM SELECTOR PLAN 4
- H/O bacteremia, likely i/s/o osteomyelitis of lumbar vertebrae  - Presently on Daptomycin - H/O bacteremia, likely i/s/o osteomyelitis of lumbar vertebrae  - Presently on Daptomycin  Subsequent cultures cleared

## 2025-02-25 NOTE — PHYSICAL THERAPY INITIAL EVALUATION ADULT - PERTINENT HX OF CURRENT PROBLEM, REHAB EVAL
73 y/o male transferred from  with bioprosthetic AV endocarditis. Also noted previous admission discitis/OM of spine.  neurosurgery was consulted and no intervention or bracing.

## 2025-02-25 NOTE — H&P ADULT - HISTORY OF PRESENT ILLNESS
75 y/o male with a PMHx of HTN, A-FIB, HLD, GI Bleed, S/P TAVR 2023, chronic back pain and DM initially presented to [facility] [place] ED with a two-day history of a rash on his left leg and pain in left knee radiating towards his foot. Pt recently discharged to [facility] after treatment for infected TAVR and Osteomyelitis of spine. Pt received transfusion and w/u for GI bleed with unremarkable colonoscopy and upper endoscopy. Underwent ROVERTO at that time which showed vegetation of TAVR. Plan at that time was for 6 weeks of IV antibiotics via PICC line and out patient pill camera study.    Since admission patient was seen by vascular surgery,  CTA showed clot to common femoral and distal embolism. S/P common femoral endarterectomy with clot retrieval and good blood flow to lower extremity post surgery. Foot appearance improving. Repeat blood cultures were negative but the excised clot was found to have gram positive cocci. Pt with weakness to legs. Sent for repeat MR with contrast. Results without abscess at this time. Other incidental problem is constipation and persistent back pain requiring tramadol for pain relief.  Pt received transfusion of 2 unit of PRBC's 2/23.    Patient transfered to [facility] for evaluation of TAVR and possible surgical replacement. Condition at time of transfer is stable but guarded prognosis.    FYI patient had requested DNR/DNI on admission.  However he has rescinded this request until after possible cardiac surgery. Order for DNR was cancelled. 73 y/o male with a PMHx of HTN, A-FIB, HLD, GI Bleed, S/P TAVR 2023, chronic back pain and DM initially presented to [facility] ED with a two-day history of a rash on his left leg and pain in left knee radiating towards his foot. Pt recently discharged to [facility] after treatment for infected TAVR and Osteomyelitis of spine, strep salivarius bacteremia. Pt received transfusion and w/u for GI bleed with unremarkable colonoscopy and upper endoscopy. Underwent ROVERTO at that time which showed vegetation of TAVR. Plan at that time was for 6 weeks of IV antibiotics via PICC line and out patient pill camera study.    Since admission patient was seen by vascular surgery,  CTA showed clot to common femoral and distal embolism. S/P common femoral endarterectomy with clot retrieval and good blood flow to lower extremity post surgery. Repeat blood cultures were negative but the excised clot was found to have gram positive cocci. Pt with weakness to legs. Sent for repeat MR with contrast. Results without abscess at this time. Other incidental problem is constipation and persistent back pain requiring tramadol for pain relief.  Pt received transfusion of 2 unit of PRBC's 2/23.    Patient transfered to [facility] for evaluation of TAVR and possible surgical replacement. Condition at time of transfer is stable but guarded prognosis.    Patient had requested DNR/DNI on admission.  However he has rescinded this request until after possible cardiac surgery. Order for DNR was cancelled.

## 2025-02-25 NOTE — H&P ADULT - PROBLEM SELECTOR PLAN 7
GI bleed on previous admission  No evidence of active bleed at present.  Ongoing anemia   Trend H/H  Will consider GI consult for any worsening signs and symptoms  Had been seen by GI at  with unremarkable UGI endoscopy  Ongoing constipation. Bowel regiment with senna/miralax. Dulcolax PRN  If no BM this AM will consider adding lactulose

## 2025-02-25 NOTE — PROGRESS NOTE ADULT - SUBJECTIVE AND OBJECTIVE BOX
Subjective: patient seen and examined at bedside. No overnight events. Patient to get ROVERTO today with cardiosx.     DAPTOmycin IVPB 750  DAPTOmycin IVPB 750  heparin   Injectable 8000 PRN  heparin   Injectable 4000 PRN  heparin  Infusion. 1700    Allergies    No Known Allergies    Intolerances        Vital Signs Last 24 Hrs  T(C): 36.7 (25 Feb 2025 05:00), Max: 37.5 (24 Feb 2025 18:04)  T(F): 98 (25 Feb 2025 05:00), Max: 99.5 (24 Feb 2025 18:04)  HR: 89 (25 Feb 2025 07:00) (81 - 100)  BP: 123/67 (25 Feb 2025 07:00) (101/69 - 134/69)  BP(mean): 85 (25 Feb 2025 07:00) (74 - 103)  RR: 23 (25 Feb 2025 07:00) (18 - 28)  SpO2: 90% (25 Feb 2025 07:00) (89% - 97%)    Parameters below as of 24 Feb 2025 21:00  Patient On (Oxygen Delivery Method): room air      I&O's Detail    24 Feb 2025 07:01  -  25 Feb 2025 07:00  --------------------------------------------------------  IN:    Heparin Infusion: 174 mL    Oral Fluid: 100 mL  Total IN: 274 mL    OUT:    Voided (mL): 800 mL  Total OUT: 800 mL    Total NET: -526 mL        PE:   GENERAL: NAD, lying in bed comfortably  HEAD:  Atraumatic, normocephalic  EYES: Conjunctiva and sclera clear  ENT: Moist mucous membranes  NECK: Supple, no JVD  HEART: Regular rate and rhythm  LUNGS: Unlabored respirations.   ABDOMEN: Soft, nontender, nondistended  EXTREMITIES: b/l LE edema, b/l palp DP/PT, L groin soft no ttp and previna in place  NERVOUS SYSTEM:  A&Ox3, no focal deficits   SKIN: No rashes or lesions    LABS:                        7.1    10.01 )-----------( 340      ( 25 Feb 2025 04:00 )             22.1     02-25    133[L]  |  99  |  25.6[H]  ----------------------------<  98  4.0   |  24.0  |  1.02    Ca    8.1[L]      25 Feb 2025 02:30  Phos  2.0     02-24  Mg     2.0     02-25    TPro  5.8[L]  /  Alb  2.5[L]  /  TBili  0.4  /  DBili  x   /  AST  47[H]  /  ALT  56[H]  /  AlkPhos  152[H]  02-25    PT/INR - ( 25 Feb 2025 02:30 )   PT: 14.0 sec;   INR: 1.21 ratio         PTT - ( 25 Feb 2025 04:00 )  PTT:55.5 sec  CAPILLARY BLOOD GLUCOSE      POCT Blood Glucose.: 122 mg/dL (25 Feb 2025 07:45)  POCT Blood Glucose.: 174 mg/dL (24 Feb 2025 17:20)  POCT Blood Glucose.: 199 mg/dL (24 Feb 2025 11:05)    Radiology and Additional Studies:      Assessment: Patient is a 73 y/o male presenting as a tx for possible AVR due to bioprosthetic valve endocarditis. Vascular consulted for post op care.    Plan:  -No acute surgical intervention. L groin soft, previna in place. LLE with palp distal pulses, NV intact.   -Previna to come down POD5  -Continue heparin gtt  -Vascular to follow  -Remainder of care per primary

## 2025-02-25 NOTE — PROGRESS NOTE ADULT - SUBJECTIVE AND OBJECTIVE BOX
CRITICAL CARE ATTENDING - CTICU    MEDICATIONS  (STANDING):  acetaminophen     Tablet .. 650 milliGRAM(s) Oral every 6 hours  atorvastatin 40 milliGRAM(s) Oral at bedtime  DAPTOmycin IVPB 750 milliGRAM(s) IV Intermittent every 24 hours  dextrose 5%. 1000 milliLiter(s) (100 mL/Hr) IV Continuous <Continuous>  dextrose 5%. 1000 milliLiter(s) (50 mL/Hr) IV Continuous <Continuous>  dextrose 50% Injectable 25 Gram(s) IV Push once  dextrose 50% Injectable 12.5 Gram(s) IV Push once  dextrose 50% Injectable 25 Gram(s) IV Push once  ferrous    sulfate 325 milliGRAM(s) Oral daily  glucagon  Injectable 1 milliGRAM(s) IntraMuscular once  heparin   Injectable 5000 Unit(s) SubCutaneous every 8 hours  insulin lispro (ADMELOG) corrective regimen sliding scale   SubCutaneous at bedtime  insulin lispro (ADMELOG) corrective regimen sliding scale   SubCutaneous three times a day before meals  levothyroxine 150 MICROGram(s) Oral daily  melatonin 5 milliGRAM(s) Oral at bedtime  multivitamin 1 Tablet(s) Oral daily  pantoprazole    Tablet 40 milliGRAM(s) Oral before breakfast  polyethylene glycol 3350 17 Gram(s) Oral daily  senna 2 Tablet(s) Oral at bedtime  sodium chloride 0.9% lock flush 3 milliLiter(s) IV Push every 8 hours                                    7.1    10.01 )-----------( 340      ( 2025 04:00 )             22.1       02-25    133[L]  |  99  |  25.6[H]  ----------------------------<  98  4.0   |  24.0  |  1.02    Ca    8.1[L]      2025 02:30  Phos  2.0     02-24  Mg     2.0     02-25    TPro  5.8[L]  /  Alb  2.5[L]  /  TBili  0.4  /  DBili  x   /  AST  47[H]  /  ALT  56[H]  /  AlkPhos  152[H]  02-25      PT/INR - ( 2025 02:30 )   PT: 14.0 sec;   INR: 1.21 ratio         PTT - ( 2025 04:00 )  PTT:55.5 sec        Daily Height in cm: 172.72 (2025 20:04)    Daily Weight in k.4 (2025 04:00)       @ 07: @ 07:00  --------------------------------------------------------  IN: 293 mL / OUT: 800 mL / NET: -507 mL     @ 07:01  -   @ 10:22  --------------------------------------------------------  IN: 38 mL / OUT: 300 mL / NET: -262 mL        Critically Ill patient  : [ ] preoperative ,   [x ] post operative    Requires :  [x ] Arterial Line   [x ] Central Line - PICC [ ] PA catheter  [ ] IABP  [ ] ECMO  [ ] LVAD  [ ] Ventilator  [ ] pacemaker [ x]  NC                    [ x] ABG's     [ x] Pulse Oxymetry Monitoring  Bedside evaluation , monitoring , treatment of hemodynamics , fluids , IVP/ IVCD meds.        Diagnosis:     Admitted - prosthetic valve - TAVR - endocarditis     TAVR -     Strep Salivarius -  Bacteremia    L3 L4 L5 S1 - Osteomyelitis / Discitis     POD 5 - LCFA / LSFA thrombectomy / endarterectomy    Sepsis     Hypotension     Hypovolemia     Hemodynamic lability,  instability. Requires IVCD [ ] vasopressors [ ] inotropes  [ ] vasodilator  [x ]IVSS fluid  to maintain MAP, perfusion, C.I.     Prerenal Azotemia     Hyponatremia     Pre Op Evaluation AVR     ROVERTO    IVCD anticoagulation with [ x] Heparin  [ ] Argatroban for  ?  reason   - may discontinue     Requires bedside physical therapy, mobilization and total long-term care.     Respiratory insuffiencey     Requires Supplemental Oxygen Therapy     Requires chest PT, pulmonary toilet,  suctioning to maintain SaO2,  patent airway and treat atelectasis.                             Discussed with CT surgeon, Physician's Assistant - Nurse Practitioner- Critical care medicine team.   Discussed at  AM / PM rounds.   Chart, labs , films reviewed.    Cumulative Critical Care Time Given Today : 105 min

## 2025-02-25 NOTE — PHYSICAL THERAPY INITIAL EVALUATION ADULT - ADDITIONAL COMMENTS
Pt reports living with spouse in a condo. 3 [address]ella with rail. 3 levels. Has a chair lift. Independent at baseline.

## 2025-02-25 NOTE — H&P ADULT - PROBLEM SELECTOR PLAN 1
- S/P TAVR in 2023  - ROVERTO done 2/13 showed the prosthetic valve has reduced leaflet opening and is well seated. Echo findings are consistent with a vegetation of the aortic prosthesis. There is trace paravalvular regurgitation (one paravalvular jet), originating at 1 o'clock and is directed laterally.  - CTA showed clot to common femoral and distal embolism. S/P common femoral endarterectomy with clot retrieval and good blood flow to lower extremity post surgery. Foot appearance improving. Repeat blood cultures were negative but the excised clot was found to have gram positive cocci.  - Started heparin drip  - Possible ROVERTO in AM

## 2025-02-25 NOTE — H&P ADULT - PROBLEM SELECTOR PLAN 5
- H/o osteomyelitis  - Presently on Daptomycin and Tramadol for pain Continue heparin gtt   Currently in NSR

## 2025-02-25 NOTE — CHART NOTE - NSCHARTNOTEFT_GEN_A_CORE
Measured and adjusted Aspen LSO prescribed by patients physician. Wife Jennifer was present for all explanations. B-B measurements. Adjusted circumferential intra abdominal waist belt and cinch tabs. Explained for OOB use. Explained don/doff. Explained to always wear a article of clothing to buffer skin. P/T will apply for therapy.  [facility]  [phone]

## 2025-02-25 NOTE — H&P ADULT - NSHPPHYSICALEXAM_GEN_ALL_CORE
Constitutional: NAD, awake and alert, well-developed  HEENT: PERR, EOMI, Normal Hearing, MMM  Neck: Soft and supple, No LAD, No JVD  Respiratory: Breath sounds are clear bilaterally, No wheezing, rales or rhonchi  Cardiovascular: S1 and S2, regular rate and rhythm, no Murmurs, gallops or rubs  Gastrointestinal: Bowel Sounds present, soft, nontender, nondistended, no guarding, no rebound  Extremities: No peripheral edema  Vascular: 2+ peripheral pulses  Neurological: A/O x 3, no focal deficits  Musculoskeletal: 5/5 strength b/l upper and lower extremities  Skin: No rashes Constitutional: NAD, awake and alert, well-developed  HEENT: PERR, EOMI, Normal Hearing, MMM  Neck: Soft and supple, No LAD, No JVD  Respiratory: Breath sounds are clear bilaterally, No wheezing, rales or rhonchi  Cardiovascular: S1 and S2, regular rate and rhythm, no Murmurs, gallops or rubs  Gastrointestinal: Bowel Sounds present, soft, nontender, nondistended, no guarding, no rebound  Extremities: No peripheral edema  Vascular: 2+ peripheral pulses, LT groin surgical dressing c/d/i  Neurological: A/O x 3, no focal deficits  Musculoskeletal: 5/5 strength b/l upper and lower extremities  Skin: No rashes

## 2025-02-25 NOTE — CONSULT NOTE ADULT - NS ATTEND AMEND GEN_ALL_CORE FT
examined at bed side, in NAD, yet sats on low 90s on 3 l/min NC. Also Hgb 7.0 g/dl this AM.  D/w with CTS team, optimize O2 requirements, transfuse as needed. Tentatively ROVERTO today. Keep NPO.

## 2025-02-25 NOTE — CONSULT NOTE ADULT - SUBJECTIVE AND OBJECTIVE BOX
INFECTIOUS DISEASES AND INTERNAL MEDICINE at [place]  =======================================================  Fabián Liz MD  Diplomates American Board of Internal Medicine and Infectious Diseases  Telephone [phone]  Fax            [phone]  =======================================================    CONNOR [id number]      HPI:  73 y/o male with a PMHx of HTN, A-FIB, HLD, GI Bleed, S/P TAVR , chronic back pain and DM initially presented to [facility] ED with a two-day history of a rash on his left leg and pain in left knee radiating towards his foot. Pt recently discharged to SN[facility] rehab after treatment for infected TAVR and Osteomyelitis of spine, strep salivarius bacteremia. Pt received transfusion and w/u for GI bleed with unremarkable colonoscopy and upper endoscopy. Underwent ROVERTO at that time which showed vegetation of TAVR. Plan at that time was for 6 weeks of IV antibiotics via PICC line and out patient pill camera study.    Since admission patient was seen by vascular surgery,  CTA showed clot to common femoral and distal embolism. S/P common femoral endarterectomy with clot retrieval and good blood flow to lower extremity post surgery. Repeat blood cultures were negative but the excised clot was found to have gram positive cocci. Pt with weakness to legs. Sent for repeat MR with contrast. Results without abscess at this time. Other incidental problem is constipation and persistent back pain requiring tramadol for pain relief.  Pt received transfusion of 2 unit of PRBC's .  Patient transfered to [facility] for evaluation of TAVR and possible surgical replacement.    AS ABOVE WAS READMITTED TO [facility] OF GI BLEED  PT WITH RASH ON LEG AND WAS CHANGED FROM ROCEPHIN TO DAPTOMYCIN  PT UNDERWENT FEMORAL ENDARECTOMY RETRIEVAL OF CLOT   ASKED TO EVALUATE FROM ID STANDPOINT            PAST MEDICAL & SURGICAL HISTORY:  Hypertension      Diabetes mellitus      Obesity      Hypothyroidism      Prostate CA          ANTIBIOTICS  cefTRIAXone Injectable. 2000 milliGRAM(s) IV Push every 24 hours      Allergies    No Known Allergies    Intolerances        SOCIAL HISTORY:     FAMILY HX   FAMILY HISTORY:      Vital Signs Last 24 Hrs  T(C): 37.1 (2025 12:00), Max: 37.5 (2025 18:04)  T(F): 98.7 (2025 12:00), Max: 99.5 (2025 18:04)  HR: 86 (2025 13:00) (73 - 100)  BP: 127/65 (2025 13:00) (112/64 - 134/69)  BP(mean): 84 (2025 13:00) (74 - 103)  RR: 20 (2025 13:00) (15 - 28)  SpO2: 98% (2025 13:00) (89% - 100%)    Parameters below as of 2025 12:00  Patient On (Oxygen Delivery Method): nasal cannula  O2 Flow (L/min): 3    Drug Dosing Weight  Height (cm): 172.7 (2025 20:04)  Weight (kg): 103.3 (2025 20:04)  BMI (kg/m2): 34.6 (2025 20:04)  BSA (m2): 2.16 (2025 20:04)      REVIEW OF SYSTEMS:    CONSTITUTIONAL:  As per HPI.    HEENT:  Eyes:  No diplopia or blurred vision. ENT:  No earache, sore throat or runny nose.    CARDIOVASCULAR:  No pressure, squeezing, strangling, tightness, heaviness or aching about the chest, neck, axilla or epigastrium.    RESPIRATORY:  No cough, shortness of breath, PND or orthopnea.    GASTROINTESTINAL:  No nausea, vomiting or diarrhea.    GENITOURINARY:  No dysuria, frequency or urgency.    MUSCULOSKELETAL:  As per HPI.    SKIN:  No change in skin, hair or nails.    NEUROLOGIC:  No paresthesias, fasciculations, seizures or weakness.                  PHYSICAL EXAMINATION:    GENERAL: The patient is a _____in no apparent distress. ___     VITAL SIGNS: T(C): 37.1 (25 @ 12:00), Max: 37.5 (25 @ 18:04)  HR: 86 (25 @ 13:00) (73 - 100)  BP: 127/65 (25 @ 13:00) (112/64 - 134/69)  RR: 20 (25 @ 13:00) (15 - 28)  SpO2: 98% (25 @ 13:00) (89% - 100%)  Wt(kg): --    HEENT: Head is normocephalic and atraumatic.  ANICTERIC  NECK: Supple. No carotid bruits.  No lymphadenopathy or thyromegaly.    LUNGS:COARSE BREATH SOUNDS    HEART: Regular rate and rhythm without murmur.    ABDOMEN: Soft, nontender, and nondistended.  Positive bowel sounds.  No hepatosplenomegaly was noted. NO REBOUND NO GUARDING    EXTREMITIES: NO EDEMA NO ERYTHEMA    NEUROLOGIC: NON FOCAL      SKIN: No ulceration or induration present. NO RASH        BLOOD CULTURES  Culture Results:   No growth to date ( @ 07:42)  Culture Results:   Testing in progress ( @ 07:42)  Culture Results:   No growth at 4 days ( @ 18:10)  Culture Results:   No growth at 4 days ( @ 18:10)       URINE CX          LABS:                        7.1    10.01 )-----------( 340      ( 2025 04:00 )             22.1         133[L]  |  99  |  25.6[H]  ----------------------------<  98  4.0   |  24.0  |  1.02    Ca    8.1[L]      2025 02:30  Phos  2.0     -24  Mg     2.0         TPro  5.8[L]  /  Alb  2.5[L]  /  TBili  0.4  /  DBili  x   /  AST  47[H]  /  ALT  56[H]  /  AlkPhos  152[H]  25    PT/INR - ( 2025 02:30 )   PT: 14.0 sec;   INR: 1.21 ratio         PTT - ( 2025 11:10 )  PTT:33.9 sec  Urinalysis Basic - ( 2025 09:20 )    Color: Yellow / Appearance: Cloudy / S.017 / pH: x  Gluc: x / Ketone: Negative mg/dL  / Bili: Negative / Urobili: 1.0 mg/dL   Blood: x / Protein: 30 mg/dL / Nitrite: Negative   Leuk Esterase: Negative / RBC: 33 /HPF / WBC 1 /HPF   Sq Epi: x / Non Sq Epi: 3 /HPF / Bacteria: Negative /HPF        RADIOLOGY & ADDITIONAL STUDIES:      ASSESSMENT/PLAN      73 y/o male with a PMHx of HTN, A-FIB, HLD, GI Bleed, S/P TAVR , chronic back pain and DM initially presented to [facility] ED with a two-day history of a rash on his left leg and pain in left knee radiating towards his foot. Pt recently discharged to SNF rehab after treatment for infected TAVR and Osteomyelitis of spine, strep salivarius bacteremia. Pt received transfusion and w/u for GI bleed with unremarkable colonoscopy and upper endoscopy. Underwent ROVERTO at that time which showed vegetation of TAVR. Plan at that time was for 6 weeks of IV antibiotics via PICC line and out patient pill camera study.    Since admission patient was seen by vascular surgery,  CTA showed clot to common femoral and distal embolism. S/P common femoral endarterectomy with clot retrieval and good blood flow to lower extremity post surgery. Repeat blood cultures were negative but the excised clot was found to have gram positive cocci. Pt with weakness to legs. Sent for repeat MR with contrast. Results without abscess at this time. Other incidental problem is constipation and persistent back pain requiring tramadol for pain relief.  Pt received transfusion of 2 unit of PRBC's .  Patient transfered to [facility] for evaluation of TAVR and possible surgical replacement.    [facility]  PT WITH RASH ON LEG AND WAS CHANGED FROM ROCEPHIN TO DAPTOMYCIN  PT UNDERWENT FEMORAL ENDARECTOMY RETRIEVAL OF CLOT   PT RASH WAS ULTIMATELY REALTED TO  ISCHEMIC ISSUES AND NOT ALLERGY  SPOKE TO ID AT [facility] TO CLARIFY  SUGGEST CHANGE BACK TO ROCEPHIN TO CONITNUE    FOR  EXTENDED COURSE  CARDIAC SURGERY TO FURTHER EVALUATE VALVE  WILL FOLLOWUP  FURTHER RECOMMENDATIONS            ROBERT RUDOLPH MD

## 2025-02-25 NOTE — CONSULT NOTE ADULT - PROBLEM SELECTOR RECOMMENDATION 9
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 No vegetations seen. Trace paravalvular regurgitation. However, a ROVERTO would be necessary to rule out vegetation on TAVR.  - Continue AC with Heparin gtt; Monitor PTT every 6hrs per protocol   - Continue abx therapy per ID recommendations   - Maintain NPO status for possible ROVERTO in AM   - Continue to monitor on telemetry and monitor for hemodynamic instability     Assessment and recommendations are final when note is signed by the attending. 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 There is trace paravalvular regurgitation (one paravalvular jet), originating at 1 o'clock and is directed laterally.  - 2/24/25 TTE shows LVEF 55-60%, TAVR valve has normal leaflet excursion and is well seated with normal function. No vegetations seen. Trace paravalvular regurgitation. However, a ROVERTO would be necessary to rule out vegetation on TAVR.  - Continue AC with Heparin gtt; Monitor PTT every 6hrs per protocol   - Continue abx therapy per ID recommendations   - Maintain NPO status for ROVERTO in AM   - Primary management per CTSx team     Assessment and recommendations are final when note is signed by the attending.

## 2025-02-25 NOTE — CONSULT NOTE ADULT - ASSESSMENT
74M with a PMHx of HTN, afib (on Eliquis), HLD, GI Bleed, CAD, CFH s/p TAVR (2023), chronic back pain and DM, initially presented to [facility] 2/10 ED with a two-day history of a rash on his left leg and pain in left knee radiating towards his foot. 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 on 2/24 for cardiac surgery evaluation to consider valve replacement. His most recent blood cultures have been negative on 2/20, currently on Daptomycin. Patient s/p common femoral endarterectomy on 2/22 for arterial plaque retrieval, gram + cocci. CTICU consulted neurosurgery of lumbar OM/discitis.    Plan:  - MRI L-spine from 2/24 reviewed- L2-L4, L5-S1 OM/discitis stable compared to 2/12   - Recommend Q4h neuro checks  - Patient s/p common femoral endarterectomy on 2/22 for arterial plaque retrieval, gram + cocci. most recent blood cultures from 2/20 are NGTD  -   74M with a PMHx of HTN, afib (on Eliquis), HLD, GI Bleed, CAD, CFH s/p TAVR (2023), chronic back pain and DM, initially presented to [facility] 2/10 ED with a two-day history of a rash on his left leg and pain in left knee radiating towards his foot. 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 His most recent blood cultures have been negative on 2/20, currently on Daptomycin. Patient s/p common femoral endarterectomy on 2/22 for arterial plaque retrieval, gram + cocci. CTICU consulted neurosurgery of lumbar OM/discitis.    Plan:  - MRI L-spine from 2/24 reviewed- L2-L4, L5-S1 OM/discitis stable compared to 2/12   - Recommend Q4h neuro checks  - Patient s/p common femoral endarterectomy on 2/22 for arterial plaque retrieval, gram + cocci. Most recent blood cultures from 2/20 are NGTD  - ID following, patient currently on Daptomycin  - Neurosurgery will continue to follow  - Further recommendations pending discussion with attending 74M with a PMHx of HTN, afib (on Eliquis), HLD, GI Bleed, CAD, CFH s/p TAVR (2023), chronic back pain and DM, initially presented to [facility] 2/10 ED with a two-day history of a rash on his left leg and pain in left knee radiating towards his foot. 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 His most recent blood cultures have been negative on 2/20, currently on Daptomycin. Patient s/p common femoral endarterectomy on 2/22 for arterial plaque retrieval, gram + cocci. CTICU consulted neurosurgery of lumbar OM/discitis.    Plan:  - MRI L-spine from 2/24 reviewed- L2-L4, L5-S1 OM/discitis stable compared to 2/12   - Recommend Q4h neuro checks  - Patient s/p common femoral endarterectomy on 2/22 for arterial plaque retrieval, gram + cocci. Most recent blood cultures from 2/20 are NGTD  - ID following, patient currently on Daptomycin  - Neurosurgery will continue to follow  - LSO brace when OOB, orthotist aware  - Discussed with Dr. Mix 74M with a PMHx of HTN, afib (on Eliquis), HLD, GI Bleed, CAD, CFH s/p TAVR (2023), chronic back pain and DM, initially presented to [facility] 2/10 ED with a two-day history of a rash on his left leg and pain in left knee radiating towards his foot. 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 His most recent blood cultures have been negative on 2/20, currently on Daptomycin. Patient s/p common femoral endarterectomy on 2/22 for arterial plaque retrieval, gram + cocci. CTICU consulted neurosurgery of lumbar OM/discitis.    Plan:  - MRI L-spine from 2/24 reviewed- L2-L4, L5-S1 OM/discitis stable compared to 2/12   - Recommend Q4h neuro checks  - Patient s/p common femoral endarterectomy on 2/22 for arterial plaque retrieval, gram + cocci. Most recent blood cultures from 2/20 are NGTD  - ID following, patient currently on Daptomycin  - No acute neurosurgical interventions recommended at this time  - Neurosurgery will continue to follow  - LSO brace when OOB, orthotist aware  - Discussed with Dr. Mix

## 2025-02-25 NOTE — PATIENT PROFILE ADULT - NSTRANSFERBELONGINGSRESP_GEN_A_NUR
To Olivia-    Patient is having burning sensation when urinating and frequency of urination.    Patient's daughter will come in and  a sterilized cup.    yes

## 2025-02-25 NOTE — CONSULT NOTE ADULT - SUBJECTIVE AND OBJECTIVE BOX
HISTORY OF PRESENT ILLNESS:   75 y/o male with a PMHx of HTN, A-FIB, HLD, GI Bleed, S/P TAVR , chronic back pain and DM initially presented to [facility] ED with a two-day history of a rash on his left leg and pain in left knee radiating towards his foot. Pt recently discharged to [facility] re[facility] after treatment for infected TAVR and Osteomyelitis of spine, strep salivarius bacteremia. Pt received transfusion and w/u for GI bleed with unremarkable colonoscopy and upper endoscopy. Underwent ROVERTO at that time which showed vegetation of TAVR. Plan at that time was for 6 weeks of IV antibiotics via PICC line and out patient pill camera study.  Since admission patient was seen by vascular surgery,  CTA showed clot to common femoral and distal embolism. S/P common femoral endarterectomy with clot retrieval and good blood flow to lower extremity post surgery. Repeat blood cultures were negative but the excised clot was found to have gram positive cocci. Pt with weakness to legs. Sent for repeat MR with contrast. Results without abscess at this time. Other incidental problem is constipation and persistent back pain requiring tramadol for pain relief.  Pt received transfusion of 2 unit of PRBC's .  Patient transfered to [facility] for evaluation of TAVR and possible surgical replacement. Condition at time of transfer is stable but guarded prognosis.  Patient had requested DNR/DNI on admission.  However he has rescinded this request until after possible cardiac surgery. Order for DNR was cancelled. (2025 00:03)    Neurosurgery was consulted for MRI imaging of L3-L4, L5-S1 osteomyelitis/discitis which appears stable from  imaging. Patient states he has been having back pain since November that has progressively worsened, and has had difficulty walking due to lower extremity weakness for approximately 1-2 months. He was transferred here to [facility]  from [facility] for CT surgery evaluation for potential valve replacement surgery on this admission pending ROVERTO. Infectious disease team is following and patient is currently on Daptomycin IC via PICC line.     PAST MEDICAL & SURGICAL HISTORY:  Hypertension  Diabetes mellitus  Obesity  Hypothyroidism  Prostate CA    REVIEW OF SYSTEMS  Negative except as noted in HPI    HOME MEDICATIONS:  acetaminophen 325 mg oral tablet: 2 tab(s) orally every 6 hours As needed Temp greater or equal to 38C (100.4F), Mild Pain (1 - 3) (2025 11:15)  atorvastatin 40 mg oral tablet: 1 tab(s) orally once a day (at bedtime) (2025 09:34)  B-Complex 50 oral tablet: 1 tab(s) orally once a day (2025 09:36)  bisacodyl 10 mg rectal suppository: 1 suppository(ies) rectally prn as needed for  constipation if no relief from MOM (2025 09:37)  DAPTOmycin 500 mg intravenous injection: 750 milligram(s) intravenous once a day x 6 weeks total (2025 11:26)  ferrous sulfate 325 mg (65 mg elemental iron) oral tablet: 1 tab(s) orally once a day (2025 11:15)  heparin 100 units/mL-D5% intravenous solution: 15 milliliter(s) intravenous every minute per wt based protocol (2025 11:26)  insulin lispro 100 units/mL injectable solution: injectable 4 times a day per sliding scale coverage (2025 11:15)  levothyroxine 150 mcg (0.15 mg) oral tablet: 1 tab(s) orally once a day (2025 09:39)  Melatonin 5 mg oral tablet: 2 tab(s) orally once a day (at bedtime) (2025 09:41)  Multiple Vitamins oral tablet: 1 tab(s) orally once a day (2025 09:41)  pantoprazole 40 mg oral delayed release tablet: 1 tab(s) orally once a day (before a meal) (2025 11:15)  polyethylene glycol 3350 oral powder for reconstitution: 17 gram(s) orally once a day (2025 11:15)  PreserVision AREDS 2 oral capsule: 1 cap(s) orally 2 times a day (2025 09:41)  senna leaf extract oral tablet: 2 tab(s) orally once a day (at bedtime) (2025 11:15)  traMADol 50 mg oral tablet: 1 tab(s) orally every 6 hours As needed Severe Pain (7 - 10) (2025 11:15)  zolpidem 5 mg oral tablet: 1 tab(s) orally once a day (at bedtime) As needed Insomnia (2025 11:15)    MEDICATIONS:  Antibiotics:  DAPTOmycin IVPB 750 milliGRAM(s) IV Intermittent every 24 hours    Neuro:  acetaminophen     Tablet .. 650 milliGRAM(s) Oral every 6 hours  melatonin 5 milliGRAM(s) Oral at bedtime  traMADol 50 milliGRAM(s) Oral every 6 hours PRN  traMADol 25 milliGRAM(s) Oral every 6 hours PRN    Anticoagulation:  heparin   Injectable 5000 Unit(s) SubCutaneous every 8 hours    OTHER:  atorvastatin 40 milliGRAM(s) Oral at bedtime  bisacodyl Suppository 10 milliGRAM(s) Rectal daily PRN  dextrose 50% Injectable 25 Gram(s) IV Push once  dextrose 50% Injectable 12.5 Gram(s) IV Push once  dextrose 50% Injectable 25 Gram(s) IV Push once  dextrose Oral Gel 15 Gram(s) Oral once PRN  glucagon  Injectable 1 milliGRAM(s) IntraMuscular once  insulin lispro (ADMELOG) corrective regimen sliding scale   SubCutaneous at bedtime  insulin lispro (ADMELOG) corrective regimen sliding scale   SubCutaneous three times a day before meals  levothyroxine 150 MICROGram(s) Oral daily  pantoprazole    Tablet 40 milliGRAM(s) Oral before breakfast  polyethylene glycol 3350 17 Gram(s) Oral daily  senna 2 Tablet(s) Oral at bedtime    IVF:  dextrose 5%. 1000 milliLiter(s) IV Continuous <Continuous>  dextrose 5%. 1000 milliLiter(s) IV Continuous <Continuous>  ferrous    sulfate 325 milliGRAM(s) Oral daily  multivitamin 1 Tablet(s) Oral daily  sodium chloride 0.9% lock flush 3 milliLiter(s) IV Push every 8 hours    Vital Signs Last 24 Hrs  T(C): 37.1 (2025 08:00), Max: 37.5 (2025 18:04)  T(F): 98.7 (2025 08:00), Max: 99.5 (2025 18:04)  HR: 82 (2025 09:00) (81 - 100)  BP: 128/65 (2025 09:00) (112/64 - 134/69)  BP(mean): 84 (2025 09:00) (74 - 103)  RR: 19 (2025 09:00) (15 - 28)  SpO2: 91% (2025 09:00) (89% - 97%)    Parameters below as of 2025 09:00  Patient On (Oxygen Delivery Method): nasal cannula  O2 Flow (L/min): 3    PHYSICAL EXAM:  GENERAL: NAD  HEAD: Atraumatic, normocephalic  ENMT: No tonsillar erythema, exudates, or enlargement; moist mucous membranes, good dentition, no lesions  NECK: Supple, nontender to palpation  MENTAL STATUS: AAOx3; awake; opens eyes spontaneously; appropriately conversant without aphasia; following simple commands  CRANIAL NERVES: PERRL. EOMI. Face grossly symmetric w/ normal eye closure and smile, tongue midline. Hearing grossly intact. Speech clear.  REFLEXES: + Clonus b/l  MOTOR: B/l UE 5/5 throughout, RLE: HF 3/5, KE/KF 5/5, DF/PF 5/5; LLE: HF 2/5, KE/KF 5/5, DF/PF 5/5  SENSATION: + decreased sensation to left toes otherwise grossly intact to light touch all extremities  CHEST/LUNG: Non-labored breathing on NC 3L  SKIN: Warm, dry    LABS:                        7.1    10.01 )-----------( 340      ( 2025 04:00 )             22.1     02-25    133[L]  |  99  |  25.6[H]  ----------------------------<  98  4.0   |  24.0  |  1.02    Ca    8.1[L]      2025 02:30  Phos  2.0     02-24  Mg     2.0     02-25    TPro  5.8[L]  /  Alb  2.5[L]  /  TBili  0.4  /  DBili  x   /  AST  47[H]  /  ALT  56[H]  /  AlkPhos  152[H]  02-25    PT/INR - ( 2025 02:30 )   PT: 14.0 sec;   INR: 1.21 ratio    PTT - ( 2025 04:00 )  PTT:55.5 sec    Urinalysis Basic - ( 2025 09:20 )  Color: Yellow / Appearance: Cloudy / S.017 / pH: x  Gluc: x / Ketone: Negative mg/dL  / Bili: Negative / Urobili: 1.0 mg/dL   Blood: x / Protein: 30 mg/dL / Nitrite: Negative   Leuk Esterase: Negative / RBC: 33 /HPF / WBC 1 /HPF   Sq Epi: x / Non Sq Epi: 3 /HPF / Bacteria: Negative /HPF    CULTURES:  No growth to date *!* ( @ 07:42)  Culture Results:   Testing in progress ( @ 07:42)    RADIOLOGY & ADDITIONAL STUDIES:  Xray Chest 1 View- PORTABLE-Urgent (25 @ 22:30)  IMPRESSION:  Clear lungs.    MR Lumbar Spine w/wo IV Cont (25 @ 09:45)  IM                                                                                                                                                                                                                                                                                                                                                                                                                                                             HISTORY OF PRESENT ILLNESS:   75 y/o male with a PMHx of HTN, A-FIB, HLD, GI Bleed, S/P TAVR , chronic back pain and DM initially presented to [facility] ED with a two-day history of a rash on his left leg and pain in left knee radiating towards his foot. Pt recently discharged to SNF rehab after treatment for infected TAVR and Osteomyelitis of spine, strep salivarius bacteremia. Pt received transfusion and w/u for GI bleed with unremarkable colonoscopy and upper endoscopy. Underwent ROVERTO at that time which showed vegetation of TAVR. Plan at that time was for 6 weeks of IV antibiotics via PICC line and out patient pill camera study.  Since admission patient was seen by vascular surgery,  CTA showed clot to common femoral and distal embolism. S/P common femoral endarterectomy with clot retrieval and good blood flow to lower extremity post surgery. Repeat blood cultures were negative but the excised clot was found to have gram positive cocci. Pt with weakness to legs. Sent for repeat MR with contrast. Results without abscess at this time. Other incidental problem is constipation and persistent back pain requiring tramadol for pain relief.  Pt received transfusion of 2 unit of PRBC's .  Patient transferred to [facility] for evaluation of TAVR and possible surgical replacement. Condition at time of transfer is stable but guarded prognosis.  Patient had requested DNR/DNI on admission.  However he has rescinded this request until after possible cardiac surgery. Order for DNR was cancelled. (2025 00:03)    Neurosurgery was consulted for MRI imaging of L3-L4, L5-S1 osteomyelitis/discitis which appears stable from  imaging. Patient states he has been having back pain since November that has progressively worsened, and has had difficulty walking due to lower extremity weakness for approximately 1-2 months. He was transferred here to [facility]  from [facility] for CT surgery evaluation for potential valve replacement surgery on this admission pending ROVERTO. Infectious disease team is following and patient is currently on Daptomycin IC via PICC line.     PAST MEDICAL & SURGICAL HISTORY:  Hypertension  Diabetes mellitus  Obesity  Hypothyroidism  Prostate CA    REVIEW OF SYSTEMS  Negative except as noted in HPI    HOME MEDICATIONS:  acetaminophen 325 mg oral tablet: 2 tab(s) orally every 6 hours As needed Temp greater or equal to 38C (100.4F), Mild Pain (1 - 3) (2025 11:15)  atorvastatin 40 mg oral tablet: 1 tab(s) orally once a day (at bedtime) (2025 09:34)  B-Complex 50 oral tablet: 1 tab(s) orally once a day (2025 09:36)  bisacodyl 10 mg rectal suppository: 1 suppository(ies) rectally prn as needed for  constipation if no relief from MOM (2025 09:37)  DAPTOmycin 500 mg intravenous injection: 750 milligram(s) intravenous once a day x 6 weeks total (2025 11:26)  ferrous sulfate 325 mg (65 mg elemental iron) oral tablet: 1 tab(s) orally once a day (2025 11:15)  heparin 100 units/mL-D5% intravenous solution: 15 milliliter(s) intravenous every minute per wt based protocol (2025 11:26)  insulin lispro 100 units/mL injectable solution: injectable 4 times a day per sliding scale coverage (2025 11:15)  levothyroxine 150 mcg (0.15 mg) oral tablet: 1 tab(s) orally once a day (2025 09:39)  Melatonin 5 mg oral tablet: 2 tab(s) orally once a day (at bedtime) (2025 09:41)  Multiple Vitamins oral tablet: 1 tab(s) orally once a day (2025 09:41)  pantoprazole 40 mg oral delayed release tablet: 1 tab(s) orally once a day (before a meal) (2025 11:15)  polyethylene glycol 3350 oral powder for reconstitution: 17 gram(s) orally once a day (2025 11:15)  PreserVision AREDS 2 oral capsule: 1 cap(s) orally 2 times a day (2025 09:41)  senna leaf extract oral tablet: 2 tab(s) orally once a day (at bedtime) (2025 11:15)  traMADol 50 mg oral tablet: 1 tab(s) orally every 6 hours As needed Severe Pain (7 - 10) (2025 11:15)  zolpidem 5 mg oral tablet: 1 tab(s) orally once a day (at bedtime) As needed Insomnia (2025 11:15)    MEDICATIONS:  Antibiotics:  DAPTOmycin IVPB 750 milliGRAM(s) IV Intermittent every 24 hours    Neuro:  acetaminophen     Tablet .. 650 milliGRAM(s) Oral every 6 hours  melatonin 5 milliGRAM(s) Oral at bedtime  traMADol 50 milliGRAM(s) Oral every 6 hours PRN  traMADol 25 milliGRAM(s) Oral every 6 hours PRN    Anticoagulation:  heparin   Injectable 5000 Unit(s) SubCutaneous every 8 hours    OTHER:  atorvastatin 40 milliGRAM(s) Oral at bedtime  bisacodyl Suppository 10 milliGRAM(s) Rectal daily PRN  dextrose 50% Injectable 25 Gram(s) IV Push once  dextrose 50% Injectable 12.5 Gram(s) IV Push once  dextrose 50% Injectable 25 Gram(s) IV Push once  dextrose Oral Gel 15 Gram(s) Oral once PRN  glucagon  Injectable 1 milliGRAM(s) IntraMuscular once  insulin lispro (ADMELOG) corrective regimen sliding scale   SubCutaneous at bedtime  insulin lispro (ADMELOG) corrective regimen sliding scale   SubCutaneous three times a day before meals  levothyroxine 150 MICROGram(s) Oral daily  pantoprazole    Tablet 40 milliGRAM(s) Oral before breakfast  polyethylene glycol 3350 17 Gram(s) Oral daily  senna 2 Tablet(s) Oral at bedtime    IVF:  dextrose 5%. 1000 milliLiter(s) IV Continuous <Continuous>  dextrose 5%. 1000 milliLiter(s) IV Continuous <Continuous>  ferrous    sulfate 325 milliGRAM(s) Oral daily  multivitamin 1 Tablet(s) Oral daily  sodium chloride 0.9% lock flush 3 milliLiter(s) IV Push every 8 hours    Vital Signs Last 24 Hrs  T(C): 37.1 (2025 08:00), Max: 37.5 (2025 18:04)  T(F): 98.7 (2025 08:00), Max: 99.5 (2025 18:04)  HR: 82 (2025 09:00) (81 - 100)  BP: 128/65 (2025 09:00) (112/64 - 134/69)  BP(mean): 84 (2025 09:00) (74 - 103)  RR: 19 (2025 09:00) (15 - 28)  SpO2: 91% (2025 09:00) (89% - 97%)    Parameters below as of 2025 09:00  Patient On (Oxygen Delivery Method): nasal cannula  O2 Flow (L/min): 3    PHYSICAL EXAM:  GENERAL: NAD  HEAD: Atraumatic, normocephalic  ENMT: No tonsillar erythema, exudates, or enlargement; moist mucous membranes, good dentition, no lesions  NECK: Supple, nontender to palpation  MENTAL STATUS: AAOx3; awake; opens eyes spontaneously; appropriately conversant without aphasia; following simple commands  CRANIAL NERVES: PERRL. EOMI. Face grossly symmetric w/ normal eye closure and smile, tongue midline. Hearing grossly intact. Speech clear.  REFLEXES: + Clonus b/l  MOTOR: B/l UE 5/5 throughout, RLE: HF 3/5, KE/KF 5/5, DF/PF 5/5; LLE: HF 2/5, KE/KF 5/5, DF/PF 5/5  SENSATION: + decreased sensation to left toes otherwise grossly intact to light touch all extremities  CHEST/LUNG: Non-labored breathing on NC 3L  SKIN: Warm, dry    LABS:                        7.1    10.01 )-----------( 340      ( 2025 04:00 )             22.1     02-25    133[L]  |  99  |  25.6[H]  ----------------------------<  98  4.0   |  24.0  |  1.02    Ca    8.1[L]      2025 02:30  Phos  2.0     02-24  Mg     2.0     02-25    TPro  5.8[L]  /  Alb  2.5[L]  /  TBili  0.4  /  DBili  x   /  AST  47[H]  /  ALT  56[H]  /  AlkPhos  152[H]  02-25    PT/INR - ( 2025 02:30 )   PT: 14.0 sec;   INR: 1.21 ratio    PTT - ( 2025 04:00 )  PTT:55.5 sec    Urinalysis Basic - ( 2025 09:20 )  Color: Yellow / Appearance: Cloudy / S.017 / pH: x  Gluc: x / Ketone: Negative mg/dL  / Bili: Negative / Urobili: 1.0 mg/dL   Blood: x / Protein: 30 mg/dL / Nitrite: Negative   Leuk Esterase: Negative / RBC: 33 /HPF / WBC 1 /HPF   Sq Epi: x / Non Sq Epi: 3 /HPF / Bacteria: Negative /HPF    CULTURES:  No growth to date *!* ( @ 07:42)  Culture Results:   Testing in progress ( @ 07:42)    RADIOLOGY & ADDITIONAL STUDIES:  Xray Chest 1 View- PORTABLE-Urgent (25 @ 22:30)  IMPRESSION:   Clear lungs    MR Lumbar Spine w/wo IV Cont (25 @ 09:45)  IMPRESSION:  1. L3-L4 suspected septic discitis appears largely similar to 2025 noting mildly increased fluid within the disc space and increased left paraspinal infiltration and enhancement. No abscess collection has developed  2. L5-S1 suspected discitis appears largely similar to 2025, noting decreased enhancement in the left ventral epidural space. No abscess collection has developed  3. Recommend continued imaging follow-up    MR Lumbar Spine w/wo IV Cont (25 @ 18:01)  IMPRESSION:  Degenerative changes with severe spinal stenosis L4-5. Enhancement with   abnormal signal at the L3-4 and L5-S1 disc space is likely related to   osteomyelitis in view of the history of bacteremia. Small left paraspinal   phlegmonous collection L3-4. Mild dural enhancement L5-S1.    TTE W or WO Ultrasound Enhancing Agent (25 @ 21:10)  CONCLUSIONS:     1. Left ventricular cavity is normal in size. Left ventricular systolic function is normal with an ejection fraction visually estimated at 55 to 60 %.   2. There is mild (grade 1) left ventricular diastolic dysfunction.   3. Normal right ventricular cavity size and normal right ventricular systolic function.   4. Left atrium is mildly dilated.   5. The right atrium is normal in size.   6. Thickened mitral valve leaflets.   7. There is mild calcification of the mitral valve annulus.   8. Trace mitral regurgitation.   9. A Norman TAVR (TAVR) valve replacement is present in the aortic position The prosthetic valve has normal leaflet excursion and is well seated with normal function The prosthetic aortic valve No vegetations seen.. Trace paravalvular regurgitation.  10. A ROVERTO would be necessary to rule out vegetation on TAVR.  11. No pericardial effusion seen.  12. Pulmonary artery systolic pressure could not be estimated.  13. Small right pleural effusion noted.    ROVERTO W or WO Ultrasound Enhancing Agent (25 @ 07:34)  CONCLUSIONS:      1. Left ventricular systolic function is normal.   2. Normal biventricular systolic function.   3. Normal left and right atrial size.   4. No thrombus seen in the left atrial, left atrial appendage, right atrial or right atrial appendage.   5. No evidence of an intracardiac shunt.   6. Structurally normal mitral valve with normal leaflet excursion.   7. No pericardial effusion seen.   8. There is no significant plaque seen in the visualized portions of the descending aorta and aortic arch.   9. Agitated saline injection was negative for intracardiac shunt.  10. No evidence of left atrial or left atrial appendage thrombus.  11. No evidence of tricuspid vegetation.  12. There is no evidence of any mitral valve vegetations.

## 2025-02-25 NOTE — H&P ADULT - ASSESSMENT
Patient is a 74y old  Male who presents with a chief complaint of     HPI:      Allergies    No Known Allergies    Intolerances      Home Medications:  acetaminophen 325 mg oral tablet: 2 tab(s) orally every 6 hours As needed Temp greater or equal to 38C (100.4F), Mild Pain (1 - 3) (24 Feb 2025 11:15)  atorvastatin 40 mg oral tablet: 1 tab(s) orally once a day (at bedtime) (21 Feb 2025 09:34)  B-Complex 50 oral tablet: 1 tab(s) orally once a day (21 Feb 2025 09:36)  bisacodyl 10 mg rectal suppository: 1 suppository(ies) rectally prn as needed for  constipation if no relief from MOM (21 Feb 2025 09:37)  DAPTOmycin 500 mg intravenous injection: 750 milligram(s) intravenous once a day x 6 weeks total (24 Feb 2025 11:26)  ferrous sulfate 325 mg (65 mg elemental iron) oral tablet: 1 tab(s) orally once a day (24 Feb 2025 11:15)  heparin 100 units/mL-D5% intravenous solution: 15 milliliter(s) intravenous every minute per wt based protocol (24 Feb 2025 11:26)  insulin lispro 100 units/mL injectable solution: injectable 4 times a day per sliding scale coverage (24 Feb 2025 11:15)  levothyroxine 150 mcg (0.15 mg) oral tablet: 1 tab(s) orally once a day (21 Feb 2025 09:39)  Melatonin 5 mg oral tablet: 2 tab(s) orally once a day (at bedtime) (21 Feb 2025 09:41)  Multiple Vitamins oral tablet: 1 tab(s) orally once a day (21 Feb 2025 09:41)  pantoprazole 40 mg oral delayed release tablet: 1 tab(s) orally once a day (before a meal) (24 Feb 2025 11:15)  polyethylene glycol 3350 oral powder for reconstitution: 17 gram(s) orally once a day (24 Feb 2025 11:15)  PreserVision AREDS 2 oral capsule: 1 cap(s) orally 2 times a day (21 Feb 2025 09:41)  senna leaf extract oral tablet: 2 tab(s) orally once a day (at bedtime) (24 Feb 2025 11:15)  traMADol 50 mg oral tablet: 1 tab(s) orally every 6 hours As needed Severe Pain (7 - 10) (24 Feb 2025 11:15)  zolpidem 5 mg oral tablet: 1 tab(s) orally once a day (at bedtime) As needed Insomnia (24 Feb 2025 11:15)      SOCIAL HX:     Smoking : [  ] Current daily  [  ] former  [  ] never         ETOH/Illicit drugs: [  ] denies  [  ] current use :           Occupation:     PAST MEDICAL & SURGICAL HISTORY:  Hypertension      Diabetes mellitus      Obesity      Hypothyroidism      Prostate CA          FAMILY HISTORY:  :    No known cardiovascular or pulmonary family history         ICU Vital Signs Last 24 Hrs  T(C): 37.2 (25 Feb 2025 00:00), Max: 37.5 (24 Feb 2025 18:04)  T(F): 99 (25 Feb 2025 00:00), Max: 99.5 (24 Feb 2025 18:04)  HR: 94 (24 Feb 2025 22:00) (69 - 100)  BP: 124/68 (24 Feb 2025 22:00) (97/54 - 130/73)  BP(mean): 83 (24 Feb 2025 22:00) (68 - 91)  ABP: --  ABP(mean): --  RR: 28 (24 Feb 2025 22:00) (16 - 28)  SpO2: 97% (24 Feb 2025 22:00) (92% - 98%)    O2 Parameters below as of 24 Feb 2025 21:00  Patient On (Oxygen Delivery Method): room air            General: Not in distress  HEENT:  SHIVANI              Lymphatic system: No LN  Lungs: Bilateral BS  Cardiovascular: Regular  Gastrointestinal: Soft, Positive BS  Musculoskeletal: No clubbing.  Moves all extremities.    Skin: Warm.  Intact  Neurological: No motor or sensory deficit       02-24-25 @ 07:01  -  02-25-25 @ 00:33  --------------------------------------------------------  IN:    Heparin Infusion: 51 mL    Oral Fluid: 100 mL  Total IN: 151 mL    OUT:    Voided (mL): 200 mL  Total OUT: 200 mL    Total NET: -49 mL          LABS:                          7.9    11.31 )-----------( 394      ( 24 Feb 2025 20:30 )             25.2      02-24    133[L]  |  99  |  28.3[H]  ----------------------------<  111[H]  4.1   |  24.0  |  0.94    Ca    8.1[L]      24 Feb 2025 20:30  Phos  2.0     02-24  Mg     2.4     02-24    TPro  6.1[L]  /  Alb  2.5[L]  /  TBili  0.3[L]  /  DBili  x   /  AST  60[H]  /  ALT  65[H]  /  AlkPhos  177[H]  02-24      PT/INR - ( 24 Feb 2025 20:30 )   PT: 13.1 sec;   INR: 1.16 ratio         PTT - ( 24 Feb 2025 20:30 )  PTT:35.3 sec     LIVER FUNCTIONS - ( 24 Feb 2025 20:30 )  Alb: 2.5 g/dL / Pro: 6.1 g/dL / ALK PHOS: 177 U/L / ALT: 65 U/L / AST: 60 U/L / GGT: x                                                                                                                                Culture - Fungal, Other (collected 22 Feb 2025 07:42)  Source: .Other Left femoral plaque  Preliminary Report (23 Feb 2025 11:28):    Testing in progress    Culture - Acid Fast - Tissue w/Smear (collected 22 Feb 2025 07:42)  Source: Tissue Left femoral plaque    Culture - Tissue with Gram Stain (collected 22 Feb 2025 07:42)  Source: Tissue Left femoral plaque  Gram Stain (22 Feb 2025 19:34):    Few polymorphonuclear leukocytes per low power field    Moderate Gram Positive Cocci in Pairs and Chains per oil power field  Preliminary Report (23 Feb 2025 14:06):    No growth to date        Urinalysis Basic - ( 24 Feb 2025 20:30 )    Color: x / Appearance: x / SG: x / pH: x  Gluc: 111 mg/dL / Ketone: x  / Bili: x / Urobili: x   Blood: x / Protein: x / Nitrite: x   Leuk Esterase: x / RBC: x / WBC x   Sq Epi: x / Non Sq Epi: x / Bacteria: x                                                     CXR:    ECHO: < from: TTE W or WO Ultrasound Enhancing Agent (02.24.25 @ 21:10) >  CONCLUSIONS:      1. Left ventricular cavity is normal in size. Left ventricular systolic function is normal with an ejection fraction visually estimated at 55 to 60 %.   2. There is mild (grade 1) left ventricular diastolic dysfunction.   3. Normal right ventricular cavity size and normal right ventricular systolic function.   4. Left atrium is mildly dilated.   5. The right atrium is normal in size.   6. Thickened mitral valve leaflets.   7. There is mild calcification of the mitral valve annulus.   8. Trace mitral regurgitation.   9. A Norman TAVR (TAVR) valve replacement is present in the aortic position The prosthetic valve has normal leaflet excursion and is well seated with normal function The prosthetic aortic valve No vegetations seen.. Trace paravalvular regurgitation.  10. A TEEwould be necessary to rule out vegetation on TAVR.  11. No pericardial effusion seen.  12. Pulmonary artery systolic pressure could not be estimated.  13. Small right pleural effusion noted.          MEDICATIONS  (STANDING):  acetaminophen     Tablet .. 650 milliGRAM(s) Oral every 6 hours  atorvastatin 40 milliGRAM(s) Oral at bedtime  DAPTOmycin IVPB 750 milliGRAM(s) IV Intermittent every 24 hours  dextrose 5%. 1000 milliLiter(s) (100 mL/Hr) IV Continuous <Continuous>  dextrose 5%. 1000 milliLiter(s) (50 mL/Hr) IV Continuous <Continuous>  dextrose 50% Injectable 25 Gram(s) IV Push once  dextrose 50% Injectable 12.5 Gram(s) IV Push once  dextrose 50% Injectable 25 Gram(s) IV Push once  ferrous    sulfate 325 milliGRAM(s) Oral daily  glucagon  Injectable 1 milliGRAM(s) IntraMuscular once  heparin  Infusion. 1700 Unit(s)/Hr (17 mL/Hr) IV Continuous <Continuous>  insulin lispro (ADMELOG) corrective regimen sliding scale   SubCutaneous at bedtime  insulin lispro (ADMELOG) corrective regimen sliding scale   SubCutaneous three times a day before meals  levothyroxine 150 MICROGram(s) Oral daily  melatonin 5 milliGRAM(s) Oral at bedtime  multivitamin 1 Tablet(s) Oral daily  pantoprazole    Tablet 40 milliGRAM(s) Oral before breakfast  polyethylene glycol 3350 17 Gram(s) Oral daily  senna 2 Tablet(s) Oral at bedtime  sodium chloride 0.9% lock flush 3 milliLiter(s) IV Push every 8 hours    MEDICATIONS  (PRN):  bisacodyl Suppository 10 milliGRAM(s) Rectal daily PRN Constipation  dextrose Oral Gel 15 Gram(s) Oral once PRN Blood Glucose LESS THAN 70 milliGRAM(s)/deciliter  heparin   Injectable 8000 Unit(s) IV Push every 6 hours PRN For aPTT less than 40  heparin   Injectable 4000 Unit(s) IV Push every 6 hours PRN For aPTT between 40 - 57  traMADol 50 milliGRAM(s) Oral every 6 hours PRN Severe Pain (7 - 10)  traMADol 25 milliGRAM(s) Oral every 6 hours PRN Moderate Pain (4 - 6)      ASSESSMENT :   ==============        PLAN:  ========    NEURO:  - AOx4  - Paint controlled with Tylenol, Tramadol  - melatonin qhs as needed  -neuro checks per ICU protocol      -acetaminophen     Tablet .. 650 milliGRAM(s)  melatonin 5 milliGRAM(s)  traMADol 50 milliGRAM(s) PRN  traMADol 25 milliGRAM(s) PRN      PULM:         - No active issues  - maintain PaO2 >92    CV:  #A-fib  #Bioprosthetic endocarditis  - S/P TAVR in 2023  - ROVERTO done 2/13 showed the prosthetic valve has reduced leaflet opening and is well seated. Echo findings are consistent with a vegetation of the aortic prosthesis. There is trace paravalvular regurgitation (one paravalvular jet), originating at 1 o'clock and is directed laterally.  - CTA showed clot to common femoral and distal embolism. S/P common femoral endarterectomy with clot retrieval and good blood flow to lower extremity post surgery. Foot appearance improving. Repeat blood cultures were negative but the excised clot was found to have gram positive cocci.  - Started heparin drip  - Plan for ROVERTO    GI:  #Transaminitis  # Constipation  - refers last bowel movement was two weeks ago  - started bowel regimen with Senna, and Miralax  - npo for now  - Mild transaminitis, will monitor LFTs      bisacodyl Suppository 10 milliGRAM(s) PRN  pantoprazole    Tablet 40 milliGRAM(s)  polyethylene glycol 3350 17 Gram(s)  senna 2 Tablet(s)    dextrose 5%. 1000 milliLiter(s) IV Continuous  dextrose 5%. 1000 milliLiter(s) IV Continuous  ferrous    sulfate 325 milliGRAM(s) Oral  multivitamin 1 Tablet(s) Oral  sodium chloride 0.9% lock flush 3 milliLiter(s) IV Push      RENAL:  -maintain urine output > 0.5cc/kg/hr     :  LEO yes [  ] NO [ x ]     ID:  # Bacteremia  # Bacterial Endocarditis  # Osteomyelitis  - Repeat blood cultures were negative but the excised clot was found to have gram positive cocci.  - Started on Daptomycin    DAPTOmycin IVPB 750 milliGRAM(s)      ENDO:  atorvastatin 40 milliGRAM(s)  dextrose 50% Injectable 25 Gram(s)  dextrose 50% Injectable 12.5 Gram(s)  dextrose 50% Injectable 25 Gram(s)  glucagon  Injectable 1 milliGRAM(s)  insulin lispro (ADMELOG) corrective regimen sliding scale    insulin lispro (ADMELOG) corrective regimen sliding scale    levothyroxine 150 MICROGram(s)      HEME:  - Received 2 units PRBCs at     heparin   Injectable 8000 Unit(s) PRN  heparin   Injectable 4000 Unit(s) PRN  heparin  Infusion. 1700 Unit(s)/Hr    IMPROVE VTE Individual Risk Assessment    RISK                                                                Points    [  ] Previous VTE                                                  3    [  ] Thrombophilia                                               2    [  ] Lower limb paralysis                                      2        (unable to hold up >15 seconds)      [  ] Current Cancer                                              2         (within 6 months)    [  ] Immobilization > 24 hrs                                1    [  ] ICU/CCU stay > 24 hours                              1    [  ] Age > 60                                                      1    IMPROVE VTE Score _________    IMPROVE Score 0-1: Low Risk, No VTE prophylaxis required for most patients, encourage ambulation.   IMPROVE Score 2-3: At risk, pharmacologic VTE prophylaxis is indicated for most patients (in the absence of a contraindication)  IMPROVE Score > or = 4: High Risk, pharmacologic VTE prophylaxis is indicated for most patients (in the absence of a contraindication)    MISC:     Goals of Care:   Advanced Directives :          73 y/o male with a PMHx of HTN, A-FIB, HLD, GI Bleed, S/P TAVR 2023, chronic back pain and DM initially presented to [facility] ED with a two-day history of a rash on his left leg and pain in left knee radiating towards his foot. Pt recently discharged to SNF rehab after treatment for infected TAVR and Osteomyelitis of spine, strep salivarius bacteremia. Pt received transfusion and w/u for GI bleed with unremarkable colonoscopy and upper endoscopy. Underwent ROVERTO at that time which showed vegetation of TAVR. Plan at that time was for 6 weeks of IV antibiotics via PICC line and out patient pill camera study.    Since admission patient was seen by vascular surgery,  CTA showed clot to common femoral and distal embolism. S/P common femoral endarterectomy with clot retrieval and good blood flow to lower extremity post surgery. Repeat blood cultures were negative but the excised clot was found to have gram positive cocci. Pt with weakness to legs. Sent for repeat MR with contrast. Results without abscess at this time. Other incidental problem is constipation and persistent back pain requiring tramadol for pain relief.  Pt received transfusion of 2 unit of PRBC's 2/23.    Patient transfered to CTICU for evaluation of TAVR and possible surgical replacement. Condition at time of transfer is stable but guarded prognosis.    Patient had requested DNR/DNI on admission.  However he has rescinded this request until after possible cardiac surgery. Order for DNR was cancelled.

## 2025-02-25 NOTE — PHARMACOTHERAPY INTERVENTION NOTE - COMMENTS
Medication reconciliation completed by referencing [facility] and facility paperwork. Updated patient's OMR.  Please note, patient takes the following:     HOME MEDICATIONS:  acetaminophen 325 mg oral tablet: 2 tab(s) orally every 6 hours As needed Temp greater or equal to 38C (100.4F), Mild Pain (1 - 3) (25 Feb 2025 15:04)  atorvastatin 40 mg oral tablet: 1 tab(s) orally once a day (at bedtime) (25 Feb 2025 15:04)  B-Complex 50 oral tablet: 1 tab(s) orally once a day (25 Feb 2025 15:04)  bisacodyl 10 mg rectal suppository: 1 suppository(ies) rectally prn as needed for  constipation if no relief from MOM (25 Feb 2025 15:04)  cholecalciferol 25 mcg (1000 intl units) oral tablet: 1 tab(s) orally once a day (25 Feb 2025 15:10)  Chondroitin-Glucosamine: 1 tab(s) orally once a day (25 Feb 2025 15:10)  Co-Q10 100 mg oral capsule: 1 cap(s) orally once a day (25 Feb 2025 15:10)  cyanocobalamin 1000 mcg oral tablet: 1 tab(s) orally once a day (25 Feb 2025 15:10)  dapagliflozin 10 mg oral tablet: 1 tab(s) orally once a day (25 Feb 2025 15:04)  ferrous sulfate 325 mg (65 mg elemental iron) oral delayed release tablet: 1 tab(s) orally once a day (25 Feb 2025 15:10)  finasteride 1 mg oral tablet: 1 tab(s) orally once a day (25 Feb 2025 15:10)  Januvia 50 mg oral tablet: 1 tab(s) orally once a day (25 Feb 2025 15:10)  levothyroxine 150 mcg (0.15 mg) oral tablet: 1 tab(s) orally once a day (25 Feb 2025 15:04)  Melatonin 5 mg oral tablet: 2 tab(s) orally once a day (at bedtime) (25 Feb 2025 15:04)  MetFORMIN 500 mg oral tablet, extended release: 4 tab(s) orally once a day in the morning (25 Feb 2025 15:09)  Multiple Vitamins oral tablet: 1 tab(s) orally once a day (25 Feb 2025 15:04)  pantoprazole 40 mg oral delayed release tablet: 1 tab(s) orally once a day (before a meal) (25 Feb 2025 15:04)  polyethylene glycol 3350 oral powder for reconstitution: 17 gram(s) orally once a day (25 Feb 2025 15:04)  PreserVision AREDS 2 oral capsule: 1 cap(s) orally 2 times a day (25 Feb 2025 15:04)  senna leaf extract oral tablet: 2 tab(s) orally once a day (at bedtime) (25 Feb 2025 15:04)  traMADol 50 mg oral tablet: 1 tab(s) orally every 6 hours As needed Severe Pain (7 - 10) (25 Feb 2025 15:10)  zolpidem 10 mg oral tablet: 1 tab(s) orally once a day (at bedtime) as needed for  insomnia (25 Feb 2025 15:04)

## 2025-02-25 NOTE — H&P ADULT - NSHPLABSRESULTS_GEN_ALL_CORE
02-24                          7.9    11.31 )-----------( 394      ( 24 Feb 2025 20:30 )             25.2       133[L]  |  99  |  28.3[H]  ----------------------------<  111[H]  4.1   |  24.0  |  0.94    Ca    8.1[L]      24 Feb 2025 20:30  Phos  2.0     02-24  Mg     2.4     02-24    TPro  6.1[L]  /  Alb  2.5[L]  /  TBili  0.3[L]  /  DBili  x   /  AST  60[H]  /  ALT  65[H]  /  AlkPhos  177[H]  02-24    Urinalysis Basic - ( 24 Feb 2025 20:30 )    Color: x / Appearance: x / SG: x / pH: x  Gluc: 111 mg/dL / Ketone: x  / Bili: x / Urobili: x   Blood: x / Protein: x / Nitrite: x   Leuk Esterase: x / RBC: x / WBC x   Sq Epi: x / Non Sq Epi: x / Bacteria: x    PT/INR - ( 24 Feb 2025 20:30 )   PT: 13.1 sec;   INR: 1.16 ratio         PTT - ( 24 Feb 2025 20:30 )  PTT:35.3 sec 02-24                          7.9    11.31 )-----------( 394      ( 24 Feb 2025 20:30 )             25.2       133[L]  |  99  |  28.3[H]  ----------------------------<  111[H]  4.1   |  24.0  |  0.94    Ca    8.1[L]      24 Feb 2025 20:30  Phos  2.0     02-24  Mg     2.4     02-24    TPro  6.1[L]  /  Alb  2.5[L]  /  TBili  0.3[L]  /  DBili  x   /  AST  60[H]  /  ALT  65[H]  /  AlkPhos  177[H]  02-24    Urinalysis Basic - ( 24 Feb 2025 20:30 )    ECHO: < from: TTE W or WO Ultrasound Enhancing Agent (02.24.25 @ 21:10) >  CONCLUSIONS:      1. Left ventricular cavity is normal in size. Left ventricular systolic function is normal with an ejection fraction visually estimated at 55 to 60 %.   2. There is mild (grade 1) left ventricular diastolic dysfunction.   3. Normal right ventricular cavity size and normal right ventricular systolic function.   4. Left atrium is mildly dilated.   5. The right atrium is normal in size.   6. Thickened mitral valve leaflets.   7. There is mild calcification of the mitral valve annulus.   8. Trace mitral regurgitation.   9. A Norman TAVR (TAVR) valve replacement is present in the aortic position The prosthetic valve has normal leaflet excursion and is well seated with normal function The prosthetic aortic valve No vegetations seen.. Trace paravalvular regurgitation.  10. A TEEwould be necessary to rule out vegetation on TAVR.  11. No pericardial effusion seen.  12. Pulmonary artery systolic pressure could not be estimated.  13. Small right pleural effusion noted.  Color: x / Appearance: x / SG: x / pH: x  Gluc: 111 mg/dL / Ketone: x  / Bili: x / Urobili: x   Blood: x / Protein: x / Nitrite: x   Leuk Esterase: x / RBC: x / WBC x   Sq Epi: x / Non Sq Epi: x / Bacteria: x    PT/INR - ( 24 Feb 2025 20:30 )   PT: 13.1 sec;   INR: 1.16 ratio         PTT - ( 24 Feb 2025 20:30 )  PTT:35.3 sec

## 2025-02-26 ENCOUNTER — RESULT REVIEW (OUTPATIENT)
Age: 75
End: 2025-02-26

## 2025-02-26 LAB
ANION GAP SERPL CALC-SCNC: 11 MMOL/L — SIGNIFICANT CHANGE UP (ref 5–17)
BUN SERPL-MCNC: 20.2 MG/DL — HIGH (ref 8–20)
CALCIUM SERPL-MCNC: 8.6 MG/DL — SIGNIFICANT CHANGE UP (ref 8.4–10.5)
CHLORIDE SERPL-SCNC: 101 MMOL/L — SIGNIFICANT CHANGE UP (ref 96–108)
CO2 SERPL-SCNC: 22 MMOL/L — SIGNIFICANT CHANGE UP (ref 22–29)
CREAT SERPL-MCNC: 0.93 MG/DL — SIGNIFICANT CHANGE UP (ref 0.5–1.3)
CULTURE RESULTS: SIGNIFICANT CHANGE UP
CULTURE RESULTS: SIGNIFICANT CHANGE UP
EGFR: 86 ML/MIN/1.73M2 — SIGNIFICANT CHANGE UP
EGFR: 86 ML/MIN/1.73M2 — SIGNIFICANT CHANGE UP
GLUCOSE BLDC GLUCOMTR-MCNC: 120 MG/DL — HIGH (ref 70–99)
GLUCOSE BLDC GLUCOMTR-MCNC: 146 MG/DL — HIGH (ref 70–99)
GLUCOSE BLDC GLUCOMTR-MCNC: 146 MG/DL — HIGH (ref 70–99)
GLUCOSE SERPL-MCNC: 116 MG/DL — HIGH (ref 70–99)
HCT VFR BLD CALC: 28.1 % — LOW (ref 39–50)
HCT VFR BLD CALC: 37.7 % — LOW (ref 39–50)
HGB BLD-MCNC: 12 G/DL — LOW (ref 13–17)
HGB BLD-MCNC: 9 G/DL — LOW (ref 13–17)
MAGNESIUM SERPL-MCNC: 2.1 MG/DL — SIGNIFICANT CHANGE UP (ref 1.6–2.6)
MCHC RBC-ENTMCNC: 26.8 PG — LOW (ref 27–34)
MCHC RBC-ENTMCNC: 26.9 PG — LOW (ref 27–34)
MCHC RBC-ENTMCNC: 31.8 G/DL — LOW (ref 32–36)
MCHC RBC-ENTMCNC: 32 G/DL — SIGNIFICANT CHANGE UP (ref 32–36)
MCV RBC AUTO: 84.1 FL — SIGNIFICANT CHANGE UP (ref 80–100)
MCV RBC AUTO: 84.2 FL — SIGNIFICANT CHANGE UP (ref 80–100)
NRBC # BLD AUTO: 0 K/UL — SIGNIFICANT CHANGE UP (ref 0–0)
NRBC # BLD AUTO: 0 K/UL — SIGNIFICANT CHANGE UP (ref 0–0)
NRBC # FLD: 0 K/UL — SIGNIFICANT CHANGE UP (ref 0–0)
NRBC # FLD: 0 K/UL — SIGNIFICANT CHANGE UP (ref 0–0)
NRBC BLD AUTO-RTO: 0 /100 WBCS — SIGNIFICANT CHANGE UP (ref 0–0)
NRBC BLD AUTO-RTO: 0 /100 WBCS — SIGNIFICANT CHANGE UP (ref 0–0)
PLATELET # BLD AUTO: 309 K/UL — SIGNIFICANT CHANGE UP (ref 150–400)
PLATELET # BLD AUTO: 324 K/UL — SIGNIFICANT CHANGE UP (ref 150–400)
PMV BLD: 9.1 FL — SIGNIFICANT CHANGE UP (ref 7–13)
PMV BLD: 9.5 FL — SIGNIFICANT CHANGE UP (ref 7–13)
POTASSIUM SERPL-MCNC: 4.1 MMOL/L — SIGNIFICANT CHANGE UP (ref 3.5–5.3)
POTASSIUM SERPL-SCNC: 4.1 MMOL/L — SIGNIFICANT CHANGE UP (ref 3.5–5.3)
RBC # BLD: 3.34 M/UL — LOW (ref 4.2–5.8)
RBC # BLD: 4.48 M/UL — SIGNIFICANT CHANGE UP (ref 4.2–5.8)
RBC # FLD: 16.4 % — HIGH (ref 10.3–14.5)
RBC # FLD: 16.6 % — HIGH (ref 10.3–14.5)
SODIUM SERPL-SCNC: 134 MMOL/L — LOW (ref 135–145)
SPECIMEN SOURCE: SIGNIFICANT CHANGE UP
SPECIMEN SOURCE: SIGNIFICANT CHANGE UP
SURGICAL PATHOLOGY STUDY: SIGNIFICANT CHANGE UP
WBC # BLD: 10.93 K/UL — HIGH (ref 3.8–10.5)
WBC # BLD: 8.05 K/UL — SIGNIFICANT CHANGE UP (ref 3.8–10.5)
WBC # FLD AUTO: 10.93 K/UL — HIGH (ref 3.8–10.5)
WBC # FLD AUTO: 8.05 K/UL — SIGNIFICANT CHANGE UP (ref 3.8–10.5)

## 2025-02-26 PROCEDURE — 99223 1ST HOSP IP/OBS HIGH 75: CPT

## 2025-02-26 PROCEDURE — 93320 DOPPLER ECHO COMPLETE: CPT | Mod: 26

## 2025-02-26 PROCEDURE — 99291 CRITICAL CARE FIRST HOUR: CPT

## 2025-02-26 PROCEDURE — 93325 DOPPLER ECHO COLOR FLOW MAPG: CPT | Mod: 26

## 2025-02-26 PROCEDURE — 99292 CRITICAL CARE ADDL 30 MIN: CPT

## 2025-02-26 PROCEDURE — 72132 CT LUMBAR SPINE W/DYE: CPT | Mod: 26

## 2025-02-26 PROCEDURE — 93312 ECHO TRANSESOPHAGEAL: CPT | Mod: 26

## 2025-02-26 PROCEDURE — 99232 SBSQ HOSP IP/OBS MODERATE 35: CPT | Mod: FS

## 2025-02-26 PROCEDURE — 70486 CT MAXILLOFACIAL W/O DYE: CPT | Mod: 26

## 2025-02-26 PROCEDURE — 99221 1ST HOSP IP/OBS SF/LOW 40: CPT

## 2025-02-26 RX ORDER — ACETAMINOPHEN 500 MG/5ML
1000 LIQUID (ML) ORAL ONCE
Refills: 0 | Status: COMPLETED | OUTPATIENT
Start: 2025-02-26 | End: 2025-02-26

## 2025-02-26 RX ADMIN — Medication 5 MILLIGRAM(S): at 21:36

## 2025-02-26 RX ADMIN — Medication 150 MICROGRAM(S): at 05:32

## 2025-02-26 RX ADMIN — Medication 650 MILLIGRAM(S): at 05:33

## 2025-02-26 RX ADMIN — Medication 325 MILLIGRAM(S): at 18:13

## 2025-02-26 RX ADMIN — TRAMADOL HYDROCHLORIDE 25 MILLIGRAM(S): 50 TABLET, FILM COATED ORAL at 07:33

## 2025-02-26 RX ADMIN — CEFTRIAXONE 2000 MILLIGRAM(S): 500 INJECTION, POWDER, FOR SOLUTION INTRAMUSCULAR; INTRAVENOUS at 18:13

## 2025-02-26 RX ADMIN — Medication 40 MILLIGRAM(S): at 05:32

## 2025-02-26 RX ADMIN — TRAMADOL HYDROCHLORIDE 25 MILLIGRAM(S): 50 TABLET, FILM COATED ORAL at 22:36

## 2025-02-26 RX ADMIN — Medication 3 MILLILITER(S): at 20:14

## 2025-02-26 RX ADMIN — HEPARIN SODIUM 5000 UNIT(S): 1000 INJECTION INTRAVENOUS; SUBCUTANEOUS at 05:34

## 2025-02-26 RX ADMIN — Medication 650 MILLIGRAM(S): at 21:35

## 2025-02-26 RX ADMIN — POLYETHYLENE GLYCOL 3350 17 GRAM(S): 17 POWDER, FOR SOLUTION ORAL at 18:38

## 2025-02-26 RX ADMIN — TRAMADOL HYDROCHLORIDE 25 MILLIGRAM(S): 50 TABLET, FILM COATED ORAL at 06:52

## 2025-02-26 RX ADMIN — Medication 1 TABLET(S): at 18:13

## 2025-02-26 RX ADMIN — TRAMADOL HYDROCHLORIDE 25 MILLIGRAM(S): 50 TABLET, FILM COATED ORAL at 21:36

## 2025-02-26 RX ADMIN — HEPARIN SODIUM 5000 UNIT(S): 1000 INJECTION INTRAVENOUS; SUBCUTANEOUS at 21:35

## 2025-02-26 RX ADMIN — ATORVASTATIN CALCIUM 40 MILLIGRAM(S): 80 TABLET, FILM COATED ORAL at 21:36

## 2025-02-26 RX ADMIN — Medication 3 MILLILITER(S): at 13:45

## 2025-02-26 RX ADMIN — Medication 2 TABLET(S): at 21:35

## 2025-02-26 RX ADMIN — Medication 3 MILLILITER(S): at 04:58

## 2025-02-26 RX ADMIN — Medication 1000 MILLIGRAM(S): at 19:03

## 2025-02-26 RX ADMIN — Medication 400 MILLIGRAM(S): at 18:56

## 2025-02-26 RX ADMIN — HEPARIN SODIUM 5000 UNIT(S): 1000 INJECTION INTRAVENOUS; SUBCUTANEOUS at 18:13

## 2025-02-26 NOTE — CONSULT NOTE ADULT - SUBJECTIVE AND OBJECTIVE BOX
[facility]                                     Ne[facility] at [facility]                                 Gene Pina, & Eleno                                  [address]                                        [place], [place], [address]                                             [phone]    CC: difficulty walking  HPI:  The patient is a 74y Male who presented with endocarditis, lumbar spine osteomyelitis/discitis and pain limiting ambulation.  He said that his back pain started on 11/23/24 and has progressively worsened.  He had workup at [facility] found spinal osteomyelits with no abscess and intervention was not deemed necessary by neurosurgery at [place], as well as here.. He was subsequently found to have endocarditis and transferred for evaluation of AVR.   Neurology is asked to evaluate    PAST MEDICAL & SURGICAL HISTORY:  Hypertension      Diabetes mellitus      Obesity      Hypothyroidism      Prostate CA      MEDICATIONS  (STANDING):  acetaminophen     Tablet .. 650 milliGRAM(s) Oral every 8 hours  atorvastatin 40 milliGRAM(s) Oral at bedtime  cefTRIAXone Injectable. 2000 milliGRAM(s) IV Push every 24 hours  ferrous    sulfate 325 milliGRAM(s) Oral daily  heparin   Injectable 5000 Unit(s) SubCutaneous every 8 hours  insulin lispro (ADMELOG) corrective regimen sliding scale   SubCutaneous at bedtime  insulin lispro (ADMELOG) corrective regimen sliding scale   SubCutaneous three times a day before meals  levothyroxine 150 MICROGram(s) Oral daily  melatonin 5 milliGRAM(s) Oral at bedtime  multivitamin 1 Tablet(s) Oral daily  pantoprazole    Tablet 40 milliGRAM(s) Oral before breakfast  polyethylene glycol 3350 17 Gram(s) Oral daily  senna 2 Tablet(s) Oral at bedtime  sodium chloride 0.9% lock flush 3 milliLiter(s) IV Push every 8 hours    MEDICATIONS  (PRN):  bisacodyl Suppository 10 milliGRAM(s) Rectal daily PRN Constipation  traMADol 50 milliGRAM(s) Oral every 6 hours PRN Severe Pain (7 - 10)  traMADol 25 milliGRAM(s) Oral every 6 hours PRN Moderate Pain (4 - 6)      Allergies    No Known Allergies    Intolerances        SOCIAL HISTORY:  no tob,   no alcohol   no drugs    FAMILY HISTORY:  n/c      ROS: 14 point ROS negative other than what is present in HPI or below    Vital Signs Last 24 Hrs  T(C): 36.9 (26 Feb 2025 12:00), Max: 37.2 (26 Feb 2025 04:00)  T(F): 98.4 (26 Feb 2025 12:00), Max: 99 (26 Feb 2025 04:00)  HR: 70 (26 Feb 2025 09:00) (67 - 93)  BP: 128/80 (26 Feb 2025 09:00) (118/65 - 145/88)  BP(mean): 95 (26 Feb 2025 09:00) (82 - 103)  RR: 18 (26 Feb 2025 09:00) (14 - 27)  SpO2: 94% (26 Feb 2025 09:00) (91% - 99%)    Parameters below as of 26 Feb 2025 04:00  Patient On (Oxygen Delivery Method): room air    General: NAD    Detailed Neurologic Exam:    Mental status: The patient is awake and alert and has normal attention span.  The patient is fully oriented in 3 spheres. The patient is oriented to current events. The patient is able to name objects, follow commands, repeat sentences.    Cranial nerves: Pupils equal and react symmetrically to light. There is no visual field deficit to confrontation. Extraocular motion is full with no nystagmus. There is no ptosis. Facial sensation is intact. Facial musculature is symmetric.   Motor: There is normal bulk and tone.  There is no tremor.  Strength is 5/5 in the right arm  Strength is 5/5 in the left arm   Pain limits testing of power in legs      Sensation: Intact to light touch and pin in 4 extremities    Refelex- 1+patella DTR b/l    Cerebellar: There is no dysmetria on finger to nose testing.    Gait : deferred    LABS:                         12.0   10.93 )-----------( 324      ( 26 Feb 2025 12:19 )             37.7       02-26    134[L]  |  101  |  20.2[H]  ----------------------------<  116[H]  4.1   |  22.0  |  0.93    Ca    8.6      26 Feb 2025 02:15  Mg     2.1     02-26    TPro  5.8[L]  /  Alb  2.5[L]  /  TBili  0.4  /  DBili  x   /  AST  47[H]  /  ALT  56[H]  /  AlkPhos  152[H]  02-25      PT/INR - ( 25 Feb 2025 02:30 )   PT: 14.0 sec;   INR: 1.21 ratio         PTT - ( 25 Feb 2025 11:10 )  PTT:33.9 sec    RADIOLOGY & ADDITIONAL STUDIES (independently reviewed unless otherwise noted):  MR Lumbar Spine w/wo IV Cont (02.24.25 @ 09:45)   IMPRESSION:  1. L3-L4 suspected septic discitis appears largely similar to 2/12/2025   noting mildly increased fluid within the disc space and increased left   paraspinal infiltration and enhancement. No abscess collection has   developed  2. L5-S1 suspected discitis appears largely similar to 2/12/2025, noting   decreased enhancement in the left ventral epidural space. No abscess   collection has developed  3. Recommend continued imaging follow-up

## 2025-02-26 NOTE — PROGRESS NOTE ADULT - ASSESSMENT
Assessment: 74M with a PMHx of HTN, afib (on Eliquis), HLD, GI Bleed, CAD, CFH s/p TAVR (2023), chronic back pain and DM, initially presented to [facility] 2/10 ED with a two-day history of a rash on his left leg and pain in left knee radiating towards his foot. 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 on 2/24 for cardiac surgery evaluation to consider valve replacement. His most recent blood cultures have been negative on 2/20, currently on Daptomycin. Patient s/p common femoral endarterectomy on 2/22 for arterial plaque retrieval, gram + cocci. CTICU consulted neurosurgery of lumbar OM/discitis.    Plan:  - MRI L-spine from 2/24 reviewed- L2-L4, L5-S1 OM/discitis stable compared to 2/12   - Recommend Q4h neuro checks  - Patient s/p common femoral endarterectomy on 2/22 for arterial plaque retrieval, gram + cocci. Most recent blood cultures from 2/20 are NGTD  - ID following, patient currently on Daptomycin  - No acute neurosurgical interventions recommended at this time  - LSO brace when OOB, nsx team confirmed brace is at bedside.  Of note patient does not have to wear the brace when in the chair, brace to be worn when OOB otherwise.   - Neurosurgery signing off     The above patient and plan was discussed with Dr. Mix. Neurosurgery signing off, please recall or reconsult PRN.

## 2025-02-26 NOTE — CHART NOTE - NSCHARTNOTEFT_GEN_A_CORE
BOLIVAR results pending   Post bolivar NAD no resp diff   awake alert oriented x3  PLAN  -Post BOLIVAR orders  - when gag reflex returns resume a soft, bland diet for the next 4 hours, avoiding hot, spicy, crunchy foods

## 2025-02-26 NOTE — CHART NOTE - NSCHARTNOTEFT_GEN_A_CORE
75 y/o male with a PMHx of HTN, A-FIB, HLD, GI Bleed, S/P TAVR 2023 transferred from [facility] for possible aortic valve surgery. +Gram positive cocci.   GI consulted for further evaluation of anemia. His hemoglobin was 7 to 8 gm at [place], patient was seen by gastroenterologist,  underwent EGD/ colonoscopy, negative study.  Colonoscopy showed "medium-sized Internal hemorrhoids, also showed a few tiny angiectasias were noted in the rectum consistent with radiation proctitis. APC was applied with good result"  EGD (02/11/2025) : Multiple polyps up to 5 mm in size were noted in the gastric body. One was removed with cold biopsy forceps. Normal Esophagus, normal duodenum  Surgical pathology was normal. No H pylori.     Plan:  Hemoglobin remains stable, 9.6 gm this morning. No indication for further endoscopic evaluation at this time.  Please consult back if any questions of concern that requires inpatient evaluation.    < from: Colonoscopy (02.12.25 @ 07:14) >    REPORTHEADER [facility]    REPORTHEADER GI     PATIENTNAME Patient Name: Win Menezes    EXAMDATE Procedure Date: 2/12/2025 7:14 AM    PATIENTACCOUNTNUM Account Number: [id number]    PATIENTDOB YOB: 1950    ADMITTYPE AdmitType: Inpatient    PATIENTROOM Room: ENDO02    PATGENDER Gender: Male    ENDOPROCEDURENAME Procedure:           Colonoscopy    INDICATION Indications:         Heme positive stool, anemia    ENDOPROCEDURETEXT Procedure:           Pre-Anesthesia Assessment:    ENDOPROCEDURETEXT                      - Prior to the procedure, a History and Physical was     ENDOPROCEDURETEXT                      performed, and patient medications and allergies were     ENDOPROCEDURETEXT                      reviewed. The patient's tolerance of previous anesthesia     ENDOPROCEDURETEXT                      was also reviewed. The risks and benefits of the     ENDOPROCEDURETEXT                      procedure and the sedation options and risks were     ENDOPROCEDURETEXT                      discussed with the patient. All questions were answered,     ENDOPROCEDURETEXT                      and informed consent was obtained. Prior Anticoagulants:     ENDOPROCEDURETEXT                      The patient anticoagulationhistory is as per the chart.     ENDOPROCEDURETEXT                      ASA Grade Assessment: III - A patient with severe     ENDOPROCEDURETEXT                      systemic disease. After reviewing the risks and     ENDOPROCEDURETEXT     benefits, the patient was deemed in satisfactory     ENDOPROCEDURETEXT                      condition to undergo the procedure.    ENDOPROCEDURETEXT                      After I obtained informed consent, the scope was passed     ENDOPROCEDURETEXT                      under direct vision. Throughout the procedure, the     ENDOPROCEDURETEXT                      patient's blood pressure, pulse, and oxygen saturations     ENDOPROCEDURETEXT                      were monitored continuously. The Colonoscope was     ENDOPROCEDURETEXT                      introduced through the anus and advanced to the terminal     ENDOPROCEDURETEXT                      ileum. The colonoscopy was performed without difficulty.     ENDOPROCEDURETEXT      The patient tolerated the procedure well. The quality of     ENDOPROCEDURETEXT                      the bowel preparation was good. Anatomical landmarks     ENDOPROCEDURETEXT                      were photographed.    PRIMARYPROVIDER Providers:          Gualberto Porter MD    CURRENT_MEDS Medicines:           Propofol per Anesthesia    FINDING Findings:    FINDING      Internal hemorrhoids were found. The hemorrhoids were medium-sized.    FINDING      A few tiny angioectasias were noted in therectum consistent with     FINDING      minimal radiaton procitits. APC was applied with good result.    FINDING      The terminal ileum appeared normal.    FINDING      The exam was otherwise without abnormality.    COMPLIC Complications:       No immediate complications.    IMPRESS Impression:          - Internal hemorrhoids.    IMPRESS                      - Few tiny angioectasias were noted in the rectum     IMPRESS                      consistent with minimal radiaton procitits. APC was     IMPRESS                      applied with good result    IMPRESS                      - The examination was otherwise normal.    IMPRESS                      - No specimens collected.    ENDORECOMMENDATION Recommendation:      - Continue present medications.    ENDORECOMMENDATION                      - Patient has a contact number available for     ENDORECOMMENDATION                      emergencies. The signs and symptoms of potential delayed     ENDORECOMMENDATION                      complications were discussed with the patient. Return to     ENDORECOMMENDATION                      normal activities tomorrow. Written discharge     ENDORECOMMENDATION                      instructions were provided to the patient.    ENDORECOMMENDATION               - Resume previous diet.    SIGNATURENAME Gualberto Porter MD    SIGNATURENAME Gualberto Porter MD    SIGNATUREDATE 2/12/2025 8:23:20 AM    NUMADDENDA Number of Addenda: 0    INITIATEDON Note Initiated On: 2/12/2025 7:14 AM    < end of copied text >    < from: Upper Endoscopy (02.11.25 @ 10:31) >    REPORTHEADER [facility]    REPORTHEADER GI     PATIENTNAME Patient Name: Win Menezes    EXAMDATE Procedure Date: 2/11/2025 10:31 AM    PATIENTACCOUNTNUM Account Number: [id number]    PATIENTDOB YOB: 1950    ADMITTYPE Admit Type: Inpatient    PATIENTROOM Room: ENDO01    PATGENDER Gender: Male    ENDOPROCEDURENAME Procedure:           Upper GI endoscopy    INDICATION Indications:         Occult blood in stool    ENDOPROCEDURETEXT Procedure:           Pre-Anesthesia Assessment:    ENDOPROCEDURETEXT                      - Prior to the procedure, a History and Physical was     ENDOPROCEDURETEXT                      performed, and patient medications and allergies were     ENDOPROCEDURETEXT                      reviewed. The patient's tolerance of previous anesthesia     ENDOPROCEDURETEXT                      was also reviewed. The risks and benefits of the     ENDOPROCEDURETEXT                      procedure and the sedation options and risks were     ENDOPROCEDURETEXT                      discussed with the patient. All questions were answered,     ENDOPROCEDURETEXT                      and informed consent was obtained. Prior Anticoagulants:     ENDOPROCEDURETEXT                      The patient anticoagulation history is as per the chart.     ENDOPROCEDURETEXT                      ASA Grade Assessment: III - A patient with severe     ENDOPROCEDURETEXT                      systemic disease. After reviewing the risks and     ENDOPROCEDURETEXT       benefits, the patient was deemed in satisfactory     ENDOPROCEDURETEXT                      condition to undergo the procedure.    ENDOPROCEDURETEXT                      After obtaining informed consent, the endoscope was     ENDOPROCEDURETEXT                     passed under direct vision. Throughout the procedure,     ENDOPROCEDURETEXT                      the patient's blood pressure, pulse, and oxygen     ENDOPROCEDURETEXT                      saturations were monitored continuously. The Endoscope     ENDOPROCEDURETEXT                      was introduced through the mouth, and advanced to the     ENDOPROCEDURETEXT                      second part of duodenum. The upper GI endoscopy was     ENDOPROCEDURETEXT                      accomplished without difficulty. The patient tolerated     ENDOPROCEDURETEXT                      the procedure well.    PRIMARYPROVIDER Providers:           Gualberto Porter MD    CURRENT_MEDS Medicines:           Propofol per Anesthesia    FINDING Findings:    FINDING      The esophagus was normal.    FINDING      Multiple polyps up to 5 mm in size were noted in the gastric body. One     FINDING      was removed with cold biopsy forceps.    FINDING      The stomach was otherwise normal. Multiple biopsies were obtained in the     FINDING      gastric antrum with cold forceps for Helicobacter pylori testing.    FINDING      The examined duodenum was normal.    COMPLIC Complications:       No immediate complications.    IMPRESS Impression:          -Normal esophagus.    IMPRESS                      - Gastric polyps    IMPRESS                      - Normal examined duodenum.    ENDORECOMMENDATION Recommendation:      - Await pathology results.    ENDORECOMMENDATION                      - Continuepresent medications.    ENDORECOMMENDATION                      - Patient has a contact number available for     ENDORECOMMENDATION                      emergencies. The signs and symptoms of potential delayed     ENDORECOMMENDATION   complications were discussed with the patient. Return to     ENDORECOMMENDATION                      normal activities tomorrow. Written discharge     ENDORECOMMENDATION                      instructions were provided to the patient.    ENDORECOMMENDATION                      - Resume previous diet.    SIGNATURENAME Gualberto Porter MD    SIGNATURENAME Gualberto Porter MD    SIGNATUREDATE 2/11/2025 12:21:02 PM    NUMADDENDA Number of Addenda: 0    INITIATEDON Note Initiated On: 2/11/2025 10:31 AM    < end of copied text > 73 y/o male with a PMHx of HTN, A-FIB, HLD, GI Bleed, S/P TAVR 2023 transferred from [facility] for possible aortic valve surgery. +Gram positive cocci.   GI consulted for further evaluation of anemia. His hemoglobin was 7 to 8 gm at [place], patient was seen by gastroenterologist,  underwent EGD/ colonoscopy, negative study.  Colonoscopy showed "medium-sized Internal hemorrhoids, also showed a few tiny angiectasias were noted in the rectum consistent with radiation proctitis. APC was applied with good result"  EGD (02/11/2025) : Multiple polyps up to 5 mm in size were noted in the gastric body. One was removed with cold biopsy forceps. Normal Esophagus, normal duodenum  Surgical pathology was normal. No H pylori.     Plan:  Hemoglobin remains stable, 9.6 gm this morning. No indication for further endoscopic evaluation at this time.  Please consult back if any questions of concern that requires inpatient evaluation.      GI Attending Note: I evaluated and reviewed this pt's recent GI w/u and agree with the above assessment and management plan recommendations. See recent  w/u below from earlier this month done @ [facility]. No need for repeat endoscopic intervention at this time. Reconsult GI as needed. Thank you.    < from: Colonoscopy (02.12.25 @ 07:14) >    REPORTHEADER [facility]    REPORTHEADER GI     PATIENTNAME Patient Name: Win Menezes    EXAMDATE Procedure Date: 2/12/2025 7:14 AM    PATIENTACCOUNTNUM Account Number: [id number]    PATIENTDOB YOB: 1950    ADMITTYPE AdmitType: Inpatient    PATIENTROOM Room: ENDO02    PATGENDER Gender: Male    ENDOPROCEDURENAME Procedure:           Colonoscopy    INDICATION Indications:         Heme positive stool, anemia    ENDOPROCEDURETEXT Procedure:           Pre-Anesthesia Assessment:    ENDOPROCEDURETEXT                      - Prior to the procedure, a History and Physical was     ENDOPROCEDURETEXT                      performed, and patient medications and allergies were     ENDOPROCEDURETEXT                      reviewed. The patient's tolerance of previous anesthesia     ENDOPROCEDURETEXT                      was also reviewed. The risks and benefits of the     ENDOPROCEDURETEXT                      procedure and the sedation options and risks were     ENDOPROCEDURETEXT                      discussed with the patient. All questions were answered,     ENDOPROCEDURETEXT                      and informed consent was obtained. Prior Anticoagulants:     ENDOPROCEDURETEXT                      The patient anticoagulationhistory is as per the chart.     ENDOPROCEDURETEXT                      ASA Grade Assessment: III - A patient with severe     ENDOPROCEDURETEXT                      systemic disease. After reviewing the risks and     ENDOPROCEDURETEXT     benefits, the patient was deemed in satisfactory     ENDOPROCEDURETEXT                      condition to undergo the procedure.    ENDOPROCEDURETEXT                      After I obtained informed consent, the scope was passed     ENDOPROCEDURETEXT                      under direct vision. Throughout the procedure, the     ENDOPROCEDURETEXT                      patient's blood pressure, pulse, and oxygen saturations     ENDOPROCEDURETEXT                      were monitored continuously. The Colonoscope was     ENDOPROCEDURETEXT                      introduced through the anus and advanced to the terminal     ENDOPROCEDURETEXT                      ileum. The colonoscopy was performed without difficulty.     ENDOPROCEDURETEXT      The patient tolerated the procedure well. The quality of     ENDOPROCEDURETEXT                      the bowel preparation was good. Anatomical landmarks     ENDOPROCEDURETEXT                      were photographed.    PRIMARYPROVIDER Providers:          Gualberto Porter MD    CURRENT_MEDS Medicines:           Propofol per Anesthesia    FINDING Findings:    FINDING      Internal hemorrhoids were found. The hemorrhoids were medium-sized.    FINDING      A few tiny angioectasias were noted in therectum consistent with     FINDING      minimal radiaton procitits. APC was applied with good result.    FINDING      The terminal ileum appeared normal.    FINDING      The exam was otherwise without abnormality.    COMPLIC Complications:       No immediate complications.    IMPRESS Impression:          - Internal hemorrhoids.    IMPRESS                      - Few tiny angioectasias were noted in the rectum     IMPRESS                      consistent with minimal radiaton procitits. APC was     IMPRESS                      applied with good result    IMPRESS                      - The examination was otherwise normal.    IMPRESS                      - No specimens collected.    ENDORECOMMENDATION Recommendation:      - Continue present medications.    ENDORECOMMENDATION                      - Patient has a contact number available for     ENDORECOMMENDATION                      emergencies. The signs and symptoms of potential delayed     ENDORECOMMENDATION                      complications were discussed with the patient. Return to     ENDORECOMMENDATION                      normal activities tomorrow. Written discharge     ENDORECOMMENDATION                      instructions were provided to the patient.    ENDORECOMMENDATION               - Resume previous diet.    SIGNATURENAME Gualberto Porter MD    SIGNATURENAME Gualberto Porter MD    SIGNATUREDATE 2/12/2025 8:23:20 AM    NUMADDENDA Number of Addenda: 0    INITIATEDON Note Initiated On: 2/12/2025 7:14 AM    < end of copied text >    < from: Upper Endoscopy (02.11.25 @ 10:31) >    REPORTHEADER [facility]    REPORTHEADER GI     PATIENTNAME Patient Name: Win Menezes    EXAMDATE Procedure Date: 2/11/2025 10:31 AM    PATIENTACCOUNTNUM Account Number: [id number]    PATIENTDOB YOB: 1950    ADMITTYPE Admit Type: Inpatient    PATIENTROOM Room: [facility]    PATGENDER Gender: Male    ENDOPROCEDURENAME Procedure:           Upper GI endoscopy    INDICATION Indications:         Occult blood in stool    ENDOPROCEDURETEXT Procedure:           Pre-Anesthesia Assessment:    ENDOPROCEDURETEXT                      - Prior to the procedure, a History and Physical was     ENDOPROCEDURETEXT                      performed, and patient medications and allergies were     ENDOPROCEDURETEXT                      reviewed. The patient's tolerance of previous anesthesia     ENDOPROCEDURETEXT                      was also reviewed. The risks and benefits of the     ENDOPROCEDURETEXT                      procedure and the sedation options and risks were     ENDOPROCEDURETEXT                      discussed with the patient. All questions were answered,     ENDOPROCEDURETEXT                      and informed consent was obtained. Prior Anticoagulants:     ENDOPROCEDURETEXT                      The patient anticoagulation history is as per the chart.     ENDOPROCEDURETEXT                      ASA Grade Assessment: III - A patient with severe     ENDOPROCEDURETEXT                      systemic disease. After reviewing the risks and     ENDOPROCEDURETEXT       benefits, the patient was deemed in satisfactory     ENDOPROCEDURETEXT                      condition to undergo the procedure.    ENDOPROCEDURETEXT                      After obtaining informed consent, the endoscope was     ENDOPROCEDURETEXT                     passed under direct vision. Throughout the procedure,     ENDOPROCEDURETEXT                      the patient's blood pressure, pulse, and oxygen     ENDOPROCEDURETEXT                      saturations were monitored continuously. The Endoscope     ENDOPROCEDURETEXT                      was introduced through the mouth, and advanced to the     ENDOPROCEDURETEXT                      second part of duodenum. The upper GI endoscopy was     ENDOPROCEDURETEXT                      accomplished without difficulty. The patient tolerated     ENDOPROCEDURETEXT                      the procedure well.    PRIMARYPROVIDER Providers:           Gualberto Porter MD    CURRENT_MEDS Medicines:           Propofol per Anesthesia    FINDING Findings:    FINDING      The esophagus was normal.    FINDING      Multiple polyps up to 5 mm in size were noted in the gastric body. One     FINDING      was removed with cold biopsy forceps.    FINDING      The stomach was otherwise normal. Multiple biopsies were obtained in the     FINDING      gastric antrum with cold forceps for Helicobacter pylori testing.    FINDING      The examined duodenum was normal.    COMPLIC Complications:       No immediate complications.    IMPRESS Impression:          -Normal esophagus.    IMPRESS                      - Gastric polyps    IMPRESS                      - Normal examined duodenum.    ENDORECOMMENDATION Recommendation:      - Await pathology results.    ENDORECOMMENDATION                      - Continuepresent medications.    ENDORECOMMENDATION                      - Patient has a contact number available for     ENDORECOMMENDATION                      emergencies. The signs and symptoms of potential delayed     ENDORECOMMENDATION   complications were discussed with the patient. Return to     ENDORECOMMENDATION                      normal activities tomorrow. Written discharge     ENDORECOMMENDATION                      instructions were provided to the patient.    ENDORECOMMENDATION                      - Resume previous diet.    SIGNATURENAME Gualberto Porter MD    SIGNATURENAME Gualberto Porter MD    SIGNATUREDATE 2/11/2025 12:21:02 PM    NUMADDENDA Number of Addenda: 0    INITIATEDON Note Initiated On: 2/11/2025 10:31 AM    < end of copied text > 75 y/o male with a PMHx of HTN, A-FIB, HLD, GI Bleed, S/P TAVR 2023 transferred from [facility] for possible aortic valve surgery. +Gram positive cocci.   GI consulted for further evaluation of anemia. His hemoglobin was 7 to 8 gm at [place], patient was seen by gastroenterologist,  underwent EGD/ colonoscopy, negative study.  Colonoscopy showed "medium-sized Internal hemorrhoids, also showed a few tiny angiectasias were noted in the rectum consistent with radiation proctitis. APC was applied with good result"  EGD (02/11/2025) : Multiple polyps up to 5 mm in size were noted in the gastric body. One was removed with cold biopsy forceps. Normal Esophagus, normal duodenum  Surgical pathology was normal. No H pylori.     Plan:  Hemoglobin remains stable, 9.6 gm this morning. No indication for further endoscopic evaluation at this time.  Please consult back if any questions of concern that requires inpatient evaluation.      GI Attending Note: I evaluated and reviewed this pt's recent GI w/u and agree with the above assessment and management plan recommendations. See recent GI w/u below from earlier this month done @ [facility]. No need for repeat endoscopic intervention at this time.  Continue Pantoprazole 40 mg./d. for GI mucosal cytoprotection. Reconsult GI as needed. Thank you.    < from: Colonoscopy (02.12.25 @ 07:14) >    REPORTHEADER [facility]    REPORTHEADER GI     PATIENTNAME Patient Name: Win Menezes    EXAMDATE Procedure Date: 2/12/2025 7:14 AM    PATIENTACCOUNTNUM Account Number: [id number]    PATIENTDOB YOB: 1950    ADMITTYPE AdmitType: Inpatient    PATIENTROOM Room: ENDO02    PATGENDER Gender: Male    ENDOPROCEDURENAME Procedure:           Colonoscopy    INDICATION Indications:         Heme positive stool, anemia    ENDOPROCEDURETEXT Procedure:           Pre-Anesthesia Assessment:    ENDOPROCEDURETEXT                      - Prior to the procedure, a History and Physical was     ENDOPROCEDURETEXT                      performed, and patient medications and allergies were     ENDOPROCEDURETEXT                      reviewed. The patient's tolerance of previous anesthesia     ENDOPROCEDURETEXT                      was also reviewed. The risks and benefits of the     ENDOPROCEDURETEXT                      procedure and the sedation options and risks were     ENDOPROCEDURETEXT                      discussed with the patient. All questions were answered,     ENDOPROCEDURETEXT                      and informed consent was obtained. Prior Anticoagulants:     ENDOPROCEDURETEXT                      The patient anticoagulationhistory is as per the chart.     ENDOPROCEDURETEXT                      ASA Grade Assessment: III - A patient with severe     ENDOPROCEDURETEXT                      systemic disease. After reviewing the risks and     ENDOPROCEDURETEXT     benefits, the patient was deemed in satisfactory     ENDOPROCEDURETEXT                      condition to undergo the procedure.    ENDOPROCEDURETEXT                      After I obtained informed consent, the scope was passed     ENDOPROCEDURETEXT                      under direct vision. Throughout the procedure, the     ENDOPROCEDURETEXT                      patient's blood pressure, pulse, and oxygen saturations     ENDOPROCEDURETEXT                      were monitored continuously. The Colonoscope was     ENDOPROCEDURETEXT                      introduced through the anus and advanced to the terminal     ENDOPROCEDURETEXT                      ileum. The colonoscopy was performed without difficulty.     ENDOPROCEDURETEXT      The patient tolerated the procedure well. The quality of     ENDOPROCEDURETEXT                      the bowel preparation was good. Anatomical landmarks     ENDOPROCEDURETEXT                      were photographed.    PRIMARYPROVIDER Providers:          Gualberto Porter MD    CURRENT_MEDS Medicines:           Propofol per Anesthesia    FINDING Findings:    FINDING      Internal hemorrhoids were found. The hemorrhoids were medium-sized.    FINDING      A few tiny angioectasias were noted in therectum consistent with     FINDING      minimal radiaton procitits. APC was applied with good result.    FINDING      The terminal ileum appeared normal.    FINDING      The exam was otherwise without abnormality.    COMPLIC Complications:       No immediate complications.    IMPRESS Impression:          - Internal hemorrhoids.    IMPRESS                      - Few tiny angioectasias were noted in the rectum     IMPRESS                      consistent with minimal radiaton procitits. APC was     IMPRESS                      applied with good result    IMPRESS                      - The examination was otherwise normal.    IMPRESS                      - No specimens collected.    ENDORECOMMENDATION Recommendation:      - Continue present medications.    ENDORECOMMENDATION                      - Patient has a contact number available for     ENDORECOMMENDATION                      emergencies. The signs and symptoms of potential delayed     ENDORECOMMENDATION                      complications were discussed with the patient. Return to     ENDORECOMMENDATION                      normal activities tomorrow. Written discharge     ENDORECOMMENDATION                      instructions were provided to the patient.    ENDORECOMMENDATION               - Resume previous diet.    SIGNATURENAME Gualberto Porter MD    SIGNATURENAME Gualberto Porter MD    SIGNATUREDATE 2/12/2025 8:23:20 AM    NUMADDENDA Number of Addenda: 0    INITIATEDON Note Initiated On: 2/12/2025 7:14 AM    < end of copied text >    < from: Upper Endoscopy (02.11.25 @ 10:31) >    [facility]    [facility]     PATIENTNAME Patient Name: Win Menezes    EXAMDATE Procedure Date: 2/11/2025 10:31 AM    PATIENTACCOUNTNUM Account Number: [id number]    PATIENTDOB YOB: 1950    ADMITTYPE Admit Type: Inpatient    PATIENTROOM Room: [facility]    PATGENDER Gender: Male    ENDOPROCEDURENAME Procedure:           Upper GI endoscopy    INDICATION Indications:         Occult blood in stool    ENDOPROCEDURETEXT Procedure:           Pre-Anesthesia Assessment:    ENDOPROCEDURETEXT                      - Prior to the procedure, a History and Physical was     ENDOPROCEDURETEXT                      performed, and patient medications and allergies were     ENDOPROCEDURETEXT                      reviewed. The patient's tolerance of previous anesthesia     ENDOPROCEDURETEXT                      was also reviewed. The risks and benefits of the     ENDOPROCEDURETEXT                      procedure and the sedation options and risks were     ENDOPROCEDURETEXT                      discussed with the patient. All questions were answered,     ENDOPROCEDURETEXT                      and informed consent was obtained. Prior Anticoagulants:     ENDOPROCEDURETEXT                      The patient anticoagulation history is as per the chart.     ENDOPROCEDURETEXT                      ASA Grade Assessment: III - A patient with severe     ENDOPROCEDURETEXT                      systemic disease. After reviewing the risks and     ENDOPROCEDURETEXT       benefits, the patient was deemed in satisfactory     ENDOPROCEDURETEXT                      condition to undergo the procedure.    ENDOPROCEDURETEXT                      After obtaining informed consent, the endoscope was     ENDOPROCEDURETEXT                     passed under direct vision. Throughout the procedure,     ENDOPROCEDURETEXT                      the patient's blood pressure, pulse, and oxygen     ENDOPROCEDURETEXT                      saturations were monitored continuously. The Endoscope     ENDOPROCEDURETEXT                      was introduced through the mouth, and advanced to the     ENDOPROCEDURETEXT                      second part of duodenum. The upper GI endoscopy was     ENDOPROCEDURETEXT                      accomplished without difficulty. The patient tolerated     ENDOPROCEDURETEXT                      the procedure well.    PRIMARYPROVIDER Providers:           Gualberto Porter MD    CURRENT_MEDS Medicines:           Propofol per Anesthesia    FINDING Findings:    FINDING      The esophagus was normal.    FINDING      Multiple polyps up to 5 mm in size were noted in the gastric body. One     FINDING      was removed with cold biopsy forceps.    FINDING      The stomach was otherwise normal. Multiple biopsies were obtained in the     FINDING      gastric antrum with cold forceps for Helicobacter pylori testing.    FINDING      The examined duodenum was normal.    COMPLIC Complications:       No immediate complications.    IMPRESS Impression:          -Normal esophagus.    IMPRESS                      - Gastric polyps    IMPRESS                      - Normal examined duodenum.    ENDORECOMMENDATION Recommendation:      - Await pathology results.    ENDORECOMMENDATION                      - Continuepresent medications.    ENDORECOMMENDATION                      - Patient has a contact number available for     ENDORECOMMENDATION                      emergencies. The signs and symptoms of potential delayed     ENDORECOMMENDATION   complications were discussed with the patient. Return to     ENDORECOMMENDATION                      normal activities tomorrow. Written discharge     ENDORECOMMENDATION                      instructions were provided to the patient.    ENDORECOMMENDATION                      - Resume previous diet.    SIGNATURENAME Gualberto Porter MD    SIGNATURENAME Gualberto Porter MD    SIGNATUREDATE 2/11/2025 12:21:02 PM    NUMADDENDA Number of Addenda: 0    INITIATEDON Note Initiated On: 2/11/2025 10:31 AM    < end of copied text >

## 2025-02-26 NOTE — CHART NOTE - NSCHARTNOTEFT_GEN_A_CORE
I attempted to see the patient for an adjustment of his LSO. The patient was currently in surgery and the LSO was not in the room. We will return tomorrow to adjust the orthosis.   [facility]  [phone]

## 2025-02-26 NOTE — PROGRESS NOTE ADULT - SUBJECTIVE AND OBJECTIVE BOX
HISTORY OF PRESENT ILLNESS:  75 y/o male with a PMHx of HTN, A-FIB, HLD, GI Bleed, S/P TAVR 2023, chronic back pain and DM initially presented to [facility] ED with a two-day history of a rash on his left leg and pain in left knee radiating towards his foot. Pt recently discharged to SNF rehab after treatment for infected TAVR and Osteomyelitis of spine, strep salivarius bacteremia. Pt received transfusion and w/u for GI bleed with unremarkable colonoscopy and upper endoscopy. Underwent ROVERTO at that time which showed vegetation of TAVR. Plan at that time was for 6 weeks of IV antibiotics via PICC line and out patient pill camera study.  Since admission patient was seen by vascular surgery,  CTA showed clot to common femoral and distal embolism. S/P common femoral endarterectomy with clot retrieval and good blood flow to lower extremity post surgery. Repeat blood cultures were negative but the excised clot was found to have gram positive cocci. Pt with weakness to legs. Sent for repeat MR with contrast. Results without abscess at this time. Other incidental problem is constipation and persistent back pain requiring tramadol for pain relief.  Pt received transfusion of 2 unit of PRBC's 2/23.Patient transferred to [facility] for evaluation of TAVR and possible surgical replacement. Condition at time of transfer is stable but guarded prognosis.Patient had requested DNR/DNI on admission.  However he has rescinded this request until after possible cardiac surgery. Order for DNR was cancelled.     Interval History: Neurosurgery was consulted for MRI imaging of L3-L4, L5-S1 osteomyelitis/discitis which appears stable from 2/12 imaging. Patient states he has been having back pain since November that has progressively worsened, and has had difficulty walking due to lower extremity weakness for approximately 1-2 months. He was transferred here to [facility] 2/24 from [facility] for CT surgery evaluation for potential valve replacement surgery on this admission pending ROVERTO. Infectious disease team is following and patient is currently on Daptomycin IC via PICC line.  2/24 MR L-spine w/wo: Stable L3-L4, L5-S1 suspected septic discitis similar to 2/12/2025 noting mildly increased fluid within the disc space and increased left paraspinal infiltration and enhancement. No abscess collection has developed. Neurosurgery recommending LSO brace when out of bed.  Patient seen and examined this morning and is without any acute complaints at this time.     Vital Signs Last 24 Hrs  T(C): 36.9 (26 Feb 2025 08:00), Max: 37.2 (26 Feb 2025 04:00)  T(F): 98.5 (26 Feb 2025 08:00), Max: 99 (26 Feb 2025 04:00)  HR: 70 (26 Feb 2025 09:00) (67 - 93)  BP: 128/80 (26 Feb 2025 09:00) (118/65 - 145/88)  BP(mean): 95 (26 Feb 2025 09:00) (81 - 103)  RR: 18 (26 Feb 2025 09:00) (14 - 27)  SpO2: 94% (26 Feb 2025 09:00) (91% - 99%)    Parameters below as of 26 Feb 2025 04:00  Patient On (Oxygen Delivery Method): room air    PHYSICAL EXAM:  GENERAL: NAD, sitting up resting comfortably in the chair  HEAD: Atraumatic, normocephalic  MENTAL STATUS: AAOx3; awake; opens eyes spontaneously; appropriately conversant without aphasia; following simple commands  CRANIAL NERVES: PERRL. EOMI. Face grossly symmetric w/ normal eye closure and smile, tongue midline. Hearing grossly intact. Speech clear.  MOTOR: B/l UE 5/5 throughout, RLE: HF 3/5, KE/KF 5/5, DF/PF 5/5; LLE: HF 2/5, KE/KF 5/5, DF/PF 5/5  SENSATION: + decreased sensation to left toes otherwise grossly intact to light touch all extremities  CHEST/LUNG: Non-labored breathing on RA   Cardio: NSR on monitor   SKIN: Warm, dry      LABS:                        9.0    8.05  )-----------( 309      ( 26 Feb 2025 02:15 )             28.1       02-26    134[L]  |  101  |  20.2[H]  ----------------------------<  116[H]  4.1   |  22.0  |  0.93    Ca    8.6      26 Feb 2025 02:15  Mg     2.1     02-26    TPro  5.8[L]  /  Alb  2.5[L]  /  TBili  0.4  /  DBili  x   /  AST  47[H]  /  ALT  56[H]  /  AlkPhos  152[H]  02-25          Urinalysis Basic - ( 26 Feb 2025 02:15 )  Color: x / Appearance: x / SG: x / pH: x  Gluc: 116 mg/dL / Ketone: x  / Bili: x / Urobili: x   Blood: x / Protein: x / Nitrite: x   Leuk Esterase: x / RBC: x / WBC x   Sq Epi: x / Non Sq Epi: x / Bacteria: x      CAPILLARY BLOOD GLUCOSE  POCT Blood Glucose.: 146 mg/dL (26 Feb 2025 07:20)

## 2025-02-26 NOTE — CHART NOTE - NSCHARTNOTEFT_GEN_A_CORE
POD 4 s/p  L femoral thromboembolectomy. Palp DP/PT signal,  mild swelling. Prevena with good seal, will plan to remove on tomorrow.  Remainder of care per primary team. Please contact Vascular team with any further questions and/or concerns.

## 2025-02-26 NOTE — PROGRESS NOTE ADULT - SUBJECTIVE AND OBJECTIVE BOX
Assessment: Presents today for ROVERTO  to be performed   no interval change since seen this am , reviewed labs and ECG   Indication: R/O endocarditis of TAVR    ROS: as stated above, otherwise negative    PHYSICAL EXAM:  Constitutional: A & O x 3, NAD  HEENT:  Normal oral mucosa, PERRL, EOMI	  Cardiovascular: S1 S2, III/VI systolic  murmur, No JVD  Respiratory: Lungs clear to auscultation	  Gastrointestinal:  Soft, Non-tender, + BS	  Skin: No rashes or cyanosis  Neurologic: No deficit appreciated  Extremities: Normal range of motion, BLLE edema  Vascular: distal pulses + trace PP       Pt assessed,by anesthesia for  appropriateness for sedation     Plan/Recommendations:   -plan for ROVERTO   -procedure discussed with patient; risks and benefits explained, questions answered  -consent obtained by attending     Risks, benefits, and alternatives reviewed.  Risks including but not limited to MI, death, stroke, bleeding, infection, vessel injury, hematoma, renal failure, allergic reaction, urgent open heart surgery, restenosis and stent thrombosis were reviewed.  All questions answered.  Patient is agreeable to proceed.

## 2025-02-26 NOTE — CONSULT NOTE ADULT - ASSESSMENT
The patient is a 74y Male who is followed by neurology because of impaired ambulation due to pain and spinal disease    Impaired ambulation  likely multifactorial    - pain limited    - spinal osteomyelitis    - limited ambulation since 11/23/24    Will need PT, pain control, abx per ID    If he eventually will need AVR I suggest to call neurointerventional for cerebral angio to evaluate for mycotic aneurysm    will follow with you    Jay Mills MD PhD   [phone]

## 2025-02-26 NOTE — PROGRESS NOTE ADULT - SUBJECTIVE AND OBJECTIVE BOX
CRITICAL CARE ATTENDING - CTICU    MEDICATIONS  (STANDING):  acetaminophen     Tablet .. 650 milliGRAM(s) Oral every 8 hours  atorvastatin 40 milliGRAM(s) Oral at bedtime  cefTRIAXone Injectable. 2000 milliGRAM(s) IV Push every 24 hours  ferrous    sulfate 325 milliGRAM(s) Oral daily  heparin   Injectable 5000 Unit(s) SubCutaneous every 8 hours  insulin lispro (ADMELOG) corrective regimen sliding scale   SubCutaneous at bedtime  insulin lispro (ADMELOG) corrective regimen sliding scale   SubCutaneous three times a day before meals  levothyroxine 150 MICROGram(s) Oral daily  melatonin 5 milliGRAM(s) Oral at bedtime  multivitamin 1 Tablet(s) Oral daily  pantoprazole    Tablet 40 milliGRAM(s) Oral before breakfast  polyethylene glycol 3350 17 Gram(s) Oral daily  senna 2 Tablet(s) Oral at bedtime  sodium chloride 0.9% lock flush 3 milliLiter(s) IV Push every 8 hours                                    9.0    8.05  )-----------( 309      ( 26 Feb 2025 02:15 )             28.1       02-26    134[L]  |  101  |  20.2[H]  ----------------------------<  116[H]  4.1   |  22.0  |  0.93    Ca    8.6      26 Feb 2025 02:15  Mg     2.1     02-26    TPro  5.8[L]  /  Alb  2.5[L]  /  TBili  0.4  /  DBili  x   /  AST  47[H]  /  ALT  56[H]  /  AlkPhos  152[H]  02-25      PT/INR - ( 25 Feb 2025 02:30 )   PT: 14.0 sec;   INR: 1.21 ratio         PTT - ( 25 Feb 2025 11:10 )  PTT:33.9 sec        Daily     Daily       02-25 @ 07:01  -  02-26 @ 07:00  --------------------------------------------------------  IN: 978 mL / OUT: 1675 mL / NET: -697 mL        Critically Ill patient  : [x ] preoperative ,   [ ] post operative    Requires :  [ x] Arterial Line   [x ] Central Line  [ ] PA catheter  [ ] IABP  [ ] ECMO  [ ] LVAD  [ ] Ventilator  [ ] pacemaker [ x]  NC                       [x ] ABG's     [x ] Pulse Oxymetry Monitoring  Bedside evaluation , monitoring , treatment of hemodynamics , fluids , IVP/ IVCD meds.        Diagnosis:     Admitted - Chest Pain     Prosthetic Valve Endocarditis - TAVR     Strep salivarius     Osteomyelitis / Discitis - L3 L4 L5 S1    POD 6 - LCFA / LSFA thrombectomy / endarterectomy     Hypovolemia     Prerenal Azotemia     Hyponatremia     s/p transfusion 4 units PRBC    Anemia - acute blood loss - ? Etiology ?     A Fib     h/o GI Bleed - w/u for blood loss    ROVERTO Today     Respiratory insuffiencey     Requires Supplemental Oxygen Therapy     Hypoxemia - Requires  [ x] Nasal Canula     Requires chest PT, pulmonary toilet, suctioning to maintain SaO2,  patent airway and treat atelectasis.     Requires bedside physical therapy, mobilization and total [facility] care.                           Discussed with CT surgeon, Physician's Assistant - Nurse Practitioner- Critical care medicine team.   Discussed at  AM / PM rounds.   Chart, labs , films reviewed.    Cumulative Critical Care Time Given Today : 105 min

## 2025-02-27 DIAGNOSIS — T82.6XXA INFECTION AND INFLAMMATORY REACTION DUE TO CARDIAC VALVE PROSTHESIS, INITIAL ENCOUNTER: ICD-10-CM

## 2025-02-27 LAB
ANION GAP SERPL CALC-SCNC: 12 MMOL/L — SIGNIFICANT CHANGE UP (ref 5–17)
BUN SERPL-MCNC: 19.4 MG/DL — SIGNIFICANT CHANGE UP (ref 8–20)
CALCIUM SERPL-MCNC: 8.3 MG/DL — LOW (ref 8.4–10.5)
CHLORIDE SERPL-SCNC: 102 MMOL/L — SIGNIFICANT CHANGE UP (ref 96–108)
CO2 SERPL-SCNC: 22 MMOL/L — SIGNIFICANT CHANGE UP (ref 22–29)
CREAT SERPL-MCNC: 0.92 MG/DL — SIGNIFICANT CHANGE UP (ref 0.5–1.3)
CULTURE RESULTS: ABNORMAL
EGFR: 87 ML/MIN/1.73M2 — SIGNIFICANT CHANGE UP
EGFR: 87 ML/MIN/1.73M2 — SIGNIFICANT CHANGE UP
GLUCOSE BLDC GLUCOMTR-MCNC: 120 MG/DL — HIGH (ref 70–99)
GLUCOSE BLDC GLUCOMTR-MCNC: 125 MG/DL — HIGH (ref 70–99)
GLUCOSE BLDC GLUCOMTR-MCNC: 132 MG/DL — HIGH (ref 70–99)
GLUCOSE BLDC GLUCOMTR-MCNC: 146 MG/DL — HIGH (ref 70–99)
GLUCOSE BLDC GLUCOMTR-MCNC: 155 MG/DL — HIGH (ref 70–99)
GLUCOSE SERPL-MCNC: 113 MG/DL — HIGH (ref 70–99)
HCT VFR BLD CALC: 33.1 % — LOW (ref 39–50)
HGB BLD-MCNC: 10.7 G/DL — LOW (ref 13–17)
MAGNESIUM SERPL-MCNC: 1.9 MG/DL — SIGNIFICANT CHANGE UP (ref 1.6–2.6)
MCHC RBC-ENTMCNC: 27.6 PG — SIGNIFICANT CHANGE UP (ref 27–34)
MCHC RBC-ENTMCNC: 32.3 G/DL — SIGNIFICANT CHANGE UP (ref 32–36)
MCV RBC AUTO: 85.3 FL — SIGNIFICANT CHANGE UP (ref 80–100)
NRBC # BLD AUTO: 0 K/UL — SIGNIFICANT CHANGE UP (ref 0–0)
NRBC # FLD: 0 K/UL — SIGNIFICANT CHANGE UP (ref 0–0)
NRBC BLD AUTO-RTO: 0 /100 WBCS — SIGNIFICANT CHANGE UP (ref 0–0)
PLATELET # BLD AUTO: 262 K/UL — SIGNIFICANT CHANGE UP (ref 150–400)
PMV BLD: 9 FL — SIGNIFICANT CHANGE UP (ref 7–13)
POTASSIUM SERPL-MCNC: 4 MMOL/L — SIGNIFICANT CHANGE UP (ref 3.5–5.3)
POTASSIUM SERPL-SCNC: 4 MMOL/L — SIGNIFICANT CHANGE UP (ref 3.5–5.3)
RBC # BLD: 3.88 M/UL — LOW (ref 4.2–5.8)
RBC # FLD: 16.9 % — HIGH (ref 10.3–14.5)
SODIUM SERPL-SCNC: 136 MMOL/L — SIGNIFICANT CHANGE UP (ref 135–145)
SPECIMEN SOURCE: SIGNIFICANT CHANGE UP
WBC # BLD: 9.07 K/UL — SIGNIFICANT CHANGE UP (ref 3.8–10.5)
WBC # FLD AUTO: 9.07 K/UL — SIGNIFICANT CHANGE UP (ref 3.8–10.5)

## 2025-02-27 PROCEDURE — 71045 X-RAY EXAM CHEST 1 VIEW: CPT | Mod: 26

## 2025-02-27 PROCEDURE — 99232 SBSQ HOSP IP/OBS MODERATE 35: CPT

## 2025-02-27 PROCEDURE — 99223 1ST HOSP IP/OBS HIGH 75: CPT | Mod: FS

## 2025-02-27 PROCEDURE — 99233 SBSQ HOSP IP/OBS HIGH 50: CPT

## 2025-02-27 PROCEDURE — G0545: CPT

## 2025-02-27 RX ORDER — LIDOCAINE HYDROCHLORIDE 20 MG/ML
1 JELLY TOPICAL DAILY
Refills: 0 | Status: DISCONTINUED | OUTPATIENT
Start: 2025-02-27 | End: 2025-03-06

## 2025-02-27 RX ORDER — FENTANYL CITRATE-0.9 % NACL/PF 100MCG/2ML
25 SYRINGE (ML) INTRAVENOUS ONCE
Refills: 0 | Status: DISCONTINUED | OUTPATIENT
Start: 2025-02-27 | End: 2025-02-27

## 2025-02-27 RX ADMIN — Medication 3 MILLILITER(S): at 06:05

## 2025-02-27 RX ADMIN — Medication 650 MILLIGRAM(S): at 13:25

## 2025-02-27 RX ADMIN — Medication 325 MILLIGRAM(S): at 12:34

## 2025-02-27 RX ADMIN — Medication 3 MILLILITER(S): at 21:24

## 2025-02-27 RX ADMIN — INSULIN LISPRO 2: 100 INJECTION, SOLUTION INTRAVENOUS; SUBCUTANEOUS at 12:31

## 2025-02-27 RX ADMIN — TRAMADOL HYDROCHLORIDE 25 MILLIGRAM(S): 50 TABLET, FILM COATED ORAL at 17:52

## 2025-02-27 RX ADMIN — Medication 650 MILLIGRAM(S): at 14:30

## 2025-02-27 RX ADMIN — TRAMADOL HYDROCHLORIDE 25 MILLIGRAM(S): 50 TABLET, FILM COATED ORAL at 06:12

## 2025-02-27 RX ADMIN — HEPARIN SODIUM 5000 UNIT(S): 1000 INJECTION INTRAVENOUS; SUBCUTANEOUS at 13:25

## 2025-02-27 RX ADMIN — TRAMADOL HYDROCHLORIDE 25 MILLIGRAM(S): 50 TABLET, FILM COATED ORAL at 07:30

## 2025-02-27 RX ADMIN — CEFTRIAXONE 2000 MILLIGRAM(S): 500 INJECTION, POWDER, FOR SOLUTION INTRAMUSCULAR; INTRAVENOUS at 17:09

## 2025-02-27 RX ADMIN — Medication 25 MICROGRAM(S): at 10:15

## 2025-02-27 RX ADMIN — LIDOCAINE HYDROCHLORIDE 1 PATCH: 20 JELLY TOPICAL at 12:32

## 2025-02-27 RX ADMIN — Medication 150 MICROGRAM(S): at 06:12

## 2025-02-27 RX ADMIN — HEPARIN SODIUM 5000 UNIT(S): 1000 INJECTION INTRAVENOUS; SUBCUTANEOUS at 21:27

## 2025-02-27 RX ADMIN — Medication 650 MILLIGRAM(S): at 06:13

## 2025-02-27 RX ADMIN — TRAMADOL HYDROCHLORIDE 25 MILLIGRAM(S): 50 TABLET, FILM COATED ORAL at 18:50

## 2025-02-27 RX ADMIN — Medication 1 TABLET(S): at 12:32

## 2025-02-27 RX ADMIN — Medication 5 MILLIGRAM(S): at 21:27

## 2025-02-27 RX ADMIN — Medication 3 MILLILITER(S): at 13:19

## 2025-02-27 RX ADMIN — ATORVASTATIN CALCIUM 40 MILLIGRAM(S): 80 TABLET, FILM COATED ORAL at 21:27

## 2025-02-27 RX ADMIN — Medication 650 MILLIGRAM(S): at 21:24

## 2025-02-27 RX ADMIN — HEPARIN SODIUM 5000 UNIT(S): 1000 INJECTION INTRAVENOUS; SUBCUTANEOUS at 06:11

## 2025-02-27 RX ADMIN — Medication 40 MILLIGRAM(S): at 06:13

## 2025-02-27 RX ADMIN — POLYETHYLENE GLYCOL 3350 17 GRAM(S): 17 POWDER, FOR SOLUTION ORAL at 12:33

## 2025-02-27 RX ADMIN — Medication 2 TABLET(S): at 21:24

## 2025-02-27 NOTE — CHART NOTE - NSCHARTNOTEFT_GEN_A_CORE
Patient evaluated at bedside  Prevena dressing removed from L groin.  Staple line intact, no erythema or drainage.     Please continue with daily island dressing.  [place] will come out in office visit  Call if any questions

## 2025-02-27 NOTE — PROGRESS NOTE ADULT - SUBJECTIVE AND OBJECTIVE BOX
[facility]                                     Ne[facility] [place]                                 Gene Pina, & Eleno                                  [address]                                        [place], [place], [address]                                             [phone]    CC: difficulty walking  HPI:  The patient is a 74y Male who presented with endocarditis, lumbar spine osteomyelitis/discitis and pain limiting ambulation.  He said that his back pain started on 11/23/24 and has progressively worsened.  He had workup at [facility] that found spinal osteomyelitis with no abscess and intervention was not deemed necessary by neurosurgery at [place], as well as here.  He was subsequently found to have endocarditis and transferred for evaluation of AVR.   Neurology is asked to evaluate.     Interval history: moving legs a bit better today    Review of systems (neurology): Denies headache or dizziness. (+) pain and weakness. no numbness.  Denies speech/language deficits. Denies diplopia/blurred vision.  Denies confusion    MEDICATIONS  (STANDING):  acetaminophen     Tablet .. 650 milliGRAM(s) Oral every 8 hours  atorvastatin 40 milliGRAM(s) Oral at bedtime  cefTRIAXone Injectable. 2000 milliGRAM(s) IV Push every 24 hours  ferrous    sulfate 325 milliGRAM(s) Oral daily  heparin   Injectable 5000 Unit(s) SubCutaneous every 8 hours  insulin lispro (ADMELOG) corrective regimen sliding scale   SubCutaneous at bedtime  insulin lispro (ADMELOG) corrective regimen sliding scale   SubCutaneous three times a day before meals  levothyroxine 150 MICROGram(s) Oral daily  lidocaine   4% Patch 1 Patch Transdermal daily  melatonin 5 milliGRAM(s) Oral at bedtime  multivitamin 1 Tablet(s) Oral daily  pantoprazole    Tablet 40 milliGRAM(s) Oral before breakfast  polyethylene glycol 3350 17 Gram(s) Oral daily  senna 2 Tablet(s) Oral at bedtime  sodium chloride 0.9% lock flush 3 milliLiter(s) IV Push every 8 hours    MEDICATIONS  (PRN):  bisacodyl Suppository 10 milliGRAM(s) Rectal daily PRN Constipation  traMADol 50 milliGRAM(s) Oral every 6 hours PRN Severe Pain (7 - 10)  traMADol 25 milliGRAM(s) Oral every 6 hours PRN Moderate Pain (4 - 6)      Vital Signs Last 24 Hrs  T(C): 36.7 (27 Feb 2025 10:36), Max: 37.2 (27 Feb 2025 00:00)  T(F): 98.1 (27 Feb 2025 10:36), Max: 99 (27 Feb 2025 00:00)  HR: 82 (27 Feb 2025 10:36) (59 - 87)  BP: 149/75 (27 Feb 2025 10:36) (116/67 - 149/75)  BP(mean): 93 (27 Feb 2025 08:00) (82 - 98)  RR: 18 (27 Feb 2025 10:36) (10 - 23)  SpO2: 95% (27 Feb 2025 10:36) (91% - 99%)    Parameters below as of 27 Feb 2025 10:36  Patient On (Oxygen Delivery Method): room air    General: NAD    Detailed Neurologic Exam:    Mental status: The patient is awake and alert and has normal attention span.  The patient is fully oriented in 3 spheres. The patient is oriented to current events. The patient is able to name objects, follow commands, repeat sentences.    Cranial nerves: Pupils equal and react symmetrically to light. There is no visual field deficit to confrontation. Extraocular motion is full with no nystagmus. There is no ptosis. Facial sensation is intact. Facial musculature is symmetric.   Motor: There is normal bulk and tone.  There is no tremor.  Strength is 5/5 in the right arm  Strength is 5/5 in the left arm   Pain limits testing of power in legs however today he is at least 3/5 b/l    Sensation: Intact to light touch and pin in 4 extremities    Refelex- 1+patella DTR b/l    Cerebellar: There is no dysmetria on finger to nose testing.    Gait : deferred    LABS:                            10.7   9.07  )-----------( 262      ( 27 Feb 2025 01:06 )             33.1     02-27    136  |  102  |  19.4  ----------------------------<  113[H]  4.0   |  22.0  |  0.92    Ca    8.3[L]      27 Feb 2025 01:06  Mg     1.9     02-27      RADIOLOGY & ADDITIONAL STUDIES (independently reviewed unless otherwise noted):  MR Lumbar Spine w/wo IV Cont (02.24.25 @ 09:45)   IMPRESSION:  1. L3-L4 suspected septic discitis appears largely similar to 2/12/2025   noting mildly increased fluid within the disc space and increased left   paraspinal infiltration and enhancement. No abscess collection has   developed  2. L5-S1 suspected discitis appears largely similar to 2/12/2025, noting   decreased enhancement in the left ventral epidural space. No abscess   collection has developed  3. Recommend continued imaging follow-up

## 2025-02-27 NOTE — CONSULT NOTE ADULT - SUBJECTIVE AND OBJECTIVE BOX
Pt Name: CONNOR SANDRA    MRN: [id number]    HPI:  The patient is a 74y Male who presented with endocarditis, lumbar spine osteomyelitis/discitis and decreasing ambulation x 3 months. Patient seen and examined at bedside. Patient with osteomyelitis L3-L4, L5-S1 OM/discitis on MRI which appears similar to MRI done on 2/12/25. Patient reports back pain started on 11/23/24 and has progressively worsened over course of 3 months making it difficult to ambulate and started using a cane. Patient seen at [facility] and found to have OM/discitis and transferred to [facility]. Patient subsequently found to have endocarditis with LLE occlusion s/p common femoral endarterectomy with clot retrieval with vascular team 2/22/25. Patient seen by neurosurgery team who recommend no acute surgical intervention at this time, continue IV antibiotics at this time. Orthopedic spine team consulted for second opinion.       HEALTH ISSUES - PROBLEM Dx:  Chronic atrial fibrillation    Hypertension    Diabetes mellitus    Aortic valve endocarditis    Bacteremia    Osteomyelitis of vertebra, multiple sites in spine    Osteomyelitis of vertebra, lumbar region    Arterial embolism of left leg    GI bleed    Endocarditis        REVIEW OF SYSTEMS      General: NAD	    Skin/Breast: no abrasions      PAST MEDICAL & SURGICAL HISTORY:  PAST MEDICAL & SURGICAL HISTORY:  Hypertension      Diabetes mellitus      Obesity      Hypothyroidism      Prostate CA          Allergies: No Known Allergies      Medications: acetaminophen     Tablet .. 650 milliGRAM(s) Oral every 8 hours  atorvastatin 40 milliGRAM(s) Oral at bedtime  bisacodyl Suppository 10 milliGRAM(s) Rectal daily PRN  cefTRIAXone Injectable. 2000 milliGRAM(s) IV Push every 24 hours  ferrous    sulfate 325 milliGRAM(s) Oral daily  heparin   Injectable 5000 Unit(s) SubCutaneous every 8 hours  insulin lispro (ADMELOG) corrective regimen sliding scale   SubCutaneous at bedtime  insulin lispro (ADMELOG) corrective regimen sliding scale   SubCutaneous three times a day before meals  levothyroxine 150 MICROGram(s) Oral daily  lidocaine   4% Patch 1 Patch Transdermal daily  melatonin 5 milliGRAM(s) Oral at bedtime  multivitamin 1 Tablet(s) Oral daily  pantoprazole    Tablet 40 milliGRAM(s) Oral before breakfast  polyethylene glycol 3350 17 Gram(s) Oral daily  senna 2 Tablet(s) Oral at bedtime  sodium chloride 0.9% lock flush 3 milliLiter(s) IV Push every 8 hours  traMADol 50 milliGRAM(s) Oral every 6 hours PRN  traMADol 25 milliGRAM(s) Oral every 6 hours PRN      FAMILY HISTORY:  : non-contributory    Social History:     Ambulation: Walking independently [ ] With Cane [x] With Walker [x]  Bedbound [ ]                           10.7   9.07  )-----------( 262      ( 27 Feb 2025 01:06 )             33.1     02-27    136  |  102  |  19.4  ----------------------------<  113[H]  4.0   |  22.0  |  0.92    Ca    8.3[L]      27 Feb 2025 01:06  Mg     1.9     02-27        PHYSICAL EXAM:    Vital Signs Last 24 Hrs  T(C): 36.7 (27 Feb 2025 10:36), Max: 37.2 (27 Feb 2025 00:00)  T(F): 98.1 (27 Feb 2025 10:36), Max: 99 (27 Feb 2025 00:00)  HR: 82 (27 Feb 2025 10:36) (59 - 87)  BP: 149/75 (27 Feb 2025 10:36) (116/67 - 149/75)  BP(mean): 93 (27 Feb 2025 08:00) (82 - 98)  RR: 18 (27 Feb 2025 10:36) (10 - 23)  SpO2: 95% (27 Feb 2025 10:36) (91% - 99%)    Parameters below as of 27 Feb 2025 10:36  Patient On (Oxygen Delivery Method): room air      Daily     Daily     Appearance: Alert, responsive, in no acute distress.    Skin: no rash on visible skin. Skin is clean, dry and intact. No bleeding. No abrasions. No ulcerations.    Vascular: bcr, extremity warm b/l    Musculoskeletal:      Neurological: Sensation is grossly intact to light touch.     Motor exam: patient with left femoral incision mild pain to left groin throughout motor exam         [ ] Upper extremity                     Bi(c5)  WE(c6)  EE(c7)   FF(c8)                                                R         5/5        5/5        5/5       5/5                                               L          5/5        5/5        5/5       5/5         [ ] Lower extremeity                    HF(l2)   KE(l3)    TA(l4)   EHL(l5)  GS(s1)                                                 R        4/5        5/5        5/5       5/5         5/5                                               L         4/5        5/5       5/5       5/5          5/5    Imaging Studies:    < from: MR Lumbar Spine w/wo IV Cont (02.12.25 @ 18:01) >    ACC: [id number] EXAM:  MR SPINE LUMBAR WAW IC   ORDERED BY: SINAI [facility]     PROCEDURE DATE:  02/12/2025          INTERPRETATION:  CLINICAL INFORMATION: Back pain with positive blood   cultures    ADDITIONAL CLINICAL INFORMATION: Not Applicable    TECHNIQUE: Multiplanar, multisequence MRI was performed of the lumbar   spine.  IV Contrast: Gadavist  9 cc administered   1 cc discarded    PRIOR STUDIES: MRI lumbar spine 6/28/2019 and CT abdomen pelvis 1/6/2023    FINDINGS:    LOCALIZER: At C4-5 and C5-6 there is at least moderate spinal canal   stenosis.  BONES: Multilevel vertebral body endplate T1 hypointensity, T2/FLAIR   hyperintensity and enhancement most significant at L3-4 and L5-S1 with   contrast enhancement which in view of the history of bacteremia likely   represents infection.  ALIGNMENT: Trace retrolisthesis at L2-L3. Grade 1 anterolisthesis at   L4-L5.  SACROILIAC JOINTS/SACRUM: There is no sacral fracture. The SI joints are   partially visualized but are intact.  CONUSAND CAUDA EQUINA: The distal cord and conus are normal in signal.   Conus terminates at the level of the L1-L2 disc space.  VISUALIZED INTRAPELVIC/INTRA-ABDOMINAL SOFT TISSUES: Normal.  PARASPINAL SOFT TISSUES: Normal.      INDIVIDUAL LEVELS:    LOWER THORACIC SPINE: No spinal canal or neuroforaminal stenosis.    L1-L2: No spinal canal or neuroforaminal stenosis.  L2-L3: No spinal canal or neuroforaminal stenosis.  L3-L4: Broad disc osteophyte complex and bilateral facet arthrosis on top   of a congenitally narrowed spinal canal that results in severe spinal   canal stenosis and severe bilateral neuroforaminal stenosis. There is   compression of the exiting left L3 nerve root. Enhancement in the left   paraspinal region in the area of the medial psoas muscle is identified as   well as enhancement of the disc space suggesting osteomyelitis with the   paraspinal phlegmon. There is mild epidural enhancement. Mild   degenerative facet changes are noted.  L4-L5: Grade 1 anterolisthesis, bilateral facet arthrosis and ligamentum   flavum thickening on top of a congenitally narrowed spinal canal results   in severe spinal canal stenosis and severe bilateral neuroforaminal   stenosis. There is compression of the exiting left L4 nerve root.  L5-S1: Moderate-sized left-sided herniation with mild thecal sac   compression. Enhancement of the endplates of L5 and S1 as well as the   ventral dura without evidence of drainable collection likely representing   osteomyelitis.    IMPRESSION:    Degenerative changes with severe spinal stenosis L4-5. Enhancement with   abnormal signal at the L3-4 and L5-S1 disc space is likely related to   osteomyelitis in view of the history of bacteremia. Small left paraspinal   phlegmonous collection L3-4. Mild dural enhancement L5-S1.    --- End of Report ---          JEREMIAS SCHWARZ MD; Resident Radiologist  This document has been electronically signed.  TETO SCOTT MD; Attending Radiologist  This document has been electronically signed. Feb 13 2025 11:28AM    < end of copied text >    < from: MR Lumbar Spine w/wo IV Cont (02.24.25 @ 09:45) >    ACC: [id number] EXAM:  MR SPINE LUMBAR WAW IC   ORDERED BY: BRITTANIE DE LA CRUZ     PROCEDURE DATE:  02/24/2025          INTERPRETATION:  MR lumbar spine with and without without gadolinium   contrast    CLINICAL INFORMATION:     Osteo of spine, r/o worsening or abscess      Increasing weakness and pain  JMR  ADDITIONAL CLINICAL INFORMATION:   Not Applicable      TECHNIQUE:   Sagittal and axial T2-weighted images, sagittal and axial   T1-weighted and sagittal STIR images of the lumbar spine were obtained.   Following gadolinium administration fat-saturated sagittal and axial   T1-weighted images were obtained  CONTRAST:    Gadavist:     10 cc administered  ;  0 cc discarded    COMPARISON:    MRI lumbar 2/12/2025    FINDINGS:    At L3-L4 there is persistent endplate enhancement when compared to MR of   2/12/2025. Endplate erosive changes appear more prominent. Subchondral   edema also appears more prominent. There is increased fluid within the   disc space. With gadolinium administration there is endplate enhancement   and a small focus of enhancement at the posterior margin of the disc. No   epidural space collection has developed. The diffuse disc bulges is again   noted to deform the ventral thecal sac. Facet arthropathy and ligamentum   flavum hypertrophy are again noted to just toward the dorsal lateral   thecal sac. These features in conjunction result in moderate central   canal stenosis with partial effacement of CSF surrounding central   clustered intervertebral cauda equina. Foraminal compromise remains high   grade left worse than right. There is increased asymmetric left   paraspinal infiltration and enhancement between the disc space in the   medial margin of the left psoas muscle. No well-defined abscess   collection has developed.    At L5-S1 there is increased enhancement and comparable subchondral edema   compared of 2/12/2025. Fluid within the disc space is unchanged. There is   a small focus of enhancement at the posterior margin of the disc.   Enhancement surrounds a left paracentral disc protrusion to lesser extent   than was present on 2/12/2025. No epidural space collection has   developed. There is paraspinal infiltration and enhancement slightly   greater on the right than the left, also without paraspinal fluid   collection.    LUMBAR VERTEBRA AND ALIGNMENT:  Lumbar vertebral alignment again demonstrates mild exaggeration of the   thoracolumbar kyphosis. An idiopathic scoliosis is dextroconvex in the   upper lumbar spine, levoconvex in the thoracicspine. There is also grade   1 anterior listhesis L4 on L5 absent spondylolysis. Lumbar vertebral body   heights are maintained.  Marrow signal intensity within lumbar vertebra   is heterogeneous throughout the lower lumbar spine similar to that seen  on 2/12/2025.   Focal marrow lesions within L2 and T11 remain typical for   hemangiomas of bone.    SACRUM:   The sacrum appears intact.  VISUALIZED PELVIC BONES:  The visualized pelvic bones appear intact.  SACROILIAC JOINTS:   The sacroiliac joints are incompletely visualized,   as seen intact.    PARASPINAL SOFT TISSUES: See above.  VISUALIZED ABDOMINAL AND PELVIC SOFT TISSUES: Left renal cystic lesion is   identified.    LOWER THORACIC SPINE:  Levels adequately evaluated:   T12  No significant abnormality in the visualized lower thoracic spine    LUMBAR INTERVERTEBRAL DISC LEVELS:    General comments: The remaining intervertebral discs again demonstrate   degenerative features that are unchanged from 2/12/2025. Please see that   report for detailed evaluation.    CNS STRUCTURES:  The distal cord maintains intact morphology and signal intensity.  The   conus is normally positioned at L1.   Nerve roots of the cauda equina   again demonstrates linear reticular enhancement is most evident at the L3   and L4 levels.   No nerve root nodularity, clumping or dural adherence is   recognized.      IMPRESSION:    1. L3-L4 suspected septic discitis appears largely similar to 2/12/2025   noting mildly increased fluid within the disc space and increased left   paraspinal infiltration and enhancement. No abscess collection has   developed    2. L5-S1 suspected discitis appears largely similar to 2/12/2025, noting   decreased enhancement in the left ventral epidural space. No abscess   collection has developed    3. Recommend continued imaging follow-up    --- End of Report ---            TISHA SHEARER MD; Attending Radiologist  This document has been electronically signed. Feb 24 2025 11:21AM    < end of copied text >      A/P:  Pt is a  74y Male with L3-4, L5-S1 osteo/discitis    PLAN: case/imaging and plan d/w Dr. Mckeon  * Pain control  * ID following  * Antibiotics as per ID  * MRI ordered  * Treatment plan to be finalized after review of pending imaging studies  * continue rest of care per primary team

## 2025-02-27 NOTE — PROGRESS NOTE ADULT - SUBJECTIVE AND OBJECTIVE BOX
INFECTIOUS DISEASES AND INTERNAL MEDICINE at [place]  =======================================================  Fabián Liz MD  Diplomates American Board of Internal Medicine and Infectious Diseases  Telephone [phone]  Fax            [phone]  =======================================================    CONNOR SANDRA [id number]    Follow up:  STREP ENDOCARDITIS AND DISCITIS     Allergies:  No Known Allergies      Medications:  acetaminophen     Tablet .. 650 milliGRAM(s) Oral every 8 hours  atorvastatin 40 milliGRAM(s) Oral at bedtime  bisacodyl Suppository 10 milliGRAM(s) Rectal daily PRN  cefTRIAXone Injectable. 2000 milliGRAM(s) IV Push every 24 hours  ferrous    sulfate 325 milliGRAM(s) Oral daily  heparin   Injectable 5000 Unit(s) SubCutaneous every 8 hours  insulin lispro (ADMELOG) corrective regimen sliding scale   SubCutaneous at bedtime  insulin lispro (ADMELOG) corrective regimen sliding scale   SubCutaneous three times a day before meals  levothyroxine 150 MICROGram(s) Oral daily  lidocaine   4% Patch 1 Patch Transdermal daily  melatonin 5 milliGRAM(s) Oral at bedtime  multivitamin 1 Tablet(s) Oral daily  pantoprazole    Tablet 40 milliGRAM(s) Oral before breakfast  polyethylene glycol 3350 17 Gram(s) Oral daily  senna 2 Tablet(s) Oral at bedtime  sodium chloride 0.9% lock flush 3 milliLiter(s) IV Push every 8 hours  traMADol 50 milliGRAM(s) Oral every 6 hours PRN  traMADol 25 milliGRAM(s) Oral every 6 hours PRN    SOCIAL       FAMILY   FAMILY HISTORY:    REVIEW OF SYSTEMS:  CONSTITUTIONAL:  No Fever or chills  HEENT:   No diplopia or blurred vision.  No earache, sore throat or runny nose.  CARDIOVASCULAR:  No pressure, squeezing, strangling, tightness, heaviness or aching about the chest, neck, axilla or epigastrium.  RESPIRATORY:  No cough, shortness of breath, PND or orthopnea.  GASTROINTESTINAL:  No nausea, vomiting or diarrhea.  GENITOURINARY:  No dysuria, frequency or urgency. No Blood in urine  MUSCULOSKELETAL:   moves all joints  SKIN:  No change in skin, hair or nails.  NEUROLOGIC:  No paresthesias, fasciculations, seizures or weakness.  PSYCHIATRIC:  No disorder of thought or mood.  ENDOCRINE:  No heat or cold intolerance, polyuria or polydipsia.  HEMATOLOGICAL:  No easy bruising or bleeding.            Physical Exam:  ICU Vital Signs Last 24 Hrs  T(C): 36.6 (27 Feb 2025 15:50), Max: 37.2 (27 Feb 2025 00:00)  T(F): 97.8 (27 Feb 2025 15:50), Max: 99 (27 Feb 2025 00:00)  HR: 73 (27 Feb 2025 15:50) (59 - 87)  BP: 132/69 (27 Feb 2025 15:50) (116/67 - 156/83)  BP(mean): 93 (27 Feb 2025 08:00) (82 - 96)  ABP: --  ABP(mean): --  RR: 18 (27 Feb 2025 15:50) (13 - 23)  SpO2: 94% (27 Feb 2025 15:50) (92% - 98%)    O2 Parameters below as of 27 Feb 2025 15:50  Patient On (Oxygen Delivery Method): room air          GEN: NAD,   HEENT: normocephalic and atraumatic. EOMI. JASVIR.    NECK: Supple. No carotid bruits.  No lymphadenopathy or thyromegaly.  LUNGS: Clear to auscultation.  HEART: Regular rate and rhythm 2/6t murmur.  ABDOMEN: Soft, nontender, and nondistended.  Positive bowel sounds.    : No CVA tenderness  EXTREMITIES: Without any cyanosis, clubbing, rash, lesions or edema.  MSK: no joint swelling  NEUROLOGIC: Cranial nerves II through XII are grossly intact.  PSYCHIATRIC: Appropriate affect .  SKIN: No ulceration or induration present.        Labs:  Vitals:  ============  T(F): 97.8 (27 Feb 2025 15:50), Max: 99 (27 Feb 2025 00:00)  HR: 73 (27 Feb 2025 15:50)  BP: 132/69 (27 Feb 2025 15:50)  RR: 18 (27 Feb 2025 15:50)  SpO2: 94% (27 Feb 2025 15:50) (92% - 98%)  temp max in last 48H T(F): , Max: 99 (02-26-25 @ 04:00)    =======================================================  Current Antibiotics:  cefTRIAXone Injectable. 2000 milliGRAM(s) IV Push every 24 hours    Other medications:  acetaminophen     Tablet .. 650 milliGRAM(s) Oral every 8 hours  atorvastatin 40 milliGRAM(s) Oral at bedtime  ferrous    sulfate 325 milliGRAM(s) Oral daily  heparin   Injectable 5000 Unit(s) SubCutaneous every 8 hours  insulin lispro (ADMELOG) corrective regimen sliding scale   SubCutaneous at bedtime  insulin lispro (ADMELOG) corrective regimen sliding scale   SubCutaneous three times a day before meals  levothyroxine 150 MICROGram(s) Oral daily  lidocaine   4% Patch 1 Patch Transdermal daily  melatonin 5 milliGRAM(s) Oral at bedtime  multivitamin 1 Tablet(s) Oral daily  pantoprazole    Tablet 40 milliGRAM(s) Oral before breakfast  polyethylene glycol 3350 17 Gram(s) Oral daily  senna 2 Tablet(s) Oral at bedtime  sodium chloride 0.9% lock flush 3 milliLiter(s) IV Push every 8 hours      =======================================================  Labs:                        10.7   9.07  )-----------( 262      ( 27 Feb 2025 01:06 )             33.1     02-27    136  |  102  |  19.4  ----------------------------<  113[H]  4.0   |  22.0  |  0.92    Ca    8.3[L]      27 Feb 2025 01:06  Mg     1.9     02-27        Culture - Blood (collected 02-24-25 @ 20:30)  Source: .Blood Blood-Venous  Preliminary Report (02-27-25 @ 02:02):    No growth at 48 Hours    Culture - Fungal, Other (collected 02-22-25 @ 07:42)  Source: .Other Left femoral plaque  Preliminary Report (02-26-25 @ 23:03):    Culture is being performed. Fungal cultures are held for 4 weeks.    Culture - Acid Fast - Tissue w/Smear (collected 02-22-25 @ 07:42)  Source: Tissue Left femoral plaque  Preliminary Report (02-26-25 @ 23:07):    Culture is being performed.    Culture - Tissue with Gram Stain (collected 02-22-25 @ 07:42)  Source: Tissue Left femoral plaque  Gram Stain (02-22-25 @ 19:34):    Few polymorphonuclear leukocytes per low power field    Moderate Gram Positive Cocci in Pairs and Chains per oil power field  Final Report (02-27-25 @ 09:58):    Organism seen in Gram stain is non-viable after prolonged    incubation and repeated subculture.    Culture - Blood (collected 02-20-25 @ 18:10)  Source: .Blood None  Final Report (02-26-25 @ 02:00):    No growth at 5 days    Culture - Blood (collected 02-20-25 @ 18:10)  Source: .Blood None  Final Report (02-26-25 @ 02:00):    No growth at 5 days    Culture - Blood (collected 02-14-25 @ 07:04)  Source: .Blood BLOOD  Final Report (02-19-25 @ 13:00):    No growth at 5 days    Culture - Blood (collected 02-14-25 @ 07:01)  Source: .Blood BLOOD  Final Report (02-19-25 @ 13:00):    No growth at 5 days    Urinalysis with Rflx Culture (collected 02-10-25 @ 12:47)    Culture - Blood (collected 02-10-25 @ 09:32)  Source: .Blood BLOOD  Gram Stain (02-11-25 @ 13:01):    Growth in aerobic bottle: Gram Positive Cocci in Pairs and Chains    Growth in anaerobic bottle: Gram Positive Cocci in Pairs and Chains  Final Report (02-13-25 @ 06:33):    Growth in aerobic and anaerobic bottles: Streptococcus    salivarius/vestibularis group  Organism: Streptococcus salivarius/vestibularis group (02-13-25 @ 06:33)  Organism: Streptococcus salivarius/vestibularis group (02-13-25 @ 06:33)    Sensitivities:      Method Type: BRIANA      -  Ceftriaxone: S <=0.25      -  Penicillin: S 0.06      -  Vancomycin: S 0.5    Culture - Blood (collected 02-10-25 @ 09:28)  Source: .Blood BLOOD  Gram Stain (02-11-25 @ 09:51):    Growth in aerobic bottle: Gram Positive Cocci in Pairs and Chains    Growth in anaerobic bottle: Gram Positive Cocci in Pairs and Chains  Final Report (02-12-25 @ 17:14):    Growth in aerobic and anaerobic bottles: Streptococcus    salivarius/vestibularis group "Susceptibilities not performed"    Direct identification is available within approximately 3-5    hours either by Blood Panel Multiplexed PCR or Direct    MALDI-TOF. Details: [email or web address]  Organism: Blood Culture PCR (02-12-25 @ 17:14)  Organism: Blood Culture PCR (02-12-25 @ 17:14)    Sensitivities:      Method Type: PCR      -  Streptococcus sp. (Not Grp A, B or S pneumoniae): Detec    Culture - Urine (collected 03-20-24 @ 20:27)  Source: Clean Catch None  Final Report (03-21-24 @ 23:15):    No growth      Creatinine: 0.92 mg/dL (02-27-25 @ 01:06)  Creatinine: 0.93 mg/dL (02-26-25 @ 02:15)  Creatinine: 1.02 mg/dL (02-25-25 @ 02:30)  Creatinine: 0.94 mg/dL (02-24-25 @ 20:30)  Creatinine: 1.07 mg/dL (02-24-25 @ 06:23)  Creatinine: 1.21 mg/dL (02-23-25 @ 06:02)        Ferritin: 548 ng/mL (02-14-25 @ 07:01)    C-Reactive Protein: 206.0 mg/mL (02-20-25 @ 18:10)    WBC Count: 9.07 K/uL (02-27-25 @ 01:06)  WBC Count: 10.93 K/uL (02-26-25 @ 12:19)  WBC Count: 8.05 K/uL (02-26-25 @ 02:15)  WBC Count: 9.52 K/uL (02-25-25 @ 20:20)  WBC Count: 10.01 K/uL (02-25-25 @ 04:00)  WBC Count: 10.86 K/uL (02-25-25 @ 02:30)  WBC Count: 11.31 K/uL (02-24-25 @ 20:30)  WBC Count: 12.81 K/uL (02-24-25 @ 06:23)  WBC Count: 13.62 K/uL (02-23-25 @ 06:02)  WBC Count: 12.38 K/uL (02-22-25 @ 17:59)    SARS-CoV-2 Result: NotDetec (02-21-25 @ 19:50)  SARS-CoV-2 Result: NotDetec (02-10-25 @ 10:13)      Alkaline Phosphatase: 152 U/L (02-25-25 @ 02:30)  Alkaline Phosphatase: 177 U/L (02-24-25 @ 20:30)  Alkaline Phosphatase: 171 U/L (02-24-25 @ 06:23)  Alanine Aminotransferase (ALT/SGPT): 56 U/L (02-25-25 @ 02:30)  Alanine Aminotransferase (ALT/SGPT): 65 U/L (02-24-25 @ 20:30)  Alanine Aminotransferase (ALT/SGPT): 84 U/L (02-24-25 @ 06:23)  Aspartate Aminotransferase (AST/SGOT): 47 U/L (02-25-25 @ 02:30)  Aspartate Aminotransferase (AST/SGOT): 60 U/L (02-24-25 @ 20:30)  Aspartate Aminotransferase (AST/SGOT): 76 U/L (02-24-25 @ 06:23)  Bilirubin Total: 0.4 mg/dL (02-25-25 @ 02:30)  Bilirubin Total: 0.3 mg/dL (02-24-25 @ 20:30)  Bilirubin Total: 0.4 mg/dL (02-24-25 @ 06:23)

## 2025-02-27 NOTE — PROGRESS NOTE ADULT - ASSESSMENT
The patient is a 74y Male who is followed by neurology because of impaired ambulation due to pain and spinal disease    Impaired ambulation  leg strength improved today (2/27) with less pain  likely multifactorial    - pain limited    - spinal osteomyelitis    - limited ambulation since 11/23/24    Will need PT, pain control, abx per ID    If he eventually will need AVR I suggest to call neurointerventional for cerebral angio to evaluate for mycotic aneurysm    will follow with you    Jay Mills MD PhD   [phone]

## 2025-02-27 NOTE — CONSULT NOTE ADULT - ASSESSMENT
Attending statement:  I have personally seen this patient, and formed a face to face diagnostic evaluation on this patient on this date.  I have reviewed the PA, NP and or Medical/PA student and/or Resident documentation and agree with the history, physical exam and plan of care except if noted otherwise.      Pleasant conversation was conducted with this gentleman in the morning with regard to a complex lumbar presentation has had a longstanding history of [place] spine multiple epidural injections and a sepsis presentation vegetations on his heart valve as well as osteomyelitis and discitis at L3-4-5 1 and probably suspected 4 5 is well.  In order to address this global presentation it would require instrumented fusion [id number] into the pelvis.  Patient is tentatively scheduled for an valve exchange secondary to these septic vegetations in approximately 4 to 6 weeks.  MRI of the cervical and thoracic spine have been ordered to make sure there is no cervical pathology given his underlying sepsis and severe cervical spondylosis noted on an oral maxillary facial CAT scan.  Undergoing advanced and complex spine surgery prior to her cardiac surgery may delay his cardiac surgery.  Surgery patient will be followed closely with additional recommendations to follow
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
## 2025-02-27 NOTE — PROGRESS NOTE ADULT - PROBLEM SELECTOR PLAN 1
of TAVR valve  prior blood cx + strept salivarius  repeat cultures neg  seen by ID, cont rocephin  2/26 ROVERTO: echogenic sesile not very mobile subcentimeter ( 6x2 mm) structure attached to the base of the leaflet corresponding to the native non-coronary cusp likely representing a calcified nodule vs. less likely an old small calcified vegetation, mod AS  seen by neurosx for LS osteo rec conservative management  fitted for brace  now seen by orhto spine, repeat imaging ordered  needs aggressive PT, ambulation limited by pain  lidopatch added  ideally pt needs to improve with PT and be more ambulatory prior to surgery  plan to be determined  d/w Dr. Rodriges in AM rounds

## 2025-02-27 NOTE — PROGRESS NOTE ADULT - SUBJECTIVE AND OBJECTIVE BOX
Subjective: c/o lower back pain denies CP, palpitations, SOB, cough, fever, chills, itchiness/rash, diaphoresis, vision changes, HA, dizziness/lightheadedness, numbness/tingling, abd pain, N/V.    T(C): 36.1 (02-27-25 @ 12:30), Max: 37.2 (02-27-25 @ 00:00)  HR: 77 (02-27-25 @ 12:30) (59 - 87)  BP: 145/73 (02-27-25 @ 12:30) (116/67 - 149/75)  RR: 18 (02-27-25 @ 12:30) (10 - 23)  SpO2: 98% (02-27-25 @ 12:30) (91% - 99%)    02-27    136  |  102  |  19.4  ----------------------------<  113[H]  4.0   |  22.0  |  0.92    Ca    8.3[L]      27 Feb 2025 01:06  Mg     1.9     02-27                                 10.7   9.07  )-----------( 262      ( 27 Feb 2025 01:06 )             33.1                    CAPILLARY BLOOD GLUCOSE  POCT Blood Glucose.: 155 mg/dL (27 Feb 2025 12:03)  POCT Blood Glucose.: 132 mg/dL (27 Feb 2025 07:40)  POCT Blood Glucose.: 146 mg/dL (26 Feb 2025 21:38)  POCT Blood Glucose.: 120 mg/dL (26 Feb 2025 18:20)    I&O's Detail    26 Feb 2025 07:01  -  27 Feb 2025 07:00  --------------------------------------------------------  IN:    IV PiggyBack: 100 mL    Oral Fluid: 240 mL  Total IN: 340 mL    OUT:    Voided (mL): 700 mL  Total OUT: 700 mL  Total NET: -360 mL    Drug Dosing Weight  Height (cm): 172.7 (24 Feb 2025 20:04)  Weight (kg): 103.3 (24 Feb 2025 20:04)  BMI (kg/m2): 34.6 (24 Feb 2025 20:04)  BSA (m2): 2.16 (24 Feb 2025 20:04)    MEDICATIONS  (STANDING):  acetaminophen     Tablet .. 650 milliGRAM(s) Oral every 8 hours  atorvastatin 40 milliGRAM(s) Oral at bedtime  cefTRIAXone Injectable. 2000 milliGRAM(s) IV Push every 24 hours  ferrous    sulfate 325 milliGRAM(s) Oral daily  heparin   Injectable 5000 Unit(s) SubCutaneous every 8 hours  insulin lispro (ADMELOG) corrective regimen sliding scale   SubCutaneous at bedtime  insulin lispro (ADMELOG) corrective regimen sliding scale   SubCutaneous three times a day before meals  levothyroxine 150 MICROGram(s) Oral daily  lidocaine   4% Patch 1 Patch Transdermal daily  melatonin 5 milliGRAM(s) Oral at bedtime  multivitamin 1 Tablet(s) Oral daily  pantoprazole    Tablet 40 milliGRAM(s) Oral before breakfast  polyethylene glycol 3350 17 Gram(s) Oral daily  senna 2 Tablet(s) Oral at bedtime  sodium chloride 0.9% lock flush 3 milliLiter(s) IV Push every 8 hours    MEDICATIONS  (PRN):  bisacodyl Suppository 10 milliGRAM(s) Rectal daily PRN Constipation  traMADol 50 milliGRAM(s) Oral every 6 hours PRN Severe Pain (7 - 10)  traMADol 25 milliGRAM(s) Oral every 6 hours PRN Moderate Pain (4 - 6)    Physical Exam  Gen: NAD  Neuro: A&Ox3, non focal speech clear and intact  Pulm: dec BS b/l no wheezing  CV: S1S2 RRR  Abd: +BS soft NT ND  Extrem/MS: 1-2+ b/l LE edema, no cyanosis OQUENDO

## 2025-02-27 NOTE — CHART NOTE - NSCHARTNOTEFT_GEN_A_CORE
Adjusted length of Aspen LSO dispensed 2/25/2025  prescribed by patients physician. Added extension panels to increased length. Nurse Louise NORIEGA assisted with having patient stand for re fit. Patient safe and comfortable  [facility]  [phone]

## 2025-02-27 NOTE — PROGRESS NOTE ADULT - ASSESSMENT
73 y/o male with a PMHx of HTN, A-FIB, HLD, GI Bleed, S/P TAVR 2023, chronic back pain and DM initially presented to [facility] ED with a two-day history of a rash on his left leg and pain in left knee radiating towards his foot. Pt recently discharged to [facility] rehab after treatment for infected TAVR and Osteomyelitis of spine, strep salivarius bacteremia. Pt received transfusion and w/u for GI bleed with unremarkable colonoscopy and upper endoscopy. Underwent ROVERTO at that time which showed vegetation of TAVR. Plan at that time was for 6 weeks of IV antibiotics via PICC line and out patient pill camera study.    Since admission patient was seen by vascular surgery,  CTA showed clot to common femoral and distal embolism. S/P common femoral endarterectomy with clot retrieval and good blood flow to lower extremity post surgery. Repeat blood cultures were negative but the excised clot was found to have gram positive cocci. Pt with weakness to legs. Sent for repeat MR with contrast. Results without abscess at this time. Other incidental problem is constipation and persistent back pain requiring tramadol for pain relief.  Pt received transfusion of 2 unit of PRBC's 2/23.  Patient transfered to [facility] for evaluation of TAVR and possible surgical replacement.    AS ABOVE WAS READMITTED TO [facility] BECAUSR OF GI BLEED  PT WITH RASH ON LEG AND WAS CHANGED FROM ROCEPHIN TO DAPTOMYCIN  PT UNDERWENT FEMORAL ENDARECTOMY RETRIEVAL OF CLOT   PT RASH WAS ULTIMATELY RELATED TO  ISCHEMIC ISSUES AND NOT ALLERGY  SPOKE TO ID AT [facility] TO CLARIFY  SUGGEST CHANGE BACK TO ROCEPHIN TO CONITNUE    FOR  EXTENDED COURSE  CARDIAC SURGERY TO FURTHER EVALUATE VALVE  WILL PLAN IV ROCEPHIN TO COMPLETE AT LEAST 8 WEEKS FOR DISCITIIS AND  ENDOCARDITIS  WILL PLAN ROCEPHIN THROGH AT LEAST 4/7/25  WEEKLY CBC CMP SED RATE CRP  WILL NEED REPEAT MRI LS SPINE AS OUITPT  SPOKE TO WIFE AT BEDSIDE

## 2025-02-27 NOTE — PROGRESS NOTE ADULT - ASSESSMENT
74M, PMHx of HTN, AF, HLD, GI Bleed, S/P TAVR 2023, chronic back pain and DM initially presented to [facility] ED with a two-day history of a rash on his left leg and pain in left knee radiating towards his foot. Pt recently discharged to SNF rehab after treatment for infected TAVR and Osteomyelitis of spine, strep salivarius bacteremia. Pt received transfusion and w/u for GI bleed with unremarkable colonoscopy and upper endoscopy. Underwent ROVERTO at that time which showed vegetation of TAVR. Plan at that time was for 6 weeks of IV antibiotics via PICC line and out patient pill camera study.    Since admission patient was seen by vascular surgery,  CTA showed clot to common femoral and distal embolism. S/P common femoral endarterectomy with clot retrieval and good blood flow to lower extremity post surgery. Repeat blood cultures were negative but the excised clot was found to have gram positive cocci. Pt with weakness to legs. Sent for repeat MR with contrast. Results without abscess at this time. Other incidental problem is constipation and persistent back pain requiring tramadol for pain relief.  Pt received transfusion of 2 unit of PRBC's 2/23.    Patient transfered to [facility] for evaluation of TAVR and possible surgical replacement. Condition at time of transfer is stable but guarded prognosis.    Patient had requested DNR/DNI on admission.  However he has rescinded this request until after possible cardiac surgery. Order for DNR was cancelled. 74M, PMHx of HTN, AF, HLD, GI Bleed, S/P TAVR 2023, chronic back pain and DM recent admission at [facility] 2/10-2/17 with acute anemia, DARLINE, hypotension, s/p 2PRBC, negative EGD/colonoscopy, found to have strept salivrius bacteremia, ROVERTO with vegetation on TAVR valve, MRI spine with possible osteo of lumbarsacral spine, d/c'd to rehab on rocephin, represented to  2/20 with LLE rash and L knee pain found to have     presented to [facility] ED with a two-day history of a rash on his left leg and pain in left knee radiating towards his foot. Pt recently discharged to SNF rehab after treatment for infected TAVR and Osteomyelitis of spine, strep salivarius bacteremia. Pt received transfusion and w/u for GI bleed with unremarkable colonoscopy and upper endoscopy. Underwent ROVERTO at that time which showed vegetation of TAVR. Plan at that time was for 6 weeks of IV antibiotics via PICC line and out patient pill camera study.    Since admission patient was seen by vascular surgery,  CTA showed clot to common femoral and distal embolism. S/P common femoral endarterectomy with clot retrieval and good blood flow to lower extremity post surgery. Repeat blood cultures were negative but the excised clot was found to have gram positive cocci. Pt with weakness to legs. Sent for repeat MR with contrast. Results without abscess at this time. Other incidental problem is constipation and persistent back pain requiring tramadol for pain relief.  Pt received transfusion of 2 unit of PRBC's 2/23.    Patient transfered to [facility] for evaluation of TAVR and possible surgical replacement. Condition at time of transfer is stable but guarded prognosis.    Patient had requested DNR/DNI on admission.  However he has rescinded this request until after possible cardiac surgery. Order for DNR was cancelled. 74M, PMHx of HTN, AF, HLD, GI Bleed, S/P TAVR 2023, chronic back pain and DM recent admission at [facility] 2/10-2/17 with acute anemia, DARLINE, hypotension, s/p 2PRBC, negative EGD/colonoscopy, found to have strept salivrius bacteremia, ROVERTO with vegetation on TAVR valve, MRI spine with possible osteo of lumbar-sacral spine, d/c'd to rehab on rocephin, represented to  2/20 with LLE rash and L knee pain found to have clot in LCFA with distal embolism s/p CFA endarterectomy with clot retrieval and good blood flow to LE, clot cx with GPC, repeat MRI spine without abscess, transfused 2 PRBC on 2/23, transferred to CTICU at [facility]  2/24 for CT surgery evaluation.

## 2025-02-28 LAB
ANION GAP SERPL CALC-SCNC: 11 MMOL/L — SIGNIFICANT CHANGE UP (ref 5–17)
BUN SERPL-MCNC: 18.3 MG/DL — SIGNIFICANT CHANGE UP (ref 8–20)
CALCIUM SERPL-MCNC: 8.4 MG/DL — SIGNIFICANT CHANGE UP (ref 8.4–10.5)
CHLORIDE SERPL-SCNC: 101 MMOL/L — SIGNIFICANT CHANGE UP (ref 96–108)
CO2 SERPL-SCNC: 22 MMOL/L — SIGNIFICANT CHANGE UP (ref 22–29)
CREAT SERPL-MCNC: 0.93 MG/DL — SIGNIFICANT CHANGE UP (ref 0.5–1.3)
EGFR: 86 ML/MIN/1.73M2 — SIGNIFICANT CHANGE UP
EGFR: 86 ML/MIN/1.73M2 — SIGNIFICANT CHANGE UP
GLUCOSE BLDC GLUCOMTR-MCNC: 113 MG/DL — HIGH (ref 70–99)
GLUCOSE BLDC GLUCOMTR-MCNC: 132 MG/DL — HIGH (ref 70–99)
GLUCOSE BLDC GLUCOMTR-MCNC: 135 MG/DL — HIGH (ref 70–99)
GLUCOSE BLDC GLUCOMTR-MCNC: 155 MG/DL — HIGH (ref 70–99)
GLUCOSE SERPL-MCNC: 103 MG/DL — HIGH (ref 70–99)
HCT VFR BLD CALC: 32.5 % — LOW (ref 39–50)
HGB BLD-MCNC: 10.8 G/DL — LOW (ref 13–17)
MAGNESIUM SERPL-MCNC: 2 MG/DL — SIGNIFICANT CHANGE UP (ref 1.6–2.6)
MCHC RBC-ENTMCNC: 27.7 PG — SIGNIFICANT CHANGE UP (ref 27–34)
MCHC RBC-ENTMCNC: 33.2 G/DL — SIGNIFICANT CHANGE UP (ref 32–36)
MCV RBC AUTO: 83.3 FL — SIGNIFICANT CHANGE UP (ref 80–100)
NRBC # BLD AUTO: 0 K/UL — SIGNIFICANT CHANGE UP (ref 0–0)
NRBC # FLD: 0 K/UL — SIGNIFICANT CHANGE UP (ref 0–0)
NRBC BLD AUTO-RTO: 0 /100 WBCS — SIGNIFICANT CHANGE UP (ref 0–0)
PLATELET # BLD AUTO: 259 K/UL — SIGNIFICANT CHANGE UP (ref 150–400)
PMV BLD: 9.7 FL — SIGNIFICANT CHANGE UP (ref 7–13)
POTASSIUM SERPL-MCNC: 3.7 MMOL/L — SIGNIFICANT CHANGE UP (ref 3.5–5.3)
POTASSIUM SERPL-SCNC: 3.7 MMOL/L — SIGNIFICANT CHANGE UP (ref 3.5–5.3)
RBC # BLD: 3.9 M/UL — LOW (ref 4.2–5.8)
RBC # FLD: 17.3 % — HIGH (ref 10.3–14.5)
SODIUM SERPL-SCNC: 134 MMOL/L — LOW (ref 135–145)
WBC # BLD: 10.31 K/UL — SIGNIFICANT CHANGE UP (ref 3.8–10.5)
WBC # FLD AUTO: 10.31 K/UL — SIGNIFICANT CHANGE UP (ref 3.8–10.5)

## 2025-02-28 PROCEDURE — 71045 X-RAY EXAM CHEST 1 VIEW: CPT | Mod: 26

## 2025-02-28 PROCEDURE — 99231 SBSQ HOSP IP/OBS SF/LOW 25: CPT

## 2025-02-28 PROCEDURE — 99233 SBSQ HOSP IP/OBS HIGH 50: CPT | Mod: FS

## 2025-02-28 PROCEDURE — G0545: CPT

## 2025-02-28 PROCEDURE — 99232 SBSQ HOSP IP/OBS MODERATE 35: CPT | Mod: FS

## 2025-02-28 PROCEDURE — 99233 SBSQ HOSP IP/OBS HIGH 50: CPT

## 2025-02-28 RX ORDER — KETOROLAC TROMETHAMINE 30 MG/ML
30 INJECTION, SOLUTION INTRAMUSCULAR; INTRAVENOUS ONCE
Refills: 0 | Status: DISCONTINUED | OUTPATIENT
Start: 2025-02-28 | End: 2025-02-28

## 2025-02-28 RX ORDER — KETOROLAC TROMETHAMINE 30 MG/ML
15 INJECTION, SOLUTION INTRAMUSCULAR; INTRAVENOUS EVERY 8 HOURS
Refills: 0 | Status: DISCONTINUED | OUTPATIENT
Start: 2025-02-28 | End: 2025-03-02

## 2025-02-28 RX ORDER — ACETAMINOPHEN 500 MG/5ML
975 LIQUID (ML) ORAL EVERY 8 HOURS
Refills: 0 | Status: DISCONTINUED | OUTPATIENT
Start: 2025-02-28 | End: 2025-02-28

## 2025-02-28 RX ORDER — CYCLOBENZAPRINE HYDROCHLORIDE 15 MG/1
5 CAPSULE, EXTENDED RELEASE ORAL THREE TIMES A DAY
Refills: 0 | Status: DISCONTINUED | OUTPATIENT
Start: 2025-02-28 | End: 2025-03-06

## 2025-02-28 RX ADMIN — Medication 150 MICROGRAM(S): at 06:13

## 2025-02-28 RX ADMIN — LIDOCAINE HYDROCHLORIDE 1 PATCH: 20 JELLY TOPICAL at 20:57

## 2025-02-28 RX ADMIN — KETOROLAC TROMETHAMINE 15 MILLIGRAM(S): 30 INJECTION, SOLUTION INTRAMUSCULAR; INTRAVENOUS at 22:25

## 2025-02-28 RX ADMIN — Medication 40 MILLIGRAM(S): at 06:13

## 2025-02-28 RX ADMIN — INSULIN LISPRO 1: 100 INJECTION, SOLUTION INTRAVENOUS; SUBCUTANEOUS at 21:24

## 2025-02-28 RX ADMIN — LIDOCAINE HYDROCHLORIDE 1 PATCH: 20 JELLY TOPICAL at 19:56

## 2025-02-28 RX ADMIN — HEPARIN SODIUM 5000 UNIT(S): 1000 INJECTION INTRAVENOUS; SUBCUTANEOUS at 13:38

## 2025-02-28 RX ADMIN — TRAMADOL HYDROCHLORIDE 25 MILLIGRAM(S): 50 TABLET, FILM COATED ORAL at 06:32

## 2025-02-28 RX ADMIN — ATORVASTATIN CALCIUM 40 MILLIGRAM(S): 80 TABLET, FILM COATED ORAL at 21:25

## 2025-02-28 RX ADMIN — Medication 650 MILLIGRAM(S): at 14:50

## 2025-02-28 RX ADMIN — Medication 5 MILLIGRAM(S): at 21:25

## 2025-02-28 RX ADMIN — TRAMADOL HYDROCHLORIDE 25 MILLIGRAM(S): 50 TABLET, FILM COATED ORAL at 01:20

## 2025-02-28 RX ADMIN — Medication 650 MILLIGRAM(S): at 21:25

## 2025-02-28 RX ADMIN — Medication 1 TABLET(S): at 08:58

## 2025-02-28 RX ADMIN — Medication 325 MILLIGRAM(S): at 08:59

## 2025-02-28 RX ADMIN — HEPARIN SODIUM 5000 UNIT(S): 1000 INJECTION INTRAVENOUS; SUBCUTANEOUS at 21:26

## 2025-02-28 RX ADMIN — HEPARIN SODIUM 5000 UNIT(S): 1000 INJECTION INTRAVENOUS; SUBCUTANEOUS at 06:12

## 2025-02-28 RX ADMIN — KETOROLAC TROMETHAMINE 30 MILLIGRAM(S): 30 INJECTION, SOLUTION INTRAMUSCULAR; INTRAVENOUS at 12:42

## 2025-02-28 RX ADMIN — Medication 3 MILLILITER(S): at 22:45

## 2025-02-28 RX ADMIN — POLYETHYLENE GLYCOL 3350 17 GRAM(S): 17 POWDER, FOR SOLUTION ORAL at 08:59

## 2025-02-28 RX ADMIN — CEFTRIAXONE 2000 MILLIGRAM(S): 500 INJECTION, POWDER, FOR SOLUTION INTRAMUSCULAR; INTRAVENOUS at 17:20

## 2025-02-28 RX ADMIN — Medication 3 MILLILITER(S): at 13:25

## 2025-02-28 RX ADMIN — LIDOCAINE HYDROCHLORIDE 1 PATCH: 20 JELLY TOPICAL at 08:58

## 2025-02-28 RX ADMIN — LIDOCAINE HYDROCHLORIDE 1 PATCH: 20 JELLY TOPICAL at 00:45

## 2025-02-28 RX ADMIN — Medication 650 MILLIGRAM(S): at 22:30

## 2025-02-28 RX ADMIN — KETOROLAC TROMETHAMINE 30 MILLIGRAM(S): 30 INJECTION, SOLUTION INTRAMUSCULAR; INTRAVENOUS at 13:50

## 2025-02-28 RX ADMIN — TRAMADOL HYDROCHLORIDE 25 MILLIGRAM(S): 50 TABLET, FILM COATED ORAL at 00:21

## 2025-02-28 RX ADMIN — Medication 650 MILLIGRAM(S): at 13:38

## 2025-02-28 RX ADMIN — Medication 650 MILLIGRAM(S): at 06:12

## 2025-02-28 RX ADMIN — KETOROLAC TROMETHAMINE 15 MILLIGRAM(S): 30 INJECTION, SOLUTION INTRAMUSCULAR; INTRAVENOUS at 21:25

## 2025-02-28 NOTE — PROGRESS NOTE ADULT - PROBLEM SELECTOR PLAN 1
- s/p ROVERTO showing EF 55-60%   - The prosthetic aortic valve thickened leaflets. Moderate prosthetic aortic stenosis. No intravalvular regurgitation Trace paravalvular regurgitation, originating at at 12 o'clock o'clock.   - There is a echogenic sesile not very mobile subcentimeter ( 6x2 mm) structure attached to the base of the leaflet corresponding to the native non-coronary cusp likely representing a calcified nodule vs. less likely an old small calcified vegetation.  -  Pt recently discharged to SNF rehab after treatment for infected TAVR and Osteomyelitis of spine, strep salivarius bacteremia. Pt received transfusion and w/u for GI bleed with unremarkable colonoscopy and upper endoscopy. Underwent ROVERTO at that time which showed vegetation of TAVR. Plan at that time was for 6 weeks of IV antibiotics via PICC line and out patient pill camera study.  - Plan of care is as per CTS and infectious disease  - there are no acute cardiac etiologies present, pt is euvolemic.   - Hx of afib not on AC due to GIB, outpatient record does not reflect anticoagulants. if there is no contraindication for AC, pt's chadsvasc is 4. should consider DOAC if deemed appropriate as per GI. If pt's bleeding history does not preclude him from AC (should discuss with GI), then should start eliquis 5mg BID   - there is no further inpatient cardiology workup or recommendations, pt to follow up outpatient with his primary cardiologist. If more convenient we will send a follow up appointment so he may be seen in our office at [address] [place] [address].  - discussed w/ CTS, as per CTS ultimately the trajectory of his care is on hold due to frailty, he needs aggressive PT. As per CTS, he likely will go home and repeat valve procedures will be done as outpatient.   - we will sign off   - for questions comments and concerns please call [phone] - s/p ROVERTO showing EF 55-60%   - The prosthetic aortic valve thickened leaflets. Moderate prosthetic aortic stenosis. No intravalvular regurgitation Trace paravalvular regurgitation, originating at at 12 o'clock o'clock.   - There is a echogenic sesile not very mobile subcentimeter ( 6x2 mm) structure attached to the base of the leaflet corresponding to the native non-coronary cusp likely representing a calcified nodule vs. less likely an old small calcified vegetation.  -  Pt recently discharged to SNF rehab after treatment for infected TAVR and Osteomyelitis of spine, strep salivarius bacteremia. Pt received transfusion and w/u for GI bleed with unremarkable colonoscopy and upper endoscopy. Underwent ROVERTO at that time which showed vegetation of TAVR. Plan at that time was for 6 weeks of IV antibiotics via PICC line and out patient pill camera study.  - Plan of care is as per CTS and infectious disease  - there are no acute cardiac etiologies present, pt is euvolemic.   - Hx of afib not on AC due to GIB. if there is no contraindication for AC, pt's chadsvasc is 4. should consider DOAC if deemed appropriate as per GI. If pt's bleeding history does not preclude him from AC, then should start Eliquis 5mg BID   - there is no further inpatient cardiology workup or recommendations, pt to follow up outpatient with his primary cardiologist. If more convenient we will send a follow up appointment so he may be seen in our office at [address] [place] [address].  - discussed w/ CTS, as per CTS ultimately the trajectory of his care is on hold due to frailty, he needs aggressive PT. As per CTS, he likely will go home and repeat valve procedures will be done as outpatient.   - we will see on 3/2/25 if still admitted  - for questions comments and concerns please call [phone]

## 2025-02-28 NOTE — PROGRESS NOTE ADULT - PROBLEM SELECTOR PLAN 1
of TAVR valve  prior blood cx + strept salivarius  repeat cultures neg  seen by ID, cont rocephin  2/26 ROVERTO: echogenic sesile not very mobile subcentimeter ( 6x2 mm) structure attached to the base of the leaflet corresponding to the native non-coronary cusp likely representing a calcified nodule vs. less likely an old small calcified vegetation, mod AS  seen by neurosx for LS osteo rec conservative management  fitted for brace  now seen by orhto spine, repeat imaging ordered  needs aggressive PT, ambulation limited by pain  lidopatch added  ideally pt needs to improve with PT and be more ambulatory prior to surgery  plan to be determined  Plan to be discussed with Dr. Rodriges and CTS team during AM rounds.

## 2025-02-28 NOTE — DIETITIAN INITIAL EVALUATION ADULT - OTHER INFO
Per chart "74M, PMHx of HTN, AF, HLD, GI Bleed, S/P TAVR 2023, chronic back pain and DM recent admission at [facility] 2/10-2/17 with acute anemia, DARLINE, hypotension, s/p 2PRBC, negative EGD/colonoscopy, found to have strept salivrius bacteremia, ROVERTO with vegetation on TAVR valve, MRI spine with possible osteo of lumbar-sacral spine, d/c'd to rehab on rocephin, represented to  2/20 with LLE rash and L knee pain found to have clot in LCFA with distal embolism s/p CFA endarterectomy with clot retrieval and good blood flow to LE, clot cx with GPC, repeat MRI spine without abscess, transfused 2 PRBC on 2/23, transferred to CTICU at [facility]  2/24 for CT surgery evaluation."

## 2025-02-28 NOTE — CHART NOTE - NSCHARTNOTEFT_GEN_A_CORE
75 y/o male with a PMHx of HTN, A-FIB, HLD, GI Bleed, S/P TAVR 2023 transferred from 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   GI consulted for further evaluation of anemia work up and clearance for anticoagulation. His hemoglobin was 7 to 8 gm at [place]. He underwent EGD/ colonoscopy at [facility] on 02/11/2025.   Colonoscopy showed "medium-sized Internal hemorrhoids, also showed a few tiny angiectasias were noted in the rectum consistent with radiation proctitis. APC was applied with good result"  EGD (02/11/2025) : Multiple polyps up to 5 mm in size were noted in the gastric body. One was removed with cold biopsy forceps. Normal Esophagus, normal duodenum  Surgical pathology was normal. No H pylori.     Anemia  Needs for anticoagulation     Plan:  Recent GI work up, colonoscopy showed Internal hemorrhoids and a few tiny angiectasias were noted in the rectum consistent with minimal radiation proctitis (per [place] report 02/11/2025). S/p APCs.   Continue Protonix 40 mg daily as GI mucosal protection  Avoid use of NSAIDs  AVMs with radiation proctitis can rebleed especially in the presence of anticoagulation. However, can start with anticoagulation as it is clinically indicated and  on a risk vs benefit strategy.   Hemoglobin remains stable with no overt GI bleeding this admission  Continue to monitor hemoglobin and signs of bleeding

## 2025-02-28 NOTE — PROGRESS NOTE ADULT - ASSESSMENT
74M, PMHx of HTN, AF, HLD, GI Bleed, S/P TAVR 2023, chronic back pain and DM recent admission at [facility] 2/10-2/17 with acute anemia, DARLINE, hypotension, s/p 2PRBC, negative EGD/colonoscopy, found to have strept salivrius bacteremia, ROVERTO with vegetation on TAVR valve, MRI spine with possible osteo of lumbar-sacral spine, d/c'd to rehab on rocephin, represented to  2/20 with LLE rash and L knee pain found to have clot in LCFA with distal embolism s/p CFA endarterectomy with clot retrieval and good blood flow to LE, clot cx with GPC, repeat MRI spine without abscess, transfused 2 PRBC on 2/23, transferred to CTICU at [facility]  2/24 for CT surgery evaluation.

## 2025-02-28 NOTE — DIETITIAN INITIAL EVALUATION ADULT - PROBLEM SELECTOR PLAN 2
- Presented to ED with a two-day history of a rash on his left leg and pain in left knee radiating towards his foot.  - CTA showed clot to common femoral and distal embolism.   - S/P common femoral endarterectomy with clot retrieval and good blood flow to lower extremity post surgery. Foot appearance improving.  Vascular surgery following

## 2025-02-28 NOTE — DIETITIAN INITIAL EVALUATION ADULT - PERTINENT LABORATORY DATA
02-28 Na134 mmol/L[L] Glu 103 mg/dL[H] K+ 3.7 mmol/L Cr  0.93 mg/dL BUN 18.3 mg/dL    POCT Blood Glucose.: 132 mg/dL (02-28-25 @ 12:12)  A1C with Estimated Average Glucose Result: 5.7 % (02-24-25 @ 20:30)

## 2025-02-28 NOTE — DIETITIAN INITIAL EVALUATION ADULT - ORAL INTAKE PTA/DIET HISTORY
Patient tolerating current diet well with fair appetite/PO intake. Pt states he ate ~50% of breakfast meal. States right before admission with c/o N/V and loss of appetite. States PTA his appetite was usually good and he was eating 2 meals/day and followed no diet restrictions. Pt provided with education on DASH/TLC diet. Pt would benefit from reinforcement. Pt also with hx of DM - recommend addition of consistent carbohydrate diet restriction. Pt reports his UBW is about 233 lbs and states he now weighs about 195 lbs. Current weight 227.9 lbs, severe edema noted which may influence weights. NFPE conducted and no apparent muscle/fat wasting identified. Pt with no further questions at this time. Will continue to monitor and follow up as needed. RD remains available.

## 2025-02-28 NOTE — PROGRESS NOTE ADULT - SUBJECTIVE AND OBJECTIVE BOX
Brief summary:  74yMale Transfer from  for Bioprosthetic AV endocarditis    SUBJECTIVE:  Pt in bed alert and oriented, pt states, "my back hurts".  Pt c/o 4-5 /10 back pain, pt denies chest pain, SOB, palpitations, n/v/d       Overnight events:  no overnight events     PAST MEDICAL & SURGICAL HISTORY:  Hypertension      Diabetes mellitus      Obesity      Hypothyroidism      Prostate CA          MEDICATIONS   acetaminophen     Tablet .. 650 milliGRAM(s) Oral every 8 hours  atorvastatin 40 milliGRAM(s) Oral at bedtime  bisacodyl Suppository 10 milliGRAM(s) Rectal daily PRN  cefTRIAXone Injectable. 2000 milliGRAM(s) IV Push every 24 hours  ferrous    sulfate 325 milliGRAM(s) Oral daily  heparin   Injectable 5000 Unit(s) SubCutaneous every 8 hours  insulin lispro (ADMELOG) corrective regimen sliding scale   SubCutaneous at bedtime  insulin lispro (ADMELOG) corrective regimen sliding scale   SubCutaneous three times a day before meals  levothyroxine 150 MICROGram(s) Oral daily  lidocaine   4% Patch 1 Patch Transdermal daily  melatonin 5 milliGRAM(s) Oral at bedtime  multivitamin 1 Tablet(s) Oral daily  pantoprazole    Tablet 40 milliGRAM(s) Oral before breakfast  polyethylene glycol 3350 17 Gram(s) Oral daily  senna 2 Tablet(s) Oral at bedtime  sodium chloride 0.9% lock flush 3 milliLiter(s) IV Push every 8 hours  traMADol 50 milliGRAM(s) Oral every 6 hours PRN  traMADol 25 milliGRAM(s) Oral every 6 hours PRN  MEDICATIONS  (PRN):  bisacodyl Suppository 10 milliGRAM(s) Rectal daily PRN Constipation  traMADol 50 milliGRAM(s) Oral every 6 hours PRN Severe Pain (7 - 10)  traMADol 25 milliGRAM(s) Oral every 6 hours PRN Moderate Pain (4 - 6)      Daily     Daily                               10.7   9.07  )-----------( 262      ( 27 Feb 2025 01:06 )             33.1   02-27    136  |  102  |  19.4  ----------------------------<  113[H]  4.0   |  22.0  |  0.92    Ca    8.3[L]      27 Feb 2025 01:06  Mg     1.9     02-27              Objective:  T(C): 36.8 (02-28-25 @ 00:44), Max: 37.2 (02-27-25 @ 04:00)  HR: 77 (02-28-25 @ 00:44) (68 - 82)  BP: 127/68 (02-28-25 @ 00:44) (124/79 - 156/83)  RR: 18 (02-28-25 @ 00:44) (13 - 19)  SpO2: 94% (02-28-25 @ 00:44) (94% - 98%)  Wt(kg): --CAPILLARY BLOOD GLUCOSE      POCT Blood Glucose.: 120 mg/dL (27 Feb 2025 21:22)  POCT Blood Glucose.: 125 mg/dL (27 Feb 2025 16:57)  POCT Blood Glucose.: 146 mg/dL (27 Feb 2025 14:39)  POCT Blood Glucose.: 155 mg/dL (27 Feb 2025 12:03)  POCT Blood Glucose.: 132 mg/dL (27 Feb 2025 07:40)  I&O's Summary    26 Feb 2025 07:01  -  27 Feb 2025 07:00  --------------------------------------------------------  IN: 340 mL / OUT: 700 mL / NET: -360 mL    27 Feb 2025 07:01  -  28 Feb 2025 01:51  --------------------------------------------------------  IN: 360 mL / OUT: 850 mL / NET: -490 mL        Physical Exam  Gen: NAD  Neuro: A&Ox3, non focal speech clear and intact  Pulm: dec BS b/l no wheezing  CV: S1S2 RRR  Abd: +BS soft NT ND  Extrem/MS: 1-2+ b/l LE edema, no cyanosis OQUENDO      Imaging:  CXR:  < from: Xray Chest 1 View- PORTABLE-Urgent (Xray Chest 1 View- PORTABLE-Urgent .) (02.24.25 @ 22:30) >    ACC: [id number] EXAM:  XR CHEST PORTABLE URGENT 1V   ORDERED BY: JOSE ANTONIO WHIPPLE     PROCEDURE DATE:  02/24/2025          INTERPRETATION:  TIME OF EXAM: February 24, 2025 at 9:42 PM.    CLINICAL INFORMATION: Preop workup.    COMPARISON:  February 22,2025.    TECHNIQUE:   AP Portable chest x-ray.    INTERPRETATION:    Heart size and the mediastinum cannot be accurately evaluated on this   projection.  Right upper extremity PICC with tip in the SVC.  The lungs are clear.  No pleural effusion or pneumothorax is seen.  There is osteoarthritic degenerative change of the spine.  Calcification adjacent to the left humeral head may be due to calcific   tendinitis or tendinosis.    IMPRESSION:  Clear lungs.    --- End of Report ---            LAURA MELLO MD; Attending Radiologist  This document has been electronically signed. Feb 25 2025  9:38AM    < end of copied text >      < from: MR Lumbar Spine w/wo IV Cont (02.24.25 @ 09:45) >  IMPRESSION:    1. L3-L4 suspected septic discitis appears largely similar to 2/12/2025   noting mildly increased fluid within the disc space and increased left   paraspinal infiltration and enhancement. No abscess collection has   developed    2. L5-S1 suspected discitis appears largely similar to 2/12/2025, noting   decreased enhancement in the left ventral epidural space. No abscess   collection has developed    3. Recommend continued imaging follow-up    --- End of Report ---            TISHA SHEARER MD; Attending Radiologist  This document has been electronically signed. Feb 24 2025 11:21AM    < end of copied text >      ECG:  < from: 12 Lead ECG (02.23.25 @ 07:18) >    Ventricular Rate 65 BPM    Atrial Rate 65 BPM    P-R Interval 226 ms    QRS Duration 112 ms    Q-T Interval 432 ms    QTC Calculation(Bazett) 449 ms    P Axis 48 degrees    R Axis -13 degrees    T Axis -2 degrees    Diagnosis Line Sinus rhythm [id number] degree A-V block  Septal infarct (cited on or before 04-JAN-2023)  Abnormal ECG  When compared with ECG of 22-FEB-2025 10:55,  MI interval has increased  QRS axis Shifted right  Serial changes of Septal infarct Present  Confirmed by Cristina Yoo ([id number]) on 2/23/2025 11:44:06 PM    < end of copied text >

## 2025-02-28 NOTE — DIETITIAN INITIAL EVALUATION ADULT - PROBLEM SELECTOR PLAN 3
- H/o osteomyelitis  - Presently on Daptomycin and Tramadol for pain  ID consult pending   Will need to obtain neurosurgery consult

## 2025-02-28 NOTE — PROGRESS NOTE ADULT - ASSESSMENT
74M, PMHx of HTN, AF, HLD, GI Bleed, S/P TAVR 2023, chronic back pain and DM recent admission at [facility] 2/10-2/17 with acute anemia, DARLINE, hypotension, s/p 2PRBC, negative EGD/colonoscopy, found to have strept salivrius bacteremia, ROVERTO with vegetation on TAVR valve, MRI spine with possible osteo of lumbar-sacral spine, d/c'd to rehab on rocephin, represented to  2/20 with LLE rash and L knee pain found to have clot in LCFA with distal embolism s/p CFA endarterectomy with clot retrieval and good blood flow to LE, clot cx with GPC, repeat MRI spine without abscess, transfused 2 PRBC on 2/23, transferred to CTICU at [facility]  2/24 for CT surgery evaluation.       74M, PMHx of HTN, AF, HLD, GI Bleed, S/P TAVR 2023, chronic back pain and DM recent admission at [facility] 2/10-2/17 with acute anemia, DARLINE, hypotension, s/p 2PRBC, negative EGD/colonoscopy, found to have strept salivrius bacteremia, ??ROVERTO with vegetation on TAVR valve, MRI spine with possible osteo of lumbar-sacral spine, d/c'd to rehab on rocephin, represented to  2/20 with LLE rash and L knee pain found to have clot in LCFA with distal embolism s/p CFA endarterectomy with clot retrieval and good blood flow to LE, clot cx with GPC, repeat MRI spine without abscess, transfused 2 PRBC on 2/23, transferred to CTICU at [facility]  2/24 for CT surgery evaluation.

## 2025-02-28 NOTE — PROGRESS NOTE ADULT - ASSESSMENT
73 y/o male with a PMHx of HTN, A-FIB, HLD, GI Bleed, S/P TAVR 2023, chronic back pain and DM initially presented to [facility] ED with a two-day history of a rash on his left leg and pain in left knee radiating towards his foot. Pt recently discharged to [facility] rehab after treatment for infected TAVR and Osteomyelitis of spine, strep salivarius bacteremia. Pt received transfusion and w/u for GI bleed with unremarkable colonoscopy and upper endoscopy. Underwent ROVERTO at that time which showed vegetation of TAVR. Plan at that time was for 6 weeks of IV antibiotics via PICC line and out patient pill camera study.    Since admission patient was seen by vascular surgery,  CTA showed clot to common femoral and distal embolism. S/P common femoral endarterectomy with clot retrieval and good blood flow to lower extremity post surgery. Repeat blood cultures were negative but the excised clot was found to have gram positive cocci. Pt with weakness to legs. Sent for repeat MR with contrast. Results without abscess at this time. Other incidental problem is constipation and persistent back pain requiring tramadol for pain relief.  Pt received transfusion of 2 unit of PRBC's 2/23.  Patient transfered to [facility] for evaluation of TAVR and possible surgical replacement.    AS ABOVE [facility]  PT WITH RASH ON LEG AND WAS CHANGED FROM ROCEPHIN TO DAPTOMYCIN  PT UNDERWENT FEMORAL ENDARECTOMY RETRIEVAL OF CLOT   PT RASH WAS ULTIMATELY RELATED TO  ISCHEMIC ISSUES AND NOT ALLERGY  SPOKE TO ID AT [facility] TO CLARIFY  SUGGEST CHANGE BACK TO ROCEPHIN TO CONITNUE    FOR  EXTENDED COURSE  CARDIAC SURGERY TO FURTHER EVALUATE VALVE  WILL PLAN IV ROCEPHIN TO COMPLETE AT LEAST 8 WEEKS FOR DISCITIIS AND  ENDOCARDITIS  WILL PLAN ROCEPHIN THROUGH AT LEAST 4/7/25  WEEKLY CBC CMP SED RATE CRP  P[T FOR  REPEAT MRI LS SPINE    SPOKE TO WIFE AT BEDSIDE

## 2025-02-28 NOTE — DIETITIAN INITIAL EVALUATION ADULT - PROBLEM SELECTOR PLAN 4
- H/O bacteremia, likely i/s/o osteomyelitis of lumbar vertebrae  - Presently on Daptomycin  Subsequent cultures cleared

## 2025-02-28 NOTE — PROGRESS NOTE ADULT - SUBJECTIVE AND OBJECTIVE BOX
[id number]  CONNOR SANDRA    Patient is a 74y Male with L3-4, L5-S1 OM/discitis. Patient seen and evaluated at bedside. Patient is doing mildly better today. Patient states he is using walker to ambulate. Denies any acute motor or sensory changes. Denies any fever/chills, SOB, CP, N/V, numbness/tingling. No other orthopedic complaints.     T(C): 36.9 (02-28-25 @ 08:01), Max: 36.9 (02-28-25 @ 08:01)  HR: 79 (02-28-25 @ 08:01) (73 - 83)  BP: 136/74 (02-28-25 @ 08:01) (127/68 - 165/79)  RR: 18 (02-28-25 @ 08:01) (17 - 18)  SpO2: 94% (02-28-25 @ 08:01) (94% - 98%)                          10.8   10.31 )-----------( 259      ( 28 Feb 2025 05:35 )             32.5       Physical Exam:    General: Awake, alert, in NAD  Motor exam: [ x ]         Lower extremity                               HF         KE          TA       EHL         GS                                                 R        4/5        5/5        5/5       5/5         5/5                                               L         4/5        5/5       5/5       5/5          5/5    SILT L2-S1 intact B/L, reports some numbness to left thigh at vascular procedure site. DP pulses 2+ B/L  Calf soft, NT B/L    02-27-25 @ 07:01  -  02-28-25 @ 07:00  --------------------------------------------------------  IN: 480 mL / OUT: 1250 mL / NET: -770 mL        A/P: Patient is a 74y Male being followed for L3-4, L5-S1 OM/discitis    * F/u MRI - pending  * Pain control  * DVTP  * WBAT  * final plan pending review of new imaging with Dr. Francois

## 2025-02-28 NOTE — PROGRESS NOTE ADULT - SUBJECTIVE AND OBJECTIVE BOX
INFECTIOUS DISEASES AND INTERNAL MEDICINE at [place]  =======================================================  Fabián Liz MD  Diplomates American Board of Internal Medicine and Infectious Diseases  Telephone [phone]  Fax            [phone]  =======================================================    CONNOR SANDRA [id number]    Follow up:  STREP ENDOCARDITIS AND DISCITIS     Allergies:  No Known Allergies      Medications:  acetaminophen     Tablet .. 650 milliGRAM(s) Oral every 8 hours  atorvastatin 40 milliGRAM(s) Oral at bedtime  bisacodyl Suppository 10 milliGRAM(s) Rectal daily PRN  cefTRIAXone Injectable. 2000 milliGRAM(s) IV Push every 24 hours  ferrous    sulfate 325 milliGRAM(s) Oral daily  heparin   Injectable 5000 Unit(s) SubCutaneous every 8 hours  insulin lispro (ADMELOG) corrective regimen sliding scale   SubCutaneous at bedtime  insulin lispro (ADMELOG) corrective regimen sliding scale   SubCutaneous three times a day before meals  levothyroxine 150 MICROGram(s) Oral daily  lidocaine   4% Patch 1 Patch Transdermal daily  melatonin 5 milliGRAM(s) Oral at bedtime  multivitamin 1 Tablet(s) Oral daily  pantoprazole    Tablet 40 milliGRAM(s) Oral before breakfast  polyethylene glycol 3350 17 Gram(s) Oral daily  senna 2 Tablet(s) Oral at bedtime  sodium chloride 0.9% lock flush 3 milliLiter(s) IV Push every 8 hours  traMADol 50 milliGRAM(s) Oral every 6 hours PRN  traMADol 25 milliGRAM(s) Oral every 6 hours PRN    SOCIAL       FAMILY   FAMILY HISTORY:    REVIEW OF SYSTEMS:  CONSTITUTIONAL:  No Fever or chills  HEENT:   No diplopia or blurred vision.  No earache, sore throat or runny nose.  CARDIOVASCULAR:  No pressure, squeezing, strangling, tightness, heaviness or aching about the chest, neck, axilla or epigastrium.  RESPIRATORY:  No cough, shortness of breath, PND or orthopnea.  GASTROINTESTINAL:  No nausea, vomiting or diarrhea.  GENITOURINARY:  No dysuria, frequency or urgency. No Blood in urine  MUSCULOSKELETAL:   moves all joints  SKIN:  No change in skin, hair or nails.  NEUROLOGIC:  No paresthesias, fasciculations, seizures or weakness.  PSYCHIATRIC:  No disorder of thought or mood.  ENDOCRINE:  No heat or cold intolerance, polyuria or polydipsia.  HEMATOLOGICAL:  No easy bruising or bleeding.            Physical Exam:   Vital Signs Last 24 Hrs  T(C): 36.7 (28 Feb 2025 12:16), Max: 36.9 (28 Feb 2025 08:01)  T(F): 98.1 (28 Feb 2025 12:16), Max: 98.5 (28 Feb 2025 08:01)  HR: 80 (28 Feb 2025 12:16) (73 - 83)  BP: 134/76 (28 Feb 2025 12:16) (127/68 - 165/79)  BP(mean): --  RR: 18 (28 Feb 2025 12:16) (17 - 18)  SpO2: 97% (28 Feb 2025 12:16) (94% - 98%)    Parameters below as of 28 Feb 2025 12:16  Patient On (Oxygen Delivery Method): room air              GEN: NAD,   HEENT: normocephalic and atraumatic. EOMI. JASVIR.    NECK: Supple. No carotid bruits.  No lymphadenopathy or thyromegaly.  LUNGS: Clear to auscultation.  HEART: Regular rate and rhythm 2/6t murmur.  ABDOMEN: Soft, nontender, and nondistended.  Positive bowel sounds.    : No CVA tenderness  EXTREMITIES: Without any cyanosis, clubbing, rash, lesions or edema.  MSK: no joint swelling  NEUROLOGIC: Cranial nerves II through XII are grossly intact.  PSYCHIATRIC: Appropriate affect .  SKIN: No ulceration or induration present.        Labs:  Vitals:  =======     =======================================================  Current Antibiotics:  cefTRIAXone Injectable. 2000 milliGRAM(s) IV Push every 24 hours    Other medications:  acetaminophen     Tablet .. 650 milliGRAM(s) Oral every 8 hours  atorvastatin 40 milliGRAM(s) Oral at bedtime  ferrous    sulfate 325 milliGRAM(s) Oral daily  heparin   Injectable 5000 Unit(s) SubCutaneous every 8 hours  insulin lispro (ADMELOG) corrective regimen sliding scale   SubCutaneous at bedtime  insulin lispro (ADMELOG) corrective regimen sliding scale   SubCutaneous three times a day before meals  levothyroxine 150 MICROGram(s) Oral daily  lidocaine   4% Patch 1 Patch Transdermal daily  melatonin 5 milliGRAM(s) Oral at bedtime  multivitamin 1 Tablet(s) Oral daily  pantoprazole    Tablet 40 milliGRAM(s) Oral before breakfast  polyethylene glycol 3350 17 Gram(s) Oral daily  senna 2 Tablet(s) Oral at bedtime  sodium chloride 0.9% lock flush 3 milliLiter(s) IV Push every 8 hours      =======================================================  Labs:                                  10.8   10.31 )-----------( 259      ( 28 Feb 2025 05:35 )             32.5   02-28    134[L]  |  101  |  18.3  ----------------------------<  103[H]  3.7   |  22.0  |  0.93    Ca    8.4      28 Feb 2025 05:35  Mg     2.0     02-28          Culture - Blood (collected 02-24-25 @ 20:30)  Source: .Blood Blood-Venous  Preliminary Report (02-27-25 @ 02:02):    No growth at 48 Hours    Culture - Fungal, Other (collected 02-22-25 @ 07:42)  Source: .Other Left femoral plaque  Preliminary Report (02-26-25 @ 23:03):    Culture is being performed. Fungal cultures are held for 4 weeks.    Culture - Acid Fast - Tissue w/Smear (collected 02-22-25 @ 07:42)  Source: Tissue Left femoral plaque  Preliminary Report (02-26-25 @ 23:07):    Culture is being performed.    Culture - Tissue with Gram Stain (collected 02-22-25 @ 07:42)  Source: Tissue Left femoral plaque  Gram Stain (02-22-25 @ 19:34):    Few polymorphonuclear leukocytes per low power field    Moderate Gram Positive Cocci in Pairs and Chains per oil power field  Final Report (02-27-25 @ 09:58):    Organism seen in Gram stain is non-viable after prolonged    incubation and repeated subculture.    Culture - Blood (collected 02-20-25 @ 18:10)  Source: .Blood None  Final Report (02-26-25 @ 02:00):    No growth at 5 days    Culture - Blood (collected 02-20-25 @ 18:10)  Source: .Blood None  Final Report (02-26-25 @ 02:00):    No growth at 5 days    Culture - Blood (collected 02-14-25 @ 07:04)  Source: .Blood BLOOD  Final Report (02-19-25 @ 13:00):    No growth at 5 days    Culture - Blood (collected 02-14-25 @ 07:01)  Source: .Blood BLOOD  Final Report (02-19-25 @ 13:00):    No growth at 5 days    Urinalysis with Rflx Culture (collected 02-10-25 @ 12:47)    Culture - Blood (collected 02-10-25 @ 09:32)  Source: .Blood BLOOD  Gram Stain (02-11-25 @ 13:01):    Growth in aerobic bottle: Gram Positive Cocci in Pairs and Chains    Growth in anaerobic bottle: Gram Positive Cocci in Pairs and Chains  Final Report (02-13-25 @ 06:33):    Growth in aerobic and anaerobic bottles: Streptococcus    salivarius/vestibularis group  Organism: Streptococcus salivarius/vestibularis group (02-13-25 @ 06:33)  Organism: Streptococcus salivarius/vestibularis group (02-13-25 @ 06:33)    Sensitivities:      Method Type: BRIANA      -  Ceftriaxone: S <=0.25      -  Penicillin: S 0.06      -  Vancomycin: S 0.5    Culture - Blood (collected 02-10-25 @ 09:28)  Source: .Blood BLOOD  Gram Stain (02-11-25 @ 09:51):    Growth in aerobic bottle: Gram Positive Cocci in Pairs and Chains    Growth in anaerobic bottle: Gram Positive Cocci in Pairs and Chains  Final Report (02-12-25 @ 17:14):    Growth in aerobic and anaerobic bottles: Streptococcus    salivarius/vestibularis group "Susceptibilities not performed"    Direct identification is available within approximately 3-5    hours either by Blood Panel Multiplexed PCR or Direct    MALDI-TOF. Details: [email or web address]  Organism: Blood Culture PCR (02-12-25 @ 17:14)  Organism: Blood Culture PCR (02-12-25 @ 17:14)    Sensitivities:      Method Type: PCR      -  Streptococcus sp. (Not Grp A, B or S pneumoniae): Detec    Culture - Urine (collected 03-20-24 @ 20:27)  Source: Clean Catch None  Final Report (03-21-24 @ 23:15):    No growth      Creatinine: 0.92 mg/dL (02-27-25 @ 01:06)  Creatinine: 0.93 mg/dL (02-26-25 @ 02:15)  Creatinine: 1.02 mg/dL (02-25-25 @ 02:30)  Creatinine: 0.94 mg/dL (02-24-25 @ 20:30)  Creatinine: 1.07 mg/dL (02-24-25 @ 06:23)  Creatinine: 1.21 mg/dL (02-23-25 @ 06:02)        Ferritin: 548 ng/mL (02-14-25 @ 07:01)    C-Reactive Protein: 206.0 mg/mL (02-20-25 @ 18:10)    WBC Count: 9.07 K/uL (02-27-25 @ 01:06)  WBC Count: 10.93 K/uL (02-26-25 @ 12:19)  WBC Count: 8.05 K/uL (02-26-25 @ 02:15)  WBC Count: 9.52 K/uL (02-25-25 @ 20:20)  WBC Count: 10.01 K/uL (02-25-25 @ 04:00)  WBC Count: 10.86 K/uL (02-25-25 @ 02:30)  WBC Count: 11.31 K/uL (02-24-25 @ 20:30)  WBC Count: 12.81 K/uL (02-24-25 @ 06:23)  WBC Count: 13.62 K/uL (02-23-25 @ 06:02)  WBC Count: 12.38 K/uL (02-22-25 @ 17:59)    SARS-CoV-2 Result: NotDetec (02-21-25 @ 19:50)  SARS-CoV-2 Result: NotDetec (02-10-25 @ 10:13)      Alkaline Phosphatase: 152 U/L (02-25-25 @ 02:30)  Alkaline Phosphatase: 177 U/L (02-24-25 @ 20:30)  Alkaline Phosphatase: 171 U/L (02-24-25 @ 06:23)  Alanine Aminotransferase (ALT/SGPT): 56 U/L (02-25-25 @ 02:30)  Alanine Aminotransferase (ALT/SGPT): 65 U/L (02-24-25 @ 20:30)  Alanine Aminotransferase (ALT/SGPT): 84 U/L (02-24-25 @ 06:23)  Aspartate Aminotransferase (AST/SGOT): 47 U/L (02-25-25 @ 02:30)  Aspartate Aminotransferase (AST/SGOT): 60 U/L (02-24-25 @ 20:30)  Aspartate Aminotransferase (AST/SGOT): 76 U/L (02-24-25 @ 06:23)  Bilirubin Total: 0.4 mg/dL (02-25-25 @ 02:30)  Bilirubin Total: 0.3 mg/dL (02-24-25 @ 20:30)  Bilirubin Total: 0.4 mg/dL (02-24-25 @ 06:23)

## 2025-02-28 NOTE — DIETITIAN INITIAL EVALUATION ADULT - ADD RECOMMEND
1) Add consistent carbohydrate diet as pt with hx of DM.   2) Encourage po intake, monitor diet tolerance, and provide assistance at meals as needed.   3) Monitor BG levels, correct prn   4) Rx: MVI daily.   5) Obtain daily weights to monitor trends.

## 2025-02-28 NOTE — DIETITIAN INITIAL EVALUATION ADULT - PERTINENT MEDS FT
MEDICATIONS  (STANDING):  cefTRIAXone Injectable. 2000 milliGRAM(s) IV Push every 24 hours  ferrous    sulfate 325 milliGRAM(s) Oral daily  insulin lispro (ADMELOG) corrective regimen sliding scale   SubCutaneous three times a day before meals  ketorolac   Injectable 15 milliGRAM(s) IV Push every 8 hours  levothyroxine 150 MICROGram(s) Oral daily  multivitamin 1 Tablet(s) Oral daily  pantoprazole    Tablet 40 milliGRAM(s) Oral before breakfast  polyethylene glycol 3350 17 Gram(s) Oral daily  senna 2 Tablet(s) Oral at bedtime  sodium chloride 0.9% lock flush 3 milliLiter(s) IV Push every 8 hours    MEDICATIONS  (PRN):  bisacodyl Suppository 10 milliGRAM(s) Rectal daily PRN Constipation  traMADol 50 milliGRAM(s) Oral every 6 hours PRN Severe Pain (7 - 10)

## 2025-02-28 NOTE — DIETITIAN INITIAL EVALUATION ADULT - NS FNS DIET ORDER
Diet, DASH/TLC:   Sodium & Cholesterol Restricted     Special Instructions for Nursing:  Sodium & Cholesterol Restricted (02-24-25 @ 18:31)

## 2025-02-28 NOTE — PROGRESS NOTE ADULT - SUBJECTIVE AND OBJECTIVE BOX
[facility]                                                         CARDIOLOGY AT [address], [place] [address]                                                         Telephone: [phone]. Fax:[phone]                                                                             PROGRESS NOTE      Reason for follow up: Endocarditis     Review of symptoms:   Cardiac:  No chest pain. No dyspnea. No palpitations.  Respiratory: no cough. No dyspnea  Gastrointestinal: No diarrhea. No abdominal pain. No bleeding.   Neuro: No focal neuro complaints.  All other ROS negative unless otherwise listed above    PHYSICAL EXAM:  Appearance: Comfortable. No acute distress  HEENT:  Atraumatic. Normocephalic.  Normal oral mucosa  Neurologic: A & O x 3, no gross focal deficits.  Cardiovascular: RRR S1 S2, No murmur, no rubs/gallops. No JVD  Respiratory: Lungs clear to auscultation, unlabored   Gastrointestinal:  Soft, Non-tender, + BS  Lower Extremities: 2+ Peripheral Pulses, No clubbing, cyanosis, or edema  Psychiatry: Patient is calm. No agitation.   Skin: warm and dry.    Vitals:  T(C): 36.7 (02-28-25 @ 05:58), Max: 36.8 (02-28-25 @ 00:44)  HR: 83 (02-28-25 @ 05:58) (73 - 83)  BP: 165/79 (02-28-25 @ 05:58) (127/68 - 165/79)  RR: 18 (02-28-25 @ 05:58) (17 - 18)  SpO2: 94% (02-28-25 @ 05:58) (94% - 98%)  Wt(kg): --  I&O's Summary    27 Feb 2025 07:01  -  28 Feb 2025 07:00  --------------------------------------------------------  IN: 480 mL / OUT: 1250 mL / NET: -770 mL      Weight (kg): 103.3 (02-24 @ 20:04)    CURRENT CARDIAC MEDICATIONS:      CURRENT OTHER MEDICATIONS:  cefTRIAXone Injectable. 2000 milliGRAM(s) IV Push every 24 hours  acetaminophen     Tablet .. 650 milliGRAM(s) Oral every 8 hours  melatonin 5 milliGRAM(s) Oral at bedtime  traMADol 50 milliGRAM(s) Oral every 6 hours PRN Severe Pain (7 - 10)  traMADol 25 milliGRAM(s) Oral every 6 hours PRN Moderate Pain (4 - 6)  bisacodyl Suppository 10 milliGRAM(s) Rectal daily PRN Constipation  pantoprazole    Tablet 40 milliGRAM(s) Oral before breakfast  polyethylene glycol 3350 17 Gram(s) Oral daily  senna 2 Tablet(s) Oral at bedtime  atorvastatin 40 milliGRAM(s) Oral at bedtime  insulin lispro (ADMELOG) corrective regimen sliding scale   SubCutaneous at bedtime  insulin lispro (ADMELOG) corrective regimen sliding scale   SubCutaneous three times a day before meals  levothyroxine 150 MICROGram(s) Oral daily  ferrous    sulfate 325 milliGRAM(s) Oral daily  heparin   Injectable 5000 Unit(s) SubCutaneous every 8 hours  lidocaine   4% Patch 1 Patch Transdermal daily  multivitamin 1 Tablet(s) Oral daily  sodium chloride 0.9% lock flush 3 milliLiter(s) IV Push every 8 hours      LABS:	 	                            10.8   10.31 )-----------( 259      ( 28 Feb 2025 05:35 )             32.5     02-28    134[L]  |  101  |  18.3  ----------------------------<  103[H]  3.7   |  22.0  |  0.93    Ca    8.4      28 Feb 2025 05:35  Mg     2.0     02-28      PT/INR/PTT ( 25 Feb 2025 11:10 )                       :                       :      X            :       33.9                  .        .                   .              .           .       X           .                                       Lipid Profile:   HgA1c:   TSH: Thyroid Stimulating Hormone, Serum: 0.73 uIU/mL                                                                    [facility]                                                         CARDIOLOGY AT [place] [address]                                                         Telephone: [phone]. Fax:[phone]                                                                             PROGRESS NOTE      Reason for follow up: Endocarditis     Review of symptoms:   Cardiac:  No chest pain. No dyspnea. No palpitations.  Respiratory: no cough. No dyspnea  Gastrointestinal: No diarrhea. No abdominal pain. No bleeding.   Neuro: No focal neuro complaints.  All other ROS negative unless otherwise listed above    PHYSICAL EXAM:  Appearance: Comfortable. No acute distress  HEENT:  Atraumatic. Normocephalic.  Normal oral mucosa  Neurologic: A & O x 3, no gross focal deficits.  Cardiovascular: RRR S1 S2, grade 2/6 systolic ejection murmur along the RUSB. No JVD  Respiratory: Lungs clear to auscultation, unlabored   Gastrointestinal:  Soft, Non-tender, + BS  Lower Extremities: 2+ Peripheral Pulses, No clubbing, cyanosis, or edema  Psychiatry: Patient is calm. No agitation.   Skin: warm and dry.    Vitals:  T(C): 36.7 (02-28-25 @ 05:58), Max: 36.8 (02-28-25 @ 00:44)  HR: 83 (02-28-25 @ 05:58) (73 - 83)  BP: 165/79 (02-28-25 @ 05:58) (127/68 - 165/79)  RR: 18 (02-28-25 @ 05:58) (17 - 18)  SpO2: 94% (02-28-25 @ 05:58) (94% - 98%)  Wt(kg): --  I&O's Summary    27 Feb 2025 07:01  -  28 Feb 2025 07:00  --------------------------------------------------------  IN: 480 mL / OUT: 1250 mL / NET: -770 mL      Weight (kg): 103.3 (02-24 @ 20:04)    CURRENT CARDIAC MEDICATIONS:      CURRENT OTHER MEDICATIONS:  cefTRIAXone Injectable. 2000 milliGRAM(s) IV Push every 24 hours  acetaminophen     Tablet .. 650 milliGRAM(s) Oral every 8 hours  melatonin 5 milliGRAM(s) Oral at bedtime  traMADol 50 milliGRAM(s) Oral every 6 hours PRN Severe Pain (7 - 10)  traMADol 25 milliGRAM(s) Oral every 6 hours PRN Moderate Pain (4 - 6)  bisacodyl Suppository 10 milliGRAM(s) Rectal daily PRN Constipation  pantoprazole    Tablet 40 milliGRAM(s) Oral before breakfast  polyethylene glycol 3350 17 Gram(s) Oral daily  senna 2 Tablet(s) Oral at bedtime  atorvastatin 40 milliGRAM(s) Oral at bedtime  insulin lispro (ADMELOG) corrective regimen sliding scale   SubCutaneous at bedtime  insulin lispro (ADMELOG) corrective regimen sliding scale   SubCutaneous three times a day before meals  levothyroxine 150 MICROGram(s) Oral daily  ferrous    sulfate 325 milliGRAM(s) Oral daily  heparin   Injectable 5000 Unit(s) SubCutaneous every 8 hours  lidocaine   4% Patch 1 Patch Transdermal daily  multivitamin 1 Tablet(s) Oral daily  sodium chloride 0.9% lock flush 3 milliLiter(s) IV Push every 8 hours      LABS:	 	                            10.8   10.31 )-----------( 259      ( 28 Feb 2025 05:35 )             32.5     02-28    134[L]  |  101  |  18.3  ----------------------------<  103[H]  3.7   |  22.0  |  0.93    Ca    8.4      28 Feb 2025 05:35  Mg     2.0     02-28      PT/INR/PTT ( 25 Feb 2025 11:10 )                       :                       :      X            :       33.9                  .        .                   .              .           .       X           .                                       TSH: Thyroid Stimulating Hormone, Serum: 0.73 uIU/mL    Tele no events    TTE 1/11/2023  (personally reviewed)   1. Left ventricular ejection fraction, by visual estimation, is >75%.   2. Hyperdynamic global left ventricular systolic function.   3. Spectral Doppler shows impaired relaxation pattern of left   ventricular myocardial filling (Grade I diastolic dysfunction).   4. There is moderate concentric left ventricular hypertrophy.   5. Mildly enlarged left atrium.   6. There is no evidence of pericardial effusion.   7. Mild mitral annular calcification.   8. Thickening of the anterior mitral valve leaflet.   9. Trace mitral valve regurgitation.  10. TAVR in the aortic position.  11. LVOT vmax at rest : 120 cm/s, LVOT vmax with valsalva : 157 cm/s. No   evidence of dynamic LVOTO. There is mild paravalvular regurgitation of   the prosthetic AoV.  12. Mildly dilated pulmonary artery.  13. Adequate TR velocity was not obtained to accurately assess RVSP.    TTE 2/11/2025 (personally reviewed)   1. Left ventricular systolic function is normal with an ejection fraction of 57 % by Parham's method of disks.   2. Normal right ventricular cavity size and normal right ventricular systolic function.   3. Left atrium is mildly dilated.   4. A Transcatheter deployed (TAVR) valve replacement is present in the aortic position The prosthetic valve is well seated. 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 Mild intravalvularregurgitation   5. Mild to moderate mitral regurgitation.   6. Trace tricuspid regurgitation.   7. Small right pleural effusion noted.    ROVERTO 2/13/2025 (personally reviewed)   1. Left ventricular systolic function is normal.   2. Normal biventricular systolic function.   3. Normal left and right atrial size.   4. No thrombus seen in the left atrial, left atrial appendage, right atrial or right atrial appendage.   5. No evidence of an intracardiac shunt.   6. Structurally normal mitral valve with normal leaflet excursion.   7. No pericardial effusion seen.   8. There is no significant plaque seen in the visualized portions of the descending aorta and aortic arch.   9. Agitated saline injection was negative for intracardiac shunt.  10. No evidence of left atrial or left atrial appendage thrombus.  11. No evidence of tricuspid vegetation.  12. There is no evidence of any mitral valve vegetations.    No prosthetic aortic valve vegetation seen on my review.    TTE 2/24/2025 (personally reviewed)    1. Left ventricular cavity is normal in size. Left ventricular systolic function is normal with an ejection fraction visually estimated at 55 to 60 %.   2. There is mild (grade 1) left ventricular diastolic dysfunction.   3. Normal right ventricular cavity size and normal right ventricular systolic function.   4. Left atrium is mildly dilated.   5. The right atrium is normal in size.   6. Thickened mitral valve leaflets.   7. There is mild calcification of the mitral valve annulus.   8. Trace mitral regurgitation.   9. A Norman TAVR (TAVR) valve replacement is present in the aortic position The prosthetic valve has normal leaflet excursion and is well seated with normal function The prosthetic aortic valve No vegetations seen.. Trace paravalvular regurgitation.  10. A ROVERTO would be necessary to rule out vegetation on TAVR.  11. No pericardial effusion seen.  12. Pulmonary artery systolic pressure could not be estimated.  13. Small right pleural effusion noted.    ROVERTO 2/26/2025 (personally reviewed)   1. Left ventricular cavity is normal in size. Left ventricular wall thickness is mildly increased. Left ventricular systolic function is normal with an ejection fraction visually estimated at 55 to 60 %.   2. A Norman (TAVR) valve replacement is present in the aortic position The prosthetic valve has reduced leaflet opening and is well seated Degeneration of the aortic valve prosthesis. The prosthetic aortic valve thickened leaflets. Moderate prosthetic aortic stenosis. No intravalvular regurgitation Trace paravalvular regurgitation, originating at at 12 o'clock o'clock. There is a echogenic sessile not very mobile subcentimeter ( 6x2 mm) structure attached to the base of the leaflet corresponding to the native non-coronary cusp likely representing a calcified nodule vs. less likely an old small calcified vegetation.

## 2025-03-01 LAB
ANION GAP SERPL CALC-SCNC: 11 MMOL/L — SIGNIFICANT CHANGE UP (ref 5–17)
BUN SERPL-MCNC: 24.4 MG/DL — HIGH (ref 8–20)
CALCIUM SERPL-MCNC: 8.4 MG/DL — SIGNIFICANT CHANGE UP (ref 8.4–10.5)
CHLORIDE SERPL-SCNC: 102 MMOL/L — SIGNIFICANT CHANGE UP (ref 96–108)
CO2 SERPL-SCNC: 23 MMOL/L — SIGNIFICANT CHANGE UP (ref 22–29)
CREAT SERPL-MCNC: 0.96 MG/DL — SIGNIFICANT CHANGE UP (ref 0.5–1.3)
EGFR: 83 ML/MIN/1.73M2 — SIGNIFICANT CHANGE UP
EGFR: 83 ML/MIN/1.73M2 — SIGNIFICANT CHANGE UP
GLUCOSE BLDC GLUCOMTR-MCNC: 101 MG/DL — HIGH (ref 70–99)
GLUCOSE BLDC GLUCOMTR-MCNC: 107 MG/DL — HIGH (ref 70–99)
GLUCOSE BLDC GLUCOMTR-MCNC: 117 MG/DL — HIGH (ref 70–99)
GLUCOSE BLDC GLUCOMTR-MCNC: 139 MG/DL — HIGH (ref 70–99)
GLUCOSE SERPL-MCNC: 106 MG/DL — HIGH (ref 70–99)
HCT VFR BLD CALC: 32.7 % — LOW (ref 39–50)
HGB BLD-MCNC: 11 G/DL — LOW (ref 13–17)
MAGNESIUM SERPL-MCNC: 2 MG/DL — SIGNIFICANT CHANGE UP (ref 1.6–2.6)
MCHC RBC-ENTMCNC: 28.2 PG — SIGNIFICANT CHANGE UP (ref 27–34)
MCHC RBC-ENTMCNC: 33.6 G/DL — SIGNIFICANT CHANGE UP (ref 32–36)
MCV RBC AUTO: 83.8 FL — SIGNIFICANT CHANGE UP (ref 80–100)
NRBC # BLD AUTO: 0 K/UL — SIGNIFICANT CHANGE UP (ref 0–0)
NRBC # FLD: 0 K/UL — SIGNIFICANT CHANGE UP (ref 0–0)
NRBC BLD AUTO-RTO: 0 /100 WBCS — SIGNIFICANT CHANGE UP (ref 0–0)
PLATELET # BLD AUTO: 252 K/UL — SIGNIFICANT CHANGE UP (ref 150–400)
PMV BLD: 9.8 FL — SIGNIFICANT CHANGE UP (ref 7–13)
POTASSIUM SERPL-MCNC: 3.8 MMOL/L — SIGNIFICANT CHANGE UP (ref 3.5–5.3)
POTASSIUM SERPL-SCNC: 3.8 MMOL/L — SIGNIFICANT CHANGE UP (ref 3.5–5.3)
RBC # BLD: 3.9 M/UL — LOW (ref 4.2–5.8)
RBC # FLD: 17.6 % — HIGH (ref 10.3–14.5)
SODIUM SERPL-SCNC: 136 MMOL/L — SIGNIFICANT CHANGE UP (ref 135–145)
WBC # BLD: 10.45 K/UL — SIGNIFICANT CHANGE UP (ref 3.8–10.5)
WBC # FLD AUTO: 10.45 K/UL — SIGNIFICANT CHANGE UP (ref 3.8–10.5)

## 2025-03-01 PROCEDURE — 99232 SBSQ HOSP IP/OBS MODERATE 35: CPT

## 2025-03-01 PROCEDURE — 71045 X-RAY EXAM CHEST 1 VIEW: CPT | Mod: 26

## 2025-03-01 PROCEDURE — 72146 MRI CHEST SPINE W/O DYE: CPT | Mod: 26

## 2025-03-01 PROCEDURE — 72141 MRI NECK SPINE W/O DYE: CPT | Mod: 26

## 2025-03-01 RX ORDER — SPIRONOLACTONE 25 MG
25 TABLET ORAL DAILY
Refills: 0 | Status: DISCONTINUED | OUTPATIENT
Start: 2025-03-02 | End: 2025-03-06

## 2025-03-01 RX ORDER — FUROSEMIDE 10 MG/ML
40 INJECTION INTRAMUSCULAR; INTRAVENOUS DAILY
Refills: 0 | Status: DISCONTINUED | OUTPATIENT
Start: 2025-03-02 | End: 2025-03-06

## 2025-03-01 RX ORDER — FUROSEMIDE 10 MG/ML
40 INJECTION INTRAMUSCULAR; INTRAVENOUS ONCE
Refills: 0 | Status: COMPLETED | OUTPATIENT
Start: 2025-03-01 | End: 2025-03-01

## 2025-03-01 RX ADMIN — HEPARIN SODIUM 5000 UNIT(S): 1000 INJECTION INTRAVENOUS; SUBCUTANEOUS at 21:16

## 2025-03-01 RX ADMIN — Medication 3 MILLILITER(S): at 07:04

## 2025-03-01 RX ADMIN — KETOROLAC TROMETHAMINE 15 MILLIGRAM(S): 30 INJECTION, SOLUTION INTRAMUSCULAR; INTRAVENOUS at 13:13

## 2025-03-01 RX ADMIN — HEPARIN SODIUM 5000 UNIT(S): 1000 INJECTION INTRAVENOUS; SUBCUTANEOUS at 05:29

## 2025-03-01 RX ADMIN — CEFTRIAXONE 2000 MILLIGRAM(S): 500 INJECTION, POWDER, FOR SOLUTION INTRAMUSCULAR; INTRAVENOUS at 12:39

## 2025-03-01 RX ADMIN — KETOROLAC TROMETHAMINE 15 MILLIGRAM(S): 30 INJECTION, SOLUTION INTRAMUSCULAR; INTRAVENOUS at 06:04

## 2025-03-01 RX ADMIN — Medication 650 MILLIGRAM(S): at 14:12

## 2025-03-01 RX ADMIN — Medication 1 TABLET(S): at 08:27

## 2025-03-01 RX ADMIN — KETOROLAC TROMETHAMINE 15 MILLIGRAM(S): 30 INJECTION, SOLUTION INTRAMUSCULAR; INTRAVENOUS at 14:12

## 2025-03-01 RX ADMIN — TRAMADOL HYDROCHLORIDE 50 MILLIGRAM(S): 50 TABLET, FILM COATED ORAL at 03:25

## 2025-03-01 RX ADMIN — LIDOCAINE HYDROCHLORIDE 1 PATCH: 20 JELLY TOPICAL at 08:27

## 2025-03-01 RX ADMIN — Medication 650 MILLIGRAM(S): at 05:28

## 2025-03-01 RX ADMIN — Medication 3 MILLILITER(S): at 22:00

## 2025-03-01 RX ADMIN — Medication 650 MILLIGRAM(S): at 21:16

## 2025-03-01 RX ADMIN — TRAMADOL HYDROCHLORIDE 50 MILLIGRAM(S): 50 TABLET, FILM COATED ORAL at 20:51

## 2025-03-01 RX ADMIN — ATORVASTATIN CALCIUM 40 MILLIGRAM(S): 80 TABLET, FILM COATED ORAL at 21:16

## 2025-03-01 RX ADMIN — KETOROLAC TROMETHAMINE 15 MILLIGRAM(S): 30 INJECTION, SOLUTION INTRAMUSCULAR; INTRAVENOUS at 05:29

## 2025-03-01 RX ADMIN — TRAMADOL HYDROCHLORIDE 50 MILLIGRAM(S): 50 TABLET, FILM COATED ORAL at 12:48

## 2025-03-01 RX ADMIN — Medication 5 MILLIGRAM(S): at 21:16

## 2025-03-01 RX ADMIN — Medication 325 MILLIGRAM(S): at 08:27

## 2025-03-01 RX ADMIN — Medication 1 APPLICATION(S): at 05:29

## 2025-03-01 RX ADMIN — Medication 40 MILLIGRAM(S): at 05:29

## 2025-03-01 RX ADMIN — FUROSEMIDE 40 MILLIGRAM(S): 10 INJECTION INTRAMUSCULAR; INTRAVENOUS at 12:39

## 2025-03-01 RX ADMIN — Medication 40 MILLIEQUIVALENT(S): at 12:39

## 2025-03-01 RX ADMIN — Medication 150 MICROGRAM(S): at 05:29

## 2025-03-01 RX ADMIN — Medication 3 MILLILITER(S): at 13:10

## 2025-03-01 RX ADMIN — HEPARIN SODIUM 5000 UNIT(S): 1000 INJECTION INTRAVENOUS; SUBCUTANEOUS at 13:12

## 2025-03-01 RX ADMIN — Medication 650 MILLIGRAM(S): at 13:12

## 2025-03-01 RX ADMIN — POLYETHYLENE GLYCOL 3350 17 GRAM(S): 17 POWDER, FOR SOLUTION ORAL at 08:27

## 2025-03-01 RX ADMIN — Medication 650 MILLIGRAM(S): at 06:04

## 2025-03-01 NOTE — PROGRESS NOTE ADULT - ASSESSMENT
74M s/p L femoral thromboembolectomy, dressing is c/d/i.  LLE DP and PT signals present.    Plan  - Recommend daily L groin island dressing changes by primary team or nursing staff, vascular did dressing change this AM on rounds  - Staples will be removed in office  - Frequent offloading of heels while in bed  - Please reconsult vascular with any questions

## 2025-03-01 NOTE — PROGRESS NOTE ADULT - PROBLEM SELECTOR PLAN 1
of TAVR valve  prior blood cx + strept salivarius  repeat cultures neg  seen by ID, cont rocephin  2/26 ROVERTO: echogenic sesile not very mobile subcentimeter ( 6x2 mm) structure attached to the base of the leaflet corresponding to the native non-coronary cusp likely representing a calcified nodule vs. less likely an old small calcified vegetation, mod AS  seen by neurosx for LS osteo rec conservative management  fitted for brace  now seen by orhto spine, repeat imaging ordered  needs aggressive PT, ambulation limited by pain  cont lido, tramadol, toradol added 2/28 per Antelmo   ideally pt needs to improve with PT and be more ambulatory prior to surgery  vascular team to see pt this morning at patient's request   plan to be determined  to be discussed with CT surgery attending in AM rounds

## 2025-03-01 NOTE — PROGRESS NOTE ADULT - ASSESSMENT
The patient is a 74y Male who is followed by neurology because of impaired ambulation due to pain and spinal disease    Impaired ambulation  leg strength again improved today (3/1) with less pain  reports that he was able to walk  likely multifactorial, but improving gradually    - pain limited    - spinal osteomyelitis    - limited ambulation since 11/23/24    For cervical and thoracic spine MRI    Continue PT, pain control, abx per ID    If he eventually will need AVR I suggest to call neurointerventional for cerebral angio to evaluate for mycotic aneurysm    will follow with you    Jay Mills MD PhD   [phone]

## 2025-03-01 NOTE — PROGRESS NOTE ADULT - SUBJECTIVE AND OBJECTIVE BOX
VASCULAR SURGERY        INTERVAL EVENTS/SUBJECTIVE:   Mr. Menezes requested to see the vascular team since his L groin island dressing was not changed yesterday.  He denies any new complaints.  He is experiencing BLE swelling and some heel pain from lying in bed.  Denies claudication sxs.  Denies changes in sensation in the lower extremities.  Denies fevers or chills.    ______________________________________________  OBJECTIVE:   T(C): 36.8 (03-01-25 @ 08:10), Max: 36.9 (02-28-25 @ 20:31)  HR: 79 (03-01-25 @ 08:10) (74 - 80)  BP: 141/66 (03-01-25 @ 08:10) (100/63 - 148/71)  RR: 17 (03-01-25 @ 08:10) (16 - 18)  SpO2: 95% (03-01-25 @ 08:10) (93% - 99%)  Wt(kg): --  CAPILLARY BLOOD GLUCOSE      POCT Blood Glucose.: 107 mg/dL (01 Mar 2025 08:11)  POCT Blood Glucose.: 155 mg/dL (28 Feb 2025 21:05)  POCT Blood Glucose.: 135 mg/dL (28 Feb 2025 16:56)  POCT Blood Glucose.: 132 mg/dL (28 Feb 2025 12:12)    I&O's Detail    28 Feb 2025 07:01  -  01 Mar 2025 07:00  --------------------------------------------------------  IN:    Oral Fluid: 960 mL  Total IN: 960 mL    OUT:    Voided (mL): 540 mL  Total OUT: 540 mL    Total NET: 420 mL          Physical exam:  Gen: resting in bed comfortably in NAD  Chest: no increased WOB, regular inspiratory effort   Vasc: L groin incision is C/D/I, no erythema or drainage noted.  Island dressing changed at bedside.  L foot with 2+ pitting edema.  L DP and PT signals present.  No wounds seen on the L heel.  ______________________________________________  LABS:                        11.0   10.45 )-----------( 252      ( 01 Mar 2025 05:20 )             32.7       03-01    136  |  102  |  24.4[H]  ----------------------------<  106[H]  3.8   |  23.0  |  0.96    Ca    8.4      01 Mar 2025 05:20  Mg     2.0     03-01      _____________________________________________

## 2025-03-01 NOTE — PROGRESS NOTE ADULT - SUBJECTIVE AND OBJECTIVE BOX
Subjective: reports back pain is better, upset MRIs did not get done yesterday, wants to see vascular regarding LLE denies CP, palpitations, SOB, cough, fever, chills, itchiness/rash, diaphoresis, vision changes, HA, dizziness/lightheadedness, numbness/tingling, abd pain, N/V     T(C): 36.8 (03-01-25 @ 01:15), Max: 36.9 (02-28-25 @ 08:01)  HR: 74 (03-01-25 @ 03:24) (74 - 83)  BP: 145/70 (03-01-25 @ 03:24) (100/63 - 165/79)  RR: 18 (03-01-25 @ 01:15) (17 - 18)  SpO2: 99% (03-01-25 @ 01:15) (94% - 99%)    02-28    134[L]  |  101  |  18.3  ----------------------------<  103[H]  3.7   |  22.0  |  0.93    Ca    8.4      28 Feb 2025 05:35  Mg     2.0     02-28                                 10.8   10.31 )-----------( 259      ( 28 Feb 2025 05:35 )             32.5                    CAPILLARY BLOOD GLUCOSE  POCT Blood Glucose.: 155 mg/dL (28 Feb 2025 21:05)  POCT Blood Glucose.: 135 mg/dL (28 Feb 2025 16:56)  POCT Blood Glucose.: 132 mg/dL (28 Feb 2025 12:12)  POCT Blood Glucose.: 113 mg/dL (28 Feb 2025 08:41)    I&O's Detail    27 Feb 2025 07:01  -  28 Feb 2025 07:00  --------------------------------------------------------  IN:    Oral Fluid: 480 mL  Total IN: 480 mL    OUT:    Voided (mL): 1250 mL  Total OUT: 1250 mL  Total NET: -770 mL    28 Feb 2025 07:01  -  01 Mar 2025 03:32  --------------------------------------------------------  IN:    Oral Fluid: 720 mL  Total IN: 720 mL    OUT:    Voided (mL): 440 mL  Total OUT: 440 mL  Total NET: 280 mL    Drug Dosing Weight  Height (cm): 172.7 (24 Feb 2025 20:04)  Weight (kg): 103.3 (24 Feb 2025 20:04)  BMI (kg/m2): 34.6 (24 Feb 2025 20:04)  BSA (m2): 2.16 (24 Feb 2025 20:04)    MEDICATIONS  (STANDING):  acetaminophen     Tablet .. 650 milliGRAM(s) Oral every 8 hours  atorvastatin 40 milliGRAM(s) Oral at bedtime  cefTRIAXone Injectable. 2000 milliGRAM(s) IV Push every 24 hours  chlorhexidine 2% Cloths 1 Application(s) Topical <User Schedule>  ferrous    sulfate 325 milliGRAM(s) Oral daily  heparin   Injectable 5000 Unit(s) SubCutaneous every 8 hours  insulin lispro (ADMELOG) corrective regimen sliding scale   SubCutaneous at bedtime  insulin lispro (ADMELOG) corrective regimen sliding scale   SubCutaneous three times a day before meals  ketorolac   Injectable 15 milliGRAM(s) IV Push every 8 hours  levothyroxine 150 MICROGram(s) Oral daily  lidocaine   4% Patch 1 Patch Transdermal daily  melatonin 5 milliGRAM(s) Oral at bedtime  multivitamin 1 Tablet(s) Oral daily  pantoprazole    Tablet 40 milliGRAM(s) Oral before breakfast  polyethylene glycol 3350 17 Gram(s) Oral daily  senna 2 Tablet(s) Oral at bedtime  sodium chloride 0.9% lock flush 3 milliLiter(s) IV Push every 8 hours    MEDICATIONS  (PRN):  bisacodyl Suppository 10 milliGRAM(s) Rectal daily PRN Constipation  cyclobenzaprine 5 milliGRAM(s) Oral three times a day PRN Muscle Spasm  traMADol 50 milliGRAM(s) Oral every 6 hours PRN Severe Pain (7 - 10)  traMADol 25 milliGRAM(s) Oral every 6 hours PRN Moderate Pain (4 - 6)    Physical Exam  gen: NAD  Neuro: A&Ox3 non focal speech clear and intact  Pulm: decreased BS b/l no wheezing  CV: S1s2 RRR  Abd: +BS soft N TND  Extrem/MS:  b/l LE edema 2+ L>R no cyanosis, WWP, palpable DP pulses b/l,  Incision(s): L groin dressing C/D/I

## 2025-03-01 NOTE — PROGRESS NOTE ADULT - SUBJECTIVE AND OBJECTIVE BOX
[facility]                                     Neurology at [place]                                 Gene Pina, & [place]                                  [address]                                        [place], [place], [address]                                             [phone]    CC: difficulty walking  HPI:  The patient is a 74y Male who presented with endocarditis, lumbar spine osteomyelitis/discitis and pain limiting ambulation.  He said that his back pain started on 11/23/24 and has progressively worsened.  He had workup at [facility] that found spinal osteomyelitis with no abscess and intervention was not deemed necessary by neurosurgery at [place], as well as here.  He was subsequently found to have endocarditis and transferred for evaluation of AVR.   Neurology is asked to evaluate.     Interval history: moving legs better today, was able to ambulate a bit    Review of systems (neurology): Denies headache or dizziness. (+)back/leg pain and weakness. no numbness.  Denies speech/language deficits. Denies diplopia/blurred vision.  Denies confusion    MEDICATIONS  (STANDING):  acetaminophen     Tablet .. 650 milliGRAM(s) Oral every 8 hours  atorvastatin 40 milliGRAM(s) Oral at bedtime  cefTRIAXone Injectable. 2000 milliGRAM(s) IV Push every 24 hours  chlorhexidine 2% Cloths 1 Application(s) Topical <User Schedule>  ferrous    sulfate 325 milliGRAM(s) Oral daily  heparin   Injectable 5000 Unit(s) SubCutaneous every 8 hours  ketorolac   Injectable 15 milliGRAM(s) IV Push every 8 hours  levothyroxine 150 MICROGram(s) Oral daily  lidocaine   4% Patch 1 Patch Transdermal daily  melatonin 5 milliGRAM(s) Oral at bedtime  multivitamin 1 Tablet(s) Oral daily  pantoprazole    Tablet 40 milliGRAM(s) Oral before breakfast  polyethylene glycol 3350 17 Gram(s) Oral daily  senna 2 Tablet(s) Oral at bedtime  sodium chloride 0.9% lock flush 3 milliLiter(s) IV Push every 8 hours    MEDICATIONS  (PRN):  bisacodyl Suppository 10 milliGRAM(s) Rectal daily PRN Constipation  cyclobenzaprine 5 milliGRAM(s) Oral three times a day PRN Muscle Spasm  traMADol 50 milliGRAM(s) Oral every 6 hours PRN Severe Pain (7 - 10)  traMADol 25 milliGRAM(s) Oral every 6 hours PRN Moderate Pain (4 - 6)      Vital Signs Last 24 Hrs  T(C): 36.8 (01 Mar 2025 12:00), Max: 36.9 (28 Feb 2025 20:31)  T(F): 98.3 (01 Mar 2025 12:00), Max: 98.4 (28 Feb 2025 20:31)  HR: 80 (01 Mar 2025 12:00) (74 - 80)  BP: 130/70 (01 Mar 2025 12:00) (100/63 - 148/71)  BP(mean): --  RR: 18 (01 Mar 2025 12:00) (16 - 18)  SpO2: 98% (01 Mar 2025 12:00) (93% - 99%)    Parameters below as of 01 Mar 2025 12:00  Patient On (Oxygen Delivery Method): room air      Detailed Neurologic Exam:    Mental status: The patient is awake and alert and has normal attention span.  The patient is fully oriented in 3 spheres. The patient is oriented to current events. The patient is able to name objects, follow commands, repeat sentences.    Cranial nerves: Pupils equal and react symmetrically to light. There is no visual field deficit to confrontation. Extraocular motion is full with no nystagmus. There is no ptosis.  Facial musculature is symmetric.     Motor: There is normal bulk and tone.  There is no tremor.  Strength is 5/5 in the right arm  Strength is 5/5 in the left arm   Pain limits testing of power in legs however today he is at least 3 to 4/5 b/l  IPS at 3/5, better power disatlly    Sensation: Intact to light touch in 4 extremities    Cerebellar: There is no dysmetria on finger to nose testing.    Gait : deferred    LABS:                              11.0   10.45 )-----------( 252      ( 01 Mar 2025 05:20 )             32.7     03-01    136  |  102  |  24.4[H]  ----------------------------<  106[H]  3.8   |  23.0  |  0.96    Ca    8.4      01 Mar 2025 05:20  Mg     2.0     03-01    RADIOLOGY & ADDITIONAL STUDIES (independently reviewed unless otherwise noted):  MR Lumbar Spine w/wo IV Cont (02.24.25 @ 09:45)   IMPRESSION:  1. L3-L4 suspected septic discitis appears largely similar to 2/12/2025   noting mildly increased fluid within the disc space and increased left   paraspinal infiltration and enhancement. No abscess collection has   developed  2. L5-S1 suspected discitis appears largely similar to 2/12/2025, noting   decreased enhancement in the left ventral epidural space. No abscess   collection has developed  3. Recommend continued imaging follow-up

## 2025-03-01 NOTE — CHART NOTE - NSCHARTNOTEFT_GEN_A_CORE
pt seen and examined earlier this evening, c/o LLE swelling, concerned it is getting worse, stated he was requesting vascular to come back and change his dressing and evaluate leg. on exam pt with b/l LE edema L>R, no cyanosis, both extremities warm, palpable DP pulses b/l, calf and thigh soft, no erythema, mild tenderness near incision, Left groin dressing intact (last vascular noted 2/27 says can be changed daily)  spoke to covering surgical resident, given no acute changes in leg exam, primary vascular team will see pt in the morning, if any worsening symptoms or changes in physical exam, will recall and resident will come to see pt overnight

## 2025-03-01 NOTE — PROGRESS NOTE ADULT - ASSESSMENT
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

## 2025-03-01 NOTE — PROGRESS NOTE ADULT - SUBJECTIVE AND OBJECTIVE BOX
[id number]  CONNOR SANDRA    Patient is a 74y Male with L3-4, L5-S1 OM/discitis. Patient seen and evaluated at bedside. Patient is doing much better today. Patient states he is using walker to ambulate. Patient seen immediately after ambulating with PT, stating he did a lot better today. Denies any acute motor or sensory changes. Denies any fever/chills, SOB, CP, N/V, numbness/tingling. No other orthopedic complaints.       T(C): 36.8 (03-01-25 @ 08:10), Max: 36.9 (02-28-25 @ 20:31)  HR: 79 (03-01-25 @ 08:10) (74 - 80)  BP: 141/66 (03-01-25 @ 08:10) (100/63 - 148/71)  RR: 17 (03-01-25 @ 08:10) (16 - 18)  SpO2: 95% (03-01-25 @ 08:10) (93% - 99%)                          11.0   10.45 )-----------( 252      ( 01 Mar 2025 05:20 )             32.7       Physical Exam:    General: Awake, alert, in NAD  Motor exam: [ x ]          Lower extremity                               HF         KE          TA       EHL         GS                                                 R        5/5        5/5        5/5       5/5         5/5                                               L         5/5        5/5       5/5       5/5          5/5    SILT L2-S1 intact B/L, decreased sensation to anterior thigh right leg and surgical site left thigh. DP pulses 2+ B/L  Calf soft, NT B/L    02-28-25 @ 07:01  -  03-01-25 @ 07:00  --------------------------------------------------------  IN: 960 mL / OUT: 540 mL / NET: 420 mL        A/P: Patient is a 74y Male being followed for L3-4, L5-S1 OM/discitis    * F/u MRI - MRI to be completed this afternoon  * IV ABX  * Pain control  * DVTP  * WBAT  * final plan pending review of new imaging with Dr. Francois

## 2025-03-02 DIAGNOSIS — M46.40 DISCITIS, UNSPECIFIED, SITE UNSPECIFIED: ICD-10-CM

## 2025-03-02 LAB
ANION GAP SERPL CALC-SCNC: 12 MMOL/L — SIGNIFICANT CHANGE UP (ref 5–17)
BUN SERPL-MCNC: 24.2 MG/DL — HIGH (ref 8–20)
CALCIUM SERPL-MCNC: 8.5 MG/DL — SIGNIFICANT CHANGE UP (ref 8.4–10.5)
CHLORIDE SERPL-SCNC: 99 MMOL/L — SIGNIFICANT CHANGE UP (ref 96–108)
CO2 SERPL-SCNC: 22 MMOL/L — SIGNIFICANT CHANGE UP (ref 22–29)
CREAT SERPL-MCNC: 0.92 MG/DL — SIGNIFICANT CHANGE UP (ref 0.5–1.3)
CULTURE RESULTS: SIGNIFICANT CHANGE UP
EGFR: 87 ML/MIN/1.73M2 — SIGNIFICANT CHANGE UP
EGFR: 87 ML/MIN/1.73M2 — SIGNIFICANT CHANGE UP
GLUCOSE SERPL-MCNC: 102 MG/DL — HIGH (ref 70–99)
HCT VFR BLD CALC: 32.5 % — LOW (ref 39–50)
HGB BLD-MCNC: 10.7 G/DL — LOW (ref 13–17)
MAGNESIUM SERPL-MCNC: 1.8 MG/DL — SIGNIFICANT CHANGE UP (ref 1.8–2.6)
MCHC RBC-ENTMCNC: 27.7 PG — SIGNIFICANT CHANGE UP (ref 27–34)
MCHC RBC-ENTMCNC: 32.9 G/DL — SIGNIFICANT CHANGE UP (ref 32–36)
MCV RBC AUTO: 84.2 FL — SIGNIFICANT CHANGE UP (ref 80–100)
NRBC # BLD AUTO: 0 K/UL — SIGNIFICANT CHANGE UP (ref 0–0)
NRBC # FLD: 0 K/UL — SIGNIFICANT CHANGE UP (ref 0–0)
NRBC BLD AUTO-RTO: 0 /100 WBCS — SIGNIFICANT CHANGE UP (ref 0–0)
PLATELET # BLD AUTO: 224 K/UL — SIGNIFICANT CHANGE UP (ref 150–400)
PMV BLD: 9.5 FL — SIGNIFICANT CHANGE UP (ref 7–13)
POTASSIUM SERPL-MCNC: 3.9 MMOL/L — SIGNIFICANT CHANGE UP (ref 3.5–5.3)
POTASSIUM SERPL-SCNC: 3.9 MMOL/L — SIGNIFICANT CHANGE UP (ref 3.5–5.3)
RBC # BLD: 3.86 M/UL — LOW (ref 4.2–5.8)
RBC # FLD: 17.9 % — HIGH (ref 10.3–14.5)
SODIUM SERPL-SCNC: 133 MMOL/L — LOW (ref 135–145)
SPECIMEN SOURCE: SIGNIFICANT CHANGE UP
WBC # BLD: 10.07 K/UL — SIGNIFICANT CHANGE UP (ref 3.8–10.5)
WBC # FLD AUTO: 10.07 K/UL — SIGNIFICANT CHANGE UP (ref 3.8–10.5)

## 2025-03-02 PROCEDURE — 71045 X-RAY EXAM CHEST 1 VIEW: CPT | Mod: 26

## 2025-03-02 PROCEDURE — 99233 SBSQ HOSP IP/OBS HIGH 50: CPT | Mod: FS

## 2025-03-02 PROCEDURE — 99232 SBSQ HOSP IP/OBS MODERATE 35: CPT

## 2025-03-02 PROCEDURE — 99232 SBSQ HOSP IP/OBS MODERATE 35: CPT | Mod: FS

## 2025-03-02 RX ORDER — MAGNESIUM OXIDE 400 MG
800 TABLET ORAL ONCE
Refills: 0 | Status: COMPLETED | OUTPATIENT
Start: 2025-03-02 | End: 2025-03-02

## 2025-03-02 RX ADMIN — Medication 650 MILLIGRAM(S): at 21:36

## 2025-03-02 RX ADMIN — TRAMADOL HYDROCHLORIDE 25 MILLIGRAM(S): 50 TABLET, FILM COATED ORAL at 18:56

## 2025-03-02 RX ADMIN — Medication 150 MICROGRAM(S): at 05:09

## 2025-03-02 RX ADMIN — FUROSEMIDE 40 MILLIGRAM(S): 10 INJECTION INTRAMUSCULAR; INTRAVENOUS at 05:08

## 2025-03-02 RX ADMIN — Medication 325 MILLIGRAM(S): at 08:02

## 2025-03-02 RX ADMIN — Medication 3 MILLILITER(S): at 21:00

## 2025-03-02 RX ADMIN — Medication 650 MILLIGRAM(S): at 15:17

## 2025-03-02 RX ADMIN — Medication 40 MILLIGRAM(S): at 05:08

## 2025-03-02 RX ADMIN — CEFTRIAXONE 2000 MILLIGRAM(S): 500 INJECTION, POWDER, FOR SOLUTION INTRAMUSCULAR; INTRAVENOUS at 12:45

## 2025-03-02 RX ADMIN — Medication 25 MILLIGRAM(S): at 05:08

## 2025-03-02 RX ADMIN — HEPARIN SODIUM 5000 UNIT(S): 1000 INJECTION INTRAVENOUS; SUBCUTANEOUS at 15:17

## 2025-03-02 RX ADMIN — Medication 3 MILLILITER(S): at 06:00

## 2025-03-02 RX ADMIN — TRAMADOL HYDROCHLORIDE 50 MILLIGRAM(S): 50 TABLET, FILM COATED ORAL at 16:17

## 2025-03-02 RX ADMIN — Medication 5 MILLIGRAM(S): at 21:37

## 2025-03-02 RX ADMIN — Medication 3 MILLILITER(S): at 14:13

## 2025-03-02 RX ADMIN — Medication 1 APPLICATION(S): at 05:09

## 2025-03-02 RX ADMIN — Medication 800 MILLIGRAM(S): at 12:45

## 2025-03-02 RX ADMIN — HEPARIN SODIUM 5000 UNIT(S): 1000 INJECTION INTRAVENOUS; SUBCUTANEOUS at 05:09

## 2025-03-02 RX ADMIN — Medication 20 MILLIEQUIVALENT(S): at 12:45

## 2025-03-02 RX ADMIN — TRAMADOL HYDROCHLORIDE 50 MILLIGRAM(S): 50 TABLET, FILM COATED ORAL at 15:17

## 2025-03-02 RX ADMIN — Medication 650 MILLIGRAM(S): at 16:17

## 2025-03-02 RX ADMIN — ATORVASTATIN CALCIUM 40 MILLIGRAM(S): 80 TABLET, FILM COATED ORAL at 21:37

## 2025-03-02 RX ADMIN — LIDOCAINE HYDROCHLORIDE 1 PATCH: 20 JELLY TOPICAL at 08:04

## 2025-03-02 RX ADMIN — HEPARIN SODIUM 5000 UNIT(S): 1000 INJECTION INTRAVENOUS; SUBCUTANEOUS at 21:37

## 2025-03-02 RX ADMIN — Medication 1 TABLET(S): at 08:05

## 2025-03-02 RX ADMIN — TRAMADOL HYDROCHLORIDE 50 MILLIGRAM(S): 50 TABLET, FILM COATED ORAL at 05:07

## 2025-03-02 RX ADMIN — Medication 650 MILLIGRAM(S): at 05:08

## 2025-03-02 RX ADMIN — POLYETHYLENE GLYCOL 3350 17 GRAM(S): 17 POWDER, FOR SOLUTION ORAL at 08:04

## 2025-03-02 NOTE — CHART NOTE - NSCHARTNOTEFT_GEN_A_CORE
Patient stated that the LSO was too loose. I readjusted the LSO and donned the orthosis on the patient. Patient was satisfied with outcome.   [facility]  [phone]

## 2025-03-02 NOTE — PROGRESS NOTE ADULT - ASSESSMENT
Attending statement:  I have personally seen this patient, and formed a face to face diagnostic evaluation on this patient on this date.  I have reviewed the PA, NP and or Medical/PA student and/or Resident documentation and agree with the history, physical exam and plan of care except if noted otherwise.      I have reviewed the new cervical and thoracic MRI thoracic osteodiscitis noted.  No significant epidural compression no significant cervical or thoracic cord compression no myelopathy.  At this point in time continue with IV antibiosis.  With regard to the lumbar 3 4 and 5 1 osteodiscitis again no epidural component irrigation and debridement could be performed prior to valve replacement if required by cardiothoracic surgery.

## 2025-03-02 NOTE — PROGRESS NOTE ADULT - PROBLEM SELECTOR PLAN 1
- s/p ROVERTO showing EF 55-60%   - The prosthetic aortic valve thickened leaflets. Moderate prosthetic aortic stenosis. No intravalvular regurgitation Trace paravalvular regurgitation, originating at at 12 o'clock o'clock.   - There is a echogenic sesile not very mobile subcentimeter ( 6x2 mm) structure attached to the base of the leaflet corresponding to the native non-coronary cusp likely representing a calcified nodule vs. less likely an old small calcified vegetation.  -  Pt recently discharged to SNF rehab after treatment for infected TAVR and Osteomyelitis of spine, strep salivarius bacteremia. Pt received transfusion and w/u for GI bleed with unremarkable colonoscopy and upper endoscopy. Underwent ROVERTO at that time which showed vegetation of TAVR. Plan at that time was for 6 weeks of IV antibiotics via PICC line and out patient pill camera study.  - Plan of care is as per CTS and infectious disease  - there are no acute cardiac etiologies present, pt is euvolemic.   - Hx of afib not on AC due to GIB. if there is no contraindication for AC, pt's chadsvasc is 4. should consider DOAC if deemed appropriate as per GI. If pt's bleeding history does not preclude him from AC, then should start Eliquis 5mg BID   - there is no further inpatient cardiology workup or recommendations, pt to follow up outpatient with his primary cardiologist. If more convenient we will send a follow up appointment so he may be seen in the office at [address] [place] [address].  - Per CT surgery, plan is still on going on when patient will need possible valve replacement and/or spine surgery.

## 2025-03-02 NOTE — PROGRESS NOTE ADULT - ASSESSMENT
74M, PMHx of HTN, AF, HLD, GI Bleed, S/P TAVR 2023, chronic back pain and DM recent admission at [facility] 2/10-2/17 with acute anemia, DARLINE, hypotension, s/p 2PRBC, negative EGD/colonoscopy, found to have strept salivrius bacteremia, ??ROVERTO with vegetation on TAVR valve, MRI spine with possible osteo of lumbar-sacral spine, d/c'd to rehab on rocephin, re-presented to  2/20 with LLE rash and L knee pain found to have clot in LCFA with distal embolism s/p CFA endarterectomy with clot retrieval and good blood flow to LE, clot cx with GPC, repeat MRI spine without abscess, transfused 2 PRBC on 2/23, transferred to CTICU at [facility]  2/24 for CT surgery evaluation.       74M recently admitted to [facility] 2/10/2025 to 2/17/2025 with acute anemia, DARLINE, hypotension, s/p PRBC, negative EGD/colonoscopy, found to have streptococcus salivarius bacteremia, ??ROVERTO with vegetation on TAVR valve, MRI spine with possible osteo of lumbar-sacral spine, d/c'd to rehab on rocephin, represented to  2/20 with LLE rash and L knee pain found to have clot in LCFA with distal embolism s/p CFA endarterectomy with clot retrieval and good blood flow to LE, clot cx with GPC, repeat MRI spine without abscess, transfused 2 PRBC on 2/23, transferred to CTICU at [facility]  2/24 for CT surgery evaluation.

## 2025-03-02 NOTE — PROGRESS NOTE ADULT - PROBLEM SELECTOR PLAN 1
of TAVR valve  prior blood cx + strept salivarius  repeat cultures neg  seen by ID, cont rocephin  2/26 ROVERTO: echogenic sesile not very mobile subcentimeter ( 6x2 mm) structure attached to the base of the leaflet corresponding to the native non-coronary cusp likely representing a calcified nodule vs. less likely an old small calcified vegetation, mod AS  seen by neurosx for LS osteo rec conservative management  fitted for brace  now seen by orhto spine, MR cervical/throacic spine completed, results pending, f/u with ortho  needs aggressive PT, ambulation limited by pain  cont lido, tramadol, toradol x5 doses (now off), consider PRN additional doses  ideally pt needs to improve with PT and be more ambulatory prior to any cardiac surgery  plan to be determined  to be discussed with CT surgery attending in AM rounds

## 2025-03-02 NOTE — PROGRESS NOTE ADULT - ASSESSMENT
74M, PMHx of HTN, AF, HLD, GI Bleed, S/P TAVR 2023, chronic back pain and DM recent admission at [facility] 2/10-2/17 with acute anemia, DARLINE, hypotension, s/p 2PRBC, negative EGD/colonoscopy, found to have strept salivrius bacteremia, ROVERTO with vegetation on TAVR valve, MRI spine with possible osteo of lumbar-sacral spine, d/c'd to rehab on rocephin, represented to  2/20 with LLE rash and L knee pain found to have clot in LCFA with distal embolism s/p CFA endarterectomy with clot retrieval and good blood flow to LE, clot cx with GPC, repeat MRI spine without abscess, transfused 2 PRBC on 2/23, transferred to CTICU at [facility]  2/24 for CT surgery evaluation.

## 2025-03-02 NOTE — PROGRESS NOTE ADULT - SUBJECTIVE AND OBJECTIVE BOX
Subjective: states back pain is better, walked with PT yesterday denies CP, palpitations, SOB, cough, fever, chills, itchiness/rash, diaphoresis, vision changes, HA, dizziness/lightheadedness, numbness/tingling, abd pain, N/V     T(C): 36.4 (03-02-25 @ 00:33), Max: 36.8 (03-01-25 @ 05:26)  HR: 74 (03-02-25 @ 00:33) (68 - 80)  BP: 148/64 (03-02-25 @ 00:33) (120/66 - 150/73)  RR: 18 (03-02-25 @ 00:33) (16 - 18)  SpO2: 93% (03-02-25 @ 00:33) (93% - 98%)    03-01    136  |  102  |  24.4[H]  ----------------------------<  106[H]  3.8   |  23.0  |  0.96    Ca    8.4      01 Mar 2025 05:20  Mg     2.0     03-01                                 11.0   10.45 )-----------( 252      ( 01 Mar 2025 05:20 )             32.7                    CAPILLARY BLOOD GLUCOSE  POCT Blood Glucose.: 139 mg/dL (01 Mar 2025 21:21)  POCT Blood Glucose.: 101 mg/dL (01 Mar 2025 17:20)  POCT Blood Glucose.: 117 mg/dL (01 Mar 2025 12:04)  POCT Blood Glucose.: 107 mg/dL (01 Mar 2025 08:11)    I&O's Detail    28 Feb 2025 07:01  -  01 Mar 2025 07:00  --------------------------------------------------------  IN:    Oral Fluid: 960 mL  Total IN: 960 mL    OUT:    Voided (mL): 540 mL  Total OUT: 540 mL  Total NET: 420 mL    01 Mar 2025 07:01  -  02 Mar 2025 02:05  --------------------------------------------------------  IN:    Oral Fluid: 950 mL  Total IN: 950 mL    OUT:    Voided (mL): 1050 mL  Total OUT: 1050 mL  Total NET: -100 mL    Drug Dosing Weight  Height (cm): 172.7 (24 Feb 2025 20:04)  Weight (kg): 103.3 (24 Feb 2025 20:04)  BMI (kg/m2): 34.6 (24 Feb 2025 20:04)  BSA (m2): 2.16 (24 Feb 2025 20:04)    MEDICATIONS  (STANDING):  acetaminophen     Tablet .. 650 milliGRAM(s) Oral every 8 hours  atorvastatin 40 milliGRAM(s) Oral at bedtime  cefTRIAXone Injectable. 2000 milliGRAM(s) IV Push every 24 hours  chlorhexidine 2% Cloths 1 Application(s) Topical <User Schedule>  ferrous    sulfate 325 milliGRAM(s) Oral daily  furosemide    Tablet 40 milliGRAM(s) Oral daily  heparin   Injectable 5000 Unit(s) SubCutaneous every 8 hours  levothyroxine 150 MICROGram(s) Oral daily  lidocaine   4% Patch 1 Patch Transdermal daily  melatonin 5 milliGRAM(s) Oral at bedtime  multivitamin 1 Tablet(s) Oral daily  pantoprazole    Tablet 40 milliGRAM(s) Oral before breakfast  polyethylene glycol 3350 17 Gram(s) Oral daily  senna 2 Tablet(s) Oral at bedtime  sodium chloride 0.9% lock flush 3 milliLiter(s) IV Push every 8 hours  spironolactone 25 milliGRAM(s) Oral daily    MEDICATIONS  (PRN):  bisacodyl Suppository 10 milliGRAM(s) Rectal daily PRN Constipation  cyclobenzaprine 5 milliGRAM(s) Oral three times a day PRN Muscle Spasm  traMADol 50 milliGRAM(s) Oral every 6 hours PRN Severe Pain (7 - 10)  traMADol 25 milliGRAM(s) Oral every 6 hours PRN Moderate Pain (4 - 6)    Physical Exam  gen: NAD  Neuro: A&Ox3 non focal speech clear and intact  Pulm: decreased BS b/l no wheezing  CV: S1s2 RRR  Abd: +BS soft N TND  Extrem/MS:  b/l LE edema 2+ L>R no cyanosis, WWP, palpable DP pulses R>L  Incision(s): L groin dressing C/D/I

## 2025-03-02 NOTE — PROGRESS NOTE ADULT - SUBJECTIVE AND OBJECTIVE BOX
[facility]                                                         CARDIOLOGY AT [address], [place] [address]                                                         Telephone: [phone]. Fax:[phone]                                                                             PROGRESS NOTE    Reason for follow up: endocarditis   Update: Pt reports having back pain when ambulating, when he's in bed, he reports no pain, otherwise no other complaints.     Review of symptoms:   Cardiac:  No chest pain. No dyspnea. No palpitations.  Respiratory: no cough. No dyspnea  Gastrointestinal: No diarrhea. No abdominal pain. No bleeding.   Neuro: No focal neuro complaints.    Vitals:  T(C): 36.4 (03-02-25 @ 08:00), Max: 36.9 (03-02-25 @ 04:25)  HR: 74 (03-02-25 @ 08:00) (68 - 80)  BP: 141/74 (03-02-25 @ 08:00) (120/66 - 150/73)  RR: 18 (03-02-25 @ 08:00) (17 - 18)  SpO2: 93% (03-02-25 @ 08:00) (93% - 98%)  Wt(kg): --  I&O's Summary    01 Mar 2025 07:01  -  02 Mar 2025 07:00  --------------------------------------------------------  IN: 950 mL / OUT: 1050 mL / NET: -100 mL    02 Mar 2025 07:01  -  02 Mar 2025 10:43  --------------------------------------------------------  IN: 300 mL / OUT: 200 mL / NET: 100 mL    PHYSICAL EXAM:  Appearance: Comfortable. No acute distress  HEENT:  Atraumatic. Normocephalic.  Normal oral mucosa  Neurologic: A & O x 3, no gross focal deficits.  Cardiovascular: RRR S1 S2, No murmur, no rubs/gallops. No JVD  Respiratory: Lungs clear to auscultation, unlabored   Gastrointestinal:  Soft, Non-tender, + BS  Lower Extremities: 2+ Peripheral Pulses, No clubbing, cyanosis, or edema  Psychiatry: Patient is calm. No agitation.   Skin: warm and dry.    CURRENT CARDIAC MEDICATIONS:  furosemide    Tablet 40 milliGRAM(s) Oral daily  spironolactone 25 milliGRAM(s) Oral daily    CURRENT OTHER MEDICATIONS:  cefTRIAXone Injectable. 2000 milliGRAM(s) IV Push every 24 hours  acetaminophen     Tablet .. 650 milliGRAM(s) Oral every 8 hours  cyclobenzaprine 5 milliGRAM(s) Oral three times a day PRN Muscle Spasm  melatonin 5 milliGRAM(s) Oral at bedtime  traMADol 25 milliGRAM(s) Oral every 6 hours PRN Moderate Pain (4 - 6)  traMADol 50 milliGRAM(s) Oral every 6 hours PRN Severe Pain (7 - 10)  bisacodyl Suppository 10 milliGRAM(s) Rectal daily PRN Constipation  pantoprazole    Tablet 40 milliGRAM(s) Oral before breakfast  polyethylene glycol 3350 17 Gram(s) Oral daily  senna 2 Tablet(s) Oral at bedtime  atorvastatin 40 milliGRAM(s) Oral at bedtime  levothyroxine 150 MICROGram(s) Oral daily  chlorhexidine 2% Cloths 1 Application(s) Topical <User Schedule>  ferrous    sulfate 325 milliGRAM(s) Oral daily  heparin   Injectable 5000 Unit(s) SubCutaneous every 8 hours  lidocaine   4% Patch 1 Patch Transdermal daily  multivitamin 1 Tablet(s) Oral daily  sodium chloride 0.9% lock flush 3 milliLiter(s) IV Push every 8 hours    LABS:	 	             10.7   10.07 )-----------( 224      ( 02 Mar 2025 06:45 )             32.5     03-02    133[L]  |  99  |  24.2[H]  ----------------------------<  102[H]  3.9   |  22.0  |  0.92    Ca    8.5      02 Mar 2025 06:45  Mg     1.8     03-02    PT/INR/PTT ( 25 Feb 2025 11:10 )                       :                       :      X            :       33.9                  .        .                   .              .           .       X           .                                       Lipid Profile:   HgA1c: 5.7%   TSH: Thyroid Stimulating Hormone, Serum: 0.73 uIU/mL    TELEMETRY: NSR   ECG:  NSR     DIAGNOSTIC TESTING:  [x ] Echocardiogram: < from: ROVERTO W or WO Ultrasound Enhancing Agent (02.26.25 @ 15:42) >      1. Left ventricular cavity is normal in size. Left ventricular wall thickness is mildly increased. Left ventricular systolic function is normal with an ejection fraction visually estimated at 55 to 60 %.   2.A Norman (TAVR) valve replacement is present in the aortic position The prosthetic valve has reduced leaflet opening and is well seated Degeneration of the aortic valve prosthesis. The prosthetic aortic valve thickened leaflets. Moderate prosthetic aortic stenosis. No intravalvular regurgitation Trace paravalvular regurgitation, originating at at 12 o'clock o'clock. There is a echogenic sesile not very mobile subcentimeter ( 6x2 mm) structure attached to the base of the leaflet corresponding to the native non-coronary cusp likely representing a calcified nodule vs. less likely an old small calcified vegetation.    < end of copied text >  < from: TTE W or WO Ultrasound Enhancing Agent (02.24.25 @ 21:10) >   1. Left ventricular cavity is normal in size. Left ventricular systolic function is normal with an ejection fraction visually estimated at 55 to 60 %.   2. There is mild (grade 1) left ventricular diastolic dysfunction.   3. Normal right ventricular cavity size and normal right ventricular systolic function.   4. Left atrium is mildly dilated.   5. The right atrium is normal in size.   6. Thickened mitral valve leaflets.   7. There is mild calcification of the mitral valve annulus.   8. Trace mitral regurgitation.   9. A Norman TAVR (TAVR) valve replacement is present in the aortic position The prosthetic valve has normal leaflet excursion and is well seated with normal function The prosthetic aortic valve No vegetations seen.. Trace paravalvular regurgitation.  10. A TEEwould be necessary to rule out vegetation on TAVR.  11. No pericardial effusion seen.  12. Pulmonary artery systolic pressure could not be estimated.  13. Small right pleural effusion noted.    < end of copied text >    [ x]  Catheterization: < from: Structural Heart (01.11.23 @ 12:09) >    Successul NOMAN with a 29 mm Evolut FX THV via percutaneous right TF  access. There was trace PVL post NOMAN. No    bradycardia or bundles with TVP removed at end of case. Following  closure of the RFA access with 2 Proglides, infrarenal  aortography demonstrated brisk runoff and no extravasation.  Neurologically intact post NOMAN.    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 Obtain   TTE   to evaluate aortic valve given worsening murmur as well as heavily   calcified valve.    < end of copied text >    [ ] Stress Test:    OTHER: 	                                                                [facility]                                                         CARDIOLOGY AT [address], [place] [address]                                                         Telephone: [phone]. Fax:[phone]                                                                             PROGRESS NOTE    Reason for follow up: endocarditis   Update: Pt reports having back pain when ambulating, when he's in bed, he reports no pain, otherwise no other complaints.     Review of symptoms:   Cardiac:  No chest pain. No dyspnea. No palpitations.  Respiratory: no cough. No dyspnea  Gastrointestinal: No diarrhea. No abdominal pain. No bleeding.   Neuro: No focal neuro complaints.    Vitals:  T(C): 36.4 (03-02-25 @ 08:00), Max: 36.9 (03-02-25 @ 04:25)  HR: 74 (03-02-25 @ 08:00) (68 - 80)  BP: 141/74 (03-02-25 @ 08:00) (120/66 - 150/73)  RR: 18 (03-02-25 @ 08:00) (17 - 18)  SpO2: 93% (03-02-25 @ 08:00) (93% - 98%)  Wt(kg): --  I&O's Summary    01 Mar 2025 07:01  -  02 Mar 2025 07:00  --------------------------------------------------------  IN: 950 mL / OUT: 1050 mL / NET: -100 mL    02 Mar 2025 07:01  -  02 Mar 2025 10:43  --------------------------------------------------------  IN: 300 mL / OUT: 200 mL / NET: 100 mL    PHYSICAL EXAM:  Appearance: Comfortable. No acute distress  Neurologic:  no gross focal deficits.  Cardiovascular: RRR S1 S2, No JVD, +2/6 james   Respiratory: Lungs clear to auscultation, unlabored   Gastrointestinal:  + BS  Lower Extremities: 2+ Peripheral Pulses, No clubbing, cyanosis, or edema    CURRENT CARDIAC MEDICATIONS:  furosemide    Tablet 40 milliGRAM(s) Oral daily  spironolactone 25 milliGRAM(s) Oral daily    CURRENT OTHER MEDICATIONS:  cefTRIAXone Injectable. 2000 milliGRAM(s) IV Push every 24 hours  acetaminophen     Tablet .. 650 milliGRAM(s) Oral every 8 hours  cyclobenzaprine 5 milliGRAM(s) Oral three times a day PRN Muscle Spasm  melatonin 5 milliGRAM(s) Oral at bedtime  traMADol 25 milliGRAM(s) Oral every 6 hours PRN Moderate Pain (4 - 6)  traMADol 50 milliGRAM(s) Oral every 6 hours PRN Severe Pain (7 - 10)  bisacodyl Suppository 10 milliGRAM(s) Rectal daily PRN Constipation  pantoprazole    Tablet 40 milliGRAM(s) Oral before breakfast  polyethylene glycol 3350 17 Gram(s) Oral daily  senna 2 Tablet(s) Oral at bedtime  atorvastatin 40 milliGRAM(s) Oral at bedtime  levothyroxine 150 MICROGram(s) Oral daily  chlorhexidine 2% Cloths 1 Application(s) Topical <User Schedule>  ferrous    sulfate 325 milliGRAM(s) Oral daily  heparin   Injectable 5000 Unit(s) SubCutaneous every 8 hours  lidocaine   4% Patch 1 Patch Transdermal daily  multivitamin 1 Tablet(s) Oral daily  sodium chloride 0.9% lock flush 3 milliLiter(s) IV Push every 8 hours    LABS:	 	             10.7   10.07 )-----------( 224      ( 02 Mar 2025 06:45 )             32.5     03-02    133[L]  |  99  |  24.2[H]  ----------------------------<  102[H]  3.9   |  22.0  |  0.92    Ca    8.5      02 Mar 2025 06:45  Mg     1.8     03-02    PT/INR/PTT ( 25 Feb 2025 11:10 )                       :                       :      X            :       33.9                  .        .                   .              .           .       X           .                                       Lipid Profile:   HgA1c: 5.7%   TSH: Thyroid Stimulating Hormone, Serum: 0.73 uIU/mL    TELEMETRY: NSR   ECG:  NSR     DIAGNOSTIC TESTING:  [x ] Echocardiogram: < from: ROVERTO W or WO Ultrasound Enhancing Agent (02.26.25 @ 15:42) >      1. Left ventricular cavity is normal in size. Left ventricular wall thickness is mildly increased. Left ventricular systolic function is normal with an ejection fraction visually estimated at 55 to 60 %.   2.A Norman (TAVR) valve replacement is present in the aortic position The prosthetic valve has reduced leaflet opening and is well seated Degeneration of the aortic valve prosthesis. The prosthetic aortic valve thickened leaflets. Moderate prosthetic aortic stenosis. No intravalvular regurgitation Trace paravalvular regurgitation, originating at at 12 o'clock o'clock. There is a echogenic sesile not very mobile subcentimeter ( 6x2 mm) structure attached to the base of the leaflet corresponding to the native non-coronary cusp likely representing a calcified nodule vs. less likely an old small calcified vegetation.    < end of copied text >  < from: TTE W or WO Ultrasound Enhancing Agent (02.24.25 @ 21:10) >   1. Left ventricular cavity is normal in size. Left ventricular systolic function is normal with an ejection fraction visually estimated at 55 to 60 %.   2. There is mild (grade 1) left ventricular diastolic dysfunction.   3. Normal right ventricular cavity size and normal right ventricular systolic function.   4. Left atrium is mildly dilated.   5. The right atrium is normal in size.   6. Thickened mitral valve leaflets.   7. There is mild calcification of the mitral valve annulus.   8. Trace mitral regurgitation.   9. A Norman TAVR (TAVR) valve replacement is present in the aortic position The prosthetic valve has normal leaflet excursion and is well seated with normal function The prosthetic aortic valve No vegetations seen.. Trace paravalvular regurgitation.  10. A TEEwould be necessary to rule out vegetation on TAVR.  11. No pericardial effusion seen.  12. Pulmonary artery systolic pressure could not be estimated.  13. Small right pleural effusion noted.    < end of copied text >    [ x]  Catheterization: < from: Structural Heart (01.11.23 @ 12:09) >    Successul NOMAN with a 29 mm Evolut FX THV via percutaneous right TF  access. There was trace PVL post NOMAN. No    bradycardia or bundles with TVP removed at end of case. Following  closure of the RFA access with 2 Proglides, infrarenal  aortography demonstrated brisk runoff and no extravasation.  Neurologically intact post NOMAN.    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 Obtain   TTE   to evaluate aortic valve given worsening murmur as well as heavily   calcified valve.    < end of copied text >    [ ] Stress Test:    OTHER:

## 2025-03-02 NOTE — PROGRESS NOTE ADULT - SUBJECTIVE AND OBJECTIVE BOX
Patient is a 74y Male with L3-4, L5-S1 OM/discitis.  Denies any acute motor or sensory changes. Denies any fever/chills, SOB, CP, N/V, numbness/tingling. No other orthopedic complaints.  Pt had MRI 3/1     CC: [id number] EXAM:  MR SPINE THORACIC   ORDERED BY: KAREN FRANCOIS     ACC: [id number] EXAM:  MR SPINE CERVICAL   ORDERED BY: KAREN FRANCOIS     PROCEDURE DATE:  03/01/2025          INTERPRETATION:  MR cervical and thoracic without gadolinium    CLINICAL INFORMATION: Cervical spondylosis.    TECHNIQUE:   Sagittal T1-weighted images, sagittal STIR images, sagittal   T2-weighted images and axial T1 and T2-weighted gradient-echo images of   the cervical spine were obtained.    FINDINGS:No prior similar studies are available for review.    Cervical and thoracic vertebral alignment is significant for   straightening on a degenerative basis with focal kyphosis at C4-5.    Cervical and thoracic vertebral body heights are maintained.  Marrow   signal intensity within cervical vertebral bodies and posterior elements   is significant for edema at the T8-9 endplates with increased signal   within the intervening disc suspicious for discitis and early   osteomyelitis. 1.6 cm hemangioma within the T11 vertebral body. No   osseous expansion, epidural disease or paraspinal abnormality is found.      Minimal prevertebral fluid seen at the C3-C5 level.    Cervical and thoracic intervertebral discs demonstrates mild to moderate   degenerative disc disease and spondylosis. There is narrowing of the   BILATERAL C3-4 through C6/7 neural foramina due to uncovertebral spurring   and facet osteophytic hypertrophy. Posterior osteophytic ridge/disc   complexes at C3-4 through C6-7 flatten theventral thecal sac. Tiny LEFT   T1-T2 disc herniation, mild T6-7 bulge, tiny LEFT paracentral T7-8 and   T8-9 disc herniations, and mild bulge at T11-T12 indents the ventral   thecal sac.    The cervical and thoracic cord maintains intact morphologyNo cord   signal intensity abnormality or focal cord lesion is appreciated.  The   cervical medullary junction remains intact.      IMPRESSION: Mild edema at the T8-9 endplates with increased signal within   the intervening disc suspicious for discitis and early osteomyelitis.     Recommend MRI with gadolinium for complete evaluation. Mild to moderate   degenerative disc disease and spondylosis. There is narrowing of the   BILATERAL C3-4 through C6/7 neural foramina due to uncovertebral spurring   and facet osteophytic hypertrophy. Posterior osteophytic ridge/disc   complexes at C3-4 through C6-7 flatten the ventral thecal sac. Tiny LEFT   T1-T2 disc herniation, mild T6-7 bulge, tiny LEFT paracentral T7-8 and   T8-9 disc herniations, and mildbulge at T11-T12 indents the ventral   thecal sac.          T(C): 36.8 (03-01-25 @ 08:10), Max: 36.9 (02-28-25 @ 20:31)  HR: 79 (03-01-25 @ 08:10) (74 - 80)  BP: 141/66 (03-01-25 @ 08:10) (100/63 - 148/71)  RR: 17 (03-01-25 @ 08:10) (16 - 18)  SpO2: 95% (03-01-25 @ 08:10) (93% - 99%)                          11.0   10.45 )-----------( 252      ( 01 Mar 2025 05:20 )             32.7       Physical Exam:    General: Awake, alert, in NAD  Motor exam: [ x ]          Lower extremity                               HF         KE          TA       EHL         GS                                                 R        5/5        5/5        5/5       5/5         5/5                                               L         5/5        5/5       5/5       5/5          5/5    SILT L2-S1 intact B/L, decreased sensation to anterior thigh right leg and surgical site left thigh. DP pulses 2+ B/L  Calf soft, NT B/L    02-28-25 @ 07:01  -  03-01-25 @ 07:00  --------------------------------------------------------  IN: 960 mL / OUT: 540 mL / NET: 420 mL          A/P: Patient is a 74y Male being followed for L3-4, L5-S1 OM/discitis  * IV ABX  * Pain control  * DVTP  * WBAT  * Plan discussed with Dr. Francois, no emergent surgical intervention required at this time   * Will continue to monitor symptoms

## 2025-03-03 DIAGNOSIS — I48.0 PAROXYSMAL ATRIAL FIBRILLATION: ICD-10-CM

## 2025-03-03 LAB
ANION GAP SERPL CALC-SCNC: 11 MMOL/L — SIGNIFICANT CHANGE UP (ref 5–17)
BUN SERPL-MCNC: 21.1 MG/DL — HIGH (ref 8–20)
CALCIUM SERPL-MCNC: 8.4 MG/DL — SIGNIFICANT CHANGE UP (ref 8.4–10.5)
CHLORIDE SERPL-SCNC: 98 MMOL/L — SIGNIFICANT CHANGE UP (ref 96–108)
CO2 SERPL-SCNC: 23 MMOL/L — SIGNIFICANT CHANGE UP (ref 22–29)
CREAT SERPL-MCNC: 0.88 MG/DL — SIGNIFICANT CHANGE UP (ref 0.5–1.3)
EGFR: 90 ML/MIN/1.73M2 — SIGNIFICANT CHANGE UP
EGFR: 90 ML/MIN/1.73M2 — SIGNIFICANT CHANGE UP
GLUCOSE BLDC GLUCOMTR-MCNC: 97 MG/DL — SIGNIFICANT CHANGE UP (ref 70–99)
GLUCOSE SERPL-MCNC: 97 MG/DL — SIGNIFICANT CHANGE UP (ref 70–99)
HCT VFR BLD CALC: 34.5 % — LOW (ref 39–50)
HGB BLD-MCNC: 11.1 G/DL — LOW (ref 13–17)
MAGNESIUM SERPL-MCNC: 1.9 MG/DL — SIGNIFICANT CHANGE UP (ref 1.6–2.6)
MCHC RBC-ENTMCNC: 27.3 PG — SIGNIFICANT CHANGE UP (ref 27–34)
MCHC RBC-ENTMCNC: 32.2 G/DL — SIGNIFICANT CHANGE UP (ref 32–36)
MCV RBC AUTO: 85 FL — SIGNIFICANT CHANGE UP (ref 80–100)
NRBC # BLD AUTO: 0 K/UL — SIGNIFICANT CHANGE UP (ref 0–0)
NRBC # FLD: 0 K/UL — SIGNIFICANT CHANGE UP (ref 0–0)
NRBC BLD AUTO-RTO: 0 /100 WBCS — SIGNIFICANT CHANGE UP (ref 0–0)
PLATELET # BLD AUTO: 224 K/UL — SIGNIFICANT CHANGE UP (ref 150–400)
PMV BLD: 9.9 FL — SIGNIFICANT CHANGE UP (ref 7–13)
POTASSIUM SERPL-MCNC: 4.1 MMOL/L — SIGNIFICANT CHANGE UP (ref 3.5–5.3)
POTASSIUM SERPL-SCNC: 4.1 MMOL/L — SIGNIFICANT CHANGE UP (ref 3.5–5.3)
RBC # BLD: 4.06 M/UL — LOW (ref 4.2–5.8)
RBC # FLD: 17.8 % — HIGH (ref 10.3–14.5)
SODIUM SERPL-SCNC: 132 MMOL/L — LOW (ref 135–145)
WBC # BLD: 10.09 K/UL — SIGNIFICANT CHANGE UP (ref 3.8–10.5)
WBC # FLD AUTO: 10.09 K/UL — SIGNIFICANT CHANGE UP (ref 3.8–10.5)

## 2025-03-03 PROCEDURE — 99232 SBSQ HOSP IP/OBS MODERATE 35: CPT

## 2025-03-03 PROCEDURE — G0545: CPT

## 2025-03-03 PROCEDURE — 99233 SBSQ HOSP IP/OBS HIGH 50: CPT

## 2025-03-03 PROCEDURE — 71045 X-RAY EXAM CHEST 1 VIEW: CPT | Mod: 26

## 2025-03-03 PROCEDURE — 99233 SBSQ HOSP IP/OBS HIGH 50: CPT | Mod: FS

## 2025-03-03 RX ORDER — MAGNESIUM OXIDE 400 MG
800 TABLET ORAL ONCE
Refills: 0 | Status: COMPLETED | OUTPATIENT
Start: 2025-03-03 | End: 2025-03-03

## 2025-03-03 RX ORDER — CEFTRIAXONE 500 MG/1
2 INJECTION, POWDER, FOR SOLUTION INTRAMUSCULAR; INTRAVENOUS
Qty: 1 | Refills: 0
Start: 2025-03-03

## 2025-03-03 RX ADMIN — HEPARIN SODIUM 5000 UNIT(S): 1000 INJECTION INTRAVENOUS; SUBCUTANEOUS at 13:34

## 2025-03-03 RX ADMIN — Medication 2 TABLET(S): at 21:35

## 2025-03-03 RX ADMIN — TRAMADOL HYDROCHLORIDE 50 MILLIGRAM(S): 50 TABLET, FILM COATED ORAL at 20:06

## 2025-03-03 RX ADMIN — HEPARIN SODIUM 5000 UNIT(S): 1000 INJECTION INTRAVENOUS; SUBCUTANEOUS at 05:24

## 2025-03-03 RX ADMIN — Medication 3 MILLILITER(S): at 21:36

## 2025-03-03 RX ADMIN — TRAMADOL HYDROCHLORIDE 50 MILLIGRAM(S): 50 TABLET, FILM COATED ORAL at 05:24

## 2025-03-03 RX ADMIN — Medication 800 MILLIGRAM(S): at 09:48

## 2025-03-03 RX ADMIN — Medication 3 MILLILITER(S): at 14:32

## 2025-03-03 RX ADMIN — TRAMADOL HYDROCHLORIDE 50 MILLIGRAM(S): 50 TABLET, FILM COATED ORAL at 21:06

## 2025-03-03 RX ADMIN — Medication 325 MILLIGRAM(S): at 11:30

## 2025-03-03 RX ADMIN — FUROSEMIDE 40 MILLIGRAM(S): 10 INJECTION INTRAMUSCULAR; INTRAVENOUS at 05:25

## 2025-03-03 RX ADMIN — Medication 5 MILLIGRAM(S): at 21:33

## 2025-03-03 RX ADMIN — HEPARIN SODIUM 5000 UNIT(S): 1000 INJECTION INTRAVENOUS; SUBCUTANEOUS at 21:33

## 2025-03-03 RX ADMIN — CEFTRIAXONE 2000 MILLIGRAM(S): 500 INJECTION, POWDER, FOR SOLUTION INTRAMUSCULAR; INTRAVENOUS at 13:34

## 2025-03-03 RX ADMIN — Medication 650 MILLIGRAM(S): at 14:05

## 2025-03-03 RX ADMIN — Medication 150 MICROGRAM(S): at 05:25

## 2025-03-03 RX ADMIN — Medication 650 MILLIGRAM(S): at 22:33

## 2025-03-03 RX ADMIN — Medication 650 MILLIGRAM(S): at 13:35

## 2025-03-03 RX ADMIN — Medication 1 TABLET(S): at 11:31

## 2025-03-03 RX ADMIN — Medication 40 MILLIGRAM(S): at 05:25

## 2025-03-03 RX ADMIN — Medication 1 APPLICATION(S): at 05:22

## 2025-03-03 RX ADMIN — POLYETHYLENE GLYCOL 3350 17 GRAM(S): 17 POWDER, FOR SOLUTION ORAL at 11:32

## 2025-03-03 RX ADMIN — Medication 3 MILLILITER(S): at 06:00

## 2025-03-03 RX ADMIN — LIDOCAINE HYDROCHLORIDE 1 PATCH: 20 JELLY TOPICAL at 11:31

## 2025-03-03 RX ADMIN — Medication 650 MILLIGRAM(S): at 21:33

## 2025-03-03 RX ADMIN — Medication 25 MILLIGRAM(S): at 05:25

## 2025-03-03 RX ADMIN — ATORVASTATIN CALCIUM 40 MILLIGRAM(S): 80 TABLET, FILM COATED ORAL at 21:33

## 2025-03-03 RX ADMIN — Medication 650 MILLIGRAM(S): at 05:25

## 2025-03-03 NOTE — CHART NOTE - NSCHARTNOTEFT_GEN_A_CORE
PT called asking about brace due to new MRI C/T spine without contrast obtained 3/1/25  Patient seen- exam stable without focal neurologic deficit  LSO at bedside, previously recommended LSO brace when OOB, does not have to wear the brace when in the chair, brace to be worn when OOB otherwise    Orthopedic surgery was consulted as second opinion and who recommended to obtain MRI T spine  Brace recommendations per orthopedic surgery as they recommended MRI T spine and are actively following patient

## 2025-03-03 NOTE — PROGRESS NOTE ADULT - ASSESSMENT
74y Male who is followed by neurology because of impaired ambulation due to pain and spinal disease    Impaired ambulation  Likely secondary to spine disease (osteomyelitis).  Ambulate with PT.    Osteomyelitis.   Antibiotics per ID.  Ortho-Spine following.    If he eventually will need AVR I suggest to call neurointerventional for cerebral angio to evaluate for mycotic aneurysm.    No further specific neurologic recommendations. Will be available as needed.     Case discussed with CT-surgery PA.

## 2025-03-03 NOTE — PROGRESS NOTE ADULT - ASSESSMENT
75 y/o male with a PMHx of HTN, A-FIB, HLD, GI Bleed, S/P TAVR 2023, chronic back pain and DM initially presented to [facility] ED with a two-day history of a rash on his left leg and pain in left knee radiating towards his foot. Pt recently discharged to SNF rehab after treatment for infected TAVR and Osteomyelitis of spine, strep salivarius bacteremia. Pt received transfusion and w/u for GI bleed with unremarkable colonoscopy and upper endoscopy. Underwent ROVERTO at that time which showed vegetation of TAVR. Plan at that time was for 6 weeks of IV antibiotics via PICC line and out patient pill camera study.    Since admission patient was seen by vascular surgery,  CTA showed clot to common femoral and distal embolism. S/P common femoral endarterectomy with clot retrieval and good blood flow to lower extremity post surgery. Repeat blood cultures were negative but the excised clot was found to have gram positive cocci. Pt with weakness to legs. Sent for repeat MR with contrast. Results without abscess at this time. Other incidental problem is constipation and persistent back pain requiring tramadol for pain relief.  Pt received transfusion of 2 unit of PRBC's 2/23.  Patient transfered to [facility] for evaluation of TAVR and possible surgical replacement.    AS ABOVE WAS READMITTED TO [facility] BECAUSE  OF GI BLEED  PT WITH RASH ON LEG AND WAS CHANGED FROM ROCEPHIN TO DAPTOMYCIN  PT UNDERWENT FEMORAL ENDARECTOMY RETRIEVAL OF CLOT   PT RASH WAS ULTIMATELY RELATED TO  ISCHEMIC ISSUES AND NOT ALLERGY  SPOKE TO ID AT [facility] TO CLARIFY    CHANGED BACK TO ROCEPHIN TO CONTINUE    FOR  EXTENDED COURSE  CARDIAC SURGERY EVENTUAL  VALVE SURGERY   WILL PLAN IV ROCEPHIN TO COMPLETE AT LEAST 8 WEEKS FOR DISCITIS AND  ENDOCARDITIS  WILL PLAN ROCEPHIN THROUGH AT LEAST 4/7/25  WEEKLY CBC CMP SED RATE CRP   PT PREFERS HOME OVER [facility]  WILL GIVE RX TO JOHN MANAGER  PT ALREADY HAS  PICC LINE

## 2025-03-03 NOTE — PROGRESS NOTE ADULT - TIME BILLING
chart reviewed, chart reviewed, patient educated on results, supportive counseling offered
History, vitals, exam, meds, labs, cardiac images, ECG, and tele were reviewed.  Patient was agreeable and appreciative of the care provided.
History, vitals, exam, meds, labs, cardiac images, ECG, and tele were reviewed.  Patient was agreeable and appreciative of the care provided.

## 2025-03-03 NOTE — PROGRESS NOTE ADULT - ASSESSMENT
75 y/o male with of HTN, AF, HLD, GI Bleed, S/P TAVR 2023, chronic back pain and DM recent admission at [facility] 2/10-2/17 with acute anemia, DARLINE, hypotension, s/p 2PRBC, negative EGD/colonoscopy, found to have strept salivarius bacteremia, ??ROVERTO with vegetation on TAVR valve, MRI spine with possible osteo of lumbar-sacral spine, d/c'd to rehab on rocephin, represented to  2/20 with LLE rash and L knee pain found to have clot in LCFA with distal embolism s/p CFA endarterectomy with clot retrieval and good blood flow to LE, clot cx with GPC, repeat MRI spine without abscess, transfused 2 PRBC on 2/23, transferred to CTICU at [facility]  2/24 for CT surgery evaluation.

## 2025-03-03 NOTE — PROGRESS NOTE ADULT - SUBJECTIVE AND OBJECTIVE BOX
[facility]                                        Neurology at [place]                                 Gene Pina, & Eleno                                  [address]                                        [place], [place], [address]                                             [phone]        CC: difficulty walking    HPI:  The patient is a 74y Male who presented with endocarditis, lumbar spine osteomyelitis/discitis and pain limiting ambulation.  He said that his back pain started on 11/23/24 and has progressively worsened.  He had workup at [facility] that found spinal osteomyelitis with no abscess and intervention was not deemed necessary by neurosurgery at [facility], as well as here.  He was subsequently found to have endocarditis and transferred for evaluation of AVR.   Neurology is asked to evaluate. (AR).    Interim history:  On 4 Tower.    ROS:   Denies headache or dizziness.  Denies chest pain.  Denies shortness of breath.    MEDICATIONS  (STANDING):  acetaminophen     Tablet .. 650 milliGRAM(s) Oral every 8 hours  atorvastatin 40 milliGRAM(s) Oral at bedtime  cefTRIAXone Injectable. 2000 milliGRAM(s) IV Push every 24 hours  chlorhexidine 2% Cloths 1 Application(s) Topical <User Schedule>  ferrous    sulfate 325 milliGRAM(s) Oral daily  furosemide    Tablet 40 milliGRAM(s) Oral daily  heparin   Injectable 5000 Unit(s) SubCutaneous every 8 hours  levothyroxine 150 MICROGram(s) Oral daily  lidocaine   4% Patch 1 Patch Transdermal daily  melatonin 5 milliGRAM(s) Oral at bedtime  multivitamin 1 Tablet(s) Oral daily  pantoprazole    Tablet 40 milliGRAM(s) Oral before breakfast  polyethylene glycol 3350 17 Gram(s) Oral daily  senna 2 Tablet(s) Oral at bedtime  sodium chloride 0.9% lock flush 3 milliLiter(s) IV Push every 8 hours  spironolactone 25 milliGRAM(s) Oral daily    Vital Signs Last 24 Hrs  T(C): 36.5 (03 Mar 2025 08:26), Max: 36.7 (02 Mar 2025 16:25)  T(F): 97.7 (03 Mar 2025 08:26), Max: 98.1 (02 Mar 2025 16:25)  HR: 90 (03 Mar 2025 08:26) (76 - 90)  BP: 146/74 (03 Mar 2025 08:26) (126/63 - 151/68)  RR: 18 (03 Mar 2025 08:26) (17 - 18)  SpO2: 95% (03 Mar 2025 08:26) (92% - 97%)    Parameters below as of 03 Mar 2025 08:26  Patient On (Oxygen Delivery Method): room air    Detailed Neurologic Exam:    Mental status: The patient is awake and alert. There is no aphasia. There is no dysarthria.     Cranial nerves: Pupils equal and react symmetrically to light. There is no visual field deficit to confrontation. Extraocular motion is full with no nystagmus. There is no ptosis.  Facial musculature is symmetric.     Motor: There is normal bulk and tone.  There is no tremor.  Strength is 5/5 in the right arm  Strength is 5/5 in the left arm   Pain limits testing of power in legs however today he is at least 3 to 4/5 b/l  IPS at 3/5, better power distally.    Sensation: Intact to light touch in 4 extremities    Cerebellar: There is no dysmetria on finger to nose testing.  Labs:     03-03    132[L]  |  98  |  21.1[H]  ----------------------------<  97  4.1   |  23.0  |  0.88    Ca    8.4      03 Mar 2025 06:59  Mg     1.9     03-03                              11.1   10.09 )-----------( 224      ( 03 Mar 2025 06:59 )             34.5       Rad:   MRI C/T spine.   Mild edema at the T8-9 endplates with increased signal within the intervening disc suspicious for discitis and early osteomyelitis.     There are also degenerative changes and disc bulges/herniations.

## 2025-03-03 NOTE — PROGRESS NOTE ADULT - SUBJECTIVE AND OBJECTIVE BOX
INFECTIOUS DISEASES AND INTERNAL MEDICINE at [place]  =======================================================  Fabián Liz MD  Diplomates American Board of Internal Medicine and Infectious Diseases  Telephone [phone]  Fax            [phone]  =======================================================    [facility] [id number]    Follow up:  STREP ENDOCARDITIS AND DISCITIS     Allergies:  No Known Allergies      Medications:  acetaminophen     Tablet .. 650 milliGRAM(s) Oral every 8 hours  atorvastatin 40 milliGRAM(s) Oral at bedtime  bisacodyl Suppository 10 milliGRAM(s) Rectal daily PRN  cefTRIAXone Injectable. 2000 milliGRAM(s) IV Push every 24 hours  ferrous    sulfate 325 milliGRAM(s) Oral daily  heparin   Injectable 5000 Unit(s) SubCutaneous every 8 hours  insulin lispro (ADMELOG) corrective regimen sliding scale   SubCutaneous at bedtime  insulin lispro (ADMELOG) corrective regimen sliding scale   SubCutaneous three times a day before meals  levothyroxine 150 MICROGram(s) Oral daily  lidocaine   4% Patch 1 Patch Transdermal daily  melatonin 5 milliGRAM(s) Oral at bedtime  multivitamin 1 Tablet(s) Oral daily  pantoprazole    Tablet 40 milliGRAM(s) Oral before breakfast  polyethylene glycol 3350 17 Gram(s) Oral daily  senna 2 Tablet(s) Oral at bedtime  sodium chloride 0.9% lock flush 3 milliLiter(s) IV Push every 8 hours  traMADol 50 milliGRAM(s) Oral every 6 hours PRN  traMADol 25 milliGRAM(s) Oral every 6 hours PRN    SOCIAL       FAMILY   FAMILY HISTORY:    REVIEW OF SYSTEMS:  CONSTITUTIONAL:  No Fever or chills  HEENT:   No diplopia or blurred vision.  No earache, sore throat or runny nose.  CARDIOVASCULAR:  No pressure, squeezing, strangling, tightness, heaviness or aching about the chest, neck, axilla or epigastrium.  RESPIRATORY:  No cough, shortness of breath, PND or orthopnea.  GASTROINTESTINAL:  No nausea, vomiting or diarrhea.  GENITOURINARY:  No dysuria, frequency or urgency. No Blood in urine  MUSCULOSKELETAL:   moves all joints  SKIN:  No change in skin, hair or nails.  NEUROLOGIC:  No paresthesias, fasciculations, seizures or weakness.  PSYCHIATRIC:  No disorder of thought or mood.  ENDOCRINE:  No heat or cold intolerance, polyuria or polydipsia.  HEMATOLOGICAL:  No easy bruising or bleeding.            Physical Exam:    Vital Signs Last 24 Hrs  T(C): 36.5 (03 Mar 2025 08:26), Max: 36.7 (02 Mar 2025 16:25)  T(F): 97.7 (03 Mar 2025 08:26), Max: 98.1 (02 Mar 2025 16:25)  HR: 90 (03 Mar 2025 08:26) (76 - 90)  BP: 146/74 (03 Mar 2025 08:26) (126/63 - 151/68)  BP(mean): --  RR: 18 (03 Mar 2025 08:26) (17 - 18)  SpO2: 95% (03 Mar 2025 08:26) (92% - 97%)    Parameters below as of 03 Mar 2025 08:26  Patient On (Oxygen Delivery Method): room air                   GEN: NAD,   HEENT: normocephalic and atraumatic. EOMI. JASVIR.    NECK: Supple. No carotid bruits.  No lymphadenopathy or thyromegaly.  LUNGS: Clear to auscultation.  HEART: Regular rate and rhythm 2/6t murmur.  ABDOMEN: Soft, nontender, and nondistended.  Positive bowel sounds.    : No CVA tenderness  EXTREMITIES: Without any cyanosis, clubbing, rash, lesions or edema.  MSK: no joint swelling  NEUROLOGIC: Cranial nerves II through XII are grossly intact.  PSYCHIATRIC: Appropriate affect .  SKIN: No ulceration or induration present.        Labs:  Vitals:  =======     =======================================================  Current Antibiotics:  cefTRIAXone Injectable. 2000 milliGRAM(s) IV Push every 24 hours    Other medications:  acetaminophen     Tablet .. 650 milliGRAM(s) Oral every 8 hours  atorvastatin 40 milliGRAM(s) Oral at bedtime  ferrous    sulfate 325 milliGRAM(s) Oral daily  heparin   Injectable 5000 Unit(s) SubCutaneous every 8 hours  insulin lispro (ADMELOG) corrective regimen sliding scale   SubCutaneous at bedtime  insulin lispro (ADMELOG) corrective regimen sliding scale   SubCutaneous three times a day before meals  levothyroxine 150 MICROGram(s) Oral daily  lidocaine   4% Patch 1 Patch Transdermal daily  melatonin 5 milliGRAM(s) Oral at bedtime  multivitamin 1 Tablet(s) Oral daily  pantoprazole    Tablet 40 milliGRAM(s) Oral before breakfast  polyethylene glycol 3350 17 Gram(s) Oral daily  senna 2 Tablet(s) Oral at bedtime  sodium chloride 0.9% lock flush 3 milliLiter(s) IV Push every 8 hours      =======================================================  Labs:                                                        11.1   10.09 )-----------( 224      ( 03 Mar 2025 06:59 )             34.5   03-03    132[L]  |  98  |  21.1[H]  ----------------------------<  97  4.1   |  23.0  |  0.88    Ca    8.4      03 Mar 2025 06:59  Mg     1.9     03-03              Culture - Blood (collected 02-24-25 @ 20:30)  Source: .Blood Blood-Venous  Preliminary Report (02-27-25 @ 02:02):    No growth at 48 Hours    Culture - Fungal, Other (collected 02-22-25 @ 07:42)  Source: .Other Left femoral plaque  Preliminary Report (02-26-25 @ 23:03):    Culture is being performed. Fungal cultures are held for 4 weeks.    Culture - Acid Fast - Tissue w/Smear (collected 02-22-25 @ 07:42)  Source: Tissue Left femoral plaque  Preliminary Report (02-26-25 @ 23:07):    Culture is being performed.    Culture - Tissue with Gram Stain (collected 02-22-25 @ 07:42)  Source: Tissue Left femoral plaque  Gram Stain (02-22-25 @ 19:34):    Few polymorphonuclear leukocytes per low power field    Moderate Gram Positive Cocci in Pairs and Chains per oil power field  Final Report (02-27-25 @ 09:58):    Organism seen in Gram stain is non-viable after prolonged    incubation and repeated subculture.    Culture - Blood (collected 02-20-25 @ 18:10)  Source: .Blood None  Final Report (02-26-25 @ 02:00):    No growth at 5 days    Culture - Blood (collected 02-20-25 @ 18:10)  Source: .Blood None  Final Report (02-26-25 @ 02:00):    No growth at 5 days    Culture - Blood (collected 02-14-25 @ 07:04)  Source: .Blood BLOOD  Final Report (02-19-25 @ 13:00):    No growth at 5 days    Culture - Blood (collected 02-14-25 @ 07:01)  Source: .Blood BLOOD  Final Report (02-19-25 @ 13:00):    No growth at 5 days    Urinalysis with Rflx Culture (collected 02-10-25 @ 12:47)    Culture - Blood (collected 02-10-25 @ 09:32)  Source: .Blood BLOOD  Gram Stain (02-11-25 @ 13:01):    Growth in aerobic bottle: Gram Positive Cocci in Pairs and Chains    Growth in anaerobic bottle: Gram Positive Cocci in Pairs and Chains  Final Report (02-13-25 @ 06:33):    Growth in aerobic and anaerobic bottles: Streptococcus    salivarius/vestibularis group  Organism: Streptococcus salivarius/vestibularis group (02-13-25 @ 06:33)  Organism: Streptococcus salivarius/vestibularis group (02-13-25 @ 06:33)    Sensitivities:      Method Type: BRIANA      -  Ceftriaxone: S <=0.25      -  Penicillin: S 0.06      -  Vancomycin: S 0.5    Culture - Blood (collected 02-10-25 @ 09:28)  Source: .Blood BLOOD  Gram Stain (02-11-25 @ 09:51):    Growth in aerobic bottle: Gram Positive Cocci in Pairs and Chains    Growth in anaerobic bottle: Gram Positive Cocci in Pairs and Chains  Final Report (02-12-25 @ 17:14):    Growth in aerobic and anaerobic bottles: Streptococcus    salivarius/vestibularis group "Susceptibilities not performed"    Direct identification is available within approximately 3-5    hours either by Blood Panel Multiplexed PCR or Direct    MALDI-TOF. Details: [email or web address]  Organism: Blood Culture PCR (02-12-25 @ 17:14)  Organism: Blood Culture PCR (02-12-25 @ 17:14)    Sensitivities:      Method Type: PCR      -  Streptococcus sp. (Not Grp A, B or S pneumoniae): Detec    Culture - Urine (collected 03-20-24 @ 20:27)  Source: Clean Catch None  Final Report (03-21-24 @ 23:15):    No growth      Creatinine: 0.92 mg/dL (02-27-25 @ 01:06)  Creatinine: 0.93 mg/dL (02-26-25 @ 02:15)  Creatinine: 1.02 mg/dL (02-25-25 @ 02:30)  Creatinine: 0.94 mg/dL (02-24-25 @ 20:30)  Creatinine: 1.07 mg/dL (02-24-25 @ 06:23)  Creatinine: 1.21 mg/dL (02-23-25 @ 06:02)        Ferritin: 548 ng/mL (02-14-25 @ 07:01)    C-Reactive Protein: 206.0 mg/mL (02-20-25 @ 18:10)    WBC Count: 9.07 K/uL (02-27-25 @ 01:06)  WBC Count: 10.93 K/uL (02-26-25 @ 12:19)  WBC Count: 8.05 K/uL (02-26-25 @ 02:15)  WBC Count: 9.52 K/uL (02-25-25 @ 20:20)  WBC Count: 10.01 K/uL (02-25-25 @ 04:00)  WBC Count: 10.86 K/uL (02-25-25 @ 02:30)  WBC Count: 11.31 K/uL (02-24-25 @ 20:30)  WBC Count: 12.81 K/uL (02-24-25 @ 06:23)  WBC Count: 13.62 K/uL (02-23-25 @ 06:02)  WBC Count: 12.38 K/uL (02-22-25 @ 17:59)    SARS-CoV-2 Result: NotDetec (02-21-25 @ 19:50)  SARS-CoV-2 Result: NotDetec (02-10-25 @ 10:13)      Alkaline Phosphatase: 152 U/L (02-25-25 @ 02:30)  Alkaline Phosphatase: 177 U/L (02-24-25 @ 20:30)  Alkaline Phosphatase: 171 U/L (02-24-25 @ 06:23)  Alanine Aminotransferase (ALT/SGPT): 56 U/L (02-25-25 @ 02:30)  Alanine Aminotransferase (ALT/SGPT): 65 U/L (02-24-25 @ 20:30)  Alanine Aminotransferase (ALT/SGPT): 84 U/L (02-24-25 @ 06:23)  Aspartate Aminotransferase (AST/SGOT): 47 U/L (02-25-25 @ 02:30)  Aspartate Aminotransferase (AST/SGOT): 60 U/L (02-24-25 @ 20:30)  Aspartate Aminotransferase (AST/SGOT): 76 U/L (02-24-25 @ 06:23)  Bilirubin Total: 0.4 mg/dL (02-25-25 @ 02:30)  Bilirubin Total: 0.3 mg/dL (02-24-25 @ 20:30)  Bilirubin Total: 0.4 mg/dL (02-24-25 @ 06:23)

## 2025-03-03 NOTE — CHART NOTE - NSCHARTNOTEFT_GEN_A_CORE
Patient requires DME in the form of Rolling Walker, and 3-in-1 Commode secondary to pain and deconditioning stemming from Discitis/Osteomyelitis of the back

## 2025-03-03 NOTE — PROGRESS NOTE ADULT - SUBJECTIVE AND OBJECTIVE BOX
Patient is a 74y Male with L3-4, L5-S1 OM/ discitis.  Denies worsening lower back pain/ bowel or bladder dysfunction  Denies any acute motor or sensory changes.   Denies any fever/chills, SOB, CP, N/V, numbness/tingling.   Also C/O right leg swelling and pain    Vital Signs Last 24 Hrs  T(C): 36.5 (03 Mar 2025 08:26), Max: 36.9 (02 Mar 2025 12:00)  T(F): 97.7 (03 Mar 2025 08:26), Max: 98.4 (02 Mar 2025 12:00)  HR: 90 (03 Mar 2025 08:26) (76 - 90)  BP: 146/74 (03 Mar 2025 08:26) (126/63 - 151/68)  BP(mean): --  RR: 18 (03 Mar 2025 08:26) (17 - 18)  SpO2: 95% (03 Mar 2025 08:26) (92% - 98%)    Parameters below as of 03 Mar 2025 08:26  Patient On (Oxygen Delivery Method): room air    Physical Exam:    General: Awake, alert, in NAD  RLE + diffuse mild swelling, No erythema, all compartments soft/ compressible, knee ROM limited secondary to pain and swelling, NTTP medial and lateral joint line, ACE compression applied  Motor exam: [ x ]          Lower extremity                               HF         KE          TA       EHL         GS                                                 R        5/5        5/5        5/5       5/5         5/5                                               L         5/5        5/5       5/5       5/5          5/5    SILT L2-S1 intact B/L. DP pulses 2+ B/L  Calf soft, NT B/L    A/P: Patient is a 74y Male being followed for L3-4, L5-S1 OM/ discitis    * No emergent surgical intervention required at this time   * RLE / knee pain with diffuse swelling- Elevation, ACE for compression  * Continue IV ABX  * Pain control  * DVTP  * PT- WBAT  * Will continue to monitor symptoms  * Continue care per primary team       Patient is a 74y Male with L3-4, L5-S1 OM/ discitis.  Denies worsening lower back pain/ bowel or bladder dysfunction  Denies any acute motor or sensory changes.   Denies any fever/chills, SOB, CP, N/V, numbness/tingling.   Also C/O right leg swelling and pain    Vital Signs Last 24 Hrs  T(C): 36.5 (03 Mar 2025 08:26), Max: 36.9 (02 Mar 2025 12:00)  T(F): 97.7 (03 Mar 2025 08:26), Max: 98.4 (02 Mar 2025 12:00)  HR: 90 (03 Mar 2025 08:26) (76 - 90)  BP: 146/74 (03 Mar 2025 08:26) (126/63 - 151/68)  BP(mean): --  RR: 18 (03 Mar 2025 08:26) (17 - 18)  SpO2: 95% (03 Mar 2025 08:26) (92% - 98%)    Parameters below as of 03 Mar 2025 08:26  Patient On (Oxygen Delivery Method): room air    Physical Exam:    General: Awake, alert, in NAD  RLE + diffuse mild swelling, No erythema, all compartments soft/ compressible, knee ROM limited secondary to pain and swelling, NTTP medial and lateral joint line, ACE compression applied  Motor exam: [ x ]          Lower extremity                               HF         KE          TA       EHL         GS                                                 R        5/5        5/5        5/5       5/5         5/5                                               L         5/5        5/5       5/5       5/5          5/5    SILT L2-S1 intact B/L. DP pulses 2+ B/L  Calf soft, NT B/L    < from: MR Thoracic Spine No Cont (03.01.25 @ 15:17) >  ACC: [id number] EXAM:  MR SPINE THORACIC   ORDERED BY: KAREN LI     ACC: [id number] EXAM:  MR SPINE CERVICAL   ORDERED BY: KAREN LI     PROCEDURE DATE:  03/01/2025      INTERPRETATION:  MR cervical and thoracic without gadolinium    CLINICAL INFORMATION: Cervical spondylosis.    TECHNIQUE:   Sagittal T1-weighted images, sagittal STIR images, sagittal   T2-weighted images and axial T1 and T2-weighted gradient-echo images of   the cervical spine were obtained.    FINDINGS:No prior similar studies are available for review.    Cervical and thoracic vertebral alignment is significant for   straightening on a degenerative basis with focal kyphosis at C4-5.    Cervical and thoracic vertebral body heights are maintained.  Marrow   signal intensity within cervical vertebral bodies and posterior elements   is significant for edema at the T8-9 endplates with increased signal   within the intervening disc suspicious for discitis and early   osteomyelitis. 1.6 cm hemangioma within the T11 vertebral body. No   osseous expansion, epidural disease or paraspinal abnormality is found.      Minimal prevertebral fluid seen at the C3-C5 level.    Cervical and thoracic intervertebral discs demonstrates mild to moderate   degenerative disc disease and spondylosis. There is narrowing of the   BILATERAL C3-4 through C6/7 neural foramina due to uncovertebral spurring   and facet osteophytic hypertrophy. Posterior osteophytic ridge/disc   complexes at C3-4 through C6-7 flatten theventral thecal sac. Tiny LEFT   T1-T2 disc herniation, mild T6-7 bulge, tiny LEFT paracentral T7-8 and   T8-9 disc herniations, and mild bulge at T11-T12 indents the ventral   thecal sac.    The cervical and thoracic cord maintains intact morphologyNo cord   signal intensity abnormality or focal cord lesion is appreciated.  The   cervical medullary junction remains intact.      IMPRESSION: Mild edema at the T8-9 endplates with increased signal within   the intervening disc suspicious for discitis and early osteomyelitis.     Recommend MRI with gadolinium for complete evaluation. Mild to moderate   degenerative disc disease and spondylosis. There is narrowing of the   BILATERAL C3-4 through C6/7 neural foramina due to uncovertebral spurring   and facet osteophytic hypertrophy. Posterior osteophytic ridge/disc   complexes at C3-4 through C6-7 flatten the ventral thecal sac. Tiny LEFT   T1-T2 disc herniation, mild T6-7 bulge, tiny LEFT paracentral T7-8 and   T8-9 disc herniations, and mildbulge at T11-T12 indents the ventral   thecal sac.    --- End of Report ---    SULAIMAN HYLTON MD; Attending Radiologist  This document has been electronically signed. Mar  2 2025  8:09AM    A/P: Patient is a 74y Male being followed for L3-4, L5-S1 OM/ discitis    * No emergent surgical intervention required at this time   * RLE / knee pain with diffuse swelling- Elevation, ACE for compression  * Continue IV ABX  * Pain control  * DVTP  * PT- WBAT  * Will continue to monitor symptoms  * Continue care per primary team

## 2025-03-03 NOTE — PROGRESS NOTE ADULT - ASSESSMENT
74M, PMHx of HTN, AF, HLD, GI Bleed, S/P TAVR 2023, chronic back pain and DM recent admission at [facility] 2/10-2/17 with acute anemia, DARLINE, hypotension, s/p 2PRBC, negative EGD/colonoscopy, found to have strept salivrius bacteremia, ROVERTO with vegetation on TAVR valve, MRI spine with possible osteo of lumbar-sacral spine, d/c'd to rehab on rocephin, represented to  2/20 with LLE rash and L knee pain found to have clot in LCFA with distal embolism s/p CFA endarterectomy with clot retrieval and good blood flow to LE, clot cx with GPC, repeat MRI spine without abscess, transfused 2 PRBC on 2/23, transferred to CTICU at [facility]  2/24 for CT surgery evaluation.

## 2025-03-03 NOTE — PROGRESS NOTE ADULT - SUBJECTIVE AND OBJECTIVE BOX
[facility] [place] [facility]                                                         CARDIOLOGY AT [address], [place] [address]                                                         Telephone: [phone]. Fax:[phone]                                                                             PROGRESS NOTE    Reason for follow up: Endocarditis  Update: Patient feeling ok        Review of symptoms:   Cardiac:  No chest pain. No dyspnea. No palpitations.  Respiratory: no cough. No dyspnea  Gastrointestinal: No diarrhea. No abdominal pain. No bleeding.   Neuro: No focal neuro complaints.      Vitals:  T(C): 36.5 (03-03-25 @ 08:26), Max: 36.9 (03-02-25 @ 12:00)  HR: 90 (03-03-25 @ 08:26) (76 - 90)  BP: 146/74 (03-03-25 @ 08:26) (126/63 - 151/68)  RR: 18 (03-03-25 @ 08:26) (17 - 18)  SpO2: 95% (03-03-25 @ 08:26) (92% - 98%)  Wt(kg): --  I&O's Summary    02 Mar 2025 07:01  -  03 Mar 2025 07:00  --------------------------------------------------------  IN: 900 mL / OUT: 1000 mL / NET: -100 mL    03 Mar 2025 07:01  -  03 Mar 2025 11:06  --------------------------------------------------------  IN: 0 mL / OUT: 300 mL / NET: -300 mL    pHYSICAL EXAM:  Appearance: Comfortable. No acute distress  HEENT:  Atraumatic. Normocephalic.  Normal oral mucosa  Neurologic: A & O x 3, no gross focal deficits.  Cardiovascular: RRR S1 S2, No murmur, no rubs/gallops. No JVD  Respiratory: Lungs clear to auscultation, unlabored   Gastrointestinal:  Soft, Non-tender, + BS  Lower Extremities: No edema  Left groin dressing  Psychiatry: Patient is calm. No agitation.   Skin: warm and dry.      CURRENT MEDICATIONS:  MEDICATIONS  (STANDING):  acetaminophen     Tablet .. 650 milliGRAM(s) Oral every 8 hours  atorvastatin 40 milliGRAM(s) Oral at bedtime  cefTRIAXone Injectable. 2000 milliGRAM(s) IV Push every 24 hours  chlorhexidine 2% Cloths 1 Application(s) Topical <User Schedule>  ferrous    sulfate 325 milliGRAM(s) Oral daily  furosemide    Tablet 40 milliGRAM(s) Oral daily  heparin   Injectable 5000 Unit(s) SubCutaneous every 8 hours  levothyroxine 150 MICROGram(s) Oral daily  lidocaine   4% Patch 1 Patch Transdermal daily  melatonin 5 milliGRAM(s) Oral at bedtime  multivitamin 1 Tablet(s) Oral daily  pantoprazole    Tablet 40 milliGRAM(s) Oral before breakfast  polyethylene glycol 3350 17 Gram(s) Oral daily  senna 2 Tablet(s) Oral at bedtime  sodium chloride 0.9% lock flush 3 milliLiter(s) IV Push every 8 hours  spironolactone 25 milliGRAM(s) Oral daily      LABS:	 	                       11.1   10.09 )-----------( 224      ( 03 Mar 2025 06:59 )             34.5     03-03    132[L]  |  98  |  21.1[H]  ----------------------------<  97  4.1   |  23.0  |  0.88    Ca    8.4      03 Mar 2025 06:59  Mg     1.9     03-03      TELEMETRY: sinus rhythmn    DIAGNOSTIC TESTING:  [ ] Echocardiogram: < from: ROVERTO W or WO Ultrasound Enhancing Agent (02.26.25 @ 15:42) >   1. Left ventricular cavity is normal in size. Left ventricular wall thickness is mildly increased. Left ventricular systolic function is normal with an ejection fraction visually estimated at 55 to 60 %.   2.A Norman (TAVR) valve replacement is present in the aortic position The prosthetic valve has reduced leaflet opening and is well seated Degeneration of the aortic valve prosthesis. The prosthetic aortic valve thickened leaflets. Moderate prosthetic aortic stenosis. No intravalvular regurgitation Trace paravalvular regurgitation, originating at at 12 o'clock o'clock. There is a echogenic sesile not very mobile subcentimeter ( 6x2 mm) structure attached to the base of the leaflet corresponding to the native non-coronary cusp likely representing a calcified nodule vs. less likely an old small calcified vegetation.    [ ]  Catheterization:  < from: Cardiac Catheterization (01.04.23 @ 16:10) >   Angiographic Findings     Cardiac Arteries and Lesion Findings  LMCA: Separate ostia of LAD and Cx, no LM present  LAD: Mild diffuse atherosclerosis   Mid LAD: 30% stenosis.  LCx: Mild diffuse atherosclerosis   Prox CX: 20% stenosis.  RCA: Mild diffuse atherosclerosis   Diagnostic Conclusions   Non-obstructive coronary artery disease. Calcified aortic valve present.

## 2025-03-03 NOTE — PROGRESS NOTE ADULT - SUBJECTIVE AND OBJECTIVE BOX
SUBJECTIVE   "what do we think the plan is?"   results reviewed with patient, supportive counseling offered to patient     INTERIM HISTORY SIGNIFICANT FOR   current s.p new MRI requested by spine - since resulted pending plan   also started on diuretic - tolerating and swelling in lower ext decreased     Patient is a 74y old  Male who presents with a chief complaint of Bioprosthetic AoV endocarditis (02 Mar 2025 11:15)    HPI:  73 y/o male with a PMHx of HTN, A-FIB, HLD, GI Bleed, S/P TAVR 2023, chronic back pain and DM initially presented to [facility] ED with a two-day history of a rash on his left leg and pain in left knee radiating towards his foot. Pt recently discharged to SNF rehab after treatment for infected TAVR and Osteomyelitis of spine, strep salivarius bacteremia. Pt received transfusion and w/u for GI bleed with unremarkable colonoscopy and upper endoscopy. Underwent ROVERTO at that time which showed vegetation of TAVR. Plan at that time was for 6 weeks of IV antibiotics via PICC line and out patient pill camera study.    Since admission patient was seen by vascular surgery,  CTA showed clot to common femoral and distal embolism. S/P common femoral endarterectomy with clot retrieval and good blood flow to lower extremity post surgery. Repeat blood cultures were negative but the excised clot was found to have gram positive cocci. Pt with weakness to legs. Sent for repeat MR with contrast. Results without abscess at this time. Other incidental problem is constipation and persistent back pain requiring tramadol for pain relief.  Pt received transfusion of 2 unit of PRBC's 2/23.    Patient transfered to [facility] for evaluation of TAVR and possible surgical replacement. Condition at time of transfer is stable but guarded prognosis.    Patient had requested DNR/DNI on admission.  However he has rescinded this request until after possible cardiac surgery. Order for DNR was cancelled. (25 Feb 2025 00:03)    OBJECTIVE  PAST MEDICAL & SURGICAL HISTORY:  Hypertension      Diabetes mellitus      Obesity      Hypothyroidism      Prostate CA        No Known Allergies    Home Medications:  acetaminophen 325 mg oral tablet: 2 tab(s) orally every 6 hours As needed Temp greater or equal to 38C (100.4F), Mild Pain (1 - 3) (25 Feb 2025 15:04)  atorvastatin 40 mg oral tablet: 1 tab(s) orally once a day (at bedtime) (25 Feb 2025 15:04)  B-Complex 50 oral tablet: 1 tab(s) orally once a day (25 Feb 2025 15:04)  bisacodyl 10 mg rectal suppository: 1 suppository(ies) rectally prn as needed for  constipation if no relief from MOM (25 Feb 2025 15:04)  cholecalciferol 25 mcg (1000 intl units) oral tablet: 1 tab(s) orally once a day (25 Feb 2025 15:10)  Chondroitin-Glucosamine: 1 tab(s) orally once a day (25 Feb 2025 15:10)  Co-Q10 100 mg oral capsule: 1 cap(s) orally once a day (25 Feb 2025 15:10)  cyanocobalamin 1000 mcg oral tablet: 1 tab(s) orally once a day (25 Feb 2025 15:10)  dapagliflozin 10 mg oral tablet: 1 tab(s) orally once a day (25 Feb 2025 15:04)  ferrous sulfate 325 mg (65 mg elemental iron) oral delayed release tablet: 1 tab(s) orally once a day (25 Feb 2025 15:10)  finasteride 1 mg oral tablet: 1 tab(s) orally once a day (25 Feb 2025 15:10)  Januvia 50 mg oral tablet: 1 tab(s) orally once a day (25 Feb 2025 15:10)  levothyroxine 150 mcg (0.15 mg) oral tablet: 1 tab(s) orally once a day (25 Feb 2025 15:04)  Melatonin 5 mg oral tablet: 2 tab(s) orally once a day (at bedtime) (25 Feb 2025 15:04)  MetFORMIN (Eqv-Glumetza) 500 mg oral tablet, extended release: 4 tab(s) orally once a day in the morning (25 Feb 2025 15:09)  Multiple Vitamins oral tablet: 1 tab(s) orally once a day (25 Feb 2025 15:04)  pantoprazole 40 mg oral delayed release tablet: 1 tab(s) orally once a day (before a meal) (25 Feb 2025 15:04)  polyethylene glycol 3350 oral powder for reconstitution: 17 gram(s) orally once a day (25 Feb 2025 15:04)  PreserVision AREDS 2 oral capsule: 1 cap(s) orally 2 times a day (25 Feb 2025 15:04)  senna leaf extract oral tablet: 2 tab(s) orally once a day (at bedtime) (25 Feb 2025 15:04)  traMADol 50 mg oral tablet: 1 tab(s) orally every 6 hours As needed Severe Pain (7 - 10) (25 Feb 2025 15:10)  zolpidem 10 mg oral tablet: 1 tab(s) orally once a day (at bedtime) as needed for  insomnia (25 Feb 2025 15:04)    VITALS  ICU Vital Signs Last 24 Hrs  T(C): 36.7 (03 Mar 2025 01:23), Max: 36.9 (02 Mar 2025 04:25)  T(F): 98.1 (03 Mar 2025 01:23), Max: 98.4 (02 Mar 2025 04:25)  HR: 78 (03 Mar 2025 01:23) (74 - 88)  BP: 126/63 (03 Mar 2025 01:23) (126/63 - 151/68)  BP(mean): --  ABP: --  ABP(mean): --  RR: 17 (03 Mar 2025 01:23) (17 - 18)  SpO2: 92% (03 Mar 2025 01:23) (92% - 98%)    O2 Parameters below as of 03 Mar 2025 01:23  Patient On (Oxygen Delivery Method): room air          Adult Advanced Hemodynamics Last 24 Hrs  CVP(mm Hg): --  CVP(cm H2O): --  CO: --  CI: --  PA: --  PA(mean): --  PCWP: --  SVR: --  SVRI: --  PVR: --  PVRI: --  LABS                        10.7   10.07 )-----------( 224      ( 02 Mar 2025 06:45 )             32.5   03-02    133[L]  |  99  |  24.2[H]  ----------------------------<  102[H]  3.9   |  22.0  |  0.92    Ca    8.5      02 Mar 2025 06:45  Mg     1.8     03-02    CAPILLARY BLOOD GLUCOSE      POCT Blood Glucose.: 139 mg/dL (01 Mar 2025 21:21)          IN/OUT    03-01-25 @ 07:01  -  03-02-25 @ 07:00  --------------------------------------------------------  IN: 950 mL / OUT: 1050 mL / NET: -100 mL    03-02-25 @ 07:01  -  03-03-25 @ 04:14  --------------------------------------------------------  IN: 900 mL / OUT: 1000 mL / NET: -100 mL      IMAGING  personally reviewed imaging   Xray Chest 1 View- PORTABLE-Routine:   ACC: [id number] EXAM:  XR CHEST PORTABLE ROUTINE 1V   ORDERED BY: FAEY WATSON     PROCEDURE DATE:  03/02/2025          INTERPRETATION:  Chest one view    HISTORY: Lung evaluation    COMPARISON STUDY: 3/1/2025    Frontal expiratory view of the chest on 2 films shows the heart to be   borderline in size. Right PICC remains present. Aortic valve stent is   again noted.    The lungs are clear and there is no evidence of pneumothorax nor pleural   effusion.    IMPRESSION:  No active pulmonary disease.        Thank you for the courtesy of this referral.    --- End of Report ---            GLENN BEACH MD; Attending Interventional Radiologist  This document has been electronically signed. Mar  2 2025  1:28PM (03-02-25 @ 05:08)    CURRENT MEDICATIONS  MEDICATIONS  (STANDING):  acetaminophen     Tablet .. 650 milliGRAM(s) Oral every 8 hours  atorvastatin 40 milliGRAM(s) Oral at bedtime  cefTRIAXone Injectable. 2000 milliGRAM(s) IV Push every 24 hours  chlorhexidine 2% Cloths 1 Application(s) Topical <User Schedule>  ferrous    sulfate 325 milliGRAM(s) Oral daily  furosemide    Tablet 40 milliGRAM(s) Oral daily  heparin   Injectable 5000 Unit(s) SubCutaneous every 8 hours  levothyroxine 150 MICROGram(s) Oral daily  lidocaine   4% Patch 1 Patch Transdermal daily  melatonin 5 milliGRAM(s) Oral at bedtime  multivitamin 1 Tablet(s) Oral daily  pantoprazole    Tablet 40 milliGRAM(s) Oral before breakfast  polyethylene glycol 3350 17 Gram(s) Oral daily  senna 2 Tablet(s) Oral at bedtime  sodium chloride 0.9% lock flush 3 milliLiter(s) IV Push every 8 hours  spironolactone 25 milliGRAM(s) Oral daily    MEDICATIONS  (PRN):  bisacodyl Suppository 10 milliGRAM(s) Rectal daily PRN Constipation  cyclobenzaprine 5 milliGRAM(s) Oral three times a day PRN Muscle Spasm  traMADol 50 milliGRAM(s) Oral every 6 hours PRN Severe Pain (7 - 10)  traMADol 25 milliGRAM(s) Oral every 6 hours PRN Moderate Pain (4 - 6)

## 2025-03-04 DIAGNOSIS — Z79.84 LONG TERM (CURRENT) USE OF ORAL HYPOGLYCEMIC DRUGS: ICD-10-CM

## 2025-03-04 DIAGNOSIS — K64.8 OTHER HEMORRHOIDS: ICD-10-CM

## 2025-03-04 DIAGNOSIS — N17.9 ACUTE KIDNEY FAILURE, UNSPECIFIED: ICD-10-CM

## 2025-03-04 DIAGNOSIS — Z85.46 PERSONAL HISTORY OF MALIGNANT NEOPLASM OF PROSTATE: ICD-10-CM

## 2025-03-04 DIAGNOSIS — G89.29 OTHER CHRONIC PAIN: ICD-10-CM

## 2025-03-04 DIAGNOSIS — M54.50 LOW BACK PAIN, UNSPECIFIED: ICD-10-CM

## 2025-03-04 DIAGNOSIS — Z79.01 LONG TERM (CURRENT) USE OF ANTICOAGULANTS: ICD-10-CM

## 2025-03-04 DIAGNOSIS — D63.1 ANEMIA IN CHRONIC KIDNEY DISEASE: ICD-10-CM

## 2025-03-04 DIAGNOSIS — E03.9 HYPOTHYROIDISM, UNSPECIFIED: ICD-10-CM

## 2025-03-04 DIAGNOSIS — I25.10 ATHEROSCLEROTIC HEART DISEASE OF NATIVE CORONARY ARTERY WITHOUT ANGINA PECTORIS: ICD-10-CM

## 2025-03-04 DIAGNOSIS — R65.21 SEVERE SEPSIS WITH SEPTIC SHOCK: ICD-10-CM

## 2025-03-04 DIAGNOSIS — K21.9 GASTRO-ESOPHAGEAL REFLUX DISEASE WITHOUT ESOPHAGITIS: ICD-10-CM

## 2025-03-04 DIAGNOSIS — Z79.890 HORMONE REPLACEMENT THERAPY: ICD-10-CM

## 2025-03-04 DIAGNOSIS — I50.32 CHRONIC DIASTOLIC (CONGESTIVE) HEART FAILURE: ICD-10-CM
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
## 2025-03-04 DIAGNOSIS — R57.1 HYPOVOLEMIC SHOCK: ICD-10-CM

## 2025-03-04 DIAGNOSIS — Z66 DO NOT RESUSCITATE: ICD-10-CM

## 2025-03-04 DIAGNOSIS — A40.8 OTHER STREPTOCOCCAL SEPSIS: ICD-10-CM

## 2025-03-04 DIAGNOSIS — E66.9 OBESITY, UNSPECIFIED: ICD-10-CM

## 2025-03-04 DIAGNOSIS — E78.5 HYPERLIPIDEMIA, UNSPECIFIED: ICD-10-CM

## 2025-03-04 DIAGNOSIS — E11.22 TYPE 2 DIABETES MELLITUS WITH DIABETIC CHRONIC KIDNEY DISEASE: ICD-10-CM

## 2025-03-04 DIAGNOSIS — E43 UNSPECIFIED SEVERE PROTEIN-CALORIE MALNUTRITION: ICD-10-CM

## 2025-03-04 DIAGNOSIS — E87.1 HYPO-OSMOLALITY AND HYPONATREMIA: ICD-10-CM
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
## 2025-03-04 DIAGNOSIS — I24.89 OTHER FORMS OF ACUTE ISCHEMIC HEART DISEASE: ICD-10-CM

## 2025-03-04 DIAGNOSIS — I48.91 UNSPECIFIED ATRIAL FIBRILLATION: ICD-10-CM

## 2025-03-04 DIAGNOSIS — K92.2 GASTROINTESTINAL HEMORRHAGE, UNSPECIFIED: ICD-10-CM

## 2025-03-04 DIAGNOSIS — E11.69 TYPE 2 DIABETES MELLITUS WITH OTHER SPECIFIED COMPLICATION: ICD-10-CM

## 2025-03-04 DIAGNOSIS — K62.7 RADIATION PROCTITIS: ICD-10-CM

## 2025-03-04 DIAGNOSIS — Z79.82 LONG TERM (CURRENT) USE OF ASPIRIN: ICD-10-CM

## 2025-03-04 DIAGNOSIS — D62 ACUTE POSTHEMORRHAGIC ANEMIA: ICD-10-CM
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
## 2025-03-04 DIAGNOSIS — K31.7 POLYP OF STOMACH AND DUODENUM: ICD-10-CM

## 2025-03-04 DIAGNOSIS — N18.30 CHRONIC KIDNEY DISEASE, STAGE 3 UNSPECIFIED: ICD-10-CM

## 2025-03-04 DIAGNOSIS — T82.6XXA INFECTION AND INFLAMMATORY REACTION DUE TO CARDIAC VALVE PROSTHESIS, INITIAL ENCOUNTER: ICD-10-CM

## 2025-03-04 LAB
ANION GAP SERPL CALC-SCNC: 12 MMOL/L — SIGNIFICANT CHANGE UP (ref 5–17)
BUN SERPL-MCNC: 17.3 MG/DL — SIGNIFICANT CHANGE UP (ref 8–20)
CALCIUM SERPL-MCNC: 8.7 MG/DL — SIGNIFICANT CHANGE UP (ref 8.4–10.5)
CHLORIDE SERPL-SCNC: 98 MMOL/L — SIGNIFICANT CHANGE UP (ref 96–108)
CO2 SERPL-SCNC: 24 MMOL/L — SIGNIFICANT CHANGE UP (ref 22–29)
CREAT SERPL-MCNC: 0.89 MG/DL — SIGNIFICANT CHANGE UP (ref 0.5–1.3)
EGFR: 90 ML/MIN/1.73M2 — SIGNIFICANT CHANGE UP
EGFR: 90 ML/MIN/1.73M2 — SIGNIFICANT CHANGE UP
GLUCOSE SERPL-MCNC: 104 MG/DL — HIGH (ref 70–99)
HCT VFR BLD CALC: 35.3 % — LOW (ref 39–50)
HGB BLD-MCNC: 11.6 G/DL — LOW (ref 13–17)
MAGNESIUM SERPL-MCNC: 2 MG/DL — SIGNIFICANT CHANGE UP (ref 1.6–2.6)
MCHC RBC-ENTMCNC: 28 PG — SIGNIFICANT CHANGE UP (ref 27–34)
MCHC RBC-ENTMCNC: 32.9 G/DL — SIGNIFICANT CHANGE UP (ref 32–36)
MCV RBC AUTO: 85.3 FL — SIGNIFICANT CHANGE UP (ref 80–100)
NRBC # BLD AUTO: 0 K/UL — SIGNIFICANT CHANGE UP (ref 0–0)
NRBC # FLD: 0 K/UL — SIGNIFICANT CHANGE UP (ref 0–0)
NRBC BLD AUTO-RTO: 0 /100 WBCS — SIGNIFICANT CHANGE UP (ref 0–0)
PLATELET # BLD AUTO: 230 K/UL — SIGNIFICANT CHANGE UP (ref 150–400)
PMV BLD: 10.1 FL — SIGNIFICANT CHANGE UP (ref 7–13)
POTASSIUM SERPL-MCNC: 4.1 MMOL/L — SIGNIFICANT CHANGE UP (ref 3.5–5.3)
POTASSIUM SERPL-SCNC: 4.1 MMOL/L — SIGNIFICANT CHANGE UP (ref 3.5–5.3)
RBC # BLD: 4.14 M/UL — LOW (ref 4.2–5.8)
RBC # FLD: 18 % — HIGH (ref 10.3–14.5)
SODIUM SERPL-SCNC: 134 MMOL/L — LOW (ref 135–145)
WBC # BLD: 9.71 K/UL — SIGNIFICANT CHANGE UP (ref 3.8–10.5)
WBC # FLD AUTO: 9.71 K/UL — SIGNIFICANT CHANGE UP (ref 3.8–10.5)

## 2025-03-04 PROCEDURE — 99232 SBSQ HOSP IP/OBS MODERATE 35: CPT

## 2025-03-04 PROCEDURE — 99222 1ST HOSP IP/OBS MODERATE 55: CPT

## 2025-03-04 PROCEDURE — 71045 X-RAY EXAM CHEST 1 VIEW: CPT | Mod: 26

## 2025-03-04 RX ORDER — TRAMADOL HYDROCHLORIDE 50 MG/1
25 TABLET, FILM COATED ORAL EVERY 6 HOURS
Refills: 0 | Status: DISCONTINUED | OUTPATIENT
Start: 2025-03-04 | End: 2025-03-06

## 2025-03-04 RX ORDER — TRAMADOL HYDROCHLORIDE 50 MG/1
50 TABLET, FILM COATED ORAL EVERY 6 HOURS
Refills: 0 | Status: DISCONTINUED | OUTPATIENT
Start: 2025-03-04 | End: 2025-03-06

## 2025-03-04 RX ADMIN — Medication 40 MILLIGRAM(S): at 04:49

## 2025-03-04 RX ADMIN — Medication 650 MILLIGRAM(S): at 05:49

## 2025-03-04 RX ADMIN — LIDOCAINE HYDROCHLORIDE 1 PATCH: 20 JELLY TOPICAL at 12:46

## 2025-03-04 RX ADMIN — Medication 5 MILLIGRAM(S): at 21:30

## 2025-03-04 RX ADMIN — Medication 3 MILLILITER(S): at 04:53

## 2025-03-04 RX ADMIN — HEPARIN SODIUM 5000 UNIT(S): 1000 INJECTION INTRAVENOUS; SUBCUTANEOUS at 04:52

## 2025-03-04 RX ADMIN — TRAMADOL HYDROCHLORIDE 50 MILLIGRAM(S): 50 TABLET, FILM COATED ORAL at 12:58

## 2025-03-04 RX ADMIN — ATORVASTATIN CALCIUM 40 MILLIGRAM(S): 80 TABLET, FILM COATED ORAL at 21:30

## 2025-03-04 RX ADMIN — HEPARIN SODIUM 5000 UNIT(S): 1000 INJECTION INTRAVENOUS; SUBCUTANEOUS at 21:30

## 2025-03-04 RX ADMIN — Medication 650 MILLIGRAM(S): at 14:26

## 2025-03-04 RX ADMIN — Medication 3 MILLILITER(S): at 21:33

## 2025-03-04 RX ADMIN — FUROSEMIDE 40 MILLIGRAM(S): 10 INJECTION INTRAMUSCULAR; INTRAVENOUS at 04:49

## 2025-03-04 RX ADMIN — TRAMADOL HYDROCHLORIDE 50 MILLIGRAM(S): 50 TABLET, FILM COATED ORAL at 18:41

## 2025-03-04 RX ADMIN — Medication 650 MILLIGRAM(S): at 21:30

## 2025-03-04 RX ADMIN — Medication 1 APPLICATION(S): at 04:52

## 2025-03-04 RX ADMIN — HEPARIN SODIUM 5000 UNIT(S): 1000 INJECTION INTRAVENOUS; SUBCUTANEOUS at 14:26

## 2025-03-04 RX ADMIN — Medication 650 MILLIGRAM(S): at 15:07

## 2025-03-04 RX ADMIN — Medication 3 MILLILITER(S): at 15:07

## 2025-03-04 RX ADMIN — Medication 150 MICROGRAM(S): at 04:50

## 2025-03-04 RX ADMIN — Medication 1 TABLET(S): at 12:46

## 2025-03-04 RX ADMIN — Medication 25 MILLIGRAM(S): at 04:49

## 2025-03-04 RX ADMIN — Medication 2 TABLET(S): at 21:32

## 2025-03-04 RX ADMIN — Medication 650 MILLIGRAM(S): at 04:49

## 2025-03-04 RX ADMIN — Medication 325 MILLIGRAM(S): at 12:46

## 2025-03-04 RX ADMIN — TRAMADOL HYDROCHLORIDE 50 MILLIGRAM(S): 50 TABLET, FILM COATED ORAL at 04:49

## 2025-03-04 RX ADMIN — CEFTRIAXONE 2000 MILLIGRAM(S): 500 INJECTION, POWDER, FOR SOLUTION INTRAMUSCULAR; INTRAVENOUS at 12:46

## 2025-03-04 RX ADMIN — Medication 650 MILLIGRAM(S): at 22:30

## 2025-03-04 RX ADMIN — TRAMADOL HYDROCHLORIDE 50 MILLIGRAM(S): 50 TABLET, FILM COATED ORAL at 05:49

## 2025-03-04 NOTE — CONSULT NOTE ADULT - SUBJECTIVE AND OBJECTIVE BOX
74yM was admitted on 02-24    Patient is a 74y old  Male who presents with a chief complaint of Bioprosthetic AoV endocarditis (04 Mar 2025 09:13)    HPI:  75 y/o male with a PMHx of HTN, A-FIB, HLD, GI Bleed, S/P TAVR 2023, chronic back pain and DM initially presented to [facility] ED with a two-day history of a rash on his left leg and pain in left knee radiating towards his foot. Pt recently discharged to [facility] rehab after treatment for infected TAVR and Osteomyelitis of spine, strep salivarius bacteremia. Pt received transfusion and w/u for GI bleed with unremarkable colonoscopy and upper endoscopy. Underwent ROVERTO at that time which showed vegetation of TAVR. Plan at that time was for 6 weeks of IV antibiotics via PICC line and out patient pill camera study.    Since admission patient was seen by vascular surgery,  CTA showed clot to common femoral and distal embolism. S/P common femoral endarterectomy with clot retrieval and good blood flow to lower extremity post surgery. Repeat blood cultures were negative but the excised clot was found to have gram positive cocci. Pt with weakness to legs. Sent for repeat MR with contrast. Results without abscess at this time. Other incidental problem is constipation and persistent back pain requiring tramadol for pain relief.  Pt received transfusion of 2 unit of PRBC's 2/23.    Patient transfered to [facility] for evaluation of TAVR and possible surgical replacement. Condition at time of transfer is stable but guarded prognosis.    Patient had requested DNR/DNI on admission.  However he has rescinded this request until after possible cardiac surgery. Order for DNR was cancelled. (25 Feb 2025 00:03)        SUBJECTIVE: Patient seen and examined in chair. Currently denies any CP, palpitations, SOB, is on supplemental O2 via nasal cannula. States that prior to admission, he was independent in ADLs and ambulated with a cane. While he was only at [facility] facility for 2 days prior to admission to the hospital, he states that he feels he needs more intensive therapies to return home at a higher functioning level.       Imaging performed:  MR C/T spine: edema at T8-T9 endplates, 1.6cm hemangioma in T11 vertebral body. multilevel degenerative changes of the cervical spine worse at C4-C5, C5-C6 with mild-mod central stenosis.       REVIEW OF SYSTEMS  Constitutional - No fever, +fatigue  HEENT - No visual disturbances, No vertigo, No neck pain  Respiratory - No cough, No wheezing, No shortness of breath  Cardiovascular - No chest pain, No palpitations  Gastrointestinal - No abdominal pain, No nausea, No vomiting, No diarrhea, No constipation  Genitourinary - No dysuria, No incontinence  Neurological - No headaches, No memory loss, +loss of strength, No numbness, No tremors  Skin - No itching, No rashes, No lesions   Musculoskeletal - No joint pain, No joint swelling, No muscle pain  Psychiatric - No depression, No anxiety        PAST MEDICAL & SURGICAL HISTORY  Hypertension    Diabetes mellitus    Hypothyroid obesity    Obesity    Hypothyroidism    Prostate CA        FUNCTIONAL HISTORY  Lives in a condo with his wife. Stair lift to main level with +bedroom and +bathroom, 15 steps to 2nd level.   Independent in ADLs and ambulation with cane PTA    CURRENT FUNCTIONAL STATUS  PT 3/1/25   transfers - min-mod A   gait - min A 50ft RW    OT - pending       SOCIAL HISTORY - as per documentation/history  Smoking - None  EtOH - +   Drugs - None    FAMILY HISTORY  mother - bladder CA  father - CAD, HLD      RECENT LABS - Reviewed  CBC Full  -  ( 04 Mar 2025 08:46 )  WBC Count : 9.71 K/uL  RBC Count : 4.14 M/uL  Hemoglobin : 11.6 g/dL  Hematocrit : 35.3 %  Platelet Count - Automated : 230 K/uL  Mean Cell Volume : 85.3 fl  Mean Cell Hemoglobin : 28.0 pg  Mean Cell Hemoglobin Concentration : 32.9 g/dL  Auto Neutrophil # : x  Auto Lymphocyte # : x  Auto Monocyte # : x  Auto Eosinophil # : x  Auto Basophil # : x  Auto Neutrophil % : x  Auto Lymphocyte % : x  Auto Monocyte % : x  Auto Eosinophil % : x  Auto Basophil % : x    03-04    134[L]  |  98  |  17.3  ----------------------------<  104[H]  4.1   |  24.0  |  0.89    Ca    8.7      04 Mar 2025 08:46  Mg     2.0     03-04      Urinalysis Basic - ( 04 Mar 2025 08:46 )    Color: x / Appearance: x / SG: x / pH: x  Gluc: 104 mg/dL / Ketone: x  / Bili: x / Urobili: x   Blood: x / Protein: x / Nitrite: x   Leuk Esterase: x / RBC: x / WBC x   Sq Epi: x / Non Sq Epi: x / Bacteria: x        ALLERGIES  No Known Allergies      MEDICATIONS   acetaminophen     Tablet .. 650 milliGRAM(s) Oral every 8 hours  atorvastatin 40 milliGRAM(s) Oral at bedtime  bisacodyl Suppository 10 milliGRAM(s) Rectal daily PRN  cefTRIAXone Injectable. 2000 milliGRAM(s) IV Push every 24 hours  chlorhexidine 2% Cloths 1 Application(s) Topical <User Schedule>  cyclobenzaprine 5 milliGRAM(s) Oral three times a day PRN  ferrous    sulfate 325 milliGRAM(s) Oral daily  furosemide    Tablet 40 milliGRAM(s) Oral daily  heparin   Injectable 5000 Unit(s) SubCutaneous every 8 hours  levothyroxine 150 MICROGram(s) Oral daily  lidocaine   4% Patch 1 Patch Transdermal daily  melatonin 5 milliGRAM(s) Oral at bedtime  multivitamin 1 Tablet(s) Oral daily  pantoprazole    Tablet 40 milliGRAM(s) Oral before breakfast  polyethylene glycol 3350 17 Gram(s) Oral daily  senna 2 Tablet(s) Oral at bedtime  sodium chloride 0.9% lock flush 3 milliLiter(s) IV Push every 8 hours  spironolactone 25 milliGRAM(s) Oral daily  traMADol 50 milliGRAM(s) Oral every 6 hours PRN  traMADol 25 milliGRAM(s) Oral every 6 hours PRN      VITALS  T(C): 36.4 (03-04-25 @ 08:01), Max: 36.8 (03-04-25 @ 00:58)  HR: 83 (03-04-25 @ 08:01) (74 - 86)  BP: 135/72 (03-04-25 @ 08:01) (135/72 - 161/73)  RR: 19 (03-04-25 @ 08:01) (17 - 19)  SpO2: 93% (03-04-25 @ 08:01) (93% - 97%)  Wt(kg): --    IMAGING:     < from: MR Thoracic Spine No Cont (03.01.25 @ 15:17) >  IMPRESSION: Mild edema at the T8-9 endplates with increased signal within   the intervening disc suspicious for discitis and early osteomyelitis.     Recommend MRI with gadolinium for complete evaluation. Mild to moderate   degenerative disc disease and spondylosis. There is narrowing of the   BILATERAL C3-4 through C6/7 neural foramina due to uncovertebral spurring   and facet osteophytic hypertrophy. Posterior osteophytic ridge/disc   complexes at C3-4 through C6-7 flatten the ventral thecal sac. Tiny LEFT   T1-T2 disc herniation, mild T6-7 bulge, tiny LEFT paracentral T7-8 and   T8-9 disc herniations, and mildbulge at T11-T12 indents the ventral   thecal sac.    < end of copied text >      < from: CT Lumbar Spine w/ IV Cont (02.26.25 @ 10:40) >  IMPRESSION:    Findings supporting the presence of discitis/osteomyelitis at L3-L4 and   L5-S1.    Evaluation of the spinal canal contents is limited by CT modality and   better accomplished on MRI of the lumbar spine from 2/24/2025.    No soft tissue abscess.    < end of copied text >  < from: MR Lumbar Spine w/wo IV Cont (02.24.25 @ 09:45) >  IMPRESSION:    1. L3-L4 suspected septic discitis appears largely similar to 2/12/2025   noting mildly increased fluid within the disc space and increased left   paraspinal infiltration and enhancement. No abscess collection has   developed    2. L5-S1 suspected discitis appears largely similar to 2/12/2025, noting   decreased enhancement in the left ventral epidural space. No abscess   collection has developed    3. Recommend continued imaging follow-up    --- End of Report ---    < end of copied text >  < from: CT Angio Lower Extremity w/ IV Cont, Bilateral (02.21.25 @ 18:13) >  IMPRESSION:    1. Complete occlusion of the proximal left SFA with extension of a long 4   cm presumed embolus rather than thrombus into the profunda. There is   reconstitution distally.    2. Diffusely diseased trifurcation vessels bilaterally but probably   patent with three-vessel runoff to the feet/ankles on both sides.    Report discussed with Dr. Zarco at approximately 1845 hours EST    2/21/2025.    --- End of Report ---    < end of copied text >      ----------------------------------------------------------------------------------------  PHYSICAL EXAM  Constitutional - NAD, Comfortable  HEENT - NCAT, EOMI  Chest - Breathing comfortably, on O2 via NC  Cardiovascular - S1S2   Abdomen - Soft   Extremities - No C/C/E, No calf tenderness   Neurologic Exam -                    Cognitive - AAO to self, place, date, year, situation     Communication - Fluent, No dysarthria     Cranial Nerves - CN 2-12 intact     Motor -                    LEFT    UE - ShAB 5/5, EF 5/5, EE 5/5, WE 5/5,  5/5                    RIGHT UE - ShAB 5/5, EF 5/5, EE 5/5, WE 5/5,  5/5                    LEFT    LE - HF 3/5, KE 4/5, DF 5/5, PF 5/5                    RIGHT LE - HF 3/5, KE 4/5, DF 5/5, PF 5/5        Sensory - Intact to LT     Reflexes - DTR Intact, No primitive reflexive     Coordination - FTN intact, HTS unable to perform due to hip flexor weakness.   Psychiatric - Mood stable, Affect WNL  ----------------------------------------------------------------------------------------  ASSESSMENT/PLAN  74yMale with functional deficits after OM of the spine 2/2 endocarditis     Endocarditis of TAVR valve - on rocephin via PICC  OM/discitis - of L3-L4, L5-S1, suspected T8-T9, nonsurgical management per ortho, continue bracing as directed.   Pain - Tylenol, cyclobenzaprine, tramadol PRN   DVT PPX - on heparin   Rehab - pending OT evaluation.   Patient continues to require hospitalization for the above diagnoses and ongoing active management of comorbid complications ( tele monitoring, long term abx treatment) that are substantially posing a threat to bodily function, functional ability and quality of life.     Recommend ACUTE inpatient rehabilitation for the functional deficits consisting of 3 hours of therapy/day & 24 hour RN/daily PMR physician for comorbid medical management.    Will continue to follow for ongoing rehab needs and recommendations.       Will continue to follow. Rehab recommendations are dependent on how functional progress changes as well as how patient continues to participate and tolerate therapeutic interventions, WHICH MAY CHANGE UPON FOLLOW UP EXAMINATIONS. Recommend ongoing mobilization by staff to maintain cardiopulmonary function and prevention of secondary complications related to debility/immobility.     Total Time Spent on Encounter (reviewing clinical notes, labs, radiology and medications, reviewing patient history, physical exam, assessment and discussing rehabilitation options - with consideration of prior level of function, expected level of recovery and return to community living, rounding with team) - 55 minutes

## 2025-03-04 NOTE — PROGRESS NOTE ADULT - SUBJECTIVE AND OBJECTIVE BOX
Brief summary:  74yMale seen and assessed at bedside.  Pt laying comfortably in hospital bed in NAD on room air.  States no current physical complaints at this time.  Denies f/c, n/v, chest pain, sob, abdominal pain, d/c, urinary symptoms.  ROS negative x 10.      Overnight events: No acute events overnight.  Hospital course progressing as expected.      PAST MEDICAL & SURGICAL HISTORY:  Hypertension    Diabetes mellitus    Obesity    Hypothyroidism    Prostate CA        Medications:  acetaminophen     Tablet .. 650 milliGRAM(s) Oral every 8 hours  atorvastatin 40 milliGRAM(s) Oral at bedtime  bisacodyl Suppository 10 milliGRAM(s) Rectal daily PRN  cefTRIAXone Injectable. 2000 milliGRAM(s) IV Push every 24 hours  chlorhexidine 2% Cloths 1 Application(s) Topical <User Schedule>  cyclobenzaprine 5 milliGRAM(s) Oral three times a day PRN  ferrous    sulfate 325 milliGRAM(s) Oral daily  furosemide    Tablet 40 milliGRAM(s) Oral daily  heparin   Injectable 5000 Unit(s) SubCutaneous every 8 hours  levothyroxine 150 MICROGram(s) Oral daily  lidocaine   4% Patch 1 Patch Transdermal daily  melatonin 5 milliGRAM(s) Oral at bedtime  multivitamin 1 Tablet(s) Oral daily  pantoprazole    Tablet 40 milliGRAM(s) Oral before breakfast  polyethylene glycol 3350 17 Gram(s) Oral daily  senna 2 Tablet(s) Oral at bedtime  sodium chloride 0.9% lock flush 3 milliLiter(s) IV Push every 8 hours  spironolactone 25 milliGRAM(s) Oral daily  traMADol 25 milliGRAM(s) Oral every 6 hours PRN      MEDICATIONS  (PRN):  bisacodyl Suppository 10 milliGRAM(s) Rectal daily PRN Constipation  cyclobenzaprine 5 milliGRAM(s) Oral three times a day PRN Muscle Spasm  traMADol 25 milliGRAM(s) Oral every 6 hours PRN Moderate Pain (4 - 6)        Daily Review:               11.1   10.09 )-----------( 224      ( 03 Mar 2025 06:59 )             34.5   03-03    132[L]  |  98  |  21.1[H]  ----------------------------<  97  4.1   |  23.0  |  0.88    Ca    8.4      03 Mar 2025 06:59  Mg     1.9     03-03      T(C): 36.8 (03-04-25 @ 00:58), Max: 36.8 (03-04-25 @ 00:58)  HR: 82 (03-04-25 @ 00:58) (74 - 90)  BP: 143/73 (03-04-25 @ 00:58) (143/73 - 161/73)  RR: 17 (03-04-25 @ 00:58) (17 - 18)  SpO2: 93% (03-04-25 @ 00:58) (93% - 97%)  Wt(kg): --      CAPILLARY BLOOD GLUCOSE    POCT Blood Glucose.: 97 mg/dL (03 Mar 2025 07:59)      I&O's Summary    02 Mar 2025 07:01  -  03 Mar 2025 07:00  --------------------------------------------------------  IN: 900 mL / OUT: 1000 mL / NET: -100 mL    03 Mar 2025 07:01  -  04 Mar 2025 01:26  --------------------------------------------------------  IN: 210 mL / OUT: 1200 mL / NET: -990 mL        Physical Exam  Neuro: A+O x 3, non-focal, speech clear and intact  Pulm: coarse breath sounds bilaterally, no wheezing or rales  CV: RRR, +S1S2  Abd: soft, NT, ND, normoactive bowel sounds  Ext: +DP Pulses b/l, +PT pulses, no edema        CXR 3/3/25    < from: Xray Chest 1 View- PORTABLE-Routine (Xray Chest 1 View- PORTABLE-Routine in AM.) (03.03.25 @ 04:12) >  INTERPRETATION:    Heart size and the mediastinum cannot be accurately evaluated on this   projection. Status post TAVR.  Right upper extremity PICC with tip at the SVC/right atrial junction.  The lungs are clear.  No pleural effusion or pneumothorax is seen.  There is no acute bony abnormality.    IMPRESSION:  Right upper extremity PICC with tip at the SVC/right atrial   junction. No pneumothorax.    Clear lungs.    --- End of Report ---    < end of copied text >

## 2025-03-04 NOTE — PROGRESS NOTE ADULT - SUBJECTIVE AND OBJECTIVE BOX
Pt Name: CONNOR SANDRA  MRN: [id number]    Procedure:  Surgeon:     Patient is a 74y Male being followed L3-L4, L5 -S1 OM/discitis and left knee pain. Patient seen and examined at bedside. Patient is doing well. Pain is controlled with the prescribed medication. Denies worsening lower back pain. Denies any acute motor or sensory changes.  Denies CP, SOB, N/V, numbness/tingling. No other orthopedic complaints.        PAST MEDICAL & SURGICAL HISTORY:  Hypertension    Diabetes mellitus    Obesity      Hypothyroidism      Prostate CA          Allergies: No Known Allergies              Vital Signs Last 24 Hrs  T(C): 36.4 (04 Mar 2025 08:01), Max: 36.8 (04 Mar 2025 00:58)  T(F): 97.6 (04 Mar 2025 08:01), Max: 98.3 (04 Mar 2025 00:58)  HR: 83 (04 Mar 2025 08:01) (74 - 86)  BP: 135/72 (04 Mar 2025 08:01) (135/72 - 161/73)  BP(mean): --  RR: 19 (04 Mar 2025 08:01) (17 - 19)  SpO2: 93% (04 Mar 2025 08:01) (93% - 97%)    Parameters below as of 04 Mar 2025 08:01  Patient On (Oxygen Delivery Method): nasal cannula      Daily     Daily Weight in k.4 (03 Mar 2025 18:33)                          11.1   10.09 )-----------( 224      ( 03 Mar 2025 06:59 )             34.5     03-03    132[L]  |  98  |  21.1[H]  ----------------------------<  97  4.1   |  23.0  |  0.88    Ca    8.4      03 Mar 2025 06:59  Mg     1.9     03-03        PHYSICAL EXAM:    Appearance: Alert, responsive, in no acute distress.    Musculoskeletal:        Lower extremity                               HF         KE          TA       EHL         GS                                                 R        5/5        5/5        5/5       5/5         5/5                                               L         5/5        5/5       5/5       5/5          5/5         Left Lower Extremity: swelling about the left lower-extremity noted. knee: ace compression wrap present, mild tenderness to anterior knee. KE/KF intact but limited due to swelling and pain. No obvious deformity. Skin is clean, dry, and intact. No abrasions, ecchymosis, or erythema. No bleeding. Sensation is grossly intact to light touch. + dorsi/plantarflexion/EHL/FHL. PT/DP pulses 2+. ca;f soft, NT, BL.  Cap refill < 2 seconds. No cyanosis. No signs of venous insufficiency or stasis.     IMAGING:       from: MR Thoracic Spine No Cont (25 @ 15:17) >  ACC: [id number] EXAM:  MR SPINE THORACIC   ORDERED BY: KAREN LI     ACC: [id number] EXAM:  MR SPINE CERVICAL   ORDERED BY: KAREN LI     PROCEDURE DATE:  2025      INTERPRETATION:  MR cervical and thoracic without gadolinium    CLINICAL INFORMATION: Cervical spondylosis.    TECHNIQUE:   Sagittal T1-weighted images, sagittal STIR images, sagittal   T2-weighted images and axial T1 and T2-weighted gradient-echo images of   the cervical spine were obtained.    FINDINGS:No prior similar studies are available for review.    Cervical and thoracic vertebral alignment is significant for   straightening on a degenerative basis with focal kyphosis at C4-5.    Cervical and thoracic vertebral body heights are maintained.  Marrow   signal intensity within cervical vertebral bodies and posterior elements   is significant for edema at the T8-9 endplates with increased signal   within the intervening disc suspicious for discitis and early   osteomyelitis. 1.6 cm hemangioma within the T11 vertebral body. No   osseous expansion, epidural disease or paraspinal abnormality is found.      Minimal prevertebral fluid seen at the C3-C5 level.    Cervical and thoracic intervertebral discs demonstrates mild to moderate   degenerative disc disease and spondylosis. There is narrowing of the   BILATERAL C3-4 through C6/7 neural foramina due to uncovertebral spurring   and facet osteophytic hypertrophy. Posterior osteophytic ridge/disc   complexes at C3-4 through C6-7 flatten theventral thecal sac. Tiny LEFT   T1-T2 disc herniation, mild T6-7 bulge, tiny LEFT paracentral T7-8 and   T8-9 disc herniations, and mild bulge at T11-T12 indents the ventral   thecal sac.    The cervical and thoracic cord maintains intact morphologyNo cord   signal intensity abnormality or focal cord lesion is appreciated.  The   cervical medullary junction remains intact.      IMPRESSION: Mild edema at the T8-9 endplates with increased signal within   the intervening disc suspicious for discitis and early osteomyelitis.     Recommend MRI with gadolinium for complete evaluation. Mild to moderate   degenerative disc disease and spondylosis. There is narrowing of the   BILATERAL C3-4 through C6/7 neural foramina due to uncovertebral spurring   and facet osteophytic hypertrophy. Posterior osteophytic ridge/disc   complexes at C3-4 through C6-7 flatten the ventral thecal sac. Tiny LEFT   T1-T2 disc herniation, mild T6-7 bulge, tiny LEFT paracentral T7-8 and   T8-9 disc herniations, and mildbulge at T11-T12 indents the ventral   thecal sac.    --- End of Report ---    SULAIMAN HYLTON MD; Attending Radiologist  This document has been electronically signed. Mar  2 2025  8:09AM        A/P:  Pt is a  74y Male being followed for L3-L4, L5-S1 OM/discitis and left knee pain and swelling.     PLAN:   * no emergent surgical intervention required at this time   * left knee pain and swelling - continue with elevation and ACE wrap for compression  * Pain control  * DVTp  * Continue IV abx   * Weight Bearing Status: WBAT  * LSO OOB  * continue monitoring symptom   * Continue care as per primary team Pt Name: CONNOR SANDRA  MRN: [id number]    Procedure:  Surgeon:     Patient is a 74y Male being followed L3-L4, L5 -S1 OM/discitis and left knee pain. Patient seen and examined at bedside. Patient is doing well. Pain is controlled with the prescribed medication. Denies worsening lower back pain or left knee pain. Denies any acute motor or sensory changes.  Denies CP, SOB, N/V, numbness/tingling. No other orthopedic complaints.        PAST MEDICAL & SURGICAL HISTORY:  Hypertension    Diabetes mellitus    Obesity      Hypothyroidism      Prostate CA          Allergies: No Known Allergies              Vital Signs Last 24 Hrs  T(C): 36.4 (04 Mar 2025 08:01), Max: 36.8 (04 Mar 2025 00:58)  T(F): 97.6 (04 Mar 2025 08:01), Max: 98.3 (04 Mar 2025 00:58)  HR: 83 (04 Mar 2025 08:01) (74 - 86)  BP: 135/72 (04 Mar 2025 08:01) (135/72 - 161/73)  BP(mean): --  RR: 19 (04 Mar 2025 08:01) (17 - 19)  SpO2: 93% (04 Mar 2025 08:01) (93% - 97%)    Parameters below as of 04 Mar 2025 08:01  Patient On (Oxygen Delivery Method): nasal cannula      Daily     Daily Weight in k.4 (03 Mar 2025 18:33)                          11.1   10.09 )-----------( 224      ( 03 Mar 2025 06:59 )             34.5     03-03    132[L]  |  98  |  21.1[H]  ----------------------------<  97  4.1   |  23.0  |  0.88    Ca    8.4      03 Mar 2025 06:59  Mg     1.9     03-03        PHYSICAL EXAM:    Appearance: Alert, responsive, in no acute distress.    Musculoskeletal:        Lower extremity                               HF         KE          TA       EHL         GS                                                 R        5/5        5/5        5/5       5/5         5/5                                               L         5/5        5/5       5/5       5/5          5/5         Left Lower Extremity: swelling about the left lower-extremity noted. knee: ace compression wrap present, NTTP. KE/KF intact but limited due to swelling and pain. No obvious deformity. Skin is clean, dry, and intact. No abrasions, ecchymosis, or erythema. No bleeding. Sensation is grossly intact to light touch. + dorsi/plantarflexion/EHL/FHL. PT/DP pulses 2+. ca;f soft, NT, BL.  Cap refill < 2 seconds. No cyanosis. No signs of venous insufficiency or stasis.     IMAGING:       from: MR Thoracic Spine No Cont (25 @ 15:17) >  ACC: [id number] EXAM:  MR SPINE THORACIC   ORDERED BY: KAREN LI     ACC: [id number] EXAM:  MR SPINE CERVICAL   ORDERED BY: KAREN LI     PROCEDURE DATE:  2025      INTERPRETATION:  MR cervical and thoracic without gadolinium    CLINICAL INFORMATION: Cervical spondylosis.    TECHNIQUE:   Sagittal T1-weighted images, sagittal STIR images, sagittal   T2-weighted images and axial T1 and T2-weighted gradient-echo images of   the cervical spine were obtained.    FINDINGS:No prior similar studies are available for review.    Cervical and thoracic vertebral alignment is significant for   straightening on a degenerative basis with focal kyphosis at C4-5.    Cervical and thoracic vertebral body heights are maintained.  Marrow   signal intensity within cervical vertebral bodies and posterior elements   is significant for edema at the T8-9 endplates with increased signal   within the intervening disc suspicious for discitis and early   osteomyelitis. 1.6 cm hemangioma within the T11 vertebral body. No   osseous expansion, epidural disease or paraspinal abnormality is found.      Minimal prevertebral fluid seen at the C3-C5 level.    Cervical and thoracic intervertebral discs demonstrates mild to moderate   degenerative disc disease and spondylosis. There is narrowing of the   BILATERAL C3-4 through C6/7 neural foramina due to uncovertebral spurring   and facet osteophytic hypertrophy. Posterior osteophytic ridge/disc   complexes at C3-4 through C6-7 flatten theventral thecal sac. Tiny LEFT   T1-T2 disc herniation, mild T6-7 bulge, tiny LEFT paracentral T7-8 and   T8-9 disc herniations, and mild bulge at T11-T12 indents the ventral   thecal sac.    The cervical and thoracic cord maintains intact morphologyNo cord   signal intensity abnormality or focal cord lesion is appreciated.  The   cervical medullary junction remains intact.      IMPRESSION: Mild edema at the T8-9 endplates with increased signal within   the intervening disc suspicious for discitis and early osteomyelitis.     Recommend MRI with gadolinium for complete evaluation. Mild to moderate   degenerative disc disease and spondylosis. There is narrowing of the   BILATERAL C3-4 through C6/7 neural foramina due to uncovertebral spurring   and facet osteophytic hypertrophy. Posterior osteophytic ridge/disc   complexes at C3-4 through C6-7 flatten the ventral thecal sac. Tiny LEFT   T1-T2 disc herniation, mild T6-7 bulge, tiny LEFT paracentral T7-8 and   T8-9 disc herniations, and mildbulge at T11-T12 indents the ventral   thecal sac.    --- End of Report ---    SULAIMAN HYLTON MD; Attending Radiologist  This document has been electronically signed. Mar  2 2025  8:09AM        A/P:  Pt is a  74y Male being followed for L3-L4, L5-S1 OM/discitis and left knee pain and swelling.     PLAN:   * no emergent surgical intervention required at this time   * left knee pain and swelling - continue with elevation and ACE wrap for compression  * Pain control  * DVTp  * Continue IV abx   * Weight Bearing Status: WBAT  * LSO OOB  * continue monitoring symptom   * Continue care as per primary team

## 2025-03-04 NOTE — OCCUPATIONAL THERAPY INITIAL EVALUATION ADULT - PERTINENT HX OF CURRENT PROBLEM, REHAB EVAL
As per MD note: Patient is a 74y Male being followed L3-L4, L5 -S1 OM/discitis and left knee pain. Patient seen and examined at bedside. Patient is doing well. Pain is controlled with the prescribed medication. Denies worsening lower back pain or left knee pain. Denies any acute motor or sensory changes.  Denies CP, SOB, N/V, numbness/tingling. No other orthopedic complaints.

## 2025-03-04 NOTE — PROGRESS NOTE ADULT - PROBLEM SELECTOR PLAN 1
of TAVR valve  prior blood cx + strept salivarius  repeat cultures neg  seen by ID, cont rocephin through at least 4/7/25 2/26 ROVERTO: echogenic sesile not very mobile subcentimeter ( 6x2 mm) structure attached to the base of the leaflet corresponding to the native non-coronary cusp likely representing a calcified nodule vs. less likely an old small calcified vegetation, mod AS  seen by neurosx for LS osteo rec conservative management  fitted for brace  now seen by ortho spine, MR cervical/throacic spine completed, continue to monitor symptoms  needs aggressive PT, ambulation limited by pain  cont lido, tramadol, toradol x5 doses (now off), consider PRN additional doses  ideally pt needs to improve with PT and be more ambulatory prior to any cardiac surgery  plan to be determined  to be discussed with CT surgery attending in AM rounds normal shape/ROM intact

## 2025-03-04 NOTE — PROGRESS NOTE ADULT - ASSESSMENT
73 y/o male with of HTN, AF, HLD, GI Bleed, S/P TAVR 2023, chronic back pain and DM recent admission at [facility] 2/10-2/17 with acute anemia, DARLINE, hypotension, s/p 2PRBC, negative EGD/colonoscopy, found to have strept salivarius bacteremia, ROVERTO with vegetation on TAVR valve, MRI spine with possible osteo of lumbar-sacral spine, d/c'd to rehab on rocephin, represented to  2/20 with LLE rash and L knee pain found to have clot in LCFA with distal embolism s/p CFA endarterectomy with clot retrieval and good blood flow to LE, clot cx with GPC, repeat MRI spine without abscess, transfused 2 PRBC on 2/23, transferred to CTICU at [facility]  2/24 for CT surgery evaluation.

## 2025-03-04 NOTE — PROGRESS NOTE ADULT - SUBJECTIVE AND OBJECTIVE BOX
[facility]                                                         CARDIOLOGY AT [place] [address]                                                         Telephone: [phone]. Fax:[phone]                                                                             PROGRESS NOTE    Reason for follow up: Endocarditis  Update: Pt seen and examined at bedside. Reports feeling well today and denies cardiac complaints.      Review of symptoms:   Cardiac:  No chest pain. No dyspnea. No palpitations.  Respiratory: no cough. No dyspnea  Gastrointestinal: No diarrhea. No abdominal pain. No bleeding.   Neuro: No focal neuro complaints.    Vitals:  T(C): 36.9 (03-04-25 @ 12:30), Max: 36.9 (03-04-25 @ 12:30)  HR: 79 (03-04-25 @ 12:30) (74 - 86)  BP: 129/77 (03-04-25 @ 12:30) (129/77 - 161/73)  RR: 20 (03-04-25 @ 12:30) (17 - 20)  SpO2: 99% (03-04-25 @ 12:30) (93% - 99%)  Wt(kg): --  I&O's Summary    03 Mar 2025 07:01  -  04 Mar 2025 07:00  --------------------------------------------------------  IN: 420 mL / OUT: 1850 mL / NET: -1430 mL    04 Mar 2025 07:01  -  04 Mar 2025 13:51  --------------------------------------------------------  IN: 480 mL / OUT: 800 mL / NET: -320 mL          PHYSICAL EXAM:  Appearance: Comfortable. No acute distress  HEENT:  Atraumatic. Normocephalic.  Normal oral mucosa  Neurologic: A & O x 3, no gross focal deficits.  Cardiovascular: RRR S1 S2, No murmur, no rubs/gallops. No JVD  Respiratory: Lungs clear to auscultation, unlabored   Gastrointestinal:  Soft, Non-tender, + BS  Lower Extremities: 2+ Peripheral Pulses, No clubbing, cyanosis, or edema  Psychiatry: Patient is calm. No agitation.   Skin: warm and dry.    CURRENT CARDIAC MEDICATIONS:  furosemide    Tablet 40 milliGRAM(s) Oral daily  spironolactone 25 milliGRAM(s) Oral daily      CURRENT OTHER MEDICATIONS:  cefTRIAXone Injectable. 2000 milliGRAM(s) IV Push every 24 hours  acetaminophen     Tablet .. 650 milliGRAM(s) Oral every 8 hours  cyclobenzaprine 5 milliGRAM(s) Oral three times a day PRN Muscle Spasm  melatonin 5 milliGRAM(s) Oral at bedtime  traMADol 50 milliGRAM(s) Oral every 6 hours PRN Severe Pain (7 - 10)  traMADol 25 milliGRAM(s) Oral every 6 hours PRN Moderate Pain (4 - 6)  bisacodyl Suppository 10 milliGRAM(s) Rectal daily PRN Constipation  pantoprazole    Tablet 40 milliGRAM(s) Oral before breakfast  polyethylene glycol 3350 17 Gram(s) Oral daily  senna 2 Tablet(s) Oral at bedtime  atorvastatin 40 milliGRAM(s) Oral at bedtime  levothyroxine 150 MICROGram(s) Oral daily  chlorhexidine 2% Cloths 1 Application(s) Topical <User Schedule>  ferrous    sulfate 325 milliGRAM(s) Oral daily  heparin   Injectable 5000 Unit(s) SubCutaneous every 8 hours  lidocaine   4% Patch 1 Patch Transdermal daily  multivitamin 1 Tablet(s) Oral daily  sodium chloride 0.9% lock flush 3 milliLiter(s) IV Push every 8 hours      LABS:	 	                            11.6   9.71  )-----------( 230      ( 04 Mar 2025 08:46 )             35.3     03-04    134[L]  |  98  |  17.3  ----------------------------<  104[H]  4.1   |  24.0  |  0.89    Ca    8.7      04 Mar 2025 08:46  Mg     2.0     03-04      PT/INR/PTT ( 25 Feb 2025 11:10 )                       :                       :      X            :       33.9                  .        .                   .              .           .       X           .                                         TELEMETRY: SR       DIAGNOSTIC TESTING:  [ ] Echocardiogram: < from: ROVERTO W or WO Ultrasound Enhancing Agent (02.26.25 @ 15:42) >   1. Left ventricular cavity is normal in size. Left ventricular wall thickness is mildly increased. Left ventricular systolic function is normal with an ejection fraction visually estimated at 55 to 60 %.   2.A Norman (TAVR) valve replacement is present in the aortic position The prosthetic valve has reduced leaflet opening and is well seated Degeneration of the aortic valve prosthesis. The prosthetic aortic valve thickened leaflets. Moderate prosthetic aortic stenosis. No intravalvular regurgitation Trace paravalvular regurgitation, originating at at 12 o'clock o'clock. There is a echogenic sesile not very mobile subcentimeter ( 6x2 mm) structure attached to the base of the leaflet corresponding to the native non-coronary cusp likely representing a calcified nodule vs. less likely an old small calcified vegetation.    < from: TTE W or WO Ultrasound Enhancing Agent (02.24.25 @ 21:10) >   1. Left ventricular cavity is normal in size. Left ventricular systolic function is normal with an ejection fraction visually estimated at 55 to 60 %.   2. There is mild (grade 1) left ventricular diastolic dysfunction.   3. Normal right ventricular cavity size and normal right ventricular systolic function.   4. Left atrium is mildly dilated.   5. The right atrium is normal in size.   6. Thickened mitral valve leaflets.   7. There is mild calcification of the mitral valve annulus.   8. Trace mitral regurgitation.   9. A Norman TAVR (TAVR) valve replacement is present in the aortic position The prosthetic valve has normal leaflet excursion and is well seated with normal function The prosthetic aortic valve No vegetations seen.. Trace paravalvular regurgitation.  10. A TEEwould be necessary to rule out vegetation on TAVR.  11. No pericardial effusion seen.  12. Pulmonary artery systolic pressure could not be estimated.  13. Small right pleural effusion noted.    < end of copied text >      [ ]  Catheterization:< from: Cardiac Catheterization (01.04.23 @ 16:10) >   Angiographic Findings     Cardiac Arteries and Lesion Findings  LMCA: Separate ostia of LAD and Cx, no LM present  LAD: Mild diffuse atherosclerosis   Mid LAD: 30% stenosis.  LCx: Mild diffuse atherosclerosis   Prox CX: 20% stenosis.  RCA: Mild diffuse atherosclerosis   Diagnostic Conclusions   Non-obstructive coronary artery disease. Calcified aortic valve prese

## 2025-03-04 NOTE — PROGRESS NOTE ADULT - ASSESSMENT
74M, PMHx of HTN, AF, HLD, GI Bleed, S/P TAVR 2023, chronic back pain and DM recent admission at [facility] 2/10-2/17 with acute anemia, DARLINE, hypotension, s/p 2PRBC, negative EGD/colonoscopy, found to have strept salivrius bacteremia, ROVERTO with vegetation on TAVR valve, MRI spine with possible osteo of lumbar-sacral spine, d/c'd to rehab on rocephin, represented to  2/20 with LLE rash and L knee pain found to have clot in LCFA with distal embolism s/p CFA endarterectomy with clot retrieval and good blood flow to LE, clot cx with GPC, repeat MRI spine without abscess, transfused 2 PRBC on 2/23, transferred to CTICU at [facility]  2/24 for CT surgery evaluation.

## 2025-03-05 DIAGNOSIS — I48.0 PAROXYSMAL ATRIAL FIBRILLATION: ICD-10-CM

## 2025-03-05 LAB
ANION GAP SERPL CALC-SCNC: 11 MMOL/L — SIGNIFICANT CHANGE UP (ref 5–17)
BUN SERPL-MCNC: 21.9 MG/DL — HIGH (ref 8–20)
CALCIUM SERPL-MCNC: 8.7 MG/DL — SIGNIFICANT CHANGE UP (ref 8.4–10.5)
CHLORIDE SERPL-SCNC: 98 MMOL/L — SIGNIFICANT CHANGE UP (ref 96–108)
CO2 SERPL-SCNC: 24 MMOL/L — SIGNIFICANT CHANGE UP (ref 22–29)
CREAT SERPL-MCNC: 0.91 MG/DL — SIGNIFICANT CHANGE UP (ref 0.5–1.3)
EGFR: 88 ML/MIN/1.73M2 — SIGNIFICANT CHANGE UP
EGFR: 88 ML/MIN/1.73M2 — SIGNIFICANT CHANGE UP
GLUCOSE SERPL-MCNC: 94 MG/DL — SIGNIFICANT CHANGE UP (ref 70–99)
HCT VFR BLD CALC: 34.3 % — LOW (ref 39–50)
HGB BLD-MCNC: 11 G/DL — LOW (ref 13–17)
MAGNESIUM SERPL-MCNC: 1.9 MG/DL — SIGNIFICANT CHANGE UP (ref 1.6–2.6)
MCHC RBC-ENTMCNC: 27.6 PG — SIGNIFICANT CHANGE UP (ref 27–34)
MCHC RBC-ENTMCNC: 32.1 G/DL — SIGNIFICANT CHANGE UP (ref 32–36)
MCV RBC AUTO: 86 FL — SIGNIFICANT CHANGE UP (ref 80–100)
NRBC # BLD AUTO: 0 K/UL — SIGNIFICANT CHANGE UP (ref 0–0)
NRBC # FLD: 0 K/UL — SIGNIFICANT CHANGE UP (ref 0–0)
NRBC BLD AUTO-RTO: 0 /100 WBCS — SIGNIFICANT CHANGE UP (ref 0–0)
PLATELET # BLD AUTO: 197 K/UL — SIGNIFICANT CHANGE UP (ref 150–400)
PMV BLD: 9.7 FL — SIGNIFICANT CHANGE UP (ref 7–13)
POTASSIUM SERPL-MCNC: 4 MMOL/L — SIGNIFICANT CHANGE UP (ref 3.5–5.3)
POTASSIUM SERPL-SCNC: 4 MMOL/L — SIGNIFICANT CHANGE UP (ref 3.5–5.3)
RBC # BLD: 3.99 M/UL — LOW (ref 4.2–5.8)
RBC # FLD: 18.2 % — HIGH (ref 10.3–14.5)
SODIUM SERPL-SCNC: 133 MMOL/L — LOW (ref 135–145)
WBC # BLD: 8.66 K/UL — SIGNIFICANT CHANGE UP (ref 3.8–10.5)
WBC # FLD AUTO: 8.66 K/UL — SIGNIFICANT CHANGE UP (ref 3.8–10.5)

## 2025-03-05 PROCEDURE — 71045 X-RAY EXAM CHEST 1 VIEW: CPT | Mod: 26

## 2025-03-05 PROCEDURE — 99232 SBSQ HOSP IP/OBS MODERATE 35: CPT

## 2025-03-05 PROCEDURE — 99152 MOD SED SAME PHYS/QHP 5/>YRS: CPT

## 2025-03-05 PROCEDURE — G0545: CPT

## 2025-03-05 PROCEDURE — 99233 SBSQ HOSP IP/OBS HIGH 50: CPT

## 2025-03-05 PROCEDURE — 93454 CORONARY ARTERY ANGIO S&I: CPT | Mod: 26

## 2025-03-05 RX ADMIN — CEFTRIAXONE 2000 MILLIGRAM(S): 500 INJECTION, POWDER, FOR SOLUTION INTRAMUSCULAR; INTRAVENOUS at 13:40

## 2025-03-05 RX ADMIN — Medication 3 MILLILITER(S): at 14:13

## 2025-03-05 RX ADMIN — TRAMADOL HYDROCHLORIDE 50 MILLIGRAM(S): 50 TABLET, FILM COATED ORAL at 15:40

## 2025-03-05 RX ADMIN — Medication 3 MILLILITER(S): at 21:37

## 2025-03-05 RX ADMIN — Medication 150 MICROGRAM(S): at 06:13

## 2025-03-05 RX ADMIN — Medication 3 MILLILITER(S): at 06:18

## 2025-03-05 RX ADMIN — HEPARIN SODIUM 5000 UNIT(S): 1000 INJECTION INTRAVENOUS; SUBCUTANEOUS at 21:37

## 2025-03-05 RX ADMIN — Medication 2 TABLET(S): at 21:36

## 2025-03-05 RX ADMIN — POLYETHYLENE GLYCOL 3350 17 GRAM(S): 17 POWDER, FOR SOLUTION ORAL at 13:39

## 2025-03-05 RX ADMIN — TRAMADOL HYDROCHLORIDE 50 MILLIGRAM(S): 50 TABLET, FILM COATED ORAL at 15:10

## 2025-03-05 RX ADMIN — Medication 650 MILLIGRAM(S): at 14:08

## 2025-03-05 RX ADMIN — Medication 1 TABLET(S): at 13:38

## 2025-03-05 RX ADMIN — HEPARIN SODIUM 5000 UNIT(S): 1000 INJECTION INTRAVENOUS; SUBCUTANEOUS at 06:16

## 2025-03-05 RX ADMIN — Medication 250 MILLILITER(S): at 09:55

## 2025-03-05 RX ADMIN — LIDOCAINE HYDROCHLORIDE 1 PATCH: 20 JELLY TOPICAL at 13:43

## 2025-03-05 RX ADMIN — Medication 325 MILLIGRAM(S): at 13:38

## 2025-03-05 RX ADMIN — Medication 650 MILLIGRAM(S): at 21:36

## 2025-03-05 RX ADMIN — FUROSEMIDE 40 MILLIGRAM(S): 10 INJECTION INTRAMUSCULAR; INTRAVENOUS at 06:12

## 2025-03-05 RX ADMIN — ATORVASTATIN CALCIUM 40 MILLIGRAM(S): 80 TABLET, FILM COATED ORAL at 21:36

## 2025-03-05 RX ADMIN — TRAMADOL HYDROCHLORIDE 50 MILLIGRAM(S): 50 TABLET, FILM COATED ORAL at 00:43

## 2025-03-05 RX ADMIN — TRAMADOL HYDROCHLORIDE 50 MILLIGRAM(S): 50 TABLET, FILM COATED ORAL at 01:43

## 2025-03-05 RX ADMIN — Medication 650 MILLIGRAM(S): at 06:13

## 2025-03-05 RX ADMIN — Medication 1 APPLICATION(S): at 06:16

## 2025-03-05 RX ADMIN — Medication 40 MILLIGRAM(S): at 06:13

## 2025-03-05 RX ADMIN — Medication 650 MILLIGRAM(S): at 13:38

## 2025-03-05 RX ADMIN — Medication 5 MILLIGRAM(S): at 21:37

## 2025-03-05 RX ADMIN — Medication 25 MILLIGRAM(S): at 06:13

## 2025-03-05 NOTE — CONSULT NOTE ADULT - CONSULT REQUESTED BY NAME
CTICU
Jerman Rodriges
DR CARRASQUILLO
Dr Rodriges
CT surgery team
CTICU
Dr. Edin Ashley
Dr. Rodriges

## 2025-03-05 NOTE — DISCHARGE NOTE PROVIDER - HOSPITAL COURSE
74M, PMHx of HTN, AF, HLD, GI Bleed, S/P TAVR 2023, chronic back pain and DM recent admission at [facility] 2/10-2/17 with acute anemia, DARLINE, hypotension, s/p 2PRBC, negative EGD/colonoscopy, found to have strept salivrius bacteremia, ROVERTO with vegetation on TAVR valve, MRI spine with possible osteo of lumbar-sacral spine, d/c'd to rehab on rocephin, represented to  2/20 with LLE rash and L knee pain found to have clot in LCFA with distal embolism s/p CFA endarterectomy with clot retrieval and good blood flow to LE, clot cx with GPC, repeat MRI spine without abscess, transfused 2 PRBC on 2/23, transferred to CTICU at [facility]  2/24 for CT surgery evaluation. 2/26 ROVERTO > echogenic sesile not very mobile subcentimeter base of left AV leaflet corresponding to the native coronary cusp .  ? calcified nodule vs vegetation.  CT lumbar spine with IV contrast> discitis/osteomyelitis L3-L4 and L5-S1.  MRI Lumbar spine> degenerative disc disease, T8-9 possible discitis/osteomyelitis.  3/5LHC non obstuctive CAD.  Ortho following.  No acute intervention at this time.  LSO brace on while oob.  Plan for discharge to acute rehab today.

## 2025-03-05 NOTE — PROGRESS NOTE ADULT - PROBLEM SELECTOR PLAN 4
Resume anticoagulation for known paroxysmal atrial fibrillation unless bleeding risk prohibitive  Rate controlled without any av rosamaria agents

## 2025-03-05 NOTE — PROGRESS NOTE ADULT - NUTRITIONAL ASSESSMENT
73 y/o male with a PMHx of HTN, A-FIB, HLD, GI Bleed, S/P TAVR 2023, chronic back pain and DM initially presented to [facility] ED with a two-day history of a rash on his left leg and pain in left knee radiating towards his foot. Pt recently discharged to [facility] rehab after treatment for infected TAVR and Osteomyelitis of spine, strep salivarius bacteremia. Pt received transfusion and w/u for GI bleed with unremarkable colonoscopy and upper endoscopy. Underwent ROVERTO at that time which showed vegetation of TAVR. Plan at that time was for 6 weeks of IV antibiotics via PICC line and out patient pill camera study.    Since admission patient was seen by vascular surgery,  CTA showed clot to common femoral and distal embolism. S/P common femoral endarterectomy with clot retrieval and good blood flow to lower extremity post surgery. Repeat blood cultures were negative but the excised clot was found to have gram positive cocci. Pt with weakness to legs. Sent for repeat MR with contrast. Results without abscess at this time. Other incidental problem is constipation and persistent back pain requiring tramadol for pain relief.  Pt received transfusion of 2 unit of PRBC's 2/23.  Patient transfered to [facility] for evaluation of TAVR and possible surgical replacement.    AS ABOVE WAS READMITTED TO [facility] BECAUSE  OF GI BLEED  PT WITH RASH ON LEG AND WAS CHANGED FROM ROCEPHIN TO DAPTOMYCIN  PT UNDERWENT FEMORAL ENDARECTOMY RETRIEVAL OF CLOT   PT RASH WAS ULTIMATELY RELATED TO  ISCHEMIC ISSUES AND NOT ALLERGY  SPOKE TO ID AT [facility] TO CLARIFY    CHANGED BACK TO ROCEPHIN TO CONTINUE    FOR  EXTENDED COURSE  CARDIAC SURGERY EVENTUAL  VALVE SURGERY   WILL PLAN IV ROCEPHIN TO COMPLETE AT LEAST 8 WEEKS FOR DISCITIS AND  ENDOCARDITIS  WILL PLAN ROCEPHIN THROUGH AT LEAST 4/7/25  WEEKLY CBC CMP SED RATE CRP   PT PREFERS HOME OVER [facility]    E RX GIVEN  TO   PT ALREADY HAS  PICC LINE

## 2025-03-05 NOTE — DISCHARGE NOTE PROVIDER - NSDCFUSCHEDAPPT_GEN_ALL_CORE_FT
[facility] [address] [facility]  Scheduled Appointment: 03/17/2025    Benito Jones  [place] Physician [facility] [address]  Scheduled Appointment: 04/07/2025

## 2025-03-05 NOTE — CONSULT NOTE ADULT - REASON FOR ADMISSION
Bioprosthetic AoV endocarditis

## 2025-03-05 NOTE — PROGRESS NOTE ADULT - SUBJECTIVE AND OBJECTIVE BOX
Brief summary:  74yMale seen and assessed at bedside.  Pt laying comfortably in hospital bed in NAD on room air.  States no current physical complaints at this time.  Denies f/c, n/v, chest pain, sob, abdominal pain, d/c, urinary symptoms.  ROS negative x 10.      Overnight events: No acute events overnight.  Hospital course progressing as expected.      PAST MEDICAL & SURGICAL HISTORY:  Hypertension    Diabetes mellitus    Obesity    Hypothyroidism    Prostate CA        Medications:  acetaminophen     Tablet .. 650 milliGRAM(s) Oral every 8 hours  atorvastatin 40 milliGRAM(s) Oral at bedtime  bisacodyl Suppository 10 milliGRAM(s) Rectal daily PRN  cefTRIAXone Injectable. 2000 milliGRAM(s) IV Push every 24 hours  chlorhexidine 2% Cloths 1 Application(s) Topical <User Schedule>  cyclobenzaprine 5 milliGRAM(s) Oral three times a day PRN  ferrous    sulfate 325 milliGRAM(s) Oral daily  furosemide    Tablet 40 milliGRAM(s) Oral daily  heparin   Injectable 5000 Unit(s) SubCutaneous every 8 hours  levothyroxine 150 MICROGram(s) Oral daily  lidocaine   4% Patch 1 Patch Transdermal daily  melatonin 5 milliGRAM(s) Oral at bedtime  multivitamin 1 Tablet(s) Oral daily  pantoprazole    Tablet 40 milliGRAM(s) Oral before breakfast  polyethylene glycol 3350 17 Gram(s) Oral daily  senna 2 Tablet(s) Oral at bedtime  sodium chloride 0.9% lock flush 3 milliLiter(s) IV Push every 8 hours  spironolactone 25 milliGRAM(s) Oral daily  traMADol 50 milliGRAM(s) Oral every 6 hours PRN  traMADol 25 milliGRAM(s) Oral every 6 hours PRN      MEDICATIONS  (PRN):  bisacodyl Suppository 10 milliGRAM(s) Rectal daily PRN Constipation  cyclobenzaprine 5 milliGRAM(s) Oral three times a day PRN Muscle Spasm  traMADol 50 milliGRAM(s) Oral every 6 hours PRN Severe Pain (7 - 10)  traMADol 25 milliGRAM(s) Oral every 6 hours PRN Moderate Pain (4 - 6)        Daily Review:               11.6   9.71  )-----------( 230      ( 04 Mar 2025 08:46 )             35.3   03-04    134[L]  |  98  |  17.3  ----------------------------<  104[H]  4.1   |  24.0  |  0.89    Ca    8.7      04 Mar 2025 08:46  Mg     2.0     03-04      T(C): 36.6 (03-05-25 @ 00:50), Max: 36.9 (03-04-25 @ 12:30)  HR: 83 (03-05-25 @ 00:50) (75 - 86)  BP: 122/62 (03-05-25 @ 00:50) (122/62 - 150/70)  RR: 18 (03-05-25 @ 00:50) (18 - 20)  SpO2: 94% (03-05-25 @ 00:50) (93% - 100%)  Wt(kg): --      I&O's Summary    03 Mar 2025 07:01  -  04 Mar 2025 07:00  --------------------------------------------------------  IN: 420 mL / OUT: 1850 mL / NET: -1430 mL    04 Mar 2025 07:01  -  05 Mar 2025 01:22  --------------------------------------------------------  IN: 720 mL / OUT: 1400 mL / NET: -680 mL        Physical Exam  Neuro: A+O x 3, non-focal, speech clear and intact  Pulm: coarse breath sounds bilaterally, no wheezing or rales  CV: RRR, +S1S2, + murmur  Abd: soft, NT, ND, normoactive bowel sounds  Ext: +DP Pulses b/l, +PT pulses, trace BL LE edema        CXR 3/4/25    < from: Xray Chest 1 View- PORTABLE-Routine (Xray Chest 1 View- PORTABLE-Routine in AM.) (03.04.25 @ 04:58) >  COMPARISON STUDY: 3/3/2025    Frontal expiratory view of the chest shows the heart to be normal in   size. Aortic valve stent is again noted. Right PICC reaches the lower   superior vena cava.    The lungs are clear and there is no evidence of pneumothorax nor pleural   effusion.    IMPRESSION:  No active pulmonary disease.    < end of copied text >

## 2025-03-05 NOTE — CONSULT NOTE ADULT - PROVIDER SPECIALTY LIST ADULT
Neurosurgery
Orthopedics
Intervent Cardiology
Vascular Surgery
Infectious Disease
Physiatry
Cardiology
Neurology

## 2025-03-05 NOTE — PROGRESS NOTE ADULT - PROBLEM SELECTOR PLAN 1
of TAVR valve  prior blood cx + strept salivarius  repeat cultures neg  seen by ID, cont rocephin through at least 4/7/25 2/26 ROVERTO: echogenic sesile not very mobile subcentimeter ( 6x2 mm) structure attached to the base of the leaflet corresponding to the native non-coronary cusp likely representing a calcified nodule vs. less likely an old small calcified vegetation, mod AS  seen by neurosx for LS osteo rec conservative management  fitted for brace  now seen by ortho spine, MR cervical/throacic spine completed, continue to monitor symptoms  needs aggressive PT, ambulation limited by pain  cont lido, tramadol, toradol x5 doses (now off), consider PRN additional doses  ideally pt needs to improve with PT and be more ambulatory prior to any cardiac surgery  PMR/OT eval - recommending acute inpatient rehab; will f/u with CM/SW    Plan to be discussed with CT surgery attending in AM rounds

## 2025-03-05 NOTE — DISCHARGE NOTE PROVIDER - NSDCCPTREATMENT_GEN_ALL_CORE_FT
PRINCIPAL PROCEDURE  Procedure: Left heart cardiac cath  Findings and Treatment: -You had a cardiac catheterization with Dr. Joon Dyson on 3/5/25. Dr. Dyson accessed your right radial artery and right femoral artery during the procedure. That is the artery in your right wrist and right groin.   -Please continue to monitor your right wrist and right groin when you go home. If you develop any bleeding, lay down where you are and apply direct firm pressure until the bleeding stops. If the bleeding is excessive, please call 911.   -If heavy bleeding or large lumps form, hold pressure at the spot and come to the Emergency Room.  -No submerging the arm or leg in water for 48 hours. This includes hot tubs, swimming pools and jacuzzis.  You may start showering today. Prior to showering, remove dressing from right wrist and right groin. Shower with warm water and soap. Pat dry with towel. You may place a bandaid over the site. change the bandaid every day.   -Restricted use with no heavy lifting of affected arm for 5 days. No heavy lifting greater than 5 lbs.   -You may walk indoors/ outdoors as tolerated. No strenuous exercise, gym, sports or heavy lifting x5 days.  -Please return to nearest ED if you develop any fever, chills, drainage from the incision site, or any change of temperature, color, sensation of the affected extremity.  -Call your doctor for any bleeding, swelling, loss of sensation in the hand or fingers, or fingers turning blue.  -Please return to nearest ED if you develop any chest pain, chest pressure, shortness of breath, jaw pain, pain radiating down the arm, palpitations, dizziness, palpitations, abdominal complaints including abdominal pain, nausea and vomiting.   -Please follow up with your cardiologist in 1-2 weeks

## 2025-03-05 NOTE — DISCHARGE NOTE PROVIDER - NSDCHHNEEDSERVICE_GEN_ALL_CORE
Medication teaching and assessment/Observation and assessment/Teaching and training O-L Flap Text: The defect edges were debeveled with a #15 scalpel blade.  Given the location of the defect, shape of the defect and the proximity to free margins an O-L flap was deemed most appropriate.  Using a sterile surgical marker, an appropriate advancement flap was drawn incorporating the defect and placing the expected incisions within the relaxed skin tension lines where possible.    The area thus outlined was incised deep to adipose tissue with a #15 scalpel blade.  The skin margins were undermined to an appropriate distance in all directions utilizing iris scissors.

## 2025-03-05 NOTE — PROGRESS NOTE ADULT - SUBJECTIVE AND OBJECTIVE BOX
Pt Name: CONNOR SANDRA  MRN: [id number]    Procedure:  Surgeon:     Patient is a 74y Male being followed L3-L4, L5 -S1 OM/discitis and left knee pain. Patient seen and examined at bedside. Patient is doing well. Pain is controlled with the prescribed medication. Denies worsening lower back pain or left knee pain. Denies any acute motor or sensory changes.  Denies CP, SOB, N/V, numbness/tingling. No other orthopedic complaints. Reports improvement of left knee pain since yesterday.     Vital Signs Last 24 Hrs  T(C): 36.4 (04 Mar 2025 08:01), Max: 36.8 (04 Mar 2025 00:58)  T(F): 97.6 (04 Mar 2025 08:01), Max: 98.3 (04 Mar 2025 00:58)  HR: 83 (04 Mar 2025 08:01) (74 - 86)  BP: 135/72 (04 Mar 2025 08:01) (135/72 - 161/73)  BP(mean): --  RR: 19 (04 Mar 2025 08:01) (17 - 19)  SpO2: 93% (04 Mar 2025 08:01) (93% - 97%)    Parameters below as of 04 Mar 2025 08:01  Patient On (Oxygen Delivery Method): nasal cannula      Daily     Daily Weight in k.4 (03 Mar 2025 18:33)      PHYSICAL EXAM:    Appearance: Alert, responsive, in no acute distress.    Musculoskeletal:        Lower extremity                                 HF         KE          TA       EHL         GS                                                 R         5/5        5/5        5/5       5/5         5/5                                               L         5/5        5/5       5/5       5/5          5/5         Left Lower Extremity: minimal swelling.  NTTP. KE/KF intact. No obvious deformity. Skin is clean, dry, and intact. No abrasions, ecchymosis, or erythema. No bleeding. Sensation is grossly intact to light touch. + dorsi/plantarflexion/EHL/FHL. PT/DP pulses 2+. ca;f soft, NT, BL.  Cap refill < 2 seconds. No cyanosis. No signs of venous insufficiency or stasis. Can bend knee to 90 degrees without pain.     IMAGING:       from: MR Thoracic Spine No Cont (25 @ 15:17) >  ACC: [id number] EXAM:  MR SPINE THORACIC   ORDERED BY: KAREN LI     ACC: [id number] EXAM:  MR SPINE CERVICAL   ORDERED BY: KAREN LI     PROCEDURE DATE:  2025      INTERPRETATION:  MR cervical and thoracic without gadolinium    CLINICAL INFORMATION: Cervical spondylosis.    TECHNIQUE:   Sagittal T1-weighted images, sagittal STIR images, sagittal   T2-weighted images and axial T1 and T2-weighted gradient-echo images of   the cervical spine were obtained.    FINDINGS:No prior similar studies are available for review.    Cervical and thoracic vertebral alignment is significant for   straightening on a degenerative basis with focal kyphosis at C4-5.    Cervical and thoracic vertebral body heights are maintained.  Marrow   signal intensity within cervical vertebral bodies and posterior elements   is significant for edema at the T8-9 endplates with increased signal   within the intervening disc suspicious for discitis and early   osteomyelitis. 1.6 cm hemangioma within the T11 vertebral body. No   osseous expansion, epidural disease or paraspinal abnormality is found.      Minimal prevertebral fluid seen at the C3-C5 level.    Cervical and thoracic intervertebral discs demonstrates mild to moderate   degenerative disc disease and spondylosis. There is narrowing of the   BILATERAL C3-4 through C6/7 neural foramina due to uncovertebral spurring   and facet osteophytic hypertrophy. Posterior osteophytic ridge/disc   complexes at C3-4 through C6-7 flatten theventral thecal sac. Tiny LEFT   T1-T2 disc herniation, mild T6-7 bulge, tiny LEFT paracentral T7-8 and   T8-9 disc herniations, and mild bulge at T11-T12 indents the ventral   thecal sac.    The cervical and thoracic cord maintains intact morphologyNo cord   signal intensity abnormality or focal cord lesion is appreciated.  The   cervical medullary junction remains intact.      IMPRESSION: Mild edema at the T8-9 endplates with increased signal within   the intervening disc suspicious for discitis and early osteomyelitis.     Recommend MRI with gadolinium for complete evaluation. Mild to moderate   degenerative disc disease and spondylosis. There is narrowing of the   BILATERAL C3-4 through C6/7 neural foramina due to uncovertebral spurring   and facet osteophytic hypertrophy. Posterior osteophytic ridge/disc   complexes at C3-4 through C6-7 flatten the ventral thecal sac. Tiny LEFT   T1-T2 disc herniation, mild T6-7 bulge, tiny LEFT paracentral T7-8 and   T8-9 disc herniations, and mildbulge at T11-T12 indents the ventral   thecal sac.    --- End of Report ---    SULAIMAN HYLTON MD; Attending Radiologist  This document has been electronically signed. Mar  2 2025  8:09AM      A/P:  Pt is a  74y Male being followed for L3-L4, L5-S1 OM/discitis and left knee pain and swelling.     PLAN:   * no emergent surgical intervention required at this time   * left knee swelling improved.   * Pain control  * DVTp  * Continue IV abx   * Weight Bearing Status: WBAT  * LSO out of bed  * Continue care as per primary team

## 2025-03-05 NOTE — DISCHARGE NOTE PROVIDER - NSDCFUADDAPPT_GEN_ALL_CORE_FT
Pt should follow up with Dr. meza once he is discharged from rehab.  Upon rehab discharge patient should call for an appointment.  [phone]    Pt should follow up with cardiologist and PCP within 7-10 days of dc from rehab.  He should call for appointments.     Pt should follow up with infectious disease and ortho within 7-10 days of discharge from rehab.  He should call for appointments.

## 2025-03-05 NOTE — PROGRESS NOTE ADULT - ASSESSMENT
73 y/o male with a PMHx of HTN, A-FIB, HLD, GI Bleed, S/P TAVR 2023, chronic back pain and DM initially presented to [facility] ED with a two-day history of a rash on his left leg and pain in left knee radiating towards his foot. Pt recently discharged to [facility] rehab after treatment for infected TAVR and Osteomyelitis of spine, strep salivarius bacteremia. Pt received transfusion and w/u for GI bleed with unremarkable colonoscopy and upper endoscopy. Underwent ROVERTO at that time which showed vegetation of TAVR. Plan at that time was for 6 weeks of IV antibiotics via PICC line and out patient pill camera study.    Since admission patient was seen by vascular surgery,  CTA showed clot to common femoral and distal embolism. S/P common femoral endarterectomy with clot retrieval and good blood flow to lower extremity post surgery. Repeat blood cultures were negative but the excised clot was found to have gram positive cocci. Pt with weakness to legs. Sent for repeat MR with contrast. Results without abscess at this time. Other incidental problem is constipation and persistent back pain requiring tramadol for pain relief.  Pt received transfusion of 2 unit of PRBC's 2/23.  Patient transfered to [facility] for evaluation of TAVR and possible surgical replacement.    AS ABOVE WAS READMITTED TO [facility] BECAUSE  OF GI BLEED  PT WITH RASH ON LEG AND WAS CHANGED FROM ROCEPHIN TO DAPTOMYCIN  PT UNDERWENT FEMORAL ENDARECTOMY RETRIEVAL OF CLOT   PT RASH WAS ULTIMATELY RELATED TO  ISCHEMIC ISSUES AND NOT ALLERGY  SPOKE TO ID AT [place] TO CLARIFY    CHANGED BACK TO ROCEPHIN TO CONTINUE    FOR  EXTENDED COURSE  CARDIAC SURGERY EVENTUAL  VALVE SURGERY   WILL PLAN IV ROCEPHIN TO COMPLETE AT LEAST 8 WEEKS FOR DISCITIS AND  ENDOCARDITIS  WILL PLAN ROCEPHIN THROUGH AT LEAST 4/7/25  WEEKLY CBC CMP SED RATE CRP   PT PREFERS HOME OVER [facility]  WILL GIVE RX TO JOHN MANAGER  PT ALREADY HAS  PICC LINE

## 2025-03-05 NOTE — DISCHARGE NOTE PROVIDER - NSDCMRMEDTOKEN_GEN_ALL_CORE_FT
acetaminophen 325 mg oral tablet: 2 tab(s) orally every 6 hours As needed Temp greater or equal to 38C (100.4F), Mild Pain (1 - 3)  atorvastatin 40 mg oral tablet: 1 tab(s) orally once a day (at bedtime)  B-Complex 50 oral tablet: 1 tab(s) orally once a day  bisacodyl 10 mg rectal suppository: 1 suppository(ies) rectally prn as needed for  constipation if no relief from MOM  cefTRIAXone 2 g injection: 2 gram(s) intravenously once a day through 4/7 /25 weekly cbc cmp sed rate crp  cholecalciferol 25 mcg (1000 intl units) oral tablet: 1 tab(s) orally once a day  Chondroitin-Glucosamine: 1 tab(s) orally once a day  Co-Q10 100 mg oral capsule: 1 cap(s) orally once a day  cyanocobalamin 1000 mcg oral tablet: 1 tab(s) orally once a day  dapagliflozin 10 mg oral tablet: 1 tab(s) orally once a day  ferrous sulfate 325 mg (65 mg elemental iron) oral delayed release tablet: 1 tab(s) orally once a day  finasteride 1 mg oral tablet: 1 tab(s) orally once a day  Januvia 50 mg oral tablet: 1 tab(s) orally once a day  levothyroxine 150 mcg (0.15 mg) oral tablet: 1 tab(s) orally once a day  Melatonin 5 mg oral tablet: 2 tab(s) orally once a day (at bedtime)  MetFORMIN (Eqv-Glumetza) 500 mg oral tablet, extended release: 4 tab(s) orally once a day in the morning  Multiple Vitamins oral tablet: 1 tab(s) orally once a day  pantoprazole 40 mg oral delayed release tablet: 1 tab(s) orally once a day (before a meal)  polyethylene glycol 3350 oral powder for reconstitution: 17 gram(s) orally once a day  PreserVision AREDS 2 oral capsule: 1 cap(s) orally 2 times a day  senna leaf extract oral tablet: 2 tab(s) orally once a day (at bedtime)  traMADol 50 mg oral tablet: 1 tab(s) orally every 6 hours As needed Severe Pain (7 - 10)  zolpidem 10 mg oral tablet: 1 tab(s) orally once a day (at bedtime) as needed for  insomnia   acetaminophen 325 mg oral tablet: 2 tab(s) orally every 8 hours  atorvastatin 40 mg oral tablet: 1 tab(s) orally once a day (at bedtime)  bisacodyl 10 mg rectal suppository: 1 suppository(ies) rectal once a day As needed Constipation  cefTRIAXone 2 g injection: 2 gram(s) intravenously once a day through 4/7 /25 weekly cbc cmp sed rate crp  cyclobenzaprine 5 mg oral tablet: 1 tab(s) orally 3 times a day As needed Muscle Spasm  Eliquis 5 mg oral tablet: 1 tab(s) orally 2 times a day  ferrous sulfate 325 mg (65 mg elemental iron) oral tablet: 1 tab(s) orally once a day  finasteride 1 mg oral tablet: 1 tab(s) orally once a day  furosemide 40 mg oral tablet: 1 tab(s) orally once a day  levothyroxine 150 mcg (0.15 mg) oral tablet: 1 tab(s) orally once a day  melatonin 5 mg oral tablet: 1 tab(s) orally once a day (at bedtime)  Multiple Vitamins oral tablet: 1 tab(s) orally once a day  pantoprazole 40 mg oral delayed release tablet: 1 tab(s) orally once a day (before a meal)  polyethylene glycol 3350 oral powder for reconstitution: 17 gram(s) orally once a day  senna leaf extract oral tablet: 2 tab(s) orally once a day (at bedtime)  spironolactone 25 mg oral tablet: 1 tab(s) orally once a day  traMADol 50 mg oral tablet: 1 tab(s) orally every 6 hours As needed Severe Pain (7 - 10)  traMADol 50 mg oral tablet: 0.5 tab(s) orally every 6 hours As needed Moderate Pain (4 - 6)

## 2025-03-05 NOTE — PROGRESS NOTE ADULT - PROBLEM SELECTOR PLAN 1
for Acute rehab then will return for Redo AVR  Left heart cath today  Mild cad on last cath 2023  Discussed with patient and he is agreeable

## 2025-03-05 NOTE — PROGRESS NOTE ADULT - SUBJECTIVE AND OBJECTIVE BOX
[facility]                                                         CARDIOLOGY AT [facility]                                                                  [address], [address]- [facility]                                                         Telephone: [phone]. Fax:[phone]                                                                             PROGRESS NOTE    Reason for follow up:  Endocarditis  Update: CT surgery wants [facility] today  plan to go to acute rehab and return for AVR re do  Patient has been feeling well with no complaints  Back pain is alittle better      Review of symptoms:   Cardiac:  No chest pain. No dyspnea. No palpitations.  Respiratory: no cough. No dyspnea  Gastrointestinal: No diarrhea. No abdominal pain. No bleeding.   Neuro: No focal neuro complaints.      Vitals:  T(C): 36.4 (03-05-25 @ 05:47), Max: 36.9 (03-04-25 @ 12:30)  HR: 70 (03-05-25 @ 05:47) (70 - 83)  BP: 146/70 (03-05-25 @ 05:47) (122/62 - 146/70)  RR: 18 (03-05-25 @ 05:47) (18 - 20)  SpO2: 95% (03-05-25 @ 05:47) (94% - 100%)  Wt(kg): --  I&O's Summary    04 Mar 2025 07:01  -  05 Mar 2025 07:00  --------------------------------------------------------  IN: 720 mL / OUT: 1540 mL / NET: -820 mL    PHYSICAL EXAM:  Appearance: Comfortable. No acute distress  HEENT:  Atraumatic. Normocephalic.  Normal oral mucosa  Neurologic: A & O x 3, no gross focal deficits.  Cardiovascular: RRR S1 S2, + systolic murmur  Respiratory: Lungs clear to auscultation, unlabored   Gastrointestinal:  Soft, Non-tender, + BS  Lower Extremities: No edema  Psychiatry: Patient is calm. No agitation.   Skin: warm and dry.      CURRENT MEDICATIONS:  MEDICATIONS  (STANDING):  acetaminophen     Tablet .. 650 milliGRAM(s) Oral every 8 hours  atorvastatin 40 milliGRAM(s) Oral at bedtime  cefTRIAXone Injectable. 2000 milliGRAM(s) IV Push every 24 hours  chlorhexidine 2% Cloths 1 Application(s) Topical <User Schedule>  ferrous    sulfate 325 milliGRAM(s) Oral daily  furosemide    Tablet 40 milliGRAM(s) Oral daily  heparin   Injectable 5000 Unit(s) SubCutaneous every 8 hours  levothyroxine 150 MICROGram(s) Oral daily  lidocaine   4% Patch 1 Patch Transdermal daily  melatonin 5 milliGRAM(s) Oral at bedtime  multivitamin 1 Tablet(s) Oral daily  pantoprazole    Tablet 40 milliGRAM(s) Oral before breakfast  polyethylene glycol 3350 17 Gram(s) Oral daily  senna 2 Tablet(s) Oral at bedtime  sodium chloride 0.9% lock flush 3 milliLiter(s) IV Push every 8 hours  spironolactone 25 milliGRAM(s) Oral daily      LABS:	 	                       11.0   8.66  )-----------( 197      ( 05 Mar 2025 06:10 )             34.3     03-05    133[L]  |  98  |  21.9[H]  ----------------------------<  94  4.0   |  24.0  |  0.91    Ca    8.7      05 Mar 2025 06:10  Mg     1.9     03-05      TELEMETRY: NSR  DIAGNOSTIC TESTING:  [ ] Echocardiogram: < from: ROVERTO W or WO Ultrasound Enhancing Agent (02.26.25 @ 15:42) >   1. Left ventricular cavity is normal in size. Left ventricular wall thickness is mildly increased. Left ventricular systolic function is normal with an ejection fraction visually estimated at 55 to 60 %.   2.A Norman (TAVR) valve replacement is present in the aortic position The prosthetic valve has reduced leaflet opening and is well seated Degeneration of the aortic valve prosthesis. The prosthetic aortic valve thickened leaflets. Moderate prosthetic aortic stenosis. No intravalvular regurgitation Trace paravalvular regurgitation, originating at at 12 o'clock o'clock. There is a echogenic sesile not very mobile subcentimeter ( 6x2 mm) structure attached to the base of the leaflet corresponding to the native non-coronary cusp likely representing a calcified nodule vs. less likely an old small calcified vegetation.    < end of copied text >  < from: TTE W or WO Ultrasound Enhancing Agent (02.24.25 @ 21:10) >   1. Left ventricular cavity is normal in size. Left ventricular systolic function is normal with an ejection fraction visually estimated at 55 to 60 %.   2. There is mild (grade 1) left ventricular diastolic dysfunction.   3. Normal right ventricular cavity size and normal right ventricular systolic function.   4. Left atrium is mildly dilated.   5. The right atrium is normal in size.   6. Thickened mitral valve leaflets.   7. There is mild calcification of the mitral valve annulus.   8. Trace mitral regurgitation.   9. A Norman TAVR (TAVR) valve replacement is present in the aortic position The prosthetic valve has normal leaflet excursion and is well seated with normal function The prosthetic aortic valve No vegetations seen.. Trace paravalvular regurgitation.  10. A TEEwould be necessary to rule out vegetation on TAVR.  11. No pericardial effusion seen.  12. Pulmonary artery systolic pressure could not be estimated.  13. Small right pleural effusion noted.    < end of copied text >    [ ]  Catheterization:    < from: Cardiac Catheterization (01.04.23 @ 16:10) >   Angiographic Findings   Cardiac Arteries and Lesion Findings  LMCA: Separate ostia of LAD and Cx, no LM present  LAD: Mild diffuse atherosclerosis   Mid LAD: 30% stenosis.  LCx: Mild diffuse atherosclerosis   Prox CX: 20% stenosis.  rCA: Mild diffuse atherosclerosis   Impression   Diagnostic Conclusions   Non-obstructive coronary artery disease. Calcified aortic valve present.

## 2025-03-05 NOTE — CONSULT NOTE ADULT - CONSULT REQUESTED DATE/TIME
24-Feb-2025 23:43
25-Feb-2025
26-Feb-2025 14:13
04-Mar-2025 12:54
05-Mar-2025 09:51
25-Feb-2025 10:06
27-Feb-2025 11:59
25-Feb-2025 01:34

## 2025-03-05 NOTE — PROGRESS NOTE ADULT - NS ATTEND AMEND GEN_ALL_CORE FT
I agree with the assessment and plan as above. Patient was seen and examined by me. I edited the entire note.     74M HTN, paroxysmal AF, HLD, prior GI Bleed, S/P TAVR 2023, chronic back pain and DM.     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     Cardiac studies as described above.     Impressions  -Prosthetic aortic valve vegetation, moderate stenosis, no significant regurgitation with a normal left ventricular ejection fraction. [place] interval has been prolonged since 2023.   -Chronic first degree AV delay.   -HTN.  -paroxysmal AF.  -Severe AS S/P TAVR 2023.  -HLD.  -Prior GI Bleed.  -chronic back pain.  -DM.    Recommendations  -Given he appears hemodynamically stable without heart failure on examination or complete heart block on telemetry, there appears to be no acute indication for repeat aortic valve surgery based on his hemodynamics or rhythm. I will continue to have to defer to infectious disease colleague in regards to whether or not his organism is highly resistant.   -There is no further cardiac workup indicated from our standpoint at least at this point in time.   -Resume anticoagulation for known paroxysmal atrial fibrillation unless bleeding risk prohibitive or if there are upcoming procedures.  -Continue abx under the direction of infectious disease.  -Continue statin.  -Reached out to ELIAN Cr, to inquire if we can be of any further assistance.
I agree with the assessment and plan as above. Patient was seen and examined by me. I edited the entire note.     74M HTN, paroxysmal AF, HLD, prior GI Bleed, S/P TAVR 2023, chronic back pain and DM.     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     Cardiac studies as described above.     Impressions  -Prosthetic aortic valve vegetation, moderate stenosis, no significant regurgitation with a normal left ventricular ejection fraction. MS interval has been prolonged since 2023.   -Chronic first degree AV delay.   -HTN.  -paroxysmal AF.  -Severe AS S/P TAVR 2023.  -HLD.  -Prior GI Bleed.  -chronic back pain.  -DM.    Recommendations  -Given he appears hemodynamically stable without heart failure on examination or complete heart block on telemetry, there appears to be no acute indication for repeat aortic valve surgery based on his hemodynamics or rhythm. I will have to defer to infectious disease colleague in regards to whether or not his organism is highly resistant.   -I extensively reviewed his echocardiograms from post TAVR to the ROVERTO performed 2/26/2025. I did see a small immobile echodensity attached to the base of leaflet corresponding to native non-coronary cusp on that ROVERTO. This likely represents a small vegetation. I personally did not see any obvious oscillating echodensity with chaotic motion to suggest vegetation on prior ROVERTO from 2/13/2025.  -There is no further cardiac workup indicated from our standpoint at least at this point in time.   -Resume anticoagulation for known paroxysmal atrial fibrillation unless bleeding risk prohibitive or if there are upcoming procedures.  -Continue abx under the direction of infectious disease.  -We will follow up on 3/2/25 if patient remains hospitalized.
I agree with the assessment and plan as above. Patient was seen and examined by me. I edited the entire note.     74M HTN, paroxysmal AF, HLD, prior GI Bleed, S/P TAVR 2023, chronic back pain and DM.     -He was recently admitted to [facility] 2/10/2025 to 2/17/2025 with acute anemia, DARLINE, hypotension, s/p PRBC, negative EGD/colonoscopy, found to have streptococcus salivarius bacteremia, ??ROVERTO with vegetation on TAVR valve, MRI spine with possible osteo of lumbar-sacral spine, d/c'd to rehab on rocephin, represented to  2/20 with LLE rash and L knee pain found to have clot in LCFA with distal embolism s/p CFA endarterectomy with clot retrieval and good blood flow to LE, clot cx with GPC, repeat MRI spine without abscess, transfused 2 PRBC on 2/23, transferred to CTICU at [facility]  2/24 for CT surgery evaluation.     Cardiac studies as described above.     Impressions  -Prosthetic aortic valve vegetation, moderate stenosis, no significant regurgitation with a normal left ventricular ejection fraction. [place] interval has been prolonged since 2023.   -Chronic first degree AV delay.   -HTN.  -paroxysmal AF.  -Severe AS S/P TAVR 2023.  -HLD.  -Prior GI Bleed.  -chronic back pain.  -DM.    Recommendations  -Given he appears hemodynamically stable without heart failure on examination or complete heart block on telemetry, there appears to be no acute indication for repeat aortic valve surgery based on his hemodynamics or rhythm. I will continue to have to defer to infectious disease colleague in regards to whether or not his organism is highly resistant.   -There is no further cardiac workup indicated from our standpoint at least at this point in time.   -Resume anticoagulation for known paroxysmal atrial fibrillation unless bleeding risk prohibitive or if there are upcoming procedures.  -Continue abx under the direction of infectious disease.  -Continue statin.
I agree with the assessment and plan as above. Patient was seen and examined by me. I edited the entire note.     74M HTN, paroxysmal AF, HLD, prior GI Bleed, S/P TAVR 2023, chronic back pain and DM.     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     Cardiac studies as described above.     Impressions  -Prosthetic aortic valve vegetation, moderate stenosis, no significant regurgitation with a normal left ventricular ejection fraction. ND interval has been prolonged since 2023.   -Chronic first degree AV delay.   -HTN.  -paroxysmal AF.  -Severe AS S/P TAVR 2023.  -HLD.  -Prior GI Bleed.  -chronic back pain.  -DM.    Recommendations  -Given he appears hemodynamically stable without heart failure on examination or complete heart block on telemetry, there appears to be no acute indication for repeat aortic valve surgery based on his hemodynamics or rhythm. I will continue to have to defer to infectious disease colleague in regards to whether or not his organism is highly resistant.   -There is no further cardiac workup indicated from our standpoint at least at this point in time.   -Resume anticoagulation for known paroxysmal atrial fibrillation unless bleeding risk prohibitive or if there are upcoming procedures.  -Continue abx under the direction of infectious disease.  -Continue statin.
I agree with the assessment and plan as above. Patient was seen and examined by me. I edited the entire note.     74M HTN, paroxysmal AF, HLD, prior GI Bleed, S/P TAVR 2023, chronic back pain and DM.     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     Cardiac studies as described above.     Impressions  -Prosthetic aortic valve vegetation, moderate stenosis, no significant regurgitation with a normal left ventricular ejection fraction. [place] interval has been prolonged since 2023.   -Chronic first degree AV delay.   -HTN.  -paroxysmal AF.  -Severe AS S/P TAVR 2023.  -HLD.  -Prior GI Bleed.  -chronic back pain.  -DM.    Recommendations  -Given he appears hemodynamically stable without heart failure on examination or complete heart block on telemetry, there appears to be no acute indication for repeat aortic valve surgery based on his hemodynamics or rhythm. I will continue to have to defer to infectious disease colleague in regards to whether or not his organism is highly resistant.   -There is no further cardiac workup indicated from our standpoint at least at this point in time.   -Resume anticoagulation for known paroxysmal atrial fibrillation unless bleeding risk prohibitive or if there are upcoming procedures.  -Continue abx under the direction of infectious disease.  -Continue statin.

## 2025-03-05 NOTE — DISCHARGE NOTE PROVIDER - CARE PROVIDER_API CALL
Jerman Rodriges  Thoracic and Cardiac Surgery  [address] [place] [address]  Phone: [phone]  Fax: [phone]  Follow Up Time:     Star Francois  Spine Surgery  [address] [place] [address]  Phone: [phone]  Fax: [phone]  Follow Up Time:     Duke Larson  Cardiology  [address], [address]  [place] [address]  Phone: [phone]  Fax: [phone]  Follow Up Time:     Efra Pike  Vascular Surgery  [address]  [place] [address]  Phone: [phone]  Fax: [phone]  Follow Up Time:     Rosas Daniels  Infectious Disease  [address], [address]  [place] [address]  Phone: [phone]  Fax: [phone]  Follow Up Time:

## 2025-03-05 NOTE — DISCHARGE NOTE PROVIDER - CARE PROVIDERS DIRECT ADDRESSES
[email or web address]

## 2025-03-05 NOTE — CHART NOTE - NSCHARTNOTEFT_GEN_A_CORE
Now s/p LHC via attempted RRA (unsuccessful)/RFA with Dr. Joon Dyson, pt tolerated procedure well. Pt arrived to recovery in NAD and HDS, RRA access site stable with radial band in place/ RFA (s/p angioseal), RUE/ right hand remains acyanotic; warm to touch; motor/sensory function intact; 2+ right radial pulse. Right groin site stable without active bleeding or hematoma. RLE remains acyanotic; warm to touch; motor/sensory function intact; 2+ right DP pulse.     Intraprocedural findings: Separate [place] of LAD and Cx; LAD with mild 30% stenosis; Lcx and RCA with minor luminal irregularities; no significant change from prior Cath (PRELIMINARY VERBAL REPORT FROM DR. DYSON; PENDING OFFICIAL REPORT)    Medications:  Lidocaine 1%: 15ml subcutaneous  Fentanyl: 50mcg IV  Versed: 1mg IV  Verapamil: 5mg IA  NS bolus: 25ml IV  Omnipaque: 58 ml  Closure Device: RRA with radial band in place, no active bleeding or hematoma. RFA s/p angioseal. Right groin benign without active bleeding or groin hematoma      Plan:  -Formal cath report pending  -Post procedure management/monitoring per protocol  -Access site precautions  -Radial compression band removal at 12pm if RRA site stable w/o active bleeding or wrist hematoma  -HOB flat with RLE straight x 2 hours. If right groin site stable, ok to raise HOB to 30 degrees in 2 hours at 1 pm  -Bedrest x 3 hours post procedure. If right groin site stable, ok to ambulate in 3 hours at 2 pm   -NS 0.9% 250ml/hr x 1 bolus: post procedure SIOBHAN ppx   -Repeat ECG if any clinical indication or change on tele  -Continue current medical therapy  -Educated regarding post procedure management and care  -Discussed the importance of RF modification  -DISPO: Further plan per Cardiology and CT surgery teams

## 2025-03-05 NOTE — PROGRESS NOTE ADULT - ASSESSMENT
73 y/o male with PMH  of HTN, AF, HLD, GI Bleed, Mild Cad 2023. S/P TAVR 2023, chronic back pain and DM recent admission at [facility] 2/10-2/17 with acute anemia, DARLINE, hypotension, s/p 2PRBC, negative EGD/colonoscopy, found to have strept salivarius bacteremia, ROVERTO with vegetation on TAVR valve, MRI spine with possible osteo of lumbar-sacral spine, d/c'd to rehab on rocephin, represented to  2/20 with LLE rash and L knee pain found to have clot in LCFA with distal embolism s/p CFA endarterectomy with clot retrieval and good blood flow to LE, clot cx with GPC, repeat MRI spine without abscess, transfused 2 PRBC on 2/23, transferred to CTICU at [facility]  2/24 for CT surgery evaluation. Echo EF55%

## 2025-03-05 NOTE — PROGRESS NOTE ADULT - ASSESSMENT
74M, PMHx of HTN, AF, HLD, GI Bleed, S/P TAVR 2023, chronic back pain and DM recent admission at [facility] 2/10-2/17 with acute anemia, DARLINE, hypotension, s/p 2PRBC, negative EGD/colonoscopy, found to have strept salivrius bacteremia, ROVERTO with vegetation on TAVR valve, MRI spine with possible osteo of lumbar-sacral spine, d/c'd to rehab on rocephin, represented to  2/20 with LLE rash and L knee pain found to have clot in LCFA with distal embolism s/p CFA endarterectomy with clot retrieval and good blood flow to LE, clot cx with GPC, repeat MRI spine without abscess, transfused 2 PRBC on 2/23, transferred to CTICU at [facility]  2/24 for CT surgery evaluation.

## 2025-03-05 NOTE — PROGRESS NOTE ADULT - SUBJECTIVE AND OBJECTIVE BOX
INFECTIOUS DISEASES AND INTERNAL MEDICINE at [place]  =======================================================  Fabián Liz MD  Diplomates [employer] Board of Internal Medicine and Infectious Diseases  Telephone [phone]  Fax            [phone]  =======================================================    [place] [id number]    Follow up:  [facility] ENDOCARDITIS [facility] DISCITIS     Allergies:  No Known Allergies      Medications:  acetaminophen     Tablet .. 650 milliGRAM(s) Oral every 8 hours  atorvastatin 40 milliGRAM(s) Oral at bedtime  bisacodyl Suppository 10 milliGRAM(s) Rectal daily PRN  cefTRIAXone Injectable. 2000 milliGRAM(s) IV Push every 24 hours  ferrous    sulfate 325 milliGRAM(s) Oral daily  heparin   Injectable 5000 Unit(s) SubCutaneous every 8 hours  insulin lispro (ADMELOG) corrective regimen sliding scale   SubCutaneous at bedtime  insulin lispro (ADMELOG) corrective regimen sliding scale   SubCutaneous three times a day before meals  levothyroxine 150 MICROGram(s) Oral daily  lidocaine   4% Patch 1 Patch Transdermal daily  melatonin 5 milliGRAM(s) Oral at bedtime  multivitamin 1 Tablet(s) Oral daily  pantoprazole    Tablet 40 milliGRAM(s) Oral before breakfast  polyethylene glycol 3350 17 Gram(s) Oral daily  senna 2 Tablet(s) Oral at bedtime  sodium chloride 0.9% lock flush 3 milliLiter(s) IV Push every 8 hours  traMADol 50 milliGRAM(s) Oral every 6 hours PRN  traMADol 25 milliGRAM(s) Oral every 6 hours PRN    SOCIAL       FAMILY   FAMILY HISTORY:    REVIEW OF SYSTEMS:  CONSTITUTIONAL:  No Fever or chills  HEENT:   No diplopia or blurred vision.  No earache, sore throat or runny nose.  CARDIOVASCULAR:  No pressure, squeezing, strangling, tightness, heaviness or aching about the chest, neck, axilla or epigastrium.  RESPIRATORY:  No cough, shortness of breath, PND or orthopnea.  GASTROINTESTINAL:  No nausea, vomiting or diarrhea.  GENITOURINARY:  No dysuria, frequency or urgency. No Blood in urine  MUSCULOSKELETAL:   moves all joints  SKIN:  No change in skin, hair or nails.  NEUROLOGIC:  No paresthesias, fasciculations, seizures or weakness.  PSYCHIATRIC:  No disorder of thought or mood.  ENDOCRINE:  No heat or cold intolerance, polyuria or polydipsia.  HEMATOLOGICAL:  No easy bruising or bleeding.            Physical Exam:     Vital Signs Last 24 Hrs  T(C): 36.6 (05 Mar 2025 08:01), Max: 36.9 (04 Mar 2025 12:30)  T(F): 97.8 (05 Mar 2025 08:01), Max: 98.4 (04 Mar 2025 12:30)  HR: 71 (05 Mar 2025 08:01) (70 - 83)  BP: 123/62 (05 Mar 2025 08:01) (122/62 - 146/70)  BP(mean): --  RR: 17 (05 Mar 2025 08:01) (17 - 20)  SpO2: 95% (05 Mar 2025 08:01) (94% - 100%)    Parameters below as of 05 Mar 2025 08:01  Patient On (Oxygen Delivery Method): room air                  GEN: NAD,   HEENT: normocephalic and atraumatic. EOMI. JASVIR.    NECK: Supple. No carotid bruits.  No lymphadenopathy or thyromegaly.  LUNGS: Clear to auscultation.  HEART: Regular rate and rhythm 2/6t murmur.  ABDOMEN: Soft, nontender, and nondistended.  Positive bowel sounds.    : No CVA tenderness  EXTREMITIES: Without any cyanosis, clubbing, rash, lesions or edema.  MSK: no joint swelling  NEUROLOGIC: Cranial nerves II through XII are grossly intact.  PSYCHIATRIC: Appropriate affect .  SKIN: No ulceration or induration present.        Labs:  Vitals:  =======     =======================================================  Current Antibiotics:  cefTRIAXone Injectable. 2000 milliGRAM(s) IV Push every 24 hours    Other medications:  acetaminophen     Tablet .. 650 milliGRAM(s) Oral every 8 hours  atorvastatin 40 milliGRAM(s) Oral at bedtime  ferrous    sulfate 325 milliGRAM(s) Oral daily  heparin   Injectable 5000 Unit(s) SubCutaneous every 8 hours  insulin lispro (ADMELOG) corrective regimen sliding scale   SubCutaneous at bedtime  insulin lispro (ADMELOG) corrective regimen sliding scale   SubCutaneous three times a day before meals  levothyroxine 150 MICROGram(s) Oral daily  lidocaine   4% Patch 1 Patch Transdermal daily  melatonin 5 milliGRAM(s) Oral at bedtime  multivitamin 1 Tablet(s) Oral daily  pantoprazole    Tablet 40 milliGRAM(s) Oral before breakfast  polyethylene glycol 3350 17 Gram(s) Oral daily  senna 2 Tablet(s) Oral at bedtime  sodium chloride 0.9% lock flush 3 milliLiter(s) IV Push every 8 hours      =======================================================  Labs:                                                           11.0   8.66  )-----------( 197      ( 05 Mar 2025 06:10 )             34.3   03-05    133[L]  |  98  |  21.9[H]  ----------------------------<  94  4.0   |  24.0  |  0.91    Ca    8.7      05 Mar 2025 06:10  Mg     1.9     03-05                Culture - Blood (collected 02-24-25 @ 20:30)  Source: .Blood Blood-Venous  Preliminary Report (02-27-25 @ 02:02):    No growth at 48 Hours    Culture - Fungal, Other (collected 02-22-25 @ 07:42)  Source: .Other Left femoral plaque  Preliminary Report (02-26-25 @ 23:03):    Culture is being performed. Fungal cultures are held for 4 weeks.    Culture - Acid Fast - Tissue w/Smear (collected 02-22-25 @ 07:42)  Source: Tissue Left femoral plaque  Preliminary Report (02-26-25 @ 23:07):    Culture is being performed.    Culture - Tissue with Gram Stain (collected 02-22-25 @ 07:42)  Source: Tissue Left femoral plaque  Gram Stain (02-22-25 @ 19:34):    Few polymorphonuclear leukocytes per low power field    Moderate Gram Positive Cocci in Pairs and Chains per oil power field  Final Report (02-27-25 @ 09:58):    Organism seen in Gram stain is non-viable after prolonged    incubation and repeated subculture.    Culture - Blood (collected 02-20-25 @ 18:10)  Source: .Blood None  Final Report (02-26-25 @ 02:00):    No growth at 5 days    Culture - Blood (collected 02-20-25 @ 18:10)  Source: .Blood None  Final Report (02-26-25 @ 02:00):    No growth at 5 days    Culture - Blood (collected 02-14-25 @ 07:04)  Source: .Blood BLOOD  Final Report (02-19-25 @ 13:00):    No growth at 5 days    Culture - Blood (collected 02-14-25 @ 07:01)  Source: .Blood BLOOD  Final Report (02-19-25 @ 13:00):    No growth at 5 days    Urinalysis with Rflx Culture (collected 02-10-25 @ 12:47)    Culture - Blood (collected 02-10-25 @ 09:32)  Source: .Blood BLOOD  Gram Stain (02-11-25 @ 13:01):    Growth in aerobic bottle: Gram Positive Cocci in Pairs and Chains    Growth in anaerobic bottle: Gram Positive Cocci in Pairs and Chains  Final Report (02-13-25 @ 06:33):    Growth in aerobic and anaerobic bottles: Streptococcus    salivarius/vestibularis group  Organism: Streptococcus salivarius/vestibularis group (02-13-25 @ 06:33)  Organism: Streptococcus salivarius/vestibularis group (02-13-25 @ 06:33)    Sensitivities:      Method Type: BRIANA      -  Ceftriaxone: S <=0.25      -  Penicillin: S 0.06      -  Vancomycin: S 0.5    Culture - Blood (collected 02-10-25 @ 09:28)  Source: .Blood BLOOD  Gram Stain (02-11-25 @ 09:51):    Growth in aerobic bottle: Gram Positive Cocci in Pairs and Chains    Growth in anaerobic bottle: Gram Positive Cocci in Pairs and Chains  Final Report (02-12-25 @ 17:14):    Growth in aerobic and anaerobic bottles: Streptococcus    salivarius/vestibularis group "Susceptibilities not performed"    Direct identification is available within approximately 3-5    hours either by Blood Panel Multiplexed PCR or Direct    MALDI-TOF. Details: [email or web address]  Organism: Blood Culture PCR (02-12-25 @ 17:14)  Organism: Blood Culture PCR (02-12-25 @ 17:14)    Sensitivities:      Method Type: PCR      -  Streptococcus sp. (Not Grp A, B or S pneumoniae): Detec    Culture - Urine (collected 03-20-24 @ 20:27)  Source: Clean Catch None  Final Report (03-21-24 @ 23:15):    No growth      Creatinine: 0.92 mg/dL (02-27-25 @ 01:06)  Creatinine: 0.93 mg/dL (02-26-25 @ 02:15)  Creatinine: 1.02 mg/dL (02-25-25 @ 02:30)  Creatinine: 0.94 mg/dL (02-24-25 @ 20:30)  Creatinine: 1.07 mg/dL (02-24-25 @ 06:23)  Creatinine: 1.21 mg/dL (02-23-25 @ 06:02)        Ferritin: 548 ng/mL (02-14-25 @ 07:01)    C-Reactive Protein: 206.0 mg/mL (02-20-25 @ 18:10)    WBC Count: 9.07 K/uL (02-27-25 @ 01:06)  WBC Count: 10.93 K/uL (02-26-25 @ 12:19)  WBC Count: 8.05 K/uL (02-26-25 @ 02:15)  WBC Count: 9.52 K/uL (02-25-25 @ 20:20)  WBC Count: 10.01 K/uL (02-25-25 @ 04:00)  WBC Count: 10.86 K/uL (02-25-25 @ 02:30)  WBC Count: 11.31 K/uL (02-24-25 @ 20:30)  WBC Count: 12.81 K/uL (02-24-25 @ 06:23)  WBC Count: 13.62 K/uL (02-23-25 @ 06:02)  WBC Count: 12.38 K/uL (02-22-25 @ 17:59)    SARS-CoV-2 Result: NotDetec (02-21-25 @ 19:50)  SARS-CoV-2 Result: NotDetec (02-10-25 @ 10:13)      Alkaline Phosphatase: 152 U/L (02-25-25 @ 02:30)  Alkaline Phosphatase: 177 U/L (02-24-25 @ 20:30)  Alkaline Phosphatase: 171 U/L (02-24-25 @ 06:23)  Alanine Aminotransferase (ALT/SGPT): 56 U/L (02-25-25 @ 02:30)  Alanine Aminotransferase (ALT/SGPT): 65 U/L (02-24-25 @ 20:30)  Alanine Aminotransferase (ALT/SGPT): 84 U/L (02-24-25 @ 06:23)  Aspartate Aminotransferase (AST/SGOT): 47 U/L (02-25-25 @ 02:30)  Aspartate Aminotransferase (AST/SGOT): 60 U/L (02-24-25 @ 20:30)  Aspartate Aminotransferase (AST/SGOT): 76 U/L (02-24-25 @ 06:23)  Bilirubin Total: 0.4 mg/dL (02-25-25 @ 02:30)  Bilirubin Total: 0.3 mg/dL (02-24-25 @ 20:30)  Bilirubin Total: 0.4 mg/dL (02-24-25 @ 06:23)

## 2025-03-05 NOTE — PROVIDER CONTACT NOTE (OTHER) - REASON
Pt is ordered for daily weight in AM but is 2 assist OOB w/ walker
Received call from monitor technician at [id number], that patient had 5 beats of V-Tach at [id number]

## 2025-03-05 NOTE — CONSULT NOTE ADULT - SUBJECTIVE AND OBJECTIVE BOX
[facility]                                              INTERVENTIONAL CARDIOLOGY AT [address], [place] [address]                                             Telephone: [phone]. Fax:[phone]                                                       INTERVENTIONAL CARDIOLOGY CONSULTATION NOTE                                                                                             History obtained by: Patient and medical record  Reason for Consultation: Evaluation for cardiac catheterization  Available pt records reviewed: Yes [ x ] No [  ]    Chief complaint:    Patient is a 74y old  Male who presents with a chief complaint of Bioprosthetic AoV endocarditis (05 Mar 2025 09:15)      HPI:  74M, PMHx of HTN, AF, HLD, GI Bleed, S/P TAVR 2023, chronic back pain and DM recent admission at [facility] 2/10-2/17 with acute anemia, DARLINE, hypotension, s/p 2PRBC, negative EGD/colonoscopy, found to have strept salivrius bacteremia, ROVERTO with vegetation on TAVR valve, MRI spine with possible osteo of lumbar-sacral spine, d/c'd to rehab on rocephin, represented to  2/20 with LLE rash and L knee pain found to have clot in LCFA with distal embolism s/p CFA endarterectomy with clot retrieval and good blood flow to LE, clot cx with GPC, repeat MRI spine without abscess, transfused 2 PRBC on 2/23, transferred to CTICU at [facility]  2/24 for CT surgery evaluation. Patient presents to [facility] for LHC for further evaluation.         PAST MEDICAL HISTORY  Hypertension    Diabetes mellitus    Hypothyroid obesity    Obesity    Hypothyroidism    Prostate CA        PAST SURGICAL HISTORY      HOME MEDICATIONS:  acetaminophen 325 mg oral tablet: 2 tab(s) orally every 6 hours As needed Temp greater or equal to 38C (100.4F), Mild Pain (1 - 3) (25 Feb 2025 15:04)  atorvastatin 40 mg oral tablet: 1 tab(s) orally once a day (at bedtime) (25 Feb 2025 15:04)  B-Complex 50 oral tablet: 1 tab(s) orally once a day (25 Feb 2025 15:04)  bisacodyl 10 mg rectal suppository: 1 suppository(ies) rectally prn as needed for  constipation if no relief from MOM (25 Feb 2025 15:04)  cholecalciferol 25 mcg (1000 intl units) oral tablet: 1 tab(s) orally once a day (25 Feb 2025 15:10)  Chondroitin-Glucosamine: 1 tab(s) orally once a day (25 Feb 2025 15:10)  Co-Q10 100 mg oral capsule: 1 cap(s) orally once a day (25 Feb 2025 15:10)  cyanocobalamin 1000 mcg oral tablet: 1 tab(s) orally once a day (25 Feb 2025 15:10)  dapagliflozin 10 mg oral tablet: 1 tab(s) orally once a day (25 Feb 2025 15:04)  ferrous sulfate 325 mg (65 mg elemental iron) oral delayed release tablet: 1 tab(s) orally once a day (25 Feb 2025 15:10)  finasteride 1 mg oral tablet: 1 tab(s) orally once a day (25 Feb 2025 15:10)  Januvia 50 mg oral tablet: 1 tab(s) orally once a day (25 Feb 2025 15:10)  levothyroxine 150 mcg (0.15 mg) oral tablet: 1 tab(s) orally once a day (25 Feb 2025 15:04)  Melatonin 5 mg oral tablet: 2 tab(s) orally once a day (at bedtime) (25 Feb 2025 15:04)  MetFORMIN (Eqv-Glumetza) 500 mg oral tablet, extended release: 4 tab(s) orally once a day in the morning (25 Feb 2025 15:09)  Multiple Vitamins oral tablet: 1 tab(s) orally once a day (25 Feb 2025 15:04)  pantoprazole 40 mg oral delayed release tablet: 1 tab(s) orally once a day (before a meal) (25 Feb 2025 15:04)  polyethylene glycol 3350 oral powder for reconstitution: 17 gram(s) orally once a day (25 Feb 2025 15:04)  PreserVision AREDS 2 oral capsule: 1 cap(s) orally 2 times a day (25 Feb 2025 15:04)  senna leaf extract oral tablet: 2 tab(s) orally once a day (at bedtime) (25 Feb 2025 15:04)  traMADol 50 mg oral tablet: 1 tab(s) orally every 6 hours As needed Severe Pain (7 - 10) (25 Feb 2025 15:10)  zolpidem 10 mg oral tablet: 1 tab(s) orally once a day (at bedtime) as needed for  insomnia (25 Feb 2025 15:04)      CURRENT CARDIAC MEDICATIONS:  furosemide    Tablet 40 milliGRAM(s) Oral daily  spironolactone 25 milliGRAM(s) Oral daily        ALLERGIES:   No Known Allergies      REVIEW OF SYMPTOMS:   CONSTITUTIONAL: no fever, no chills, no weight loss, no weight gain, no fatigue   CARDIOVASCULAR: denies chest pain, chest pressure, shortness of breath at rest, denies palpitations, dizziness, orthopnea  RESPIRATORY: no Shortness of breath, no cough, no wheezing  : No dysuria, no hematuria   GI: No dark color stool, no nausea, no diarrhea, no constipation, no abdominal pain   NEURO: No headache, no slurred speech   ALL OTHER REVIEW OF SYSTEMS ARE NEGATIVE.    VITAL SIGNS:  T(C): 36.6 (03-05-25 @ 08:01), Max: 36.9 (03-04-25 @ 12:30)  T(F): 97.8 (03-05-25 @ 08:01), Max: 98.4 (03-04-25 @ 12:30)  HR: 71 (03-05-25 @ 08:01) (70 - 83)  BP: 123/62 (03-05-25 @ 08:01) (122/62 - 146/70)  RR: 17 (03-05-25 @ 08:01) (17 - 20)  SpO2: 95% (03-05-25 @ 08:01) (94% - 100%)    INTAKE AND OUTPUT:     03-04 @ 07:01  -  03-05 @ 07:00  --------------------------------------------------------  IN: 720 mL / OUT: 1540 mL / NET: -820 mL    03-05 @ 07:01  -  03-05 @ 09:51  --------------------------------------------------------  IN: 0 mL / OUT: 350 mL / NET: -350 mL        PHYSICAL EXAM:  Constitutional: Comfortable . No acute distress.   HEENT: Atraumatic and normocephalic , neck is supple . no JVD. No carotid bruit.  CNS: A&Ox3. No focal deficits.   Respiratory: CTAB, unlabored   Cardiovascular: RRR normal s1 s2. +systolic murmur at RSB  Gastrointestinal: Soft, non-tender. +Bowel sounds.   Extremities: b/l upper extremities acyanotic; warm to touch; motor/sensory function intact; 2+ b/l radial pulses. b/l LE edema L>R. motor/sensory function intact; 2+ right DP pulse; 1+ left DP pulse. legs/feet warm to touch.    Psychiatric: Calm . no agitation.   Skin: Warm and dry, no ulcers on extremities     LABS:                            11.0   8.66  )-----------( 197      ( 05 Mar 2025 06:10 )             34.3     03-05    133[L]  |  98  |  21.9[H]  ----------------------------<  94  4.0   |  24.0  |  0.91    Ca    8.7      05 Mar 2025 06:10  Mg     1.9     03-05        Urinalysis Basic - ( 05 Mar 2025 06:10 )    Color: x / Appearance: x / SG: x / pH: x  Gluc: 94 mg/dL / Ketone: x  / Bili: x / Urobili: x   Blood: x / Protein: x / Nitrite: x   Leuk Esterase: x / RBC: x / WBC x   Sq Epi: x / Non Sq Epi: x / Bacteria: x      ECG: SR 1st degree AVB, septal infarct  Prior ECG: Yes [ X ] No [  ]    CARDIAC TESTING   ECHO: < from: TTE W or WO Ultrasound Enhancing Agent (02.24.25 @ 21:10) >  CONCLUSIONS:      1. Left ventricular cavity is normal in size. Left ventricular systolic function is normal with an ejection fraction visually estimated at 55 to 60 %.   2. There is mild (grade 1) left ventricular diastolic dysfunction.   3. Normal right ventricular cavity size and normal right ventricular systolic function.   4. Left atrium is mildly dilated.   5. The right atrium is normal in size.   6. Thickened mitral valve leaflets.   7. There is mild calcification of the mitral valve annulus.   8. Trace mitral regurgitation.   9. A Norman TAVR (TAVR) valve replacement is present in the aortic position The prosthetic valve has normal leaflet excursion and is well seated with normal function The prosthetic aortic valve No vegetations seen.. Trace paravalvular regurgitation.  10. A TEEwould be necessary to rule out vegetation on TAVR.  11. No pericardial effusion seen.  12. Pulmonary artery systolic pressure could not be estimated.  13. Small right pleural effusion noted.    < end of copied text >    ROVERTO: < from: ROVERTO W or WO Ultrasound Enhancing Agent (02.26.25 @ 15:42) >     CONCLUSIONS:   1. Left ventricular cavity is normal in size. Left ventricular wall thickness is mildly increased. Left ventricular systolic function is normal with an ejection fraction visually estimated at 55 to 60 %.   2.A Norman (TAVR) valve replacement is present in the aortic position The prosthetic valve has reduced leaflet opening and is well seated Degeneration of the aortic valve prosthesis. The prosthetic aortic valve thickened leaflets. Moderate prosthetic aortic stenosis. No intravalvular regurgitation Trace paravalvular regurgitation, originating at at 12 o'clock o'clock. There is a echogenic sesile not very mobile subcentimeter ( 6x2 mm) structure attached to the base of the leaflet corresponding to the native non-coronary cusp likely representing a calcified nodule vs. less likely an old small calcified vegetation.  < end of copied text >    < from: Cardiac Catheterization (01.04.23 @ 16:10) >  LMCA: Separate ostia of LAD and Cx, no LM present  LAD: Mild diffuse atherosclerosis   Mid LAD: 30% stenosis.  LCx: Mild diffuse atherosclerosis   Prox CX: 20% stenosis.  RCA: Mild diffuse atherosclerosis      < end of copied text >    Cardiac Cath Risk Assessments:  ASA: 3  Mallampati: 2  Bleeding Risk: 2.0%  Creatinine: 0.91  GFR: 80      PLAN:  -Plan for [facility] today  -patient seen and examined  -Procedure and risks discussed with patient  -ECG and labs reviewed  -0.9% NS 250cc bolus ordered to prevent SIOBHAN  -Consent obtained by attending IC    Risks, benefits, and alternatives reviewed.  Risks including but not limited to MI, death, stroke, bleeding, infection, vessel injury, hematoma, renal failure, allergic reaction, urgent open heart surgery, restenosis and stent thrombosis were reviewed.  All questions answered.  Patient is agreeable to proceed.   Pt. assessed, appropriate for sedation, pt. educated regarding the plan for Versed/Fentanyl as needed.                                                  [facility]                                              INTERVENTIONAL CARDIOLOGY AT [address], [place] [address]                                             Telephone: [phone]. Fax:[phone]                                                       INTERVENTIONAL CARDIOLOGY CONSULTATION NOTE                                                                                             History obtained by: Patient and medical record  Reason for Consultation: Evaluation for cardiac catheterization  Available pt records reviewed: Yes [ x ] No [  ]    Chief complaint:    Patient is a 74y old  Male who presents with a chief complaint of Bioprosthetic AoV endocarditis (05 Mar 2025 09:15)      HPI:  74M, PMHx of HTN, AF, HLD, GI Bleed, S/P TAVR 2023, chronic back pain and DM recent admission at [facility] 2/10-2/17 with acute anemia, DARLINE, hypotension, s/p 2PRBC, negative EGD/colonoscopy, found to have strept salivrius bacteremia, ROVERTO with vegetation on TAVR valve, MRI spine with possible osteo of lumbar-sacral spine, d/c'd to rehab on rocephin, represented to  2/20 with LLE rash and L knee pain found to have clot in LCFA with distal embolism s/p CFA endarterectomy with clot retrieval and good blood flow to LE, clot cx with GPC, repeat MRI spine without abscess, transfused 2 PRBC on 2/23, transferred to CTICU at [facility]  2/24 for CT surgery evaluation. Patient presents to [facility] for LHC for further evaluation.         PAST MEDICAL HISTORY  Hypertension    Diabetes mellitus    Hypothyroid obesity    Obesity    Hypothyroidism    Prostate CA        PAST SURGICAL HISTORY      HOME MEDICATIONS:  acetaminophen 325 mg oral tablet: 2 tab(s) orally every 6 hours As needed Temp greater or equal to 38C (100.4F), Mild Pain (1 - 3) (25 Feb 2025 15:04)  atorvastatin 40 mg oral tablet: 1 tab(s) orally once a day (at bedtime) (25 Feb 2025 15:04)  B-Complex 50 oral tablet: 1 tab(s) orally once a day (25 Feb 2025 15:04)  bisacodyl 10 mg rectal suppository: 1 suppository(ies) rectally prn as needed for  constipation if no relief from MOM (25 Feb 2025 15:04)  cholecalciferol 25 mcg (1000 intl units) oral tablet: 1 tab(s) orally once a day (25 Feb 2025 15:10)  Chondroitin-Glucosamine: 1 tab(s) orally once a day (25 Feb 2025 15:10)  Co-Q10 100 mg oral capsule: 1 cap(s) orally once a day (25 Feb 2025 15:10)  cyanocobalamin 1000 mcg oral tablet: 1 tab(s) orally once a day (25 Feb 2025 15:10)  dapagliflozin 10 mg oral tablet: 1 tab(s) orally once a day (25 Feb 2025 15:04)  ferrous sulfate 325 mg (65 mg elemental iron) oral delayed release tablet: 1 tab(s) orally once a day (25 Feb 2025 15:10)  finasteride 1 mg oral tablet: 1 tab(s) orally once a day (25 Feb 2025 15:10)  Januvia 50 mg oral tablet: 1 tab(s) orally once a day (25 Feb 2025 15:10)  levothyroxine 150 mcg (0.15 mg) oral tablet: 1 tab(s) orally once a day (25 Feb 2025 15:04)  Melatonin 5 mg oral tablet: 2 tab(s) orally once a day (at bedtime) (25 Feb 2025 15:04)  MetFORMIN (Eqv-Glumetza) 500 mg oral tablet, extended release: 4 tab(s) orally once a day in the morning (25 Feb 2025 15:09)  Multiple Vitamins oral tablet: 1 tab(s) orally once a day (25 Feb 2025 15:04)  pantoprazole 40 mg oral delayed release tablet: 1 tab(s) orally once a day (before a meal) (25 Feb 2025 15:04)  polyethylene glycol 3350 oral powder for reconstitution: 17 gram(s) orally once a day (25 Feb 2025 15:04)  PreserVision AREDS 2 oral capsule: 1 cap(s) orally 2 times a day (25 Feb 2025 15:04)  senna leaf extract oral tablet: 2 tab(s) orally once a day (at bedtime) (25 Feb 2025 15:04)  traMADol 50 mg oral tablet: 1 tab(s) orally every 6 hours As needed Severe Pain (7 - 10) (25 Feb 2025 15:10)  zolpidem 10 mg oral tablet: 1 tab(s) orally once a day (at bedtime) as needed for  insomnia (25 Feb 2025 15:04)      CURRENT CARDIAC MEDICATIONS:  furosemide    Tablet 40 milliGRAM(s) Oral daily  spironolactone 25 milliGRAM(s) Oral daily        ALLERGIES:   No Known Allergies      REVIEW OF SYMPTOMS:   CONSTITUTIONAL: no fever, no chills, no weight loss, no weight gain, no fatigue   CARDIOVASCULAR: denies chest pain, chest pressure, shortness of breath at rest, denies palpitations, dizziness, orthopnea  RESPIRATORY: no Shortness of breath, no cough, no wheezing  : No dysuria, no hematuria   GI: No dark color stool, no nausea, no diarrhea, no constipation, no abdominal pain   NEURO: No headache, no slurred speech   ALL OTHER REVIEW OF SYSTEMS ARE NEGATIVE.    VITAL SIGNS:  T(C): 36.6 (03-05-25 @ 08:01), Max: 36.9 (03-04-25 @ 12:30)  T(F): 97.8 (03-05-25 @ 08:01), Max: 98.4 (03-04-25 @ 12:30)  HR: 71 (03-05-25 @ 08:01) (70 - 83)  BP: 123/62 (03-05-25 @ 08:01) (122/62 - 146/70)  RR: 17 (03-05-25 @ 08:01) (17 - 20)  SpO2: 95% (03-05-25 @ 08:01) (94% - 100%)    INTAKE AND OUTPUT:     03-04 @ 07:01  -  03-05 @ 07:00  --------------------------------------------------------  IN: 720 mL / OUT: 1540 mL / NET: -820 mL    03-05 @ 07:01  -  03-05 @ 09:51  --------------------------------------------------------  IN: 0 mL / OUT: 350 mL / NET: -350 mL        PHYSICAL EXAM:  Constitutional: Comfortable . No acute distress.   HEENT: Atraumatic and normocephalic , neck is supple . no JVD. No carotid bruit.  CNS: A&Ox3. No focal deficits.   Respiratory: CTAB, unlabored   Cardiovascular: RRR normal s1 s2. +systolic murmur at RSB  Gastrointestinal: Soft, non-tender. +Bowel sounds.   Extremities: b/l upper extremities acyanotic; warm to touch; motor/sensory function intact; 2+ b/l radial pulses. b/l LE edema L>R. motor/sensory function intact; 2+ right DP pulse; 1+ left DP pulse. legs/feet warm to touch.  left groin with staples.   Psychiatric: Calm . no agitation.   Skin: Warm and dry, no ulcers on extremities     LABS:                            11.0   8.66  )-----------( 197      ( 05 Mar 2025 06:10 )             34.3     03-05    133[L]  |  98  |  21.9[H]  ----------------------------<  94  4.0   |  24.0  |  0.91    Ca    8.7      05 Mar 2025 06:10  Mg     1.9     03-05        Urinalysis Basic - ( 05 Mar 2025 06:10 )    Color: x / Appearance: x / SG: x / pH: x  Gluc: 94 mg/dL / Ketone: x  / Bili: x / Urobili: x   Blood: x / Protein: x / Nitrite: x   Leuk Esterase: x / RBC: x / WBC x   Sq Epi: x / Non Sq Epi: x / Bacteria: x      ECG: SR 1st degree AVB, septal infarct  Prior ECG: Yes [ X ] No [  ]    CARDIAC TESTING   ECHO: < from: TTE W or WO Ultrasound Enhancing Agent (02.24.25 @ 21:10) >  CONCLUSIONS:      1. Left ventricular cavity is normal in size. Left ventricular systolic function is normal with an ejection fraction visually estimated at 55 to 60 %.   2. There is mild (grade 1) left ventricular diastolic dysfunction.   3. Normal right ventricular cavity size and normal right ventricular systolic function.   4. Left atrium is mildly dilated.   5. The right atrium is normal in size.   6. Thickened mitral valve leaflets.   7. There is mild calcification of the mitral valve annulus.   8. Trace mitral regurgitation.   9. A Norman TAVR (TAVR) valve replacement is present in the aortic position The prosthetic valve has normal leaflet excursion and is well seated with normal function The prosthetic aortic valve No vegetations seen.. Trace paravalvular regurgitation.  10. A TEEwould be necessary to rule out vegetation on TAVR.  11. No pericardial effusion seen.  12. Pulmonary artery systolic pressure could not be estimated.  13. Small right pleural effusion noted.    < end of copied text >    ROVERTO: < from: ROVERTO W or WO Ultrasound Enhancing Agent (02.26.25 @ 15:42) >     CONCLUSIONS:   1. Left ventricular cavity is normal in size. Left ventricular wall thickness is mildly increased. Left ventricular systolic function is normal with an ejection fraction visually estimated at 55 to 60 %.   2.A Norman (TAVR) valve replacement is present in the aortic position The prosthetic valve has reduced leaflet opening and is well seated Degeneration of the aortic valve prosthesis. The prosthetic aortic valve thickened leaflets. Moderate prosthetic aortic stenosis. No intravalvular regurgitation Trace paravalvular regurgitation, originating at at 12 o'clock o'clock. There is a echogenic sesile not very mobile subcentimeter ( 6x2 mm) structure attached to the base of the leaflet corresponding to the native non-coronary cusp likely representing a calcified nodule vs. less likely an old small calcified vegetation.  < end of copied text >    < from: Cardiac Catheterization (01.04.23 @ 16:10) >  LMCA: Separate ostia of LAD and Cx, no LM present  LAD: Mild diffuse atherosclerosis   Mid LAD: 30% stenosis.  LCx: Mild diffuse atherosclerosis   Prox CX: 20% stenosis.  RCA: Mild diffuse atherosclerosis      < end of copied text >    Cardiac Cath Risk Assessments:  ASA: 3  Mallampati: 2  Bleeding Risk: 2.0%  Creatinine: 0.91  GFR: 80      PLAN:  -Plan for [facility] today  -patient seen and examined  -Procedure and risks discussed with patient  -ECG and labs reviewed  -0.9% NS 250cc bolus ordered to prevent SIOBHAN  -Consent obtained by attending IC    Risks, benefits, and alternatives reviewed.  Risks including but not limited to MI, death, stroke, bleeding, infection, vessel injury, hematoma, renal failure, allergic reaction, urgent open heart surgery, restenosis and stent thrombosis were reviewed.  All questions answered.  Patient is agreeable to proceed.   Pt. assessed, appropriate for sedation, pt. educated regarding the plan for Versed/Fentanyl as needed.

## 2025-03-05 NOTE — DISCHARGE NOTE PROVIDER - PROVIDER TOKENS
PROVIDER:[TOKEN:[[id number]]],PROVIDER:[TOKEN:[[id number]]],PROVIDER:[TOKEN:[[id number]]],PROVIDER:[TOKEN:[[id number]]],PROVIDER:[TOKEN:[[id number]]]

## 2025-03-05 NOTE — PROGRESS NOTE ADULT - PROBLEM SELECTOR PLAN 5
c/w lasix and spironolactone  Euvolemic on exam   Strict i/o and daily weights.    Keep K > 4, Mg > 2.    Monitor Cr.      CARDIAC MEDS  atorvastatin 40 milliGRAM(s) Oral at bedtime  furosemide    Tablet 40 milliGRAM(s) Oral daily  spironolactone 25 milliGRAM(s) Oral

## 2025-03-05 NOTE — DISCHARGE NOTE PROVIDER - NSDCCPCAREPLAN_GEN_ALL_CORE_FT
PRINCIPAL DISCHARGE DIAGNOSIS  Diagnosis: Aortic valve endocarditis  Assessment and Plan of Treatment: Upon discharge from rehab, make a follow up appointment with Dr. Rodriges by calling [phone]  .  Follow up with your cardiologist and primary care doctor within 7-10 days after discharge. Sternal wound precautions, Blood glucose fingerstick checks qAC/HS, daily weights, DASH diet, ensure supplements bid, daily shower,  PT/OT Rehab  per rehab facility. BMP, CBC twice a week. CXR weekly. Vitals per routine.   Ceftriaxone through 4/7/25.  Pt should follow up with infectious disease doctor upon discharge from rehab.      SECONDARY DISCHARGE DIAGNOSES  Diagnosis: Arterial embolism of left leg  Assessment and Plan of Treatment: S/P LCFA/LSFA thrombectomy/endarterectomy with vascular surgery on 2/22.  Has staple to the Left groin.   Should remain in place until 3/13.  Can be removed at rehab on 3/13 if still at rehab.  If patient is discharged before then he should make an appointment to see vascular in the office to remove them .    Diagnosis: Bacteremia  Assessment and Plan of Treatment: strep salivarius bacteremia> cultures now clear.  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  Antibiotics.   LSO brace when OOB.  WBAT  Should follow up with ortho once discharged from rehab.

## 2025-03-06 ENCOUNTER — INPATIENT (INPATIENT)
Facility: HOSPITAL | Age: 75
LOS: 13 days | Discharge: HOME | DRG: 541 | End: 2025-03-20
Attending: PHYSICAL MEDICINE & REHABILITATION | Admitting: PHYSICAL MEDICINE & REHABILITATION
Payer: MEDICARE

## 2025-03-06 ENCOUNTER — NON-APPOINTMENT (OUTPATIENT)
Age: 75
End: 2025-03-06

## 2025-03-06 VITALS
HEART RATE: 72 BPM | SYSTOLIC BLOOD PRESSURE: 136 MMHG | WEIGHT: 218.26 LBS | TEMPERATURE: 98 F | DIASTOLIC BLOOD PRESSURE: 83 MMHG | RESPIRATION RATE: 16 BRPM | OXYGEN SATURATION: 97 % | HEIGHT: 68 IN

## 2025-03-06 VITALS
SYSTOLIC BLOOD PRESSURE: 144 MMHG | OXYGEN SATURATION: 96 % | DIASTOLIC BLOOD PRESSURE: 72 MMHG | RESPIRATION RATE: 17 BRPM | HEART RATE: 76 BPM | TEMPERATURE: 98 F

## 2025-03-06 LAB
ANION GAP SERPL CALC-SCNC: 12 MMOL/L — SIGNIFICANT CHANGE UP (ref 5–17)
BUN SERPL-MCNC: 20 MG/DL — SIGNIFICANT CHANGE UP (ref 8–20)
CALCIUM SERPL-MCNC: 8.6 MG/DL — SIGNIFICANT CHANGE UP (ref 8.4–10.5)
CHLORIDE SERPL-SCNC: 95 MMOL/L — LOW (ref 96–108)
CO2 SERPL-SCNC: 24 MMOL/L — SIGNIFICANT CHANGE UP (ref 22–29)
CREAT SERPL-MCNC: 0.87 MG/DL — SIGNIFICANT CHANGE UP (ref 0.5–1.3)
EGFR: 91 ML/MIN/1.73M2 — SIGNIFICANT CHANGE UP
EGFR: 91 ML/MIN/1.73M2 — SIGNIFICANT CHANGE UP
GLUCOSE BLDC GLUCOMTR-MCNC: 161 MG/DL — HIGH (ref 70–99)
GLUCOSE SERPL-MCNC: 112 MG/DL — HIGH (ref 70–99)
HCT VFR BLD CALC: 32.9 % — LOW (ref 39–50)
HGB BLD-MCNC: 10.9 G/DL — LOW (ref 13–17)
MAGNESIUM SERPL-MCNC: 1.8 MG/DL — SIGNIFICANT CHANGE UP (ref 1.6–2.6)
MCHC RBC-ENTMCNC: 28.2 PG — SIGNIFICANT CHANGE UP (ref 27–34)
MCHC RBC-ENTMCNC: 33.1 G/DL — SIGNIFICANT CHANGE UP (ref 32–36)
MCV RBC AUTO: 85 FL — SIGNIFICANT CHANGE UP (ref 80–100)
NRBC # BLD AUTO: 0 K/UL — SIGNIFICANT CHANGE UP (ref 0–0)
NRBC # FLD: 0 K/UL — SIGNIFICANT CHANGE UP (ref 0–0)
NRBC BLD AUTO-RTO: 0 /100 WBCS — SIGNIFICANT CHANGE UP (ref 0–0)
PLATELET # BLD AUTO: 183 K/UL — SIGNIFICANT CHANGE UP (ref 150–400)
PMV BLD: 9.5 FL — SIGNIFICANT CHANGE UP (ref 7–13)
POTASSIUM SERPL-MCNC: 4 MMOL/L — SIGNIFICANT CHANGE UP (ref 3.5–5.3)
POTASSIUM SERPL-SCNC: 4 MMOL/L — SIGNIFICANT CHANGE UP (ref 3.5–5.3)
RBC # BLD: 3.87 M/UL — LOW (ref 4.2–5.8)
RBC # FLD: 18.2 % — HIGH (ref 10.3–14.5)
SARS-COV-2 RNA SPEC QL NAA+PROBE: SIGNIFICANT CHANGE UP
SODIUM SERPL-SCNC: 131 MMOL/L — LOW (ref 135–145)
WBC # BLD: 10.09 K/UL — SIGNIFICANT CHANGE UP (ref 3.8–10.5)
WBC # FLD AUTO: 10.09 K/UL — SIGNIFICANT CHANGE UP (ref 3.8–10.5)

## 2025-03-06 PROCEDURE — 74018 RADEX ABDOMEN 1 VIEW: CPT

## 2025-03-06 PROCEDURE — 97167 OT EVAL HIGH COMPLEX 60 MIN: CPT

## 2025-03-06 PROCEDURE — 72141 MRI NECK SPINE W/O DYE: CPT | Mod: MC

## 2025-03-06 PROCEDURE — 86900 BLOOD TYPING SEROLOGIC ABO: CPT

## 2025-03-06 PROCEDURE — 84443 ASSAY THYROID STIM HORMONE: CPT

## 2025-03-06 PROCEDURE — 87040 BLOOD CULTURE FOR BACTERIA: CPT

## 2025-03-06 PROCEDURE — 83036 HEMOGLOBIN GLYCOSYLATED A1C: CPT

## 2025-03-06 PROCEDURE — 86850 RBC ANTIBODY SCREEN: CPT

## 2025-03-06 PROCEDURE — 71045 X-RAY EXAM CHEST 1 VIEW: CPT

## 2025-03-06 PROCEDURE — 70486 CT MAXILLOFACIAL W/O DYE: CPT | Mod: MC

## 2025-03-06 PROCEDURE — 86901 BLOOD TYPING SEROLOGIC RH(D): CPT

## 2025-03-06 PROCEDURE — C1760: CPT

## 2025-03-06 PROCEDURE — 93880 EXTRACRANIAL BILAT STUDY: CPT

## 2025-03-06 PROCEDURE — 85610 PROTHROMBIN TIME: CPT

## 2025-03-06 PROCEDURE — 94010 BREATHING CAPACITY TEST: CPT

## 2025-03-06 PROCEDURE — 93312 ECHO TRANSESOPHAGEAL: CPT

## 2025-03-06 PROCEDURE — 85027 COMPLETE CBC AUTOMATED: CPT

## 2025-03-06 PROCEDURE — 82962 GLUCOSE BLOOD TEST: CPT

## 2025-03-06 PROCEDURE — 72132 CT LUMBAR SPINE W/DYE: CPT | Mod: MC

## 2025-03-06 PROCEDURE — 72146 MRI CHEST SPINE W/O DYE: CPT | Mod: MC

## 2025-03-06 PROCEDURE — 83735 ASSAY OF MAGNESIUM: CPT

## 2025-03-06 PROCEDURE — 93325 DOPPLER ECHO COLOR FLOW MAPG: CPT

## 2025-03-06 PROCEDURE — 87641 MR-STAPH DNA AMP PROBE: CPT

## 2025-03-06 PROCEDURE — 85025 COMPLETE CBC W/AUTO DIFF WBC: CPT

## 2025-03-06 PROCEDURE — 85730 THROMBOPLASTIN TIME PARTIAL: CPT

## 2025-03-06 PROCEDURE — 85576 BLOOD PLATELET AGGREGATION: CPT

## 2025-03-06 PROCEDURE — 86922 COMPATIBILITY TEST ANTIGLOB: CPT

## 2025-03-06 PROCEDURE — 80053 COMPREHEN METABOLIC PANEL: CPT

## 2025-03-06 PROCEDURE — C1769: CPT

## 2025-03-06 PROCEDURE — 83880 ASSAY OF NATRIURETIC PEPTIDE: CPT

## 2025-03-06 PROCEDURE — 81001 URINALYSIS AUTO W/SCOPE: CPT

## 2025-03-06 PROCEDURE — 71045 X-RAY EXAM CHEST 1 VIEW: CPT | Mod: 26

## 2025-03-06 PROCEDURE — 87640 STAPH A DNA AMP PROBE: CPT

## 2025-03-06 PROCEDURE — 71045 X-RAY EXAM CHEST 1 VIEW: CPT | Mod: 26,77

## 2025-03-06 PROCEDURE — 93320 DOPPLER ECHO COMPLETE: CPT

## 2025-03-06 PROCEDURE — 80048 BASIC METABOLIC PNL TOTAL CA: CPT

## 2025-03-06 PROCEDURE — 84134 ASSAY OF PREALBUMIN: CPT

## 2025-03-06 PROCEDURE — 93454 CORONARY ARTERY ANGIO S&I: CPT

## 2025-03-06 PROCEDURE — 36430 TRANSFUSION BLD/BLD COMPNT: CPT

## 2025-03-06 PROCEDURE — 36415 COLL VENOUS BLD VENIPUNCTURE: CPT

## 2025-03-06 PROCEDURE — P9016: CPT

## 2025-03-06 PROCEDURE — C1887: CPT

## 2025-03-06 PROCEDURE — C1894: CPT

## 2025-03-06 PROCEDURE — 93306 TTE W/DOPPLER COMPLETE: CPT

## 2025-03-06 PROCEDURE — 99232 SBSQ HOSP IP/OBS MODERATE 35: CPT

## 2025-03-06 RX ORDER — B1/B2/B3/B5/B6/B12/VIT C/FOLIC 500-0.5 MG
1 TABLET ORAL DAILY
Refills: 0 | Status: DISCONTINUED | OUTPATIENT
Start: 2025-03-06 | End: 2025-03-20

## 2025-03-06 RX ORDER — SENNA 187 MG
2 TABLET ORAL
Qty: 0 | Refills: 0 | DISCHARGE
Start: 2025-03-06

## 2025-03-06 RX ORDER — LEVOTHYROXINE SODIUM 300 MCG
1 TABLET ORAL
Qty: 0 | Refills: 0 | DISCHARGE
Start: 2025-03-06

## 2025-03-06 RX ORDER — MELATONIN 5 MG
5 TABLET ORAL AT BEDTIME
Refills: 0 | Status: DISCONTINUED | OUTPATIENT
Start: 2025-03-06 | End: 2025-03-06

## 2025-03-06 RX ORDER — CEFTRIAXONE 500 MG/1
INJECTION, POWDER, FOR SOLUTION INTRAMUSCULAR; INTRAVENOUS
Refills: 0 | Status: DISCONTINUED | OUTPATIENT
Start: 2025-03-06 | End: 2025-03-18

## 2025-03-06 RX ORDER — CEFTRIAXONE 500 MG/1
2000 INJECTION, POWDER, FOR SOLUTION INTRAMUSCULAR; INTRAVENOUS EVERY 24 HOURS
Refills: 0 | Status: DISCONTINUED | OUTPATIENT
Start: 2025-03-07 | End: 2025-03-18

## 2025-03-06 RX ORDER — SENNA 187 MG
2 TABLET ORAL AT BEDTIME
Refills: 0 | Status: DISCONTINUED | OUTPATIENT
Start: 2025-03-06 | End: 2025-03-20

## 2025-03-06 RX ORDER — CYCLOBENZAPRINE HYDROCHLORIDE 15 MG/1
5 CAPSULE, EXTENDED RELEASE ORAL THREE TIMES A DAY
Refills: 0 | Status: DISCONTINUED | OUTPATIENT
Start: 2025-03-06 | End: 2025-03-20

## 2025-03-06 RX ORDER — MAGNESIUM SULFATE 500 MG/ML
2 SYRINGE (ML) INJECTION ONCE
Refills: 0 | Status: COMPLETED | OUTPATIENT
Start: 2025-03-06 | End: 2025-03-06

## 2025-03-06 RX ORDER — GLUCOSAMINE HCL/CHONDROITIN SU 500-400 MG
1 CAPSULE ORAL
Refills: 0 | DISCHARGE

## 2025-03-06 RX ORDER — FERROUS SULFATE 137(45) MG
1 TABLET, EXTENDED RELEASE ORAL
Qty: 0 | Refills: 0 | DISCHARGE
Start: 2025-03-06

## 2025-03-06 RX ORDER — LEVOTHYROXINE SODIUM 300 MCG
150 TABLET ORAL DAILY
Refills: 0 | Status: DISCONTINUED | OUTPATIENT
Start: 2025-03-07 | End: 2025-03-20

## 2025-03-06 RX ORDER — CYANOCOBALAMIN 1000 UG/ML
1 INJECTION INTRAMUSCULAR; SUBCUTANEOUS
Refills: 0 | DISCHARGE

## 2025-03-06 RX ORDER — FUROSEMIDE 10 MG/ML
1 INJECTION INTRAMUSCULAR; INTRAVENOUS
Qty: 0 | Refills: 0 | DISCHARGE
Start: 2025-03-06

## 2025-03-06 RX ORDER — ATORVASTATIN CALCIUM 80 MG/1
1 TABLET, FILM COATED ORAL
Qty: 0 | Refills: 0 | DISCHARGE
Start: 2025-03-06

## 2025-03-06 RX ORDER — UBIDECARENONE 100 MG
1 CAPSULE ORAL
Refills: 0 | DISCHARGE

## 2025-03-06 RX ORDER — BISACODYL 5 MG
1 TABLET, DELAYED RELEASE (ENTERIC COATED) ORAL
Qty: 0 | Refills: 0 | DISCHARGE
Start: 2025-03-06

## 2025-03-06 RX ORDER — POLYETHYLENE GLYCOL 3350 17 G/17G
17 POWDER, FOR SOLUTION ORAL DAILY
Refills: 0 | Status: DISCONTINUED | OUTPATIENT
Start: 2025-03-07 | End: 2025-03-20

## 2025-03-06 RX ORDER — ATORVASTATIN CALCIUM 80 MG/1
1 TABLET, FILM COATED ORAL
Refills: 0 | DISCHARGE

## 2025-03-06 RX ORDER — B1/B2/B3/B5/B6/B12/VIT C/FOLIC 500-0.5 MG
1 TABLET ORAL
Qty: 0 | Refills: 0 | DISCHARGE
Start: 2025-03-06

## 2025-03-06 RX ORDER — POLYETHYLENE GLYCOL 3350 17 G/17G
17 POWDER, FOR SOLUTION ORAL
Qty: 0 | Refills: 0 | DISCHARGE
Start: 2025-03-06

## 2025-03-06 RX ORDER — TRAMADOL HYDROCHLORIDE 50 MG/1
50 TABLET, FILM COATED ORAL EVERY 6 HOURS
Refills: 0 | Status: DISCONTINUED | OUTPATIENT
Start: 2025-03-06 | End: 2025-03-10

## 2025-03-06 RX ORDER — TRAMADOL HYDROCHLORIDE 50 MG/1
1 TABLET, FILM COATED ORAL
Qty: 0 | Refills: 0 | DISCHARGE
Start: 2025-03-06

## 2025-03-06 RX ORDER — FUROSEMIDE 10 MG/ML
40 INJECTION INTRAMUSCULAR; INTRAVENOUS DAILY
Refills: 0 | Status: DISCONTINUED | OUTPATIENT
Start: 2025-03-07 | End: 2025-03-20

## 2025-03-06 RX ORDER — CYCLOBENZAPRINE HYDROCHLORIDE 15 MG/1
1 CAPSULE, EXTENDED RELEASE ORAL
Qty: 0 | Refills: 0 | DISCHARGE
Start: 2025-03-06

## 2025-03-06 RX ORDER — CEFTRIAXONE 500 MG/1
2000 INJECTION, POWDER, FOR SOLUTION INTRAMUSCULAR; INTRAVENOUS EVERY 24 HOURS
Refills: 0 | Status: DISCONTINUED | OUTPATIENT
Start: 2025-03-06 | End: 2025-03-06

## 2025-03-06 RX ORDER — SITAGLIPTIN 100 MG/1
1 TABLET, FILM COATED ORAL
Refills: 0 | DISCHARGE

## 2025-03-06 RX ORDER — SPIRONOLACTONE 25 MG
1 TABLET ORAL
Qty: 0 | Refills: 0 | DISCHARGE
Start: 2025-03-06

## 2025-03-06 RX ORDER — B1/B2/B3/B5/B6/B12/VIT C/FOLIC 500-0.5 MG
1 TABLET ORAL
Refills: 0 | DISCHARGE

## 2025-03-06 RX ORDER — SPIRONOLACTONE 25 MG
25 TABLET ORAL DAILY
Refills: 0 | Status: DISCONTINUED | OUTPATIENT
Start: 2025-03-07 | End: 2025-03-20

## 2025-03-06 RX ORDER — AMMONIUM LACTATE 12 %
1 LOTION (ML) TOPICAL
Refills: 0 | Status: DISCONTINUED | OUTPATIENT
Start: 2025-03-06 | End: 2025-03-20

## 2025-03-06 RX ORDER — METFORMIN HYDROCHLORIDE 850 MG/1
4 TABLET ORAL
Refills: 0 | DISCHARGE

## 2025-03-06 RX ORDER — ACETAMINOPHEN 500 MG/5ML
2 LIQUID (ML) ORAL
Qty: 0 | Refills: 0 | DISCHARGE
Start: 2025-03-06

## 2025-03-06 RX ORDER — TRAMADOL HYDROCHLORIDE 50 MG/1
0.5 TABLET, FILM COATED ORAL
Qty: 0 | Refills: 0 | DISCHARGE
Start: 2025-03-06

## 2025-03-06 RX ORDER — CEFTRIAXONE 500 MG/1
2000 INJECTION, POWDER, FOR SOLUTION INTRAMUSCULAR; INTRAVENOUS ONCE
Refills: 0 | Status: COMPLETED | OUTPATIENT
Start: 2025-03-06 | End: 2025-03-06

## 2025-03-06 RX ORDER — ACETAMINOPHEN 500 MG/5ML
650 LIQUID (ML) ORAL EVERY 8 HOURS
Refills: 0 | Status: DISCONTINUED | OUTPATIENT
Start: 2025-03-06 | End: 2025-03-18

## 2025-03-06 RX ORDER — LEVOTHYROXINE SODIUM 300 MCG
1 TABLET ORAL
Refills: 0 | DISCHARGE

## 2025-03-06 RX ORDER — TRAMADOL HYDROCHLORIDE 50 MG/1
25 TABLET, FILM COATED ORAL EVERY 6 HOURS
Refills: 0 | Status: DISCONTINUED | OUTPATIENT
Start: 2025-03-06 | End: 2025-03-10

## 2025-03-06 RX ORDER — APIXABAN 2.5 MG/1
5 TABLET, FILM COATED ORAL
Refills: 0 | Status: DISCONTINUED | OUTPATIENT
Start: 2025-03-06 | End: 2025-03-20

## 2025-03-06 RX ORDER — MELATONIN 5 MG
1 TABLET ORAL
Qty: 0 | Refills: 0 | DISCHARGE
Start: 2025-03-06

## 2025-03-06 RX ORDER — FERROUS SULFATE 137(45) MG
1 TABLET, EXTENDED RELEASE ORAL
Refills: 0 | DISCHARGE

## 2025-03-06 RX ORDER — DAPAGLIFLOZIN 5 MG/1
1 TABLET, FILM COATED ORAL
Refills: 0 | DISCHARGE

## 2025-03-06 RX ORDER — LIDOCAINE HYDROCHLORIDE 20 MG/ML
1 JELLY TOPICAL DAILY
Refills: 0 | Status: DISCONTINUED | OUTPATIENT
Start: 2025-03-07 | End: 2025-03-20

## 2025-03-06 RX ORDER — FERROUS SULFATE 137(45) MG
325 TABLET, EXTENDED RELEASE ORAL DAILY
Refills: 0 | Status: DISCONTINUED | OUTPATIENT
Start: 2025-03-07 | End: 2025-03-20

## 2025-03-06 RX ORDER — BISACODYL 5 MG
10 TABLET, DELAYED RELEASE (ENTERIC COATED) ORAL DAILY
Refills: 0 | Status: DISCONTINUED | OUTPATIENT
Start: 2025-03-06 | End: 2025-03-20

## 2025-03-06 RX ORDER — ATORVASTATIN CALCIUM 80 MG/1
40 TABLET, FILM COATED ORAL AT BEDTIME
Refills: 0 | Status: DISCONTINUED | OUTPATIENT
Start: 2025-03-06 | End: 2025-03-20

## 2025-03-06 RX ORDER — MELATONIN 5 MG
6 TABLET ORAL AT BEDTIME
Refills: 0 | Status: DISCONTINUED | OUTPATIENT
Start: 2025-03-06 | End: 2025-03-09

## 2025-03-06 RX ORDER — CYST/ALA/Q10/PHOS.SER/DHA/BROC 100-20-50
1 POWDER (GRAM) ORAL
Refills: 0 | DISCHARGE

## 2025-03-06 RX ORDER — BISACODYL 5 MG
1 TABLET, DELAYED RELEASE (ENTERIC COATED) ORAL
Refills: 0 | DISCHARGE

## 2025-03-06 RX ADMIN — FUROSEMIDE 40 MILLIGRAM(S): 10 INJECTION INTRAMUSCULAR; INTRAVENOUS at 05:29

## 2025-03-06 RX ADMIN — Medication 1 APPLICATION(S): at 05:30

## 2025-03-06 RX ADMIN — TRAMADOL HYDROCHLORIDE 25 MILLIGRAM(S): 50 TABLET, FILM COATED ORAL at 22:10

## 2025-03-06 RX ADMIN — Medication 2 TABLET(S): at 22:11

## 2025-03-06 RX ADMIN — Medication 40 MILLIGRAM(S): at 05:28

## 2025-03-06 RX ADMIN — TRAMADOL HYDROCHLORIDE 25 MILLIGRAM(S): 50 TABLET, FILM COATED ORAL at 01:56

## 2025-03-06 RX ADMIN — TRAMADOL HYDROCHLORIDE 50 MILLIGRAM(S): 50 TABLET, FILM COATED ORAL at 19:22

## 2025-03-06 RX ADMIN — HEPARIN SODIUM 5000 UNIT(S): 1000 INJECTION INTRAVENOUS; SUBCUTANEOUS at 05:28

## 2025-03-06 RX ADMIN — APIXABAN 5 MILLIGRAM(S): 2.5 TABLET, FILM COATED ORAL at 18:43

## 2025-03-06 RX ADMIN — Medication 650 MILLIGRAM(S): at 22:10

## 2025-03-06 RX ADMIN — TRAMADOL HYDROCHLORIDE 50 MILLIGRAM(S): 50 TABLET, FILM COATED ORAL at 10:47

## 2025-03-06 RX ADMIN — Medication 25 GRAM(S): at 10:43

## 2025-03-06 RX ADMIN — TRAMADOL HYDROCHLORIDE 50 MILLIGRAM(S): 50 TABLET, FILM COATED ORAL at 11:17

## 2025-03-06 RX ADMIN — TRAMADOL HYDROCHLORIDE 50 MILLIGRAM(S): 50 TABLET, FILM COATED ORAL at 18:42

## 2025-03-06 RX ADMIN — ATORVASTATIN CALCIUM 40 MILLIGRAM(S): 80 TABLET, FILM COATED ORAL at 22:11

## 2025-03-06 RX ADMIN — Medication 650 MILLIGRAM(S): at 05:28

## 2025-03-06 RX ADMIN — CEFTRIAXONE 100 MILLIGRAM(S): 500 INJECTION, POWDER, FOR SOLUTION INTRAMUSCULAR; INTRAVENOUS at 22:14

## 2025-03-06 RX ADMIN — Medication 150 MICROGRAM(S): at 05:28

## 2025-03-06 RX ADMIN — Medication 25 MILLIGRAM(S): at 05:29

## 2025-03-06 NOTE — ADVANCED PRACTICE NURSE CONSULT - ASSESSMENT
JORGITO SCORE  Last jorgito score is : ---19  19+: pt is NOT at risk for developing pressure injuries  15-18: pt is AT RISK for developing pressure injuries   13-14: pt is AT MODERATE RISK for developing pressure injuries   10-12: pt is AT HIGH RISK for developing pressure injuries   6-9: pt is AT VERY HIGH RISK for developing pressure injuries     PHYSICAL EXAM  Is pt AOx?: x4 in nad resting comfortably in bed   Bed bound?: no charifast   Incontinent?: no   Mattress/ bed active?: low airloss bed    Can pt assist with turn or dependent?: yes  Active pressure injury prevention measures? : low airloss suerface, heel offloading boots and pillows      SKIN CHECK  - over the L heel is are of subcm discoloration 0.5x0.5cm with diminsihed/difficult to palpate pedal; pulses. there is b/l LE edema, sligtly pitting.

## 2025-03-06 NOTE — H&P ADULT - HISTORY OF PRESENT ILLNESS
73 y/o male with a PMHx of HTN, hypothyroidism obesity, prostate ca, A-FIB, HLD, GI Bleed, S/P TAVR 2023, chronic back pain and DM presented to [facility] ED on 2/20/25 with a two-day history of a rash on his left leg and pain in left knee radiating towards his foot.  Pt  presented from SNF rehab after hospitalization at [facility] (2/10-2/17) and treatment for acute anemia, DARLINE, hypotension, s/p 2 units PRBC and negative EGD/colonscopy. 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 Plan at that time was for 6 weeks of IV antibiotics via PICC line.  CTA on 2/21/25 showed clot to common femoral and distal embolism. S/P common femoral endarterectomy with clot retrieval and good blood flow to lower extremity post surgery. Repeat blood cultures were negative, but the excised clot was found to have gram positive cocci. Repeat MRI spine showed no abscess.   Hospital stay c/b constipation and persistent back pain requiring tramadol for pain relief.  CT and MRI L-spine showed degenerative disc disease and possible discitis/osteomyelisits at T8-T9,  L3-L4 and L5-S1. Pt received transfusion of 2 unit of PRBC's 2/23. He was transferred to CTICU at [facility] on 2/24 for CT surgery evaluation. Cardiac catheterization showed non-obstructive CAD. Pt determined by CT surgery team to not be strong enough for CT surgery at this time, referred to rehab for optimization.  Patient was evaluated by PM&R and therapy for functional deficits, gait/ADL impairments and acute rehabilitation was recommended. Patient was cleared for discharge to [facility] IRF on 3/6/2025.   75 y/o male with a PMHx of HTN, hypothyroidism obesity, prostate ca, A-FIB, HLD, GI Bleed, S/P TAVR 2023, chronic back pain and DM presented to [facility] ED on 2/20/25 with a two-day history of a rash on his left leg and pain in left knee radiating towards his foot.  Pt  presented from SNF rehab after hospitalization at [facility] (2/10-2/17) and treatment for acute anemia, DARLINE, hypotension, s/p 2 units PRBC and negative EGD/colonscopy. 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 Plan at that time was for 6 weeks of IV antibiotics via PICC line.  CTA on 2/21/25 showed clot to common femoral and distal embolism. S/P common femoral endarterectomy with clot retrieval and good blood flow to lower extremity post surgery. Repeat blood cultures were negative, but the excised clot was found to have gram positive cocci. Repeat MRI spine showed no abscess.   Hospital stay c/b constipation and persistent back pain.  CT and MRI L-spine showed degenerative disc disease and possible discitis/osteomyelisits at multiple levels. Pt received transfusion of 2 unit of PRBC's 2/23. He was transferred to CTICU at [facility] on 2/24 for CT surgery evaluation. Cardiac catheterization showed non-obstructive CAD. Pt determined by CT surgery team to not be strong enough for CT surgery at this time, referred to rehab for optimization.  Patient was evaluated by PM&R and therapy for functional deficits, gait/ADL impairments and acute rehabilitation was recommended. Patient was cleared for discharge to [facility] IRF on 3/6/2025.   Mr. Win Menezes is a 74-year-old male patient with past medical history of HTN, diabetes mellitus with hyperglycemia, hypothyroidism, obesity, prostate cancer, atrial fibrillation, HLD, GI bleed, S/P TAVR (2023), and chronic back pain who presented to the ED at [facility] on 2/20/25 with a two-day history of a rash on his left leg and pain in the left knee radiating towards his foot. He presented to the ED from SNF rehab after hospitalization at [facility] from 2/10/25 through 2/17/25 and treatment for acute anemia, DARLINE, hypotension, s/p 2 units PRBC and negative EGD/colonoscopy. He was found to have an infected TAVR and osteomyelitis of spine, and strep salivarius bacteremia. Plan at that time was for 6 weeks of IV antibiotics via PICC line. CTA on 2/21/25 showed clot to common femoral and distal embolism. He is S/P common femoral endarterectomy with clot retrieval and good blood flow to lower extremity post surgery. Repeat blood cultures were negative, but the excised clot was found to have gram positive cocci. Repeat MRI spine showed no abscess.   Hospital stay c/b constipation and persistent back pain. CT and MRI L-spine showed degenerative disc disease and possible discitis/osteomyelitis at multiple levels. Patient received transfusion of 2 unit of PRBC's on 2/23/25. He was transferred to CTICU at [facility] on 2/24/25 for CT surgery evaluation. Cardiac catheterization showed non-obstructive CAD. Patient determined by CT surgery team to not be strong enough for CT surgery at this time, referred to rehab for optimization. Patient was evaluated by PM&R and therapy for functional deficits, gait/ADL impairments and acute rehabilitation was recommended. Patient was cleared for discharge to [facility] IRF on 3/6/2025.   Mr. Win Menezes is a 74-year-old male patient with past medical history of HTN, diabetes mellitus with hyperglycemia, hypothyroidism, obesity, prostate cancer, atrial fibrillation, HLD, GI bleed, S/P TAVR (2023), and chronic back pain who presented to the ED at [facility] on 2/20/25 with a two-day history of a rash on his left leg and pain in the left knee radiating towards his foot. He presented to the ED from SNF rehab where he was admitted for less than one week. after hospitalization at [facility] from 2/10/25 through 2/17/25 and treatment for acute anemia, DARLINE, hypotension, s/p 2 units PRBC and negative EGD/colonoscopy. He was found to have an infected TAVR and osteomyelitis of spine, and strep salivarius bacteremia. Plan at that time was for 6 weeks of IV antibiotics via PICC line. CTA on 2/21/25 showed clot to common femoral and distal embolism. He is S/P common femoral endarterectomy with clot retrieval and good blood flow to lower extremity post surgery. Repeat blood cultures were negative, but the excised clot was found to have gram positive cocci. Repeat MRI spine showed no abscess. Hospital stay c/b constipation and persistent back pain. CT and MRI L-spine showed degenerative disc disease and possible discitis/osteomyelitis at multiple levels. Patient received transfusion of 2 unit of PRBC's on 2/23/25. He was transferred to CTICU at [facility] on 2/24/25 for CT surgery evaluation. Cardiac catheterization showed non-obstructive CAD. Patient determined by CT surgery team to not be strong enough for CT surgery at this time, referred to rehab for optimization. Patient was evaluated by PM&R and therapy for functional deficits, gait/ADL impairments and acute rehabilitation was recommended. Patient was cleared for discharge to [facility] IRF on 3/6/2025.

## 2025-03-06 NOTE — PROGRESS NOTE ADULT - SUBJECTIVE AND OBJECTIVE BOX
Brief summary:  74yMale seen and assessed at bedside.  Pt laying comfortably in hospital bed in NAD on room air.  States no current physical complaints at this time.  Denies f/c, n/v, chest pain, sob, abdominal pain, d/c, urinary symptoms.  ROS negative x 10.      Overnight events: No acute events overnight.  Hospital course progressing as expected.      PAST MEDICAL & SURGICAL HISTORY:  Hypertension    Diabetes mellitus    Obesity    Hypothyroidism    Prostate CA        Medications:  acetaminophen     Tablet .. 650 milliGRAM(s) Oral every 8 hours  atorvastatin 40 milliGRAM(s) Oral at bedtime  bisacodyl Suppository 10 milliGRAM(s) Rectal daily PRN  cefTRIAXone Injectable. 2000 milliGRAM(s) IV Push every 24 hours  chlorhexidine 2% Cloths 1 Application(s) Topical <User Schedule>  cyclobenzaprine 5 milliGRAM(s) Oral three times a day PRN  ferrous    sulfate 325 milliGRAM(s) Oral daily  furosemide    Tablet 40 milliGRAM(s) Oral daily  heparin   Injectable 5000 Unit(s) SubCutaneous every 8 hours  levothyroxine 150 MICROGram(s) Oral daily  lidocaine   4% Patch 1 Patch Transdermal daily  melatonin 5 milliGRAM(s) Oral at bedtime  multivitamin 1 Tablet(s) Oral daily  pantoprazole    Tablet 40 milliGRAM(s) Oral before breakfast  polyethylene glycol 3350 17 Gram(s) Oral daily  senna 2 Tablet(s) Oral at bedtime  sodium chloride 0.9% Bolus 250 milliLiter(s) IV Bolus once  sodium chloride 0.9% lock flush 3 milliLiter(s) IV Push every 8 hours  spironolactone 25 milliGRAM(s) Oral daily  traMADol 25 milliGRAM(s) Oral every 6 hours PRN  traMADol 50 milliGRAM(s) Oral every 6 hours PRN      MEDICATIONS  (PRN):  bisacodyl Suppository 10 milliGRAM(s) Rectal daily PRN Constipation  cyclobenzaprine 5 milliGRAM(s) Oral three times a day PRN Muscle Spasm  traMADol 25 milliGRAM(s) Oral every 6 hours PRN Moderate Pain (4 - 6)  traMADol 50 milliGRAM(s) Oral every 6 hours PRN Severe Pain (7 - 10)        Daily Review:               11.0   8.66  )-----------( 197      ( 05 Mar 2025 06:10 )             34.3   03-05    133[L]  |  98  |  21.9[H]  ----------------------------<  94  4.0   |  24.0  |  0.91    Ca    8.7      05 Mar 2025 06:10  Mg     1.9     03-05      T(C): 36.6 (03-06-25 @ 00:09), Max: 36.8 (03-05-25 @ 16:11)  HR: 74 (03-06-25 @ 00:09) (61 - 79)  BP: 134/66 (03-06-25 @ 00:09) (120/69 - 146/70)  RR: 17 (03-06-25 @ 00:09) (15 - 18)  SpO2: 93% (03-06-25 @ 00:09) (93% - 99%)  Wt(kg): --      I&O's Summary    04 Mar 2025 07:01  -  05 Mar 2025 07:00  --------------------------------------------------------  IN: 720 mL / OUT: 1540 mL / NET: -820 mL    05 Mar 2025 07:01  -  06 Mar 2025 01:11  --------------------------------------------------------  IN: 480 mL / OUT: 1370 mL / NET: -890 mL        Physical Exam  Neuro: A+O x 3, non-focal, speech clear and intact  Pulm: coarse breath sounds bilaterally, no wheezing or rales  CV: RRR, +S1S2  Abd: soft, NT, ND, normoactive bowel sounds  Ext: +DP Pulses b/l, +PT pulses, no edema        CXR 3/5/25    < from: Xray Chest 1 View- PORTABLE-Routine (Xray Chest 1 View- PORTABLE-Routine in AM.) (03.05.25 @ 06:18) >  COMPARISON: 3/4/2025 chest x-ray. A 2/28/2025 chest x-ray CLINICAL   INFORMATION: Acute and subacute endocarditis..    FINDINGS:  CATHETERS AND TUBES: RIGHT PICC line catheter tip in SVC.    PULMONARY:  RIGHT mid zone focal airspace opacity versus overlapping   pulmonary vessels.  Remaining lung parenchyma grossly clear.  ..  No pleural effusion or pneumothorax.    HEART/VASCULAR: The heart size and mediastinum configuration are within   the limits of normal.    BONES: The visualized osseous thorax is intact.    IMPRESSION:  RIGHT mid zone focal airspace opacity versus overlapping pulmonary   vessels.  Remaining lung parenchyma grossly clear.    --- End of Report ---    < end of copied text >       regular

## 2025-03-06 NOTE — PROGRESS NOTE ADULT - REASON FOR ADMISSION
Bioprosthetic AoV endocarditis

## 2025-03-06 NOTE — DISCHARGE NOTE NURSING/CASE MANAGEMENT/SOCIAL WORK - NSDCPEFALRISK_GEN_ALL_CORE
For information on Fall & Injury Prevention, visit: [email or web address] OR  [email or web address] OR  https://www.cdc.gov/steadi/patient.html

## 2025-03-06 NOTE — H&P ADULT - ASSESSMENT
Assessment/Plan:  CONNOR SANDRA is a 74y with a history of  HTN, hypothyroidism obesity, prostate ca, A-FIB, HLD, GI Bleed, S/P TAVR 2023, chronic back pain, DM, s/p hospitalization (2/10-2/17) and treatment for acute anemia, DARLINE, hypotension, s/p 2 units PRBC and negative EGD/colonscopy. Found to have infected TAVR and Osteomyelitis of spine, currently still on IV antibiotics via PICC, who presented back to [facility] for left lower rash and pain, found to have clot to common femoral and distal embolism. S/P common femoral endarterectomy with clot retrieval and good blood flow to lower extremity post surgery. Pt evaulated for CT surgery and found not to be a candidate at this time, therefore transferred to  rehab for optimization.      #Aortic valve endocarditis   - Ceftriaxone 2 g once daily via PICC through 4/7/25  - PICC line care; monitor site for swelling, bleeding, infection  - Monitor CBC, CMP, and Weekly ESR and CRP.   - WBC 10.09 on 3/6  - ESR//206 on 2/20  - CXR weekly  - Daily weights  - Blood glucose fingersticks AC/HS  - CTS workup initiated  - ID and CTS f/up after discharge  Comprehensive Multidisciplinary Rehab Program:  - Start comprehensive rehab program, 3 hours a day, 5 days a week.  - PT 1hr/day: Focused on improving strength, endurance, coordination, balance, functional mobility, and transfers  - OT 1hr/day: Focused on improving strength, fine motor skills, coordination, posture and ADLs.    - Speech Therapy 1hr/day: to diagnose and treat deficits in swallowing, cognition and communication.   - Activity Limitations: Decreased social, vocational and leisure activities, decreased self care and ADLs, decreased mobility, decreased ability to manage household and finances.     -----------------------------------------------------------------------------------  Concurrent Medical Problems    # s/p Cardiac catheterization   - Monitor right wrist and right groin for bleeding.  - No heavy lifting or strenuous activity until 3/10    #Osteomyelitis of multiple vertebra  - Ceftriaxone through 4/7/25 as above  - LSO brace when OOB  - ortho f/up after rehab d/c    #Arterial embolism of left leg  - s/p LCFA/LSFA thrombectomy/endarterectomy on 2/22  - Left groin staples should remain in place until 3/13  - Continue Eliquis 5mg BID  - DVT ppx: TEDs    #Bacteremia  - strep salivarius bacteremia> cultures now clear.   - Ceftriaxione through 4/7/25 as above      #Chronic atrial fibrillation  - Contiue eliquis as above    #DM  - No home meds??  - AM glucose 113-97      #HTN  - Sprinolactone 25mg daily  - Lasix 40mg daily  - Finasteride 40mg daily    #HLD  - atovastatin 40mg daily at HS    #Anemia  - Ferrous sulfate 325mg daily  - Trend H/H (10.9/32/9 on 3/6)    #hypothryoid  - levothryoxine 150mcg daily    #Mood/Cognition:  Neuropsychology consult PRN    #Sleep:   Maintain quiet hours and low stim environment.  Melatonin 6mg PRN to maximize participation in therapy during the day.     #Pain Management:  Analgesic: Tylenol 650mg every 8 hours PRN  Narcotic: Tramadol 25 mg every 6 hours PRN moderate pain (4-6)  Tramadol 50mg every 6 hours PRN severe pain (7-10)  Antispasmodic: Cyclobenzaprine 5mg TID PRN muscle spasm  Avoid sedating medications that may interfere with cognitive recovery    #GI/Bowel:  Senna 2 tabs QHS   Miralax 17g Daily  GI ppx: Pantoprazole 40mg daily before a meal  bisacodyl 10mg suppository rectally once a day PRN constipation      #/Bladder:   - PVR on admission  - monitor urine output    #Skin/Pressure Injury:   - Skin assessment on admission: ***  - Monitor Incisions: ***  - Soft heel protectors  - Skin barrier cream as needed  - Nursing to monitor skin Qshift    #Diet/Dysphagia:  Current Diet: Dash diet, consistent carb diabetic diet  Nutrition consult     #Precautions / PROPHYLAXIS:   - Falls, Cardiac, Spinal  - ortho: Weight bearing status: WBAT   - Lungs: Aspiration, Incentive Spirometer   - Pressure injury/Skin: OOB to Chair, PT/OT      ---------------  Code Status: FULL  Emergency Contact:    Outpatient Follow-up (Specialty/Name of physician):    [facility] [address]  Scheduled Appointment: 03/17/2025  For kidney tests and CBC    [facility]  [facility] [address]  Scheduled Appointment: 04/07/2025    Kumar Mahamed Lam  Cardiovascular Disease  [address] [facility], [address]  [place] [address]  Phone: [phone]  Fax: [phone]  Follow Up Time: 1-2 weeks for ROVERTO and cardiac workup    Lizbeth Redd  Nephrology  [address] [facility] [facility], [address]  [place] [address]  Phone: [phone]  Fax: [phone]  Follow Up Time: within 4-6 weeks    Gualberto Porter  Gastroenterology  [address] [facility], [address]  [place] [address]  Phone: [phone]  Fax: [phone]  Follow Up Time:   For pill camera test    Berny Lizarraga [facility]  Neurosurgery  [address] [facility], [address]  [place] [address]  Phone: [phone]  Fax: [phone]  Follow Up Time: 4 weeks  For MRI    Dr. Rodriges   Cardiothoracic surgery  Follow appointment after discharge  [phone]         --------------  Goals: Safe discharge to Home*****  Estimated Length of Stay: 10-14 days  Rehab Potential: Good  Medical Prognosis: Good  Estimated Disposition: Home with Home Care  ---------------    PRESCREEN COMPARISON:  I have reviewed the prescreen information and I have found no relevant changes between the preadmission screening and my post admission evaluation.    RATIONALE FOR INPATIENT ADMISSION: Patient demonstrates the following:  [X] Medically appropriate for rehabilitation admission  [X] Has attainable rehab goals with an appropriate initial discharge plan  [X]Has rehabilitation potential (expected to make a significant improvement within a reasonable period of time)  [X] Requires close medical management by a rehab physician, rehab nursing care, Hospitalist and comprehensive interdisciplinary team (including PT, OT)     Assessment/Plan:  CONNOR SANDRA is a 74y with a history of  HTN, hypothyroidism obesity, prostate ca, A-FIB, HLD, GI Bleed, S/P TAVR 2023, chronic back pain, DM, s/p hospitalization (2/10-2/17) and treatment for acute anemia, DARLINE, hypotension, s/p 2 units PRBC and negative EGD/colonscopy. Found to have infected TAVR and Osteomyelitis of spine, currently still on IV antibiotics via PICC, who presented back to [facility] for left lower rash and pain, found to have clot to common femoral and distal embolism. S/P common femoral endarterectomy with clot retrieval and good blood flow to lower extremity post surgery. Pt evaluated for CT surgery and found not to be a candidate at this time, therefore transferred to  rehab for optimization.    #Aortic valve endocarditis   - Ceftriaxone 2 g once daily via PICC through 4/7/25  - PICC line care; monitor site for swelling, bleeding, infection  - Monitor CBC, CMP, and Weekly ESR and CRP.   - WBC 10.09 on 3/6  - ESR//206 on 2/20  - CXR weekly  - Daily weights  -2/26 ROVERTO: echogenic sesile not very mobile subcentimeter ( 6x2 mm) structure attached to the base of the leaflet corresponding to the native non-coronary cusp likely representing a calcified nodule vs. less likely an old small calcified vegetation, mod AS    Comprehensive Multidisciplinary Rehab Program:  - Start comprehensive rehab program, 3 hours a day, 5 days a week.  - PT 2hr/day: Focused on improving strength, endurance, coordination, balance, functional mobility, and transfers  - OT 1hr/day: Focused on improving strength, fine motor skills, coordination, posture and ADLs.    - Activity Limitations: Decreased social, vocational and leisure activities, decreased self care and ADLs, decreased mobility, decreased ability to manage household and finances.     -----------------------------------------------------------------------------------  Concurrent Medical Problems    # s/p Cardiac catheterization   - Monitor right wrist and right groin for bleeding.  - No heavy lifting or strenuous activity until 3/10    #Osteomyelitis of multiple vertebra  - Ceftriaxone through 4/7/25 as above  - LSO brace when OOB  - ortho f/up after rehab d/c    #Arterial embolism of left leg  - s/p LCFA/LSFA thrombectomy/endarterectomy on 2/22  - Left groin staples should remain in place until 3/13  - Continue Eliquis 5mg BID  - DVT ppx: TEDs    #Bacteremia  - strep salivarius bacteremia> cultures now clear.   - Ceftriaxone through 4/7/25 as above    #Chronic atrial fibrillation  - Continue Eliquis as above    #Type 2 Diabetes Mellitus   - FBG ACHS + Mod ISS?   - No home meds??  - AM glucose 113-97    #HTN  - Sprinolactone 25mg daily  - Lasix 40mg daily  - Finasteride 40mg daily    #HLD  - atovastatin 40mg daily at HS    #Anemia  - Ferrous sulfate 325mg daily  - Trend H/H (10.9/32/9 on 3/6)    #hypothryoid  - levothryoxine 150mcg daily    #Mood/Cognition:  Neuropsychology consult PRN    #Sleep:   Maintain quiet hours and low stim environment.  Melatonin 6mg PRN to maximize participation in therapy during the day.     #Pain Management:  Analgesic: Tylenol 650mg every 8 hours PRN  Narcotic: Tramadol 25 mg every 6 hours PRN moderate pain (4-6)  Tramadol 50mg every 6 hours PRN severe pain (7-10)  Antispasmodic: Cyclobenzaprine 5mg TID PRN muscle spasm  Avoid sedating medications that may interfere with cognitive recovery    #GI/Bowel:  Senna 2 tabs QHS   Miralax 17g Daily  GI ppx: Pantoprazole 40mg daily before a meal  bisacodyl 10mg suppository rectally once a day PRN constipation      #/Bladder:   - PVR on admission  - monitor urine output    #Skin/Pressure Injury:   - Skin assessment on admission: ***  - Monitor Incisions: ***      #Diet/Dysphagia:  Current Diet: Dash diet, consistent carb diabetic diet  Nutrition consult     #Precautions / PROPHYLAXIS:   - Falls, Cardiac, Spinal  - ortho: Weight bearing status: FWB  - Lungs: Aspiration, Incentive Spirometer   - Pressure injury/Skin: OOB to Chair, PT/OT      ---------------  Code Status: FULL  Emergency Contact:    Outpatient Follow-up (Specialty/Name of physician):    [facility]  [facility] [address]  Scheduled Appointment: 03/17/2025  For kidney tests and CBC    [facility]  [facility] [address]  Scheduled Appointment: 04/07/2025    Kumar Mahamed Lam  Cardiovascular Disease  [address] [facility], [address]  [place] [address]  Phone: [phone]  Fax: [phone]  Follow Up Time: 1-2 weeks for ROVERTO and cardiac workup    Lizbeth Redd  Nephrology  [address] [facility], [address]  [place] [address]  Phone: [phone]  Fax: [phone]  Follow Up Time: within 4-6 weeks    Gualberto Porter  Gastroenterology  [address] [facility], [address]  [place] [address]  Phone: [phone]  Fax: [phone]  Follow Up Time:   For pill camera test    Berny [facility]arraga [facility]  Neurosurgery  [address] [facility], [address]  [place] [address]  Phone: [phone]  Fax: [phone]  Follow Up Time: 4 weeks  For MRI    Dr. Rodriges   Cardiothoracic surgery  Follow appointment after discharge  [phone]         --------------  Goals: Safe discharge to Home*****  Estimated Length of Stay: 10-14 days  Rehab Potential: Good  Medical Prognosis: Good  Estimated Disposition: Home with Home Care  ---------------    PRESCREEN COMPARISON:  I have reviewed the prescreen information and I have found no relevant changes between the preadmission screening and my post admission evaluation.    RATIONALE FOR INPATIENT ADMISSION: Patient demonstrates the following:  [X] Medically appropriate for rehabilitation admission  [X] Has attainable rehab goals with an appropriate initial discharge plan  [X]Has rehabilitation potential (expected to make a significant improvement within a reasonable period of time)  [X] Requires close medical management by a rehab physician, rehab nursing care, Hospitalist and comprehensive interdisciplinary team (including PT, OT)     Assessment/Plan:  CONNOR SANDRA is a 74y with a history of  HTN, hypothyroidism obesity, prostate ca, A-FIB, HLD, GI Bleed, S/P TAVR 2023, chronic back pain, DM, s/p hospitalization (2/10-2/17) and treatment for acute anemia, DARLINE, hypotension, s/p 2 units PRBC and negative EGD/colonscopy. Found to have infected TAVR and Osteomyelitis of spine, currently still on IV antibiotics via PICC, who presented back to [facility] for left lower rash and pain, found to have clot to common femoral and distal embolism. S/P common femoral endarterectomy with clot retrieval and good blood flow to lower extremity post surgery. Pt evaluated for CT surgery and found not to be a candidate at this time, therefore transferred to  rehab for optimization.    #Aortic valve endocarditis   - Ceftriaxone 2 g once daily via PICC through 4/7/25  - PICC line care; monitor site for swelling, bleeding, infection  - Monitor CBC, CMP, and Weekly ESR and CRP.   - WBC 10.09 on 3/6  - ESR//206 on 2/20  - CXR weekly  - Daily weights  -2/26 ROVERTO: echogenic sesile not very mobile subcentimeter ( 6x2 mm) structure attached to the base of the leaflet corresponding to the native non-coronary cusp likely representing a calcified nodule vs. less likely an old small calcified vegetation, mod AS    Comprehensive Multidisciplinary Rehab Program:  - Start comprehensive rehab program, 3 hours a day, 5 days a week.  - PT 2hr/day: Focused on improving strength, endurance, coordination, balance, functional mobility, and transfers  - OT 1hr/day: Focused on improving strength, fine motor skills, coordination, posture and ADLs.    - Activity Limitations: Decreased social, vocational and leisure activities, decreased self care and ADLs, decreased mobility, decreased ability to manage household and finances.     -----------------------------------------------------------------------------------  Concurrent Medical Problems    # s/p Cardiac catheterization   - Monitor right wrist and right groin for bleeding.  - No heavy lifting or strenuous activity until 3/10    #Osteomyelitis of multiple vertebra  - Ceftriaxone through 4/7/25 as above  - LSO brace when OOB  - ortho f/up after rehab d/c    #Arterial embolism of left leg  - s/p LCFA/LSFA thrombectomy/endarterectomy on 2/22  - Left groin staples should remain in place until 3/13  - Eliquis 5mg BID -- cleared to restart by cardiology on 3/6.  - DVT ppx: TEDs    #Bacteremia  - strep salivarius bacteremia> cultures now clear.   - Ceftriaxone through 4/7/25 as above    #Chronic atrial fibrillation  - Continue Eliquis as above    #Type 2 Diabetes Mellitus   - Consistent carb diet.   - On metformin and Farxiga at home  - AM glucose 113-97; well controlled.  - Monitor glucose on AM labs.    #HTN  - Sprinolactone 25mg daily  - Lasix 40mg daily  - Finasteride 40mg daily    #HLD  - atovastatin 40mg daily at HS    #Anemia  - Ferrous sulfate 325mg daily  - Trend H/H (10.9/32/9 on 3/6)    #hypothryoid  - levothryoxine 150mcg daily    #Mood/Cognition:  Neuropsychology consult PRN    #Sleep:   Maintain quiet hours and low stim environment.  Melatonin 6mg PRN to maximize participation in therapy during the day.     #Pain Management:  Analgesic: Tylenol 650mg every 8 hours PRN  Narcotic: Tramadol 25 mg every 6 hours PRN moderate pain (4-6)  Tramadol 50mg every 6 hours PRN severe pain (7-10)  Antispasmodic: Cyclobenzaprine 5mg TID PRN muscle spasm  Avoid sedating medications that may interfere with cognitive recovery    #GI/Bowel:  Senna 2 tabs QHS   Miralax 17g Daily  GI ppx: Pantoprazole 40mg daily before a meal  bisacodyl 10mg suppository rectally once a day PRN constipation      #/Bladder:   - PVR on admission  - monitor urine output    #Skin/Pressure Injury:   - Skin assessment on admission: ***  - Monitor Incisions: ***      #Diet/Dysphagia:  Current Diet: Dash diet, consistent carb diabetic diet  Nutrition consult     #Precautions / PROPHYLAXIS:   - Falls, Cardiac, Spinal  - ortho: Weight bearing status: FWB  - Lungs: Aspiration, Incentive Spirometer   - Pressure injury/Skin: OOB to Chair, PT/OT      ---------------  Code Status: FULL  Emergency Contact:    Outpatient Follow-up (Specialty/Name of physician):    [facility]  [facility] [address] [facility]  Scheduled Appointment: 03/17/2025  For kidney tests and CBC    [facility]  [facility] [address]  Scheduled Appointment: 04/07/2025    Mahamed Heath  Cardiovascular Disease  [address] [facility], [address]  [place] [address]  Phone: [phone]  Fax: [phone]  Follow Up Time: 1-2 weeks for ROVERTO and cardiac workup    Lizbeth Redd  Nephrology  [address] [facility], [address]  [place] [address]  Phone: [phone]  Fax: [phone]  Follow Up Time: within 4-6 weeks    Gualberto Poretr  Gastroenterology  [address] [facility], [address]  [place] [address]  Phone: [phone]  Fax: [phone]  Follow Up Time:   For pill camera test    Berny [facility]arraga [facility]  Neurosurgery  [address] [facility]  [place] [address]  Phone: [phone]  Fax: [phone]  Follow Up Time: 4 weeks  For MRI    Dr. Rodriges   Cardiothoracic surgery  Follow appointment after discharge  [phone]         --------------  Goals: Safe discharge to Home*****  Estimated Length of Stay: 10-14 days  Rehab Potential: Good  Medical Prognosis: Good  Estimated Disposition: Home with Home Care  ---------------    PRESCREEN COMPARISON:  I have reviewed the prescreen information and I have found no relevant changes between the preadmission screening and my post admission evaluation.    RATIONALE FOR INPATIENT ADMISSION: Patient demonstrates the following:  [X] Medically appropriate for rehabilitation admission  [X] Has attainable rehab goals with an appropriate initial discharge plan  [X]Has rehabilitation potential (expected to make a significant improvement within a reasonable period of time)  [X] Requires close medical management by a rehab physician, rehab nursing care, Hospitalist and comprehensive interdisciplinary team (including PT, OT)     Assessment/Plan:  CONNOR SANDRA is a 74y with a history of  HTN, hypothyroidism obesity, prostate ca, A-FIB, HLD, GI Bleed, S/P TAVR 2023, chronic back pain, DM, s/p hospitalization (2/10-2/17) and treatment for acute anemia, DARLINE, hypotension, s/p 2 units PRBC and negative EGD/colonscopy. Found to have infected TAVR and Osteomyelitis of spine, currently still on IV antibiotics via PICC, who presented back to [facility] for left lower rash and pain, found to have clot to common femoral and distal embolism. S/P common femoral endarterectomy with clot retrieval and good blood flow to lower extremity post surgery. Pt evaluated for CT surgery and found not to be a candidate at this time, therefore transferred to  rehab for optimization.    #Aortic valve endocarditis   - Ceftriaxone 2 g once daily via PICC through 4/7/25  - PICC line care; monitor site for swelling, bleeding, infection  - Monitor CBC, CMP, and Weekly ESR and CRP.   - WBC 10.09 on 3/6  - ESR//206 on 2/20  - CXR weekly  - Daily weights  -2/26 ROVERTO: echogenic sesile not very mobile subcentimeter ( 6x2 mm) structure attached to the base of the leaflet corresponding to the native non-coronary cusp likely representing a calcified nodule vs. less likely an old small calcified vegetation, mod AS  Comprehensive Multidisciplinary Rehab Program:  - Start comprehensive rehab program, 3 hours a day, 5 days a week.  - PT 2hr/day: Focused on improving strength, endurance, coordination, balance, functional mobility, and transfers  - OT 1hr/day: Focused on improving strength, fine motor skills, coordination, posture and ADLs.    - Activity Limitations: Decreased social, vocational and leisure activities, decreased self care and ADLs, decreased mobility, decreased ability to manage household and finances.     -----------------------------------------------------------------------------------  Concurrent Medical Problems    #LLE edema/warmth/calf pain (on arrival 3/6)  - r/o DVT with bedside doppler 3/6    # s/p Cardiac catheterization   - Monitor right wrist and right groin for bleeding.  - No heavy lifting or strenuous activity until 3/10    #Osteomyelitis of multiple vertebra  - Ceftriaxone through 4/7/25 as above  - LSO brace when OOB  - ortho f/up after rehab d/c    #Arterial embolism of left leg  - s/p LCFA/LSFA thrombectomy/endarterectomy on 2/22  - Left groin staples should remain in place until 3/13  - Eliquis 5mg BID -- cleared to restart by cardiology on 3/6.  - DVT ppx: TEDs    #Bacteremia  - strep salivarius bacteremia> cultures now clear.   - Ceftriaxone through 4/7/25 as above    #Chronic atrial fibrillation  - Continue Eliquis as above    #Type 2 Diabetes Mellitus   - Consistent carb diet.   - On metformin and Farxiga at home  - AM glucose 113-97; well controlled.  - Monitor glucose on AM labs.    #HTN  - Sprinolactone 25mg daily  - Lasix 40mg daily  - Finasteride 40mg daily    #HLD  - atovastatin 40mg daily at HS    #Anemia  - Ferrous sulfate 325mg daily  - Trend H/H (10.9/32/9 on 3/6)    #hypothryoid  - levothryoxine 150mcg daily    #Mood/Cognition:  Neuropsychology consult PRN    #Sleep:   Maintain quiet hours and low stim environment.  Melatonin 6mg PRN to maximize participation in therapy during the day.     #Pain Management:  Analgesic: Tylenol 650mg every 8 hours PRN  Narcotic: Tramadol 25 mg every 6 hours PRN moderate pain (4-6)  Tramadol 50mg every 6 hours PRN severe pain (7-10)  Antispasmodic: Cyclobenzaprine 5mg TID PRN muscle spasm  Avoid sedating medications that may interfere with cognitive recovery    #GI/Bowel:  Senna 2 tabs QHS   Miralax 17g Daily  GI ppx: Pantoprazole 40mg daily before a meal  bisacodyl 10mg suppository rectally once a day PRN constipation      #/Bladder:   - PVR on admission  - monitor urine output    #Skin/Pressure Injury:   - Skin assessment on admission: ***  - Monitor Incisions: ***      #Diet/Dysphagia:  Current Diet: Dash diet, consistent carb diabetic diet  Nutrition consult     #Precautions / PROPHYLAXIS:   - Falls, Cardiac, Spinal  - ortho: Weight bearing status: FWB  - Lungs: Aspiration, Incentive Spirometer   - Pressure injury/Skin: OOB to Chair, PT/OT      ---------------  Code Status: FULL  Emergency Contact:    Outpatient Follow-up (Specialty/Name of physician):    [facility]  [facility] [address]  Scheduled Appointment: 03/17/2025  For kidney tests and CBC    [facility]  [facility] [address]  Scheduled Appointment: 04/07/2025    Mahamed Heath  Cardiovascular Disease  [address] [facility], [address]  [place] [address]  Phone: [phone]  Fax: [phone]  Follow Up Time: 1-2 weeks for ROVERTO and cardiac workup    Lizbeth Redd  Nephrology  [address] [facility], [address]  [place] [address]  Phone: [phone]  Fax: [phone]  Follow Up Time: within 4-6 weeks    Gualberto Porter  Gastroenterology  [address] [facility], [address]  [place] [address]  Phone: [phone]  Fax: [phone]  Follow Up Time:   For pill camera test    Berny Lizarraga [facility]  Neurosurgery  [address] [facility], [address]  [place] [address]  Phone: [phone]  Fax: [phone]  Follow Up Time: 4 weeks  For MRI    Dr. Rodriges   Cardiothoracic surgery  Follow appointment after discharge  [phone]         --------------  Goals: Safe discharge to Home*****  Estimated Length of Stay: 10-14 days  Rehab Potential: Good  Medical Prognosis: Good  Estimated Disposition: Home with Home Care  ---------------    PRESCREEN COMPARISON:  I have reviewed the prescreen information and I have found no relevant changes between the preadmission screening and my post admission evaluation.    RATIONALE FOR INPATIENT ADMISSION: Patient demonstrates the following:  [X] Medically appropriate for rehabilitation admission  [X] Has attainable rehab goals with an appropriate initial discharge plan  [X]Has rehabilitation potential (expected to make a significant improvement within a reasonable period of time)  [X] Requires close medical management by a rehab physician, rehab nursing care, Hospitalist and comprehensive interdisciplinary team (including PT, OT)     Assessment/Plan:  CONNOR SANDRA is a 74y with a history of  HTN, hypothyroidism obesity, prostate ca, A-FIB, HLD, GI Bleed, S/P TAVR 2023, chronic back pain, DM, s/p hospitalization (2/10-2/17) and treatment for acute anemia, DARLINE, hypotension, s/p 2 units PRBC and negative EGD/colonscopy. 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 S/P common femoral endarterectomy with clot retrieval and good blood flow to lower extremity post surgery. Pt evaluated for CT surgery and found not to be a candidate at this time, therefore transferred to  rehab for optimization.    #Aortic valve endocarditis   - Ceftriaxone 2 g once daily via PICC through 4/7/25  - PICC line care; monitor site for swelling, bleeding, infection  - Monitor CBC, CMP, and Weekly ESR and CRP.   - WBC 10.09 on 3/6  - ESR//206 on 2/20  - CXR weekly  - Daily weights  -2/26 ROVERTO: echogenic sesile not very mobile subcentimeter ( 6x2 mm) structure attached to the base of the leaflet corresponding to the native non-coronary cusp likely representing a calcified nodule vs. less likely an old small calcified vegetation, mod AS  Comprehensive Multidisciplinary Rehab Program:  - Start comprehensive rehab program, 3 hours a day, 5 days a week.  - PT 2hr/day: Focused on improving strength, endurance, coordination, balance, functional mobility, and transfers  - OT 1hr/day: Focused on improving strength, fine motor skills, coordination, posture and ADLs.    - Activity Limitations: Decreased social, vocational and leisure activities, decreased self care and ADLs, decreased mobility, decreased ability to manage household and finances.     -----------------------------------------------------------------------------------  Concurrent Medical Problems    #LLE edema/warmth/calf pain (on arrival 3/6)  - r/o DVT with bedside doppler 3/6    # s/p Cardiac catheterization   - Monitor right wrist and right groin for bleeding.  - No heavy lifting or strenuous activity until 3/10    #Osteomyelitis of multiple vertebra  - Ceftriaxone through 4/7/25 as above  - LSO brace when OOB  - ortho f/up after rehab d/c    #Arterial embolism of left leg  - s/p LCFA/LSFA thrombectomy/endarterectomy on 2/22  - Left groin staples should remain in place until 3/13  - Eliquis 5mg BID -- cleared to restart by cardiology on 3/6.  - DVT ppx: TEDs    #Bacteremia  - strep salivarius bacteremia> cultures now clear.   - Ceftriaxone through 4/7/25 as above    #Chronic atrial fibrillation  - Continue Eliquis as above    #Type 2 Diabetes Mellitus   - Consistent carb diet.   - On metformin and Farxiga at home  - AM glucose 113-97; well controlled.  - Monitor glucose on AM labs.    #HTN  - Sprinolactone 25mg daily  - Lasix 40mg daily  - Finasteride 40mg daily    #HLD  - atovastatin 40mg daily at HS    #Anemia  - Ferrous sulfate 325mg daily  - Trend H/H (10.9/32/9 on 3/6)    #hypothryoid  - levothryoxine 150mcg daily    #Mood/Cognition:  Neuropsychology consult PRN    #Sleep:   Maintain quiet hours and low stim environment.  Melatonin 6mg PRN to maximize participation in therapy during the day.     #Pain Management:  Analgesic: Tylenol 650mg every 8 hours PRN  Narcotic: Tramadol 25 mg every 6 hours PRN moderate pain (4-6)  Tramadol 50mg every 6 hours PRN severe pain (7-10)  Antispasmodic: Cyclobenzaprine 5mg TID PRN muscle spasm  Avoid sedating medications that may interfere with cognitive recovery    #GI/Bowel:  Senna 2 tabs QHS   Miralax 17g Daily  GI ppx: Pantoprazole 40mg daily before a meal  bisacodyl 10mg suppository rectally once a day PRN constipation      #/Bladder:   - PVR on admission  - monitor urine output    #Skin/Pressure Injury:   - Skin assessment on admission: ***  - Monitor Incisions: ***      #Diet/Dysphagia:  Current Diet: Dash diet, consistent carb diabetic diet  Nutrition consult     #Precautions / PROPHYLAXIS:   - Falls, Cardiac, Spinal  - ortho: Weight bearing status: FWB  - Lungs: Aspiration, Incentive Spirometer   - Pressure injury/Skin: OOB to Chair, PT/OT      ---------------  Code Status: DNR/DNI; all other medical interventions allowed (see MOLST)  Emergency Contact:    Outpatient Follow-up (Specialty/Name of physician):    [facility] [address]  Scheduled Appointment: 03/17/2025  For kidney tests and CBC    Benito Jones  [facility]  DERM [address]  Scheduled Appointment: 04/07/2025    Mahamed Heath  Cardiovascular Disease  [address] [facility] [address] [address]  [place] [address]  Phone: [phone]  Fax: [phone]  Follow Up Time: 1-2 weeks for ROVERTO and cardiac workup    Lizbeth Redd  Nephrology  [address] [facility] [address] [facility], [address]  [place] [address]  Phone: [phone]  Fax: [phone]  Follow Up Time: within 4-6 weeks    Gualberto Porter  Gastroenterology  [address], [address]  [place] [address]  Phone: [phone]  Fax: [phone]  Follow Up Time:   For pill camera test    Berny Lizarraga [facility]  Neurosurgery  [address]  [place] [address]  Phone: [phone]  Fax: [phone]  Follow Up Time: 4 weeks  For MRI    Dr. Rodriges   Cardiothoracic surgery  Follow appointment after discharge  [phone]         --------------  Goals: Safe discharge to Home*****  Estimated Length of Stay: 10-14 days  Rehab Potential: Good  Medical Prognosis: Good  Estimated Disposition: Home with Home Care  ---------------    PRESCREEN COMPARISON:  I have reviewed the prescreen information and I have found no relevant changes between the preadmission screening and my post admission evaluation.    RATIONALE FOR INPATIENT ADMISSION: Patient demonstrates the following:  [X] Medically appropriate for rehabilitation admission  [X] Has attainable rehab goals with an appropriate initial discharge plan  [X]Has rehabilitation potential (expected to make a significant improvement within a reasonable period of time)  [X] Requires close medical management by a rehab physician, rehab nursing care, Hospitalist and comprehensive interdisciplinary team (including PT, OT)     Assessment/Plan:  CONNOR SANDRA is a 74y with a history of  HTN, hypothyroidism obesity, prostate ca, A-FIB, HLD, GI Bleed, S/P TAVR 2023, chronic back pain, DM, s/p hospitalization (2/10-2/17) and treatment for acute anemia, DARLINE, hypotension, s/p 2 units PRBC and negative EGD/colonscopy. Found to have infected TAVR and Osteomyelitis of spine, currently still on IV antibiotics via PICC, who presented back to [facility] for left lower rash and pain, found to have clot to common femoral and distal embolism. S/P common femoral endarterectomy with clot retrieval and good blood flow to lower extremity post surgery. Pt evaluated for CT surgery and found not to be a candidate at this time, therefore transferred to  rehab for optimization.    #Aortic valve endocarditis   - Ceftriaxone 2 g once daily via PICC through 4/7/25  - PICC line care; monitor site for swelling, bleeding, infection  - Monitor CBC, CMP, and Weekly ESR and CRP.   - WBC 10.09 on 3/6  - ESR//206 on 2/20  - CXR weekly  - Daily weights  -2/26 ROVERTO: echogenic sesile not very mobile subcentimeter ( 6x2 mm) structure attached to the base of the leaflet corresponding to the native non-coronary cusp likely representing a calcified nodule vs. less likely an old small calcified vegetation, mod AS  Comprehensive Multidisciplinary Rehab Program:  - Start comprehensive rehab program, 3 hours a day, 5 days a week.  - PT 2hr/day: Focused on improving strength, endurance, coordination, balance, functional mobility, and transfers  - OT 1hr/day: Focused on improving strength, fine motor skills, coordination, posture and ADLs.    - Activity Limitations: Decreased social, vocational and leisure activities, decreased self care and ADLs, decreased mobility, decreased ability to manage household and finances.     -----------------------------------------------------------------------------------  Concurrent Medical Problems    #LLE edema/warmth/calf pain (on arrival 3/6)  - r/o DVT with bedside doppler 3/6    # s/p Cardiac catheterization   - Monitor right wrist and right groin for bleeding.  - No heavy lifting or strenuous activity until 3/10    #Osteomyelitis of multiple vertebra  - Ceftriaxone through 4/7/25 as above  - LSO brace when OOB  - ortho f/up after rehab d/c    #Arterial embolism of left leg  - s/p LCFA/LSFA thrombectomy/endarterectomy on 2/22  - Left groin staples should remain in place until 3/13  - Eliquis 5mg BID -- cleared to restart by cardiology on 3/6.  - DVT ppx: TEDs    #Bacteremia  - strep salivarius bacteremia> cultures now clear.   - Ceftriaxone through 4/7/25 as above    #Chronic atrial fibrillation  - Continue Eliquis as above    #Type 2 Diabetes Mellitus   - Consistent carb diet.   - On metformin and Farxiga at home  - AM glucose 113-97; well controlled.  - Monitor glucose on AM labs.    #HTN  - Sprinolactone 25mg daily  - Lasix 40mg daily    -#HLD  - atovastatin 40mg daily at HS    #Hypothyroid  - levothyroxine 150mcg daily  - TSH 0.73 on 2/24/25    #Anemia  - Ferrous sulfate 325mg daily  - Trend H/H (10.9/32/9 on 3/6)    #hypothryoid  - levothryoxine 150mcg daily    #Mood/Cognition:  Neuropsychology consult PRN    #Sleep:   Maintain quiet hours and low stim environment.  Melatonin 6mg PRN to maximize participation in therapy during the day.     #Pain Management:  Analgesic: Tylenol 650mg every 8 hours PRN  Narcotic: Tramadol 25 mg every 6 hours PRN moderate pain (4-6)  Tramadol 50mg every 6 hours PRN severe pain (7-10)  Antispasmodic: Cyclobenzaprine 5mg TID PRN muscle spasm  Avoid sedating medications that may interfere with cognitive recovery    #GI/Bowel:  Senna 2 tabs QHS   Miralax 17g Daily  GI ppx: Pantoprazole 40mg daily before a meal  bisacodyl 10mg suppository rectally once a day PRN constipation      #/Bladder:   - PVR on admission  - monitor urine output    #Skin/Pressure Injury:   - Skin assessment on admission: ***  - Monitor Incisions: ***      #Diet/Dysphagia:  Current Diet: Dash diet, consistent carb diabetic diet  Nutrition consult     #Precautions / PROPHYLAXIS:   - Falls, Cardiac, Spinal  - ortho: Weight bearing status: FWB  - Lungs: Aspiration, Incentive Spirometer   - Pressure injury/Skin: OOB to Chair, PT/OT      ---------------  Code Status: DNR/DNI; all other medical interventions allowed (see MOLST)  Emergency Contact:    Outpatient Follow-up (Specialty/Name of physician):    [facility]  [facility] [address] [facility]  Scheduled Appointment: 03/17/2025  For kidney tests and CBC    Benito Jones  [facility] [address]  Scheduled Appointment: 04/07/2025    Mahamed Heath  Cardiovascular Disease  [address] [facility] [facility], [address]  [place] [address]  Phone: [phone]  Fax: [phone]  Follow Up Time: 1-2 weeks for ROVERTO and cardiac workup    Lizbeth Redd  Nephrology  [address] [facility] [address] [facility]  [place] [address]  Phone: [phone]  Fax: [phone]  Follow Up Time: within 4-6 weeks    Gualberto Porter  Gastroenterology  [address] [facility]  [place] [address]  Phone: [phone]  Fax: [phone]  Follow Up Time:   For pill camera test    Berny [facility]arraga [facility]  Neurosurgery  [address]  [place] [address]  Phone: [phone]  Fax: [phone]  Follow Up Time: 4 weeks  For MRI    Dr. Rodriges   Cardiothoracic surgery  Follow appointment after discharge  [phone]         --------------  Goals: Safe discharge to Home*****  Estimated Length of Stay: 10-14 days  Rehab Potential: Good  Medical Prognosis: Good  Estimated Disposition: Home with Home Care  ---------------    PRESCREEN COMPARISON:  I have reviewed the prescreen information and I have found no relevant changes between the preadmission screening and my post admission evaluation.    RATIONALE FOR INPATIENT ADMISSION: Patient demonstrates the following:  [X] Medically appropriate for rehabilitation admission  [X] Has attainable rehab goals with an appropriate initial discharge plan  [X]Has rehabilitation potential (expected to make a significant improvement within a reasonable period of time)  [X] Requires close medical management by a rehab physician, rehab nursing care, Hospitalist and comprehensive interdisciplinary team (including PT, OT)     Assessment/Plan:  Mr. Win Menezes is a 74-year-old male patient with past medical history of HTN, diabetes mellitus with hyperglycemia, hypothyroidism, obesity, prostate cancer, atrial fibrillation, HLD, GI bleed, S/P TAVR (2023), and chronic back pain who is admitted for Acute Inpatient Rehabilitation with a multidisciplinary rehab program at [facility] with functional impairments in ADLs and mobility secondary to spinal osteomyelitis/discitis secondary to infected TAVR and aortic valve.      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              - WBC 10.09 on 3/6             - ESR//206 on 2/20      * CXR weekly      * Daily weights      * LSO brace when OOB  Comprehensive Multidisciplinary Rehab Program:  - Start comprehensive rehab program, 3 hours a day, 5 days a week.  - PT 2hr/day: Focused on improving strength, endurance, coordination, balance, functional mobility, and transfers  - OT 1hr/day: Focused on improving strength, fine motor skills, coordination, posture and ADLs.    - Activity Limitations: Decreased social, vocational and leisure activities, decreased self care and ADLs, decreased mobility, decreased ability to manage household and finances.     -----------------------------------------------------------------------------------  Concurrent Medical Problems    #LLE edema/warmth/calf pain (on arrival 3/6)  - R/O DVT with bedside doppler 3/6    #Severe aortic stenosis with chronic diastolic CHF and first degree AV block  - S/P TAVR (2023)  - ROVERTO (2/26):       * echogenic sessile not very mobile subcentimeter ( 6x2 mm) structure attached to the base of the leaflet corresponding to the native non-coronary cusp       * likely representing a calcified nodule vs. less likely an old small calcified vegetation, mod AS  - S/P Cardiac catheterization  - Monitor right wrist and right groin for bleeding  - No heavy lifting or strenuous activity until 3/10    #Multilevel osteomyelitis   - Ceftriaxone through 4/7/25 as above  - LSO brace when OOB  - Ortho f/up after rehab d/c    #Arterial embolism of left leg  - S/P LCFA/LSFA thrombectomy/endarterectomy on 2/22  - Left groin staples should remain in place until 3/13  - Eliquis 5mg BID -- cleared to restart by cardiology on 3/6.  - DVT ppx: TEDs    #Bacteremia  - Strep salivarius bacteremia> cultures now clear.   - Ceftriaxone through 4/7/25 as above    #Chronic atrial fibrillation  - Continue Eliquis as above    #Type 2 Diabetes Mellitus with hyperglycemia  - Consistent carb diet.   - On metformin and Farxiga at home  - AM glucose 113-97; well controlled.  - Monitor glucose on AM labs.    #HTN  - Spironolactone 25mg daily  - Lasix 40mg daily    #HLD  - Atorvastatin 40mg daily at HS    #Hypothyroid  - levothyroxine 150mcg daily  - TSH 0.73 on 2/24/25    #Anemia  - Ferrous sulfate 325mg daily  - Trend H/H (10.9/32/9 on 3/6)    #Mood/Cognition:  - Neuropsychology consult PRN    #Sleep:   - Maintain quiet hours and low stim environment.  - Melatonin 6mg PRN to maximize participation in therapy during the day.     #Pain Management:  - Analgesic: Tylenol 650mg every 8 hours PRN  - Narcotic: Tramadol 25 mg every 6 hours PRN moderate pain (4-6)  - Tramadol 50mg every 6 hours PRN severe pain (7-10)  - Antispasmodic: Cyclobenzaprine 5mg TID PRN muscle spasm    #GI/Bowel:  - Senna 2 tabs QHS   - Miralax 17g Daily  - GI ppx: Pantoprazole 40mg daily before a meal  - bisacodyl 10mg suppository rectally once a day PRN constipation    #/Bladder:   - PVR on admission  - monitor urine output    #Skin/Pressure Injury assessment:   - Skin assessment on admission:       * Left groin 7.5cm incision with 13 staples present; no swelling/redness/drainage to site      * Right buttock healed scab    #Diet/Dysphagia:  - Current Diet: Dash diet, consistent carb diabetic diet  - Nutrition consult     #Patient Education  - Education provided on the following:      * Admitting diagnosis and functional implications      * Functional goals      * Bladder management      * Bowel management      * Skin care management      * Intensity of service and scheduling of rehab disciplines      * Plan of care and role of interdisciplinary team conference in discharge planning      * Reconciliation of medications from prior institution    #Precautions / PROPHYLAXIS:   - Falls, Cardiac, Spinal  - ortho: Weight bearing status: FWB  - Lungs: Aspiration, Incentive Spirometer   - Pressure injury/Skin: OOB to Chair, PT/OT      ---------------  Code Status: DNR/DNI; all other medical interventions allowed (see MOLST)  Emergency Contact:    Outpatient Follow-up (Specialty/Name of physician):    [facility] [address]  Scheduled Appointment: 03/17/2025  For kidney tests and CBC    Benito Jones Physician [address]  Scheduled Appointment: 04/07/2025    Mahamed Heath  Cardiovascular Disease  [address] [facility], [address]  [place] [address]  Phone: [phone]  Fax: [phone]  Follow Up Time: 1-2 weeks for ROVERTO and cardiac workup    Lizbeth Redd  Nephrology  [address] [facility], [address]  [place] [address]  Phone: [phone]  Fax: [phone]  Follow Up Time: within 4-6 weeks    Gualberto Porter  Gastroenterology  [address] [facility], [address]  [place] [address]  Phone: [phone]  Fax: [phone]  Follow Up Time:   For pill camera test    Berny Lizarraga [facility]  Neurosurgery  [address] [facility] [facility]  [place] [address]  Phone: [phone]  Fax: [phone]  Follow Up Time: 4 weeks  For MRI    Dr. Rodriges   Cardiothoracic surgery  Follow appointment after discharge  [phone]         --------------  Goals: Safe discharge to Home*****  Estimated Length of Stay: 10-14 days  Rehab Potential: Good  Medical Prognosis: Good  Estimated Disposition: Home with Home Care  ---------------    PRESCREEN COMPARISON:  I have reviewed the prescreen information and I have found no relevant changes between the preadmission screening and my post admission evaluation.    RATIONALE FOR INPATIENT ADMISSION: Patient demonstrates the following:  [X] Medically appropriate for rehabilitation admission  [X] Has attainable rehab goals with an appropriate initial discharge plan  [X]Has rehabilitation potential (expected to make a significant improvement within a reasonable period of time)  [X] Requires close medical management by a rehab physician, rehab nursing care, Hospitalist and comprehensive interdisciplinary team (including PT, OT)

## 2025-03-06 NOTE — H&P ADULT - NSHPSOCIALHISTORY_GEN_ALL_CORE
SOCIAL HISTORY  Smoking - Denied  EtOH - Denied   Drugs - Denied    FUNCTIONAL HISTORY  Lives in a condo  with his spouse and has 3 stairs to enter + hand rails and 3 levels of stairs inside + hand rails and chair lift.   Prior Level of Function: Independent in ADLs and ambulation    CURRENT FUNCTIONAL STATUS  - Bed Mobility: Mod assist; 1 person assist  - Transfers: Mod-max assist; 1 person assit. Rolling walker  - Gait: Unable to perform; Rere steady transfer from bed to chair  - ADLs: Upper body: min assist +LSO brace. Lower body: max assist    Lives in a condo with his wife. Stair lift to main level with +bedroom and +bathroom, 15 steps to 2nd level.   Independent in ADLs and ambulation with cane PTA

## 2025-03-06 NOTE — PROGRESS NOTE ADULT - PROBLEM SELECTOR PROBLEM 1
Endocarditis of prosthetic aortic valve
Aortic valve endocarditis
Endocarditis of prosthetic aortic valve
Aortic valve endocarditis

## 2025-03-06 NOTE — PROGRESS NOTE ADULT - PROBLEM SELECTOR PLAN 1
of TAVR valve  prior blood cx + strept salivarius  repeat cultures neg  seen by ID, cont rocephin through at least 4/7/25 2/26 ROVERTO: echogenic sesile not very mobile subcentimeter ( 6x2 mm) structure attached to the base of the leaflet corresponding to the native non-coronary cusp likely representing a calcified nodule vs. less likely an old small calcified vegetation, mod AS  seen by neurosx for LS osteo rec conservative management  fitted for brace  now seen by ortho spine, MR cervical/throacic spine completed, continue to monitor symptoms  needs aggressive PT, ambulation limited by pain  cont lido, tramadol, toradol x5 doses (now off), consider PRN additional doses  ideally pt needs to improve with PT and be more ambulatory prior to any cardiac surgery  PMR/OT eval - recommending acute inpatient rehab; pending bed, will f/u with CM/SW    Plan to be discussed with CT surgery attending in AM rounds

## 2025-03-06 NOTE — H&P ADULT - NSHPREVIEWOFSYSTEMS_GEN_ALL_CORE
REVIEW OF SYSTEMS  Constitutional: No fever, No Chills, No fatigue  HEENT: No eye pain, No visual disturbances, No difficulty hearing  Pulm: No cough,  No shortness of breath  Cardio: No chest pain, No palpitations  GI:  No abdominal pain, No nausea, No vomiting, No diarrhea, + constipation  : No dysuria, No frequency, No hematuria  Neuro: No headaches, No memory loss,  No numbness, No tremors + weakness to b/l lower extremitities  Skin: No itching, No rashes, No lesions. + gluteal cleft painful scab  Endo: No temperature intolerance  MSK: No joint pain, No joint swelling,  + intermittent back pain and left knee and heel pain  Psych:  No depression, No anxiety Constitutional: No fever, No Chills, No fatigue  HEENT: No eye pain, No visual disturbances, No difficulty hearing  Pulm: No cough,  No shortness of breath  Cardio: No chest pain, No palpitations  GI:  No abdominal pain, No nausea, No vomiting, No diarrhea, + constipation  : No dysuria, No frequency, No hematuria  Neuro: No headaches, No memory loss,  No numbness, No tremors + weakness to b/l lower extremitities  Skin: No itching, No rashes, No lesions. + gluteal cleft painful scab  Endo: No temperature intolerance  MSK: No joint pain, No joint swelling,  + intermittent back pain and left knee and heel pain  Psych:  No depression, No anxiety

## 2025-03-06 NOTE — H&P ADULT - REASON FOR ADMISSION
Endocarditis, osteomyelitis Spinal osteomyelitis/discitis secondary to infected TAVR and aortic valve endocarditis

## 2025-03-06 NOTE — ADVANCED PRACTICE NURSE CONSULT - REASON FOR CONSULT
Wound Care was consulted for evaluation of the following:  -L heel wound         Pt seen at bedside today:  - reportig that he has been experiencing intermittent pain to the L heel worse with porlonged seated exposure, somewhat better when elevated rated as 3/10 and non radiating. denies prior h/o bed sores.

## 2025-03-06 NOTE — ADVANCED PRACTICE NURSE CONSULT - RECOMMEDATIONS
area of suspicion not directly over bony prominence, is superficial, resembles more abrasions tot he back fo the LLE, epidermis is retained, intasct, non boggy, no ring of erythema,not uiformly discolored as typiocal DTPI presentation.    1. L heel bruise     WOUNDS:  =======  L heel   continue offfloading when applicable in bed or chair, however if beginning to open would paint with betadine daily and leave porfirio       GENERAL GUIDELINES, AND RECOMMENDATIONS  ==============================  -Carefully check that no tubes are entangled with the bed linens or have contact to patient’s skin. Keep bed sheets smooth and wrinkle free to prevent injury to the skin   - Assure to position pt feet at 20 degrees, then HOB at a 30 degree angle  to limit sliding/friction/shearing, especially in bed bound populations.       Place wedges / moldable pillows above the waist for effective sacral offloading. Keep shoulders and hips properly aligned for greater comfort. Place pillow between legs to support bony prominences and reduce friction/rubbing.  Apply waffle/sacral cushion for sacral offloading, both in bed and in chair. if limited mobility, maintain full fowlers chair seated position for <2 hours at a time.   Keep the pt’s heels protected and elevated off the surface of the bed. Place what the patient can tolerate: pillows, moldable pillows, offloading heel boots      This was a 15  minute visit, with greater than 50% counseling. My findings and suggestions that may benefit the pt were disscussed with the family/caregiver if present at bedside and with pt permission,  shift RN at bedside, in addition to overseeing team/provider via chat and or telephone.     Wound Care will continue to follow the patient? : no    Continue to monitor skin integrity as per policy,  and call or re-consult Wound Care with any acute changes or lack of progression of current recommendations. Wound care rceommendations are generally for shift RN dressing changes, unless otherwise specified. Wound Care IS NOT continuing to follow the patient unless otherwise specified as noted above.     Troy WILLIAMN, RN, CWCN  Wound Ostomy Nurse Educator   [facility]  Please call/message over Teams and/or Email for clarification/contact.  (E): [email or web address]

## 2025-03-06 NOTE — PROGRESS NOTE ADULT - ASSESSMENT
74M, PMHx of HTN, AF, HLD, GI Bleed, S/P TAVR 2023, chronic back pain and DM recent admission at [facility] 2/10-2/17 with acute anemia, DARLINE, hypotension, s/p 2PRBC, negative EGD/colonoscopy, found to have strept salivrius bacteremia, ROVERTO with vegetation on TAVR valve, MRI spine with possible osteo of lumbar-sacral spine, d/c'd to rehab on rocephin, represented to  2/20 with LLE rash and L knee pain found to have clot in LCFA with distal embolism s/p CFA endarterectomy with clot retrieval and good blood flow to LE, clot cx with GPC, repeat MRI spine without abscess, transfused 2 PRBC on 2/23, transferred to CTICU at [facility]  2/24 for CT surgery evaluation.

## 2025-03-06 NOTE — PROGRESS NOTE ADULT - SUBJECTIVE AND OBJECTIVE BOX
Interventional Cardiology Post Cath Progress Note:    Diagnostic cath mild CAD             Subjective:   Patient feels well- no current complaints- Denies chest pain, shortness of breath. Denies pain, numbness, tingling or swelling around (groin/wrist) access site    Since admission patient was seen by vascular surgery,  CTA showed clot to common femoral and distal embolism. S/P common femoral endarterectomy with clot retrieval and good blood flow to lower extremity post surgery. Repeat blood cultures were negative but the excised clot was found to have gram positive cocci. Pt with weakness to legs. Sent for repeat MR with contrast. Results without abscess at this time. Other incidental problem is constipation and persistent back pain requiring tramadol for pain relief.  Pt received transfusion of 2 unit of PRBC's 2/23.    Patient transfered to [facility] for evaluation of TAVR and possible surgical replacement. Condition at time of transfer is stable but guarded prognosis.      Tele 24hrs: fib flutter 98       MEDICATIONS  (STANDING):  acetaminophen     Tablet .. 650 milliGRAM(s) Oral every 8 hours  atorvastatin 40 milliGRAM(s) Oral at bedtime  cefTRIAXone Injectable. 2000 milliGRAM(s) IV Push every 24 hours  chlorhexidine 2% Cloths 1 Application(s) Topical <User Schedule>  ferrous    sulfate 325 milliGRAM(s) Oral daily  furosemide    Tablet 40 milliGRAM(s) Oral daily  heparin   Injectable 5000 Unit(s) SubCutaneous every 8 hours  levothyroxine 150 MICROGram(s) Oral daily  lidocaine   4% Patch 1 Patch Transdermal daily  melatonin 5 milliGRAM(s) Oral at bedtime  multivitamin 1 Tablet(s) Oral daily  pantoprazole    Tablet 40 milliGRAM(s) Oral before breakfast  polyethylene glycol 3350 17 Gram(s) Oral daily  senna 2 Tablet(s) Oral at bedtime  sodium chloride 0.9% Bolus 250 milliLiter(s) IV Bolus once  sodium chloride 0.9% lock flush 3 milliLiter(s) IV Push every 8 hours  spironolactone 25 milliGRAM(s) Oral daily    MEDICATIONS  (PRN):  bisacodyl Suppository 10 milliGRAM(s) Rectal daily PRN Constipation  cyclobenzaprine 5 milliGRAM(s) Oral three times a day PRN Muscle Spasm  traMADol 50 milliGRAM(s) Oral every 6 hours PRN Severe Pain (7 - 10)  traMADol 25 milliGRAM(s) Oral every 6 hours PRN Moderate Pain (4 - 6)      Objective:  Vital Signs Last 24 Hrs  T(C): 36.6 (06 Mar 2025 07:41), Max: 36.8 (05 Mar 2025 16:11)  T(F): 97.9 (06 Mar 2025 07:41), Max: 98.2 (05 Mar 2025 16:11)  HR: 72 (06 Mar 2025 07:41) (61 - 79)  BP: 135/64 (06 Mar 2025 07:41) (120/69 - 146/70)  BP(mean): --  RR: 17 (06 Mar 2025 07:41) (15 - 18)  SpO2: 94% (06 Mar 2025 07:41) (93% - 99%)                          10.9   10.09 )-----------( 183      ( 06 Mar 2025 06:00 )             32.9     03-06    131[L]  |  95[L]  |  20.0  ----------------------------<  112[H]  4.0   |  24.0  |  0.87    Ca    8.6      06 Mar 2025 06:00  Mg     1.8     03-06      Urinalysis Basic - ( 06 Mar 2025 06:00 )          Physical Exam:  No apparent distress, alert and oriented times three, appropriate affect  JVD is not elevated, supple  Clear to auscultation with no wheezing, ronchi or crackles  Regular rate and rhythm with no murmur, rub or gallop  Soft, non-tender, non-distendedRight Upper Extremity: Radial site soft, non tender, no bleeding or hematoma; +2 palpable radial pulse  IF groin:  (Right  groin: Soft, non tender, no bleeding or hematoma; +2 (palpable femoral pulse/audible by doppler/absent)  No clubbing, cyanosis or edema      Assessment/Plan:  HPI:  75 y/o male with a PMHx of HTN, A-FIB, HLD, GI Bleed, S/P TAVR 2023, chronic back pain and DM initially presented to [facility] ED with a two-day history of a rash on his left leg and pain in left knee radiating towards his foot. Pt recently discharged to [facility] rehab after treatment for infected TAVR and Osteomyelitis of spine, strep salivarius bacteremia. Pt received transfusion and w/u for GI bleed with unremarkable colonoscopy and upper endoscopy. Underwent ROVERTO at that time which showed vegetation of TAVR. Plan at that time was for 6 weeks of IV antibiotics via PICC line and out patient pill camera study.      -Post Diagnostic cath mild non obstructive disease   - Procedure site stable.   - Continue atorvastatin  - Recommend a heart healthy diet which includes a variety of fruits and vegetables, whole grains, low fat dairy products, legumes and skinless poulty and fish; food prepared with little or no salt and minimize processed foods  - Avoid using NSAIDs  (Aleve, Motrin, ibuprofen, naproxen) while on DAPT, please utilize Tylenol for pain control (not to exceed 4gm in 24 hours)  -Keep K>4 Mg>2  -For all general cardiology questions please contact patient's primary cards team

## 2025-03-06 NOTE — H&P ADULT - NSHPLABSRESULTS_GEN_ALL_CORE
LABS:                          10.9   10.09 )-----------( 183      ( 06 Mar 2025 06:00 )             32.9     03-06    131[L]  |  95[L]  |  20.0  ----------------------------<  112[H]  4.0   |  24.0  |  0.87    Ca    8.6      06 Mar 2025 06:00  Mg     1.8     03-06     from: CT Angio Lower Extremity w/ IV Cont, Bilateral (02.21.25 @ 18:13)     IMPRESSION:    1. Complete occlusion of the proximal left SFA with extension of a long 4   cm presumed embolus rather than thrombus into the profunda. There is   reconstitution distally.    2. Diffusely diseased trifurcation vessels bilaterally but probably   patent with three-vessel runoff to the feet/ankles on both sides.    --- End of Report ---     from: MR Lumbar Spine w/wo IV Cont (02.24.25 @ 09:45)     IMPRESSION:    1. L3-L4 suspected septic discitis appears largely similar to 2/12/2025   noting mildly increased fluid within the disc space and increased left   paraspinal infiltration and enhancement. No abscess collection has   developed    2. L5-S1 suspected discitis appears largely similar to 2/12/2025, noting   decreased enhancement in the left ventral epidural space. No abscess   collection has developed    3. Recommend continued imaging follow-up    --- End of Report ---       from: Xray Abdomen 1 View (02.24.25 @ 22:30)     IMPRESSION:    Moderate  stool burden within the RIGHT colon and hepatic flexure.  Left-sided air-filled diffuse small bowel ileus versus backflow and   distention..    --- End of Report ---     from: US Duplex Carotid Arteries Complete, Bilateral (02.25.25 @ 10:51)     IMPRESSION: No significant hemodynamic stenosis of either carotid artery.    < end of copied text >        from: CT Lumbar Spine w/ IV Cont (02.26.25 @ 10:40)     IMPRESSION:    Findings supporting the presence of discitis/osteomyelitis at L3-L4 and   L5-S1.    No soft tissue abscess.    --- End of Report---     from: MR Cervical Spine No Cont (03.01.25 @ 15:17)     IMPRESSION: Mild edema at the T8-9 endplates with increased signal within   the intervening disc suspicious for discitis and early osteomyelitis.       < end of copied text >    < from: Xray Chest 1 View->    IMPRESSION:  Right upper extremity PICC with tip at the SVC/right atrial   junction. No pneumothorax.

## 2025-03-06 NOTE — H&P ADULT - NS ATTEND AMEND GEN_ALL_CORE FT
I independently performed the documented the history, exam, and medical decision making. I have made amendments to the documentation where necessary. I have personally seen and examined the patient. Medical records were reviewed and I have made amendments to the documentation where necessary and adjusted the history, physical examination, and plan as documented by the Nurse Practitioners. Patient was seen and evaluated at bedside today. Reported no overnight events and is in no acute distress. Eager to participate on the recommended rehabilitation program. Denies any CP, SOB, RICE, palpitations, fever, chills, body aches, cough, congestion, or any other symptoms at this time. Admission vitals, labs, and physical exam are outlined below.    US DPLX LWR EXT VEINS LTD LT    PROCEDURE DATE:  03/07/2025    IMPRESSION: No evidence of left lower extremity deep venous thrombosis. There is partial compression of the left great saphenous vein at the saphenofemoral junction, however evaluation is limited due to overlying staples. Short-term follow-up is recommended.    LAB                        10.8   9.21  )-----------( 180      ( 07 Mar 2025 07:15 )             33.3     03-07    134[L]  |  99  |  24[H]  ----------------------------<  112[H]  3.7   |  26  |  1.14    Ca    9.1      07 Mar 2025 07:15  Mg     1.8     03-06    TPro  7.0  /  Alb  2.4[L]  /  TBili  1.0  /  DBili  x   /  AST  24  /  ALT  17  /  AlkPhos  127[H]  03-07    LIVER FUNCTIONS - ( 07 Mar 2025 07:15 )  Alb: 2.4 g/dL / Pro: 7.0 g/dL / ALK PHOS: 127 U/L / ALT: 17 U/L / AST: 24 U/L / GGT: x             Vital Signs Last 24 Hrs  T(C): 36.8 (07 Mar 2025 09:35), Max: 37 (06 Mar 2025 20:45)  T(F): 98.3 (07 Mar 2025 09:35), Max: 98.6 (06 Mar 2025 20:45)  HR: 76 (07 Mar 2025 09:35) (74 - 82)  BP: 124/60 (07 Mar 2025 09:35) (124/60 - 145/69)  RR: 16 (07 Mar 2025 09:35) (16 - 16)  SpO2: 94% (07 Mar 2025 09:35) (94% - 96%)    Gen - NAD, Comfortable  HEENT - NCAT, EOMI,  Normal Conjunctivae  Neck - Supple, No limited ROM  Pulm - Clear lungs  Cardiovascular - RRR, S1S2, No murmurs  Chest - good chest expansion, good respiratory effort  Abdomen - Soft, NT/ND, +BS  Extremities - No C/C/E, no calf tenderness +LE swelling with +calf tenderness and warmth. Palpable but diminished left DP pulse  Neuro-     Cognitive - awake, alert, oriented to person, place, date, year, and situation.  Able/   to follow command     Communication - Fluent, Comprehensible, No dysarthria, No aphasia      Cranial Nerves -No facial asymmetry, Tongue midline,  Shoulder shrug intact     Motor - No focal deficits.                     LEFT    UE - ShAB 5/5, EF 5/5, EE 5/5,  5/5                    RIGHT UE - ShAB 5/5, EF 3/5, EE 5/5,   5/5                    LEFT    LE - HF 2/5, KE 2/5, DF 3/5, PF 3/5                    RIGHT LE - HF 3/5, KE 3/5, DF 3/5, PF 4/5        Sensory - Intact to light touch; LLE diminished light touch sensation      Tone - normal  MSK: no joint deformity or swelling; full ROM  Psychiatric - Mood stable, Affect WNL  Skin:    No rashes or lesions; warm and dry.   Left groin 7.5cm incision with 13 staples present; no swelling/redness/drainage to site.   Feet dry.                              Wounds: Right buttock healed scab

## 2025-03-06 NOTE — PROGRESS NOTE ADULT - PROVIDER SPECIALTY LIST ADULT
Cardiology
Critical Care
Critical Care
Infectious Disease
Intervent Cardiology
Orthopedics
Orthopedics
Vascular Surgery
Critical Care
Infectious Disease
Neurosurgery
Orthopedics
CT Surgery
Intervent Cardiology
Neurology
Vascular Surgery
CT Surgery
Infectious Disease
Infectious Disease
CT Surgery
CT Surgery
Cardiology
Cardiology
CT Surgery
CT Surgery
Cardiology
Cardiology

## 2025-03-06 NOTE — DISCHARGE NOTE NURSING/CASE MANAGEMENT/SOCIAL WORK - NSDCFUADDAPPT_GEN_ALL_CORE_FT
DISCHARGING TO:         [facility]         [address]         [facility], [address]         [place] [address]         Phone: [phone]  *******

## 2025-03-06 NOTE — PROGRESS NOTE ADULT - PROBLEM SELECTOR PROBLEM 2
Chronic atrial fibrillation
Discitis
PAF (paroxysmal atrial fibrillation)
Discitis
Osteomyelitis of vertebra, multiple sites in spine

## 2025-03-06 NOTE — H&P ADULT - NSHPPHYSICALEXAM_GEN_ALL_CORE
PHYSICAL EXAMINATION   VItals: T(C): 36.8 (03-06-25 @ 16:19), Max: 36.9 (03-06-25 @ 13:55)  HR: 72 (03-06-25 @ 16:19) (72 - 89)  BP: 136/83 (03-06-25 @ 16:19) (123/68 - 144/75)  RR: 16 (03-06-25 @ 16:19) (16 - 19)  SpO2: 97% (03-06-25 @ 16:19) (93% - 97%)    General: NAD, Resting Comfortable,                                  HEENT: NC/AT, EOMI, Normal Conjunctivae  Cardio: RRR, Normal S1-S2, No M/G/R                              Pulm: No Respiratory Distress, respirations unlabored                        Abdomen: ND/NT, Soft, BS+                                                MSK: Full ROM ; +LE swelling with +calf tenderness and warmth. Palpable but diminished left DP pulse.                                      Ext: No C/C/E, Pulses 2+ throughout, No calf tenderness    Skin: No rashes or lesions; warm and dry. Left groin 7.5cm incision with 13 staples present; no swelling/redness/drainage to site. Feet dry.                                                               Wounds: Right buttock healed scab  Decubitus Ulcers: None Present     Neurological Examination    Cognitive: AAO x 3                                                                         Attention: Intact   Judgment: Good evidence of judgement                                 Mood/Affect: wnl                                                                           Communication:  Fluent,  No dysarthria   CN II - XII  intact                                                                            Sensory: Intact to light touch; LLE diminished light touch sensation                                                                                            Tone: normal Throughout     Motor    LEFT    UE: SF [5/5], EF [5/5], EE [5/5], WE [5/5],  [wnl]  RIGHT UE: SF [5/5], EF [3/5], EE [5/5], WE [5/5],  [wnl]  LEFT    LE:  HF [2/5], KE [2/5], DF [2/5], EHL [3/5],  PF [3/5]  RIGHT LE:  HF [3/5], KE [3/5], DF [3/5], EHL [4/5],  PF [4/5] PHYSICAL EXAM  VITALS  T(C): 36.8 (03-06-25 @ 16:19), Max: 36.9 (03-06-25 @ 13:55)  HR: 72 (03-06-25 @ 16:19) (72 - 89)  BP: 136/83 (03-06-25 @ 16:19) (123/68 - 144/75)  RR: 16 (03-06-25 @ 16:19) (16 - 19)  SpO2: 97% (03-06-25 @ 16:19) (93% - 97%)    Gen - NAD, Comfortable  HEENT - NCAT, EOMI,  Normal Conjunctivae  Neck - Supple, No limited ROM  Pulm - Clear lungs  Cardiovascular - RRR, S1S2, No murmurs  Chest - good chest expansion, good respiratory effort  Abdomen - Soft, NT/ND, +BS  Extremities - No C/C/E, no calf tenderness +LE swelling with +calf tenderness and warmth. Palpable but diminished left DP pulse  Neuro-     Cognitive - awake, alert, oriented to person, place, date, year, and situation.  Able/   to follow command     Communication - Fluent, Comprehensible, No dysarthria, No aphasia      Cranial Nerves -No facial asymmetry, Tongue midline,  Shoulder shrug intact     Motor - No focal deficits.                     LEFT    UE - ShAB 5/5, EF 5/5, EE 5/5,  5/5                    RIGHT UE - ShAB 5/5, EF 3/5, EE 5/5,   5/5                    LEFT    LE - HF 2/5, KE 2/5, DF 3/5, PF 3/5                    RIGHT LE - HF 3/5, KE 3/5, DF 3/5, PF 4/5        Sensory - Intact to light touch; LLE diminished light touch sensation      Tone - normal  MSK: no joint deformity or swelling; full ROM  Psychiatric - Mood stable, Affect WNL  Skin:  No rashes or lesions; warm and dry. Left groin 7.5cm incision with 13 staples present; no swelling/redness/drainage to site. Feet dry.                                                               Wounds: Right buttock healed scab

## 2025-03-06 NOTE — PROGRESS NOTE ADULT - PROBLEM SELECTOR PLAN 2
as above  ortho following  LSO provided to pt
as above  ortho following  f/u MR cervical/thoracic spine
as above  ortho following  LSO provided to pt
as above  ortho following  f/u MR cervical/thoracic spine
CARDIAC MEDS  atorvastatin 40 milliGRAM(s) Oral at bedtime  furosemide    Tablet 40 milliGRAM(s) Oral daily  spironolactone 25 milliGRAM(s) Oral daily
as above  ortho following  LSO provided to pt
back pain is resuming  c/w Rocephin
- Currently sinus rhythm  - CHADSVASC 4 (age, htn, DM, diastolic HF)  - Resume AC unless bleeding risk prohibitive or if there are upcoming procedures.

## 2025-03-06 NOTE — PATIENT PROFILE ADULT - FALL HARM RISK - HARM RISK INTERVENTIONS
Assistance with ambulation/Assistance OOB with selected safe patient handling equipment/Communicate Risk of Fall with Harm to all staff/Discuss with provider need for PT consult/Monitor gait and stability/Reinforce activity limits and safety measures with patient and family/Tailored Fall Risk Interventions/Visual Cue: Yellow wristband and red socks/Bed in lowest position, wheels locked, appropriate side rails in place/Call bell, personal items and telephone in reach/Instruct patient to call for assistance before getting out of bed or chair/Non-slip footwear when patient is out of bed/[place] to call system/Physically safe environment - no spills, clutter or unnecessary equipment/Purposeful Proactive Rounding/Room/bathroom lighting operational, light cord in reach

## 2025-03-06 NOTE — DISCHARGE NOTE NURSING/CASE MANAGEMENT/SOCIAL WORK - FINANCIAL ASSISTANCE
[facility] provides services at a reduced cost to those who are determined to be eligible through [facility]’s financial assistance program. Information regarding [facility]’s financial assistance program can be found by going to [email or web address] or by calling [phone].

## 2025-03-07 LAB
ALBUMIN SERPL ELPH-MCNC: 2.4 G/DL — LOW (ref 3.3–5)
ALP SERPL-CCNC: 127 U/L — HIGH (ref 40–120)
ALT FLD-CCNC: 17 U/L — SIGNIFICANT CHANGE UP (ref 10–45)
ANION GAP SERPL CALC-SCNC: 9 MMOL/L — SIGNIFICANT CHANGE UP (ref 5–17)
AST SERPL-CCNC: 24 U/L — SIGNIFICANT CHANGE UP (ref 10–40)
BASOPHILS # BLD AUTO: 0.06 K/UL — SIGNIFICANT CHANGE UP (ref 0–0.2)
BASOPHILS NFR BLD AUTO: 0.7 % — SIGNIFICANT CHANGE UP (ref 0–2)
BILIRUB SERPL-MCNC: 1 MG/DL — SIGNIFICANT CHANGE UP (ref 0.2–1.2)
BUN SERPL-MCNC: 24 MG/DL — HIGH (ref 7–23)
CALCIUM SERPL-MCNC: 9.1 MG/DL — SIGNIFICANT CHANGE UP (ref 8.4–10.5)
CHLORIDE SERPL-SCNC: 99 MMOL/L — SIGNIFICANT CHANGE UP (ref 96–108)
CO2 SERPL-SCNC: 26 MMOL/L — SIGNIFICANT CHANGE UP (ref 22–31)
CREAT SERPL-MCNC: 1.14 MG/DL — SIGNIFICANT CHANGE UP (ref 0.5–1.3)
EGFR: 67 ML/MIN/1.73M2 — SIGNIFICANT CHANGE UP
EGFR: 67 ML/MIN/1.73M2 — SIGNIFICANT CHANGE UP
EOSINOPHIL # BLD AUTO: 0.07 K/UL — SIGNIFICANT CHANGE UP (ref 0–0.5)
EOSINOPHIL NFR BLD AUTO: 0.8 % — SIGNIFICANT CHANGE UP (ref 0–6)
HGB BLD-MCNC: 10.8 G/DL — LOW (ref 13–17)
IMM GRANULOCYTES NFR BLD AUTO: 0.4 % — SIGNIFICANT CHANGE UP (ref 0–0.9)
LYMPHOCYTES # BLD AUTO: 1.02 K/UL — SIGNIFICANT CHANGE UP (ref 1–3.3)
MCHC RBC-ENTMCNC: 27.8 PG — SIGNIFICANT CHANGE UP (ref 27–34)
MCHC RBC-ENTMCNC: 32.4 G/DL — SIGNIFICANT CHANGE UP (ref 32–36)
MCV RBC AUTO: 85.8 FL — SIGNIFICANT CHANGE UP (ref 80–100)
MONOCYTES # BLD AUTO: 0.44 K/UL — SIGNIFICANT CHANGE UP (ref 0–0.9)
MONOCYTES NFR BLD AUTO: 4.8 % — SIGNIFICANT CHANGE UP (ref 2–14)
NEUTROPHILS # BLD AUTO: 7.58 K/UL — HIGH (ref 1.8–7.4)
NEUTROPHILS NFR BLD AUTO: 82.2 % — HIGH (ref 43–77)
NRBC BLD AUTO-RTO: 0 /100 WBCS — SIGNIFICANT CHANGE UP (ref 0–0)
PLATELET # BLD AUTO: 180 K/UL — SIGNIFICANT CHANGE UP (ref 150–400)
POTASSIUM SERPL-MCNC: 3.7 MMOL/L — SIGNIFICANT CHANGE UP (ref 3.5–5.3)
POTASSIUM SERPL-SCNC: 3.7 MMOL/L — SIGNIFICANT CHANGE UP (ref 3.5–5.3)
PROT SERPL-MCNC: 7 G/DL — SIGNIFICANT CHANGE UP (ref 6–8.3)
RBC # BLD: 3.88 M/UL — LOW (ref 4.2–5.8)
RBC # FLD: 18.5 % — HIGH (ref 10.3–14.5)
SODIUM SERPL-SCNC: 134 MMOL/L — LOW (ref 135–145)
WBC # BLD: 9.21 K/UL — SIGNIFICANT CHANGE UP (ref 3.8–10.5)

## 2025-03-07 PROCEDURE — 93971 EXTREMITY STUDY: CPT | Mod: 26,LT

## 2025-03-07 PROCEDURE — 99223 1ST HOSP IP/OBS HIGH 75: CPT

## 2025-03-07 RX ADMIN — TRAMADOL HYDROCHLORIDE 50 MILLIGRAM(S): 50 TABLET, FILM COATED ORAL at 10:20

## 2025-03-07 RX ADMIN — FUROSEMIDE 40 MILLIGRAM(S): 10 INJECTION INTRAMUSCULAR; INTRAVENOUS at 06:34

## 2025-03-07 RX ADMIN — POLYETHYLENE GLYCOL 3350 17 GRAM(S): 17 POWDER, FOR SOLUTION ORAL at 12:56

## 2025-03-07 RX ADMIN — TRAMADOL HYDROCHLORIDE 50 MILLIGRAM(S): 50 TABLET, FILM COATED ORAL at 21:58

## 2025-03-07 RX ADMIN — Medication 40 MILLIGRAM(S): at 06:34

## 2025-03-07 RX ADMIN — APIXABAN 5 MILLIGRAM(S): 2.5 TABLET, FILM COATED ORAL at 18:51

## 2025-03-07 RX ADMIN — Medication 650 MILLIGRAM(S): at 21:59

## 2025-03-07 RX ADMIN — Medication 1 APPLICATION(S): at 18:52

## 2025-03-07 RX ADMIN — Medication 650 MILLIGRAM(S): at 15:33

## 2025-03-07 RX ADMIN — Medication 150 MICROGRAM(S): at 06:34

## 2025-03-07 RX ADMIN — Medication 1 APPLICATION(S): at 06:35

## 2025-03-07 RX ADMIN — Medication 325 MILLIGRAM(S): at 12:56

## 2025-03-07 RX ADMIN — CYCLOBENZAPRINE HYDROCHLORIDE 5 MILLIGRAM(S): 15 CAPSULE, EXTENDED RELEASE ORAL at 18:51

## 2025-03-07 RX ADMIN — Medication 2 TABLET(S): at 21:59

## 2025-03-07 RX ADMIN — ATORVASTATIN CALCIUM 40 MILLIGRAM(S): 80 TABLET, FILM COATED ORAL at 21:58

## 2025-03-07 RX ADMIN — TRAMADOL HYDROCHLORIDE 50 MILLIGRAM(S): 50 TABLET, FILM COATED ORAL at 15:40

## 2025-03-07 RX ADMIN — Medication 1 TABLET(S): at 12:56

## 2025-03-07 RX ADMIN — APIXABAN 5 MILLIGRAM(S): 2.5 TABLET, FILM COATED ORAL at 06:34

## 2025-03-07 RX ADMIN — Medication 650 MILLIGRAM(S): at 15:03

## 2025-03-07 RX ADMIN — Medication 6 MILLIGRAM(S): at 21:58

## 2025-03-07 RX ADMIN — Medication 1 APPLICATION(S): at 06:40

## 2025-03-07 RX ADMIN — LIDOCAINE HYDROCHLORIDE 1 PATCH: 20 JELLY TOPICAL at 12:57

## 2025-03-07 RX ADMIN — Medication 25 MILLIGRAM(S): at 06:34

## 2025-03-07 RX ADMIN — TRAMADOL HYDROCHLORIDE 50 MILLIGRAM(S): 50 TABLET, FILM COATED ORAL at 15:02

## 2025-03-07 RX ADMIN — Medication 650 MILLIGRAM(S): at 06:34

## 2025-03-07 RX ADMIN — TRAMADOL HYDROCHLORIDE 50 MILLIGRAM(S): 50 TABLET, FILM COATED ORAL at 09:40

## 2025-03-07 NOTE — DIETITIAN INITIAL EVALUATION ADULT - ADD RECOMMEND
1) Monitor Weights, Intake, Tolerance, Skin, POCT & Labwork  2) Recommend Change Diet to Regular Diet  3) Recommend Initiate Ensure Max 11oz PO Daily  4) Education Provided on Proper Nutrition   5) Continue Nutrition Plan of Care

## 2025-03-07 NOTE — DIETITIAN NUTRITION RISK NOTIFICATION - TREATMENT: THE FOLLOWING DIET HAS BEEN RECOMMENDED
Recommend Change Diet to Regular Diet (IDDSI Level 7) w/ Thin Liquids (IDDSI Level 0)   Recommend Initiate Ensure Max 11oz PO Daily (Provides 150kcal & 30grams of Protein)   Nutrition Education Provided

## 2025-03-07 NOTE — DIETITIAN INITIAL EVALUATION ADULT - FACTORS AFF FOOD INTAKE
States Fair Intake over Last Week  Patient States Decreased Intake From Prior to Hospitalization (Per Patient)/persistent lack of appetite

## 2025-03-07 NOTE — CONSULT NOTE ADULT - SUBJECTIVE AND OBJECTIVE BOX
Patient is a 74y old  Male who presents with a chief complaint of Osteomyelitis     (07 Mar 2025 09:35)        HPI:  Mr. Win Menezes is a 74-year-old male patient with past medical history of HTN, diabetes mellitus with hyperglycemia, hypothyroidism, obesity, prostate cancer, atrial fibrillation, HLD, GI bleed, S/P TAVR (2023), and chronic back pain who presented to the ED at [facility] on 2/20/25 with a two-day history of a rash on his left leg and pain in the left knee radiating towards his foot. He presented to the ED from SNF rehab after hospitalization at [facility] from 2/10/25 through 2/17/25 and treatment for acute anemia, DARLINE, hypotension, s/p 2 units PRBC and negative EGD/colonoscopy. He was found to have an infected TAVR and osteomyelitis of spine, and strep salivarius bacteremia. Plan at that time was for 6 weeks of IV antibiotics via PICC line. CTA on 2/21/25 showed clot to common femoral and distal embolism. He is S/P common femoral endarterectomy with clot retrieval and good blood flow to lower extremity post surgery. Repeat blood cultures were negative, but the excised clot was found to have gram positive cocci. Repeat MRI spine showed no abscess.   Hospital stay c/b constipation and persistent back pain. CT and MRI L-spine showed degenerative disc disease and possible discitis/osteomyelitis at multiple levels. Patient received transfusion of 2 unit of PRBC's on 2/23/25. He was transferred to CTICU at [facility] on 2/24/25 for CT surgery evaluation. Cardiac catheterization showed non-obstructive CAD. Patient determined by CT surgery team to not be strong enough for CT surgery at this time, referred to rehab for optimization. Patient was evaluated by PM&R and therapy for functional deficits, gait/ADL impairments and acute rehabilitation was recommended. Patient was cleared for discharge to [facility] IRF on 3/6/2025.   (06 Mar 2025 13:06)      PAST MEDICAL & SURGICAL HISTORY:  Hypertension      Diabetes mellitus      Obesity      Hypothyroidism      Prostate CA            Substance Use (street drugs): ( X ) never used  (  ) other:  Tobacco Usage:  ( X  ) never smoked   (   ) former smoker   (   ) current smoker  (     ) pack year  Alcohol Usage: denies        Allergies    No Known Allergies    Intolerances        ammonium lactate 12% Lotion 1 Application(s) Topical two times a day  cefTRIAXone   IVPB 2000 milliGRAM(s) IV Intermittent every 24 hours  cefTRIAXone   IVPB      melatonin 6 milliGRAM(s) Oral at bedtime PRN      REVIEW OF SYSTEMS:  Constitutional: No fever, No Chills, No fatigue  HEENT: No eye pain, No visual disturbances, No difficulty hearing  Pulm: No cough,  No shortness of breath  Cardio: No chest pain, No palpitations  GI:  No abdominal pain, No nausea, No vomiting, No diarrhea, + constipation  : No dysuria, No frequency, No hematuria  Neuro: No headaches, No memory loss,  No numbness, No tremors + weakness to b/l lower extremitities  Skin: No itching, No rashes, No lesions. + gluteal cleft painful scab  Endo: No temperature intolerance  MSK: No joint pain, No joint swelling,  + intermittent back pain and left knee and heel pain  Psych:  No depression, No anxiety    T(C): 36.8 (03-07-25 @ 09:35), Max: 37 (03-06-25 @ 20:45)  HR: 76 (03-07-25 @ 09:35) (72 - 89)  BP: 124/60 (03-07-25 @ 09:35) (124/60 - 145/69)  RR: 16 (03-07-25 @ 09:35) (16 - 19)  SpO2: 94% (03-07-25 @ 09:35) (94% - 97%)  Wt(kg): --Vital Signs Last 24 Hrs  T(C): 36.8 (07 Mar 2025 09:35), Max: 37 (06 Mar 2025 20:45)  T(F): 98.3 (07 Mar 2025 09:35), Max: 98.6 (06 Mar 2025 20:45)  HR: 76 (07 Mar 2025 09:35) (72 - 89)  BP: 124/60 (07 Mar 2025 09:35) (124/60 - 145/69)  BP(mean): --  RR: 16 (07 Mar 2025 09:35) (16 - 19)  SpO2: 94% (07 Mar 2025 09:35) (94% - 97%)    Parameters below as of 07 Mar 2025 09:35  Patient On (Oxygen Delivery Method): room air        PHYSICAL EXAM:  GENERAL: NAD, well-groomed, well-developed  HEAD:  Atraumatic, Normocephalic  EYES: EOMI, conjunctiva and sclera clear  ENMT:  Moist mucous membranes  NECK: Supple, No JVD  NERVOUS SYSTEM:  Alert & Oriented X3, Good concentration  CHEST/LUNG: Clear to percussion bilaterally; No rales, rhonchi, wheezing  HEART: Regular rate and rhythm  ABDOMEN: Soft, Nontender, Nondistended; Bowel sounds present  EXTREMITIES: +LE swelling with +calf tenderness and warmth. Palpable left DP pulse      LABS:                        10.8   9.21  )-----------( 180      ( 07 Mar 2025 07:15 )             33.3     03-07    134[L]  |  99  |  24[H]  ----------------------------<  112[H]  3.7   |  26  |  1.14    Ca    9.1      07 Mar 2025 07:15  Mg     1.8     03-06    TPro  7.0  /  Alb  2.4[L]  /  TBili  1.0  /  DBili  x   /  AST  24  /  ALT  17  /  AlkPhos  127[H]  03-07      Urinalysis Basic - ( 07 Mar 2025 07:15 )    Color: x / Appearance: x / SG: x / pH: x  Gluc: 112 mg/dL / Ketone: x  / Bili: x / Urobili: x   Blood: x / Protein: x / Nitrite: x   Leuk Esterase: x / RBC: x / WBC x   Sq Epi: x / Non Sq Epi: x / Bacteria: x       CAPILLARY BLOOD GLUCOSE      POCT Blood Glucose.: 161 mg/dL (06 Mar 2025 21:00)  POCT Blood Glucose.: 108 mg/dL (06 Mar 2025 17:33)        Urinalysis Basic - ( 07 Mar 2025 07:15 )    Color: x / Appearance: x / SG: x / pH: x  Gluc: 112 mg/dL / Ketone: x  / Bili: x / Urobili: x   Blood: x / Protein: x / Nitrite: x   Leuk Esterase: x / RBC: x / WBC x   Sq Epi: x / Non Sq Epi: x / Bacteria: x        RADIOLOGY & ADDITIONAL TESTS:    Consultant(s) Notes Reviewed:  [x ] YES  [ ] NO  Care Discussed with Consultants/Other Providers [ x] YES  [ ] NO  Imaging Personally Reviewed:  [ ] YES  [ ] NO

## 2025-03-07 NOTE — DIETITIAN INITIAL EVALUATION ADULT - PERTINENT LABORATORY DATA
03-07    134[L]  |  99  |  24[H]  ----------------------------<  112[H]  3.7   |  26  |  1.14    Ca    9.1      07 Mar 2025 07:15  Mg     1.8     03-06    TPro  7.0  /  Alb  2.4[L]  /  TBili  1.0  /  DBili  x   /  AST  24  /  ALT  17  /  AlkPhos  127[H]  03-07  POCT Blood Glucose.: 161 mg/dL (03-06-25 @ 21:00)  A1C with Estimated Average Glucose Result: 5.7 % (02-24-25 @ 20:30)  A1C with Estimated Average Glucose Result: 5.9 % (02-21-25 @ 07:17)  A1C with Estimated Average Glucose Result: 5.9 % (02-10-25 @ 16:58)

## 2025-03-07 NOTE — DIETITIAN INITIAL EVALUATION ADULT - NS FNS DIET ORDER
on Consistent Carbohydrate DASH-TLC Diet w/ Thin Liquids (IDDSI Level 0)  Recommend Change Diet to Regular Diet  Recommend Initiate Ensure Max 11oz PO Daily (Provides 150kcal & 30grams of Protein)   Education Provided on Proper Nutrition

## 2025-03-07 NOTE — DIETITIAN INITIAL EVALUATION ADULT - OTHER INFO
Initial Nutrition Assessment   74yr Old Male   Denies Food Allergy/Intolerance  Tolerates Diet Well - No Chewing/Swallowing Complications (Per Patient)  Consumed 50-75% of 1st Meals (as Per Documentation)  Surgical Incision on Right Groin & No Pressure Ulcers (as Per Nursing Flow Sheets)  No Edema Noted (as Per Nursing Flow Sheets)  No Recent Nausea/Vomiting/Diarrhea & Some Recent Constipation (as Per Patient)

## 2025-03-07 NOTE — DIETITIAN INITIAL EVALUATION ADULT - ORAL INTAKE PTA/DIET HISTORY
Patient Does Not Follow Diet @Home  Consumes 3 Meals a Day   Usually Orders Takeout/Delivery  Does Take Vitamin/Supplements @Home (MVI)

## 2025-03-07 NOTE — CONSULT NOTE ADULT - ASSESSMENT
74-year-old male patient with past medical history of HTN, diabetes mellitus with hyperglycemia, hypothyroidism, obesity, prostate cancer, atrial fibrillation, HLD, GI bleed, S/P TAVR (2023), and chronic back pain who is admitted for Acute Inpatient Rehabilitation with a multidisciplinary rehab program at [facility] with functional impairments in ADLs and mobility secondary to spinal osteomyelitis/discitis secondary to infected TAVR and aortic valve.      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   - LSO brace when OOB  - Ceftriaxone 2 g once daily via PICC through 4/7/25  - PICC line care  - Monitor CBC, CMP, and Weekly ESR and CRP.   - CXR weekly  - Comprehensive Multidisciplinary Rehab Program  - Ortho f/up after rehab d/c    #Severe aortic stenosis with chronic diastolic CHF and first degree AV block  - S/P TAVR (2023)  - ROVERTO (2/26): echogenic sessile not very mobile subcentimeter ( 6x2 mm) structure attached to the base of the leaflet corresponding to the native non-coronary cusp   - S/P Cardiac catheterization  - Monitor right wrist and right groin for bleeding  - No heavy lifting or strenuous activity until 3/10    #Arterial embolism of left leg  - S/P LCFA/LSFA thrombectomy/endarterectomy on 2/22  - Left groin staples should remain in place until 3/13  - Eliquis 5mg BID -- cleared to restart by cardiology on 3/6.    #Chronic atrial fibrillation  - Continue Eliquis as above  - Rate controlled without any av rosamaria agents    #Type 2 Diabetes Mellitus with hyperglycemia  - Consistent carb diet.   - On metformin and Farxiga at home  - Monitor glucose on labs.    #HTN  - Spironolactone 25mg daily  - Lasix 40mg daily    #HLD  - Atorvastatin 40mg daily at HS    #Hypothyroid  - levothyroxine 150mcg daily  - TSH 0.73 on 2/24/25    #Anemia  - Ferrous sulfate 325mg daily  - Trend H/H   - Transfuse if HgB <7    #Mood/Cognition:  - Neuropsychology consult PRN    #GI/Bowel:  - Senna 2 tabs QHS   - Miralax 17g Daily  - GI ppx: Pantoprazole 40mg daily before a meal  - bisacodyl 10mg suppository rectally once a day PRN constipation

## 2025-03-07 NOTE — DIETITIAN INITIAL EVALUATION ADULT - PERTINENT MEDS FT
MEDICATIONS  (STANDING):  acetaminophen     Tablet .. 650 milliGRAM(s) Oral every 8 hours  ammonium lactate 12% Lotion 1 Application(s) Topical two times a day  apixaban 5 milliGRAM(s) Oral two times a day  atorvastatin 40 milliGRAM(s) Oral at bedtime  cefTRIAXone   IVPB 2000 milliGRAM(s) IV Intermittent every 24 hours  cefTRIAXone   IVPB      chlorhexidine 4% Liquid 1 Application(s) Topical <User Schedule>  ferrous    sulfate 325 milliGRAM(s) Oral daily  furosemide    Tablet 40 milliGRAM(s) Oral daily  levothyroxine 150 MICROGram(s) Oral daily  lidocaine   4% Patch 1 Patch Transdermal daily  multivitamin 1 Tablet(s) Oral daily  pantoprazole    Tablet 40 milliGRAM(s) Oral before breakfast  polyethylene glycol 3350 17 Gram(s) Oral daily  senna 2 Tablet(s) Oral at bedtime  spironolactone 25 milliGRAM(s) Oral daily    MEDICATIONS  (PRN):  bisacodyl Suppository 10 milliGRAM(s) Rectal daily PRN Constipation  cyclobenzaprine 5 milliGRAM(s) Oral three times a day PRN Muscle Spasm  melatonin 6 milliGRAM(s) Oral at bedtime PRN Insomnia  sodium chloride 0.9% lock flush 10 milliLiter(s) IV Push every 1 hour PRN Pre/post blood products, medications, blood draw, and to maintain line patency  traMADol 50 milliGRAM(s) Oral every 6 hours PRN Severe Pain (7 - 10)  traMADol 25 milliGRAM(s) Oral every 6 hours PRN Moderate Pain (4 - 6)

## 2025-03-08 PROCEDURE — 99232 SBSQ HOSP IP/OBS MODERATE 35: CPT

## 2025-03-08 RX ORDER — WHITE PETROLATUM 1 G/G
1 OINTMENT TOPICAL THREE TIMES A DAY
Refills: 0 | Status: DISCONTINUED | OUTPATIENT
Start: 2025-03-08 | End: 2025-03-20

## 2025-03-08 RX ADMIN — ATORVASTATIN CALCIUM 40 MILLIGRAM(S): 80 TABLET, FILM COATED ORAL at 22:08

## 2025-03-08 RX ADMIN — LIDOCAINE HYDROCHLORIDE 1 PATCH: 20 JELLY TOPICAL at 23:00

## 2025-03-08 RX ADMIN — Medication 650 MILLIGRAM(S): at 22:06

## 2025-03-08 RX ADMIN — Medication 1 APPLICATION(S): at 18:06

## 2025-03-08 RX ADMIN — TRAMADOL HYDROCHLORIDE 50 MILLIGRAM(S): 50 TABLET, FILM COATED ORAL at 18:34

## 2025-03-08 RX ADMIN — Medication 2 TABLET(S): at 22:10

## 2025-03-08 RX ADMIN — LIDOCAINE HYDROCHLORIDE 1 PATCH: 20 JELLY TOPICAL at 11:42

## 2025-03-08 RX ADMIN — Medication 1 APPLICATION(S): at 06:24

## 2025-03-08 RX ADMIN — Medication 650 MILLIGRAM(S): at 06:22

## 2025-03-08 RX ADMIN — LIDOCAINE HYDROCHLORIDE 1 PATCH: 20 JELLY TOPICAL at 19:00

## 2025-03-08 RX ADMIN — Medication 25 MILLIGRAM(S): at 06:22

## 2025-03-08 RX ADMIN — FUROSEMIDE 40 MILLIGRAM(S): 10 INJECTION INTRAMUSCULAR; INTRAVENOUS at 06:22

## 2025-03-08 RX ADMIN — WHITE PETROLATUM 1 APPLICATION(S): 1 OINTMENT TOPICAL at 23:07

## 2025-03-08 RX ADMIN — Medication 1 TABLET(S): at 11:40

## 2025-03-08 RX ADMIN — TRAMADOL HYDROCHLORIDE 50 MILLIGRAM(S): 50 TABLET, FILM COATED ORAL at 18:04

## 2025-03-08 RX ADMIN — APIXABAN 5 MILLIGRAM(S): 2.5 TABLET, FILM COATED ORAL at 18:04

## 2025-03-08 RX ADMIN — TRAMADOL HYDROCHLORIDE 50 MILLIGRAM(S): 50 TABLET, FILM COATED ORAL at 06:32

## 2025-03-08 RX ADMIN — Medication 650 MILLIGRAM(S): at 14:27

## 2025-03-08 RX ADMIN — Medication 6 MILLIGRAM(S): at 22:31

## 2025-03-08 RX ADMIN — Medication 1 APPLICATION(S): at 06:23

## 2025-03-08 RX ADMIN — CYCLOBENZAPRINE HYDROCHLORIDE 5 MILLIGRAM(S): 15 CAPSULE, EXTENDED RELEASE ORAL at 11:41

## 2025-03-08 RX ADMIN — APIXABAN 5 MILLIGRAM(S): 2.5 TABLET, FILM COATED ORAL at 06:23

## 2025-03-08 RX ADMIN — Medication 40 MILLIGRAM(S): at 06:22

## 2025-03-08 RX ADMIN — CEFTRIAXONE 100 MILLIGRAM(S): 500 INJECTION, POWDER, FOR SOLUTION INTRAMUSCULAR; INTRAVENOUS at 00:04

## 2025-03-08 RX ADMIN — Medication 650 MILLIGRAM(S): at 13:57

## 2025-03-08 RX ADMIN — Medication 150 MICROGRAM(S): at 06:22

## 2025-03-08 RX ADMIN — Medication 325 MILLIGRAM(S): at 11:40

## 2025-03-08 RX ADMIN — CEFTRIAXONE 100 MILLIGRAM(S): 500 INJECTION, POWDER, FOR SOLUTION INTRAMUSCULAR; INTRAVENOUS at 23:08

## 2025-03-08 NOTE — PROGRESS NOTE ADULT - SUBJECTIVE AND OBJECTIVE BOX
rehab follow up note  CC: osteomyelitis     no new complaints         REVIEW OF SYSTEMS  Constitutional - No fever,  No fatigue  Neurological - No headaches, No loss of strength  Musculoskeletal - No joint pain, No joint swelling, No muscle pain    VITALS  T(C): 36.2 (03-08-25 @ 08:44), Max: 36.8 (03-07-25 @ 20:27)  HR: 87 (03-08-25 @ 08:44) (69 - 87)  BP: 115/64 (03-08-25 @ 08:44) (115/64 - 136/70)  RR: 16 (03-08-25 @ 08:44) (16 - 16)  SpO2: 95% (03-08-25 @ 08:44) (95% - 95%)  Wt(kg): --       MEDICATIONS   acetaminophen     Tablet .. 650 milliGRAM(s) every 8 hours  ammonium lactate 12% Lotion 1 Application(s) two times a day  apixaban 5 milliGRAM(s) two times a day  atorvastatin 40 milliGRAM(s) at bedtime  bisacodyl Suppository 10 milliGRAM(s) daily PRN  cefTRIAXone   IVPB 2000 milliGRAM(s) every 24 hours  cefTRIAXone   IVPB     chlorhexidine 4% Liquid 1 Application(s) <User Schedule>  cyclobenzaprine 5 milliGRAM(s) three times a day PRN  ferrous    sulfate 325 milliGRAM(s) daily  furosemide    Tablet 40 milliGRAM(s) daily  levothyroxine 150 MICROGram(s) daily  lidocaine   4% Patch 1 Patch daily  melatonin 6 milliGRAM(s) at bedtime PRN  multivitamin 1 Tablet(s) daily  pantoprazole    Tablet 40 milliGRAM(s) before breakfast  polyethylene glycol 3350 17 Gram(s) daily  senna 2 Tablet(s) at bedtime  sodium chloride 0.9% lock flush 10 milliLiter(s) every 1 hour PRN  spironolactone 25 milliGRAM(s) daily  traMADol 50 milliGRAM(s) every 6 hours PRN  traMADol 25 milliGRAM(s) every 6 hours PRN      RECENT LABS/IMAGING                        10.8   9.21  )-----------( 180      ( 07 Mar 2025 07:15 )             33.3     03-07    134[L]  |  99  |  24[H]  ----------------------------<  112[H]  3.7   |  26  |  1.14    Ca    9.1      07 Mar 2025 07:15    TPro  7.0  /  Alb  2.4[L]  /  TBili  1.0  /  DBili  x   /  AST  24  /  ALT  17  /  AlkPhos  127[H]  03-07      Urinalysis Basic - ( 07 Mar 2025 07:15 )    Color: x / Appearance: x / SG: x / pH: x  Gluc: 112 mg/dL / Ketone: x  / Bili: x / Urobili: x   Blood: x / Protein: x / Nitrite: x   Leuk Esterase: x / RBC: x / WBC x   Sq Epi: x / Non Sq Epi: x / Bacteria: x          < from: US Duplex Venous Lower Ext Ltd, Left (03.07.25 @ 09:43) >    IMPRESSION:  No evidence of left lower extremity deep venous thrombosis.    There is partial compression of the left great saphenous vein at the   saphenofemoral junction, however evaluation is limited due to overlying   staples. Short-term follow-up is recommended.      < end of copied text >        ---------  PHYSICAL EXAM  Constitutional - NAD, Comfortable, in chair   Pulm - Breathing comfortably on room air   Extremities - LE edema  Neurologic Exam -                    Cognitive - Awake, Alert, oriented x 3      Communication - Fluent     Motor - LEFT    UE - ShAB 5/5, EF 5/5, EE 5/5,  5/5                    RIGHT UE - ShAB 5/5, EF 3/5, EE 5/5,   5/5                    LEFT    LE - HF 2/5, KE 2/5, DF 3/5, PF 3/5                    RIGHT LE - HF 3/5, KE 3/5, DF 3/5, PF 4/5     Sensory - decreased LLE  Psychiatric - Mood WNL, Affect WNL    ASSESSMENT/PLAN  74y Male h/o HTN, DM, prostate cancer TAVR 2023 with functional deficits after spinal osteomyelitis, endocarditis  s/p LLE thrombectomy, endarterectomy, eliquis   LSO brace when OOB   ceftriaxone, PICC, through 4/7/25  Doppler negative For DVT LLE   Continue current medical management  Pain - Tylenol PRN lidocaine, tramadol     Continue 3hrs a day of comprehensive rehab program.       35 minutes spent reviewing hospital course, relevant imaging, therapy notes, consultant notes, labs, imaging, examining patient, discussion with nurses/rehab team

## 2025-03-08 NOTE — PROGRESS NOTE ADULT - SUBJECTIVE AND OBJECTIVE BOX
CC: Patient is a 74y old  Male who presents with a chief complaint of Spinal osteomyelitis/discitis secondary to infected TAVR and aortic valve endocarditis     Interval History:  Patient seen and examined at bedside.  No overnight events  No complaints this morning    ALLERGIES:  No Known Allergies    MEDICATIONS  (STANDING):  acetaminophen     Tablet .. 650 milliGRAM(s) Oral every 8 hours  ammonium lactate 12% Lotion 1 Application(s) Topical two times a day  apixaban 5 milliGRAM(s) Oral two times a day  atorvastatin 40 milliGRAM(s) Oral at bedtime  cefTRIAXone   IVPB 2000 milliGRAM(s) IV Intermittent every 24 hours  cefTRIAXone   IVPB      chlorhexidine 4% Liquid 1 Application(s) Topical <User Schedule>  ferrous    sulfate 325 milliGRAM(s) Oral daily  furosemide    Tablet 40 milliGRAM(s) Oral daily  levothyroxine 150 MICROGram(s) Oral daily  lidocaine   4% Patch 1 Patch Transdermal daily  multivitamin 1 Tablet(s) Oral daily  pantoprazole    Tablet 40 milliGRAM(s) Oral before breakfast  polyethylene glycol 3350 17 Gram(s) Oral daily  senna 2 Tablet(s) Oral at bedtime  spironolactone 25 milliGRAM(s) Oral daily    MEDICATIONS  (PRN):  bisacodyl Suppository 10 milliGRAM(s) Rectal daily PRN Constipation  cyclobenzaprine 5 milliGRAM(s) Oral three times a day PRN Muscle Spasm  melatonin 6 milliGRAM(s) Oral at bedtime PRN Insomnia  sodium chloride 0.9% lock flush 10 milliLiter(s) IV Push every 1 hour PRN Pre/post blood products, medications, blood draw, and to maintain line patency  traMADol 50 milliGRAM(s) Oral every 6 hours PRN Severe Pain (7 - 10)  traMADol 25 milliGRAM(s) Oral every 6 hours PRN Moderate Pain (4 - 6)    Vital Signs Last 24 Hrs  T(F): 97.2 (08 Mar 2025 08:44), Max: 98.3 (07 Mar 2025 20:27)  HR: 87 (08 Mar 2025 08:44) (69 - 87)  BP: 115/64 (08 Mar 2025 08:44) (115/64 - 136/70)  RR: 16 (08 Mar 2025 08:44) (16 - 16)  SpO2: 95% (08 Mar 2025 08:44) (95% - 95%)  I&O's Summary    BMI (kg/m2): 33.2 (03-06-25 @ 16:19)    PHYSICAL EXAM:  GENERAL: NAD  NERVOUS SYSTEM:  CN II - XII intact;   CHEST/LUNG: Clear to percussion bilaterally; No rales, rhonchi, wheezing, or rubs; normal respiratory effort, no intercostal retractions  HEART: Regular rate and rhythm; No murmurs, rubs, or gallops; No pitting edema  ABDOMEN: Soft, Nontender, Nondistended; Bowel sounds present; No HSM or masses  MUSCULOSKELETAL/EXTREMITIES:  2+ Peripheral Pulses,     LABS:                        10.8   9.21  )-----------( 180      ( 07 Mar 2025 07:15 )             33.3       03-07    134  |  99  |  24  ----------------------------<  112  3.7   |  26  |  1.14    Ca    9.1      07 Mar 2025 07:15  Mg     1.8     03-06    TPro  7.0  /  Alb  2.4  /  TBili  1.0  /  DBili  x   /  AST  24  /  ALT  17  /  AlkPhos  127  03-07         Urinalysis Basic - ( 07 Mar 2025 07:15 )    Color: x / Appearance: x / SG: x / pH: x  Gluc: 112 mg/dL / Ketone: x  / Bili: x / Urobili: x   Blood: x / Protein: x / Nitrite: x   Leuk Esterase: x / RBC: x / WBC x   Sq Epi: x / Non Sq Epi: x / Bacteria: x        COVID-19 PCR: NotDetec (03-06-25 @ 17:33)      Care Discussed with Consultants/Other Providers: Yes   CC: Patient is a 74y old  Male who presents with a chief complaint of Spinal osteomyelitis/discitis secondary to infected TAVR and aortic valve endocarditis     Interval History:  Patient seen and examined at bedside.  No overnight events  No complaints this morning  Pt advised to increase water intake    ALLERGIES:  No Known Allergies    MEDICATIONS  (STANDING):  acetaminophen     Tablet .. 650 milliGRAM(s) Oral every 8 hours  ammonium lactate 12% Lotion 1 Application(s) Topical two times a day  apixaban 5 milliGRAM(s) Oral two times a day  atorvastatin 40 milliGRAM(s) Oral at bedtime  cefTRIAXone   IVPB 2000 milliGRAM(s) IV Intermittent every 24 hours  cefTRIAXone   IVPB      chlorhexidine 4% Liquid 1 Application(s) Topical <User Schedule>  ferrous    sulfate 325 milliGRAM(s) Oral daily  furosemide    Tablet 40 milliGRAM(s) Oral daily  levothyroxine 150 MICROGram(s) Oral daily  lidocaine   4% Patch 1 Patch Transdermal daily  multivitamin 1 Tablet(s) Oral daily  pantoprazole    Tablet 40 milliGRAM(s) Oral before breakfast  polyethylene glycol 3350 17 Gram(s) Oral daily  senna 2 Tablet(s) Oral at bedtime  spironolactone 25 milliGRAM(s) Oral daily    MEDICATIONS  (PRN):  bisacodyl Suppository 10 milliGRAM(s) Rectal daily PRN Constipation  cyclobenzaprine 5 milliGRAM(s) Oral three times a day PRN Muscle Spasm  melatonin 6 milliGRAM(s) Oral at bedtime PRN Insomnia  sodium chloride 0.9% lock flush 10 milliLiter(s) IV Push every 1 hour PRN Pre/post blood products, medications, blood draw, and to maintain line patency  traMADol 50 milliGRAM(s) Oral every 6 hours PRN Severe Pain (7 - 10)  traMADol 25 milliGRAM(s) Oral every 6 hours PRN Moderate Pain (4 - 6)    Vital Signs Last 24 Hrs  T(F): 97.2 (08 Mar 2025 08:44), Max: 98.3 (07 Mar 2025 20:27)  HR: 87 (08 Mar 2025 08:44) (69 - 87)  BP: 115/64 (08 Mar 2025 08:44) (115/64 - 136/70)  RR: 16 (08 Mar 2025 08:44) (16 - 16)  SpO2: 95% (08 Mar 2025 08:44) (95% - 95%)  I&O's Summary    BMI (kg/m2): 33.2 (03-06-25 @ 16:19)    PHYSICAL EXAM:  GENERAL: NAD  NERVOUS SYSTEM:  CN II - XII intact;   CHEST/LUNG: Clear to percussion bilaterally; No rales, rhonchi, wheezing, or rubs; normal respiratory effort, no intercostal retractions  HEART: Regular rate and rhythm; No murmurs, rubs, or gallops; No pitting edema  ABDOMEN: Soft, Nontender, Nondistended; Bowel sounds present; No HSM or masses  MUSCULOSKELETAL/EXTREMITIES:  2+ Peripheral Pulses,     LABS:                        10.8   9.21  )-----------( 180      ( 07 Mar 2025 07:15 )             33.3       03-07    134  |  99  |  24  ----------------------------<  112  3.7   |  26  |  1.14    Ca    9.1      07 Mar 2025 07:15  Mg     1.8     03-06    TPro  7.0  /  Alb  2.4  /  TBili  1.0  /  DBili  x   /  AST  24  /  ALT  17  /  AlkPhos  127  03-07         Urinalysis Basic - ( 07 Mar 2025 07:15 )    Color: x / Appearance: x / SG: x / pH: x  Gluc: 112 mg/dL / Ketone: x  / Bili: x / Urobili: x   Blood: x / Protein: x / Nitrite: x   Leuk Esterase: x / RBC: x / WBC x   Sq Epi: x / Non Sq Epi: x / Bacteria: x        COVID-19 PCR: NotDetec (03-06-25 @ 17:33)      Care Discussed with Consultants/Other Providers: Yes

## 2025-03-08 NOTE — PROGRESS NOTE ADULT - ASSESSMENT
CC: Patient is a 74y old  Male who presents with a chief complaint of Spinal osteomyelitis/discitis secondary to infected TAVR and aortic valve endocarditis      Interval History:  Patient seen and examined at bedside.  No overnight events  No complaints this morning    ALLERGIES:  No Known Allergies    MEDICATIONS  (STANDING):  acetaminophen     Tablet .. 650 milliGRAM(s) Oral every 8 hours  ammonium lactate 12% Lotion 1 Application(s) Topical two times a day  apixaban 5 milliGRAM(s) Oral two times a day  atorvastatin 40 milliGRAM(s) Oral at bedtime  cefTRIAXone   IVPB 2000 milliGRAM(s) IV Intermittent every 24 hours  cefTRIAXone   IVPB      chlorhexidine 4% Liquid 1 Application(s) Topical <User Schedule>  ferrous    sulfate 325 milliGRAM(s) Oral daily  furosemide    Tablet 40 milliGRAM(s) Oral daily  levothyroxine 150 MICROGram(s) Oral daily  lidocaine   4% Patch 1 Patch Transdermal daily  multivitamin 1 Tablet(s) Oral daily  pantoprazole    Tablet 40 milliGRAM(s) Oral before breakfast  polyethylene glycol 3350 17 Gram(s) Oral daily  senna 2 Tablet(s) Oral at bedtime  spironolactone 25 milliGRAM(s) Oral daily    MEDICATIONS  (PRN):  bisacodyl Suppository 10 milliGRAM(s) Rectal daily PRN Constipation  cyclobenzaprine 5 milliGRAM(s) Oral three times a day PRN Muscle Spasm  melatonin 6 milliGRAM(s) Oral at bedtime PRN Insomnia  sodium chloride 0.9% lock flush 10 milliLiter(s) IV Push every 1 hour PRN Pre/post blood products, medications, blood draw, and to maintain line patency  traMADol 50 milliGRAM(s) Oral every 6 hours PRN Severe Pain (7 - 10)  traMADol 25 milliGRAM(s) Oral every 6 hours PRN Moderate Pain (4 - 6)    Vital Signs Last 24 Hrs  T(F): 98.3 (07 Mar 2025 20:27), Max: 98.3 (07 Mar 2025 09:35)  HR: 69 (08 Mar 2025 06:30) (69 - 76)  BP: 132/65 (08 Mar 2025 06:30) (124/60 - 136/70)  RR: 16 (08 Mar 2025 06:30) (16 - 16)  SpO2: 95% (08 Mar 2025 06:30) (94% - 95%)  I&O's Summary    BMI (kg/m2): 33.2 (03-06-25 @ 16:19)    PHYSICAL EXAM:  GENERAL: NAD, well-groomed, well-developed  HEAD:  Atraumatic, Normocephalic  EYES: EOMI, conjunctiva and sclera clear  ENMT:  Moist mucous membranes  NECK: Supple, No JVD  NERVOUS SYSTEM:  Alert & Oriented X3, Good concentration  CHEST/LUNG: Clear to percussion bilaterally; No rales, rhonchi, wheezing  HEART: Regular rate and rhythm  ABDOMEN: Soft, Nontender, Nondistended; Bowel sounds present  EXTREMITIES: +LE swelling with +calf tenderness and warmth. Palpable left DP pulse      LABS:                        10.8   9.21  )-----------( 180      ( 07 Mar 2025 07:15 )             33.3       03-07    134  |  99  |  24  ----------------------------<  112  3.7   |  26  |  1.14    Ca    9.1      07 Mar 2025 07:15  Mg     1.8     03-06    TPro  7.0  /  Alb  2.4  /  TBili  1.0  /  DBili  x   /  AST  24  /  ALT  17  /  AlkPhos  127  03-07                                  Urinalysis Basic - ( 07 Mar 2025 07:15 )    Color: x / Appearance: x / SG: x / pH: x  Gluc: 112 mg/dL / Ketone: x  / Bili: x / Urobili: x   Blood: x / Protein: x / Nitrite: x   Leuk Esterase: x / RBC: x / WBC x   Sq Epi: x / Non Sq Epi: x / Bacteria: x        COVID-19 PCR: NotDetec (03-06-25 @ 17:33)      Care Discussed with Consultants/Other Providers: Yes   74-year-old male patient with past medical history of HTN, diabetes mellitus with hyperglycemia, hypothyroidism, obesity, prostate cancer, atrial fibrillation, HLD, GI bleed, S/P TAVR (2023), and chronic back pain who is admitted for Acute Inpatient Rehabilitation with a multidisciplinary rehab program at [facility] with functional impairments in ADLs and mobility secondary to spinal osteomyelitis/discitis secondary to infected TAVR and aortic valve.      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   - LSO brace when OOB  - Ceftriaxone 2 g once daily via PICC through 4/7/25  - PICC line care  - Monitor CBC, CMP, and Weekly ESR and CRP.   - CXR weekly  - Comprehensive Multidisciplinary Rehab Program  - Ortho f/up after rehab d/c    #Severe aortic stenosis with chronic diastolic CHF and first degree AV block  - S/P TAVR (2023)  - ROVERTO (2/26): echogenic sessile not very mobile subcentimeter ( 6x2 mm) structure attached to the base of the leaflet corresponding to the native non-coronary cusp   - S/P Cardiac catheterization  - Monitor right wrist and right groin for bleeding  - No heavy lifting or strenuous activity until 3/10    #Arterial embolism of left leg  - S/P LCFA/LSFA thrombectomy/endarterectomy on 2/22  - Left groin staples should remain in place until 3/13  - Eliquis 5mg BID -- cleared to restart by cardiology on 3/6.    #Chronic atrial fibrillation  - Continue Eliquis as above  - Rate controlled without any av rosamaria agents    #Type 2 Diabetes Mellitus with hyperglycemia  - Consistent carb diet.   - On metformin and Farxiga at home  - Monitor glucose on labs.    #HTN  - Spironolactone 25mg daily  - Lasix 40mg daily    #HLD  - Atorvastatin 40mg daily at HS    #Hypothyroid  - levothyroxine 150mcg daily  - TSH 0.73 on 2/24/25    #Anemia  - Ferrous sulfate 325mg daily  - Trend H/H   - Transfuse if HgB <7    #Mood/Cognition:  - Neuropsychology consult PRN    #GI/Bowel:  - Senna 2 tabs QHS   - Miralax 17g Daily  - GI ppx: Pantoprazole 40mg daily before a meal  - bisacodyl 10mg suppository rectally once a day PRN constipation 74-year-old male patient with past medical history of HTN, diabetes mellitus with hyperglycemia, hypothyroidism, obesity, prostate cancer, atrial fibrillation, HLD, GI bleed, S/P TAVR (2023), and chronic back pain who is admitted for Acute Inpatient Rehabilitation with a multidisciplinary rehab program at [facility] with functional impairments in ADLs and mobility secondary to spinal osteomyelitis/discitis secondary to infected TAVR and aortic valve.      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   - LSO brace when OOB  - Ceftriaxone 2 g once daily via PICC through 4/7/25  - PICC line care  - Monitor CBC, CMP, and Weekly ESR and CRP.   - CXR weekly  - Comprehensive Multidisciplinary Rehab Program  - Ortho f/up after rehab d/c    #Severe aortic stenosis with chronic diastolic CHF and first degree AV block  - S/P TAVR (2023)  - ROVERTO (2/26): echogenic sessile not very mobile subcentimeter ( 6x2 mm) structure attached to the base of the leaflet corresponding to the native non-coronary cusp   - S/P Cardiac catheterization  - Monitor right wrist and right groin for bleeding  - No heavy lifting or strenuous activity until 3/10    #Arterial embolism of left leg  - S/P LCFA/LSFA thrombectomy/endarterectomy on 2/22  - Left groin staples should remain in place until 3/13  - Eliquis 5mg BID -- cleared to restart by cardiology on 3/6.    #Chronic atrial fibrillation  - Continue Eliquis as above  - Rate controlled without any av rosamaria agents    #Type 2 Diabetes Mellitus with hyperglycemia  - Consistent carb diet.   - On metformin and Farxiga at home  - Monitor glucose on labs.    #HTN  - Spironolactone 25mg daily  - Lasix 40mg daily    #HLD  - Atorvastatin 40mg daily at HS    #Hypothyroid  - levothyroxine 150mcg daily  - TSH 0.73 on 2/24/25    #Anemia  - Ferrous sulfate 325mg daily  - Trend H/H   - Transfuse if HgB <7    #Mood/Cognition:  - Neuropsychology consult PRN    #GI/Bowel:  - Senna 2 tabs QHS   - Miralax 17g Daily  - GI ppx: Pantoprazole 40mg daily before a meal  - bisacodyl 10mg suppository rectally once a day PRN constipation    3/7  No evidence of left lower extremity deep venous thrombosis.    There is partial compression of the left great saphenous vein at the   saphenofemoral junction, however evaluation is limited due to overlying   staples. Short-term follow-up is recommended.

## 2025-03-09 PROCEDURE — 99232 SBSQ HOSP IP/OBS MODERATE 35: CPT

## 2025-03-09 RX ORDER — MELATONIN 5 MG
9 TABLET ORAL AT BEDTIME
Refills: 0 | Status: DISCONTINUED | OUTPATIENT
Start: 2025-03-09 | End: 2025-03-12

## 2025-03-09 RX ADMIN — Medication 1 TABLET(S): at 11:21

## 2025-03-09 RX ADMIN — Medication 650 MILLIGRAM(S): at 06:07

## 2025-03-09 RX ADMIN — TRAMADOL HYDROCHLORIDE 50 MILLIGRAM(S): 50 TABLET, FILM COATED ORAL at 00:23

## 2025-03-09 RX ADMIN — APIXABAN 5 MILLIGRAM(S): 2.5 TABLET, FILM COATED ORAL at 17:17

## 2025-03-09 RX ADMIN — TRAMADOL HYDROCHLORIDE 50 MILLIGRAM(S): 50 TABLET, FILM COATED ORAL at 21:17

## 2025-03-09 RX ADMIN — TRAMADOL HYDROCHLORIDE 50 MILLIGRAM(S): 50 TABLET, FILM COATED ORAL at 06:36

## 2025-03-09 RX ADMIN — Medication 25 MILLIGRAM(S): at 06:06

## 2025-03-09 RX ADMIN — Medication 650 MILLIGRAM(S): at 21:15

## 2025-03-09 RX ADMIN — Medication 1 APPLICATION(S): at 06:39

## 2025-03-09 RX ADMIN — Medication 1 APPLICATION(S): at 17:17

## 2025-03-09 RX ADMIN — Medication 40 MILLIGRAM(S): at 06:08

## 2025-03-09 RX ADMIN — Medication 650 MILLIGRAM(S): at 14:30

## 2025-03-09 RX ADMIN — LIDOCAINE HYDROCHLORIDE 1 PATCH: 20 JELLY TOPICAL at 19:00

## 2025-03-09 RX ADMIN — CEFTRIAXONE 100 MILLIGRAM(S): 500 INJECTION, POWDER, FOR SOLUTION INTRAMUSCULAR; INTRAVENOUS at 22:46

## 2025-03-09 RX ADMIN — APIXABAN 5 MILLIGRAM(S): 2.5 TABLET, FILM COATED ORAL at 06:07

## 2025-03-09 RX ADMIN — TRAMADOL HYDROCHLORIDE 50 MILLIGRAM(S): 50 TABLET, FILM COATED ORAL at 13:05

## 2025-03-09 RX ADMIN — LIDOCAINE HYDROCHLORIDE 1 PATCH: 20 JELLY TOPICAL at 23:00

## 2025-03-09 RX ADMIN — Medication 1 APPLICATION(S): at 06:07

## 2025-03-09 RX ADMIN — WHITE PETROLATUM 1 APPLICATION(S): 1 OINTMENT TOPICAL at 13:06

## 2025-03-09 RX ADMIN — Medication 150 MICROGRAM(S): at 06:09

## 2025-03-09 RX ADMIN — Medication 9 MILLIGRAM(S): at 21:17

## 2025-03-09 RX ADMIN — WHITE PETROLATUM 1 APPLICATION(S): 1 OINTMENT TOPICAL at 06:07

## 2025-03-09 RX ADMIN — ATORVASTATIN CALCIUM 40 MILLIGRAM(S): 80 TABLET, FILM COATED ORAL at 21:16

## 2025-03-09 RX ADMIN — WHITE PETROLATUM 1 APPLICATION(S): 1 OINTMENT TOPICAL at 21:16

## 2025-03-09 RX ADMIN — Medication 325 MILLIGRAM(S): at 11:21

## 2025-03-09 RX ADMIN — Medication 2 TABLET(S): at 21:16

## 2025-03-09 RX ADMIN — FUROSEMIDE 40 MILLIGRAM(S): 10 INJECTION INTRAMUSCULAR; INTRAVENOUS at 06:09

## 2025-03-09 RX ADMIN — LIDOCAINE HYDROCHLORIDE 1 PATCH: 20 JELLY TOPICAL at 11:21

## 2025-03-09 RX ADMIN — Medication 650 MILLIGRAM(S): at 14:40

## 2025-03-09 NOTE — PROGRESS NOTE ADULT - SUBJECTIVE AND OBJECTIVE BOX
rehab follow up note  CC: osteomyelitis    patient reports decreased sleep overnight         REVIEW OF SYSTEMS  Constitutional - No fever,  No fatigue  Neurological - No headaches, No loss of strength  Musculoskeletal - No joint pain, No joint swelling, No muscle pain    VITALS  T(C): 36.7 (03-09-25 @ 08:45), Max: 36.7 (03-09-25 @ 08:45)  HR: 74 (03-09-25 @ 08:45) (74 - 86)  BP: 111/60 (03-09-25 @ 08:45) (111/60 - 132/62)  RR: 16 (03-09-25 @ 08:45) (15 - 16)  SpO2: 97% (03-09-25 @ 08:45) (96% - 97%)  Wt(kg): --       MEDICATIONS   acetaminophen     Tablet .. 650 milliGRAM(s) every 8 hours  ammonium lactate 12% Lotion 1 Application(s) two times a day  apixaban 5 milliGRAM(s) two times a day  AQUAPHOR (petrolatum Ointment) 1 Application(s) three times a day  atorvastatin 40 milliGRAM(s) at bedtime  bisacodyl Suppository 10 milliGRAM(s) daily PRN  cefTRIAXone   IVPB 2000 milliGRAM(s) every 24 hours  cefTRIAXone   IVPB     chlorhexidine 4% Liquid 1 Application(s) <User Schedule>  cyclobenzaprine 5 milliGRAM(s) three times a day PRN  ferrous    sulfate 325 milliGRAM(s) daily  furosemide    Tablet 40 milliGRAM(s) daily  levothyroxine 150 MICROGram(s) daily  lidocaine   4% Patch 1 Patch daily  melatonin 6 milliGRAM(s) at bedtime PRN  multivitamin 1 Tablet(s) daily  pantoprazole    Tablet 40 milliGRAM(s) before breakfast  polyethylene glycol 3350 17 Gram(s) daily  senna 2 Tablet(s) at bedtime  sodium chloride 0.9% lock flush 10 milliLiter(s) every 1 hour PRN  spironolactone 25 milliGRAM(s) daily  traMADol 50 milliGRAM(s) every 6 hours PRN  traMADol 25 milliGRAM(s) every 6 hours PRN      RECENT LABS/IMAGING                      ---------  PHYSICAL EXAM  Constitutional - NAD, Comfortable, in chair   Pulm - Breathing comfortably on room air   Extremities - LE edema  Neurologic Exam -                    Cognitive - Awake, Alert, oriented x 3      Communication - Fluent     Motor - LEFT    UE - ShAB 5/5, EF 5/5, EE 5/5,  5/5                    RIGHT UE - ShAB 5/5, EF 3/5, EE 5/5,   5/5                    LEFT    LE - HF 2/5, KE 2/5, DF 3/5, PF 3/5                    RIGHT LE - HF 3/5, KE 3/5, DF 3/5, PF 4/5     Sensory - decreased LLE  Psychiatric - Mood WNL, Affect WNL    ASSESSMENT/PLAN  74y Male h/o HTN, DM, prostate cancer TAVR 2023 with functional deficits after spinal osteomyelitis, endocarditis  s/p LLE thrombectomy, endarterectomy, eliquis   LSO brace when OOB   ceftriaxone, PICC, through 4/7/25  Doppler negative For DVT LLE   Continue current medical management  Pain - Tylenol PRN lidocaine, tramadol     Continue 3hrs a day of comprehensive rehab program.   on melatonin 6 mg    35 minutes spent reviewing hospital course, relevant imaging, therapy notes, consultant notes, labs, imaging, examining patient, discussion with nurses/rehab team

## 2025-03-09 NOTE — PROGRESS NOTE ADULT - SUBJECTIVE AND OBJECTIVE BOX
Hospitalist: Quita Badillo DO    CHIEF COMPLAINT: Patient is a 74y old  male who presents with a chief complaint of Spinal osteomyelitis/discitis secondary to infected TAVR and aortic valve endocarditis (09 Mar 2025 11:16)      SUBJECTIVE / OVERNIGHT EVENTS: Patient seen and examined. No acute events overnight. Pain well controlled and patient without any complaints.    MEDICATIONS  (STANDING):  acetaminophen     Tablet .. 650 milliGRAM(s) Oral every 8 hours  ammonium lactate 12% Lotion 1 Application(s) Topical two times a day  apixaban 5 milliGRAM(s) Oral two times a day  AQUAPHOR (petrolatum Ointment) 1 Application(s) Topical three times a day  atorvastatin 40 milliGRAM(s) Oral at bedtime  cefTRIAXone   IVPB 2000 milliGRAM(s) IV Intermittent every 24 hours  cefTRIAXone   IVPB      chlorhexidine 4% Liquid 1 Application(s) Topical <User Schedule>  ferrous    sulfate 325 milliGRAM(s) Oral daily  furosemide    Tablet 40 milliGRAM(s) Oral daily  levothyroxine 150 MICROGram(s) Oral daily  lidocaine   4% Patch 1 Patch Transdermal daily  multivitamin 1 Tablet(s) Oral daily  pantoprazole    Tablet 40 milliGRAM(s) Oral before breakfast  polyethylene glycol 3350 17 Gram(s) Oral daily  senna 2 Tablet(s) Oral at bedtime  spironolactone 25 milliGRAM(s) Oral daily    MEDICATIONS  (PRN):  bisacodyl Suppository 10 milliGRAM(s) Rectal daily PRN Constipation  cyclobenzaprine 5 milliGRAM(s) Oral three times a day PRN Muscle Spasm  melatonin 6 milliGRAM(s) Oral at bedtime PRN Insomnia  sodium chloride 0.9% lock flush 10 milliLiter(s) IV Push every 1 hour PRN Pre/post blood products, medications, blood draw, and to maintain line patency  traMADol 50 milliGRAM(s) Oral every 6 hours PRN Severe Pain (7 - 10)  traMADol 25 milliGRAM(s) Oral every 6 hours PRN Moderate Pain (4 - 6)      VITALS:  T(F): 98 (03-09-25 @ 08:45), Max: 98 (03-09-25 @ 08:45)  HR: 74 (03-09-25 @ 08:45) (74 - 86)  BP: 111/60 (03-09-25 @ 08:45) (111/60 - 132/62)  RR: 16 (03-09-25 @ 08:45) (15 - 16)  SpO2: 97% (03-09-25 @ 08:45)      REVIEW OF SYSTEMS:  For ROV please refer back to H&P     PHYSICAL EXAM:  GENERAL: NAD  NERVOUS SYSTEM:  CN II - XII intact;   CHEST/LUNG: Clear to percussion bilaterally; No rales, rhonchi, wheezing, or rubs; normal respiratory effort, no intercostal retractions  HEART: Regular rate and rhythm; No murmurs, rubs, or gallops; No pitting edema  ABDOMEN: Soft, Nontender, Nondistended; Bowel sounds present; No HSM or masses  MUSCULOSKELETAL/EXTREMITIES:  2+ Peripheral Pulses,       RADIOLOGY & ADDITIONAL TESTS:    Imaging Personally Reviewed:    [X] Consultant(s) Notes Reviewed:  [X] Care Discussed with Consultants/Other Providers: fall precautions

## 2025-03-09 NOTE — PROGRESS NOTE ADULT - ASSESSMENT
74-year-old male patient with past medical history of HTN, diabetes mellitus with hyperglycemia, hypothyroidism, obesity, prostate cancer, atrial fibrillation, HLD, GI bleed, S/P TAVR (2023), and chronic back pain who is admitted for Acute Inpatient Rehabilitation with a multidisciplinary rehab program at [facility] with functional impairments in ADLs and mobility secondary to spinal osteomyelitis/discitis secondary to infected TAVR and aortic valve.    Rehab: Functional and gait instability:  - C/w PT/OT per primary team     #Spinal osteomyelitis/discitis secondary to infected TAVR and aortic valve endocarditis  #Bacteremia  - Strep salivarius bacteremia> cultures now clear.   - LSO brace when OOB  - Ceftriaxone 2 g once daily via PICC through 4/7/25  - PICC line care  - Monitor CBC, CMP, and Weekly ESR and CRP.   - CXR weekly  - Comprehensive Multidisciplinary Rehab Program  - Ortho f/up after rehab d/c    #Severe aortic stenosis with chronic diastolic CHF and first degree AV block  - S/P TAVR (2023)  - ROVERTO (2/26): echogenic sessile not very mobile subcentimeter ( 6x2 mm) structure attached to the base of the leaflet corresponding to the native non-coronary cusp   - S/P Cardiac catheterization  - Monitor right wrist and right groin for bleeding  - No heavy lifting or strenuous activity until 3/10    #Arterial embolism of left leg  - S/P LCFA/LSFA thrombectomy/endarterectomy on 2/22  - Left groin staples should remain in place until 3/13  - Eliquis 5mg BID -- cleared to restart by cardiology on 3/6.    #Chronic atrial fibrillation  - Continue Eliquis as above  - Rate controlled without any av rosamaria agents    #Type 2 Diabetes Mellitus with hyperglycemia  - Consistent carb diet.   - On metformin and Farxiga at home  - Monitor glucose on labs.    #HTN  - Spironolactone 25mg daily  - Lasix 40mg daily    #HLD  - Atorvastatin 40mg daily at HS    #Hypothyroid  - levothyroxine 150mcg daily  - TSH 0.73 on 2/24/25    #Anemia  - Ferrous sulfate 325mg daily  - Trend H/H   - Transfuse if HgB <7    #Mood/Cognition:  - Neuropsychology consult PRN    #GI/Bowel:  - Senna 2 tabs QHS   - Miralax 17g Daily  - GI ppx: Pantoprazole 40mg daily before a meal  - bisacodyl 10mg suppository rectally once a day PRN constipation

## 2025-03-10 LAB
ALBUMIN SERPL ELPH-MCNC: 2.5 G/DL — LOW (ref 3.3–5)
ALP SERPL-CCNC: 130 U/L — HIGH (ref 40–120)
ALT FLD-CCNC: 17 U/L — SIGNIFICANT CHANGE UP (ref 10–45)
ANION GAP SERPL CALC-SCNC: 10 MMOL/L — SIGNIFICANT CHANGE UP (ref 5–17)
AST SERPL-CCNC: 26 U/L — SIGNIFICANT CHANGE UP (ref 10–40)
BASOPHILS # BLD AUTO: 0.04 K/UL — SIGNIFICANT CHANGE UP (ref 0–0.2)
BASOPHILS NFR BLD AUTO: 0.4 % — SIGNIFICANT CHANGE UP (ref 0–2)
BILIRUB SERPL-MCNC: 0.9 MG/DL — SIGNIFICANT CHANGE UP (ref 0.2–1.2)
BUN SERPL-MCNC: 25 MG/DL — HIGH (ref 7–23)
CALCIUM SERPL-MCNC: 9.2 MG/DL — SIGNIFICANT CHANGE UP (ref 8.4–10.5)
CO2 SERPL-SCNC: 25 MMOL/L — SIGNIFICANT CHANGE UP (ref 22–31)
CREAT SERPL-MCNC: 1.01 MG/DL — SIGNIFICANT CHANGE UP (ref 0.5–1.3)
CRP SERPL-MCNC: 32 MG/L — HIGH
EGFR: 78 ML/MIN/1.73M2 — SIGNIFICANT CHANGE UP
EGFR: 78 ML/MIN/1.73M2 — SIGNIFICANT CHANGE UP
EOSINOPHIL # BLD AUTO: 0.09 K/UL — SIGNIFICANT CHANGE UP (ref 0–0.5)
ERYTHROCYTE [SEDIMENTATION RATE] IN BLOOD: 104 MM/HR — HIGH (ref 0–20)
GLUCOSE SERPL-MCNC: 95 MG/DL — SIGNIFICANT CHANGE UP (ref 70–99)
HCT VFR BLD CALC: 33.4 % — LOW (ref 39–50)
HGB BLD-MCNC: 11.1 G/DL — LOW (ref 13–17)
IMM GRANULOCYTES NFR BLD AUTO: 0.3 % — SIGNIFICANT CHANGE UP (ref 0–0.9)
LYMPHOCYTES # BLD AUTO: 1.15 K/UL — SIGNIFICANT CHANGE UP (ref 1–3.3)
LYMPHOCYTES # BLD AUTO: 12.7 % — LOW (ref 13–44)
MCHC RBC-ENTMCNC: 33.2 G/DL — SIGNIFICANT CHANGE UP (ref 32–36)
MCV RBC AUTO: 85.2 FL — SIGNIFICANT CHANGE UP (ref 80–100)
MONOCYTES # BLD AUTO: 0.44 K/UL — SIGNIFICANT CHANGE UP (ref 0–0.9)
MONOCYTES NFR BLD AUTO: 4.9 % — SIGNIFICANT CHANGE UP (ref 2–14)
NEUTROPHILS # BLD AUTO: 7.3 K/UL — SIGNIFICANT CHANGE UP (ref 1.8–7.4)
NEUTROPHILS NFR BLD AUTO: 80.7 % — HIGH (ref 43–77)
NRBC BLD AUTO-RTO: 0 /100 WBCS — SIGNIFICANT CHANGE UP (ref 0–0)
POTASSIUM SERPL-MCNC: 3.9 MMOL/L — SIGNIFICANT CHANGE UP (ref 3.5–5.3)
POTASSIUM SERPL-SCNC: 3.9 MMOL/L — SIGNIFICANT CHANGE UP (ref 3.5–5.3)
PROT SERPL-MCNC: 7 G/DL — SIGNIFICANT CHANGE UP (ref 6–8.3)
RBC # BLD: 3.92 M/UL — LOW (ref 4.2–5.8)
RBC # FLD: 18.3 % — HIGH (ref 10.3–14.5)
SODIUM SERPL-SCNC: 131 MMOL/L — LOW (ref 135–145)
WBC # BLD: 9.05 K/UL — SIGNIFICANT CHANGE UP (ref 3.8–10.5)
WBC # FLD AUTO: 9.05 K/UL — SIGNIFICANT CHANGE UP (ref 3.8–10.5)

## 2025-03-10 PROCEDURE — 71045 X-RAY EXAM CHEST 1 VIEW: CPT | Mod: 26

## 2025-03-10 PROCEDURE — 99233 SBSQ HOSP IP/OBS HIGH 50: CPT

## 2025-03-10 PROCEDURE — 99232 SBSQ HOSP IP/OBS MODERATE 35: CPT

## 2025-03-10 RX ORDER — TRAMADOL HYDROCHLORIDE 50 MG/1
50 TABLET, FILM COATED ORAL EVERY 6 HOURS
Refills: 0 | Status: DISCONTINUED | OUTPATIENT
Start: 2025-03-10 | End: 2025-03-10

## 2025-03-10 RX ORDER — TRAMADOL HYDROCHLORIDE 50 MG/1
50 TABLET, FILM COATED ORAL EVERY 4 HOURS
Refills: 0 | Status: DISCONTINUED | OUTPATIENT
Start: 2025-03-10 | End: 2025-03-17

## 2025-03-10 RX ADMIN — Medication 9 MILLIGRAM(S): at 22:06

## 2025-03-10 RX ADMIN — WHITE PETROLATUM 1 APPLICATION(S): 1 OINTMENT TOPICAL at 06:24

## 2025-03-10 RX ADMIN — Medication 150 MICROGRAM(S): at 06:25

## 2025-03-10 RX ADMIN — Medication 1 APPLICATION(S): at 06:23

## 2025-03-10 RX ADMIN — Medication 650 MILLIGRAM(S): at 13:56

## 2025-03-10 RX ADMIN — LIDOCAINE HYDROCHLORIDE 1 PATCH: 20 JELLY TOPICAL at 19:00

## 2025-03-10 RX ADMIN — APIXABAN 5 MILLIGRAM(S): 2.5 TABLET, FILM COATED ORAL at 06:24

## 2025-03-10 RX ADMIN — TRAMADOL HYDROCHLORIDE 50 MILLIGRAM(S): 50 TABLET, FILM COATED ORAL at 03:23

## 2025-03-10 RX ADMIN — TRAMADOL HYDROCHLORIDE 50 MILLIGRAM(S): 50 TABLET, FILM COATED ORAL at 22:07

## 2025-03-10 RX ADMIN — LIDOCAINE HYDROCHLORIDE 1 PATCH: 20 JELLY TOPICAL at 11:37

## 2025-03-10 RX ADMIN — Medication 650 MILLIGRAM(S): at 13:55

## 2025-03-10 RX ADMIN — POLYETHYLENE GLYCOL 3350 17 GRAM(S): 17 POWDER, FOR SOLUTION ORAL at 11:39

## 2025-03-10 RX ADMIN — Medication 650 MILLIGRAM(S): at 06:24

## 2025-03-10 RX ADMIN — Medication 1 APPLICATION(S): at 17:22

## 2025-03-10 RX ADMIN — TRAMADOL HYDROCHLORIDE 50 MILLIGRAM(S): 50 TABLET, FILM COATED ORAL at 11:17

## 2025-03-10 RX ADMIN — APIXABAN 5 MILLIGRAM(S): 2.5 TABLET, FILM COATED ORAL at 17:22

## 2025-03-10 RX ADMIN — Medication 325 MILLIGRAM(S): at 11:37

## 2025-03-10 RX ADMIN — Medication 650 MILLIGRAM(S): at 22:05

## 2025-03-10 RX ADMIN — CEFTRIAXONE 100 MILLIGRAM(S): 500 INJECTION, POWDER, FOR SOLUTION INTRAMUSCULAR; INTRAVENOUS at 23:24

## 2025-03-10 RX ADMIN — Medication 1 APPLICATION(S): at 06:25

## 2025-03-10 RX ADMIN — Medication 25 MILLIGRAM(S): at 06:26

## 2025-03-10 RX ADMIN — WHITE PETROLATUM 1 APPLICATION(S): 1 OINTMENT TOPICAL at 13:57

## 2025-03-10 RX ADMIN — TRAMADOL HYDROCHLORIDE 50 MILLIGRAM(S): 50 TABLET, FILM COATED ORAL at 10:11

## 2025-03-10 RX ADMIN — FUROSEMIDE 40 MILLIGRAM(S): 10 INJECTION INTRAMUSCULAR; INTRAVENOUS at 06:24

## 2025-03-10 RX ADMIN — Medication 2 TABLET(S): at 22:05

## 2025-03-10 RX ADMIN — TRAMADOL HYDROCHLORIDE 50 MILLIGRAM(S): 50 TABLET, FILM COATED ORAL at 17:23

## 2025-03-10 RX ADMIN — Medication 1 TABLET(S): at 11:37

## 2025-03-10 RX ADMIN — LIDOCAINE HYDROCHLORIDE 1 PATCH: 20 JELLY TOPICAL at 23:04

## 2025-03-10 RX ADMIN — WHITE PETROLATUM 1 APPLICATION(S): 1 OINTMENT TOPICAL at 22:04

## 2025-03-10 RX ADMIN — ATORVASTATIN CALCIUM 40 MILLIGRAM(S): 80 TABLET, FILM COATED ORAL at 22:05

## 2025-03-10 RX ADMIN — Medication 40 MILLIGRAM(S): at 06:26

## 2025-03-10 NOTE — PROGRESS NOTE ADULT - ASSESSMENT
74-year-old male patient with past medical history of HTN, diabetes mellitus with hyperglycemia, hypothyroidism, obesity, prostate cancer, atrial fibrillation, HLD, GI bleed, S/P TAVR (2023), and chronic back pain who is admitted for Acute Inpatient Rehabilitation with a multidisciplinary rehab program at [facility] with functional impairments in ADLs and mobility secondary to spinal osteomyelitis/discitis secondary to infected TAVR and aortic valve.    Rehab: Functional and gait instability:  - C/w PT/OT per primary team     #Spinal osteomyelitis/discitis secondary to infected TAVR and aortic valve endocarditis  #Bacteremia  - Strep salivarius bacteremia> cultures now clear.   - LSO brace when OOB  - Ceftriaxone 2 g once daily via PICC through 4/7/25  - PICC line care  - Monitor CBC, CMP, and Weekly ESR and CRP.   - CXR weekly  - Comprehensive Multidisciplinary Rehab Program  - Ortho f/up after rehab d/c    #Severe aortic stenosis with chronic diastolic CHF and first degree AV block  - S/P TAVR (2023)  - ROVERTO (2/26): echogenic sessile not very mobile subcentimeter ( 6x2 mm) structure attached to the base of the leaflet corresponding to the native non-coronary cusp   - S/P Cardiac catheterization  - Monitor right wrist and right groin for bleeding  - No heavy lifting or strenuous activity until 3/10    #Arterial embolism of left leg  - S/P LCFA/LSFA thrombectomy/endarterectomy on 2/22  - Left groin staples should remain in place until 3/13  - Eliquis 5mg BID -- cleared to restart by cardiology on 3/6.    #Chronic atrial fibrillation  - Continue Eliquis as above  - Rate controlled without any av rosamaria agents    #Type 2 Diabetes Mellitus with hyperglycemia  - Consistent carb diet.   - On metformin and Farxiga at home  - Monitor glucose on labs.    #HTN  - Spironolactone 25mg daily  - Lasix 40mg daily    #HLD  - Atorvastatin 40mg daily at HS    #Hypothyroid  - levothyroxine 150mcg daily  - TSH 0.73 on 2/24/25    #Anemia  - Ferrous sulfate 325mg daily  - Trend H/H   - Transfuse if HgB <7    #Mood/Cognition:  - Neuropsychology consult PRN    #GI/Bowel:  - Senna 2 tabs QHS   - Miralax 17g Daily  - GI ppx: Pantoprazole 40mg daily before a meal  - bisacodyl 10mg suppository rectally once a day PRN constipation

## 2025-03-10 NOTE — PROGRESS NOTE ADULT - SUBJECTIVE AND OBJECTIVE BOX
HPI:  Mr. Win Menezes is a 74-year-old male patient with past medical history of HTN, diabetes mellitus with hyperglycemia, hypothyroidism, obesity, prostate cancer, atrial fibrillation, HLD, GI bleed, S/P TAVR (2023), and chronic back pain who presented to the ED at [facility] on 2/20/25 with a two-day history of a rash on his left leg and pain in the left knee radiating towards his foot. He presented to the ED from SNF rehab where he was admitted for less than one week. after hospitalization at [facility] from 2/10/25 through 2/17/25 and treatment for acute anemia, DARLINE, hypotension, s/p 2 units PRBC and negative EGD/colonoscopy. He was found to have an infected TAVR and osteomyelitis of spine, and strep salivarius bacteremia. Plan at that time was for 6 weeks of IV antibiotics via PICC line. CTA on 2/21/25 showed clot to common femoral and distal embolism. He is S/P common femoral endarterectomy with clot retrieval and good blood flow to lower extremity post surgery. Repeat blood cultures were negative, but the excised clot was found to have gram positive cocci. Repeat MRI spine showed no abscess. Hospital stay c/b constipation and persistent back pain. CT and MRI L-spine showed degenerative disc disease and possible discitis/osteomyelitis at multiple levels. Patient received transfusion of 2 unit of PRBC's on 2/23/25. He was transferred to [facility]ICU at [facility] on 2/24/25 for CT surgery evaluation. Cardiac catheterization showed non-obstructive CAD. Patient determined by CT surgery team to not be strong enough for CT surgery at this time, referred to rehab for optimization. Patient was evaluated by PM&R and therapy for functional deficits, gait/ADL impairments and acute rehabilitation was recommended. Patient was cleared for discharge to [facility]F on 3/6/2025. (06 Mar 2025 13:06)    ___________________________________________________________________________    SUBJECTIVE/ROS  Patient was seen and evaluated at bedside today.  Reported no overnight events and is in no acute distress.  Tolerated admission process and initial evaluations.  No adverse events over the weekend.  Tramadol frequency increased to q4h.  Eager to participate on the recommended rehabilitation program.  Denies any CP, SOB, RICE, palpitations, fever, chills, body aches, cough, congestion, or any other symptoms at this time.   ___________________________________________________________________________    Vital Signs Last 24 Hrs  T(C): 36.6 (10 Mar 2025 08:28), Max: 37.1 (09 Mar 2025 21:00)  T(F): 97.9 (10 Mar 2025 08:28), Max: 98.7 (09 Mar 2025 21:00)  HR: 72 (10 Mar 2025 08:28) (71 - 77)  BP: 123/72 (10 Mar 2025 08:28) (121/74 - 139/77)  RR: 16 (10 Mar 2025 08:28) (14 - 16)  SpO2: 96% (10 Mar 2025 08:28) (96% - 99%)  ___________________________________________________________________________    LAB                        11.1   9.05  )-----------( 180      ( 10 Mar 2025 05:37 )             33.4     03-10    131[L]  |  96  |  25[H]  ----------------------------<  95  3.9   |  25  |  1.01    Ca    9.2      10 Mar 2025 05:37    TPro  7.0  /  Alb  2.5[L]  /  TBili  0.9  /  DBili  x   /  AST  26  /  ALT  17  /  AlkPhos  130[H]  03-10    LIVER FUNCTIONS - ( 10 Mar 2025 05:37 )  Alb: 2.5 g/dL / Pro: 7.0 g/dL / ALK PHOS: 130 U/L / ALT: 17 U/L / AST: 26 U/L / GGT: x           ___________________________________________________________________________    MEDICATIONS  (STANDING):  acetaminophen     Tablet .. 650 milliGRAM(s) Oral every 8 hours  ammonium lactate 12% Lotion 1 Application(s) Topical two times a day  apixaban 5 milliGRAM(s) Oral two times a day  AQUAPHOR (petrolatum Ointment) 1 Application(s) Topical three times a day  atorvastatin 40 milliGRAM(s) Oral at bedtime  cefTRIAXone   IVPB 2000 milliGRAM(s) IV Intermittent every 24 hours  cefTRIAXone   IVPB      chlorhexidine 4% Liquid 1 Application(s) Topical <User Schedule>  ferrous    sulfate 325 milliGRAM(s) Oral daily  furosemide    Tablet 40 milliGRAM(s) Oral daily  levothyroxine 150 MICROGram(s) Oral daily  lidocaine   4% Patch 1 Patch Transdermal daily  multivitamin 1 Tablet(s) Oral daily  pantoprazole    Tablet 40 milliGRAM(s) Oral before breakfast  polyethylene glycol 3350 17 Gram(s) Oral daily  senna 2 Tablet(s) Oral at bedtime  spironolactone 25 milliGRAM(s) Oral daily    MEDICATIONS  (PRN):  bisacodyl Suppository 10 milliGRAM(s) Rectal daily PRN Constipation  cyclobenzaprine 5 milliGRAM(s) Oral three times a day PRN Muscle Spasm  melatonin 9 milliGRAM(s) Oral at bedtime PRN Insomnia  sodium chloride 0.9% lock flush 10 milliLiter(s) IV Push every 1 hour PRN Pre/post blood products, medications, blood draw, and to maintain line patency  traMADol 50 milliGRAM(s) Oral every 4 hours PRN Severe Pain (7 - 10)    ___________________________________________________________________________    PHYSICAL EXAM:    Gen - NAD, Comfortable  HEENT - NCAT, EOMI,  Normal Conjunctivae  Neck - Supple, No limited ROM  Pulm - Clear lungs  Cardiovascular - RRR, S1S2, No murmurs  Chest - good chest expansion, good respiratory effort  Abdomen - Soft, NT/ND, +BS  Extremities - No C/C/E, no calf tenderness +LE swelling with +calf tenderness and warmth. Palpable but diminished left DP pulse  Neuro-     Cognitive - awake, alert, oriented to person, place, date, year, and situation.  Able/   to follow command     Communication - Fluent, Comprehensible, No dysarthria, No aphasia      Cranial Nerves -No facial asymmetry, Tongue midline,  Shoulder shrug intact     Motor - No focal deficits.                     LEFT    UE - ShAB 5/5, EF 5/5, EE 5/5,  5/5                    RIGHT UE - ShAB 5/5, EF 3/5, EE 5/5,   5/5                    LEFT    LE - HF 2/5, KE 2/5, DF 3/5, PF 3/5                    RIGHT LE - HF 3/5, KE 3/5, DF 3/5, PF 4/5        Sensory - Intact to light touch; LLE diminished light touch sensation      Tone - normal  MSK: no joint deformity or swelling; full ROM  Psychiatric - Mood stable, Affect WNL  Skin:  No rashes or lesions; warm and dry. Left groin 7.5cm incision with 13 staples present; no swelling/redness/drainage to site. Feet dry.         Wounds: Right buttock healed scab    ___________________________________________________________________________

## 2025-03-10 NOTE — PROGRESS NOTE ADULT - SUBJECTIVE AND OBJECTIVE BOX
Hospitalist: Quita Badillo DO    CHIEF COMPLAINT: Patient is a 74y old  male who presents with a chief complaint of Spinal osteomyelitis/discitis secondary to infected TAVR and aortic valve endocarditis (10 Mar 2025 10:38)      SUBJECTIVE / OVERNIGHT EVENTS: Patient seen and examined. No acute events overnight. Pain well controlled and patient without any complaints.    MEDICATIONS  (STANDING):  acetaminophen     Tablet .. 650 milliGRAM(s) Oral every 8 hours  ammonium lactate 12% Lotion 1 Application(s) Topical two times a day  apixaban 5 milliGRAM(s) Oral two times a day  AQUAPHOR (petrolatum Ointment) 1 Application(s) Topical three times a day  atorvastatin 40 milliGRAM(s) Oral at bedtime  cefTRIAXone   IVPB 2000 milliGRAM(s) IV Intermittent every 24 hours  cefTRIAXone   IVPB      chlorhexidine 4% Liquid 1 Application(s) Topical <User Schedule>  ferrous    sulfate 325 milliGRAM(s) Oral daily  furosemide    Tablet 40 milliGRAM(s) Oral daily  levothyroxine 150 MICROGram(s) Oral daily  lidocaine   4% Patch 1 Patch Transdermal daily  multivitamin 1 Tablet(s) Oral daily  pantoprazole    Tablet 40 milliGRAM(s) Oral before breakfast  polyethylene glycol 3350 17 Gram(s) Oral daily  senna 2 Tablet(s) Oral at bedtime  spironolactone 25 milliGRAM(s) Oral daily    MEDICATIONS  (PRN):  bisacodyl Suppository 10 milliGRAM(s) Rectal daily PRN Constipation  cyclobenzaprine 5 milliGRAM(s) Oral three times a day PRN Muscle Spasm  melatonin 9 milliGRAM(s) Oral at bedtime PRN Insomnia  sodium chloride 0.9% lock flush 10 milliLiter(s) IV Push every 1 hour PRN Pre/post blood products, medications, blood draw, and to maintain line patency  traMADol 50 milliGRAM(s) Oral every 4 hours PRN Severe Pain (7 - 10)      VITALS:  T(F): 97.9 (03-10-25 @ 08:28), Max: 98.7 (03-09-25 @ 21:00)  HR: 72 (03-10-25 @ 08:28) (71 - 77)  BP: 123/72 (03-10-25 @ 08:28) (121/74 - 139/77)  RR: 16 (03-10-25 @ 08:28) (14 - 16)  SpO2: 96% (03-10-25 @ 08:28)      REVIEW OF SYSTEMS:  For ROV please refer back to H&P     PHYSICAL EXAM:  GENERAL: NAD  NERVOUS SYSTEM:  CN II - XII intact;   CHEST/LUNG: Clear to percussion bilaterally; No rales, rhonchi, wheezing, or rubs; normal respiratory effort, no intercostal retractions  HEART: Regular rate and rhythm; No murmurs, rubs, or gallops; No pitting edema  ABDOMEN: Soft, Nontender, Nondistended; Bowel sounds present; No HSM or masses  MUSCULOSKELETAL/EXTREMITIES:  2+ Peripheral Pulses,     LABS:              11.1                 131  | 25   | 25           9.05  >-----------< 180     ------------------------< 95                    33.4                 3.9  | 96   | 1.01                                         Ca 9.2   Mg x     Ph x           TPro  7.0  /  Alb  2.5      TBili  0.9  /  DBili  x         AST  26  /  ALT  17            AlkPhos  130          MICROBIOLOGY:  Urinalysis Basic - ( 10 Mar 2025 05:37 )    Color: x / Appearance: x / SG: x / pH: x  Gluc: 95 mg/dL / Ketone: x  / Bili: x / Urobili: x   Blood: x / Protein: x / Nitrite: x   Leuk Esterase: x / RBC: x / WBC x   Sq Epi: x / Non Sq Epi: x / Bacteria: x      RADIOLOGY & ADDITIONAL TESTS:    Imaging Personally Reviewed:    [X] Consultant(s) Notes Reviewed:  [X] Care Discussed with Consultants/Other Providers:

## 2025-03-10 NOTE — PROGRESS NOTE ADULT - ASSESSMENT
Assessment/Plan:  Mr. Win Menezes is a 74-year-old male patient with past medical history of HTN, diabetes mellitus with hyperglycemia, hypothyroidism, obesity, prostate cancer, atrial fibrillation, HLD, GI bleed, S/P TAVR (2023), and chronic back pain who is admitted for Acute Inpatient Rehabilitation with a multidisciplinary rehab program at [facility] with functional impairments in ADLs and mobility secondary to spinal osteomyelitis/discitis secondary to infected TAVR and aortic valve.      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              - WBC 10.09 on 3/6             - ESR//206 on 2/20      * CXR weekly      * Daily weights      * LSO brace when OOB  Comprehensive Multidisciplinary Rehab Program:  - Start comprehensive rehab program, 3 hours a day, 5 days a week.  - PT 2hr/day: Focused on improving strength, endurance, coordination, balance, functional mobility, and transfers  - OT 1hr/day: Focused on improving strength, fine motor skills, coordination, posture and ADLs.    - Activity Limitations: Decreased social, vocational and leisure activities, decreased self care and ADLs, decreased mobility, decreased ability to manage household and finances.     -----------------------------------------------------------------------------------  Concurrent Medical Problems    #LLE edema/warmth/calf pain (on arrival 3/6)  - R/O DVT with bedside doppler 3/6    #Severe aortic stenosis with chronic diastolic CHF and first degree AV block  - S/P TAVR (2023)  - ROVERTO (2/26):       * echogenic sessile not very mobile subcentimeter ( 6x2 mm) structure attached to the base of the leaflet corresponding to the native non-coronary cusp       * likely representing a calcified nodule vs. less likely an old small calcified vegetation, mod AS  - S/P Cardiac catheterization  - Monitor right wrist and right groin for bleeding  - No heavy lifting or strenuous activity until 3/10    #Multilevel osteomyelitis   - Ceftriaxone through 4/7/25 as above  - LSO brace when OOB  - Ortho f/up after rehab d/c    #Arterial embolism of left leg  - S/P LCFA/LSFA thrombectomy/endarterectomy on 2/22  - Left groin staples should remain in place until 3/13  - Eliquis 5mg BID -- cleared to restart by cardiology on 3/6.  - DVT ppx: TEDs    #Bacteremia  - Strep salivarius bacteremia> cultures now clear.   - Ceftriaxone through 4/7/25 as above    #Chronic atrial fibrillation  - Continue Eliquis as above    #Type 2 Diabetes Mellitus with hyperglycemia  - Consistent carb diet.   - On metformin and Farxiga at home  - AM glucose 113-97; well controlled.  - Monitor glucose on AM labs.    #HTN  - Spironolactone 25mg daily  - Lasix 40mg daily    #HLD  - Atorvastatin 40mg daily at HS    #Hypothyroid  - levothyroxine 150mcg daily  - TSH 0.73 on 2/24/25    #Anemia  - Ferrous sulfate 325mg daily  - Trend H/H (10.9/32/9 on 3/6)    #Mood/Cognition:  - Neuropsychology consult PRN    #Sleep:   - Maintain quiet hours and low stim environment.  - Melatonin 6mg PRN to maximize participation in therapy during the day.     #Pain Management:  - Analgesic: Tylenol 650mg every 8 hours PRN  - Narcotic: Tramadol 25 mg every 6 hours PRN moderate pain (4-6)  - Tramadol 50mg every 6 hours PRN severe pain (7-10)  - Antispasmodic: Cyclobenzaprine 5mg TID PRN muscle spasm    #GI/Bowel:  - Senna 2 tabs QHS   - Miralax 17g Daily  - GI ppx: Pantoprazole 40mg daily before a meal  - bisacodyl 10mg suppository rectally once a day PRN constipation    #/Bladder:   - PVR on admission  - monitor urine output    #Skin/Pressure Injury assessment:   - Skin assessment on admission:       * Left groin 7.5cm incision with 13 staples present; no swelling/redness/drainage to site      * Right buttock healed scab    #Diet/Dysphagia:  - Current Diet: Dash diet, consistent carb diabetic diet  - Nutrition consult     #Patient Education  - Education provided on the following:      * Admitting diagnosis and functional implications      * Functional goals      * Bladder management      * Bowel management      * Skin care management      * Intensity of service and scheduling of rehab disciplines      * Plan of care and role of interdisciplinary team conference in discharge planning      * Reconciliation of medications from prior institution    #Precautions / PROPHYLAXIS:   - Falls, Cardiac, Spinal  - ortho: Weight bearing status: FWB  - Lungs: Aspiration, Incentive Spirometer   - Pressure injury/Skin: OOB to Chair, PT/OT      ---------------  Code Status: DNR/DNI; all other medical interventions allowed (see MOLST)  Emergency Contact:    Outpatient Follow-up (Specialty/Name of physician):    Abdulaziz Jernigan Physician [address]  Scheduled Appointment: 03/17/2025  For kidney tests and CBC    Benito Jones Physician [address]  Scheduled Appointment: 04/07/2025    Mahamed Heath  Cardiovascular Disease  [address], [address]  [place] [address]  Phone: [phone]  Fax: [phone]  Follow Up Time: 1-2 weeks for ROVERTO and cardiac workup    Lizbeth Redd  Nephrology  [address], [address]  [place] [address]  Phone: [phone]  Fax: [phone]  Follow Up Time: within 4-6 weeks    Gualberto Porter  Gastroenterology  [address], [address]  [place] [address]  Phone: [phone]  Fax: [phone]  Follow Up Time:   For pill camera test    Berny Lizarraga [place]  Neurosurgery  [address]  [place] [address]  Phone: [phone]  Fax: [phone]  Follow Up Time: 4 weeks  For MRI    Dr. Rodriges   Cardiothoracic surgery  Follow appointment after discharge  [phone]       --------------

## 2025-03-11 DIAGNOSIS — E78.5 HYPERLIPIDEMIA, UNSPECIFIED: ICD-10-CM

## 2025-03-11 DIAGNOSIS — I48.0 PAROXYSMAL ATRIAL FIBRILLATION: ICD-10-CM

## 2025-03-11 DIAGNOSIS — E11.22 TYPE 2 DIABETES MELLITUS WITH DIABETIC CHRONIC KIDNEY DISEASE: ICD-10-CM

## 2025-03-11 DIAGNOSIS — R29.898 OTHER SYMPTOMS AND SIGNS INVOLVING THE MUSCULOSKELETAL SYSTEM: ICD-10-CM

## 2025-03-11 DIAGNOSIS — I12.9 HYPERTENSIVE CHRONIC KIDNEY DISEASE WITH STAGE 1 THROUGH STAGE 4 CHRONIC KIDNEY DISEASE, OR UNSPECIFIED CHRONIC KIDNEY DISEASE: ICD-10-CM

## 2025-03-11 DIAGNOSIS — E87.1 HYPO-OSMOLALITY AND HYPONATREMIA: ICD-10-CM

## 2025-03-11 DIAGNOSIS — G47.33 OBSTRUCTIVE SLEEP APNEA (ADULT) (PEDIATRIC): ICD-10-CM

## 2025-03-11 DIAGNOSIS — R35.0 FREQUENCY OF MICTURITION: ICD-10-CM

## 2025-03-11 DIAGNOSIS — Z95.3 PRESENCE OF XENOGENIC HEART VALVE: ICD-10-CM

## 2025-03-11 DIAGNOSIS — N18.30 CHRONIC KIDNEY DISEASE, STAGE 3 UNSPECIFIED: ICD-10-CM

## 2025-03-11 DIAGNOSIS — Z79.01 LONG TERM (CURRENT) USE OF ANTICOAGULANTS: ICD-10-CM
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
## 2025-03-11 DIAGNOSIS — I74.3 EMBOLISM AND THROMBOSIS OF ARTERIES OF THE LOWER EXTREMITIES: ICD-10-CM

## 2025-03-11 DIAGNOSIS — E66.9 OBESITY, UNSPECIFIED: ICD-10-CM

## 2025-03-11 DIAGNOSIS — D64.9 ANEMIA, UNSPECIFIED: ICD-10-CM

## 2025-03-11 DIAGNOSIS — T82.6XXA INFECTION AND INFLAMMATORY REACTION DUE TO CARDIAC VALVE PROSTHESIS, INITIAL ENCOUNTER: ICD-10-CM

## 2025-03-11 DIAGNOSIS — E03.9 HYPOTHYROIDISM, UNSPECIFIED: ICD-10-CM
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
## 2025-03-11 DIAGNOSIS — K59.00 CONSTIPATION, UNSPECIFIED: ICD-10-CM

## 2025-03-11 DIAGNOSIS — N40.1 BENIGN PROSTATIC HYPERPLASIA WITH LOWER URINARY TRACT SYMPTOMS: ICD-10-CM

## 2025-03-11 DIAGNOSIS — Z79.84 LONG TERM (CURRENT) USE OF ORAL HYPOGLYCEMIC DRUGS: ICD-10-CM

## 2025-03-11 DIAGNOSIS — I33.0 ACUTE AND SUBACUTE INFECTIVE ENDOCARDITIS: ICD-10-CM

## 2025-03-11 DIAGNOSIS — N13.8 OTHER OBSTRUCTIVE AND REFLUX UROPATHY: ICD-10-CM
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
## 2025-03-11 DIAGNOSIS — Z79.890 HORMONE REPLACEMENT THERAPY: ICD-10-CM

## 2025-03-11 DIAGNOSIS — I76 SEPTIC ARTERIAL EMBOLISM: ICD-10-CM

## 2025-03-11 DIAGNOSIS — K21.9 GASTRO-ESOPHAGEAL REFLUX DISEASE WITHOUT ESOPHAGITIS: ICD-10-CM

## 2025-03-11 DIAGNOSIS — E43 UNSPECIFIED SEVERE PROTEIN-CALORIE MALNUTRITION: ICD-10-CM
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
## 2025-03-11 DIAGNOSIS — G89.29 OTHER CHRONIC PAIN: ICD-10-CM

## 2025-03-11 DIAGNOSIS — Z85.46 PERSONAL HISTORY OF MALIGNANT NEOPLASM OF PROSTATE: ICD-10-CM

## 2025-03-11 DIAGNOSIS — L98.9 DISORDER OF THE SKIN AND SUBCUTANEOUS TISSUE, UNSPECIFIED: ICD-10-CM

## 2025-03-11 LAB — GLUCOSE BLDC GLUCOMTR-MCNC: 160 MG/DL — HIGH (ref 70–99)

## 2025-03-11 PROCEDURE — 93971 EXTREMITY STUDY: CPT | Mod: 26,RT

## 2025-03-11 PROCEDURE — 99233 SBSQ HOSP IP/OBS HIGH 50: CPT

## 2025-03-11 PROCEDURE — 99232 SBSQ HOSP IP/OBS MODERATE 35: CPT

## 2025-03-11 RX ADMIN — Medication 1 APPLICATION(S): at 18:24

## 2025-03-11 RX ADMIN — Medication 650 MILLIGRAM(S): at 22:30

## 2025-03-11 RX ADMIN — Medication 650 MILLIGRAM(S): at 15:29

## 2025-03-11 RX ADMIN — TRAMADOL HYDROCHLORIDE 50 MILLIGRAM(S): 50 TABLET, FILM COATED ORAL at 14:30

## 2025-03-11 RX ADMIN — WHITE PETROLATUM 1 APPLICATION(S): 1 OINTMENT TOPICAL at 21:45

## 2025-03-11 RX ADMIN — WHITE PETROLATUM 1 APPLICATION(S): 1 OINTMENT TOPICAL at 05:55

## 2025-03-11 RX ADMIN — Medication 40 MILLIGRAM(S): at 05:58

## 2025-03-11 RX ADMIN — APIXABAN 5 MILLIGRAM(S): 2.5 TABLET, FILM COATED ORAL at 05:56

## 2025-03-11 RX ADMIN — Medication 650 MILLIGRAM(S): at 05:55

## 2025-03-11 RX ADMIN — Medication 25 MILLIGRAM(S): at 05:56

## 2025-03-11 RX ADMIN — Medication 325 MILLIGRAM(S): at 12:22

## 2025-03-11 RX ADMIN — TRAMADOL HYDROCHLORIDE 50 MILLIGRAM(S): 50 TABLET, FILM COATED ORAL at 13:34

## 2025-03-11 RX ADMIN — TRAMADOL HYDROCHLORIDE 50 MILLIGRAM(S): 50 TABLET, FILM COATED ORAL at 17:26

## 2025-03-11 RX ADMIN — Medication 9 MILLIGRAM(S): at 21:43

## 2025-03-11 RX ADMIN — TRAMADOL HYDROCHLORIDE 50 MILLIGRAM(S): 50 TABLET, FILM COATED ORAL at 23:15

## 2025-03-11 RX ADMIN — WHITE PETROLATUM 1 APPLICATION(S): 1 OINTMENT TOPICAL at 14:30

## 2025-03-11 RX ADMIN — CEFTRIAXONE 100 MILLIGRAM(S): 500 INJECTION, POWDER, FOR SOLUTION INTRAMUSCULAR; INTRAVENOUS at 21:38

## 2025-03-11 RX ADMIN — TRAMADOL HYDROCHLORIDE 50 MILLIGRAM(S): 50 TABLET, FILM COATED ORAL at 09:01

## 2025-03-11 RX ADMIN — LIDOCAINE HYDROCHLORIDE 1 PATCH: 20 JELLY TOPICAL at 12:25

## 2025-03-11 RX ADMIN — Medication 650 MILLIGRAM(S): at 14:29

## 2025-03-11 RX ADMIN — ATORVASTATIN CALCIUM 40 MILLIGRAM(S): 80 TABLET, FILM COATED ORAL at 21:34

## 2025-03-11 RX ADMIN — Medication 650 MILLIGRAM(S): at 21:35

## 2025-03-11 RX ADMIN — TRAMADOL HYDROCHLORIDE 50 MILLIGRAM(S): 50 TABLET, FILM COATED ORAL at 22:35

## 2025-03-11 RX ADMIN — FUROSEMIDE 40 MILLIGRAM(S): 10 INJECTION INTRAMUSCULAR; INTRAVENOUS at 05:57

## 2025-03-11 RX ADMIN — Medication 1 APPLICATION(S): at 06:00

## 2025-03-11 RX ADMIN — Medication 150 MICROGRAM(S): at 05:56

## 2025-03-11 RX ADMIN — TRAMADOL HYDROCHLORIDE 50 MILLIGRAM(S): 50 TABLET, FILM COATED ORAL at 08:06

## 2025-03-11 RX ADMIN — LIDOCAINE HYDROCHLORIDE 1 PATCH: 20 JELLY TOPICAL at 19:17

## 2025-03-11 RX ADMIN — Medication 1 APPLICATION(S): at 05:55

## 2025-03-11 RX ADMIN — APIXABAN 5 MILLIGRAM(S): 2.5 TABLET, FILM COATED ORAL at 18:21

## 2025-03-11 RX ADMIN — Medication 1 TABLET(S): at 12:22

## 2025-03-11 RX ADMIN — TRAMADOL HYDROCHLORIDE 50 MILLIGRAM(S): 50 TABLET, FILM COATED ORAL at 18:26

## 2025-03-11 NOTE — PROGRESS NOTE ADULT - SUBJECTIVE AND OBJECTIVE BOX
Patient is a 74y old  Male who presents with a chief complaint of Spinal osteomyelitis/discitis secondary to infected TAVR and aortic valve endocarditis (10 Mar 2025 11:24)      SUBJECTIVE / OVERNIGHT EVENTS:  Pt seen and examined at bedside. No acute events overnight.  Pt denies cp, palpitations, sob, abd pain, N/V, fever, chills.    ROS:  All other review of systems negative    Allergies    No Known Allergies    Intolerances        MEDICATIONS  (STANDING):  acetaminophen     Tablet .. 650 milliGRAM(s) Oral every 8 hours  ammonium lactate 12% Lotion 1 Application(s) Topical two times a day  apixaban 5 milliGRAM(s) Oral two times a day  AQUAPHOR (petrolatum Ointment) 1 Application(s) Topical three times a day  atorvastatin 40 milliGRAM(s) Oral at bedtime  cefTRIAXone   IVPB 2000 milliGRAM(s) IV Intermittent every 24 hours  cefTRIAXone   IVPB      chlorhexidine 4% Liquid 1 Application(s) Topical <User Schedule>  ferrous    sulfate 325 milliGRAM(s) Oral daily  furosemide    Tablet 40 milliGRAM(s) Oral daily  levothyroxine 150 MICROGram(s) Oral daily  lidocaine   4% Patch 1 Patch Transdermal daily  multivitamin 1 Tablet(s) Oral daily  pantoprazole    Tablet 40 milliGRAM(s) Oral before breakfast  polyethylene glycol 3350 17 Gram(s) Oral daily  senna 2 Tablet(s) Oral at bedtime  spironolactone 25 milliGRAM(s) Oral daily    MEDICATIONS  (PRN):  bisacodyl Suppository 10 milliGRAM(s) Rectal daily PRN Constipation  cyclobenzaprine 5 milliGRAM(s) Oral three times a day PRN Muscle Spasm  melatonin 9 milliGRAM(s) Oral at bedtime PRN Insomnia  sodium chloride 0.9% lock flush 10 milliLiter(s) IV Push every 1 hour PRN Pre/post blood products, medications, blood draw, and to maintain line patency  traMADol 50 milliGRAM(s) Oral every 4 hours PRN Severe Pain (7 - 10)      Vital Signs Last 24 Hrs  T(C): 36.4 (11 Mar 2025 08:08), Max: 36.7 (10 Mar 2025 20:25)  T(F): 97.6 (11 Mar 2025 08:08), Max: 98.1 (10 Mar 2025 20:25)  HR: 74 (11 Mar 2025 08:08) (68 - 77)  BP: 121/62 (11 Mar 2025 08:08) (111/62 - 130/65)  BP(mean): --  RR: 16 (11 Mar 2025 08:08) (14 - 16)  SpO2: 95% (11 Mar 2025 08:08) (93% - 95%)    Parameters below as of 11 Mar 2025 08:08  Patient On (Oxygen Delivery Method): room air      CAPILLARY BLOOD GLUCOSE        I&O's Summary    10 Mar 2025 07:01  -  11 Mar 2025 07:00  --------------------------------------------------------  IN: 230 mL / OUT: 1200 mL / NET: -970 mL        PHYSICAL EXAM:  GENERAL: NAD, well-developed AAOx3 Male   HEAD:  Atraumatic, Normocephalic  NECK: Supple, No JVD  CHEST/LUNG: Clear to auscultation bilaterally; No wheeze, nonlabored breathing  HEART: Regular rate and rhythm; No murmurs, rubs, or gallops  ABDOMEN: Soft, Nontender, Nondistended; Bowel sounds present  EXTREMITIES: No clubbing, cyanosis, or edema  PSYCH: calm, appropriate mood    LABS:                        11.1   9.05  )-----------( 180      ( 10 Mar 2025 05:37 )             33.4     03-10    131[L]  |  96  |  25[H]  ----------------------------<  95  3.9   |  25  |  1.01    Ca    9.2      10 Mar 2025 05:37    TPro  7.0  /  Alb  2.5[L]  /  TBili  0.9  /  DBili  x   /  AST  26  /  ALT  17  /  AlkPhos  130[H]  03-10          Urinalysis Basic - ( 10 Mar 2025 05:37 )    Color: x / Appearance: x / SG: x / pH: x  Gluc: 95 mg/dL / Ketone: x  / Bili: x / Urobili: x   Blood: x / Protein: x / Nitrite: x   Leuk Esterase: x / RBC: x / WBC x   Sq Epi: x / Non Sq Epi: x / Bacteria: x        RADIOLOGY & ADDITIONAL TESTS:  Results Reviewed:   Imaging Personally Reviewed:  Electrocardiogram Personally Reviewed:    COORDINATION OF CARE:  Care Discussed with Consultants/Other Providers [Y/N]:  Prior or Outpatient Records Reviewed [Y/N]:

## 2025-03-11 NOTE — PROGRESS NOTE ADULT - ASSESSMENT
Assessment/Plan:  Mr. Win Menezes is a 74-year-old male patient with past medical history of HTN, diabetes mellitus with hyperglycemia, hypothyroidism, obesity, prostate cancer, atrial fibrillation, HLD, GI bleed, S/P TAVR (2023), and chronic back pain who is admitted for Acute Inpatient Rehabilitation with a multidisciplinary rehab program at [facility] with functional impairments in ADLs and mobility secondary to spinal osteomyelitis/discitis secondary to infected TAVR and aortic valve.      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              - WBC 10.09 on 3/6             - ESR//206 on 2/20      * CXR weekly      * Daily weights      * LSO brace when OOB  Comprehensive Multidisciplinary Rehab Program:  - Start comprehensive rehab program, 3 hours a day, 5 days a week.  - PT 2hr/day: Focused on improving strength, endurance, coordination, balance, functional mobility, and transfers  - OT 1hr/day: Focused on improving strength, fine motor skills, coordination, posture and ADLs.    - Activity Limitations: Decreased social, vocational and leisure activities, decreased self care and ADLs, decreased mobility, decreased ability to manage household and finances.     -----------------------------------------------------------------------------------  Concurrent Medical Problems    #LLE edema/warmth/calf pain (on arrival 3/6)  - R/O DVT with bedside doppler 3/6    #Severe aortic stenosis with chronic diastolic CHF and first degree AV block  - S/P TAVR (2023)  - ROVERTO (2/26):       * echogenic sessile not very mobile subcentimeter ( 6x2 mm) structure attached to the base of the leaflet corresponding to the native non-coronary cusp       * likely representing a calcified nodule vs. less likely an old small calcified vegetation, mod AS  - S/P Cardiac catheterization  - Monitor right wrist and right groin for bleeding  - No heavy lifting or strenuous activity until 3/10    #Multilevel osteomyelitis   - Ceftriaxone through 4/7/25 as above  - LSO brace when OOB  - Ortho f/up after rehab d/c    #Arterial embolism of left leg  - S/P LCFA/LSFA thrombectomy/endarterectomy on 2/22  - Left groin staples should remain in place until 3/13  - Eliquis 5mg BID -- cleared to restart by cardiology on 3/6.  - DVT ppx: TEDs    #Bacteremia  - Strep salivarius bacteremia> cultures now clear.   - Ceftriaxone through 4/7/25 as above    #Chronic atrial fibrillation  - Continue Eliquis as above    #Type 2 Diabetes Mellitus with hyperglycemia  - Consistent carb diet.   - On metformin and Farxiga at home  - AM glucose 113-97; well controlled.  - Monitor glucose on AM labs.    #HTN  - Spironolactone 25mg daily  - Lasix 40mg daily    #HLD  - Atorvastatin 40mg daily at HS    #Hypothyroid  - levothyroxine 150mcg daily  - TSH 0.73 on 2/24/25    #Anemia  - Ferrous sulfate 325mg daily  - Trend H/H (10.9/32/9 on 3/6)    #Mood/Cognition:  - Neuropsychology consult PRN    #Sleep:   - Maintain quiet hours and low stim environment.  - Melatonin 6mg PRN to maximize participation in therapy during the day.     #Pain Management:  - Analgesic: Tylenol 650mg every 8 hours PRN  - Narcotic: Tramadol 25 mg every 6 hours PRN moderate pain (4-6)  - Tramadol 50mg every 6 hours PRN severe pain (7-10)  - Antispasmodic: Cyclobenzaprine 5mg TID PRN muscle spasm    #GI/Bowel:  - Senna 2 tabs QHS   - Miralax 17g Daily  - GI ppx: Pantoprazole 40mg daily before a meal  - bisacodyl 10mg suppository rectally once a day PRN constipation    #/Bladder:   - PVR on admission  - monitor urine output    #Skin/Pressure Injury assessment:   - Skin assessment on admission:       * Left groin 7.5cm incision with 13 staples present; no swelling/redness/drainage to site      * Right buttock healed scab    #Diet/Dysphagia:  - Current Diet: Dash diet, consistent carb diabetic diet  - Nutrition consult     #Patient Education  - Education provided on the following:      * Admitting diagnosis and functional implications      * Functional goals      * Bladder management      * Bowel management      * Skin care management      * Intensity of service and scheduling of rehab disciplines      * Plan of care and role of interdisciplinary team conference in discharge planning      * Reconciliation of medications from prior institution    #Precautions / PROPHYLAXIS:   - Falls, Cardiac, Spinal  - ortho: Weight bearing status: FWB  - Lungs: Aspiration, Incentive Spirometer   - Pressure injury/Skin: OOB to Chair, PT/OT      ---------------  Code Status: DNR/DNI; all other medical interventions allowed (see MOLST)  Emergency Contact:    Outpatient Follow-up (Specialty/Name of physician):    Abdulaziz Jernigan Physician [address]  Scheduled Appointment: 03/17/2025  For kidney tests and CBC    Benito Jones Physician [address]  Scheduled Appointment: 04/07/2025    Mahamed Heath  Cardiovascular Disease  [address], [address]  [place] [address]  Phone: [phone]  Fax: [phone]  Follow Up Time: 1-2 weeks for ROVERTO and cardiac workup    Lizbeth Redd  Nephrology  [address] [facility], [address]  [place] [address]  Phone: [phone]  Fax: [phone]  Follow Up Time: within 4-6 weeks    Gualberto Porter  Gastroenterology  [address], [address]  [place] [address]  Phone: [phone]  Fax: [phone]  Follow Up Time:   For pill camera test    Berny Lizarraga [place]  Neurosurgery  [address]  [place] [address]  Phone: [phone]  Fax: [phone]  Follow Up Time: 4 weeks  For MRI    Dr. Rodriges   Cardiothoracic surgery  Follow appointment after discharge  [phone]       --------------

## 2025-03-11 NOTE — PROGRESS NOTE ADULT - SUBJECTIVE AND OBJECTIVE BOX
HPI:  Mr. Win Menezes is a 74-year-old male patient with past medical history of HTN, diabetes mellitus with hyperglycemia, hypothyroidism, obesity, prostate cancer, atrial fibrillation, HLD, GI bleed, S/P TAVR (2023), and chronic back pain who presented to the ED at [facility] on 2/20/25 with a two-day history of a rash on his left leg and pain in the left knee radiating towards his foot. He presented to the ED from SNF rehab where he was admitted for less than one week. after hospitalization at [facility] from 2/10/25 through 2/17/25 and treatment for acute anemia, DARLINE, hypotension, s/p 2 units PRBC and negative EGD/colonoscopy. He was found to have an infected TAVR and osteomyelitis of spine, and strep salivarius bacteremia. Plan at that time was for 6 weeks of IV antibiotics via PICC line. CTA on 2/21/25 showed clot to common femoral and distal embolism. He is S/P common femoral endarterectomy with clot retrieval and good blood flow to lower extremity post surgery. Repeat blood cultures were negative, but the excised clot was found to have gram positive cocci. Repeat MRI spine showed no abscess. Hospital stay c/b constipation and persistent back pain. CT and MRI L-spine showed degenerative disc disease and possible discitis/osteomyelitis at multiple levels. Patient received transfusion of 2 unit of PRBC's on 2/23/25. He was transferred to CTICU at [facility] on 2/24/25 for CT surgery evaluation. Cardiac catheterization showed non-obstructive CAD. Patient determined by CT surgery team to not be strong enough for CT surgery at this time, referred to rehab for optimization. Patient was evaluated by PM&R and therapy for functional deficits, gait/ADL impairments and acute rehabilitation was recommended. Patient was cleared for discharge to [facility] IRF on 3/6/2025. (06 Mar 2025 13:06)    ___________________________________________________________________________    SUBJECTIVE/ROS  Patient was seen and evaluated at bedside today.  Reported no overnight events and is in no acute distress.  Acute right calf pain noted and Doppler ordered.  Responded well to increase in Tramadol frequency.  Eager to participate on the recommended rehabilitation program.  Denies any CP, SOB, RICE, palpitations, fever, chills, body aches, cough, congestion, or any other symptoms at this time.   ___________________________________________________________________________    Vital Signs Last 24 Hrs  T(C): 36.4 (11 Mar 2025 08:08), Max: 36.7 (10 Mar 2025 20:25)  T(F): 97.6 (11 Mar 2025 08:08), Max: 98.1 (10 Mar 2025 20:25)  HR: 74 (11 Mar 2025 08:08) (68 - 77)  BP: 121/62 (11 Mar 2025 08:08) (111/62 - 130/65)  BP(mean): --  RR: 16 (11 Mar 2025 08:08) (14 - 16)  SpO2: 95% (11 Mar 2025 08:08) (93% - 95%)    Parameters below as of 11 Mar 2025 08:08  Patient On (Oxygen Delivery Method): room air      ___________________________________________________________________________    LAB                        11.1   9.05  )-----------( 180      ( 10 Mar 2025 05:37 )             33.4     03-10    131[L]  |  96  |  25[H]  ----------------------------<  95  3.9   |  25  |  1.01    Ca    9.2      10 Mar 2025 05:37    TPro  7.0  /  Alb  2.5[L]  /  TBili  0.9  /  DBili  x   /  AST  26  /  ALT  17  /  AlkPhos  130[H]  03-10    LIVER FUNCTIONS - ( 10 Mar 2025 05:37 )  Alb: 2.5 g/dL / Pro: 7.0 g/dL / ALK PHOS: 130 U/L / ALT: 17 U/L / AST: 26 U/L / GGT: x           ___________________________________________________________________________    MEDICATIONS  (STANDING):  acetaminophen     Tablet .. 650 milliGRAM(s) Oral every 8 hours  ammonium lactate 12% Lotion 1 Application(s) Topical two times a day  apixaban 5 milliGRAM(s) Oral two times a day  AQUAPHOR (petrolatum Ointment) 1 Application(s) Topical three times a day  atorvastatin 40 milliGRAM(s) Oral at bedtime  cefTRIAXone   IVPB 2000 milliGRAM(s) IV Intermittent every 24 hours  cefTRIAXone   IVPB      chlorhexidine 4% Liquid 1 Application(s) Topical <User Schedule>  ferrous    sulfate 325 milliGRAM(s) Oral daily  furosemide    Tablet 40 milliGRAM(s) Oral daily  levothyroxine 150 MICROGram(s) Oral daily  lidocaine   4% Patch 1 Patch Transdermal daily  multivitamin 1 Tablet(s) Oral daily  pantoprazole    Tablet 40 milliGRAM(s) Oral before breakfast  polyethylene glycol 3350 17 Gram(s) Oral daily  senna 2 Tablet(s) Oral at bedtime  spironolactone 25 milliGRAM(s) Oral daily    MEDICATIONS  (PRN):  bisacodyl Suppository 10 milliGRAM(s) Rectal daily PRN Constipation  cyclobenzaprine 5 milliGRAM(s) Oral three times a day PRN Muscle Spasm  melatonin 9 milliGRAM(s) Oral at bedtime PRN Insomnia  sodium chloride 0.9% lock flush 10 milliLiter(s) IV Push every 1 hour PRN Pre/post blood products, medications, blood draw, and to maintain line patency  traMADol 50 milliGRAM(s) Oral every 4 hours PRN Severe Pain (7 - 10)    ___________________________________________________________________________    PHYSICAL EXAM:    Gen - NAD, Comfortable  HEENT - NCAT, EOMI,  Normal Conjunctivae  Neck - Supple, No limited ROM  Pulm - Clear lungs  Cardiovascular - RRR, S1S2, No murmurs  Chest - good chest expansion, good respiratory effort  Abdomen - Soft, NT/ND, +BS  Extremities - No C/C/E, no calf tenderness +LE swelling with +calf tenderness and warmth. Palpable but diminished left DP pulse  Neuro-     Cognitive - awake, alert, oriented to person, place, date, year, and situation.  Able/   to follow command     Communication - Fluent, Comprehensible, No dysarthria, No aphasia      Cranial Nerves -No facial asymmetry, Tongue midline,  Shoulder shrug intact     Motor - No focal deficits.                     LEFT    UE - ShAB 5/5, EF 5/5, EE 5/5,  5/5                    RIGHT UE - ShAB 5/5, EF 3/5, EE 5/5,   5/5                    LEFT    LE - HF 2/5, KE 2/5, DF 3/5, PF 3/5                    RIGHT LE - HF 3/5, KE 3/5, DF 3/5, PF 4/5        Sensory - Intact to light touch; LLE diminished light touch sensation      Tone - normal  MSK: no joint deformity or swelling; full ROM  Psychiatric - Mood stable, Affect WNL  Skin:  No rashes or lesions; warm and dry. Left groin 7.5cm incision with 13 staples present; no swelling/redness/drainage to site. Feet dry.         Wounds: Right buttock healed scab    ___________________________________________________________________________

## 2025-03-11 NOTE — PROGRESS NOTE ADULT - ASSESSMENT
74-year-old male patient with past medical history of HTN, diabetes mellitus with hyperglycemia, hypothyroidism, obesity, prostate cancer, atrial fibrillation, HLD, GI bleed, S/P TAVR (2023), and chronic back pain who is admitted for Acute Inpatient Rehabilitation with a multidisciplinary rehab program at [facility] with functional impairments in ADLs and mobility secondary to spinal osteomyelitis/discitis secondary to infected TAVR and aortic valve.    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   - Ceftriaxone 2 g once daily via PICC through 4/7/25  - Monitor CBC, CMP, and Weekly ESR and CRP  - CXR weekly  - Ortho f/up after rehab d/c    #Severe aortic stenosis with chronic diastolic CHF and first degree AV block  - S/P TAVR (2023)  - ROVERTO (2/26): echogenic sessile not very mobile subcentimeter ( 6x2 mm) structure attached to the base of the leaflet corresponding to the native non-coronary cusp   - S/P Cardiac catheterization    #Arterial embolism of left leg  - S/P LCFA/LSFA thrombectomy/endarterectomy on 2/22  - Left groin staples should remain in place until 3/13  - Eliquis 5mg BID     #Chronic atrial fibrillation  - Eliquis  - Rate controlled without any av rosamaria agents    #Type 2 Diabetes Mellitus  - Ha1c 5.7  - Blood glucose levels are within normal limits   - On metformin and Farxiga at home. Not on any meds while here     #HTN  - Spironolactone 25mg daily  - Lasix 40mg daily    #HLD  - Atorvastatin     #Hypothyroid  - levothyroxine    #Anemia  - Ferrous sulfate 325mg daily  - Trend H/H   - Transfuse if HgB <7

## 2025-03-12 PROCEDURE — 99232 SBSQ HOSP IP/OBS MODERATE 35: CPT

## 2025-03-12 PROCEDURE — 99233 SBSQ HOSP IP/OBS HIGH 50: CPT

## 2025-03-12 RX ADMIN — Medication 25 MILLIGRAM(S): at 05:22

## 2025-03-12 RX ADMIN — FUROSEMIDE 40 MILLIGRAM(S): 10 INJECTION INTRAMUSCULAR; INTRAVENOUS at 05:22

## 2025-03-12 RX ADMIN — TRAMADOL HYDROCHLORIDE 50 MILLIGRAM(S): 50 TABLET, FILM COATED ORAL at 12:08

## 2025-03-12 RX ADMIN — Medication 650 MILLIGRAM(S): at 13:01

## 2025-03-12 RX ADMIN — ATORVASTATIN CALCIUM 40 MILLIGRAM(S): 80 TABLET, FILM COATED ORAL at 21:16

## 2025-03-12 RX ADMIN — TRAMADOL HYDROCHLORIDE 50 MILLIGRAM(S): 50 TABLET, FILM COATED ORAL at 05:22

## 2025-03-12 RX ADMIN — Medication 650 MILLIGRAM(S): at 06:20

## 2025-03-12 RX ADMIN — WHITE PETROLATUM 1 APPLICATION(S): 1 OINTMENT TOPICAL at 21:23

## 2025-03-12 RX ADMIN — LIDOCAINE HYDROCHLORIDE 1 PATCH: 20 JELLY TOPICAL at 19:54

## 2025-03-12 RX ADMIN — CEFTRIAXONE 100 MILLIGRAM(S): 500 INJECTION, POWDER, FOR SOLUTION INTRAMUSCULAR; INTRAVENOUS at 21:19

## 2025-03-12 RX ADMIN — TRAMADOL HYDROCHLORIDE 50 MILLIGRAM(S): 50 TABLET, FILM COATED ORAL at 17:30

## 2025-03-12 RX ADMIN — LIDOCAINE HYDROCHLORIDE 1 PATCH: 20 JELLY TOPICAL at 00:17

## 2025-03-12 RX ADMIN — Medication 1 APPLICATION(S): at 05:33

## 2025-03-12 RX ADMIN — TRAMADOL HYDROCHLORIDE 50 MILLIGRAM(S): 50 TABLET, FILM COATED ORAL at 11:08

## 2025-03-12 RX ADMIN — WHITE PETROLATUM 1 APPLICATION(S): 1 OINTMENT TOPICAL at 13:01

## 2025-03-12 RX ADMIN — Medication 1 TABLET(S): at 11:06

## 2025-03-12 RX ADMIN — Medication 650 MILLIGRAM(S): at 22:00

## 2025-03-12 RX ADMIN — Medication 150 MICROGRAM(S): at 05:23

## 2025-03-12 RX ADMIN — WHITE PETROLATUM 1 APPLICATION(S): 1 OINTMENT TOPICAL at 05:24

## 2025-03-12 RX ADMIN — Medication 650 MILLIGRAM(S): at 05:22

## 2025-03-12 RX ADMIN — Medication 5 MILLIGRAM(S): at 21:18

## 2025-03-12 RX ADMIN — TRAMADOL HYDROCHLORIDE 50 MILLIGRAM(S): 50 TABLET, FILM COATED ORAL at 21:31

## 2025-03-12 RX ADMIN — TRAMADOL HYDROCHLORIDE 50 MILLIGRAM(S): 50 TABLET, FILM COATED ORAL at 18:30

## 2025-03-12 RX ADMIN — Medication 1 APPLICATION(S): at 05:25

## 2025-03-12 RX ADMIN — Medication 5 MILLIGRAM(S): at 22:23

## 2025-03-12 RX ADMIN — LIDOCAINE HYDROCHLORIDE 1 PATCH: 20 JELLY TOPICAL at 22:26

## 2025-03-12 RX ADMIN — Medication 1 APPLICATION(S): at 21:23

## 2025-03-12 RX ADMIN — Medication 325 MILLIGRAM(S): at 11:07

## 2025-03-12 RX ADMIN — Medication 650 MILLIGRAM(S): at 21:16

## 2025-03-12 RX ADMIN — LIDOCAINE HYDROCHLORIDE 1 PATCH: 20 JELLY TOPICAL at 11:06

## 2025-03-12 RX ADMIN — TRAMADOL HYDROCHLORIDE 50 MILLIGRAM(S): 50 TABLET, FILM COATED ORAL at 22:30

## 2025-03-12 RX ADMIN — APIXABAN 5 MILLIGRAM(S): 2.5 TABLET, FILM COATED ORAL at 17:28

## 2025-03-12 RX ADMIN — TRAMADOL HYDROCHLORIDE 50 MILLIGRAM(S): 50 TABLET, FILM COATED ORAL at 06:20

## 2025-03-12 RX ADMIN — Medication 40 MILLIGRAM(S): at 05:23

## 2025-03-12 RX ADMIN — APIXABAN 5 MILLIGRAM(S): 2.5 TABLET, FILM COATED ORAL at 05:23

## 2025-03-12 RX ADMIN — Medication 650 MILLIGRAM(S): at 14:01

## 2025-03-12 NOTE — PROGRESS NOTE ADULT - ASSESSMENT
74-year-old male patient with past medical history of HTN, diabetes mellitus with hyperglycemia, hypothyroidism, obesity, prostate cancer, atrial fibrillation, HLD, GI bleed, S/P TAVR (2023), and chronic back pain who is admitted for Acute Inpatient Rehabilitation with a multidisciplinary rehab program at [facility] with functional impairments in ADLs and mobility secondary to spinal osteomyelitis/discitis secondary to infected TAVR and aortic valve.    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   - Ceftriaxone 2 g once daily via PICC through 4/7/25  - Monitor CBC, CMP, and Weekly ESR and CRP  - CXR weekly  - Ortho f/up after rehab d/c    #Severe aortic stenosis with chronic diastolic CHF and first degree AV block  - S/P TAVR (2023)  - ROVERTO (2/26): echogenic sessile not very mobile subcentimeter ( 6x2 mm) structure attached to the base of the leaflet corresponding to the native non-coronary cusp   - S/P Cardiac catheterization    #Arterial embolism of left leg  # R LE edema  - S/P LCFA/LSFA thrombectomy/endarterectomy on 2/22  - Left groin staples should remain in place until 3/13  - Eliquis 5mg BID   - R LE doppler neg    #Chronic atrial fibrillation  - Eliquis  - Rate controlled without any av rosamaria agents    #Type 2 Diabetes Mellitus  - Ha1c 5.7  - Blood glucose levels are within normal limits   - On metformin and Farxiga at home. Not on any meds while here     #HTN  - Spironolactone 25mg daily  - Lasix 40mg daily    #HLD  - Atorvastatin     #Hypothyroid  - levothyroxine    #Anemia  - Ferrous sulfate 325mg daily  - Trend H/H   - Transfuse if HgB <7

## 2025-03-12 NOTE — PROGRESS NOTE ADULT - SUBJECTIVE AND OBJECTIVE BOX
HPI:  Mr. Win Menezes is a 74-year-old male patient with past medical history of HTN, diabetes mellitus with hyperglycemia, hypothyroidism, obesity, prostate cancer, atrial fibrillation, HLD, GI bleed, S/P TAVR (2023), and chronic back pain who presented to the ED at [facility] on 2/20/25 with a two-day history of a rash on his left leg and pain in the left knee radiating towards his foot. He presented to the ED from SNF rehab where he was admitted for less than one week. after hospitalization at [facility] from 2/10/25 through 2/17/25 and treatment for acute anemia, DARLINE, hypotension, s/p 2 units PRBC and negative EGD/colonoscopy. He was found to have an infected TAVR and osteomyelitis of spine, and strep salivarius bacteremia. Plan at that time was for 6 weeks of IV antibiotics via PICC line. CTA on 2/21/25 showed clot to common femoral and distal embolism. He is S/P common femoral endarterectomy with clot retrieval and good blood flow to lower extremity post surgery. Repeat blood cultures were negative, but the excised clot was found to have gram positive cocci. Repeat MRI spine showed no abscess. Hospital stay c/b constipation and persistent back pain. CT and MRI L-spine showed degenerative disc disease and possible discitis/osteomyelitis at multiple levels. Patient received transfusion of 2 unit of PRBC's on 2/23/25. He was transferred to CTICU at [facility] on 2/24/25 for CT surgery evaluation. Cardiac catheterization showed non-obstructive CAD. Patient determined by CT surgery team to not be strong enough for CT surgery at this time, referred to rehab for optimization. Patient was evaluated by PM&R and therapy for functional deficits, gait/ADL impairments and acute rehabilitation was recommended. Patient was cleared for discharge to [facility] IRF on 3/6/2025. (06 Mar 2025 13:06)    TDD: 3/20/25 home  ___________________________________________________________________________    SUBJECTIVE/ROS  Patient was seen and evaluated at bedside today.  Reported no overnight events and is in no acute distress.  Acute right calf pain noted and Doppler ordered.  Responded well to increase in Tramadol frequency.  Case was discussed at Interdisciplinary Team meeting yesterday.  A tentative discharge date is outlined above.  Patient is eager to continue participation on the recommended rehabilitation program.  Denies any CP, SOB, RICE, palpitations, fever, chills, body aches, cough, congestion, or any other symptoms at this time.   ___________________________________________________________________________    US DPLX LWR EXT VEINS LTD RT  PROCEDURE DATE:  03/11/2025  IMPRESSION: No evidence of right lower extremity deep venous thrombosis.  ___________________________________________________________________________    Vital Signs Last 24 Hrs  T(C): 36.8 (12 Mar 2025 08:01), Max: 36.8 (11 Mar 2025 19:37)  T(F): 98.2 (12 Mar 2025 08:01), Max: 98.2 (11 Mar 2025 19:37)  HR: 77 (12 Mar 2025 08:01) (73 - 77)  BP: 133/77 (12 Mar 2025 08:01) (116/63 - 143/69)  RR: 15 (12 Mar 2025 08:01) (15 - 16)  SpO2: 96% (12 Mar 2025 08:01) (96% - 96%)    ___________________________________________________________________________    LAB                        11.1   9.05  )-----------( 180      ( 10 Mar 2025 05:37 )             33.4     03-10    131[L]  |  96  |  25[H]  ----------------------------<  95  3.9   |  25  |  1.01    Ca    9.2      10 Mar 2025 05:37    TPro  7.0  /  Alb  2.5[L]  /  TBili  0.9  /  DBili  x   /  AST  26  /  ALT  17  /  AlkPhos  130[H]  03-10    LIVER FUNCTIONS - ( 10 Mar 2025 05:37 )  Alb: 2.5 g/dL / Pro: 7.0 g/dL / ALK PHOS: 130 U/L / ALT: 17 U/L / AST: 26 U/L / GGT: x          ESR Historical Values  Sedimentation Rate, Erythrocyte (03.10.25 @ 05:37)   Sedimentation Rate, Erythrocyte: 104 mm/hr  Sedimentation Rate, Erythrocyte (02.20.25 @ 18:10)   Sedimentation Rate, Erythrocyte: 135 mm/hr  Sedimentation Rate, Erythrocyte (02.12.25 @ 13:56)   Sedimentation Rate, Erythrocyte: 93 mm/hr     CRP Historical Values  POCT Blood Glucose (03.11.25 @ 20:21)   POCT Blood Glucose.: 160 mg/dL  POCT Blood Glucose (03.06.25 @ 21:00)   POCT Blood Glucose.: 161 mg/dL  POCT Blood Glucose (03.06.25 @ 17:33)   POCT Blood Glucose.: 108 mg/dL  POCT Blood Glucose (03.03.25 @ 07:59)   POCT Blood Glucose.: 97 mg/dL  ___________________________________________________________________________    MEDICATIONS  (STANDING):  acetaminophen     Tablet .. 650 milliGRAM(s) Oral every 8 hours  ammonium lactate 12% Lotion 1 Application(s) Topical two times a day  apixaban 5 milliGRAM(s) Oral two times a day  AQUAPHOR (petrolatum Ointment) 1 Application(s) Topical three times a day  atorvastatin 40 milliGRAM(s) Oral at bedtime  cefTRIAXone   IVPB 2000 milliGRAM(s) IV Intermittent every 24 hours  cefTRIAXone   IVPB      chlorhexidine 4% Liquid 1 Application(s) Topical <User Schedule>  ferrous    sulfate 325 milliGRAM(s) Oral daily  furosemide    Tablet 40 milliGRAM(s) Oral daily  levothyroxine 150 MICROGram(s) Oral daily  lidocaine   4% Patch 1 Patch Transdermal daily  multivitamin 1 Tablet(s) Oral daily  pantoprazole    Tablet 40 milliGRAM(s) Oral before breakfast  polyethylene glycol 3350 17 Gram(s) Oral daily  senna 2 Tablet(s) Oral at bedtime  spironolactone 25 milliGRAM(s) Oral daily    MEDICATIONS  (PRN):  bisacodyl Suppository 10 milliGRAM(s) Rectal daily PRN Constipation  cyclobenzaprine 5 milliGRAM(s) Oral three times a day PRN Muscle Spasm  melatonin 9 milliGRAM(s) Oral at bedtime PRN Insomnia  sodium chloride 0.9% lock flush 10 milliLiter(s) IV Push every 1 hour PRN Pre/post blood products, medications, blood draw, and to maintain line patency  traMADol 50 milliGRAM(s) Oral every 4 hours PRN Severe Pain (7 - 10)  zolpidem 5 milliGRAM(s) Oral at bedtime PRN Insomnia  zolpidem 5 milliGRAM(s) Oral at bedtime PRN Insomnia    ___________________________________________________________________________    PHYSICAL EXAM:    Gen - NAD, Comfortable  HEENT - NCAT, EOMI,  Normal Conjunctivae  Neck - Supple, No limited ROM  Pulm - Clear lungs  Cardiovascular - RRR, S1S2, No murmurs  Chest - good chest expansion, good respiratory effort  Abdomen - Soft, NT/ND, +BS  Extremities - No C/C/E, no calf tenderness +LE swelling with +calf tenderness and warmth. Palpable but diminished left DP pulse  Neuro-     Cognitive - awake, alert, and oriented.     Motor - No focal deficits.                     LEFT    UE - ShAB 5/5, EF 5/5, EE 5/5,  5/5                    RIGHT UE - ShAB 5/5, EF 3/5, EE 5/5,   5/5                    LEFT    LE - HF 2/5, KE 2/5, DF 3/5, PF 3/5                    RIGHT LE - HF 3/5, KE 3/5, DF 3/5, PF 4/5        Sensory - Intact to light touch; LLE diminished light touch sensation      Tone - normal  MSK: no joint deformity or swelling; full ROM  Psychiatric - Mood stable, Affect WNL  Skin: No rashes or lesions; warm and dry. Left groin 7.5cm incision with 13 staples present; no swelling/redness/drainage to site. Feet dry.  Wounds: Right buttock healed scab    ___________________________________________________________________________

## 2025-03-12 NOTE — PROGRESS NOTE ADULT - ASSESSMENT
Assessment/Plan:  Mr. Win Menezes is a 74-year-old male patient with past medical history of HTN, diabetes mellitus with hyperglycemia, hypothyroidism, obesity, prostate cancer, atrial fibrillation, HLD, GI bleed, S/P TAVR (2023), and chronic back pain who is admitted for Acute Inpatient Rehabilitation with a multidisciplinary rehab program at [facility] with functional impairments in ADLs and mobility secondary to spinal osteomyelitis/discitis secondary to infected TAVR and aortic valve.      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              - WBC 10.09 on 3/6             - ESR//206 on 2/20      * CXR weekly      * Daily weights      * LSO brace when OOB  Comprehensive Multidisciplinary Rehab Program:  - Start comprehensive rehab program, 3 hours a day, 5 days a week.  - PT 2hr/day: Focused on improving strength, endurance, coordination, balance, functional mobility, and transfers  - OT 1hr/day: Focused on improving strength, fine motor skills, coordination, posture and ADLs.    - Activity Limitations: Decreased social, vocational and leisure activities, decreased self care and ADLs, decreased mobility, decreased ability to manage household and finances.     -----------------------------------------------------------------------------------  Concurrent Medical Problems    #LLE edema/warmth/calf pain (on arrival 3/6)  - R/O DVT with bedside doppler 3/6    #Severe aortic stenosis with chronic diastolic CHF and first degree AV block  - S/P TAVR (2023)  - ROVERTO (2/26):       * echogenic sessile not very mobile subcentimeter ( 6x2 mm) structure attached to the base of the leaflet corresponding to the native non-coronary cusp       * likely representing a calcified nodule vs. less likely an old small calcified vegetation, mod AS  - S/P Cardiac catheterization  - Monitor right wrist and right groin for bleeding  - No heavy lifting or strenuous activity until 3/10    #Multilevel osteomyelitis   - Ceftriaxone through 4/7/25 as above  - LSO brace when OOB  - Ortho f/up after rehab d/c    #Arterial embolism of left leg  - S/P LCFA/LSFA thrombectomy/endarterectomy on 2/22  - Left groin staples should remain in place until 3/13  - Eliquis 5mg BID -- cleared to restart by cardiology on 3/6.  - DVT ppx: TEDs    #Bacteremia  - Strep salivarius bacteremia> cultures now clear.   - Ceftriaxone through 4/7/25 as above    #Chronic atrial fibrillation  - Continue Eliquis as above    #Type 2 Diabetes Mellitus with hyperglycemia  - Consistent carb diet.   - On metformin and Farxiga at home  - AM glucose 113-97; well controlled.  - Monitor glucose on AM labs.    #HTN  - Spironolactone 25mg daily  - Lasix 40mg daily    #HLD  - Atorvastatin 40mg daily at HS    #Hypothyroid  - levothyroxine 150mcg daily  - TSH 0.73 on 2/24/25    #Anemia  - Ferrous sulfate 325mg daily  - Trend H/H (10.9/32/9 on 3/6)    #Mood/Cognition:  - Neuropsychology consult PRN    #Sleep:   - Maintain quiet hours and low stim environment.  - Melatonin 6mg PRN to maximize participation in therapy during the day.     #Pain Management:  - Analgesic: Tylenol 650mg every 8 hours PRN  - Narcotic: Tramadol 25 mg every 6 hours PRN moderate pain (4-6)  - Tramadol 50mg every 6 hours PRN severe pain (7-10)  - Antispasmodic: Cyclobenzaprine 5mg TID PRN muscle spasm    #GI/Bowel:  - Senna 2 tabs QHS   - Miralax 17g Daily  - GI ppx: Pantoprazole 40mg daily before a meal  - bisacodyl 10mg suppository rectally once a day PRN constipation    #/Bladder:   - PVR on admission  - monitor urine output    #Skin/Pressure Injury assessment:   - Skin assessment on admission:       * Left groin 7.5cm incision with 13 staples present; no swelling/redness/drainage to site      * Right buttock healed scab    #Diet/Dysphagia:  - Current Diet: Dash diet, consistent carb diabetic diet  - Nutrition consult     #Patient Education  - Education provided on the following:      * Admitting diagnosis and functional implications      * Functional goals      * Bladder management      * Bowel management      * Skin care management      * Intensity of service and scheduling of rehab disciplines      * Plan of care and role of interdisciplinary team conference in discharge planning      * Reconciliation of medications from prior institution    #Precautions / PROPHYLAXIS:   - Falls, Cardiac, Spinal  - ortho: Weight bearing status: FWB  - Lungs: Aspiration, Incentive Spirometer   - Pressure injury/Skin: OOB to Chair, PT/OT      ---------------  Code Status: DNR/DNI; all other medical interventions allowed (see MOLST)  Emergency Contact:    Outpatient Follow-up (Specialty/Name of physician):    Abdulaziz Jernigan Physician [address]  Scheduled Appointment: 03/17/2025  For kidney tests and CBC    Benito Jones Physician [address]  Scheduled Appointment: 04/07/2025    Mahamed Heath  Cardiovascular Disease  [address] [facility] [facility], [address]  [place] [address]  Phone: [phone]  Fax: [phone]  Follow Up Time: 1-2 weeks for ROVERTO and cardiac workup    Lizbeth Redd  Nephrology  [address] [facility] [address], [address]  [place] [address]  Phone: [phone]  Fax: [phone]  Follow Up Time: within 4-6 weeks    Gualberto Porter  Gastroenterology  [address] [facility], [address]  [place] [address]  Phone: [phone]  Fax: [phone]  Follow Up Time:   For pill camera test    Berny Lizarraga [facility]  Neurosurgery  [address] [facility]  [place] [address]  Phone: [phone]  Fax: [phone]  Follow Up Time: 4 weeks  For MRI    Dr. Rodriges   Cardiothoracic surgery  Follow appointment after discharge  [phone]       --------------

## 2025-03-13 ENCOUNTER — TRANSCRIPTION ENCOUNTER (OUTPATIENT)
Age: 75
End: 2025-03-13

## 2025-03-13 LAB
ALBUMIN SERPL ELPH-MCNC: 2.8 G/DL — LOW (ref 3.3–5)
ALP SERPL-CCNC: 147 U/L — HIGH (ref 40–120)
ALT FLD-CCNC: 23 U/L — SIGNIFICANT CHANGE UP (ref 10–45)
ANION GAP SERPL CALC-SCNC: 9 MMOL/L — SIGNIFICANT CHANGE UP (ref 5–17)
AST SERPL-CCNC: 28 U/L — SIGNIFICANT CHANGE UP (ref 10–40)
BASOPHILS # BLD AUTO: 0.05 K/UL — SIGNIFICANT CHANGE UP (ref 0–0.2)
BASOPHILS NFR BLD AUTO: 0.6 % — SIGNIFICANT CHANGE UP (ref 0–2)
BILIRUB SERPL-MCNC: 1 MG/DL — SIGNIFICANT CHANGE UP (ref 0.2–1.2)
BUN SERPL-MCNC: 18 MG/DL — SIGNIFICANT CHANGE UP (ref 7–23)
CALCIUM SERPL-MCNC: 9.7 MG/DL — SIGNIFICANT CHANGE UP (ref 8.4–10.5)
CHLORIDE SERPL-SCNC: 95 MMOL/L — LOW (ref 96–108)
CO2 SERPL-SCNC: 28 MMOL/L — SIGNIFICANT CHANGE UP (ref 22–31)
CREAT SERPL-MCNC: 1 MG/DL — SIGNIFICANT CHANGE UP (ref 0.5–1.3)
EGFR: 79 ML/MIN/1.73M2 — SIGNIFICANT CHANGE UP
EGFR: 79 ML/MIN/1.73M2 — SIGNIFICANT CHANGE UP
EOSINOPHIL # BLD AUTO: 0.09 K/UL — SIGNIFICANT CHANGE UP (ref 0–0.5)
GLUCOSE SERPL-MCNC: 101 MG/DL — HIGH (ref 70–99)
HCT VFR BLD CALC: 34.3 % — LOW (ref 39–50)
HGB BLD-MCNC: 11.2 G/DL — LOW (ref 13–17)
IMM GRANULOCYTES NFR BLD AUTO: 0.4 % — SIGNIFICANT CHANGE UP (ref 0–0.9)
LYMPHOCYTES # BLD AUTO: 0.98 K/UL — LOW (ref 1–3.3)
LYMPHOCYTES # BLD AUTO: 11.8 % — LOW (ref 13–44)
MCHC RBC-ENTMCNC: 28.1 PG — SIGNIFICANT CHANGE UP (ref 27–34)
MCHC RBC-ENTMCNC: 32.7 G/DL — SIGNIFICANT CHANGE UP (ref 32–36)
MCV RBC AUTO: 86.2 FL — SIGNIFICANT CHANGE UP (ref 80–100)
MONOCYTES # BLD AUTO: 0.43 K/UL — SIGNIFICANT CHANGE UP (ref 0–0.9)
MONOCYTES NFR BLD AUTO: 5.2 % — SIGNIFICANT CHANGE UP (ref 2–14)
NEUTROPHILS # BLD AUTO: 6.74 K/UL — SIGNIFICANT CHANGE UP (ref 1.8–7.4)
NEUTROPHILS NFR BLD AUTO: 80.9 % — HIGH (ref 43–77)
NRBC BLD AUTO-RTO: 0 /100 WBCS — SIGNIFICANT CHANGE UP (ref 0–0)
PLATELET # BLD AUTO: 205 K/UL — SIGNIFICANT CHANGE UP (ref 150–400)
POTASSIUM SERPL-SCNC: 3.7 MMOL/L — SIGNIFICANT CHANGE UP (ref 3.5–5.3)
PROT SERPL-MCNC: 7.6 G/DL — SIGNIFICANT CHANGE UP (ref 6–8.3)
RBC # BLD: 3.98 M/UL — LOW (ref 4.2–5.8)
RBC # FLD: 18.1 % — HIGH (ref 10.3–14.5)
SODIUM SERPL-SCNC: 132 MMOL/L — LOW (ref 135–145)
WBC # BLD: 8.32 K/UL — SIGNIFICANT CHANGE UP (ref 3.8–10.5)
WBC # FLD AUTO: 8.32 K/UL — SIGNIFICANT CHANGE UP (ref 3.8–10.5)

## 2025-03-13 PROCEDURE — 99233 SBSQ HOSP IP/OBS HIGH 50: CPT

## 2025-03-13 PROCEDURE — 99232 SBSQ HOSP IP/OBS MODERATE 35: CPT

## 2025-03-13 RX ORDER — MELATONIN 5 MG
6 TABLET ORAL AT BEDTIME
Refills: 0 | Status: DISCONTINUED | OUTPATIENT
Start: 2025-03-13 | End: 2025-03-13

## 2025-03-13 RX ORDER — CEFTRIAXONE 500 MG/1
2 INJECTION, POWDER, FOR SOLUTION INTRAMUSCULAR; INTRAVENOUS
Qty: 36 | Refills: 0
Start: 2025-03-13 | End: 2025-03-30

## 2025-03-13 RX ORDER — MELATONIN 5 MG
6 TABLET ORAL AT BEDTIME
Refills: 0 | Status: DISCONTINUED | OUTPATIENT
Start: 2025-03-13 | End: 2025-03-20

## 2025-03-13 RX ADMIN — Medication 325 MILLIGRAM(S): at 11:10

## 2025-03-13 RX ADMIN — Medication 2 TABLET(S): at 21:48

## 2025-03-13 RX ADMIN — CEFTRIAXONE 100 MILLIGRAM(S): 500 INJECTION, POWDER, FOR SOLUTION INTRAMUSCULAR; INTRAVENOUS at 21:47

## 2025-03-13 RX ADMIN — Medication 1 TABLET(S): at 11:10

## 2025-03-13 RX ADMIN — FUROSEMIDE 40 MILLIGRAM(S): 10 INJECTION INTRAMUSCULAR; INTRAVENOUS at 05:46

## 2025-03-13 RX ADMIN — Medication 1 APPLICATION(S): at 05:47

## 2025-03-13 RX ADMIN — LIDOCAINE HYDROCHLORIDE 1 PATCH: 20 JELLY TOPICAL at 11:10

## 2025-03-13 RX ADMIN — Medication 150 MICROGRAM(S): at 05:00

## 2025-03-13 RX ADMIN — Medication 650 MILLIGRAM(S): at 22:48

## 2025-03-13 RX ADMIN — ATORVASTATIN CALCIUM 40 MILLIGRAM(S): 80 TABLET, FILM COATED ORAL at 21:48

## 2025-03-13 RX ADMIN — Medication 25 MILLIGRAM(S): at 05:45

## 2025-03-13 RX ADMIN — TRAMADOL HYDROCHLORIDE 50 MILLIGRAM(S): 50 TABLET, FILM COATED ORAL at 18:16

## 2025-03-13 RX ADMIN — Medication 40 MILLIGRAM(S): at 05:47

## 2025-03-13 RX ADMIN — TRAMADOL HYDROCHLORIDE 50 MILLIGRAM(S): 50 TABLET, FILM COATED ORAL at 17:16

## 2025-03-13 RX ADMIN — Medication 1 APPLICATION(S): at 08:47

## 2025-03-13 RX ADMIN — TRAMADOL HYDROCHLORIDE 50 MILLIGRAM(S): 50 TABLET, FILM COATED ORAL at 21:48

## 2025-03-13 RX ADMIN — Medication 650 MILLIGRAM(S): at 13:56

## 2025-03-13 RX ADMIN — TRAMADOL HYDROCHLORIDE 50 MILLIGRAM(S): 50 TABLET, FILM COATED ORAL at 22:48

## 2025-03-13 RX ADMIN — Medication 1 APPLICATION(S): at 17:15

## 2025-03-13 RX ADMIN — APIXABAN 5 MILLIGRAM(S): 2.5 TABLET, FILM COATED ORAL at 17:15

## 2025-03-13 RX ADMIN — Medication 650 MILLIGRAM(S): at 21:48

## 2025-03-13 RX ADMIN — LIDOCAINE HYDROCHLORIDE 1 PATCH: 20 JELLY TOPICAL at 19:57

## 2025-03-13 RX ADMIN — WHITE PETROLATUM 1 APPLICATION(S): 1 OINTMENT TOPICAL at 05:48

## 2025-03-13 RX ADMIN — TRAMADOL HYDROCHLORIDE 50 MILLIGRAM(S): 50 TABLET, FILM COATED ORAL at 05:45

## 2025-03-13 RX ADMIN — Medication 6 MILLIGRAM(S): at 23:36

## 2025-03-13 RX ADMIN — APIXABAN 5 MILLIGRAM(S): 2.5 TABLET, FILM COATED ORAL at 05:46

## 2025-03-13 RX ADMIN — Medication 650 MILLIGRAM(S): at 06:45

## 2025-03-13 RX ADMIN — TRAMADOL HYDROCHLORIDE 50 MILLIGRAM(S): 50 TABLET, FILM COATED ORAL at 11:11

## 2025-03-13 RX ADMIN — WHITE PETROLATUM 1 APPLICATION(S): 1 OINTMENT TOPICAL at 14:51

## 2025-03-13 RX ADMIN — TRAMADOL HYDROCHLORIDE 50 MILLIGRAM(S): 50 TABLET, FILM COATED ORAL at 06:45

## 2025-03-13 RX ADMIN — Medication 650 MILLIGRAM(S): at 05:45

## 2025-03-13 RX ADMIN — Medication 5 MILLIGRAM(S): at 23:36

## 2025-03-13 RX ADMIN — TRAMADOL HYDROCHLORIDE 50 MILLIGRAM(S): 50 TABLET, FILM COATED ORAL at 12:11

## 2025-03-13 RX ADMIN — Medication 650 MILLIGRAM(S): at 14:56

## 2025-03-13 RX ADMIN — Medication 5 MILLIGRAM(S): at 21:47

## 2025-03-13 NOTE — DISCHARGE NOTE PROVIDER - DETAILS OF MALNUTRITION DIAGNOSIS/DIAGNOSES
This patient has been assessed with a concern for Malnutrition and was treated during this hospitalization for the following Nutrition diagnosis/diagnoses:     -  03/07/2025: Moderate protein-calorie malnutrition

## 2025-03-13 NOTE — PROGRESS NOTE ADULT - SUBJECTIVE AND OBJECTIVE BOX
CC: Patient is a 74y old  Male who presents with a chief complaint of Spinal osteomyelitis/discitis secondary to infected TAVR and aortic valve endocarditis (13 Mar 2025 10:33)      Interval History:  Patient seen and examined at bedside.  No overnight events  Slept a little better but would prefer to continue Ambien and melatonin  Denies CP, SOB, abd pain, N/V/D    ALLERGIES:  No Known Allergies    MEDICATIONS  (STANDING):  acetaminophen     Tablet .. 650 milliGRAM(s) Oral every 8 hours  ammonium lactate 12% Lotion 1 Application(s) Topical two times a day  apixaban 5 milliGRAM(s) Oral two times a day  AQUAPHOR (petrolatum Ointment) 1 Application(s) Topical three times a day  atorvastatin 40 milliGRAM(s) Oral at bedtime  cefTRIAXone   IVPB 2000 milliGRAM(s) IV Intermittent every 24 hours  cefTRIAXone   IVPB      chlorhexidine 4% Liquid 1 Application(s) Topical <User Schedule>  ferrous    sulfate 325 milliGRAM(s) Oral daily  furosemide    Tablet 40 milliGRAM(s) Oral daily  levothyroxine 150 MICROGram(s) Oral daily  lidocaine   4% Patch 1 Patch Transdermal daily  multivitamin 1 Tablet(s) Oral daily  pantoprazole    Tablet 40 milliGRAM(s) Oral before breakfast  polyethylene glycol 3350 17 Gram(s) Oral daily  senna 2 Tablet(s) Oral at bedtime  spironolactone 25 milliGRAM(s) Oral daily    MEDICATIONS  (PRN):  bisacodyl Suppository 10 milliGRAM(s) Rectal daily PRN Constipation  cyclobenzaprine 5 milliGRAM(s) Oral three times a day PRN Muscle Spasm  sodium chloride 0.9% lock flush 10 milliLiter(s) IV Push every 1 hour PRN Pre/post blood products, medications, blood draw, and to maintain line patency  traMADol 50 milliGRAM(s) Oral every 4 hours PRN Severe Pain (7 - 10)  zolpidem 5 milliGRAM(s) Oral at bedtime PRN Insomnia  zolpidem 5 milliGRAM(s) Oral at bedtime PRN Insomnia    Vital Signs Last 24 Hrs  T(F): 98.1 (13 Mar 2025 08:13), Max: 98.1 (13 Mar 2025 08:13)  HR: 82 (13 Mar 2025 08:13) (77 - 82)  BP: 145/74 (13 Mar 2025 08:13) (126/64 - 145/74)  RR: 15 (13 Mar 2025 08:13) (15 - 16)  SpO2: 96% (13 Mar 2025 08:13) (96% - 97%)  I&O's Summary        PHYSICAL EXAM:  GENERAL: NAD, well-developed AAOx3 Male   HEAD:  Atraumatic, Normocephalic  NECK: Supple, No JVD  CHEST/LUNG: Clear to auscultation bilaterally; No wheeze, nonlabored breathing  HEART: Regular rate and rhythm; 2/6 systolic murmur  ABDOMEN: Soft, Nontender, Nondistended; Bowel sounds present  EXTREMITIES: No clubbing, cyanosis, R LE swelling  PSYCH: calm, appropriate mood    LABS:                        11.2   8.32  )-----------( 205      ( 13 Mar 2025 05:54 )             34.3       03-13    132  |  95  |  18  ----------------------------<  101  3.7   |  28  |  1.00    Ca    9.7      13 Mar 2025 05:54    TPro  7.6  /  Alb  2.8  /  TBili  1.0  /  DBili  x   /  AST  28  /  ALT  23  /  AlkPhos  147  03-13                                  Urinalysis Basic - ( 13 Mar 2025 05:54 )    Color: x / Appearance: x / SG: x / pH: x  Gluc: 101 mg/dL / Ketone: x  / Bili: x / Urobili: x   Blood: x / Protein: x / Nitrite: x   Leuk Esterase: x / RBC: x / WBC x   Sq Epi: x / Non Sq Epi: x / Bacteria: x        COVID-19 PCR: NotDetec (03-06-25 @ 17:33)      Care Discussed with Consultants/Other Providers: Yes. Rehab team

## 2025-03-13 NOTE — PROGRESS NOTE ADULT - ASSESSMENT
74-year-old male patient with past medical history of HTN, diabetes mellitus with hyperglycemia, hypothyroidism, obesity, prostate cancer, atrial fibrillation, HLD, GI bleed, S/P TAVR (2023), and chronic back pain who is admitted for Acute Inpatient Rehabilitation with a multidisciplinary rehab program at [facility] with functional impairments in ADLs and mobility secondary to spinal osteomyelitis/discitis secondary to infected TAVR and aortic valve.    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   - Ceftriaxone 2 g once daily via PICC through 4/7/25  - Monitor CBC, CMP, and Weekly ESR and CRP  - CXR weekly  - Ortho f/up after rehab d/c    #Severe aortic stenosis with chronic diastolic CHF and first degree AV block  - S/P TAVR (2023)  - ROVERTO (2/26): echogenic sessile not very mobile subcentimeter ( 6x2 mm) structure attached to the base of the leaflet corresponding to the native non-coronary cusp   - S/P Cardiac catheterization    #Arterial embolism of left leg  # R LE edema  - S/P LCFA/LSFA thrombectomy/endarterectomy on 2/22  - Left groin staples should remain in place until 3/13  - Eliquis 5mg BID   - R LE doppler neg    #Chronic atrial fibrillation  - Eliquis  - Rate controlled without any av rosamaria agents    #Type 2 Diabetes Mellitus  - Ha1c 5.7  - Blood glucose levels are within normal limits   - On metformin and Farxiga at home. Not on any meds while here     #HTN  - Spironolactone 25mg daily  - Lasix 40mg daily    #HLD  - Atorvastatin     #Hypothyroid  - levothyroxine    #Anemia  - Ferrous sulfate 325mg daily  - Trend H/H   - Transfuse if HgB <7 74-year-old male patient with past medical history of HTN, diabetes mellitus with hyperglycemia, hypothyroidism, obesity, prostate cancer, atrial fibrillation, HLD, GI bleed, S/P TAVR (2023), and chronic back pain who is admitted for Acute Inpatient Rehabilitation with a multidisciplinary rehab program at [facility] with functional impairments in ADLs and mobility secondary to spinal osteomyelitis/discitis secondary to infected TAVR and aortic valve.    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   - Ceftriaxone 2 g once daily via PICC through 4/7/25  - Monitor CBC, CMP, and Weekly ESR and CRP  - CXR weekly  - Ortho f/up after rehab d/c    #Severe aortic stenosis with chronic diastolic CHF and first degree AV block  - S/P TAVR (2023)  - ROVERTO (2/26): echogenic sessile not very mobile subcentimeter ( 6x2 mm) structure attached to the base of the leaflet corresponding to the native non-coronary cusp   - S/P Cardiac catheterization    #Arterial embolism of left leg  # R LE edema  - S/P LCFA/LSFA thrombectomy/endarterectomy on 2/22  - Left groin staples should remain in place until 3/13  - Eliquis 5mg BID   - R LE doppler neg    #Chronic atrial fibrillation  - Eliquis  - Rate controlled without any av rosamaria agents    # Mild Hyponatremia  - stable around 132-134  - continue to monitor closely  - if trending [id number], will work up    #Type 2 Diabetes Mellitus  - Ha1c 5.7  - Blood glucose levels are within normal limits   - On metformin and Farxiga at home. Not on any meds while here     #HTN  - Spironolactone 25mg daily  - Lasix 40mg daily    #HLD  - Atorvastatin     #Hypothyroid  - levothyroxine    #Anemia  - Ferrous sulfate 325mg daily  - Trend H/H   - Transfuse if HgB <7

## 2025-03-13 NOTE — DISCHARGE NOTE PROVIDER - HOSPITAL COURSE
HPI:  Mr. Win Menezes is a 74-year-old male patient with past medical history of HTN, diabetes mellitus with hyperglycemia, hypothyroidism, obesity, prostate cancer, atrial fibrillation, HLD, GI bleed, S/P TAVR (2023), and chronic back pain who presented to the ED at [facility] on 2/20/25 with a two-day history of a rash on his left leg and pain in the left knee radiating towards his foot. He presented to the ED from SNF rehab where he was admitted for less than one week. after hospitalization at [facility] from 2/10/25 through 2/17/25 and treatment for acute anemia, DARLINE, hypotension, s/p 2 units PRBC and negative EGD/colonoscopy. He was found to have an infected TAVR and osteomyelitis of spine, and strep salivarius bacteremia. Plan at that time was for 6 weeks of IV antibiotics via PICC line through 4/7/25. CTA on 2/21/25 showed clot to common femoral and distal embolism. He is S/P common femoral endarterectomy with clot retrieval and good blood flow to lower extremity post surgery. Repeat blood cultures were negative, but the excised clot was found to have gram positive cocci. Repeat MRI spine showed no abscess. Hospital stay c/b constipation and persistent back pain. CT and MRI L-spine showed degenerative disc disease and possible discitis/osteomyelitis at multiple levels. Patient received transfusion of 2 unit of PRBC's on 2/23/25. He was transferred [facility] at [facility] on 2/24/25 for CT surgery evaluation. Cardiac catheterization showed non-obstructive CAD. Patient determined by CT surgery team to not be strong enough for CT surgery at this time, referred to rehab for optimization. Patient was evaluated by PM&R and therapy for functional deficits, gait/ADL impairments and acute rehabilitation was recommended. Patient was cleared for discharge to [facility] IRF on 3/6/2025. Admitted with gait instability, ADL, and functional impairments.     Weekly CXRs showed no significant findings or changes. Patient had an episode of subjective SOB with normal spo2 that spontaneously resolved. Otherwise, no new medical problems while in rehab.     All other medical co-morbidities were stable. Patient tolerated course of inpatient PT/OT rehab with significant improvements in transfers and ambulation and met rehab goals prior to discharge. Patient was cleared on 3/20 for discharge to home.

## 2025-03-13 NOTE — DISCHARGE NOTE PROVIDER - PROVIDER TOKENS
PROVIDER:[TOKEN:[[id number]],FOLLOWUP:[2 weeks]],PROVIDER:[TOKEN:[[id number]],FOLLOWUP:[2 weeks]],PROVIDER:[TOKEN:[[id number]],FOLLOWUP:[1 month]],PROVIDER:[TOKEN:[[id number]],FOLLOWUP:[1 week]],PROVIDER:[TOKEN:[[id number]],FOLLOWUP:[1 month]],PROVIDER:[TOKEN:[[id number]],FOLLOWUP:[2 weeks]],PROVIDER:[TOKEN:[[id number]],FOLLOWUP:[1 month]],PROVIDER:[TOKEN:[[id number]],FOLLOWUP:[2 weeks]],PROVIDER:[TOKEN:[[id number]],FOLLOWUP:[1 month]]

## 2025-03-13 NOTE — DISCHARGE NOTE PROVIDER - CARE PROVIDER_API CALL
Fabio Marshall  Infectious Disease  [address], [address]  [place] [address]  Phone: [phone]  Fax: [phone]  Follow Up Time: 2 weeks    Abdulaziz Jernigan  [facility] Medicine  [address], [address]  [place] [address]  Phone: [phone]  Fax: [phone]  Follow Up Time: 2 weeks    Benito Jones  Dermatology  [address], [address]  [place] [address]  Phone: [phone]  Fax: [phone]  Follow Up Time: 1 month    Mahamed Heath  Cardiovascular Disease  [address], [address]  [place] [address]  Phone: [phone]  Fax: [phone]  Follow Up Time: 1 week    Lizbeth Redd  Nephrology  [address] [facility], [address]  [place] [address]  Phone: [phone]  Fax: [phone]  Follow Up Time: 1 month    Gualberto Porter  Gastroenterology  [address] [facility] [address] [facility], [address]  [place] [address]  Phone: [phone]  Fax: [phone]  Follow Up Time: 2 weeks    Berny Lizarraga [facility]  Neurosurgery  [address] [facility]  [place] [address]  Phone: [phone]  Fax: [phone]  Follow Up Time: 1 month    Jerman Rodriges  Thoracic and Cardiac Surgery  [address] [place] [address]  Phone: [phone]  Fax: [phone]  Follow Up Time: 2 weeks    Golden Edwards  Physical/Rehab Medicine  [address] [place] [address]  Phone: [phone]  Fax: [phone]  Follow Up Time: 1 month

## 2025-03-13 NOTE — PROGRESS NOTE ADULT - SUBJECTIVE AND OBJECTIVE BOX
HPI:  Mr. Win Menezes is a 74-year-old male patient with past medical history of HTN, diabetes mellitus with hyperglycemia, hypothyroidism, obesity, prostate cancer, atrial fibrillation, HLD, GI bleed, S/P TAVR (2023), and chronic back pain who presented to the ED at [facility] on 2/20/25 with a two-day history of a rash on his left leg and pain in the left knee radiating towards his foot. He presented to the ED from SNF rehab where he was admitted for less than one week. after hospitalization at [facility] from 2/10/25 through 2/17/25 and treatment for acute anemia, DARLINE, hypotension, s/p 2 units PRBC and negative EGD/colonoscopy. He was found to have an infected TAVR and osteomyelitis of spine, and strep salivarius bacteremia. Plan at that time was for 6 weeks of IV antibiotics via PICC line. CTA on 2/21/25 showed clot to common femoral and distal embolism. He is S/P common femoral endarterectomy with clot retrieval and good blood flow to lower extremity post surgery. Repeat blood cultures were negative, but the excised clot was found to have gram positive cocci. Repeat MRI spine showed no abscess. Hospital stay c/b constipation and persistent back pain. CT and MRI L-spine showed degenerative disc disease and possible discitis/osteomyelitis at multiple levels. Patient received transfusion of 2 unit of PRBC's on 2/23/25. He was transferred to CTICU at [facility] on 2/24/25 for CT surgery evaluation. Cardiac catheterization showed non-obstructive CAD. Patient determined by CT surgery team to not be strong enough for CT surgery at this time, referred to rehab for optimization. Patient was evaluated by PM&R and therapy for functional deficits, gait/ADL impairments and acute rehabilitation was recommended. Patient was cleared for discharge to [facility] IRF on 3/6/2025. (06 Mar 2025 13:06)    TDD: 3/20/25 home  ___________________________________________________________________________    SUBJECTIVE/ROS  Patient was seen and evaluated at bedside today.  Reported no overnight events and is in no acute distress.  Lengthy discussions took place with patient and his wife yesterday.  Focus placed on treatment plan and antibiotic pharmacotherapy.  All questions were answered and they expressed understanding and agreement.   Case to be discussed at Interdisciplinary Team meeting again today.  A tentative discharge date is outlined above.  Patient is eager to continue participation on the recommended rehabilitation program.  Denies any CP, SOB, RICE, palpitations, fever, chills, body aches, cough, congestion, or any other symptoms at this time.   ___________________________________________________________________________    US DPLX LWR EXT VEINS LTD RT  PROCEDURE DATE:  03/11/2025  IMPRESSION: No evidence of right lower extremity deep venous thrombosis.  ___________________________________________________________________________    Vital Signs Last 24 Hrs  T(C): 36.7 (13 Mar 2025 08:13), Max: 36.7 (13 Mar 2025 08:13)  T(F): 98.1 (13 Mar 2025 08:13), Max: 98.1 (13 Mar 2025 08:13)  HR: 82 (13 Mar 2025 08:13) (77 - 82)  BP: 145/74 (13 Mar 2025 08:13) (126/64 - 145/74)  RR: 15 (13 Mar 2025 08:13) (15 - 16)  SpO2: 96% (13 Mar 2025 08:13) (96% - 97%)    ___________________________________________________________________________    LAB                        11.2   8.32  )-----------( 205      ( 13 Mar 2025 05:54 )             34.3                           11.1   9.05  )-----------( 180      ( 10 Mar 2025 05:37 )             33.4       03-13    132[L]  |  95[L]  |  18  ----------------------------<  101[H]  3.7   |  28  |  1.00    Ca    9.7      13 Mar 2025 05:54    TPro  7.6  /  Alb  2.8[L]  /  TBili  1.0  /  DBili  x   /  AST  28  /  ALT  23  /  AlkPhos  147[H]  03-13    LIVER FUNCTIONS - ( 13 Mar 2025 05:54 )  Alb: 2.8 g/dL / Pro: 7.6 g/dL / ALK PHOS: 147 U/L / ALT: 23 U/L / AST: 28 U/L / GGT: x             03-10    131[L]  |  96  |  25[H]  ----------------------------<  95  3.9   |  25  |  1.01    Ca    9.2      10 Mar 2025 05:37    TPro  7.0  /  Alb  2.5[L]  /  TBili  0.9  /  DBili  x   /  AST  26  /  ALT  17  /  AlkPhos  130[H]  03-10    LIVER FUNCTIONS - ( 10 Mar 2025 05:37 )  Alb: 2.5 g/dL / Pro: 7.0 g/dL / ALK PHOS: 130 U/L / ALT: 17 U/L / AST: 26 U/L / GGT: x          ESR Historical Values  Sedimentation Rate, Erythrocyte (03.10.25 @ 05:37)   Sedimentation Rate, Erythrocyte: 104 mm/hr  Sedimentation Rate, Erythrocyte (02.20.25 @ 18:10)   Sedimentation Rate, Erythrocyte: 135 mm/hr  Sedimentation Rate, Erythrocyte (02.12.25 @ 13:56)   Sedimentation Rate, Erythrocyte: 93 mm/hr     CRP Historical Values  POCT Blood Glucose (03.11.25 @ 20:21)   POCT Blood Glucose.: 160 mg/dL  POCT Blood Glucose (03.06.25 @ 21:00)   POCT Blood Glucose.: 161 mg/dL  POCT Blood Glucose (03.06.25 @ 17:33)   POCT Blood Glucose.: 108 mg/dL  POCT Blood Glucose (03.03.25 @ 07:59)   POCT Blood Glucose.: 97 mg/dL  ___________________________________________________________________________    MEDICATIONS  (STANDING):  acetaminophen     Tablet .. 650 milliGRAM(s) Oral every 8 hours  ammonium lactate 12% Lotion 1 Application(s) Topical two times a day  apixaban 5 milliGRAM(s) Oral two times a day  AQUAPHOR (petrolatum Ointment) 1 Application(s) Topical three times a day  atorvastatin 40 milliGRAM(s) Oral at bedtime  cefTRIAXone   IVPB 2000 milliGRAM(s) IV Intermittent every 24 hours  cefTRIAXone   IVPB      chlorhexidine 4% Liquid 1 Application(s) Topical <User Schedule>  ferrous    sulfate 325 milliGRAM(s) Oral daily  furosemide    Tablet 40 milliGRAM(s) Oral daily  levothyroxine 150 MICROGram(s) Oral daily  lidocaine   4% Patch 1 Patch Transdermal daily  multivitamin 1 Tablet(s) Oral daily  pantoprazole    Tablet 40 milliGRAM(s) Oral before breakfast  polyethylene glycol 3350 17 Gram(s) Oral daily  senna 2 Tablet(s) Oral at bedtime  spironolactone 25 milliGRAM(s) Oral daily    MEDICATIONS  (PRN):  bisacodyl Suppository 10 milliGRAM(s) Rectal daily PRN Constipation  cyclobenzaprine 5 milliGRAM(s) Oral three times a day PRN Muscle Spasm  melatonin 9 milliGRAM(s) Oral at bedtime PRN Insomnia  sodium chloride 0.9% lock flush 10 milliLiter(s) IV Push every 1 hour PRN Pre/post blood products, medications, blood draw, and to maintain line patency  traMADol 50 milliGRAM(s) Oral every 4 hours PRN Severe Pain (7 - 10)  zolpidem 5 milliGRAM(s) Oral at bedtime PRN Insomnia  zolpidem 5 milliGRAM(s) Oral at bedtime PRN Insomnia    ___________________________________________________________________________    PHYSICAL EXAM:    Gen - NAD, Comfortable  HEENT - NCAT, EOMI,  Normal Conjunctivae  Neck - Supple, No limited ROM  Pulm - Clear lungs  Cardiovascular - RRR, S1S2, No murmurs  Chest - good chest expansion, good respiratory effort  Abdomen - Soft, NT/ND, +BS  Extremities - No C/C/E, no calf tenderness +LE swelling with +calf tenderness and warmth. Palpable but diminished left DP pulse  Neuro-     Cognitive - awake, alert, and oriented.     Motor - No focal deficits.                     LEFT    UE - ShAB 5/5, EF 5/5, EE 5/5,  5/5                    RIGHT UE - ShAB 5/5, EF 3/5, EE 5/5,   5/5                    LEFT    LE - HF 2/5, KE 2/5, DF 3/5, PF 3/5                    RIGHT LE - HF 3/5, KE 3/5, DF 3/5, PF 4/5        Sensory - Intact to light touch; LLE diminished light touch sensation      Tone - normal  MSK: no joint deformity or swelling; full ROM  Psychiatric - Mood stable, Affect WNL  Skin: No rashes or lesions; warm and dry. Left groin 7.5cm incision with 13 staples present; no swelling/redness/drainage to site. Feet dry.  Wounds: Right buttock healed scab    ___________________________________________________________________________

## 2025-03-13 NOTE — PROGRESS NOTE ADULT - ASSESSMENT
Assessment/Plan:  Mr. Win Menezes is a 74-year-old male patient with past medical history of HTN, diabetes mellitus with hyperglycemia, hypothyroidism, obesity, prostate cancer, atrial fibrillation, HLD, GI bleed, S/P TAVR (2023), and chronic back pain who is admitted for Acute Inpatient Rehabilitation with a multidisciplinary rehab program at [facility] with functional impairments in ADLs and mobility secondary to spinal osteomyelitis/discitis secondary to infected TAVR and aortic valve.      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              - WBC 10.09 on 3/6             - ESR//206 on 2/20      * CXR weekly      * Daily weights      * LSO brace when OOB  Comprehensive Multidisciplinary Rehab Program:  - Start comprehensive rehab program, 3 hours a day, 5 days a week.  - PT 2hr/day: Focused on improving strength, endurance, coordination, balance, functional mobility, and transfers  - OT 1hr/day: Focused on improving strength, fine motor skills, coordination, posture and ADLs.    - Activity Limitations: Decreased social, vocational and leisure activities, decreased self care and ADLs, decreased mobility, decreased ability to manage household and finances.     -----------------------------------------------------------------------------------  Concurrent Medical Problems    #LLE edema/warmth/calf pain (on arrival 3/6)  - R/O DVT with bedside doppler 3/6    #Severe aortic stenosis with chronic diastolic CHF and first degree AV block  - S/P TAVR (2023)  - ROVERTO (2/26):       * echogenic sessile not very mobile subcentimeter ( 6x2 mm) structure attached to the base of the leaflet corresponding to the native non-coronary cusp       * likely representing a calcified nodule vs. less likely an old small calcified vegetation, mod AS  - S/P Cardiac catheterization  - Monitor right wrist and right groin for bleeding  - No heavy lifting or strenuous activity until 3/10    #Multilevel osteomyelitis   - Ceftriaxone through 4/7/25 as above  - LSO brace when OOB  - Ortho f/up after rehab d/c    #Arterial embolism of left leg  - S/P LCFA/LSFA thrombectomy/endarterectomy on 2/22  - Left groin staples should remain in place until 3/13  - Eliquis 5mg BID -- cleared to restart by cardiology on 3/6.  - DVT ppx: TEDs    #Bacteremia  - Strep salivarius bacteremia> cultures now clear.   - Ceftriaxone through 4/7/25 as above    #Chronic atrial fibrillation  - Continue Eliquis as above    #Type 2 Diabetes Mellitus with hyperglycemia  - Consistent carb diet.   - On metformin and Farxiga at home  - AM glucose 113-97; well controlled.  - Monitor glucose on AM labs.    #HTN  - Spironolactone 25mg daily  - Lasix 40mg daily    #HLD  - Atorvastatin 40mg daily at HS    #Hypothyroid  - levothyroxine 150mcg daily  - TSH 0.73 on 2/24/25    #Anemia  - Ferrous sulfate 325mg daily  - Trend H/H (10.9/32/9 on 3/6)    #Mood/Cognition:  - Neuropsychology consult PRN    #Sleep:   - Maintain quiet hours and low stim environment.  - Melatonin 6mg PRN to maximize participation in therapy during the day.     #Pain Management:  - Analgesic: Tylenol 650mg every 8 hours PRN  - Narcotic: Tramadol 25 mg every 6 hours PRN moderate pain (4-6)  - Tramadol 50mg every 6 hours PRN severe pain (7-10)  - Antispasmodic: Cyclobenzaprine 5mg TID PRN muscle spasm    #GI/Bowel:  - Senna 2 tabs QHS   - Miralax 17g Daily  - GI ppx: Pantoprazole 40mg daily before a meal  - bisacodyl 10mg suppository rectally once a day PRN constipation    #/Bladder:   - PVR on admission  - monitor urine output    #Skin/Pressure Injury assessment:   - Skin assessment on admission:       * Left groin 7.5cm incision with 13 staples present; no swelling/redness/drainage to site      * Right buttock healed scab    #Diet/Dysphagia:  - Current Diet: Dash diet, consistent carb diabetic diet  - Nutrition consult     #Patient Education  - Education provided on the following:      * Admitting diagnosis and functional implications      * Functional goals      * Bladder management      * Bowel management      * Skin care management      * Intensity of service and scheduling of rehab disciplines      * Plan of care and role of interdisciplinary team conference in discharge planning      * Reconciliation of medications from prior institution    #Precautions / PROPHYLAXIS:   - Falls, Cardiac, Spinal  - ortho: Weight bearing status: FWB  - Lungs: Aspiration, Incentive Spirometer   - Pressure injury/Skin: OOB to Chair, PT/OT      ---------------  Code Status: DNR/DNI; all other medical interventions allowed (see MOLST)  Emergency Contact:    Outpatient Follow-up (Specialty/Name of physician):    Abdulaziz Jernigan Physician [address]  Scheduled Appointment: 03/17/2025  For kidney tests and CBC    Benito Jones Physician [address]  Scheduled Appointment: 04/07/2025    Mahamed Heath  Cardiovascular Disease  [address] [facility], [address]  [place] [address]  Phone: [phone]  Fax: [phone]  Follow Up Time: 1-2 weeks for ROVERTO and cardiac workup    Lizbeth Redd  Nephrology  [address] [facility], [address]  [place] [address]  Phone: [phone]  Fax: [phone]  Follow Up Time: within 4-6 weeks    Gualberto Porter  Gastroenterology  [address] [facility], [address]  [place] [address]  Phone: [phone]  Fax: [phone]  Follow Up Time:   For pill camera test    Berny Lizarraga [place]  Neurosurgery  [address] [facility]  [place] [address]  Phone: [phone]  Fax: [phone]  Follow Up Time: 4 weeks  For MRI    Dr. Rodriges   Cardiothoracic surgery  Follow appointment after discharge  [phone]       --------------

## 2025-03-13 NOTE — DISCHARGE NOTE PROVIDER - NPI NUMBER (FOR SYSADMIN USE ONLY) :
[[id number]]

## 2025-03-13 NOTE — DISCHARGE NOTE PROVIDER - CARE PROVIDERS DIRECT ADDRESSES
,[email or web address]

## 2025-03-13 NOTE — DISCHARGE NOTE PROVIDER - NSDCCPCAREPLAN_GEN_ALL_CORE_FT
PRINCIPAL DISCHARGE DIAGNOSIS  Diagnosis: Osteomyelitis of spine  Assessment and Plan of Treatment: Osteomyelitis is a bone infection that can cause pain, fever, and weakness. It's usually caused by bacteria, but can also be caused by fungi or other germs.   Symptoms pain in a specific bone, redness, fever, chills, and weakness.  Causes include the following:  - A bacterial infection in the bloodstream that spreads to the bone   - An infection that starts in another part of the body and spreads to the bone through the blood   - An infection that starts in the bone, often due to an injury   - An infection that starts after bone surgery   Treatment include the following:  - Antibiotics, which can be taken at home or in the hospital  - Surgery to remove infected bone or areas around the bone  - Pain medication  Please follow up with your orthopedic surgeon after discharge.      SECONDARY DISCHARGE DIAGNOSES  Diagnosis: Severe aortic stenosis  Assessment and Plan of Treatment: You have severe aortic stenosis, a condition where your heart valve is narrowed, reducing blood flow. Please follow up with your cardiologist after discharge.    Diagnosis: Chronic atrial fibrillation  Assessment and Plan of Treatment: Atrial fibrillation (A-fib) is an irregular and often very rapid heart rhythm (arrhythmia) that can lead to blood clots in the heart. A-fib increases the risk of stroke, heart failure and other heart-related complications. You should continue your medications and follow-up with your Cardiologist for further re-assessments and management.    Diagnosis: Hypertension  Assessment and Plan of Treatment: High blood pressure, also called hypertension, is blood pressure that is higher than normal. Your blood pressure changes throughout the day based on your activities. Having blood pressure measures consistently above normal may result in a diagnosis of high blood pressure (or hypertension). You are advided to follow up with your primary care physician for further assessment and recommendations.

## 2025-03-14 PROCEDURE — 99232 SBSQ HOSP IP/OBS MODERATE 35: CPT

## 2025-03-14 PROCEDURE — 99233 SBSQ HOSP IP/OBS HIGH 50: CPT

## 2025-03-14 RX ORDER — SALINE 7; 19 G/118ML; G/118ML
1 ENEMA RECTAL ONCE
Refills: 0 | Status: COMPLETED | OUTPATIENT
Start: 2025-03-14 | End: 2025-03-15

## 2025-03-14 RX ORDER — LACTULOSE 10 G/15ML
10 SOLUTION ORAL ONCE
Refills: 0 | Status: COMPLETED | OUTPATIENT
Start: 2025-03-14 | End: 2025-03-14

## 2025-03-14 RX ADMIN — Medication 2 TABLET(S): at 21:53

## 2025-03-14 RX ADMIN — Medication 650 MILLIGRAM(S): at 07:24

## 2025-03-14 RX ADMIN — Medication 1 APPLICATION(S): at 06:28

## 2025-03-14 RX ADMIN — APIXABAN 5 MILLIGRAM(S): 2.5 TABLET, FILM COATED ORAL at 06:23

## 2025-03-14 RX ADMIN — CEFTRIAXONE 100 MILLIGRAM(S): 500 INJECTION, POWDER, FOR SOLUTION INTRAMUSCULAR; INTRAVENOUS at 21:54

## 2025-03-14 RX ADMIN — POLYETHYLENE GLYCOL 3350 17 GRAM(S): 17 POWDER, FOR SOLUTION ORAL at 15:04

## 2025-03-14 RX ADMIN — LACTULOSE 10 GRAM(S): 10 SOLUTION ORAL at 18:06

## 2025-03-14 RX ADMIN — TRAMADOL HYDROCHLORIDE 50 MILLIGRAM(S): 50 TABLET, FILM COATED ORAL at 19:54

## 2025-03-14 RX ADMIN — TRAMADOL HYDROCHLORIDE 50 MILLIGRAM(S): 50 TABLET, FILM COATED ORAL at 02:35

## 2025-03-14 RX ADMIN — Medication 1 TABLET(S): at 11:36

## 2025-03-14 RX ADMIN — TRAMADOL HYDROCHLORIDE 50 MILLIGRAM(S): 50 TABLET, FILM COATED ORAL at 18:03

## 2025-03-14 RX ADMIN — APIXABAN 5 MILLIGRAM(S): 2.5 TABLET, FILM COATED ORAL at 18:06

## 2025-03-14 RX ADMIN — FUROSEMIDE 40 MILLIGRAM(S): 10 INJECTION INTRAMUSCULAR; INTRAVENOUS at 06:24

## 2025-03-14 RX ADMIN — WHITE PETROLATUM 1 APPLICATION(S): 1 OINTMENT TOPICAL at 06:24

## 2025-03-14 RX ADMIN — Medication 1 APPLICATION(S): at 06:24

## 2025-03-14 RX ADMIN — LIDOCAINE HYDROCHLORIDE 1 PATCH: 20 JELLY TOPICAL at 18:32

## 2025-03-14 RX ADMIN — WHITE PETROLATUM 1 APPLICATION(S): 1 OINTMENT TOPICAL at 15:13

## 2025-03-14 RX ADMIN — Medication 5 MILLIGRAM(S): at 21:53

## 2025-03-14 RX ADMIN — Medication 650 MILLIGRAM(S): at 06:24

## 2025-03-14 RX ADMIN — Medication 40 MILLIGRAM(S): at 06:24

## 2025-03-14 RX ADMIN — Medication 150 MICROGRAM(S): at 06:24

## 2025-03-14 RX ADMIN — Medication 25 MILLIGRAM(S): at 06:24

## 2025-03-14 RX ADMIN — Medication 650 MILLIGRAM(S): at 15:11

## 2025-03-14 RX ADMIN — LIDOCAINE HYDROCHLORIDE 1 PATCH: 20 JELLY TOPICAL at 11:36

## 2025-03-14 RX ADMIN — LIDOCAINE HYDROCHLORIDE 1 PATCH: 20 JELLY TOPICAL at 00:52

## 2025-03-14 RX ADMIN — TRAMADOL HYDROCHLORIDE 50 MILLIGRAM(S): 50 TABLET, FILM COATED ORAL at 20:54

## 2025-03-14 RX ADMIN — Medication 650 MILLIGRAM(S): at 22:54

## 2025-03-14 RX ADMIN — Medication 6 MILLIGRAM(S): at 21:53

## 2025-03-14 RX ADMIN — ATORVASTATIN CALCIUM 40 MILLIGRAM(S): 80 TABLET, FILM COATED ORAL at 21:54

## 2025-03-14 RX ADMIN — TRAMADOL HYDROCHLORIDE 50 MILLIGRAM(S): 50 TABLET, FILM COATED ORAL at 03:35

## 2025-03-14 RX ADMIN — TRAMADOL HYDROCHLORIDE 50 MILLIGRAM(S): 50 TABLET, FILM COATED ORAL at 15:11

## 2025-03-14 RX ADMIN — Medication 650 MILLIGRAM(S): at 21:54

## 2025-03-14 RX ADMIN — Medication 650 MILLIGRAM(S): at 15:13

## 2025-03-14 RX ADMIN — Medication 325 MILLIGRAM(S): at 11:36

## 2025-03-14 NOTE — PROGRESS NOTE ADULT - ASSESSMENT
Assessment/Plan:  Mr. Win Menezes is a 74-year-old male patient with past medical history of HTN, diabetes mellitus with hyperglycemia, hypothyroidism, obesity, prostate cancer, atrial fibrillation, HLD, GI bleed, S/P TAVR (2023), and chronic back pain who is admitted for Acute Inpatient Rehabilitation with a multidisciplinary rehab program at [facility] with functional impairments in ADLs and mobility secondary to spinal osteomyelitis/discitis secondary to infected TAVR and aortic valve.      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              - WBC 10.09 on 3/6             - ESR//206 on 2/20      * CXR weekly      * Daily weights      * LSO brace when OOB  Comprehensive Multidisciplinary Rehab Program:  - Start comprehensive rehab program, 3 hours a day, 5 days a week.  - PT 2hr/day: Focused on improving strength, endurance, coordination, balance, functional mobility, and transfers  - OT 1hr/day: Focused on improving strength, fine motor skills, coordination, posture and ADLs.    - Activity Limitations: Decreased social, vocational and leisure activities, decreased self care and ADLs, decreased mobility, decreased ability to manage household and finances.     -----------------------------------------------------------------------------------  Concurrent Medical Problems    #LLE edema/warmth/calf pain (on arrival 3/6)  - R/O DVT with bedside doppler 3/6    #Severe aortic stenosis with chronic diastolic CHF and first degree AV block  - S/P TAVR (2023)  - ROVERTO (2/26):       * echogenic sessile not very mobile subcentimeter ( 6x2 mm) structure attached to the base of the leaflet corresponding to the native non-coronary cusp       * likely representing a calcified nodule vs. less likely an old small calcified vegetation, mod AS  - S/P Cardiac catheterization  - Monitor right wrist and right groin for bleeding  - No heavy lifting or strenuous activity until 3/10    #Multilevel osteomyelitis   - Ceftriaxone through 4/7/25 as above  - LSO brace when OOB  - Ortho f/up after rehab d/c    #Arterial embolism of left leg  - S/P LCFA/LSFA thrombectomy/endarterectomy on 2/22  - Left groin staples should remain in place until 3/13  - Eliquis 5mg BID -- cleared to restart by cardiology on 3/6.  - DVT ppx: TEDs    #Bacteremia  - Strep salivarius bacteremia> cultures now clear.   - Ceftriaxone through 4/7/25 as above    #Chronic atrial fibrillation  - Continue Eliquis as above    #Type 2 Diabetes Mellitus with hyperglycemia  - Consistent carb diet.   - On metformin and Farxiga at home  - AM glucose 113-97; well controlled.  - Monitor glucose on AM labs.    #HTN  - Spironolactone 25mg daily  - Lasix 40mg daily    #HLD  - Atorvastatin 40mg daily at HS    #Hypothyroid  - levothyroxine 150mcg daily  - TSH 0.73 on 2/24/25    #Anemia  - Ferrous sulfate 325mg daily  - Trend H/H (10.9/32/9 on 3/6)    #Mood/Cognition:  - Neuropsychology consult PRN    #Sleep:   - Maintain quiet hours and low stim environment.  - Melatonin 6mg PRN to maximize participation in therapy during the day.     #Pain Management:  - Analgesic: Tylenol 650mg every 8 hours PRN  - Narcotic: Tramadol 25 mg every 6 hours PRN moderate pain (4-6)  - Tramadol 50mg every 6 hours PRN severe pain (7-10)  - Antispasmodic: Cyclobenzaprine 5mg TID PRN muscle spasm    #GI/Bowel:  - Senna 2 tabs QHS   - Miralax 17g Daily  - GI ppx: Pantoprazole 40mg daily before a meal  - bisacodyl 10mg suppository rectally once a day PRN constipation    #/Bladder:   - PVR on admission  - monitor urine output    #Skin/Pressure Injury assessment:   - Skin assessment on admission:       * Left groin 7.5cm incision with 13 staples present; no swelling/redness/drainage to site      * Right buttock healed scab    #Diet/Dysphagia:  - Current Diet: Dash diet, consistent carb diabetic diet  - Nutrition consult     #Patient Education  - Education provided on the following:      * Admitting diagnosis and functional implications      * Functional goals      * Bladder management      * Bowel management      * Skin care management      * Intensity of service and scheduling of rehab disciplines      * Plan of care and role of interdisciplinary team conference in discharge planning      * Reconciliation of medications from prior institution    #Precautions / PROPHYLAXIS:   - Falls, Cardiac, Spinal  - ortho: Weight bearing status: FWB  - Lungs: Aspiration, Incentive Spirometer   - Pressure injury/Skin: OOB to Chair, PT/OT      ---------------  Code Status: DNR/DNI; all other medical interventions allowed (see MOLST)  Emergency Contact:    Outpatient Follow-up (Specialty/Name of physician):    Abdulaziz Jernigan Physician [address]  Scheduled Appointment: 03/17/2025  For kidney tests and CBC    Benito Jones Physician [address]  Scheduled Appointment: 04/07/2025    Mahamed Heath  Cardiovascular Disease  [address] [facility], [address]  [place] [address]  Phone: [phone]  Fax: [phone]  Follow Up Time: 1-2 weeks for ROVERTO and cardiac workup    Lizbeth Redd  Nephrology  [address] [facility], [address]  [place] [address]  Phone: [phone]  Fax: [phone]  Follow Up Time: within 4-6 weeks    Gualberto Porter  Gastroenterology  [address] [facility], [address]  [place] [address]  Phone: [phone]  Fax: [phone]  Follow Up Time:   For pill camera test    Berny Lizarraga [place]  Neurosurgery  [address] [facility], [address]  [place] [address]  Phone: [phone]  Fax: [phone]  Follow Up Time: 4 weeks  For MRI    Dr. Rodriges   Cardiothoracic surgery  Follow appointment after discharge  [phone]       --------------

## 2025-03-14 NOTE — PROGRESS NOTE ADULT - ASSESSMENT
74-year-old male patient with past medical history of HTN, diabetes mellitus with hyperglycemia, hypothyroidism, obesity, prostate cancer, atrial fibrillation, HLD, GI bleed, S/P TAVR (2023), and chronic back pain who is admitted for Acute Inpatient Rehabilitation with a multidisciplinary rehab program at [facility] with functional impairments in ADLs and mobility secondary to spinal osteomyelitis/discitis secondary to infected TAVR and aortic valve.    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   - Ceftriaxone 2 g once daily via PICC through 4/7/25  - Monitor CBC, CMP, and Weekly ESR and CRP  - CXR weekly  - Ortho f/up after rehab d/c    #Severe aortic stenosis with chronic diastolic CHF and first degree AV block  - S/P TAVR (2023)  - ROVERTO (2/26): echogenic sessile not very mobile subcentimeter ( 6x2 mm) structure attached to the base of the leaflet corresponding to the native non-coronary cusp   - S/P Cardiac catheterization    #Arterial embolism of left leg  # R LE edema  - S/P LCFA/LSFA thrombectomy/endarterectomy on 2/22  - Left groin staples should remain in place until 3/13  - Eliquis 5mg BID   - R LE doppler neg    #Chronic atrial fibrillation  - Eliquis  - Rate controlled without any av rosamaria agents    # Mild Hyponatremia  - stable around 132-134  - continue to monitor closely  - if trending [id number], will work up    #Type 2 Diabetes Mellitus  - Ha1c 5.7  - Blood glucose levels are within normal limits   - On metformin and Farxiga at home. Not on any meds while here     #HTN  - Spironolactone 25mg daily  - Lasix 40mg daily    #HLD  - Atorvastatin     #Hypothyroid  - levothyroxine    #Anemia  - Ferrous sulfate 325mg daily  - Trend H/H   - Transfuse if HgB <7

## 2025-03-14 NOTE — PROGRESS NOTE ADULT - SUBJECTIVE AND OBJECTIVE BOX
HPI:  Mr. Win Menezes is a 74-year-old male patient with past medical history of HTN, diabetes mellitus with hyperglycemia, hypothyroidism, obesity, prostate cancer, atrial fibrillation, HLD, GI bleed, S/P TAVR (2023), and chronic back pain who presented to the ED at [facility] on 2/20/25 with a two-day history of a rash on his left leg and pain in the left knee radiating towards his foot. He presented to the ED from SNF rehab where he was admitted for less than one week. after hospitalization at [facility] from 2/10/25 through 2/17/25 and treatment for acute anemia, DARLINE, hypotension, s/p 2 units PRBC and negative EGD/colonoscopy. He was found to have an infected TAVR and osteomyelitis of spine, and strep salivarius bacteremia. Plan at that time was for 6 weeks of IV antibiotics via PICC line. CTA on 2/21/25 showed clot to common femoral and distal embolism. He is S/P common femoral endarterectomy with clot retrieval and good blood flow to lower extremity post surgery. Repeat blood cultures were negative, but the excised clot was found to have gram positive cocci. Repeat MRI spine showed no abscess. Hospital stay c/b constipation and persistent back pain. CT and MRI L-spine showed degenerative disc disease and possible discitis/osteomyelitis at multiple levels. Patient received transfusion of 2 unit of PRBC's on 2/23/25. He was transferred to CTICU at [facility] on 2/24/25 for CT surgery evaluation. Cardiac catheterization showed non-obstructive CAD. Patient determined by CT surgery team to not be strong enough for CT surgery at this time, referred to rehab for optimization. Patient was evaluated by PM&R and therapy for functional deficits, gait/ADL impairments and acute rehabilitation was recommended. Patient was cleared for discharge to [facility] IRF on 3/6/2025. (06 Mar 2025 13:06)    TDD: 3/20/25 home  ___________________________________________________________________________    SUBJECTIVE/ROS  Patient was seen and evaluated at bedside today.  Reported no overnight events and is in no acute distress.  Reviewed weekend coverage with patient.  Patient expressed understanding and felt comfortable.  Case was discussed at Interdisciplinary Team meeting again yesterday.  A tentative discharge date is outlined above.  Patient is eager to continue participation on the recommended rehabilitation program.  Denies any CP, SOB, RICE, palpitations, fever, chills, body aches, cough, congestion, or any other symptoms at this time.   ___________________________________________________________________________    US DPLX LWR EXT VEINS LTD RT  PROCEDURE DATE:  03/11/2025  IMPRESSION: No evidence of right lower extremity deep venous thrombosis.  ___________________________________________________________________________    Vital Signs Last 24 Hrs  T(C): 36.4 (13 Mar 2025 19:49), Max: 36.4 (13 Mar 2025 19:49)  T(F): 97.5 (13 Mar 2025 19:49), Max: 97.5 (13 Mar 2025 19:49)  HR: 79 (14 Mar 2025 08:26) (79 - 83)  BP: 136/74 (14 Mar 2025 08:26) (124/66 - 136/74)  RR: 15 (13 Mar 2025 19:49) (15 - 15)  SpO2: 95% (14 Mar 2025 08:26) (95% - 97%)    ___________________________________________________________________________    LAB                        11.2   8.32  )-----------( 205      ( 13 Mar 2025 05:54 )             34.3                           11.1   9.05  )-----------( 180      ( 10 Mar 2025 05:37 )             33.4       03-13    132[L]  |  95[L]  |  18  ----------------------------<  101[H]  3.7   |  28  |  1.00    Ca    9.7      13 Mar 2025 05:54    TPro  7.6  /  Alb  2.8[L]  /  TBili  1.0  /  DBili  x   /  AST  28  /  ALT  23  /  AlkPhos  147[H]  03-13    LIVER FUNCTIONS - ( 13 Mar 2025 05:54 )  Alb: 2.8 g/dL / Pro: 7.6 g/dL / ALK PHOS: 147 U/L / ALT: 23 U/L / AST: 28 U/L / GGT: x             03-10    131[L]  |  96  |  25[H]  ----------------------------<  95  3.9   |  25  |  1.01    Ca    9.2      10 Mar 2025 05:37    TPro  7.0  /  Alb  2.5[L]  /  TBili  0.9  /  DBili  x   /  AST  26  /  ALT  17  /  AlkPhos  130[H]  03-10    LIVER FUNCTIONS - ( 10 Mar 2025 05:37 )  Alb: 2.5 g/dL / Pro: 7.0 g/dL / ALK PHOS: 130 U/L / ALT: 17 U/L / AST: 26 U/L / GGT: x          ESR Historical Values  Sedimentation Rate, Erythrocyte (03.10.25 @ 05:37)   Sedimentation Rate, Erythrocyte: 104 mm/hr  Sedimentation Rate, Erythrocyte (02.20.25 @ 18:10)   Sedimentation Rate, Erythrocyte: 135 mm/hr  Sedimentation Rate, Erythrocyte (02.12.25 @ 13:56)   Sedimentation Rate, Erythrocyte: 93 mm/hr     CRP Historical Values  POCT Blood Glucose (03.11.25 @ 20:21)   POCT Blood Glucose.: 160 mg/dL  POCT Blood Glucose (03.06.25 @ 21:00)   POCT Blood Glucose.: 161 mg/dL  POCT Blood Glucose (03.06.25 @ 17:33)   POCT Blood Glucose.: 108 mg/dL  POCT Blood Glucose (03.03.25 @ 07:59)   POCT Blood Glucose.: 97 mg/dL  ___________________________________________________________________________    MEDICATIONS  (STANDING):  acetaminophen     Tablet .. 650 milliGRAM(s) Oral every 8 hours  ammonium lactate 12% Lotion 1 Application(s) Topical two times a day  apixaban 5 milliGRAM(s) Oral two times a day  AQUAPHOR (petrolatum Ointment) 1 Application(s) Topical three times a day  atorvastatin 40 milliGRAM(s) Oral at bedtime  cefTRIAXone   IVPB 2000 milliGRAM(s) IV Intermittent every 24 hours  cefTRIAXone   IVPB      chlorhexidine 4% Liquid 1 Application(s) Topical <User Schedule>  ferrous    sulfate 325 milliGRAM(s) Oral daily  furosemide    Tablet 40 milliGRAM(s) Oral daily  lactulose Syrup 10 Gram(s) Oral once  levothyroxine 150 MICROGram(s) Oral daily  lidocaine   4% Patch 1 Patch Transdermal daily  melatonin 6 milliGRAM(s) Oral at bedtime  multivitamin 1 Tablet(s) Oral daily  pantoprazole    Tablet 40 milliGRAM(s) Oral before breakfast  polyethylene glycol 3350 17 Gram(s) Oral daily  saline laxative (FLEET) Rectal Enema 1 Enema Rectal once  senna 2 Tablet(s) Oral at bedtime  spironolactone 25 milliGRAM(s) Oral daily    MEDICATIONS  (PRN):  bisacodyl Suppository 10 milliGRAM(s) Rectal daily PRN Constipation  cyclobenzaprine 5 milliGRAM(s) Oral three times a day PRN Muscle Spasm  sodium chloride 0.9% lock flush 10 milliLiter(s) IV Push every 1 hour PRN Pre/post blood products, medications, blood draw, and to maintain line patency  traMADol 50 milliGRAM(s) Oral every 4 hours PRN Severe Pain (7 - 10)  zolpidem 5 milliGRAM(s) Oral at bedtime PRN Insomnia  zolpidem 5 milliGRAM(s) Oral at bedtime PRN Insomnia    ___________________________________________________________________________    PHYSICAL EXAM:    Gen - NAD, Comfortable  HEENT - NCAT, EOMI,  Normal Conjunctivae  Neck - Supple, No limited ROM  Pulm - Clear lungs  Cardiovascular - RRR, S1S2, No murmurs  Chest - good chest expansion, good respiratory effort  Abdomen - Soft, NT/ND, +BS  Extremities - No C/C/E, no calf tenderness +LE swelling with +calf tenderness and warmth. Palpable but diminished left DP pulse  Neuro-     Cognitive - awake, alert, and oriented.     Motor - No focal deficits.                     LEFT    UE - ShAB 5/5, EF 5/5, EE 5/5,  5/5                    RIGHT UE - ShAB 5/5, EF 3/5, EE 5/5,   5/5                    LEFT    LE - HF 2/5, KE 2/5, DF 3/5, PF 3/5                    RIGHT LE - HF 3/5, KE 3/5, DF 3/5, PF 4/5        Sensory - Intact to light touch; LLE diminished light touch sensation      Tone - normal  MSK: no joint deformity or swelling; full ROM  Psychiatric - Mood stable, Affect WNL  Skin: No rashes or lesions; warm and dry. Left groin 7.5cm incision with 13 staples present; no swelling/redness/drainage to site. Feet dry.  Wounds: Right buttock healed scab    ___________________________________________________________________________

## 2025-03-14 NOTE — PROGRESS NOTE ADULT - SUBJECTIVE AND OBJECTIVE BOX
CC: Patient is a 74y old  Male who presents with a chief complaint of Spinal osteomyelitis/discitis secondary to infected TAVR and aortic valve endocarditis (13 Mar 2025 11:39)      Interval History:  Patient seen and examined at bedside.  No overnight events  No complaints this morning  Denies CP, SOB, abd pain, N/V, fever, chills    ALLERGIES:  No Known Allergies    MEDICATIONS  (STANDING):  acetaminophen     Tablet .. 650 milliGRAM(s) Oral every 8 hours  ammonium lactate 12% Lotion 1 Application(s) Topical two times a day  apixaban 5 milliGRAM(s) Oral two times a day  AQUAPHOR (petrolatum Ointment) 1 Application(s) Topical three times a day  atorvastatin 40 milliGRAM(s) Oral at bedtime  cefTRIAXone   IVPB 2000 milliGRAM(s) IV Intermittent every 24 hours  cefTRIAXone   IVPB      chlorhexidine 4% Liquid 1 Application(s) Topical <User Schedule>  ferrous    sulfate 325 milliGRAM(s) Oral daily  furosemide    Tablet 40 milliGRAM(s) Oral daily  levothyroxine 150 MICROGram(s) Oral daily  lidocaine   4% Patch 1 Patch Transdermal daily  melatonin 6 milliGRAM(s) Oral at bedtime  multivitamin 1 Tablet(s) Oral daily  pantoprazole    Tablet 40 milliGRAM(s) Oral before breakfast  polyethylene glycol 3350 17 Gram(s) Oral daily  senna 2 Tablet(s) Oral at bedtime  spironolactone 25 milliGRAM(s) Oral daily    MEDICATIONS  (PRN):  bisacodyl Suppository 10 milliGRAM(s) Rectal daily PRN Constipation  cyclobenzaprine 5 milliGRAM(s) Oral three times a day PRN Muscle Spasm  sodium chloride 0.9% lock flush 10 milliLiter(s) IV Push every 1 hour PRN Pre/post blood products, medications, blood draw, and to maintain line patency  traMADol 50 milliGRAM(s) Oral every 4 hours PRN Severe Pain (7 - 10)  zolpidem 5 milliGRAM(s) Oral at bedtime PRN Insomnia  zolpidem 5 milliGRAM(s) Oral at bedtime PRN Insomnia    Vital Signs Last 24 Hrs  T(F): 97.5 (13 Mar 2025 19:49), Max: 97.5 (13 Mar 2025 19:49)  HR: 79 (14 Mar 2025 08:26) (79 - 83)  BP: 136/74 (14 Mar 2025 08:26) (124/66 - 136/74)  RR: 15 (13 Mar 2025 19:49) (15 - 15)  SpO2: 95% (14 Mar 2025 08:26) (95% - 97%)  I&O's Summary        PHYSICAL EXAM:  GENERAL: NAD, well-developed AAOx3 Male   HEAD:  Atraumatic, Normocephalic  NECK: Supple, No JVD  CHEST/LUNG: Clear to auscultation bilaterally; No wheeze, nonlabored breathing  HEART: Regular rate and rhythm; 2/6 systolic murmur  ABDOMEN: Soft, Nontender, Nondistended; Bowel sounds present  EXTREMITIES: No clubbing, cyanosis, R LE swelling  PSYCH: calm, appropriate mood  LABS:                        11.2   8.32  )-----------( 205      ( 13 Mar 2025 05:54 )             34.3       03-13    132  |  95  |  18  ----------------------------<  101  3.7   |  28  |  1.00    Ca    9.7      13 Mar 2025 05:54    TPro  7.6  /  Alb  2.8  /  TBili  1.0  /  DBili  x   /  AST  28  /  ALT  23  /  AlkPhos  147  03-13                                  Urinalysis Basic - ( 13 Mar 2025 05:54 )    Color: x / Appearance: x / SG: x / pH: x  Gluc: 101 mg/dL / Ketone: x  / Bili: x / Urobili: x   Blood: x / Protein: x / Nitrite: x   Leuk Esterase: x / RBC: x / WBC x   Sq Epi: x / Non Sq Epi: x / Bacteria: x        COVID-19 PCR: NotDetec (03-06-25 @ 17:33)      Care Discussed with Consultants/Other Providers: Yes. Rehab team

## 2025-03-15 PROCEDURE — 99232 SBSQ HOSP IP/OBS MODERATE 35: CPT

## 2025-03-15 RX ADMIN — ATORVASTATIN CALCIUM 40 MILLIGRAM(S): 80 TABLET, FILM COATED ORAL at 21:50

## 2025-03-15 RX ADMIN — TRAMADOL HYDROCHLORIDE 50 MILLIGRAM(S): 50 TABLET, FILM COATED ORAL at 17:28

## 2025-03-15 RX ADMIN — Medication 1 APPLICATION(S): at 17:26

## 2025-03-15 RX ADMIN — WHITE PETROLATUM 1 APPLICATION(S): 1 OINTMENT TOPICAL at 13:16

## 2025-03-15 RX ADMIN — Medication 650 MILLIGRAM(S): at 06:28

## 2025-03-15 RX ADMIN — Medication 1 APPLICATION(S): at 05:29

## 2025-03-15 RX ADMIN — Medication 325 MILLIGRAM(S): at 11:29

## 2025-03-15 RX ADMIN — TRAMADOL HYDROCHLORIDE 50 MILLIGRAM(S): 50 TABLET, FILM COATED ORAL at 03:11

## 2025-03-15 RX ADMIN — FUROSEMIDE 40 MILLIGRAM(S): 10 INJECTION INTRAMUSCULAR; INTRAVENOUS at 05:28

## 2025-03-15 RX ADMIN — TRAMADOL HYDROCHLORIDE 50 MILLIGRAM(S): 50 TABLET, FILM COATED ORAL at 09:11

## 2025-03-15 RX ADMIN — Medication 2 TABLET(S): at 21:49

## 2025-03-15 RX ADMIN — Medication 650 MILLIGRAM(S): at 13:15

## 2025-03-15 RX ADMIN — APIXABAN 5 MILLIGRAM(S): 2.5 TABLET, FILM COATED ORAL at 17:26

## 2025-03-15 RX ADMIN — Medication 650 MILLIGRAM(S): at 05:28

## 2025-03-15 RX ADMIN — WHITE PETROLATUM 1 APPLICATION(S): 1 OINTMENT TOPICAL at 05:29

## 2025-03-15 RX ADMIN — Medication 25 MILLIGRAM(S): at 05:28

## 2025-03-15 RX ADMIN — Medication 5 MILLIGRAM(S): at 21:49

## 2025-03-15 RX ADMIN — LIDOCAINE HYDROCHLORIDE 1 PATCH: 20 JELLY TOPICAL at 19:47

## 2025-03-15 RX ADMIN — LIDOCAINE HYDROCHLORIDE 1 PATCH: 20 JELLY TOPICAL at 11:29

## 2025-03-15 RX ADMIN — Medication 650 MILLIGRAM(S): at 22:49

## 2025-03-15 RX ADMIN — Medication 150 MICROGRAM(S): at 05:28

## 2025-03-15 RX ADMIN — Medication 1 TABLET(S): at 11:29

## 2025-03-15 RX ADMIN — SALINE 1 ENEMA: 7; 19 ENEMA RECTAL at 06:40

## 2025-03-15 RX ADMIN — TRAMADOL HYDROCHLORIDE 50 MILLIGRAM(S): 50 TABLET, FILM COATED ORAL at 02:11

## 2025-03-15 RX ADMIN — Medication 40 MILLIGRAM(S): at 05:28

## 2025-03-15 RX ADMIN — Medication 650 MILLIGRAM(S): at 14:15

## 2025-03-15 RX ADMIN — TRAMADOL HYDROCHLORIDE 50 MILLIGRAM(S): 50 TABLET, FILM COATED ORAL at 22:49

## 2025-03-15 RX ADMIN — TRAMADOL HYDROCHLORIDE 50 MILLIGRAM(S): 50 TABLET, FILM COATED ORAL at 08:11

## 2025-03-15 RX ADMIN — Medication 6 MILLIGRAM(S): at 21:49

## 2025-03-15 RX ADMIN — TRAMADOL HYDROCHLORIDE 50 MILLIGRAM(S): 50 TABLET, FILM COATED ORAL at 18:28

## 2025-03-15 RX ADMIN — TRAMADOL HYDROCHLORIDE 50 MILLIGRAM(S): 50 TABLET, FILM COATED ORAL at 21:49

## 2025-03-15 RX ADMIN — Medication 1 APPLICATION(S): at 05:31

## 2025-03-15 RX ADMIN — APIXABAN 5 MILLIGRAM(S): 2.5 TABLET, FILM COATED ORAL at 05:28

## 2025-03-15 RX ADMIN — CEFTRIAXONE 100 MILLIGRAM(S): 500 INJECTION, POWDER, FOR SOLUTION INTRAMUSCULAR; INTRAVENOUS at 21:50

## 2025-03-15 RX ADMIN — LIDOCAINE HYDROCHLORIDE 1 PATCH: 20 JELLY TOPICAL at 05:30

## 2025-03-15 RX ADMIN — Medication 650 MILLIGRAM(S): at 21:50

## 2025-03-15 NOTE — PROGRESS NOTE ADULT - ASSESSMENT
74-year-old male patient with past medical history of HTN, diabetes mellitus with hyperglycemia, hypothyroidism, obesity, prostate cancer, atrial fibrillation, HLD, GI bleed, S/P TAVR (2023), and chronic back pain who is admitted for Acute Inpatient Rehabilitation with a multidisciplinary rehab program at [facility] with functional impairments in ADLs and mobility secondary to spinal osteomyelitis/discitis secondary to infected TAVR and aortic valve.    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   - Ceftriaxone 2 g once daily via PICC through 4/7/25  - Monitor CBC, CMP, and Weekly ESR and CRP  - CXR weekly  - Ortho f/up after rehab d/c    #Severe aortic stenosis with chronic diastolic CHF and first degree AV block  - S/P TAVR (2023)  - ROVERTO (2/26): echogenic sessile not very mobile subcentimeter ( 6x2 mm) structure attached to the base of the leaflet corresponding to the native non-coronary cusp   - S/P Cardiac catheterization    #Arterial embolism of left leg  # R LE edema  - S/P LCFA/LSFA thrombectomy/endarterectomy on 2/22  - Left groin staples should remain in place until 3/13  - Eliquis 5mg BID   - R LE doppler neg    #Chronic atrial fibrillation  - Eliquis  - Rate controlled without any av rosamaria agents    # Mild Hyponatremia  - stable around 132-134  - continue to monitor closely  - if trending [id number], will work up    #Type 2 Diabetes Mellitus  - Ha1c 5.7  - Blood glucose levels are within normal limits   - On metformin and Farxiga at home. Not on any meds while here     #HTN  - Spironolactone 25mg daily  - Lasix 40mg daily    #HLD  - Atorvastatin     #Hypothyroid  - levothyroxine    #Anemia  - Ferrous sulfate 325mg daily  - Trend H/H   - Transfuse if HgB <7

## 2025-03-15 NOTE — PROGRESS NOTE ADULT - SUBJECTIVE AND OBJECTIVE BOX
HPI:  Mr. Win Menezes is a 74-year-old male patient with past medical history of HTN, diabetes mellitus with hyperglycemia, hypothyroidism, obesity, prostate cancer, atrial fibrillation, HLD, GI bleed, S/P TAVR (2023), and chronic back pain who presented to the ED at [facility] on 2/20/25 with a two-day history of a rash on his left leg and pain in the left knee radiating towards his foot. He presented to the ED from SNF rehab where he was admitted for less than one week. after hospitalization at [facility] from 2/10/25 through 2/17/25 and treatment for acute anemia, DARLINE, hypotension, s/p 2 units PRBC and negative EGD/colonoscopy. He was found to have an infected TAVR and osteomyelitis of spine, and strep salivarius bacteremia. Plan at that time was for 6 weeks of IV antibiotics via PICC line. CTA on 2/21/25 showed clot to common femoral and distal embolism. He is S/P common femoral endarterectomy with clot retrieval and good blood flow to lower extremity post surgery. Repeat blood cultures were negative, but the excised clot was found to have gram positive cocci. Repeat MRI spine showed no abscess. Hospital stay c/b constipation and persistent back pain. CT and MRI L-spine showed degenerative disc disease and possible discitis/osteomyelitis at multiple levels. Patient received transfusion of 2 unit of PRBC's on 2/23/25. He was transferred to CTICU at [facility] on 2/24/25 for CT surgery evaluation. Cardiac catheterization showed non-obstructive CAD. Patient determined by CT surgery team to not be strong enough for CT surgery at this time, referred to rehab for optimization. Patient was evaluated by PM&R and therapy for functional deficits, gait/ADL impairments and acute rehabilitation was recommended. Patient was cleared for discharge to [facility] IRF on 3/6/2025. (06 Mar 2025 13:06)    TDD: 3/20/25 home  ___________________________________________________________________________    SUBJECTIVE/ROS  Patient was seen and evaluated at bedside today.  Reported no overnight events and is in no acute distress.  no new events   no NV SOB    US DPLX LWR EXT VEINS LTD RT  PROCEDURE DATE:  03/11/2025  IMPRESSION: No evidence of right lower extremity deep venous thrombosis.  ___________________________________________________________________________    Vital Signs Last 24 Hrs  Vital Signs Last 24 Hrs  T(C): 36.3 (15 Mar 2025 08:24), Max: 36.7 (14 Mar 2025 19:50)  T(F): 97.3 (15 Mar 2025 08:24), Max: 98.1 (14 Mar 2025 19:50)  HR: 85 (15 Mar 2025 08:24) (71 - 85)  BP: 131/72 (15 Mar 2025 08:24) (131/72 - 135/70)  BP(mean): --  RR: 15 (15 Mar 2025 08:24) (15 - 15)  SpO2: 93% (15 Mar 2025 08:24) (93% - 96%)    Parameters below as of 15 Mar 2025 08:24  Patient On (Oxygen Delivery Method): room air      ___________________________________________________________________________    LAB                        11.2   8.32  )-----------( 205      ( 13 Mar 2025 05:54 )             34.3                           11.1   9.05  )-----------( 180      ( 10 Mar 2025 05:37 )             33.4       03-13    132[L]  |  95[L]  |  18  ----------------------------<  101[H]  3.7   |  28  |  1.00    Ca    9.7      13 Mar 2025 05:54    TPro  7.6  /  Alb  2.8[L]  /  TBili  1.0  /  DBili  x   /  AST  28  /  ALT  23  /  AlkPhos  147[H]  03-13    LIVER FUNCTIONS - ( 13 Mar 2025 05:54 )  Alb: 2.8 g/dL / Pro: 7.6 g/dL / ALK PHOS: 147 U/L / ALT: 23 U/L / AST: 28 U/L / GGT: x             03-10    131[L]  |  96  |  25[H]  ----------------------------<  95  3.9   |  25  |  1.01    Ca    9.2      10 Mar 2025 05:37    TPro  7.0  /  Alb  2.5[L]  /  TBili  0.9  /  DBili  x   /  AST  26  /  ALT  17  /  AlkPhos  130[H]  03-10    LIVER FUNCTIONS - ( 10 Mar 2025 05:37 )  Alb: 2.5 g/dL / Pro: 7.0 g/dL / ALK PHOS: 130 U/L / ALT: 17 U/L / AST: 26 U/L / GGT: x          ESR Historical Values  Sedimentation Rate, Erythrocyte (03.10.25 @ 05:37)   Sedimentation Rate, Erythrocyte: 104 mm/hr  Sedimentation Rate, Erythrocyte (02.20.25 @ 18:10)   Sedimentation Rate, Erythrocyte: 135 mm/hr  Sedimentation Rate, Erythrocyte (02.12.25 @ 13:56)   Sedimentation Rate, Erythrocyte: 93 mm/hr     CRP Historical Values  POCT Blood Glucose (03.11.25 @ 20:21)   POCT Blood Glucose.: 160 mg/dL  POCT Blood Glucose (03.06.25 @ 21:00)   POCT Blood Glucose.: 161 mg/dL  POCT Blood Glucose (03.06.25 @ 17:33)   POCT Blood Glucose.: 108 mg/dL  POCT Blood Glucose (03.03.25 @ 07:59)   POCT Blood Glucose.: 97 mg/dL  ___________________________________________________________________________    MEDICATIONS  (STANDING):  MEDICATIONS  (STANDING):  acetaminophen     Tablet .. 650 milliGRAM(s) Oral every 8 hours  ammonium lactate 12% Lotion 1 Application(s) Topical two times a day  apixaban 5 milliGRAM(s) Oral two times a day  AQUAPHOR (petrolatum Ointment) 1 Application(s) Topical three times a day  atorvastatin 40 milliGRAM(s) Oral at bedtime  cefTRIAXone   IVPB 2000 milliGRAM(s) IV Intermittent every 24 hours  cefTRIAXone   IVPB      chlorhexidine 4% Liquid 1 Application(s) Topical <User Schedule>  ferrous    sulfate 325 milliGRAM(s) Oral daily  furosemide    Tablet 40 milliGRAM(s) Oral daily  levothyroxine 150 MICROGram(s) Oral daily  lidocaine   4% Patch 1 Patch Transdermal daily  melatonin 6 milliGRAM(s) Oral at bedtime  multivitamin 1 Tablet(s) Oral daily  pantoprazole    Tablet 40 milliGRAM(s) Oral before breakfast  polyethylene glycol 3350 17 Gram(s) Oral daily  senna 2 Tablet(s) Oral at bedtime  spironolactone 25 milliGRAM(s) Oral daily    MEDICATIONS  (PRN):  bisacodyl Suppository 10 milliGRAM(s) Rectal daily PRN Constipation  cyclobenzaprine 5 milliGRAM(s) Oral three times a day PRN Muscle Spasm  sodium chloride 0.9% lock flush 10 milliLiter(s) IV Push every 1 hour PRN Pre/post blood products, medications, blood draw, and to maintain line patency  traMADol 50 milliGRAM(s) Oral every 4 hours PRN Severe Pain (7 - 10)  zolpidem 5 milliGRAM(s) Oral at bedtime PRN Insomnia  zolpidem 5 milliGRAM(s) Oral at bedtime PRN Insomnia      ___________________________________________________________________________    PHYSICAL EXAM:    Gen - NAD, Comfortable  HEENT - NCAT, EOMI,  Normal Conjunctivae  Neck - Supple, No limited ROM  Pulm - Clear lungs  Cardiovascular - RRR, S1S2, No murmurs  Chest - good chest expansion, good respiratory effort  Abdomen - Soft, NT/ND, +BS  Extremities - No C/C/E, no calf tenderness +LE swelling with +calf tenderness and warmth. Palpable but diminished left DP pulse  Neuro-     Cognitive - awake, alert, and oriented.     Motor -                    LEFT    UE - ShAB 5/5, EF 5/5, EE 5/5,  5/5                    RIGHT UE - ShAB 5/5, EF 3/5, EE 5/5,   5/5                    LEFT    LE - HF 2/5, KE 2/5, DF 3/5, PF 3/5                    RIGHT LE - HF 3/5, KE 3/5, DF 3/5, PF 4/5        Sensory - Intact to light touch; LLE diminished light touch sensation      Tone - normal  MSK: no joint deformity or swelling; full ROM  Psychiatric - Mood stable, Affect WNL  Skin: No rashes or lesions; warm and dry. Left groin 7.5cm incision with 13 staples present; no swelling/redness/drainage to site. Feet dry.  Wounds: Right buttock healed scab    ___________________________________________________________________________

## 2025-03-15 NOTE — PROGRESS NOTE ADULT - SUBJECTIVE AND OBJECTIVE BOX
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       REVIEW OF SYSTEMS: remaining ROS negative     PHYSICAL EXAM:  VITALS:  Vital Signs Last 24 Hrs  T(C): 36.3 (15 Mar 2025 08:24), Max: 36.7 (14 Mar 2025 19:50)  T(F): 97.3 (15 Mar 2025 08:24), Max: 98.1 (14 Mar 2025 19:50)  HR: 85 (15 Mar 2025 08:24) (71 - 85)  BP: 131/72 (15 Mar 2025 08:24) (131/72 - 135/70)  BP(mean): --  RR: 15 (15 Mar 2025 08:24) (15 - 15)  SpO2: 93% (15 Mar 2025 08:24) (93% - 96%)    Parameters below as of 15 Mar 2025 08:24  Patient On (Oxygen Delivery Method): room air          GENERAL: NAD,  no increased WOB  HEAD:  Atraumatic, Normocephalic  EYES: EOMI, conjunctiva and sclera clear  ENMT: Moist mucous membranes  NECK: Supple, No JVD  NERVOUS SYSTEM:  Alert & Oriented    CHEST/LUNG: Clear to auscultation bilaterally; No rales, rhonchi, wheezing  HEART: Regular rate and rhythm  ABDOMEN: Soft, Nontender, Nondistended; Bowel sounds present  EXTREMITIES:  No clubbing, cyanosis, calf tenderness. trace edema      MEDICATIONS  (STANDING):  acetaminophen     Tablet .. 650 milliGRAM(s) Oral every 8 hours  ammonium lactate 12% Lotion 1 Application(s) Topical two times a day  apixaban 5 milliGRAM(s) Oral two times a day  AQUAPHOR (petrolatum Ointment) 1 Application(s) Topical three times a day  atorvastatin 40 milliGRAM(s) Oral at bedtime  cefTRIAXone   IVPB 2000 milliGRAM(s) IV Intermittent every 24 hours  cefTRIAXone   IVPB      chlorhexidine 4% Liquid 1 Application(s) Topical <User Schedule>  ferrous    sulfate 325 milliGRAM(s) Oral daily  furosemide    Tablet 40 milliGRAM(s) Oral daily  levothyroxine 150 MICROGram(s) Oral daily  lidocaine   4% Patch 1 Patch Transdermal daily  melatonin 6 milliGRAM(s) Oral at bedtime  multivitamin 1 Tablet(s) Oral daily  pantoprazole    Tablet 40 milliGRAM(s) Oral before breakfast  polyethylene glycol 3350 17 Gram(s) Oral daily  senna 2 Tablet(s) Oral at bedtime  spironolactone 25 milliGRAM(s) Oral daily    MEDICATIONS  (PRN):  bisacodyl Suppository 10 milliGRAM(s) Rectal daily PRN Constipation  cyclobenzaprine 5 milliGRAM(s) Oral three times a day PRN Muscle Spasm  sodium chloride 0.9% lock flush 10 milliLiter(s) IV Push every 1 hour PRN Pre/post blood products, medications, blood draw, and to maintain line patency  traMADol 50 milliGRAM(s) Oral every 4 hours PRN Severe Pain (7 - 10)  zolpidem 5 milliGRAM(s) Oral at bedtime PRN Insomnia  zolpidem 5 milliGRAM(s) Oral at bedtime PRN Insomnia      Allergies    No Known Allergies    Intolerances              LABS:                 CAPILLARY BLOOD GLUCOSE                    RECENT CULTURES:          RADIOLOGY & ADDITIONAL TESTS:

## 2025-03-15 NOTE — PROGRESS NOTE ADULT - ASSESSMENT
Assessment/Plan:  Mr. Win Menezes is a 74-year-old male patient with past medical history of HTN, diabetes mellitus with hyperglycemia, hypothyroidism, obesity, prostate cancer, atrial fibrillation, HLD, GI bleed, S/P TAVR (2023), and chronic back pain who is admitted for Acute Inpatient Rehabilitation with a multidisciplinary rehab program at [facility] with functional impairments in ADLs and mobility secondary to spinal osteomyelitis/discitis secondary to infected TAVR and aortic valve.    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              - WBC 10.09 on 3/6             - ESR//206 on 2/20      * CXR weekly      * Daily weights      * LSO brace when OOB  Comprehensive Multidisciplinary Rehab Program:  - Start comprehensive rehab program, 3 hours a day, 5 days a week.  - PT 2hr/day: Focused on improving strength, endurance, coordination, balance, functional mobility, and transfers  - OT 1hr/day: Focused on improving strength, fine motor skills, coordination, posture and ADLs.    - Activity Limitations: Decreased social, vocational and leisure activities, decreased self care and ADLs, decreased mobility, decreased ability to manage household and finances.     -----------------------------------------------------------------------------------  Concurrent Medical Problems    #LLE edema/warmth/calf pain (on arrival 3/6)  - R/O DVT with bedside doppler 3/6    #Severe aortic stenosis with chronic diastolic CHF and first degree AV block  - S/P TAVR (2023)  - ROVERTO (2/26):       * echogenic sessile not very mobile subcentimeter ( 6x2 mm) structure attached to the base of the leaflet corresponding to the native non-coronary cusp       * likely representing a calcified nodule vs. less likely an old small calcified vegetation, mod AS  - S/P Cardiac catheterization  - Monitor right wrist and right groin for bleeding  - No heavy lifting or strenuous activity until 3/10    #Multilevel osteomyelitis   - Ceftriaxone through 4/7/25 as above  - LSO brace when OOB  - Ortho f/up after rehab d/c    #Arterial embolism of left leg  - S/P LCFA/LSFA thrombectomy/endarterectomy on 2/22  - Left groin staples should remain in place until 3/13  - Eliquis 5mg BID -- cleared to restart by cardiology on 3/6.  - DVT ppx: TEDs    #Bacteremia  - Strep salivarius bacteremia> cultures now clear.   - Ceftriaxone through 4/7/25 as above    #Chronic atrial fibrillation  - Continue Eliquis as above    #Type 2 Diabetes Mellitus with hyperglycemia  - Consistent carb diet.   - On metformin and Farxiga at home  - AM glucose 113-97; well controlled.  - Monitor glucose on AM labs.    #HTN  - Spironolactone 25mg daily  - Lasix 40mg daily    #HLD  - Atorvastatin 40mg daily at HS    #Hypothyroid  - levothyroxine 150mcg daily  - TSH 0.73 on 2/24/25    #Anemia  - Ferrous sulfate 325mg daily  - Trend H/H (10.9/32/9 on 3/6)    #Mood/Cognition:  - Neuropsychology consult PRN    #Sleep:   - Maintain quiet hours and low stim environment.  - Melatonin 6mg PRN to maximize participation in therapy during the day.     #Pain Management:  - Analgesic: Tylenol 650mg every 8 hours PRN  - Narcotic: Tramadol 25 mg every 6 hours PRN moderate pain (4-6)  - Tramadol 50mg every 6 hours PRN severe pain (7-10)  - Antispasmodic: Cyclobenzaprine 5mg TID PRN muscle spasm    #GI/Bowel:  - Senna 2 tabs QHS   - Miralax 17g Daily  - GI ppx: Pantoprazole 40mg daily before a meal  - bisacodyl 10mg suppository rectally once a day PRN constipation    #/Bladder:   - PVR on admission  - monitor urine output    #Skin/Pressure Injury assessment:   - Skin assessment on admission:       * Left groin 7.5cm incision with 13 staples present; no swelling/redness/drainage to site      * Right buttock healed scab    #Diet/Dysphagia:  - Current Diet: Dash diet, consistent carb diabetic diet  - Nutrition consult     #Patient Education  - Education provided on the following:      * Admitting diagnosis and functional implications      * Functional goals      * Bladder management      * Bowel management      * Skin care management      * Intensity of service and scheduling of rehab disciplines      * Plan of care and role of interdisciplinary team conference in discharge planning      * Reconciliation of medications from prior institution    #Precautions / PROPHYLAXIS:   - Falls, Cardiac, Spinal  - ortho: Weight bearing status: FWB  - Lungs: Aspiration, Incentive Spirometer   - Pressure injury/Skin: OOB to Chair, PT/OT      ---------------  Code Status: DNR/DNI; all other medical interventions allowed (see MOLST)  Emergency Contact:    Outpatient Follow-up (Specialty/Name of physician):    Abdulaziz Jernigan Physician [address]  Scheduled Appointment: 03/17/2025  For kidney tests and CBC    Benito Jones Physician [address]  Scheduled Appointment: 04/07/2025    Mahamed Heath  Cardiovascular Disease  [address] [facility], [address]  [place] [address]  Phone: [phone]  Fax: [phone]  Follow Up Time: 1-2 weeks for ROVERTO and cardiac workup    Lizbeth Redd  Nephrology  [address] [facility], [address]  [place] [address]  Phone: [phone]  Fax: [phone]  Follow Up Time: within 4-6 weeks    Gualberto Porter  Gastroenterology  [address] [facility], [address]  [place] [address]  Phone: [phone]  Fax: [phone]  Follow Up Time:   For pill camera test    Berny Lizarraga Chi-Moulton  Neurosurgery  [address] [facility], [address]  [place] [address]  Phone: [phone]  Fax: [phone]  Follow Up Time: 4 weeks  For MRI    Dr. Rodriges   Cardiothoracic surgery  Follow appointment after discharge  [phone]       --------------

## 2025-03-16 PROCEDURE — 99232 SBSQ HOSP IP/OBS MODERATE 35: CPT

## 2025-03-16 RX ADMIN — Medication 6 MILLIGRAM(S): at 22:09

## 2025-03-16 RX ADMIN — Medication 650 MILLIGRAM(S): at 22:08

## 2025-03-16 RX ADMIN — LIDOCAINE HYDROCHLORIDE 1 PATCH: 20 JELLY TOPICAL at 00:47

## 2025-03-16 RX ADMIN — TRAMADOL HYDROCHLORIDE 50 MILLIGRAM(S): 50 TABLET, FILM COATED ORAL at 16:18

## 2025-03-16 RX ADMIN — Medication 25 MILLIGRAM(S): at 05:39

## 2025-03-16 RX ADMIN — LIDOCAINE HYDROCHLORIDE 1 PATCH: 20 JELLY TOPICAL at 23:00

## 2025-03-16 RX ADMIN — Medication 650 MILLIGRAM(S): at 05:39

## 2025-03-16 RX ADMIN — Medication 1 APPLICATION(S): at 05:39

## 2025-03-16 RX ADMIN — CYCLOBENZAPRINE HYDROCHLORIDE 5 MILLIGRAM(S): 15 CAPSULE, EXTENDED RELEASE ORAL at 22:09

## 2025-03-16 RX ADMIN — Medication 650 MILLIGRAM(S): at 14:09

## 2025-03-16 RX ADMIN — LIDOCAINE HYDROCHLORIDE 1 PATCH: 20 JELLY TOPICAL at 11:05

## 2025-03-16 RX ADMIN — FUROSEMIDE 40 MILLIGRAM(S): 10 INJECTION INTRAMUSCULAR; INTRAVENOUS at 05:39

## 2025-03-16 RX ADMIN — WHITE PETROLATUM 1 APPLICATION(S): 1 OINTMENT TOPICAL at 05:40

## 2025-03-16 RX ADMIN — Medication 650 MILLIGRAM(S): at 13:09

## 2025-03-16 RX ADMIN — TRAMADOL HYDROCHLORIDE 50 MILLIGRAM(S): 50 TABLET, FILM COATED ORAL at 12:04

## 2025-03-16 RX ADMIN — Medication 650 MILLIGRAM(S): at 23:00

## 2025-03-16 RX ADMIN — Medication 1 TABLET(S): at 11:04

## 2025-03-16 RX ADMIN — Medication 150 MICROGRAM(S): at 05:38

## 2025-03-16 RX ADMIN — TRAMADOL HYDROCHLORIDE 50 MILLIGRAM(S): 50 TABLET, FILM COATED ORAL at 15:18

## 2025-03-16 RX ADMIN — APIXABAN 5 MILLIGRAM(S): 2.5 TABLET, FILM COATED ORAL at 17:19

## 2025-03-16 RX ADMIN — LIDOCAINE HYDROCHLORIDE 1 PATCH: 20 JELLY TOPICAL at 19:08

## 2025-03-16 RX ADMIN — Medication 325 MILLIGRAM(S): at 11:05

## 2025-03-16 RX ADMIN — TRAMADOL HYDROCHLORIDE 50 MILLIGRAM(S): 50 TABLET, FILM COATED ORAL at 05:39

## 2025-03-16 RX ADMIN — CEFTRIAXONE 100 MILLIGRAM(S): 500 INJECTION, POWDER, FOR SOLUTION INTRAMUSCULAR; INTRAVENOUS at 22:08

## 2025-03-16 RX ADMIN — APIXABAN 5 MILLIGRAM(S): 2.5 TABLET, FILM COATED ORAL at 05:39

## 2025-03-16 RX ADMIN — Medication 40 MILLIGRAM(S): at 05:39

## 2025-03-16 RX ADMIN — Medication 1 APPLICATION(S): at 05:40

## 2025-03-16 RX ADMIN — TRAMADOL HYDROCHLORIDE 50 MILLIGRAM(S): 50 TABLET, FILM COATED ORAL at 11:04

## 2025-03-16 RX ADMIN — TRAMADOL HYDROCHLORIDE 50 MILLIGRAM(S): 50 TABLET, FILM COATED ORAL at 06:39

## 2025-03-16 RX ADMIN — TRAMADOL HYDROCHLORIDE 50 MILLIGRAM(S): 50 TABLET, FILM COATED ORAL at 19:57

## 2025-03-16 RX ADMIN — Medication 2 TABLET(S): at 22:09

## 2025-03-16 RX ADMIN — ATORVASTATIN CALCIUM 40 MILLIGRAM(S): 80 TABLET, FILM COATED ORAL at 22:09

## 2025-03-16 RX ADMIN — Medication 5 MILLIGRAM(S): at 22:09

## 2025-03-16 RX ADMIN — TRAMADOL HYDROCHLORIDE 50 MILLIGRAM(S): 50 TABLET, FILM COATED ORAL at 21:30

## 2025-03-16 RX ADMIN — WHITE PETROLATUM 1 APPLICATION(S): 1 OINTMENT TOPICAL at 22:10

## 2025-03-16 RX ADMIN — Medication 1 APPLICATION(S): at 18:13

## 2025-03-16 NOTE — PROGRESS NOTE ADULT - ASSESSMENT
74-year-old male patient with past medical history of HTN, diabetes mellitus with hyperglycemia, hypothyroidism, obesity, prostate cancer, atrial fibrillation, HLD, GI bleed, S/P TAVR (2023), and chronic back pain who is admitted for Acute Inpatient Rehabilitation with a multidisciplinary rehab program at [facility] with functional impairments in ADLs and mobility secondary to spinal osteomyelitis/discitis secondary to infected TAVR and aortic valve.    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   - Ceftriaxone 2 g once daily via PICC through 4/7/25  - Monitor CBC, CMP, and Weekly ESR and CRP  - CXR weekly  - Ortho f/up after rehab d/c    #Severe aortic stenosis with chronic diastolic CHF and first degree AV block  - S/P TAVR (2023)  - ROVERTO (2/26): echogenic sessile not very mobile subcentimeter ( 6x2 mm) structure attached to the base of the leaflet corresponding to the native non-coronary cusp   - S/P Cardiac catheterization    #Arterial embolism of left leg  # R LE edema  - S/P LCFA/LSFA thrombectomy/endarterectomy on 2/22  - Left groin staples should remain in place until 3/13  - Eliquis 5mg BID   - R LE doppler neg    #Chronic atrial fibrillation  - Eliquis  - Rate controlled without any av rosamaria agents    # Mild Hyponatremia  - stable around 132-134  - continue to monitor closely  - if trending [id number], will work up    #Type 2 Diabetes Mellitus  - Ha1c 5.7  - Blood glucose levels are within normal limits   - On metformin and Farxiga at home. Not on any meds while here     #HTN  - Spironolactone 25mg daily  - Lasix 40mg daily    #HLD  - Atorvastatin     #Hypothyroid  - levothyroxine    #Anemia  - Ferrous sulfate 325mg daily  - Trend H/H   - Transfuse if HgB <7

## 2025-03-16 NOTE — PROGRESS NOTE ADULT - ASSESSMENT
Assessment/Plan:  Mr. Win Menezes is a 74-year-old male patient with past medical history of HTN, diabetes mellitus with hyperglycemia, hypothyroidism, obesity, prostate cancer, atrial fibrillation, HLD, GI bleed, S/P TAVR (2023), and chronic back pain who is admitted for Acute Inpatient Rehabilitation with a multidisciplinary rehab program at [facility] with functional impairments in ADLs and mobility secondary to spinal osteomyelitis/discitis secondary to infected TAVR and aortic valve.    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              - WBC 10.09 on 3/6             - ESR//206 on 2/20      * CXR weekly      * Daily weights      * LSO brace when OOB  Comprehensive Multidisciplinary Rehab Program:  - Start comprehensive rehab program, 3 hours a day, 5 days a week.  - PT 2hr/day: Focused on improving strength, endurance, coordination, balance, functional mobility, and transfers  - OT 1hr/day: Focused on improving strength, fine motor skills, coordination, posture and ADLs.    - Activity Limitations: Decreased social, vocational and leisure activities, decreased self care and ADLs, decreased mobility, decreased ability to manage household and finances.     -----------------------------------------------------------------------------------  Concurrent Medical Problems    #LLE edema/warmth/calf pain (on arrival 3/6)  - R/O DVT with bedside doppler 3/6    #Severe aortic stenosis with chronic diastolic CHF and first degree AV block  - S/P TAVR (2023)  - ROVERTO (2/26):       * echogenic sessile not very mobile subcentimeter ( 6x2 mm) structure attached to the base of the leaflet corresponding to the native non-coronary cusp       * likely representing a calcified nodule vs. less likely an old small calcified vegetation, mod AS  - S/P Cardiac catheterization  - Monitor right wrist and right groin for bleeding  - No heavy lifting or strenuous activity until 3/10    #Multilevel osteomyelitis   - Ceftriaxone through 4/7/25 as above  - LSO brace when OOB  - Ortho f/up after rehab d/c    #Arterial embolism of left leg  - S/P LCFA/LSFA thrombectomy/endarterectomy on 2/22  - Left groin staples should remain in place until 3/13  - Eliquis 5mg BID -- cleared to restart by cardiology on 3/6.  - DVT ppx: TEDs    #Bacteremia  - Strep salivarius bacteremia> cultures now clear.   - Ceftriaxone through 4/7/25 as above    #Chronic atrial fibrillation  - Continue Eliquis as above    #Type 2 Diabetes Mellitus with hyperglycemia  - Consistent carb diet.   - On metformin and Farxiga at home  - AM glucose 113-97; well controlled.  - Monitor glucose on AM labs.    #HTN  - Spironolactone 25mg daily  - Lasix 40mg daily    #HLD  - Atorvastatin 40mg daily at HS    #Hypothyroid  - levothyroxine 150mcg daily  - TSH 0.73 on 2/24/25    #Anemia  - Ferrous sulfate 325mg daily  - Trend H/H (10.9/32/9 on 3/6)    #Mood/Cognition:  - Neuropsychology consult PRN    #Sleep:   - Maintain quiet hours and low stim environment.  - Melatonin 6mg PRN to maximize participation in therapy during the day.     #Pain Management:  - Analgesic: Tylenol 650mg every 8 hours PRN  - Narcotic: Tramadol 25 mg every 6 hours PRN moderate pain (4-6)  - Tramadol 50mg every 6 hours PRN severe pain (7-10)  - Antispasmodic: Cyclobenzaprine 5mg TID PRN muscle spasm    #GI/Bowel:  - Senna 2 tabs QHS   - Miralax 17g Daily  - GI ppx: Pantoprazole 40mg daily before a meal  - bisacodyl 10mg suppository rectally once a day PRN constipation    #/Bladder:   - PVR on admission  - monitor urine output    #Skin/Pressure Injury assessment:   - Skin assessment on admission:       * Left groin 7.5cm incision with 13 staples present; no swelling/redness/drainage to site      * Right buttock healed scab    #Diet/Dysphagia:  - Current Diet: Dash diet, consistent carb diabetic diet  - Nutrition consult     #Patient Education  - Education provided on the following:      * Admitting diagnosis and functional implications      * Functional goals      * Bladder management      * Bowel management      * Skin care management      * Intensity of service and scheduling of rehab disciplines      * Plan of care and role of interdisciplinary team conference in discharge planning      * Reconciliation of medications from prior institution    #Precautions / PROPHYLAXIS:   - Falls, Cardiac, Spinal  - ortho: Weight bearing status: FWB  - Lungs: Aspiration, Incentive Spirometer   - Pressure injury/Skin: OOB to Chair, PT/OT      ---------------  Code Status: DNR/DNI; all other medical interventions allowed (see MOLST)  Emergency Contact:    Outpatient Follow-up (Specialty/Name of physician):    Abdulaziz Jernigan Physician [address]  Scheduled Appointment: 03/17/2025  For kidney tests and CBC    Benito Jones Physician [address]  Scheduled Appointment: 04/07/2025    Mahamed Heath  Cardiovascular Disease  [address] [facility], [address]  [place] [address]  Phone: [phone]  Fax: [phone]  Follow Up Time: 1-2 weeks for ROVERTO and cardiac workup    Lizbeth Redd  Nephrology  [address] [facility], [address]  [place] [address]  Phone: [phone]  Fax: [phone]  Follow Up Time: within 4-6 weeks    Gualberto Porter  Gastroenterology  [address] [facility], [address]  [place] [address]  Phone: [phone]  Fax: [phone]  Follow Up Time:   For pill camera test    Berny Lizarraga Chi-Dav[place]  Neurosurgery  [address] [facility], [address]  [place] [address]  Phone: [phone]  Fax: [phone]  Follow Up Time: 4 weeks  For MRI    Dr. Rodriges   Cardiothoracic surgery  Follow appointment after discharge  [phone]       --------------

## 2025-03-16 NOTE — PROGRESS NOTE ADULT - SUBJECTIVE AND OBJECTIVE BOX
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       REVIEW OF SYSTEMS: remaining ROS negative     PHYSICAL EXAM:  VITALS:  Vital Signs Last 24 Hrs  T(C): 36.4 (16 Mar 2025 08:13), Max: 36.6 (15 Mar 2025 20:37)  T(F): 97.6 (16 Mar 2025 08:13), Max: 97.9 (15 Mar 2025 20:37)  HR: 69 (16 Mar 2025 08:13) (69 - 73)  BP: 103/55 (16 Mar 2025 08:13) (103/55 - 143/70)  BP(mean): --  RR: 15 (16 Mar 2025 08:13) (15 - 16)  SpO2: 96% (16 Mar 2025 08:13) (96% - 96%)    Parameters below as of 16 Mar 2025 08:13  Patient On (Oxygen Delivery Method): room air          GENERAL: NAD,  no increased WOB  HEAD:  Atraumatic, Normocephalic  EYES: EOMI, conjunctiva and sclera clear  ENMT: Moist mucous membranes  NECK: Supple, No JVD  NERVOUS SYSTEM:  Alert & Oriented    CHEST/LUNG: Clear to auscultation bilaterally; No rales, rhonchi, wheezing  HEART: Regular rate and rhythm  ABDOMEN: Soft, Nontender, Nondistended; Bowel sounds present  EXTREMITIES:  No clubbing, cyanosis, calf tenderness. trace edema      MEDICATIONS  (STANDING):  acetaminophen     Tablet .. 650 milliGRAM(s) Oral every 8 hours  ammonium lactate 12% Lotion 1 Application(s) Topical two times a day  apixaban 5 milliGRAM(s) Oral two times a day  AQUAPHOR (petrolatum Ointment) 1 Application(s) Topical three times a day  atorvastatin 40 milliGRAM(s) Oral at bedtime  cefTRIAXone   IVPB 2000 milliGRAM(s) IV Intermittent every 24 hours  cefTRIAXone   IVPB      chlorhexidine 4% Liquid 1 Application(s) Topical <User Schedule>  ferrous    sulfate 325 milliGRAM(s) Oral daily  furosemide    Tablet 40 milliGRAM(s) Oral daily  levothyroxine 150 MICROGram(s) Oral daily  lidocaine   4% Patch 1 Patch Transdermal daily  melatonin 6 milliGRAM(s) Oral at bedtime  multivitamin 1 Tablet(s) Oral daily  pantoprazole    Tablet 40 milliGRAM(s) Oral before breakfast  polyethylene glycol 3350 17 Gram(s) Oral daily  senna 2 Tablet(s) Oral at bedtime  spironolactone 25 milliGRAM(s) Oral daily    MEDICATIONS  (PRN):  bisacodyl Suppository 10 milliGRAM(s) Rectal daily PRN Constipation  cyclobenzaprine 5 milliGRAM(s) Oral three times a day PRN Muscle Spasm  sodium chloride 0.9% lock flush 10 milliLiter(s) IV Push every 1 hour PRN Pre/post blood products, medications, blood draw, and to maintain line patency  traMADol 50 milliGRAM(s) Oral every 4 hours PRN Severe Pain (7 - 10)  zolpidem 5 milliGRAM(s) Oral at bedtime PRN Insomnia  zolpidem 5 milliGRAM(s) Oral at bedtime PRN Insomnia        Allergies    No Known Allergies    Intolerances              LABS:                 CAPILLARY BLOOD GLUCOSE                    RECENT CULTURES:          RADIOLOGY & ADDITIONAL TESTS:

## 2025-03-16 NOTE — PROGRESS NOTE ADULT - SUBJECTIVE AND OBJECTIVE BOX
HPI:  Mr. Win Menezes is a 74-year-old male patient with past medical history of HTN, diabetes mellitus with hyperglycemia, hypothyroidism, obesity, prostate cancer, atrial fibrillation, HLD, GI bleed, S/P TAVR (2023), and chronic back pain who presented to the ED at [facility] on 2/20/25 with a two-day history of a rash on his left leg and pain in the left knee radiating towards his foot. He presented to the ED from SNF rehab where he was admitted for less than one week. after hospitalization at [facility] from 2/10/25 through 2/17/25 and treatment for acute anemia, DARLINE, hypotension, s/p 2 units PRBC and negative EGD/colonoscopy. He was found to have an infected TAVR and osteomyelitis of spine, and strep salivarius bacteremia. Plan at that time was for 6 weeks of IV antibiotics via PICC line. CTA on 2/21/25 showed clot to common femoral and distal embolism. He is S/P common femoral endarterectomy with clot retrieval and good blood flow to lower extremity post surgery. Repeat blood cultures were negative, but the excised clot was found to have gram positive cocci. Repeat MRI spine showed no abscess. Hospital stay c/b constipation and persistent back pain. CT and MRI L-spine showed degenerative disc disease and possible discitis/osteomyelitis at multiple levels. Patient received transfusion of 2 unit of PRBC's on 2/23/25. He was transferred to CTICU at [facility] on 2/24/25 for CT surgery evaluation. Cardiac catheterization showed non-obstructive CAD. Patient determined by CT surgery team to not be strong enough for CT surgery at this time, referred to rehab for optimization. Patient was evaluated by PM&R and therapy for functional deficits, gait/ADL impairments and acute rehabilitation was recommended. Patient was cleared for discharge to [facility] on 3/6/2025. (06 Mar 2025 13:06)    TDD: 3/20/25 home  ___________________________________________________________________________    SUBJECTIVE/ROS  Patient was seen and evaluated at bedside today.  Reported no overnight events and is in no acute distress.  no new events   no NV SOB    US DPLX LWR EXT VEINS LTD RT  PROCEDURE DATE:  03/11/2025  IMPRESSION: No evidence of right lower extremity deep venous thrombosis.  ___________________________________________________________________________    Vital Signs Last 24 Hrs  T(C): 36.4 (16 Mar 2025 08:13), Max: 36.6 (15 Mar 2025 20:37)  T(F): 97.6 (16 Mar 2025 08:13), Max: 97.9 (15 Mar 2025 20:37)  HR: 69 (16 Mar 2025 08:13) (69 - 73)  BP: 103/55 (16 Mar 2025 08:13) (103/55 - 143/70)  BP(mean): --  RR: 15 (16 Mar 2025 08:13) (15 - 16)  SpO2: 96% (16 Mar 2025 08:13) (96% - 96%)    Parameters below as of 16 Mar 2025 08:13  Patient On (Oxygen Delivery Method): room air    r      ___________________________________________________________________________  MEDICATIONS  (STANDING):  acetaminophen     Tablet .. 650 milliGRAM(s) Oral every 8 hours  ammonium lactate 12% Lotion 1 Application(s) Topical two times a day  apixaban 5 milliGRAM(s) Oral two times a day  AQUAPHOR (petrolatum Ointment) 1 Application(s) Topical three times a day  atorvastatin 40 milliGRAM(s) Oral at bedtime  cefTRIAXone   IVPB 2000 milliGRAM(s) IV Intermittent every 24 hours  cefTRIAXone   IVPB      chlorhexidine 4% Liquid 1 Application(s) Topical <User Schedule>  ferrous    sulfate 325 milliGRAM(s) Oral daily  furosemide    Tablet 40 milliGRAM(s) Oral daily  levothyroxine 150 MICROGram(s) Oral daily  lidocaine   4% Patch 1 Patch Transdermal daily  melatonin 6 milliGRAM(s) Oral at bedtime  multivitamin 1 Tablet(s) Oral daily  pantoprazole    Tablet 40 milliGRAM(s) Oral before breakfast  polyethylene glycol 3350 17 Gram(s) Oral daily  senna 2 Tablet(s) Oral at bedtime  spironolactone 25 milliGRAM(s) Oral daily    MEDICATIONS  (PRN):  bisacodyl Suppository 10 milliGRAM(s) Rectal daily PRN Constipation  cyclobenzaprine 5 milliGRAM(s) Oral three times a day PRN Muscle Spasm  sodium chloride 0.9% lock flush 10 milliLiter(s) IV Push every 1 hour PRN Pre/post blood products, medications, blood draw, and to maintain line patency  traMADol 50 milliGRAM(s) Oral every 4 hours PRN Severe Pain (7 - 10)  zolpidem 5 milliGRAM(s) Oral at bedtime PRN Insomnia  zolpidem 5 milliGRAM(s) Oral at bedtime PRN Insomnia      ___________________________________________________________________________    PHYSICAL EXAM:    Gen - NAD, Comfortable  HEENT - NCAT, EOMI,  Normal Conjunctivae  Neck - Supple, No limited ROM  Pulm - Clear lungs  Cardiovascular - RRR, S1S2, No murmurs  Chest - good chest expansion, good respiratory effort  Abdomen - Soft, NT/ND, +BS  Extremities - No C/C/E, no calf tenderness +LE swelling with +calf tenderness and warmth. Palpable but diminished left DP pulse  Neuro-     Cognitive - awake, alert, and oriented.     Motor -                    LEFT    UE - ShAB 5/5, EF 5/5, EE 5/5,  5/5                    RIGHT UE - ShAB 5/5, EF 3/5, EE 5/5,   5/5                    LEFT    LE - HF 2/5, KE 2/5, DF 3/5, PF 3/5                    RIGHT LE - HF 3/5, KE 3/5, DF 3/5, PF 4/5        Sensory - Intact to light touch; LLE diminished light touch sensation      Tone - normal  MSK: no joint deformity or swelling; full ROM  Psychiatric - Mood stable, Affect WNL  Skin: No rashes or lesions; warm and dry. Left groin 7.5cm incision with 13 staples present; no swelling/redness/drainage to site. Feet dry.  Wounds: Right buttock healed scab    ___________________________________________________________________________

## 2025-03-17 ENCOUNTER — APPOINTMENT (OUTPATIENT)
Dept: FAMILY MEDICINE | Facility: CLINIC | Age: 75
End: 2025-03-17

## 2025-03-17 LAB
ALBUMIN SERPL ELPH-MCNC: 3 G/DL — LOW (ref 3.3–5)
ALP SERPL-CCNC: 142 U/L — HIGH (ref 40–120)
ALT FLD-CCNC: 24 U/L — SIGNIFICANT CHANGE UP (ref 10–45)
AST SERPL-CCNC: 25 U/L — SIGNIFICANT CHANGE UP (ref 10–40)
BASOPHILS # BLD AUTO: 0.04 K/UL — SIGNIFICANT CHANGE UP (ref 0–0.2)
BASOPHILS NFR BLD AUTO: 0.6 % — SIGNIFICANT CHANGE UP (ref 0–2)
BUN SERPL-MCNC: 29 MG/DL — HIGH (ref 7–23)
CALCIUM SERPL-MCNC: 9.6 MG/DL — SIGNIFICANT CHANGE UP (ref 8.4–10.5)
CHLORIDE SERPL-SCNC: 95 MMOL/L — LOW (ref 96–108)
CO2 SERPL-SCNC: 30 MMOL/L — SIGNIFICANT CHANGE UP (ref 22–31)
CREAT SERPL-MCNC: 1.26 MG/DL — SIGNIFICANT CHANGE UP (ref 0.5–1.3)
CRP SERPL-MCNC: 30 MG/L — HIGH
EGFR: 60 ML/MIN/1.73M2 — SIGNIFICANT CHANGE UP
EGFR: 60 ML/MIN/1.73M2 — SIGNIFICANT CHANGE UP
EOSINOPHIL # BLD AUTO: 0.12 K/UL — SIGNIFICANT CHANGE UP (ref 0–0.5)
EOSINOPHIL NFR BLD AUTO: 1.8 % — SIGNIFICANT CHANGE UP (ref 0–6)
ERYTHROCYTE [SEDIMENTATION RATE] IN BLOOD: 64 MM/HR — HIGH (ref 0–20)
GLUCOSE SERPL-MCNC: 108 MG/DL — HIGH (ref 70–99)
HGB BLD-MCNC: 10.6 G/DL — LOW (ref 13–17)
IMM GRANULOCYTES NFR BLD AUTO: 0.3 % — SIGNIFICANT CHANGE UP (ref 0–0.9)
LYMPHOCYTES # BLD AUTO: 0.86 K/UL — LOW (ref 1–3.3)
LYMPHOCYTES # BLD AUTO: 13.1 % — SIGNIFICANT CHANGE UP (ref 13–44)
MCHC RBC-ENTMCNC: 28 PG — SIGNIFICANT CHANGE UP (ref 27–34)
MCHC RBC-ENTMCNC: 32.2 G/DL — SIGNIFICANT CHANGE UP (ref 32–36)
MCV RBC AUTO: 87 FL — SIGNIFICANT CHANGE UP (ref 80–100)
MONOCYTES # BLD AUTO: 0.42 K/UL — SIGNIFICANT CHANGE UP (ref 0–0.9)
MONOCYTES NFR BLD AUTO: 6.4 % — SIGNIFICANT CHANGE UP (ref 2–14)
NEUTROPHILS # BLD AUTO: 5.08 K/UL — SIGNIFICANT CHANGE UP (ref 1.8–7.4)
NEUTROPHILS NFR BLD AUTO: 77.8 % — HIGH (ref 43–77)
NRBC BLD AUTO-RTO: 0 /100 WBCS — SIGNIFICANT CHANGE UP (ref 0–0)
PLATELET # BLD AUTO: 221 K/UL — SIGNIFICANT CHANGE UP (ref 150–400)
POTASSIUM SERPL-MCNC: 4.2 MMOL/L — SIGNIFICANT CHANGE UP (ref 3.5–5.3)
POTASSIUM SERPL-SCNC: 4.2 MMOL/L — SIGNIFICANT CHANGE UP (ref 3.5–5.3)
PROT SERPL-MCNC: 7.5 G/DL — SIGNIFICANT CHANGE UP (ref 6–8.3)
RBC # BLD: 3.78 M/UL — LOW (ref 4.2–5.8)
RBC # FLD: 18.1 % — HIGH (ref 10.3–14.5)
SODIUM SERPL-SCNC: 133 MMOL/L — LOW (ref 135–145)
WBC # BLD: 6.54 K/UL — SIGNIFICANT CHANGE UP (ref 3.8–10.5)
WBC # FLD AUTO: 6.54 K/UL — SIGNIFICANT CHANGE UP (ref 3.8–10.5)

## 2025-03-17 PROCEDURE — 99232 SBSQ HOSP IP/OBS MODERATE 35: CPT

## 2025-03-17 PROCEDURE — 71045 X-RAY EXAM CHEST 1 VIEW: CPT | Mod: 26

## 2025-03-17 PROCEDURE — 99233 SBSQ HOSP IP/OBS HIGH 50: CPT

## 2025-03-17 RX ORDER — CYCLOBENZAPRINE HYDROCHLORIDE 15 MG/1
1 CAPSULE, EXTENDED RELEASE ORAL
Qty: 63 | Refills: 0
Start: 2025-03-17 | End: 2025-04-06

## 2025-03-17 RX ORDER — SENNA 187 MG
2 TABLET ORAL
Qty: 0 | Refills: 0 | DISCHARGE
Start: 2025-03-17

## 2025-03-17 RX ORDER — FERROUS SULFATE 137(45) MG
1 TABLET, EXTENDED RELEASE ORAL
Qty: 30 | Refills: 0
Start: 2025-03-17 | End: 2025-04-15

## 2025-03-17 RX ORDER — LIDOCAINE HYDROCHLORIDE 20 MG/ML
1 JELLY TOPICAL
Qty: 6 | Refills: 0
Start: 2025-03-17 | End: 2025-04-19

## 2025-03-17 RX ORDER — POLYETHYLENE GLYCOL 3350 17 G/17G
17 POWDER, FOR SOLUTION ORAL
Qty: 0 | Refills: 0 | DISCHARGE
Start: 2025-03-17

## 2025-03-17 RX ORDER — TRAMADOL HYDROCHLORIDE 50 MG/1
50 TABLET, FILM COATED ORAL EVERY 4 HOURS
Refills: 0 | Status: DISCONTINUED | OUTPATIENT
Start: 2025-03-17 | End: 2025-03-20

## 2025-03-17 RX ORDER — LEVOTHYROXINE SODIUM 300 MCG
1 TABLET ORAL
Qty: 30 | Refills: 0
Start: 2025-03-17 | End: 2025-04-15

## 2025-03-17 RX ORDER — LIDOCAINE HYDROCHLORIDE 20 MG/ML
1 JELLY TOPICAL
Qty: 6 | Refills: 0
Start: 2025-03-17 | End: 2025-04-22

## 2025-03-17 RX ORDER — APIXABAN 2.5 MG/1
1 TABLET, FILM COATED ORAL
Qty: 60 | Refills: 0
Start: 2025-03-17 | End: 2025-04-15

## 2025-03-17 RX ORDER — FINASTERIDE 1 MG/1
1 TABLET, FILM COATED ORAL
Refills: 0 | DISCHARGE

## 2025-03-17 RX ORDER — ACETAMINOPHEN 500 MG/5ML
2 LIQUID (ML) ORAL
Qty: 0 | Refills: 0 | DISCHARGE
Start: 2025-03-17

## 2025-03-17 RX ORDER — FUROSEMIDE 10 MG/ML
1 INJECTION INTRAMUSCULAR; INTRAVENOUS
Qty: 30 | Refills: 0
Start: 2025-03-17 | End: 2025-04-15

## 2025-03-17 RX ORDER — TRAMADOL HYDROCHLORIDE 50 MG/1
1 TABLET, FILM COATED ORAL
Qty: 60 | Refills: 0
Start: 2025-03-17 | End: 2025-03-26

## 2025-03-17 RX ORDER — MELATONIN 5 MG
2 TABLET ORAL
Qty: 0 | Refills: 0 | DISCHARGE
Start: 2025-03-17

## 2025-03-17 RX ORDER — CEFTRIAXONE 500 MG/1
2 INJECTION, POWDER, FOR SOLUTION INTRAMUSCULAR; INTRAVENOUS
Qty: 36 | Refills: 0
Start: 2025-03-17 | End: 2025-04-03

## 2025-03-17 RX ORDER — SPIRONOLACTONE 25 MG
1 TABLET ORAL
Qty: 30 | Refills: 0
Start: 2025-03-17 | End: 2025-04-15

## 2025-03-17 RX ORDER — LIDOCAINE HYDROCHLORIDE 20 MG/ML
1 JELLY TOPICAL
Qty: 6 | Refills: 0
Start: 2025-03-17 | End: 2025-04-15

## 2025-03-17 RX ORDER — BISACODYL 5 MG
1 TABLET, DELAYED RELEASE (ENTERIC COATED) ORAL
Qty: 15 | Refills: 0
Start: 2025-03-17 | End: 2025-03-31

## 2025-03-17 RX ORDER — APIXABAN 2.5 MG/1
1 TABLET, FILM COATED ORAL
Qty: 0 | Refills: 0 | DISCHARGE

## 2025-03-17 RX ORDER — B1/B2/B3/B5/B6/B12/VIT C/FOLIC 500-0.5 MG
1 TABLET ORAL
Qty: 0 | Refills: 0 | DISCHARGE
Start: 2025-03-17

## 2025-03-17 RX ORDER — WHITE PETROLATUM 1 G/G
1 OINTMENT TOPICAL
Qty: 0 | Refills: 0 | DISCHARGE
Start: 2025-03-17

## 2025-03-17 RX ORDER — AMMONIUM LACTATE 12 %
1 LOTION (ML) TOPICAL
Qty: 1 | Refills: 0
Start: 2025-03-17 | End: 2025-04-15

## 2025-03-17 RX ORDER — ATORVASTATIN CALCIUM 80 MG/1
1 TABLET, FILM COATED ORAL
Qty: 30 | Refills: 0
Start: 2025-03-17 | End: 2025-04-15

## 2025-03-17 RX ADMIN — Medication 650 MILLIGRAM(S): at 13:12

## 2025-03-17 RX ADMIN — Medication 1 TABLET(S): at 11:31

## 2025-03-17 RX ADMIN — Medication 1 APPLICATION(S): at 05:44

## 2025-03-17 RX ADMIN — APIXABAN 5 MILLIGRAM(S): 2.5 TABLET, FILM COATED ORAL at 05:41

## 2025-03-17 RX ADMIN — WHITE PETROLATUM 1 APPLICATION(S): 1 OINTMENT TOPICAL at 21:37

## 2025-03-17 RX ADMIN — CYCLOBENZAPRINE HYDROCHLORIDE 5 MILLIGRAM(S): 15 CAPSULE, EXTENDED RELEASE ORAL at 10:49

## 2025-03-17 RX ADMIN — Medication 25 MILLIGRAM(S): at 05:40

## 2025-03-17 RX ADMIN — CEFTRIAXONE 100 MILLIGRAM(S): 500 INJECTION, POWDER, FOR SOLUTION INTRAMUSCULAR; INTRAVENOUS at 21:37

## 2025-03-17 RX ADMIN — WHITE PETROLATUM 1 APPLICATION(S): 1 OINTMENT TOPICAL at 14:48

## 2025-03-17 RX ADMIN — TRAMADOL HYDROCHLORIDE 50 MILLIGRAM(S): 50 TABLET, FILM COATED ORAL at 17:12

## 2025-03-17 RX ADMIN — TRAMADOL HYDROCHLORIDE 50 MILLIGRAM(S): 50 TABLET, FILM COATED ORAL at 05:40

## 2025-03-17 RX ADMIN — Medication 1 APPLICATION(S): at 17:12

## 2025-03-17 RX ADMIN — POLYETHYLENE GLYCOL 3350 17 GRAM(S): 17 POWDER, FOR SOLUTION ORAL at 11:30

## 2025-03-17 RX ADMIN — Medication 40 MILLIGRAM(S): at 05:40

## 2025-03-17 RX ADMIN — LIDOCAINE HYDROCHLORIDE 1 PATCH: 20 JELLY TOPICAL at 23:48

## 2025-03-17 RX ADMIN — Medication 650 MILLIGRAM(S): at 14:14

## 2025-03-17 RX ADMIN — LIDOCAINE HYDROCHLORIDE 1 PATCH: 20 JELLY TOPICAL at 19:58

## 2025-03-17 RX ADMIN — TRAMADOL HYDROCHLORIDE 50 MILLIGRAM(S): 50 TABLET, FILM COATED ORAL at 11:31

## 2025-03-17 RX ADMIN — FUROSEMIDE 40 MILLIGRAM(S): 10 INJECTION INTRAMUSCULAR; INTRAVENOUS at 05:40

## 2025-03-17 RX ADMIN — TRAMADOL HYDROCHLORIDE 50 MILLIGRAM(S): 50 TABLET, FILM COATED ORAL at 12:27

## 2025-03-17 RX ADMIN — LIDOCAINE HYDROCHLORIDE 1 PATCH: 20 JELLY TOPICAL at 11:30

## 2025-03-17 RX ADMIN — Medication 2 TABLET(S): at 21:35

## 2025-03-17 RX ADMIN — Medication 325 MILLIGRAM(S): at 11:31

## 2025-03-17 RX ADMIN — WHITE PETROLATUM 1 APPLICATION(S): 1 OINTMENT TOPICAL at 05:44

## 2025-03-17 RX ADMIN — Medication 650 MILLIGRAM(S): at 05:41

## 2025-03-17 RX ADMIN — Medication 6 MILLIGRAM(S): at 21:35

## 2025-03-17 RX ADMIN — Medication 650 MILLIGRAM(S): at 22:41

## 2025-03-17 RX ADMIN — CYCLOBENZAPRINE HYDROCHLORIDE 5 MILLIGRAM(S): 15 CAPSULE, EXTENDED RELEASE ORAL at 21:39

## 2025-03-17 RX ADMIN — ATORVASTATIN CALCIUM 40 MILLIGRAM(S): 80 TABLET, FILM COATED ORAL at 21:36

## 2025-03-17 RX ADMIN — Medication 5 MILLIGRAM(S): at 23:14

## 2025-03-17 RX ADMIN — Medication 650 MILLIGRAM(S): at 21:36

## 2025-03-17 RX ADMIN — APIXABAN 5 MILLIGRAM(S): 2.5 TABLET, FILM COATED ORAL at 17:11

## 2025-03-17 RX ADMIN — Medication 150 MICROGRAM(S): at 05:41

## 2025-03-17 NOTE — CHART NOTE - NSCHARTNOTESELECT_GEN_ALL_CORE
CT surgery/Post-Discharge Note
Event Note
Gastroenterology/Event Note
Gastroenterology/Event Note
Post Cardiac Cath NP Note/Event Note
Vascular Surgery update/Event Note
CT surgery/Event Note
Event Note
Event Note
Neurosurgery/Event Note
Vascular
post ROVERTO/Event Note
Event Note
Event Note

## 2025-03-17 NOTE — PROGRESS NOTE ADULT - ASSESSMENT
Assessment/Plan:  Mr. Wni Menezes is a 74-year-old male patient with past medical history of HTN, diabetes mellitus with hyperglycemia, hypothyroidism, obesity, prostate cancer, atrial fibrillation, HLD, GI bleed, S/P TAVR (2023), and chronic back pain who is admitted for Acute Inpatient Rehabilitation with a multidisciplinary rehab program at [facility] with functional impairments in ADLs and mobility secondary to spinal osteomyelitis/discitis secondary to infected TAVR and aortic valve.      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              - WBC 10.09 on 3/6             - ESR//206 on 2/20      * CXR weekly      * Daily weights      * LSO brace when OOB  Comprehensive Multidisciplinary Rehab Program:  - Start comprehensive rehab program, 3 hours a day, 5 days a week.  - PT 2hr/day: Focused on improving strength, endurance, coordination, balance, functional mobility, and transfers  - OT 1hr/day: Focused on improving strength, fine motor skills, coordination, posture and ADLs.    - Activity Limitations: Decreased social, vocational and leisure activities, decreased self care and ADLs, decreased mobility, decreased ability to manage household and finances.     -----------------------------------------------------------------------------------  Concurrent Medical Problems    #LLE edema/warmth/calf pain (on arrival 3/6)  - R/O DVT with bedside doppler 3/6    #Severe aortic stenosis with chronic diastolic CHF and first degree AV block  - S/P TAVR (2023)  - ROVERTO (2/26):       * echogenic sessile not very mobile subcentimeter ( 6x2 mm) structure attached to the base of the leaflet corresponding to the native non-coronary cusp       * likely representing a calcified nodule vs. less likely an old small calcified vegetation, mod AS  - S/P Cardiac catheterization  - Monitor right wrist and right groin for bleeding  - No heavy lifting or strenuous activity until 3/10    #Multilevel osteomyelitis   - Ceftriaxone through 4/7/25 as above  - LSO brace when OOB  - Ortho f/up after rehab d/c    #Arterial embolism of left leg  - S/P LCFA/LSFA thrombectomy/endarterectomy on 2/22  - Left groin staples should remain in place until 3/13  - Eliquis 5mg BID -- cleared to restart by cardiology on 3/6.  - DVT ppx: TEDs    #Bacteremia  - Strep salivarius bacteremia> cultures now clear.   - Ceftriaxone through 4/7/25 as above    #Chronic atrial fibrillation  - Continue Eliquis as above    #Type 2 Diabetes Mellitus with hyperglycemia  - Consistent carb diet.   - On metformin and Farxiga at home  - AM glucose 113-97; well controlled.  - Monitor glucose on AM labs.    #HTN  - Spironolactone 25mg daily  - Lasix 40mg daily    #HLD  - Atorvastatin 40mg daily at HS    #Hypothyroid  - levothyroxine 150mcg daily  - TSH 0.73 on 2/24/25    #Anemia  - Ferrous sulfate 325mg daily  - Trend H/H (10.9/32/9 on 3/6)    #Mood/Cognition:  - Neuropsychology consult PRN    #Sleep:   - Maintain quiet hours and low stim environment.  - Melatonin 6mg PRN to maximize participation in therapy during the day.     #Pain Management:  - Analgesic: Tylenol 650mg every 8 hours PRN  - Narcotic: Tramadol 25 mg every 6 hours PRN moderate pain (4-6)  - Tramadol 50mg every 6 hours PRN severe pain (7-10)  - Antispasmodic: Cyclobenzaprine 5mg TID PRN muscle spasm    #GI/Bowel:  - Senna 2 tabs QHS   - Miralax 17g Daily  - GI ppx: Pantoprazole 40mg daily before a meal  - bisacodyl 10mg suppository rectally once a day PRN constipation    #/Bladder:   - PVR on admission  - monitor urine output    #Skin/Pressure Injury assessment:   - Skin assessment on admission:       * Left groin 7.5cm incision with 13 staples present; no swelling/redness/drainage to site      * Right buttock healed scab    #Diet/Dysphagia:  - Current Diet: Dash diet, consistent carb diabetic diet  - Nutrition consult     #Patient Education  - Education provided on the following:      * Admitting diagnosis and functional implications      * Functional goals      * Bladder management      * Bowel management      * Skin care management      * Intensity of service and scheduling of rehab disciplines      * Plan of care and role of interdisciplinary team conference in discharge planning      * Reconciliation of medications from prior institution    #Precautions / PROPHYLAXIS:   - Falls, Cardiac, Spinal  - ortho: Weight bearing status: FWB  - Lungs: Aspiration, Incentive Spirometer   - Pressure injury/Skin: OOB to Chair, PT/OT      ---------------  Code Status: DNR/DNI; all other medical interventions allowed (see MOLST)  Emergency Contact:    Outpatient Follow-up (Specialty/Name of physician):    Abdulaziz Jernigan Physician [address]  Scheduled Appointment: 03/17/2025  For kidney tests and CBC    Benito Jones Physician [address]  Scheduled Appointment: 04/07/2025    Mahamed Heath  Cardiovascular Disease  [address] [facility] [address] [facility], [address]  [place] [address]  Phone: [phone]  Fax: [phone]  Follow Up Time: 1-2 weeks for ROVERTO and cardiac workup    Lizbeth Redd  Nephrology  [address] [facility] [address], [address]  [place] [address]  Phone: [phone]  Fax: [phone]  Follow Up Time: within 4-6 weeks    Gualberto Porter  Gastroenterology  [address] [facility], [address]  [place] [address]  Phone: [phone]  Fax: [phone]  Follow Up Time:   For pill camera test    Berny Lizarraga [place]  Neurosurgery  [address]  [place] [address]  Phone: [phone]  Fax: [phone]  Follow Up Time: 4 weeks  For MRI    Dr. Rodriges   Cardiothoracic surgery  Follow appointment after discharge  [phone]       --------------   Assessment/Plan:  Mr. Win Menezes is a 74-year-old male patient with past medical history of HTN, diabetes mellitus with hyperglycemia, hypothyroidism, obesity, prostate cancer, atrial fibrillation, HLD, GI bleed, S/P TAVR (2023), and chronic back pain who is admitted for Acute Inpatient Rehabilitation with a multidisciplinary rehab program at [facility] with functional impairments in ADLs and mobility secondary to spinal osteomyelitis/discitis secondary to infected TAVR and aortic valve.      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              - WBC 10.09 on 3/6             - ESR//206 on 2/20      * CXR weekly      * Daily weights      * LSO brace when OOB  Comprehensive Multidisciplinary Rehab Program:  - Start comprehensive rehab program, 3 hours a day, 5 days a week.  - PT 2hr/day: Focused on improving strength, endurance, coordination, balance, functional mobility, and transfers  - OT 1hr/day: Focused on improving strength, fine motor skills, coordination, posture and ADLs.    - Activity Limitations: Decreased social, vocational and leisure activities, decreased self care and ADLs, decreased mobility, decreased ability to manage household and finances.     -----------------------------------------------------------------------------------  Concurrent Medical Problems    #LLE edema/warmth/calf pain (on arrival 3/6)  - R/O DVT with bedside doppler 3/6    #Severe aortic stenosis with chronic diastolic CHF and first degree AV block  - S/P TAVR (2023)  - ROVERTO (2/26):       * echogenic sessile not very mobile subcentimeter ( 6x2 mm) structure attached to the base of the leaflet corresponding to the native non-coronary cusp       * likely representing a calcified nodule vs. less likely an old small calcified vegetation, mod AS  - S/P Cardiac catheterization  - Monitor right wrist and right groin for bleeding  - No heavy lifting or strenuous activity until 3/10    #Multilevel osteomyelitis   - Ceftriaxone through 4/7/25 as above  - LSO brace when OOB  - Ortho f/up after rehab d/c    #Arterial embolism of left leg  - S/P LCFA/LSFA thrombectomy/endarterectomy on 2/22  - Left groin staples should remain in place until 3/13  - Eliquis 5mg BID -- cleared to restart by cardiology on 3/6.  - DVT ppx: TEDs    #Bacteremia  - Strep salivarius bacteremia> cultures now clear.   - Ceftriaxone through 4/7/25 as above    #Chronic atrial fibrillation  - Continue Eliquis as above    #Type 2 Diabetes Mellitus with hyperglycemia  - Consistent carb diet.   - On metformin and Farxiga at home  - AM glucose 113-97; well controlled.  - Monitor glucose on AM labs.    #HTN  - Spironolactone 25mg daily  - Lasix 40mg daily    #HLD  - Atorvastatin 40mg daily at HS    #Hypothyroid  - levothyroxine 150mcg daily  - TSH 0.73 on 2/24/25    #Anemia  - Ferrous sulfate 325mg daily  - Trend H/H (10.9/32/9 on 3/6)    #Mood/Cognition:  - Neuropsychology consult PRN    #Sleep:   - Maintain quiet hours and low stim environment.  - Melatonin 6mg PRN to maximize participation in therapy during the day.     #Pain Management:  - Analgesic: Tylenol 650mg every 8 hours PRN  - Narcotic: Tramadol 25 mg every 6 hours PRN moderate pain (4-6)  - Tramadol 50mg every 6 hours PRN severe pain (7-10)  - Antispasmodic: Cyclobenzaprine 5mg TID PRN muscle spasm    #GI/Bowel:  - Senna 2 tabs QHS   - Miralax 17g Daily  - GI ppx: Pantoprazole 40mg daily before a meal  - bisacodyl 10mg suppository rectally once a day PRN constipation    #/Bladder:   - PVR on admission  - monitor urine output    #Skin/Pressure Injury assessment:   - Skin assessment on admission:       * Left groin 7.5cm incision with 13 staples present; no swelling/redness/drainage to site      * Right buttock healed scab    #Diet/Dysphagia:  - Current Diet: Dash diet, consistent carb diabetic diet  - Nutrition consult     #Patient Education  - Education provided on the following:      * Admitting diagnosis and functional implications      * Functional goals      * Bladder management      * Bowel management      * Skin care management      * Intensity of service and scheduling of rehab disciplines      * Plan of care and role of interdisciplinary team conference in discharge planning      * Reconciliation of medications from prior institution    #Precautions / PROPHYLAXIS:   - Falls, Cardiac, Spinal  - ortho: Weight bearing status: FWB  - Lungs: Aspiration, Incentive Spirometer   - Pressure injury/Skin: OOB to Chair, PT/OT      ---------------  Code Status: DNR/DNI; all other medical interventions allowed (see MOLST)  Emergency Contact:    Outpatient Follow-up (Specialty/Name of physician):    Dr. Fabio Marshall MD  Infectious Disease  [address] [place] [address]  Phone: [phone]    Abdulaziz Jernigan Physician [address]  Scheduled Appointment: 03/17/2025  For kidney tests and CBC    Benito Jones  [facility] Physician [address]  Scheduled Appointment: 04/07/2025    Mahamed Heath  Cardiovascular Disease  [address] [facility], [address]  [place] [address]  Phone: [phone]  Fax: [phone]  Follow Up Time: 1-2 weeks for ROVERTO and cardiac workup    Lizbeth Redd  Nephrology  [address] [facility], [address]  [place] [address]  Phone: [phone]  Fax: [phone]  Follow Up Time: within 4-6 weeks    Gualberto Porter  Gastroenterology  [address] [facility], [address]  [place] [address]  Phone: [phone]  Fax: [phone]  Follow Up Time:   For pill camera test    Berny Lizarraga [place]  Neurosurgery  [address] [facility]  [place] [address]  Phone: [phone]  Fax: [phone]  Follow Up Time: 4 weeks  For MRI    Dr. Rodriges   Cardiothoracic surgery  Follow appointment after discharge  [phone]       --------------

## 2025-03-17 NOTE — PROGRESS NOTE ADULT - SUBJECTIVE AND OBJECTIVE BOX
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       REVIEW OF SYSTEMS: remaining ROS negative     PHYSICAL EXAM:  VITALS:  Vital Signs Last 24 Hrs  T(C): 36.9 (17 Mar 2025 07:51), Max: 36.9 (17 Mar 2025 07:51)  T(F): 98.5 (17 Mar 2025 07:51), Max: 98.5 (17 Mar 2025 07:51)  HR: 70 (17 Mar 2025 07:51) (70 - 72)  BP: 123/75 (17 Mar 2025 07:51) (120/62 - 130/58)  BP(mean): --  RR: 16 (17 Mar 2025 07:51) (15 - 16)  SpO2: 95% (17 Mar 2025 07:51) (95% - 97%)    Parameters below as of 17 Mar 2025 07:51  Patient On (Oxygen Delivery Method): room air          GENERAL: NAD,  no increased WOB  HEAD:  Atraumatic, Normocephalic  EYES: EOMI, conjunctiva and sclera clear  ENMT: Moist mucous membranes  NECK: Supple, No JVD  NERVOUS SYSTEM:  Alert & Oriented    CHEST/LUNG: Clear to auscultation bilaterally; No rales, rhonchi, wheezing  HEART: Regular rate and rhythm  ABDOMEN: Soft, Nontender, Nondistended; Bowel sounds present  EXTREMITIES:  No clubbing, cyanosis, calf tenderness. trace edema      MEDICATIONS  (STANDING):  acetaminophen     Tablet .. 650 milliGRAM(s) Oral every 8 hours  ammonium lactate 12% Lotion 1 Application(s) Topical two times a day  apixaban 5 milliGRAM(s) Oral two times a day  AQUAPHOR (petrolatum Ointment) 1 Application(s) Topical three times a day  atorvastatin 40 milliGRAM(s) Oral at bedtime  cefTRIAXone   IVPB 2000 milliGRAM(s) IV Intermittent every 24 hours  cefTRIAXone   IVPB      chlorhexidine 4% Liquid 1 Application(s) Topical <User Schedule>  ferrous    sulfate 325 milliGRAM(s) Oral daily  furosemide    Tablet 40 milliGRAM(s) Oral daily  levothyroxine 150 MICROGram(s) Oral daily  lidocaine   4% Patch 1 Patch Transdermal daily  melatonin 6 milliGRAM(s) Oral at bedtime  multivitamin 1 Tablet(s) Oral daily  pantoprazole    Tablet 40 milliGRAM(s) Oral before breakfast  polyethylene glycol 3350 17 Gram(s) Oral daily  senna 2 Tablet(s) Oral at bedtime  spironolactone 25 milliGRAM(s) Oral daily    MEDICATIONS  (PRN):  bisacodyl Suppository 10 milliGRAM(s) Rectal daily PRN Constipation  cyclobenzaprine 5 milliGRAM(s) Oral three times a day PRN Muscle Spasm  sodium chloride 0.9% lock flush 10 milliLiter(s) IV Push every 1 hour PRN Pre/post blood products, medications, blood draw, and to maintain line patency  traMADol 50 milliGRAM(s) Oral every 4 hours PRN Severe Pain (7 - 10)  zolpidem 5 milliGRAM(s) Oral at bedtime PRN Insomnia          Allergies    No Known Allergies    Intolerances              LABS:                           10.6   6.54  )-----------( 221      ( 17 Mar 2025 06:38 )             32.9     03-17    133[L]  |  95[L]  |  29[H]  ----------------------------<  108[H]  4.2   |  30  |  1.26    Ca    9.6      17 Mar 2025 06:38    TPro  7.5  /  Alb  3.0[L]  /  TBili  1.0  /  DBili  x   /  AST  25  /  ALT  24  /  AlkPhos  142[H]  03-17    LIVER FUNCTIONS - ( 17 Mar 2025 06:38 )  Alb: 3.0 g/dL / Pro: 7.5 g/dL / ALK PHOS: 142 U/L / ALT: 24 U/L / AST: 25 U/L / GGT: x             Urinalysis Basic - ( 17 Mar 2025 06:38 )    Color: x / Appearance: x / SG: x / pH: x  Gluc: 108 mg/dL / Ketone: x  / Bili: x / Urobili: x   Blood: x / Protein: x / Nitrite: x   Leuk Esterase: x / RBC: x / WBC x   Sq Epi: x / Non Sq Epi: x / Bacteria: x                CAPILLARY BLOOD GLUCOSE                    RECENT CULTURES:          RADIOLOGY & ADDITIONAL TESTS:

## 2025-03-17 NOTE — CHART NOTE - NSCHARTNOTEFT_GEN_A_CORE
Nutrition Follow Up Note  Hospital Course   (Per Electronic Medical Record)    Source:  Patient ASleepx2  Medical Record [X]      Diet:   Regular Diet (IDDSI Level 7) w/ Thin Liquids (IDDSI Level 0)  Tolerates Diet Consistency Well  Consumes % of Meals Usually (as Per Documentation)  on Ensure Max 11oz PO Daily (Provides 150kcal & 30grams of Protein)    Enteral/Parenteral Nutrition: Not Applicable    Current Weight: 207.2lb on 3/15  Obtain New Weight to Confirm  Obtain Weights Weekly     Pertinent Medications: MEDICATIONS  (STANDING):  acetaminophen     Tablet .. 650 milliGRAM(s) Oral every 8 hours  ammonium lactate 12% Lotion 1 Application(s) Topical two times a day  apixaban 5 milliGRAM(s) Oral two times a day  AQUAPHOR (petrolatum Ointment) 1 Application(s) Topical three times a day  atorvastatin 40 milliGRAM(s) Oral at bedtime  cefTRIAXone   IVPB 2000 milliGRAM(s) IV Intermittent every 24 hours  cefTRIAXone   IVPB      chlorhexidine 4% Liquid 1 Application(s) Topical <User Schedule>  ferrous    sulfate 325 milliGRAM(s) Oral daily  furosemide    Tablet 40 milliGRAM(s) Oral daily  levothyroxine 150 MICROGram(s) Oral daily  lidocaine   4% Patch 1 Patch Transdermal daily  melatonin 6 milliGRAM(s) Oral at bedtime  multivitamin 1 Tablet(s) Oral daily  pantoprazole    Tablet 40 milliGRAM(s) Oral before breakfast  polyethylene glycol 3350 17 Gram(s) Oral daily  senna 2 Tablet(s) Oral at bedtime  spironolactone 25 milliGRAM(s) Oral daily    MEDICATIONS  (PRN):  bisacodyl Suppository 10 milliGRAM(s) Rectal daily PRN Constipation  cyclobenzaprine 5 milliGRAM(s) Oral three times a day PRN Muscle Spasm  sodium chloride 0.9% lock flush 10 milliLiter(s) IV Push every 1 hour PRN Pre/post blood products, medications, blood draw, and to maintain line patency  traMADol 50 milliGRAM(s) Oral every 4 hours PRN Severe Pain (7 - 10)  zolpidem 5 milliGRAM(s) Oral at bedtime PRN Insomnia    Pertinent Labs:  03-17 Na133 mmol/L[L] Glu 108 mg/dL[H] K+ 4.2 mmol/L Cr  1.26 mg/dL BUN 29 mg/dL[H] 03-17 Alb 3.0 g/dL[L] 02-24 PAB 12 mg/dL[L]    Skin: No Pressure Ulcers   Surgical Incision on Right Groin  (as Per Nursing Flow Sheet)     Edema: None Noted (as Per Documentation)     Last Bowel Movement: on 3/16    Estimated Needs:   [X] No Change Since Previous Assessment    Previous Nutrition Diagnosis:   Moderate Malnutrition     Nutrition Diagnosis is [X] Ongoing - Continues on Nutrition Supplement    New Nutrition Diagnosis: [X] Not Applicable    Interventions:   1. Recommend Continue Nutrition Plan of Care     Monitoring & Evaluation:   [X] Weights   [X] PO Intake   [X] Skin Integrity   [X] Follow Up (Per Protocol)  [X] Tolerance to Diet Prescription   [X] Other: Labs     Registered Dietitian/Nutritionist Remains Available.  Alo Ramirez RDN, CDN    Phone# [phone]
Nurse stated that pt endorsed some subjective shortness of breath with clamminess and dry mouth Evaluated pt in bed, stated this sensation began about an hour ago, insidious onset, ate dinner earlier without issue. Pt appears in no acute distress, speaks full sentences completely. respirations even and unlabored. Skin is moist, not diaphoretic, and is warm to touch. No skin discoloration noted. As pt has hx of diabetes, fingerstick was performed and found to be 160. Pt then stated he feels like he may need to have a BM (last one was this morning). Vitals were obtained prior to allowing pt to ambulate- SpO2 96% on RA, 140s/80s, HR 70s. Pt reassured that he is vitally stable, glucose level is okay, and perhaps oral care and using the restroom may help his symptoms. Educated pt to alert nurse should any symptoms worsen. Verbalized understanding.
Nutrition Follow Up Note  Hospital Course   (Per Electronic Medical Record)    Source:  Patient [X]  Medical Record [X]      Diet:   Regular Diet (IDDSI Level 7) w/ Thin Liquids (IDDSI Level 0)  Tolerates Diet Consistency Well  No Chewing/Swallowing Difficulties  No Recent Nausea, Vomiting, Diarrhea or Constipation (as Per Patient)  Consumes % of Meals (as Per Documentation) - States Fair Intake (Per Patient)  on Ensure Max 11oz PO Daily (Provides 150kcal & 30grams of Protein)  Patient Takes Nutrition Supplement Well  Education Provided on Need for Supplementation   Obtained Food Preferences from Patient    Enteral/Parenteral Nutrition: Not Applicable    Current Weight: 218.2lb on 3/6  Obtain New Weight  Obtain Weights Weekly     Pertinent Medications: MEDICATIONS  (STANDING):  acetaminophen     Tablet .. 650 milliGRAM(s) Oral every 8 hours  ammonium lactate 12% Lotion 1 Application(s) Topical two times a day  apixaban 5 milliGRAM(s) Oral two times a day  AQUAPHOR (petrolatum Ointment) 1 Application(s) Topical three times a day  atorvastatin 40 milliGRAM(s) Oral at bedtime  cefTRIAXone   IVPB 2000 milliGRAM(s) IV Intermittent every 24 hours  cefTRIAXone   IVPB      chlorhexidine 4% Liquid 1 Application(s) Topical <User Schedule>  ferrous    sulfate 325 milliGRAM(s) Oral daily  furosemide    Tablet 40 milliGRAM(s) Oral daily  levothyroxine 150 MICROGram(s) Oral daily  lidocaine   4% Patch 1 Patch Transdermal daily  multivitamin 1 Tablet(s) Oral daily  pantoprazole    Tablet 40 milliGRAM(s) Oral before breakfast  polyethylene glycol 3350 17 Gram(s) Oral daily  senna 2 Tablet(s) Oral at bedtime  spironolactone 25 milliGRAM(s) Oral daily    MEDICATIONS  (PRN):  bisacodyl Suppository 10 milliGRAM(s) Rectal daily PRN Constipation  cyclobenzaprine 5 milliGRAM(s) Oral three times a day PRN Muscle Spasm  melatonin 9 milliGRAM(s) Oral at bedtime PRN Insomnia  sodium chloride 0.9% lock flush 10 milliLiter(s) IV Push every 1 hour PRN Pre/post blood products, medications, blood draw, and to maintain line patency  traMADol 50 milliGRAM(s) Oral every 4 hours PRN Severe Pain (7 - 10)    Pertinent Labs:  03-10 Na131 mmol/L[L] Glu 95 mg/dL K+ 3.9 mmol/L Cr  1.01 mg/dL BUN 25 mg/dL[H] 03-10 Alb 2.5 g/dL[L] 02-24 PAB 12 mg/dL[L]    Skin: No Pressure Ulcers  Surgical Incision on Right Groin  (as Per Nursing Flow Sheet)     Edema: None Noted (as Per Documentation)     Last Bowel Movement: on 3/10    Estimated Needs:   [X] No Change Since Previous Assessment    Previous Nutrition Diagnosis:   Moderate Malnutrition     Nutrition Diagnosis is [X] Ongoing - Patient Continues on Nutrition Supplement, Patient Takes Nutrition Supplement & Nutrition Education Provided on Need for Supplementation     New Nutrition Diagnosis: [X] Not Applicable    Interventions:   1. Education Provided on Need for Supplementation   2. Recommend Continue Nutrition Plan of Care     Monitoring & Evaluation:   [X] Weights   [X] PO Intake   [X] Skin Integrity   [X] Follow Up (Per Protocol)  [X] Tolerance to Diet Prescription   [X] Other: Labs    Registered Dietitian/Nutritionist Remains Available.  Alo Ramirez, GHULAMN, CDN    Phone# [phone]

## 2025-03-17 NOTE — PROGRESS NOTE ADULT - ASSESSMENT
74-year-old male patient with past medical history of HTN, diabetes mellitus with hyperglycemia, hypothyroidism, obesity, prostate cancer, atrial fibrillation, HLD, GI bleed, S/P TAVR (2023), and chronic back pain who is admitted for Acute Inpatient Rehabilitation with a multidisciplinary rehab program at [facility] with functional impairments in ADLs and mobility secondary to spinal osteomyelitis/discitis secondary to infected TAVR and aortic valve.    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   - Ceftriaxone 2 g once daily via PICC through 4/7/25  - Monitor CBC, CMP, and Weekly ESR and CRP  - CXR weekly  - Ortho f/up after rehab d/c    #Severe aortic stenosis with chronic diastolic CHF and first degree AV block  - S/P TAVR (2023)  - ROVERTO (2/26): echogenic sessile not very mobile subcentimeter ( 6x2 mm) structure attached to the base of the leaflet corresponding to the native non-coronary cusp   - S/P Cardiac catheterization    #Arterial embolism of left leg  # R LE edema  - S/P LCFA/LSFA thrombectomy/endarterectomy on 2/22  - Left groin staples should remain in place until 3/13  - Eliquis 5mg BID  - Lasix/Spironolactone   - R LE doppler neg    #Chronic atrial fibrillation  - Eliquis  - Rate controlled without any av rosamaria agents    # Mild Hyponatremia  - stable around 132-134  - continue to monitor closely  - if trending [id number], will work up    #Type 2 Diabetes Mellitus  - Ha1c 5.7  - Blood glucose levels are within normal limits   - On metformin and Farxiga at home. Not on any meds while here     #HTN  - Spironolactone 25mg daily  - Lasix 40mg daily    #HLD  - Atorvastatin     #Hypothyroid  - levothyroxine    #Anemia  - Ferrous sulfate 325mg daily  - Trend H/H   - Transfuse if HgB <7      #DVT ppx  - on Eliquis    #GI ppx  - PPI

## 2025-03-17 NOTE — PROGRESS NOTE ADULT - SUBJECTIVE AND OBJECTIVE BOX
HPI:  Mr. Win Menezes is a 74-year-old male patient with past medical history of HTN, diabetes mellitus with hyperglycemia, hypothyroidism, obesity, prostate cancer, atrial fibrillation, HLD, GI bleed, S/P TAVR (2023), and chronic back pain who presented to the ED at [facility] on 2/20/25 with a two-day history of a rash on his left leg and pain in the left knee radiating towards his foot. He presented to the ED from SNF rehab where he was admitted for less than one week. after hospitalization at [facility] from 2/10/25 through 2/17/25 and treatment for acute anemia, DARLINE, hypotension, s/p 2 units PRBC and negative EGD/colonoscopy. He was found to have an infected TAVR and osteomyelitis of spine, and strep salivarius bacteremia. Plan at that time was for 6 weeks of IV antibiotics via PICC line. CTA on 2/21/25 showed clot to common femoral and distal embolism. He is S/P common femoral endarterectomy with clot retrieval and good blood flow to lower extremity post surgery. Repeat blood cultures were negative, but the excised clot was found to have gram positive cocci. Repeat MRI spine showed no abscess. Hospital stay c/b constipation and persistent back pain. CT and MRI L-spine showed degenerative disc disease and possible discitis/osteomyelitis at multiple levels. Patient received transfusion of 2 unit of PRBC's on 2/23/25. He was transferred to CTICU at [facility] on 2/24/25 for CT surgery evaluation. Cardiac catheterization showed non-obstructive CAD. Patient determined by CT surgery team to not be strong enough for CT surgery at this time, referred to rehab for optimization. Patient was evaluated by PM&R and therapy for functional deficits, gait/ADL impairments and acute rehabilitation was recommended. Patient was cleared for discharge to [facility] IRF on 3/6/2025. (06 Mar 2025 13:06)    TDD: 3/20/25 home  ___________________________________________________________________________    SUBJECTIVE/ROS  Patient was seen and evaluated at bedside today.  Reported no overnight events and is in no acute distress.  Reviewed weekend coverage with patient.  Patient expressed understanding and felt comfortable.  Case was discussed at Interdisciplinary Team meeting again yesterday.  A tentative discharge date is outlined above.  Patient is eager to continue participation on the recommended rehabilitation program.  Denies any CP, SOB, RICE, palpitations, fever, chills, body aches, cough, congestion, or any other symptoms at this time.   ___________________________________________________________________________    US DPLX LWR EXT VEINS LTD RT  PROCEDURE DATE:  03/11/2025  IMPRESSION: No evidence of right lower extremity deep venous thrombosis.  ___________________________________________________________________________    Vital Signs Last 24 Hrs  T(C): 36.6 (17 Mar 2025 05:41), Max: 36.6 (16 Mar 2025 22:10)  T(F): 97.8 (17 Mar 2025 05:41), Max: 97.8 (16 Mar 2025 22:10)  HR: 72 (17 Mar 2025 05:41) (69 - 72)  BP: 130/58 (17 Mar 2025 05:41) (103/55 - 130/58)  RR: 15 (17 Mar 2025 05:41) (15 - 15)  SpO2: 97% (17 Mar 2025 05:41) (96% - 97%)    ___________________________________________________________________________    LAB                        11.2   8.32  )-----------( 205      ( 13 Mar 2025 05:54 )             34.3                           11.1   9.05  )-----------( 180      ( 10 Mar 2025 05:37 )             33.4       03-13    132[L]  |  95[L]  |  18  ----------------------------<  101[H]  3.7   |  28  |  1.00    Ca    9.7      13 Mar 2025 05:54    TPro  7.6  /  Alb  2.8[L]  /  TBili  1.0  /  DBili  x   /  AST  28  /  ALT  23  /  AlkPhos  147[H]  03-13    LIVER FUNCTIONS - ( 13 Mar 2025 05:54 )  Alb: 2.8 g/dL / Pro: 7.6 g/dL / ALK PHOS: 147 U/L / ALT: 23 U/L / AST: 28 U/L / GGT: x             03-10    131[L]  |  96  |  25[H]  ----------------------------<  95  3.9   |  25  |  1.01    Ca    9.2      10 Mar 2025 05:37    TPro  7.0  /  Alb  2.5[L]  /  TBili  0.9  /  DBili  x   /  AST  26  /  ALT  17  /  AlkPhos  130[H]  03-10    LIVER FUNCTIONS - ( 10 Mar 2025 05:37 )  Alb: 2.5 g/dL / Pro: 7.0 g/dL / ALK PHOS: 130 U/L / ALT: 17 U/L / AST: 26 U/L / GGT: x          ESR Historical Values  Sedimentation Rate, Erythrocyte (03.10.25 @ 05:37)   Sedimentation Rate, Erythrocyte: 104 mm/hr  Sedimentation Rate, Erythrocyte (02.20.25 @ 18:10)   Sedimentation Rate, Erythrocyte: 135 mm/hr  Sedimentation Rate, Erythrocyte (02.12.25 @ 13:56)   Sedimentation Rate, Erythrocyte: 93 mm/hr     CRP Historical Values  POCT Blood Glucose (03.11.25 @ 20:21)   POCT Blood Glucose.: 160 mg/dL  POCT Blood Glucose (03.06.25 @ 21:00)   POCT Blood Glucose.: 161 mg/dL  POCT Blood Glucose (03.06.25 @ 17:33)   POCT Blood Glucose.: 108 mg/dL  POCT Blood Glucose (03.03.25 @ 07:59)   POCT Blood Glucose.: 97 mg/dL  ___________________________________________________________________________    MEDICATIONS  (STANDING):  acetaminophen     Tablet .. 650 milliGRAM(s) Oral every 8 hours  ammonium lactate 12% Lotion 1 Application(s) Topical two times a day  apixaban 5 milliGRAM(s) Oral two times a day  AQUAPHOR (petrolatum Ointment) 1 Application(s) Topical three times a day  atorvastatin 40 milliGRAM(s) Oral at bedtime  cefTRIAXone   IVPB 2000 milliGRAM(s) IV Intermittent every 24 hours  cefTRIAXone   IVPB      chlorhexidine 4% Liquid 1 Application(s) Topical <User Schedule>  ferrous    sulfate 325 milliGRAM(s) Oral daily  furosemide    Tablet 40 milliGRAM(s) Oral daily  levothyroxine 150 MICROGram(s) Oral daily  lidocaine   4% Patch 1 Patch Transdermal daily  melatonin 6 milliGRAM(s) Oral at bedtime  multivitamin 1 Tablet(s) Oral daily  pantoprazole    Tablet 40 milliGRAM(s) Oral before breakfast  polyethylene glycol 3350 17 Gram(s) Oral daily  senna 2 Tablet(s) Oral at bedtime  spironolactone 25 milliGRAM(s) Oral daily    MEDICATIONS  (PRN):  bisacodyl Suppository 10 milliGRAM(s) Rectal daily PRN Constipation  cyclobenzaprine 5 milliGRAM(s) Oral three times a day PRN Muscle Spasm  sodium chloride 0.9% lock flush 10 milliLiter(s) IV Push every 1 hour PRN Pre/post blood products, medications, blood draw, and to maintain line patency  traMADol 50 milliGRAM(s) Oral every 4 hours PRN Severe Pain (7 - 10)  zolpidem 5 milliGRAM(s) Oral at bedtime PRN Insomnia  zolpidem 5 milliGRAM(s) Oral at bedtime PRN Insomnia    ___________________________________________________________________________    PHYSICAL EXAM:    Gen - NAD, Comfortable  HEENT - NCAT, EOMI,  Normal Conjunctivae  Neck - Supple, No limited ROM  Pulm - Clear lungs  Cardiovascular - RRR, S1S2, No murmurs  Chest - good chest expansion, good respiratory effort  Abdomen - Soft, NT/ND, +BS  Extremities - No C/C/E, no calf tenderness +LE swelling with +calf tenderness and warmth. Palpable but diminished left DP pulse  Neuro-     Cognitive - awake, alert, and oriented.     Motor - No focal deficits.                     LEFT    UE - ShAB 5/5, EF 5/5, EE 5/5,  5/5                    RIGHT UE - ShAB 5/5, EF 3/5, EE 5/5,   5/5                    LEFT    LE - HF 2/5, KE 2/5, DF 3/5, PF 3/5                    RIGHT LE - HF 3/5, KE 3/5, DF 3/5, PF 4/5        Sensory - Intact to light touch; LLE diminished light touch sensation      Tone - normal  MSK: no joint deformity or swelling; full ROM  Psychiatric - Mood stable, Affect WNL  Skin: No rashes or lesions; warm and dry. Left groin 7.5cm incision with 13 staples present; no swelling/redness/drainage to site. Feet dry.  Wounds: Right buttock healed scab    ___________________________________________________________________________ HPI:  Mr. Win Menezes is a 74-year-old male patient with past medical history of HTN, diabetes mellitus with hyperglycemia, hypothyroidism, obesity, prostate cancer, atrial fibrillation, HLD, GI bleed, S/P TAVR (2023), and chronic back pain who presented to the ED at [facility] on 2/20/25 with a two-day history of a rash on his left leg and pain in the left knee radiating towards his foot. He presented to the ED from SNF rehab where he was admitted for less than one week. after hospitalization at [facility] from 2/10/25 through 2/17/25 and treatment for acute anemia, DARLINE, hypotension, s/p 2 units PRBC and negative EGD/colonoscopy. He was found to have an infected TAVR and osteomyelitis of spine, and strep salivarius bacteremia. Plan at that time was for 6 weeks of IV antibiotics via PICC line. CTA on 2/21/25 showed clot to common femoral and distal embolism. He is S/P common femoral endarterectomy with clot retrieval and good blood flow to lower extremity post surgery. Repeat blood cultures were negative, but the excised clot was found to have gram positive cocci. Repeat MRI spine showed no abscess. Hospital stay c/b constipation and persistent back pain. CT and MRI L-spine showed degenerative disc disease and possible discitis/osteomyelitis at multiple levels. Patient received transfusion of 2 unit of PRBC's on 2/23/25. He was transferred to CTICU at [facility] on 2/24/25 for CT surgery evaluation. Cardiac catheterization showed non-obstructive CAD. Patient determined by CT surgery team to not be strong enough for CT surgery at this time, referred to rehab for optimization. Patient was evaluated by PM&R and therapy for functional deficits, gait/ADL impairments and acute rehabilitation was recommended. Patient was cleared for discharge to [facility] IRF on 3/6/2025. (06 Mar 2025 13:06)    TDD: 3/20/25 home  ___________________________________________________________________________    SUBJECTIVE/ROS  Patient was seen and evaluated at bedside today.  Reported no overnight events and is in no acute distress.  Tentative discharge date as outlined above.  Discussed IV infusion post-discharge.  Patient is eager to continue participation on the recommended rehabilitation program.  Denies any CP, SOB, RICE, palpitations, fever, chills, body aches, cough, congestion, or any other symptoms at this time.   ___________________________________________________________________________    US DPLX LWR EXT VEINS LTD RT  PROCEDURE DATE:  03/11/2025  IMPRESSION: No evidence of right lower extremity deep venous thrombosis.  ___________________________________________________________________________    Vital Signs Last 24 Hrs  T(C): 36.6 (17 Mar 2025 05:41), Max: 36.6 (16 Mar 2025 22:10)  T(F): 97.8 (17 Mar 2025 05:41), Max: 97.8 (16 Mar 2025 22:10)  HR: 72 (17 Mar 2025 05:41) (69 - 72)  BP: 130/58 (17 Mar 2025 05:41) (103/55 - 130/58)  RR: 15 (17 Mar 2025 05:41) (15 - 15)  SpO2: 97% (17 Mar 2025 05:41) (96% - 97%)    ___________________________________________________________________________    LAB                        10.6   6.54  )-----------( 221      ( 17 Mar 2025 06:38 )             32.9     03-17    133[L]  |  95[L]  |  29[H]  ----------------------------<  108[H]  4.2   |  30  |  1.26    Ca    9.6      17 Mar 2025 06:38    TPro  7.5  /  Alb  3.0[L]  /  TBili  1.0  /  DBili  x   /  AST  25  /  ALT  24  /  AlkPhos  142[H]  03-17    LIVER FUNCTIONS - ( 17 Mar 2025 06:38 )  Alb: 3.0 g/dL / Pro: 7.5 g/dL / ALK PHOS: 142 U/L / ALT: 24 U/L / AST: 25 U/L / GGT: x           ESR Historical Values  Sedimentation Rate, Erythrocyte (03.10.25 @ 05:37)   Sedimentation Rate, Erythrocyte: 104 mm/hr  Sedimentation Rate, Erythrocyte (02.20.25 @ 18:10)   Sedimentation Rate, Erythrocyte: 135 mm/hr  Sedimentation Rate, Erythrocyte (02.12.25 @ 13:56)   Sedimentation Rate, Erythrocyte: 93 mm/hr     CRP Historical Values  POCT Blood Glucose (03.11.25 @ 20:21)   POCT Blood Glucose.: 160 mg/dL  POCT Blood Glucose (03.06.25 @ 21:00)   POCT Blood Glucose.: 161 mg/dL  POCT Blood Glucose (03.06.25 @ 17:33)   POCT Blood Glucose.: 108 mg/dL  POCT Blood Glucose (03.03.25 @ 07:59)   POCT Blood Glucose.: 97 mg/dL  ___________________________________________________________________________    MEDICATIONS  (STANDING):  acetaminophen     Tablet .. 650 milliGRAM(s) Oral every 8 hours  ammonium lactate 12% Lotion 1 Application(s) Topical two times a day  apixaban 5 milliGRAM(s) Oral two times a day  AQUAPHOR (petrolatum Ointment) 1 Application(s) Topical three times a day  atorvastatin 40 milliGRAM(s) Oral at bedtime  cefTRIAXone   IVPB 2000 milliGRAM(s) IV Intermittent every 24 hours  cefTRIAXone   IVPB      chlorhexidine 4% Liquid 1 Application(s) Topical <User Schedule>  ferrous    sulfate 325 milliGRAM(s) Oral daily  furosemide    Tablet 40 milliGRAM(s) Oral daily  levothyroxine 150 MICROGram(s) Oral daily  lidocaine   4% Patch 1 Patch Transdermal daily  melatonin 6 milliGRAM(s) Oral at bedtime  multivitamin 1 Tablet(s) Oral daily  pantoprazole    Tablet 40 milliGRAM(s) Oral before breakfast  polyethylene glycol 3350 17 Gram(s) Oral daily  senna 2 Tablet(s) Oral at bedtime  spironolactone 25 milliGRAM(s) Oral daily    MEDICATIONS  (PRN):  bisacodyl Suppository 10 milliGRAM(s) Rectal daily PRN Constipation  cyclobenzaprine 5 milliGRAM(s) Oral three times a day PRN Muscle Spasm  sodium chloride 0.9% lock flush 10 milliLiter(s) IV Push every 1 hour PRN Pre/post blood products, medications, blood draw, and to maintain line patency  traMADol 50 milliGRAM(s) Oral every 4 hours PRN Severe Pain (7 - 10)  zolpidem 5 milliGRAM(s) Oral at bedtime PRN Insomnia  zolpidem 5 milliGRAM(s) Oral at bedtime PRN Insomnia    ___________________________________________________________________________    PHYSICAL EXAM:    Gen - NAD, Comfortable  HEENT - NCAT, EOMI,  Normal Conjunctivae  Neck - Supple, No limited ROM  Pulm - Clear lungs  Cardiovascular - RRR, S1S2, No murmurs  Chest - good chest expansion, good respiratory effort  Abdomen - Soft, NT/ND, +BS  Extremities - No C/C/E, no calf tenderness +LE swelling with +calf tenderness and warmth. Palpable but diminished left DP pulse  Neuro-     Cognitive - awake, alert, and oriented.     Motor - No focal deficits.                     LEFT    UE - ShAB 5/5, EF 5/5, EE 5/5,  5/5                    RIGHT UE - ShAB 5/5, EF 3/5, EE 5/5,   5/5                    LEFT    LE - HF 2/5, KE 2/5, DF 3/5, PF 3/5                    RIGHT LE - HF 3/5, KE 3/5, DF 3/5, PF 4/5        Sensory - Intact to light touch; LLE diminished light touch sensation      Tone - normal  MSK: no joint deformity or swelling; full ROM  Psychiatric - Mood stable, Affect WNL  Skin: No rashes or lesions; warm and dry. Left groin 7.5cm incision with 13 staples present; no swelling/redness/drainage to site. Feet dry.  Wounds: Right buttock healed scab    ___________________________________________________________________________

## 2025-03-17 NOTE — CHART NOTE - NSCHARTNOTEFT_GEN_A_CORE
Please note, I was present and rounded on this patient throughout the hospitalization and upon further review of the EMR the following diagnosis exists:     Bacteremia: sepsis ruled out.

## 2025-03-18 PROCEDURE — 99233 SBSQ HOSP IP/OBS HIGH 50: CPT

## 2025-03-18 PROCEDURE — 99232 SBSQ HOSP IP/OBS MODERATE 35: CPT

## 2025-03-18 RX ORDER — ACETAMINOPHEN 500 MG/5ML
975 LIQUID (ML) ORAL EVERY 8 HOURS
Refills: 0 | Status: DISCONTINUED | OUTPATIENT
Start: 2025-03-18 | End: 2025-03-20

## 2025-03-18 RX ORDER — CEFTRIAXONE 500 MG/1
2000 INJECTION, POWDER, FOR SOLUTION INTRAMUSCULAR; INTRAVENOUS EVERY 24 HOURS
Refills: 0 | Status: DISCONTINUED | OUTPATIENT
Start: 2025-03-18 | End: 2025-03-20

## 2025-03-18 RX ADMIN — Medication 650 MILLIGRAM(S): at 07:13

## 2025-03-18 RX ADMIN — FUROSEMIDE 40 MILLIGRAM(S): 10 INJECTION INTRAMUSCULAR; INTRAVENOUS at 06:40

## 2025-03-18 RX ADMIN — TRAMADOL HYDROCHLORIDE 50 MILLIGRAM(S): 50 TABLET, FILM COATED ORAL at 08:50

## 2025-03-18 RX ADMIN — Medication 2 TABLET(S): at 21:55

## 2025-03-18 RX ADMIN — Medication 1 TABLET(S): at 11:17

## 2025-03-18 RX ADMIN — Medication 5 MILLIGRAM(S): at 22:46

## 2025-03-18 RX ADMIN — Medication 1 APPLICATION(S): at 06:43

## 2025-03-18 RX ADMIN — Medication 25 MILLIGRAM(S): at 06:40

## 2025-03-18 RX ADMIN — LIDOCAINE HYDROCHLORIDE 1 PATCH: 20 JELLY TOPICAL at 19:35

## 2025-03-18 RX ADMIN — TRAMADOL HYDROCHLORIDE 50 MILLIGRAM(S): 50 TABLET, FILM COATED ORAL at 17:20

## 2025-03-18 RX ADMIN — APIXABAN 5 MILLIGRAM(S): 2.5 TABLET, FILM COATED ORAL at 06:40

## 2025-03-18 RX ADMIN — Medication 1 APPLICATION(S): at 06:44

## 2025-03-18 RX ADMIN — Medication 975 MILLIGRAM(S): at 22:40

## 2025-03-18 RX ADMIN — TRAMADOL HYDROCHLORIDE 50 MILLIGRAM(S): 50 TABLET, FILM COATED ORAL at 03:00

## 2025-03-18 RX ADMIN — LIDOCAINE HYDROCHLORIDE 1 PATCH: 20 JELLY TOPICAL at 11:16

## 2025-03-18 RX ADMIN — TRAMADOL HYDROCHLORIDE 50 MILLIGRAM(S): 50 TABLET, FILM COATED ORAL at 16:29

## 2025-03-18 RX ADMIN — WHITE PETROLATUM 1 APPLICATION(S): 1 OINTMENT TOPICAL at 06:43

## 2025-03-18 RX ADMIN — Medication 325 MILLIGRAM(S): at 11:17

## 2025-03-18 RX ADMIN — Medication 975 MILLIGRAM(S): at 21:55

## 2025-03-18 RX ADMIN — TRAMADOL HYDROCHLORIDE 50 MILLIGRAM(S): 50 TABLET, FILM COATED ORAL at 04:47

## 2025-03-18 RX ADMIN — Medication 650 MILLIGRAM(S): at 06:39

## 2025-03-18 RX ADMIN — Medication 1 APPLICATION(S): at 21:56

## 2025-03-18 RX ADMIN — CYCLOBENZAPRINE HYDROCHLORIDE 5 MILLIGRAM(S): 15 CAPSULE, EXTENDED RELEASE ORAL at 21:56

## 2025-03-18 RX ADMIN — Medication 150 MICROGRAM(S): at 06:40

## 2025-03-18 RX ADMIN — LIDOCAINE HYDROCHLORIDE 1 PATCH: 20 JELLY TOPICAL at 23:15

## 2025-03-18 RX ADMIN — Medication 40 MILLIGRAM(S): at 06:40

## 2025-03-18 RX ADMIN — ATORVASTATIN CALCIUM 40 MILLIGRAM(S): 80 TABLET, FILM COATED ORAL at 21:55

## 2025-03-18 RX ADMIN — Medication 975 MILLIGRAM(S): at 17:30

## 2025-03-18 RX ADMIN — Medication 6 MILLIGRAM(S): at 21:55

## 2025-03-18 RX ADMIN — APIXABAN 5 MILLIGRAM(S): 2.5 TABLET, FILM COATED ORAL at 17:56

## 2025-03-18 RX ADMIN — WHITE PETROLATUM 1 APPLICATION(S): 1 OINTMENT TOPICAL at 21:56

## 2025-03-18 RX ADMIN — TRAMADOL HYDROCHLORIDE 50 MILLIGRAM(S): 50 TABLET, FILM COATED ORAL at 07:52

## 2025-03-18 RX ADMIN — TRAMADOL HYDROCHLORIDE 50 MILLIGRAM(S): 50 TABLET, FILM COATED ORAL at 22:46

## 2025-03-18 RX ADMIN — TRAMADOL HYDROCHLORIDE 50 MILLIGRAM(S): 50 TABLET, FILM COATED ORAL at 23:35

## 2025-03-18 RX ADMIN — CEFTRIAXONE 100 MILLIGRAM(S): 500 INJECTION, POWDER, FOR SOLUTION INTRAMUSCULAR; INTRAVENOUS at 18:10

## 2025-03-18 RX ADMIN — Medication 975 MILLIGRAM(S): at 16:29

## 2025-03-18 NOTE — PROGRESS NOTE ADULT - ASSESSMENT
74-year-old male patient with past medical history of HTN, diabetes mellitus with hyperglycemia, hypothyroidism, obesity, prostate cancer, atrial fibrillation, HLD, GI bleed, S/P TAVR (2023), and chronic back pain who is admitted for Acute Inpatient Rehabilitation with a multidisciplinary rehab program at [facility] with functional impairments in ADLs and mobility secondary to spinal osteomyelitis/discitis secondary to infected TAVR and aortic valve.    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   - Ceftriaxone 2 g once daily via PICC through 4/7/25  - Monitor CBC, CMP, and Weekly ESR and CRP  - CXR weekly  - Ortho f/up after rehab d/c    #Severe aortic stenosis with chronic diastolic CHF and first degree AV block  - S/P TAVR (2023)  - ROVERTO (2/26): echogenic sessile not very mobile subcentimeter ( 6x2 mm) structure attached to the base of the leaflet corresponding to the native non-coronary cusp   - S/P Cardiac catheterization    #Arterial embolism of left leg  # R LE edema  - S/P LCFA/LSFA thrombectomy/endarterectomy on 2/22  - Left groin staples should remain in place until 3/13  - Eliquis 5mg BID  - Lasix/Spironolactone   - R LE doppler neg    #Chronic atrial fibrillation  - Eliquis  - Rate controlled without any av rosamaria agents    # Mild Hyponatremia  - stable around 132-134  - continue to monitor closely  - if trending [id number], will work up    #Type 2 Diabetes Mellitus  - Ha1c 5.7  - Blood glucose levels are within normal limits   - On metformin and Farxiga at home. Not on any meds while here     #HTN  - Spironolactone 25mg daily  - Lasix 40mg daily    #HLD  - Atorvastatin     #Hypothyroid  - levothyroxine    #Anemia  - Ferrous sulfate 325mg daily  - Trend H/H   - Transfuse if HgB <7      #DVT ppx  - on Eliquis    #GI ppx  - PPI

## 2025-03-18 NOTE — PROGRESS NOTE ADULT - SUBJECTIVE AND OBJECTIVE BOX
HPI:  Mr. Win Menezes is a 74-year-old male patient with past medical history of HTN, diabetes mellitus with hyperglycemia, hypothyroidism, obesity, prostate cancer, atrial fibrillation, HLD, GI bleed, S/P TAVR (2023), and chronic back pain who presented to the ED at [facility] on 2/20/25 with a two-day history of a rash on his left leg and pain in the left knee radiating towards his foot. He presented to the ED from SNF rehab where he was admitted for less than one week. after hospitalization at [facility] from 2/10/25 through 2/17/25 and treatment for acute anemia, DARLINE, hypotension, s/p 2 units PRBC and negative EGD/colonoscopy. He was found to have an infected TAVR and osteomyelitis of spine, and strep salivarius bacteremia. Plan at that time was for 6 weeks of IV antibiotics via PICC line. CTA on 2/21/25 showed clot to common femoral and distal embolism. He is S/P common femoral endarterectomy with clot retrieval and good blood flow to lower extremity post surgery. Repeat blood cultures were negative, but the excised clot was found to have gram positive cocci. Repeat MRI spine showed no abscess. Hospital stay c/b constipation and persistent back pain. CT and MRI L-spine showed degenerative disc disease and possible discitis/osteomyelitis at multiple levels. Patient received transfusion of 2 unit of PRBC's on 2/23/25. He was transferred to CTICU at [facility] on 2/24/25 for CT surgery evaluation. Cardiac catheterization showed non-obstructive CAD. Patient determined by CT surgery team to not be strong enough for CT surgery at this time, referred to rehab for optimization. Patient was evaluated by PM&R and therapy for functional deficits, gait/ADL impairments and acute rehabilitation was recommended. Patient was cleared for discharge to [facility] IRF on 3/6/2025. (06 Mar 2025 13:06)    TDD: 3/20/25 home  ___________________________________________________________________________    SUBJECTIVE/ROS  Patient was seen and evaluated at bedside today.  Reported no overnight events and is in no acute distress.  Still having some back pain- will increase scheduled tylenol.  Patient is eager to continue participation on the recommended rehabilitation program.  Denies any CP, SOB, RICE, palpitations, fever, chills, body aches, cough, congestion, or any other symptoms at this time.   ___________________________________________________________________________    US DPLX LWR EXT VEINS LTD RT  PROCEDURE DATE:  03/11/2025  IMPRESSION: No evidence of right lower extremity deep venous thrombosis.  ___________________________________________________________________________    Vital Signs Last 24 Hrs  T(C): 36.8 (03-18-25 @ 08:34), Max: 36.9 (03-17-25 @ 21:23)  T(F): 98.3 (03-18-25 @ 08:34), Max: 98.4 (03-17-25 @ 21:23)  HR: 75 (03-18-25 @ 08:34) (75 - 81)  BP: 123/60 (03-18-25 @ 08:34) (123/60 - 129/63)  ABP: --  ABP(mean): --  RR: 16 (03-18-25 @ 08:34) (16 - 16)  SpO2: 95% (03-18-25 @ 08:34) (95% - 96%)      ___________________________________________________________________________    LAB                        10.6   6.54  )-----------( 221      ( 17 Mar 2025 06:38 )             32.9     03-17    133[L]  |  95[L]  |  29[H]  ----------------------------<  108[H]  4.2   |  30  |  1.26    Ca    9.6      17 Mar 2025 06:38    TPro  7.5  /  Alb  3.0[L]  /  TBili  1.0  /  DBili  x   /  AST  25  /  ALT  24  /  AlkPhos  142[H]  03-17    LIVER FUNCTIONS - ( 17 Mar 2025 06:38 )  Alb: 3.0 g/dL / Pro: 7.5 g/dL / ALK PHOS: 142 U/L / ALT: 24 U/L / AST: 25 U/L / GGT: x                 Urinalysis Basic - ( 17 Mar 2025 06:38 )    Color: x / Appearance: x / SG: x / pH: x  Gluc: 108 mg/dL / Ketone: x  / Bili: x / Urobili: x   Blood: x / Protein: x / Nitrite: x   Leuk Esterase: x / RBC: x / WBC x   Sq Epi: x / Non Sq Epi: x / Bacteria: x          ___________________________________________________________________________  MEDICATIONS  (STANDING):  acetaminophen     Tablet .. 975 milliGRAM(s) Oral every 8 hours  ammonium lactate 12% Lotion 1 Application(s) Topical two times a day  apixaban 5 milliGRAM(s) Oral two times a day  AQUAPHOR (petrolatum Ointment) 1 Application(s) Topical three times a day  atorvastatin 40 milliGRAM(s) Oral at bedtime  cefTRIAXone   IVPB 2000 milliGRAM(s) IV Intermittent every 24 hours  cefTRIAXone   IVPB      chlorhexidine 4% Liquid 1 Application(s) Topical <User Schedule>  ferrous    sulfate 325 milliGRAM(s) Oral daily  furosemide    Tablet 40 milliGRAM(s) Oral daily  levothyroxine 150 MICROGram(s) Oral daily  lidocaine   4% Patch 1 Patch Transdermal daily  melatonin 6 milliGRAM(s) Oral at bedtime  multivitamin 1 Tablet(s) Oral daily  pantoprazole    Tablet 40 milliGRAM(s) Oral before breakfast  polyethylene glycol 3350 17 Gram(s) Oral daily  senna 2 Tablet(s) Oral at bedtime  spironolactone 25 milliGRAM(s) Oral daily    MEDICATIONS  (PRN):  bisacodyl Suppository 10 milliGRAM(s) Rectal daily PRN Constipation  cyclobenzaprine 5 milliGRAM(s) Oral three times a day PRN Muscle Spasm  sodium chloride 0.9% lock flush 10 milliLiter(s) IV Push every 1 hour PRN Pre/post blood products, medications, blood draw, and to maintain line patency  traMADol 50 milliGRAM(s) Oral every 4 hours PRN Severe Pain (7 - 10)  zolpidem 5 milliGRAM(s) Oral at bedtime PRN Insomnia      ___________________________________________________________________________    PHYSICAL EXAM:    Gen - NAD, Comfortable  HEENT - NCAT, Normal Conjunctivae  Neck - Supple  Pulm - Clear lungs  Cardiovascular - RRR, S1S2, + systolic murmur  Chest - good chest expansion, good respiratory effort  Abdomen - Soft, NT/ND, +BS  Extremities - No C/C/E, no calf tenderness +LE swelling with +calf tenderness and warmth. Palpable but diminished left DP pulse  Neuro-     Cognitive - awake, alert, and oriented.  Psychiatric - Mood stable, Affect WNL    ___________________________________________________________________________ HPI:  Mr. Win Menezes is a 74-year-old male patient with past medical history of HTN, diabetes mellitus with hyperglycemia, hypothyroidism, obesity, prostate cancer, atrial fibrillation, HLD, GI bleed, S/P TAVR (2023), and chronic back pain who presented to the ED at [facility] on 2/20/25 with a two-day history of a rash on his left leg and pain in the left knee radiating towards his foot. He presented to the ED from SNF rehab where he was admitted for less than one week. after hospitalization at [facility] from 2/10/25 through 2/17/25 and treatment for acute anemia, DARLINE, hypotension, s/p 2 units PRBC and negative EGD/colonoscopy. He was found to have an infected TAVR and osteomyelitis of spine, and strep salivarius bacteremia. Plan at that time was for 6 weeks of IV antibiotics via PICC line. CTA on 2/21/25 showed clot to common femoral and distal embolism. He is S/P common femoral endarterectomy with clot retrieval and good blood flow to lower extremity post surgery. Repeat blood cultures were negative, but the excised clot was found to have gram positive cocci. Repeat MRI spine showed no abscess. Hospital stay c/b constipation and persistent back pain. CT and MRI L-spine showed degenerative disc disease and possible discitis/osteomyelitis at multiple levels. Patient received transfusion of 2 unit of PRBC's on 2/23/25. He was transferred to CTICU at [facility] on 2/24/25 for CT surgery evaluation. Cardiac catheterization showed non-obstructive CAD. Patient determined by CT surgery team to not be strong enough for CT surgery at this time, referred to rehab for optimization. Patient was evaluated by PM&R and therapy for functional deficits, gait/ADL impairments and acute rehabilitation was recommended. Patient was cleared for discharge to [facility] IRF on 3/6/2025. (06 Mar 2025 13:06)    TDD: 3/20/25 home  ___________________________________________________________________________    SUBJECTIVE/ROS  Patient was seen and evaluated at bedside today.  Reported no overnight events and is in no acute distress.  Still having some back pain- will increase scheduled Tylenol  Patient is eager to continue participation on the recommended rehabilitation program.  Denies any CP, SOB, RICE, palpitations, fever, chills, body aches, cough, congestion, or any other symptoms at this time.   ___________________________________________________________________________    US DPLX LWR EXT VEINS LTD RT  PROCEDURE DATE:  03/11/2025  IMPRESSION: No evidence of right lower extremity deep venous thrombosis.  ___________________________________________________________________________    Vital Signs Last 24 Hrs  T(C): 36.8 (03-18-25 @ 08:34), Max: 36.9 (03-17-25 @ 21:23)  T(F): 98.3 (03-18-25 @ 08:34), Max: 98.4 (03-17-25 @ 21:23)  HR: 75 (03-18-25 @ 08:34) (75 - 81)  BP: 123/60 (03-18-25 @ 08:34) (123/60 - 129/63)  RR: 16 (03-18-25 @ 08:34) (16 - 16)  SpO2: 95% (03-18-25 @ 08:34) (95% - 96%)    ___________________________________________________________________________    LAB                        10.6   6.54  )-----------( 221      ( 17 Mar 2025 06:38 )             32.9     03-17    133[L]  |  95[L]  |  29[H]  ----------------------------<  108[H]  4.2   |  30  |  1.26    Ca    9.6      17 Mar 2025 06:38    TPro  7.5  /  Alb  3.0[L]  /  TBili  1.0  /  DBili  x   /  AST  25  /  ALT  24  /  AlkPhos  142[H]  03-17    LIVER FUNCTIONS - ( 17 Mar 2025 06:38 )  Alb: 3.0 g/dL / Pro: 7.5 g/dL / ALK PHOS: 142 U/L / ALT: 24 U/L / AST: 25 U/L / GGT: x                 Urinalysis Basic - ( 17 Mar 2025 06:38 )    Color: x / Appearance: x / SG: x / pH: x  Gluc: 108 mg/dL / Ketone: x  / Bili: x / Urobili: x   Blood: x / Protein: x / Nitrite: x   Leuk Esterase: x / RBC: x / WBC x   Sq Epi: x / Non Sq Epi: x / Bacteria: x          ___________________________________________________________________________  MEDICATIONS  (STANDING):  acetaminophen     Tablet .. 975 milliGRAM(s) Oral every 8 hours  ammonium lactate 12% Lotion 1 Application(s) Topical two times a day  apixaban 5 milliGRAM(s) Oral two times a day  AQUAPHOR (petrolatum Ointment) 1 Application(s) Topical three times a day  atorvastatin 40 milliGRAM(s) Oral at bedtime  cefTRIAXone   IVPB 2000 milliGRAM(s) IV Intermittent every 24 hours  cefTRIAXone   IVPB      chlorhexidine 4% Liquid 1 Application(s) Topical <User Schedule>  ferrous    sulfate 325 milliGRAM(s) Oral daily  furosemide    Tablet 40 milliGRAM(s) Oral daily  levothyroxine 150 MICROGram(s) Oral daily  lidocaine   4% Patch 1 Patch Transdermal daily  melatonin 6 milliGRAM(s) Oral at bedtime  multivitamin 1 Tablet(s) Oral daily  pantoprazole    Tablet 40 milliGRAM(s) Oral before breakfast  polyethylene glycol 3350 17 Gram(s) Oral daily  senna 2 Tablet(s) Oral at bedtime  spironolactone 25 milliGRAM(s) Oral daily    MEDICATIONS  (PRN):  bisacodyl Suppository 10 milliGRAM(s) Rectal daily PRN Constipation  cyclobenzaprine 5 milliGRAM(s) Oral three times a day PRN Muscle Spasm  sodium chloride 0.9% lock flush 10 milliLiter(s) IV Push every 1 hour PRN Pre/post blood products, medications, blood draw, and to maintain line patency  traMADol 50 milliGRAM(s) Oral every 4 hours PRN Severe Pain (7 - 10)  zolpidem 5 milliGRAM(s) Oral at bedtime PRN Insomnia      ___________________________________________________________________________    PHYSICAL EXAM:    Gen - NAD, Comfortable  HEENT - NCAT, Normal Conjunctivae  Neck - Supple  Pulm - Clear lungs  Cardiovascular - RRR, S1S2, + systolic murmur  Chest - good chest expansion, good respiratory effort  Abdomen - Soft, NT/ND, +BS  Extremities - No C/C/E, no calf tenderness +LE swelling with +calf tenderness and warmth. Palpable but diminished left DP pulse  Neuro-     Cognitive - awake, alert, and oriented.  Psychiatric - Mood stable, Affect WNL    ___________________________________________________________________________

## 2025-03-18 NOTE — PROGRESS NOTE ADULT - ASSESSMENT
Assessment/Plan:  Mr. Win Menezes is a 74-year-old male patient with past medical history of HTN, diabetes mellitus with hyperglycemia, hypothyroidism, obesity, prostate cancer, atrial fibrillation, HLD, GI bleed, S/P TAVR (2023), and chronic back pain who is admitted for Acute Inpatient Rehabilitation with a multidisciplinary rehab program at [facility] with functional impairments in ADLs and mobility secondary to spinal osteomyelitis/discitis secondary to infected TAVR and aortic valve.      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              - WBC 10.09 on 3/6             - ESR//206 on 2/20      * CXR weekly      * Daily weights      * LSO brace when OOB  Comprehensive Multidisciplinary Rehab Program:  - Start comprehensive rehab program, 3 hours a day, 5 days a week.  - PT 2hr/day: Focused on improving strength, endurance, coordination, balance, functional mobility, and transfers  - OT 1hr/day: Focused on improving strength, fine motor skills, coordination, posture and ADLs.    - Activity Limitations: Decreased social, vocational and leisure activities, decreased self care and ADLs, decreased mobility, decreased ability to manage household and finances.     -----------------------------------------------------------------------------------  Concurrent Medical Problems    #LLE edema/warmth/calf pain (on arrival 3/6)  - R/O DVT with bedside doppler 3/6    #Severe aortic stenosis with chronic diastolic CHF and first degree AV block  - S/P TAVR (2023)  - ORVERTO (2/26):       * echogenic sessile not very mobile subcentimeter ( 6x2 mm) structure attached to the base of the leaflet corresponding to the native non-coronary cusp       * likely representing a calcified nodule vs. less likely an old small calcified vegetation, mod AS  - S/P Cardiac catheterization  - Monitor right wrist and right groin for bleeding  - No heavy lifting or strenuous activity until 3/10    #Multilevel osteomyelitis   - Ceftriaxone through 4/7/25 as above  - LSO brace when OOB  - Ortho f/up after rehab d/c    #Arterial embolism of left leg  - S/P LCFA/LSFA thrombectomy/endarterectomy on 2/22  - Left groin staples should remain in place until 3/13  - Eliquis 5mg BID -- cleared to restart by cardiology on 3/6.  - DVT ppx: TEDs    #Bacteremia  - Strep salivarius bacteremia> cultures now clear.   - Ceftriaxone through 4/7/25 as above    #Chronic atrial fibrillation  - Continue Eliquis as above    #Type 2 Diabetes Mellitus with hyperglycemia  - Consistent carb diet.   - On metformin and Farxiga at home  - AM glucose 113-97; well controlled.  - Monitor glucose on AM labs.    #HTN  - Spironolactone 25mg daily  - Lasix 40mg daily    #HLD  - Atorvastatin 40mg daily at HS    #Hypothyroid  - levothyroxine 150mcg daily  - TSH 0.73 on 2/24/25    #Anemia  - Ferrous sulfate 325mg daily  - Trend H/H (10.9/32/9 on 3/6)    #Mood/Cognition:  - Neuropsychology consult PRN    #Sleep:   - Maintain quiet hours and low stim environment.  - Melatonin 6mg PRN to maximize participation in therapy during the day.     #Pain Management:  - Analgesic: Tylenol 975mg every 8 hours  - Tramadol 50mg every 4 hours PRN severe pain (7-10)  - Antispasmodic: Cyclobenzaprine 5mg TID PRN muscle spasm    #GI/Bowel:  - Senna 2 tabs QHS   - Miralax 17g Daily  - GI ppx: Pantoprazole 40mg daily before a meal  - bisacodyl 10mg suppository rectally once a day PRN constipation    #/Bladder:   - PVR on admission  - monitor urine output    #Skin/Pressure Injury assessment:   - Skin assessment on admission:       * Left groin 7.5cm incision with 13 staples present; no swelling/redness/drainage to site      * Right buttock healed scab    #Diet/Dysphagia:  - Current Diet: Dash diet, consistent carb diabetic diet  - Nutrition consult     #Patient Education  - Education provided on the following:      * Admitting diagnosis and functional implications      * Functional goals      * Bladder management      * Bowel management      * Skin care management      * Intensity of service and scheduling of rehab disciplines      * Plan of care and role of interdisciplinary team conference in discharge planning      * Reconciliation of medications from prior institution    #Precautions / PROPHYLAXIS:   - Falls, Cardiac, Spinal  - ortho: Weight bearing status: FWB  - Lungs: Aspiration, Incentive Spirometer   - Pressure injury/Skin: OOB to Chair, PT/OT      ---------------  Code Status: DNR/DNI; all other medical interventions allowed (see MOLST)  Emergency Contact:    Outpatient Follow-up (Specialty/Name of physician):    Dr. Fabio Marshall MD  Infectious Disease  [address] [place] [address]  Phone: [phone]    Abdulaziz Jernigan  [facility]  [facility] [address] [facility]  Scheduled Appointment: 03/17/2025  For kidney tests and CBC    Benito Jones  [facility]  [address]  Scheduled Appointment: 04/07/2025    Mahamed Heath  Cardiovascular Disease  [address] [facility]  [place] [id number]  Phone: [phone]  Fax: [phone]  Follow Up Time: 1-2 weeks for ROVERTO and cardiac workup    Lizbeth Redd  Nephrology  [address] [facility], [address]  [place] [address]  Phone: [phone]  Fax: [phone]  Follow Up Time: within 4-6 weeks    Gualberto Porter  Gastroenterology  [address] [facility], [address]  [place] [address]  Phone: [phone]  Fax: [phone]  Follow Up Time:   For pill camera test    Berny Lizarraga [place]  Neurosurgery  [address] [facility]  [place] [address]  Phone: [phone]  Fax: [phone]  Follow Up Time: 4 weeks  For MRI    Dr. Rodriges   Cardiothoracic surgery  Follow appointment after discharge  [phone]       --------------

## 2025-03-18 NOTE — PROGRESS NOTE ADULT - SUBJECTIVE AND OBJECTIVE BOX
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       REVIEW OF SYSTEMS: remaining ROS negative     PHYSICAL EXAM:  VITALS:  Vital Signs Last 24 Hrs  T(C): 36.8 (18 Mar 2025 08:34), Max: 36.9 (17 Mar 2025 21:23)  T(F): 98.3 (18 Mar 2025 08:34), Max: 98.4 (17 Mar 2025 21:23)  HR: 75 (18 Mar 2025 08:34) (75 - 81)  BP: 123/60 (18 Mar 2025 08:34) (123/60 - 129/63)  BP(mean): --  RR: 16 (18 Mar 2025 08:34) (16 - 16)  SpO2: 95% (18 Mar 2025 08:34) (95% - 96%)    Parameters below as of 18 Mar 2025 08:34  Patient On (Oxygen Delivery Method): room air          GENERAL: NAD,  no increased WOB  HEAD:  Atraumatic, Normocephalic  EYES: EOMI, conjunctiva and sclera clear  ENMT: Moist mucous membranes  NECK: Supple, No JVD  NERVOUS SYSTEM:  Alert & Oriented    CHEST/LUNG: Clear to auscultation bilaterally; No rales, rhonchi, wheezing  HEART: Regular rate and rhythm  ABDOMEN: Soft, Nontender, Nondistended; Bowel sounds present  EXTREMITIES:  No clubbing, cyanosis, calf tenderness. trace edema      MEDICATIONS  (STANDING):  acetaminophen     Tablet .. 650 milliGRAM(s) Oral every 8 hours  ammonium lactate 12% Lotion 1 Application(s) Topical two times a day  apixaban 5 milliGRAM(s) Oral two times a day  AQUAPHOR (petrolatum Ointment) 1 Application(s) Topical three times a day  atorvastatin 40 milliGRAM(s) Oral at bedtime  cefTRIAXone   IVPB 2000 milliGRAM(s) IV Intermittent every 24 hours  cefTRIAXone   IVPB      chlorhexidine 4% Liquid 1 Application(s) Topical <User Schedule>  ferrous    sulfate 325 milliGRAM(s) Oral daily  furosemide    Tablet 40 milliGRAM(s) Oral daily  levothyroxine 150 MICROGram(s) Oral daily  lidocaine   4% Patch 1 Patch Transdermal daily  melatonin 6 milliGRAM(s) Oral at bedtime  multivitamin 1 Tablet(s) Oral daily  pantoprazole    Tablet 40 milliGRAM(s) Oral before breakfast  polyethylene glycol 3350 17 Gram(s) Oral daily  senna 2 Tablet(s) Oral at bedtime  spironolactone 25 milliGRAM(s) Oral daily    MEDICATIONS  (PRN):  bisacodyl Suppository 10 milliGRAM(s) Rectal daily PRN Constipation  cyclobenzaprine 5 milliGRAM(s) Oral three times a day PRN Muscle Spasm  sodium chloride 0.9% lock flush 10 milliLiter(s) IV Push every 1 hour PRN Pre/post blood products, medications, blood draw, and to maintain line patency  traMADol 50 milliGRAM(s) Oral every 4 hours PRN Severe Pain (7 - 10)  zolpidem 5 milliGRAM(s) Oral at bedtime PRN Insomnia          Allergies    No Known Allergies    Intolerances              LABS:                           10.6   6.54  )-----------( 221      ( 17 Mar 2025 06:38 )             32.9     03-17    133[L]  |  95[L]  |  29[H]  ----------------------------<  108[H]  4.2   |  30  |  1.26    Ca    9.6      17 Mar 2025 06:38    TPro  7.5  /  Alb  3.0[L]  /  TBili  1.0  /  DBili  x   /  AST  25  /  ALT  24  /  AlkPhos  142[H]  03-17    LIVER FUNCTIONS - ( 17 Mar 2025 06:38 )  Alb: 3.0 g/dL / Pro: 7.5 g/dL / ALK PHOS: 142 U/L / ALT: 24 U/L / AST: 25 U/L / GGT: x             Urinalysis Basic - ( 17 Mar 2025 06:38 )    Color: x / Appearance: x / SG: x / pH: x  Gluc: 108 mg/dL / Ketone: x  / Bili: x / Urobili: x   Blood: x / Protein: x / Nitrite: x   Leuk Esterase: x / RBC: x / WBC x   Sq Epi: x / Non Sq Epi: x / Bacteria: x                CAPILLARY BLOOD GLUCOSE                    RECENT CULTURES:          RADIOLOGY & ADDITIONAL TESTS:

## 2025-03-19 ENCOUNTER — TRANSCRIPTION ENCOUNTER (OUTPATIENT)
Age: 75
End: 2025-03-19

## 2025-03-19 PROCEDURE — 99232 SBSQ HOSP IP/OBS MODERATE 35: CPT

## 2025-03-19 PROCEDURE — 99233 SBSQ HOSP IP/OBS HIGH 50: CPT

## 2025-03-19 RX ORDER — BISACODYL 5 MG
1 TABLET, DELAYED RELEASE (ENTERIC COATED) ORAL
Qty: 30 | Refills: 0
Start: 2025-03-19 | End: 2025-04-17

## 2025-03-19 RX ORDER — SALINE 7; 19 G/118ML; G/118ML
1 ENEMA RECTAL ONCE
Refills: 0 | Status: COMPLETED | OUTPATIENT
Start: 2025-03-19 | End: 2025-03-19

## 2025-03-19 RX ORDER — LACTULOSE 10 G/15ML
20 SOLUTION ORAL ONCE
Refills: 0 | Status: COMPLETED | OUTPATIENT
Start: 2025-03-19 | End: 2025-03-19

## 2025-03-19 RX ORDER — BISACODYL 5 MG
5 TABLET, DELAYED RELEASE (ENTERIC COATED) ORAL AT BEDTIME
Refills: 0 | Status: DISCONTINUED | OUTPATIENT
Start: 2025-03-19 | End: 2025-03-20

## 2025-03-19 RX ORDER — BISACODYL 5 MG
1 TABLET, DELAYED RELEASE (ENTERIC COATED) ORAL
Qty: 15 | Refills: 0
Start: 2025-03-19 | End: 2025-04-02

## 2025-03-19 RX ADMIN — POLYETHYLENE GLYCOL 3350 17 GRAM(S): 17 POWDER, FOR SOLUTION ORAL at 11:44

## 2025-03-19 RX ADMIN — Medication 25 MILLIGRAM(S): at 05:45

## 2025-03-19 RX ADMIN — WHITE PETROLATUM 1 APPLICATION(S): 1 OINTMENT TOPICAL at 05:46

## 2025-03-19 RX ADMIN — WHITE PETROLATUM 1 APPLICATION(S): 1 OINTMENT TOPICAL at 14:22

## 2025-03-19 RX ADMIN — Medication 1 APPLICATION(S): at 18:12

## 2025-03-19 RX ADMIN — WHITE PETROLATUM 1 APPLICATION(S): 1 OINTMENT TOPICAL at 21:59

## 2025-03-19 RX ADMIN — Medication 325 MILLIGRAM(S): at 11:43

## 2025-03-19 RX ADMIN — CYCLOBENZAPRINE HYDROCHLORIDE 5 MILLIGRAM(S): 15 CAPSULE, EXTENDED RELEASE ORAL at 21:51

## 2025-03-19 RX ADMIN — Medication 40 MILLIGRAM(S): at 05:45

## 2025-03-19 RX ADMIN — SALINE 1 ENEMA: 7; 19 ENEMA RECTAL at 20:35

## 2025-03-19 RX ADMIN — LIDOCAINE HYDROCHLORIDE 1 PATCH: 20 JELLY TOPICAL at 11:44

## 2025-03-19 RX ADMIN — APIXABAN 5 MILLIGRAM(S): 2.5 TABLET, FILM COATED ORAL at 18:11

## 2025-03-19 RX ADMIN — CEFTRIAXONE 100 MILLIGRAM(S): 500 INJECTION, POWDER, FOR SOLUTION INTRAMUSCULAR; INTRAVENOUS at 18:11

## 2025-03-19 RX ADMIN — TRAMADOL HYDROCHLORIDE 50 MILLIGRAM(S): 50 TABLET, FILM COATED ORAL at 07:48

## 2025-03-19 RX ADMIN — TRAMADOL HYDROCHLORIDE 50 MILLIGRAM(S): 50 TABLET, FILM COATED ORAL at 08:45

## 2025-03-19 RX ADMIN — Medication 5 MILLIGRAM(S): at 22:54

## 2025-03-19 RX ADMIN — Medication 6 MILLIGRAM(S): at 21:51

## 2025-03-19 RX ADMIN — FUROSEMIDE 40 MILLIGRAM(S): 10 INJECTION INTRAMUSCULAR; INTRAVENOUS at 05:45

## 2025-03-19 RX ADMIN — TRAMADOL HYDROCHLORIDE 50 MILLIGRAM(S): 50 TABLET, FILM COATED ORAL at 19:00

## 2025-03-19 RX ADMIN — Medication 975 MILLIGRAM(S): at 15:20

## 2025-03-19 RX ADMIN — Medication 975 MILLIGRAM(S): at 06:20

## 2025-03-19 RX ADMIN — LIDOCAINE HYDROCHLORIDE 1 PATCH: 20 JELLY TOPICAL at 19:00

## 2025-03-19 RX ADMIN — Medication 975 MILLIGRAM(S): at 05:45

## 2025-03-19 RX ADMIN — TRAMADOL HYDROCHLORIDE 50 MILLIGRAM(S): 50 TABLET, FILM COATED ORAL at 22:54

## 2025-03-19 RX ADMIN — Medication 1 APPLICATION(S): at 05:46

## 2025-03-19 RX ADMIN — ATORVASTATIN CALCIUM 40 MILLIGRAM(S): 80 TABLET, FILM COATED ORAL at 21:51

## 2025-03-19 RX ADMIN — Medication 975 MILLIGRAM(S): at 21:50

## 2025-03-19 RX ADMIN — LACTULOSE 20 GRAM(S): 10 SOLUTION ORAL at 14:22

## 2025-03-19 RX ADMIN — TRAMADOL HYDROCHLORIDE 50 MILLIGRAM(S): 50 TABLET, FILM COATED ORAL at 18:10

## 2025-03-19 RX ADMIN — Medication 975 MILLIGRAM(S): at 22:50

## 2025-03-19 RX ADMIN — APIXABAN 5 MILLIGRAM(S): 2.5 TABLET, FILM COATED ORAL at 05:45

## 2025-03-19 RX ADMIN — Medication 150 MICROGRAM(S): at 05:45

## 2025-03-19 RX ADMIN — Medication 1 TABLET(S): at 11:43

## 2025-03-19 RX ADMIN — Medication 1 APPLICATION(S): at 05:50

## 2025-03-19 RX ADMIN — Medication 975 MILLIGRAM(S): at 14:21

## 2025-03-19 NOTE — PROGRESS NOTE ADULT - ASSESSMENT
Assessment/Plan:  Mr. Win Menezes is a 74-year-old male patient with past medical history of HTN, diabetes mellitus with hyperglycemia, hypothyroidism, obesity, prostate cancer, atrial fibrillation, HLD, GI bleed, S/P TAVR (2023), and chronic back pain who is admitted for Acute Inpatient Rehabilitation with a multidisciplinary rehab program at [facility] with functional impairments in ADLs and mobility secondary to spinal osteomyelitis/discitis secondary to infected TAVR and aortic valve.      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              - WBC 10.09 on 3/6             - ESR//206 on 2/20      * CXR weekly      * Daily weights      * LSO brace when OOB  Comprehensive Multidisciplinary Rehab Program:  - Start comprehensive rehab program, 3 hours a day, 5 days a week.  - PT 2hr/day: Focused on improving strength, endurance, coordination, balance, functional mobility, and transfers  - OT 1hr/day: Focused on improving strength, fine motor skills, coordination, posture and ADLs.    - Activity Limitations: Decreased social, vocational and leisure activities, decreased self care and ADLs, decreased mobility, decreased ability to manage household and finances.     -----------------------------------------------------------------------------------  Concurrent Medical Problems    #LLE edema/warmth/calf pain (on arrival 3/6)  - R/O DVT with bedside doppler 3/6    #Severe aortic stenosis with chronic diastolic CHF and first degree AV block  - S/P TAVR (2023)  - ROVERTO (2/26):       * echogenic sessile not very mobile subcentimeter ( 6x2 mm) structure attached to the base of the leaflet corresponding to the native non-coronary cusp       * likely representing a calcified nodule vs. less likely an old small calcified vegetation, mod AS  - S/P Cardiac catheterization  - Monitor right wrist and right groin for bleeding  - No heavy lifting or strenuous activity until 3/10    #Multilevel osteomyelitis   - Ceftriaxone through 4/7/25 as above  - LSO brace when OOB  - Ortho f/up after rehab d/c    #Arterial embolism of left leg  - S/P LCFA/LSFA thrombectomy/endarterectomy on 2/22  - Left groin staples should remain in place until 3/13  - Eliquis 5mg BID -- cleared to restart by cardiology on 3/6.  - DVT ppx: TEDs    #Bacteremia  - Strep salivarius bacteremia> cultures now clear.   - Ceftriaxone through 4/7/25 as above    #Chronic atrial fibrillation  - Continue Eliquis as above    #Type 2 Diabetes Mellitus with hyperglycemia  - Consistent carb diet.   - On metformin and Farxiga at home  - AM glucose 113-97; well controlled.  - Monitor glucose on AM labs.    #HTN  - Spironolactone 25mg daily  - Lasix 40mg daily    #HLD  - Atorvastatin 40mg daily at HS    #Hypothyroid  - levothyroxine 150mcg daily  - TSH 0.73 on 2/24/25    #Anemia  - Ferrous sulfate 325mg daily  - Trend H/H (10.9/32/9 on 3/6)    #Mood/Cognition:  - Neuropsychology consult PRN    #Sleep:   - Maintain quiet hours and low stim environment.  - Melatonin 6mg PRN to maximize participation in therapy during the day.     #Pain Management:  - Analgesic: Tylenol 975mg every 8 hours  - Tramadol 50mg every 4 hours PRN severe pain (7-10)  - Antispasmodic: Cyclobenzaprine 5mg TID PRN muscle spasm    #GI/Bowel:  - Senna 2 tabs QHS   - Miralax 17g Daily  - GI ppx: Pantoprazole 40mg daily before a meal  - bisacodyl 10mg suppository rectally once a day PRN constipation    #/Bladder:   - PVR on admission  - monitor urine output    #Skin/Pressure Injury assessment:   - Skin assessment on admission:       * Left groin 7.5cm incision with 13 staples present; no swelling/redness/drainage to site      * Right buttock healed scab    #Diet/Dysphagia:  - Current Diet: Dash diet, consistent carb diabetic diet  - Nutrition consult     #Patient Education  - Education provided on the following:      * Admitting diagnosis and functional implications      * Functional goals      * Bladder management      * Bowel management      * Skin care management      * Intensity of service and scheduling of rehab disciplines      * Plan of care and role of interdisciplinary team conference in discharge planning      * Reconciliation of medications from prior institution    #Precautions / PROPHYLAXIS:   - Falls, Cardiac, Spinal  - ortho: Weight bearing status: FWB  - Lungs: Aspiration, Incentive Spirometer   - Pressure injury/Skin: OOB to Chair, PT/OT      ---------------  Code Status: DNR/DNI; all other medical interventions allowed (see MOLST)  Emergency Contact:    Outpatient Follow-up (Specialty/Name of physician):    Dr. Fabio Marshall MD  Infectious Disease  [address] [place] [address]  Phone: [phone]    Abdulaziz Jernigan  [facility]  [facility] [address] [facility]  Scheduled Appointment: 03/17/2025  For kidney tests and CBC    Benito Jones  [facility]  [address]  Scheduled Appointment: 04/07/2025    Mahamed Heath  Cardiovascular Disease  [address] [facility], [address]  [place] [id number]  Phone: [phone]  Fax: [phone]  Follow Up Time: 1-2 weeks for ROVERTO and cardiac workup    Lizbeth Redd  Nephrology  [address], [address]  [place] [address]  Phone: [phone]  Fax: [phone]  Follow Up Time: within 4-6 weeks    Gualberto Porter  Gastroenterology  [address] [facility] [facility], [address]  [place] [address]  Phone: [phone]  Fax: [phone]  Follow Up Time:   For pill camera test    Berny Lizarraga [place]  Neurosurgery  [address] [facility]  [place] [address]  Phone: [phone]  Fax: [phone]  Follow Up Time: 4 weeks  For MRI    Dr. Rodriges   Cardiothoracic surgery  Follow appointment after discharge  [phone]       --------------

## 2025-03-19 NOTE — PROGRESS NOTE ADULT - ASSESSMENT
74-year-old male patient with past medical history of HTN, diabetes mellitus with hyperglycemia, hypothyroidism, obesity, prostate cancer, atrial fibrillation, HLD, GI bleed, S/P TAVR (2023), and chronic back pain who is admitted for Acute Inpatient Rehabilitation with a multidisciplinary rehab program at [facility] with functional impairments in ADLs and mobility secondary to spinal osteomyelitis/discitis secondary to infected TAVR and aortic valve.    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   - Ceftriaxone 2 g once daily via PICC through 4/7/25  - Monitor CBC, CMP, and Weekly ESR and CRP  - CXR weekly  - Ortho f/up after rehab d/c    #Severe aortic stenosis with chronic diastolic CHF and first degree AV block  - S/P TAVR (2023)  - ROVERTO (2/26): echogenic sessile not very mobile subcentimeter ( 6x2 mm) structure attached to the base of the leaflet corresponding to the native non-coronary cusp   - S/P Cardiac catheterization    #Arterial embolism of left leg  # R LE edema  - S/P LCFA/LSFA thrombectomy/endarterectomy on 2/22  - Left groin staples should remain in place until 3/13  - Eliquis 5mg BID  - Lasix/Spironolactone   - R LE doppler neg    #Chronic atrial fibrillation  - Eliquis  - Rate controlled without any av rosamaria agents    # Mild Hyponatremia  - stable around 132-134  - continue to monitor closely  - if trending [id number], will work up    #Type 2 Diabetes Mellitus  - Ha1c 5.7  - Blood glucose levels are within normal limits   - On metformin and Farxiga at home. Not on any meds while here     #HTN  - Spironolactone 25mg daily  - Lasix 40mg daily    #HLD  - Atorvastatin     #Hypothyroid  - levothyroxine    #Anemia  - Ferrous sulfate 325mg daily  - Trend H/H   - Transfuse if HgB <7    DVT ppx- on Eliquis

## 2025-03-19 NOTE — DISCHARGE NOTE NURSING/CASE MANAGEMENT/SOCIAL WORK - PATIENT PORTAL LINK FT
You can access the FollowMyHealth Patient Portal offered by [facility] by registering at the following website: [email or web address]. By joining [employer]’s FollowMyHealth portal, you will also be able to view your health information using other applications (apps) compatible with our system.

## 2025-03-19 NOTE — PROGRESS NOTE ADULT - SUBJECTIVE AND OBJECTIVE BOX
Patient is a 74y old  Male who presents with a chief complaint of Spinal osteomyelitis/discitis secondary to infected TAVR and aortic valve endocarditis (18 Mar 2025 10:17)      Patient seen and examined at bedside.    ALLERGIES:  No Known Allergies    MEDICATIONS  (STANDING):  acetaminophen     Tablet .. 975 milliGRAM(s) Oral every 8 hours  ammonium lactate 12% Lotion 1 Application(s) Topical two times a day  apixaban 5 milliGRAM(s) Oral two times a day  AQUAPHOR (petrolatum Ointment) 1 Application(s) Topical three times a day  atorvastatin 40 milliGRAM(s) Oral at bedtime  cefTRIAXone   IVPB 2000 milliGRAM(s) IV Intermittent every 24 hours  chlorhexidine 4% Liquid 1 Application(s) Topical <User Schedule>  ferrous    sulfate 325 milliGRAM(s) Oral daily  furosemide    Tablet 40 milliGRAM(s) Oral daily  levothyroxine 150 MICROGram(s) Oral daily  lidocaine   4% Patch 1 Patch Transdermal daily  melatonin 6 milliGRAM(s) Oral at bedtime  multivitamin 1 Tablet(s) Oral daily  pantoprazole    Tablet 40 milliGRAM(s) Oral before breakfast  polyethylene glycol 3350 17 Gram(s) Oral daily  senna 2 Tablet(s) Oral at bedtime  spironolactone 25 milliGRAM(s) Oral daily    MEDICATIONS  (PRN):  bisacodyl Suppository 10 milliGRAM(s) Rectal daily PRN Constipation  cyclobenzaprine 5 milliGRAM(s) Oral three times a day PRN Muscle Spasm  sodium chloride 0.9% lock flush 10 milliLiter(s) IV Push every 1 hour PRN Pre/post blood products, medications, blood draw, and to maintain line patency  traMADol 50 milliGRAM(s) Oral every 4 hours PRN Severe Pain (7 - 10)  zolpidem 5 milliGRAM(s) Oral at bedtime PRN Insomnia    Vital Signs Last 24 Hrs  T(F): 97.9 (19 Mar 2025 07:49), Max: 97.9 (19 Mar 2025 07:49)  HR: 71 (19 Mar 2025 07:49) (71 - 80)  BP: 116/62 (19 Mar 2025 07:49) (116/62 - 118/66)  RR: 16 (19 Mar 2025 07:49) (15 - 16)  SpO2: 95% (19 Mar 2025 07:49) (95% - 96%)  I&O's Summary      PHYSICAL EXAM:  General: NAD, A/O  ENT: MMM, no scleral icterus  Neck: Supple, No JVD, no thyroidomegaly  Lungs: Clear to auscultation bilaterally, no wheezes, no rales, no rhonchi, good inspiratory effort  Cardio: RRR, S1/S2, No murmurs  Abdomen: Soft, Nontender, Nondistended; Bowel sounds present  Extremities: No calf tenderness, No pitting edema, no skin changes    LABS:                        10.6   6.54  )-----------( 221      ( 17 Mar 2025 06:38 )             32.9       03-17    133  |  95  |  29  ----------------------------<  108  4.2   |  30  |  1.26    Ca    9.6      17 Mar 2025 06:38    TPro  7.5  /  Alb  3.0  /  TBili  1.0  /  DBili  x   /  AST  25  /  ALT  24  /  AlkPhos  142  03-17     Urinalysis Basic - ( 17 Mar 2025 06:38 )    Color: x / Appearance: x / SG: x / pH: x  Gluc: 108 mg/dL / Ketone: x  / Bili: x / Urobili: x   Blood: x / Protein: x / Nitrite: x   Leuk Esterase: x / RBC: x / WBC x   Sq Epi: x / Non Sq Epi: x / Bacteria: x    COVID-19 PCR: Veliateomer (03-06-25 @ 17:33)  COVID-19 PCR: NotDeteomer (03-06-25 @ 17:33)

## 2025-03-19 NOTE — DISCHARGE NOTE NURSING/CASE MANAGEMENT/SOCIAL WORK - NSDCPEFALRISK_GEN_ALL_CORE
For information on Fall & Injury Prevention, visit: [email or web address] OR  [email or web address] OR  https://www.cdc.gov/steadi/patient.html

## 2025-03-19 NOTE — PROGRESS NOTE ADULT - SUBJECTIVE AND OBJECTIVE BOX
HPI:  Mr. Win Menezes is a 74-year-old male patient with past medical history of HTN, diabetes mellitus with hyperglycemia, hypothyroidism, obesity, prostate cancer, atrial fibrillation, HLD, GI bleed, S/P TAVR (2023), and chronic back pain who presented to the ED at [facility] on 2/20/25 with a two-day history of a rash on his left leg and pain in the left knee radiating towards his foot. He presented to the ED from SNF rehab where he was admitted for less than one week. after hospitalization at [facility] from 2/10/25 through 2/17/25 and treatment for acute anemia, DARLINE, hypotension, s/p 2 units PRBC and negative EGD/colonoscopy. He was found to have an infected TAVR and osteomyelitis of spine, and strep salivarius bacteremia. Plan at that time was for 6 weeks of IV antibiotics via PICC line. CTA on 2/21/25 showed clot to common femoral and distal embolism. He is S/P common femoral endarterectomy with clot retrieval and good blood flow to lower extremity post surgery. Repeat blood cultures were negative, but the excised clot was found to have gram positive cocci. Repeat MRI spine showed no abscess. Hospital stay c/b constipation and persistent back pain. CT and MRI L-spine showed degenerative disc disease and possible discitis/osteomyelitis at multiple levels. Patient received transfusion of 2 unit of PRBC's on 2/23/25. He was transferred to CTICU at [facility] on 2/24/25 for CT surgery evaluation. Cardiac catheterization showed non-obstructive CAD. Patient determined by CT surgery team to not be strong enough for CT surgery at this time, referred to rehab for optimization. Patient was evaluated by PM&R and therapy for functional deficits, gait/ADL impairments and acute rehabilitation was recommended. Patient was cleared for discharge to [facility] IRF on 3/6/2025. (06 Mar 2025 13:06)    TDD: 3/20/25 home  ___________________________________________________________________________    SUBJECTIVE/ROS  Patient was seen and evaluated at bedside today.  Reported no overnight events and is in no acute distress.  Still having some back pain- will increase scheduled Tylenol  Patient is eager to continue participation on the recommended rehabilitation program.  Denies any CP, SOB, RICE, palpitations, fever, chills, body aches, cough, congestion, or any other symptoms at this time.   ___________________________________________________________________________    US DPLX LWR EXT VEINS LTD RT  PROCEDURE DATE:  03/11/2025  IMPRESSION: No evidence of right lower extremity deep venous thrombosis.  ___________________________________________________________________________    Vital Signs Last 24 Hrs  T(C): 36.6 (19 Mar 2025 07:49), Max: 36.6 (18 Mar 2025 20:32)  T(F): 97.9 (19 Mar 2025 07:49), Max: 97.9 (19 Mar 2025 07:49)  HR: 71 (19 Mar 2025 07:49) (71 - 80)  BP: 116/62 (19 Mar 2025 07:49) (116/62 - 118/66)  RR: 16 (19 Mar 2025 07:49) (15 - 16)  SpO2: 95% (19 Mar 2025 07:49) (95% - 96%)    ___________________________________________________________________________    LAB                        10.6   6.54  )-----------( 221      ( 17 Mar 2025 06:38 )             32.9     03-17    133[L]  |  95[L]  |  29[H]  ----------------------------<  108[H]  4.2   |  30  |  1.26    Ca    9.6      17 Mar 2025 06:38    TPro  7.5  /  Alb  3.0[L]  /  TBili  1.0  /  DBili  x   /  AST  25  /  ALT  24  /  AlkPhos  142[H]  03-17    LIVER FUNCTIONS - ( 17 Mar 2025 06:38 )  Alb: 3.0 g/dL / Pro: 7.5 g/dL / ALK PHOS: 142 U/L / ALT: 24 U/L / AST: 25 U/L / GGT: x           ___________________________________________________________________________    MEDICATIONS  (STANDING):  acetaminophen     Tablet .. 975 milliGRAM(s) Oral every 8 hours  ammonium lactate 12% Lotion 1 Application(s) Topical two times a day  apixaban 5 milliGRAM(s) Oral two times a day  AQUAPHOR (petrolatum Ointment) 1 Application(s) Topical three times a day  atorvastatin 40 milliGRAM(s) Oral at bedtime  bisacodyl 5 milliGRAM(s) Oral at bedtime  cefTRIAXone   IVPB 2000 milliGRAM(s) IV Intermittent every 24 hours  chlorhexidine 4% Liquid 1 Application(s) Topical <User Schedule>  ferrous    sulfate 325 milliGRAM(s) Oral daily  furosemide    Tablet 40 milliGRAM(s) Oral daily  lactulose Syrup 20 Gram(s) Oral once  levothyroxine 150 MICROGram(s) Oral daily  lidocaine   4% Patch 1 Patch Transdermal daily  melatonin 6 milliGRAM(s) Oral at bedtime  multivitamin 1 Tablet(s) Oral daily  pantoprazole    Tablet 40 milliGRAM(s) Oral before breakfast  polyethylene glycol 3350 17 Gram(s) Oral daily  saline laxative (FLEET) Rectal Enema 1 Enema Rectal once  senna 2 Tablet(s) Oral at bedtime  spironolactone 25 milliGRAM(s) Oral daily    MEDICATIONS  (PRN):  bisacodyl Suppository 10 milliGRAM(s) Rectal daily PRN Constipation  cyclobenzaprine 5 milliGRAM(s) Oral three times a day PRN Muscle Spasm  sodium chloride 0.9% lock flush 10 milliLiter(s) IV Push every 1 hour PRN Pre/post blood products, medications, blood draw, and to maintain line patency  traMADol 50 milliGRAM(s) Oral every 4 hours PRN Severe Pain (7 - 10)  zolpidem 5 milliGRAM(s) Oral at bedtime PRN Insomnia    ___________________________________________________________________________    PHYSICAL EXAM:    Gen - NAD, Comfortable  HEENT - NCAT, Normal Conjunctivae  Neck - Supple  Pulm - Clear lungs  Cardiovascular - RRR, S1S2, + systolic murmur  Chest - good chest expansion, good respiratory effort  Abdomen - Soft, NT/ND, +BS  Extremities - No C/C/E, no calf tenderness +LE swelling with +calf tenderness and warmth. Palpable but diminished left DP pulse  Neuro-     Cognitive - awake, alert, and oriented.  Psychiatric - Mood stable, Affect WNL    ___________________________________________________________________________

## 2025-03-19 NOTE — DISCHARGE NOTE NURSING/CASE MANAGEMENT/SOCIAL WORK - FINANCIAL ASSISTANCE
[facility] provides services at a reduced cost to those who are determined to be eligible through [facility]’s financial assistance program. Information regarding [facility]’s financial assistance program can be found by going to [email or web address] or by calling [phone].

## 2025-03-20 ENCOUNTER — NON-APPOINTMENT (OUTPATIENT)
Age: 75
End: 2025-03-20

## 2025-03-20 VITALS
RESPIRATION RATE: 16 BRPM | SYSTOLIC BLOOD PRESSURE: 130 MMHG | DIASTOLIC BLOOD PRESSURE: 74 MMHG | HEART RATE: 86 BPM | TEMPERATURE: 98 F | OXYGEN SATURATION: 95 %

## 2025-03-20 LAB
ALBUMIN SERPL ELPH-MCNC: 2.8 G/DL — LOW (ref 3.3–5)
ALP SERPL-CCNC: 136 U/L — HIGH (ref 40–120)
ALT FLD-CCNC: 32 U/L — SIGNIFICANT CHANGE UP (ref 10–45)
ANION GAP SERPL CALC-SCNC: 7 MMOL/L — SIGNIFICANT CHANGE UP (ref 5–17)
AST SERPL-CCNC: 31 U/L — SIGNIFICANT CHANGE UP (ref 10–40)
BASOPHILS # BLD AUTO: 0.04 K/UL — SIGNIFICANT CHANGE UP (ref 0–0.2)
BASOPHILS NFR BLD AUTO: 0.6 % — SIGNIFICANT CHANGE UP (ref 0–2)
BILIRUB SERPL-MCNC: 1 MG/DL — SIGNIFICANT CHANGE UP (ref 0.2–1.2)
BUN SERPL-MCNC: 27 MG/DL — HIGH (ref 7–23)
CALCIUM SERPL-MCNC: 9.7 MG/DL — SIGNIFICANT CHANGE UP (ref 8.4–10.5)
CHLORIDE SERPL-SCNC: 95 MMOL/L — LOW (ref 96–108)
CO2 SERPL-SCNC: 31 MMOL/L — SIGNIFICANT CHANGE UP (ref 22–31)
CREAT SERPL-MCNC: 1.1 MG/DL — SIGNIFICANT CHANGE UP (ref 0.5–1.3)
EGFR: 71 ML/MIN/1.73M2 — SIGNIFICANT CHANGE UP
EOSINOPHIL # BLD AUTO: 0.09 K/UL — SIGNIFICANT CHANGE UP (ref 0–0.5)
EOSINOPHIL NFR BLD AUTO: 1.2 % — SIGNIFICANT CHANGE UP (ref 0–6)
GLUCOSE SERPL-MCNC: 103 MG/DL — HIGH (ref 70–99)
HCT VFR BLD CALC: 32.3 % — LOW (ref 39–50)
HGB BLD-MCNC: 10.8 G/DL — LOW (ref 13–17)
LYMPHOCYTES # BLD AUTO: 0.93 K/UL — LOW (ref 1–3.3)
LYMPHOCYTES # BLD AUTO: 12.8 % — LOW (ref 13–44)
MCHC RBC-ENTMCNC: 28.8 PG — SIGNIFICANT CHANGE UP (ref 27–34)
MCHC RBC-ENTMCNC: 33.4 G/DL — SIGNIFICANT CHANGE UP (ref 32–36)
MCV RBC AUTO: 86.1 FL — SIGNIFICANT CHANGE UP (ref 80–100)
MONOCYTES # BLD AUTO: 0.52 K/UL — SIGNIFICANT CHANGE UP (ref 0–0.9)
MONOCYTES NFR BLD AUTO: 7.2 % — SIGNIFICANT CHANGE UP (ref 2–14)
NEUTROPHILS # BLD AUTO: 5.66 K/UL — SIGNIFICANT CHANGE UP (ref 1.8–7.4)
NEUTROPHILS NFR BLD AUTO: 77.8 % — HIGH (ref 43–77)
NRBC BLD AUTO-RTO: 0 /100 WBCS — SIGNIFICANT CHANGE UP (ref 0–0)
PLATELET # BLD AUTO: 261 K/UL — SIGNIFICANT CHANGE UP (ref 150–400)
POTASSIUM SERPL-MCNC: 3.8 MMOL/L — SIGNIFICANT CHANGE UP (ref 3.5–5.3)
POTASSIUM SERPL-SCNC: 3.8 MMOL/L — SIGNIFICANT CHANGE UP (ref 3.5–5.3)
PROT SERPL-MCNC: 7.4 G/DL — SIGNIFICANT CHANGE UP (ref 6–8.3)
RBC # BLD: 3.75 M/UL — LOW (ref 4.2–5.8)
RBC # FLD: 17.7 % — HIGH (ref 10.3–14.5)
SODIUM SERPL-SCNC: 133 MMOL/L — LOW (ref 135–145)
WBC # BLD: 7.27 K/UL — SIGNIFICANT CHANGE UP (ref 3.8–10.5)
WBC # FLD AUTO: 7.27 K/UL — SIGNIFICANT CHANGE UP (ref 3.8–10.5)

## 2025-03-20 PROCEDURE — 99239 HOSP IP/OBS DSCHRG MGMT >30: CPT

## 2025-03-20 PROCEDURE — 99232 SBSQ HOSP IP/OBS MODERATE 35: CPT

## 2025-03-20 RX ADMIN — Medication 325 MILLIGRAM(S): at 11:23

## 2025-03-20 RX ADMIN — LIDOCAINE HYDROCHLORIDE 1 PATCH: 20 JELLY TOPICAL at 00:00

## 2025-03-20 RX ADMIN — FUROSEMIDE 40 MILLIGRAM(S): 10 INJECTION INTRAMUSCULAR; INTRAVENOUS at 06:07

## 2025-03-20 RX ADMIN — Medication 1 APPLICATION(S): at 06:09

## 2025-03-20 RX ADMIN — Medication 25 MILLIGRAM(S): at 06:03

## 2025-03-20 RX ADMIN — Medication 975 MILLIGRAM(S): at 06:03

## 2025-03-20 RX ADMIN — Medication 40 MILLIGRAM(S): at 06:10

## 2025-03-20 RX ADMIN — TRAMADOL HYDROCHLORIDE 50 MILLIGRAM(S): 50 TABLET, FILM COATED ORAL at 10:17

## 2025-03-20 RX ADMIN — WHITE PETROLATUM 1 APPLICATION(S): 1 OINTMENT TOPICAL at 06:09

## 2025-03-20 RX ADMIN — Medication 1 APPLICATION(S): at 05:39

## 2025-03-20 RX ADMIN — Medication 150 MICROGRAM(S): at 05:34

## 2025-03-20 RX ADMIN — LIDOCAINE HYDROCHLORIDE 1 PATCH: 20 JELLY TOPICAL at 11:24

## 2025-03-20 RX ADMIN — Medication 1 TABLET(S): at 11:23

## 2025-03-20 RX ADMIN — APIXABAN 5 MILLIGRAM(S): 2.5 TABLET, FILM COATED ORAL at 06:03

## 2025-03-20 NOTE — PROGRESS NOTE ADULT - SUBJECTIVE AND OBJECTIVE BOX
Patient is a 74y old  Male who presents with a chief complaint of Spinal osteomyelitis/discitis secondary to infected TAVR and aortic valve endocarditis       Patient seen and examined at bedside.    ALLERGIES:  No Known Allergies    MEDICATIONS  (STANDING):  acetaminophen     Tablet .. 975 milliGRAM(s) Oral every 8 hours  ammonium lactate 12% Lotion 1 Application(s) Topical two times a day  apixaban 5 milliGRAM(s) Oral two times a day  AQUAPHOR (petrolatum Ointment) 1 Application(s) Topical three times a day  atorvastatin 40 milliGRAM(s) Oral at bedtime  bisacodyl 5 milliGRAM(s) Oral at bedtime  cefTRIAXone   IVPB 2000 milliGRAM(s) IV Intermittent every 24 hours  chlorhexidine 4% Liquid 1 Application(s) Topical <User Schedule>  ferrous    sulfate 325 milliGRAM(s) Oral daily  furosemide    Tablet 40 milliGRAM(s) Oral daily  levothyroxine 150 MICROGram(s) Oral daily  lidocaine   4% Patch 1 Patch Transdermal daily  melatonin 6 milliGRAM(s) Oral at bedtime  multivitamin 1 Tablet(s) Oral daily  pantoprazole    Tablet 40 milliGRAM(s) Oral before breakfast  polyethylene glycol 3350 17 Gram(s) Oral daily  senna 2 Tablet(s) Oral at bedtime  spironolactone 25 milliGRAM(s) Oral daily    MEDICATIONS  (PRN):  bisacodyl Suppository 10 milliGRAM(s) Rectal daily PRN Constipation  cyclobenzaprine 5 milliGRAM(s) Oral three times a day PRN Muscle Spasm  sodium chloride 0.9% lock flush 10 milliLiter(s) IV Push every 1 hour PRN Pre/post blood products, medications, blood draw, and to maintain line patency  traMADol 50 milliGRAM(s) Oral every 4 hours PRN Severe Pain (7 - 10)  zolpidem 5 milliGRAM(s) Oral at bedtime PRN Insomnia    Vital Signs Last 24 Hrs  T(F): 98 (19 Mar 2025 20:22), Max: 98 (19 Mar 2025 20:22)  HR: 80 (20 Mar 2025 06:05) (79 - 80)  BP: 126/62 (20 Mar 2025 06:05) (125/61 - 126/62)  RR: 16 (19 Mar 2025 20:22) (16 - 16)  SpO2: 96% (19 Mar 2025 20:22) (96% - 96%)  I&O's Summary      PHYSICAL EXAM:  General: NAD, A/O   ENT: MMM, no scleral icterus  Neck: Supple, No JVD, no thyroidomegaly  Lungs: Clear to auscultation bilaterally, no wheezes, no rales, no rhonchi, good inspiratory effort  Cardio: RRR, S1/S2, No murmurs  Abdomen: Soft, Nontender, Nondistended; Bowel sounds present  Extremities: No calf tenderness, No pitting edema, no skin changes    LABS:                        10.8   7.27  )-----------( 261      ( 20 Mar 2025 06:16 )             32.3       03-20    133  |  95  |  27  ----------------------------<  103  3.8   |  31  |  1.10    Ca    9.7      20 Mar 2025 06:16    TPro  7.4  /  Alb  2.8  /  TBili  1.0  /  DBili  x   /  AST  31  /  ALT  32  /  AlkPhos  136  03-20     Urinalysis Basic - ( 20 Mar 2025 06:16 )    Color: x / Appearance: x / SG: x / pH: x  Gluc: 103 mg/dL / Ketone: x  / Bili: x / Urobili: x   Blood: x / Protein: x / Nitrite: x   Leuk Esterase: x / RBC: x / WBC x   Sq Epi: x / Non Sq Epi: x / Bacteria: x    COVID-19 PCR: NotDetec (03-06-25 @ 17:33)  COVID-19 PCR: NotDetec (03-06-25 @ 17:33)

## 2025-03-20 NOTE — PROGRESS NOTE ADULT - SUBJECTIVE AND OBJECTIVE BOX
HPI:  Mr. Win Menezes is a 74-year-old male patient with past medical history of HTN, diabetes mellitus with hyperglycemia, hypothyroidism, obesity, prostate cancer, atrial fibrillation, HLD, GI bleed, S/P TAVR (2023), and chronic back pain who presented to the ED at [facility] on 2/20/25 with a two-day history of a rash on his left leg and pain in the left knee radiating towards his foot. He presented to the ED from SNF rehab where he was admitted for less than one week. after hospitalization at [facility] from 2/10/25 through 2/17/25 and treatment for acute anemia, DARLINE, hypotension, s/p 2 units PRBC and negative EGD/colonoscopy. He was found to have an infected TAVR and osteomyelitis of spine, and strep salivarius bacteremia. Plan at that time was for 6 weeks of IV antibiotics via PICC line. CTA on 2/21/25 showed clot to common femoral and distal embolism. He is S/P common femoral endarterectomy with clot retrieval and good blood flow to lower extremity post surgery. Repeat blood cultures were negative, but the excised clot was found to have gram positive cocci. Repeat MRI spine showed no abscess. Hospital stay c/b constipation and persistent back pain. CT and MRI L-spine showed degenerative disc disease and possible discitis/osteomyelitis at multiple levels. Patient received transfusion of 2 unit of PRBC's on 2/23/25. He was transferred to CTICU at [facility] on 2/24/25 for CT surgery evaluation. Cardiac catheterization showed non-obstructive CAD. Patient determined by CT surgery team to not be strong enough for CT surgery at this time, referred to rehab for optimization. Patient was evaluated by PM&R and therapy for functional deficits, gait/ADL impairments and acute rehabilitation was recommended. Patient was cleared for discharge to [facility] IRF on 3/6/2025. (06 Mar 2025 13:06)    TDD: 3/20/25 home  ___________________________________________________________________________    SUBJECTIVE/ROS  Patient was seen and evaluated at bedside today.  Reported no overnight events and is in no acute distress.  Answered questions about his discharge, patient expressed understanding.  Patient is eager to continue participation on the recommended rehabilitation program.  Denies any CP, SOB, RICE, palpitations, fever, chills, body aches, cough, congestion, or any other symptoms at this time.   ___________________________________________________________________________    US DPLX LWR EXT VEINS LTD RT  PROCEDURE DATE:  03/11/2025  IMPRESSION: No evidence of right lower extremity deep venous thrombosis.  ___________________________________________________________________________    Vital Signs Last 24 Hrs  T(C): 36.7 (03-19-25 @ 20:22), Max: 36.7 (03-19-25 @ 20:22)  T(F): 98 (03-19-25 @ 20:22), Max: 98 (03-19-25 @ 20:22)  HR: 80 (03-20-25 @ 06:05) (79 - 80)  BP: 126/62 (03-20-25 @ 06:05) (125/61 - 126/62)  ABP: --  ABP(mean): --  RR: 16 (03-19-25 @ 20:22) (16 - 16)  SpO2: 96% (03-19-25 @ 20:22) (96% - 96%)      ___________________________________________________________________________    LAB                        10.8   7.27  )-----------( 261      ( 20 Mar 2025 06:16 )             32.3     03-20    133[L]  |  95[L]  |  27[H]  ----------------------------<  103[H]  3.8   |  31  |  1.10    Ca    9.7      20 Mar 2025 06:16    TPro  7.4  /  Alb  2.8[L]  /  TBili  1.0  /  DBili  x   /  AST  31  /  ALT  32  /  AlkPhos  136[H]  03-20    LIVER FUNCTIONS - ( 20 Mar 2025 06:16 )  Alb: 2.8 g/dL / Pro: 7.4 g/dL / ALK PHOS: 136 U/L / ALT: 32 U/L / AST: 31 U/L / GGT: x                 Urinalysis Basic - ( 20 Mar 2025 06:16 )    Color: x / Appearance: x / SG: x / pH: x  Gluc: 103 mg/dL / Ketone: x  / Bili: x / Urobili: x   Blood: x / Protein: x / Nitrite: x   Leuk Esterase: x / RBC: x / WBC x   Sq Epi: x / Non Sq Epi: x / Bacteria: x            ___________________________________________________________________________    MEDICATIONS  (STANDING):  acetaminophen     Tablet .. 975 milliGRAM(s) Oral every 8 hours  ammonium lactate 12% Lotion 1 Application(s) Topical two times a day  apixaban 5 milliGRAM(s) Oral two times a day  AQUAPHOR (petrolatum Ointment) 1 Application(s) Topical three times a day  atorvastatin 40 milliGRAM(s) Oral at bedtime  bisacodyl 5 milliGRAM(s) Oral at bedtime  cefTRIAXone   IVPB 2000 milliGRAM(s) IV Intermittent every 24 hours  chlorhexidine 4% Liquid 1 Application(s) Topical <User Schedule>  ferrous    sulfate 325 milliGRAM(s) Oral daily  furosemide    Tablet 40 milliGRAM(s) Oral daily  levothyroxine 150 MICROGram(s) Oral daily  lidocaine   4% Patch 1 Patch Transdermal daily  melatonin 6 milliGRAM(s) Oral at bedtime  multivitamin 1 Tablet(s) Oral daily  pantoprazole    Tablet 40 milliGRAM(s) Oral before breakfast  polyethylene glycol 3350 17 Gram(s) Oral daily  senna 2 Tablet(s) Oral at bedtime  spironolactone 25 milliGRAM(s) Oral daily    MEDICATIONS  (PRN):  bisacodyl Suppository 10 milliGRAM(s) Rectal daily PRN Constipation  cyclobenzaprine 5 milliGRAM(s) Oral three times a day PRN Muscle Spasm  sodium chloride 0.9% lock flush 10 milliLiter(s) IV Push every 1 hour PRN Pre/post blood products, medications, blood draw, and to maintain line patency  traMADol 50 milliGRAM(s) Oral every 4 hours PRN Severe Pain (7 - 10)  zolpidem 5 milliGRAM(s) Oral at bedtime PRN Insomnia      ___________________________________________________________________________    PHYSICAL EXAM:    Gen - NAD, Comfortable  HEENT - NCAT, Normal Conjunctivae  Neck - Supple  Pulm - Clear lungs  Cardiovascular - warm and well perfused  Chest - good chest expansion, good respiratory effort  Abdomen - Soft  Extremities - No calf tenderness  Neuro-     Cognitive - awake, alert, and oriented.  Psychiatric - Mood stable, Affect WNL    ___________________________________________________________________________ HPI:  Mr. Win Menezes is a 74-year-old male patient with past medical history of HTN, diabetes mellitus with hyperglycemia, hypothyroidism, obesity, prostate cancer, atrial fibrillation, HLD, GI bleed, S/P TAVR (2023), and chronic back pain who presented to the ED at [facility] on 2/20/25 with a two-day history of a rash on his left leg and pain in the left knee radiating towards his foot. He presented to the ED from SNF rehab where he was admitted for less than one week. after hospitalization at [facility] from 2/10/25 through 2/17/25 and treatment for acute anemia, DARLINE, hypotension, s/p 2 units PRBC and negative EGD/colonoscopy. He was found to have an infected TAVR and osteomyelitis of spine, and strep salivarius bacteremia. Plan at that time was for 6 weeks of IV antibiotics via PICC line. CTA on 2/21/25 showed clot to common femoral and distal embolism. He is S/P common femoral endarterectomy with clot retrieval and good blood flow to lower extremity post surgery. Repeat blood cultures were negative, but the excised clot was found to have gram positive cocci. Repeat MRI spine showed no abscess. Hospital stay c/b constipation and persistent back pain. CT and MRI L-spine showed degenerative disc disease and possible discitis/osteomyelitis at multiple levels. Patient received transfusion of 2 unit of PRBC's on 2/23/25. He was transferred to CTICU at [facility] on 2/24/25 for CT surgery evaluation. Cardiac catheterization showed non-obstructive CAD. Patient determined by CT surgery team to not be strong enough for CT surgery at this time, referred to rehab for optimization. Patient was evaluated by PM&R and therapy for functional deficits, gait/ADL impairments and acute rehabilitation was recommended. Patient was cleared for discharge to [facility] IRF on 3/6/2025. (06 Mar 2025 13:06)    ___________________________________________________________________________    SUBJECTIVE/ROS  Patient was seen and evaluated at bedside today.  Reported no overnight events and is in no acute distress.  Answered questions about his discharge, patient expressed understanding.  Discharge home today.  Denies any CP, SOB, RICE, palpitations, fever, chills, body aches, cough, congestion, or any other symptoms at this time.   ___________________________________________________________________________    US DPLX LWR EXT VEINS LTD RT  PROCEDURE DATE:  03/11/2025  IMPRESSION: No evidence of right lower extremity deep venous thrombosis.  ___________________________________________________________________________    Vital Signs Last 24 Hrs  T(C): 36.7 (03-19-25 @ 20:22), Max: 36.7 (03-19-25 @ 20:22)  T(F): 98 (03-19-25 @ 20:22), Max: 98 (03-19-25 @ 20:22)  HR: 80 (03-20-25 @ 06:05) (79 - 80)  BP: 126/62 (03-20-25 @ 06:05) (125/61 - 126/62)  ABP: --  ABP(mean): --  RR: 16 (03-19-25 @ 20:22) (16 - 16)  SpO2: 96% (03-19-25 @ 20:22) (96% - 96%)      ___________________________________________________________________________    LAB                        10.8   7.27  )-----------( 261      ( 20 Mar 2025 06:16 )             32.3     03-20    133[L]  |  95[L]  |  27[H]  ----------------------------<  103[H]  3.8   |  31  |  1.10    Ca    9.7      20 Mar 2025 06:16    TPro  7.4  /  Alb  2.8[L]  /  TBili  1.0  /  DBili  x   /  AST  31  /  ALT  32  /  AlkPhos  136[H]  03-20    LIVER FUNCTIONS - ( 20 Mar 2025 06:16 )  Alb: 2.8 g/dL / Pro: 7.4 g/dL / ALK PHOS: 136 U/L / ALT: 32 U/L / AST: 31 U/L / GGT: x           __________________________________________________________________________    MEDICATIONS  (STANDING):  acetaminophen     Tablet .. 975 milliGRAM(s) Oral every 8 hours  ammonium lactate 12% Lotion 1 Application(s) Topical two times a day  apixaban 5 milliGRAM(s) Oral two times a day  AQUAPHOR (petrolatum Ointment) 1 Application(s) Topical three times a day  atorvastatin 40 milliGRAM(s) Oral at bedtime  bisacodyl 5 milliGRAM(s) Oral at bedtime  cefTRIAXone   IVPB 2000 milliGRAM(s) IV Intermittent every 24 hours  chlorhexidine 4% Liquid 1 Application(s) Topical <User Schedule>  ferrous    sulfate 325 milliGRAM(s) Oral daily  furosemide    Tablet 40 milliGRAM(s) Oral daily  levothyroxine 150 MICROGram(s) Oral daily  lidocaine   4% Patch 1 Patch Transdermal daily  melatonin 6 milliGRAM(s) Oral at bedtime  multivitamin 1 Tablet(s) Oral daily  pantoprazole    Tablet 40 milliGRAM(s) Oral before breakfast  polyethylene glycol 3350 17 Gram(s) Oral daily  senna 2 Tablet(s) Oral at bedtime  spironolactone 25 milliGRAM(s) Oral daily    MEDICATIONS  (PRN):  bisacodyl Suppository 10 milliGRAM(s) Rectal daily PRN Constipation  cyclobenzaprine 5 milliGRAM(s) Oral three times a day PRN Muscle Spasm  sodium chloride 0.9% lock flush 10 milliLiter(s) IV Push every 1 hour PRN Pre/post blood products, medications, blood draw, and to maintain line patency  traMADol 50 milliGRAM(s) Oral every 4 hours PRN Severe Pain (7 - 10)  zolpidem 5 milliGRAM(s) Oral at bedtime PRN Insomnia      ___________________________________________________________________________    PHYSICAL EXAM:    Gen - NAD, Comfortable  HEENT - NCAT, Normal Conjunctivae  Neck - Supple  Pulm - Clear lungs  Cardiovascular - warm and well perfused  Chest - good chest expansion, good respiratory effort  Abdomen - Soft  Extremities - No calf tenderness  Neuro-     Cognitive - awake, alert, and oriented.  Psychiatric - Mood stable, Affect WNL    ___________________________________________________________________________

## 2025-03-20 NOTE — PROGRESS NOTE ADULT - NUTRITIONAL ASSESSMENT
74-year-old male patient with past medical history of HTN, diabetes mellitus with hyperglycemia, hypothyroidism, obesity, prostate cancer, atrial fibrillation, HLD, GI bleed, S/P TAVR (2023), and chronic back pain who is admitted for Acute Inpatient Rehabilitation with a multidisciplinary rehab program at [facility] with functional impairments in ADLs and mobility secondary to spinal osteomyelitis/discitis secondary to infected TAVR and aortic valve.      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   - LSO brace when OOB  - Ceftriaxone 2 g once daily via PICC through 4/7/25  - PICC line care  - Monitor CBC, CMP, and Weekly ESR and CRP.   - CXR weekly  - Comprehensive Multidisciplinary Rehab Program  - Ortho f/up after rehab d/c    #Severe aortic stenosis with chronic diastolic CHF and first degree AV block  - S/P TAVR (2023)  - ROVERTO (2/26): echogenic sessile not very mobile subcentimeter ( 6x2 mm) structure attached to the base of the leaflet corresponding to the native non-coronary cusp   - S/P Cardiac catheterization  - Monitor right wrist and right groin for bleeding  - No heavy lifting or strenuous activity until 3/10    #Arterial embolism of left leg  - S/P LCFA/LSFA thrombectomy/endarterectomy on 2/22  - Left groin staples should remain in place until 3/13  - Eliquis 5mg BID -- cleared to restart by cardiology on 3/6.    #Chronic atrial fibrillation  - Continue Eliquis as above  - Rate controlled without any av rosamaria agents    #Type 2 Diabetes Mellitus with hyperglycemia  - Consistent carb diet.   - On metformin and Farxiga at home  - Monitor glucose on labs.    #HTN  - Spironolactone 25mg daily  - Lasix 40mg daily    #HLD  - Atorvastatin 40mg daily at HS    #Hypothyroid  - levothyroxine 150mcg daily  - TSH 0.73 on 2/24/25    #Anemia  - Ferrous sulfate 325mg daily  - Trend H/H   - Transfuse if HgB <7    #Mood/Cognition:  - Neuropsychology consult PRN    #GI/Bowel:  - Senna 2 tabs QHS   - Miralax 17g Daily  - GI ppx: Pantoprazole 40mg daily before a meal  - bisacodyl 10mg suppository rectally once a day PRN constipation
This patient has been assessed with a concern for Malnutrition and has been determined to have a diagnosis/diagnoses of Moderate protein-calorie malnutrition.    This patient is being managed with:   Diet Regular-  Supplement Feeding Modality:  Oral  Ensure Max Cans or Servings Per Day:  1       Frequency:  Daily  Entered: Mar  7 2025  9:33AM  

## 2025-03-20 NOTE — PROGRESS NOTE ADULT - REASON FOR ADMISSION
Spinal osteomyelitis/discitis secondary to infected TAVR and aortic valve endocarditis

## 2025-03-20 NOTE — PROGRESS NOTE ADULT - PROVIDER SPECIALTY LIST ADULT
Hospitalist
Physiatry
Rehab Medicine
Hospitalist
Physiatry
Hospitalist
Hospitalist
Physiatry
Rehab Medicine
Hospitalist
Physiatry
Hospitalist
Hospitalist

## 2025-03-20 NOTE — PROGRESS NOTE ADULT - ASSESSMENT
Assessment/Plan:  Mr. Win Menezes is a 74-year-old male patient with past medical history of HTN, diabetes mellitus with hyperglycemia, hypothyroidism, obesity, prostate cancer, atrial fibrillation, HLD, GI bleed, S/P TAVR (2023), and chronic back pain who is admitted for Acute Inpatient Rehabilitation with a multidisciplinary rehab program at [facility] with functional impairments in ADLs and mobility secondary to spinal osteomyelitis/discitis secondary to infected TAVR and aortic valve.      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              - WBC 10.09 on 3/6             - ESR//206 on 2/20      * CXR weekly      * Daily weights      * LSO brace when OOB  Comprehensive Multidisciplinary Rehab Program:  - Start comprehensive rehab program, 3 hours a day, 5 days a week.  - PT 2hr/day: Focused on improving strength, endurance, coordination, balance, functional mobility, and transfers  - OT 1hr/day: Focused on improving strength, fine motor skills, coordination, posture and ADLs.    - Activity Limitations: Decreased social, vocational and leisure activities, decreased self care and ADLs, decreased mobility, decreased ability to manage household and finances.     -----------------------------------------------------------------------------------  Concurrent Medical Problems    #LLE edema/warmth/calf pain (on arrival 3/6)  - R/O DVT with bedside doppler 3/6    #Severe aortic stenosis with chronic diastolic CHF and first degree AV block  - S/P TAVR (2023)  - ROVERTO (2/26):       * echogenic sessile not very mobile subcentimeter ( 6x2 mm) structure attached to the base of the leaflet corresponding to the native non-coronary cusp       * likely representing a calcified nodule vs. less likely an old small calcified vegetation, mod AS  - S/P Cardiac catheterization  - Monitor right wrist and right groin for bleeding  - No heavy lifting or strenuous activity until 3/10    #Multilevel osteomyelitis   - Ceftriaxone through 4/7/25 as above  - LSO brace when OOB  - Ortho f/up after rehab d/c    #Arterial embolism of left leg  - S/P LCFA/LSFA thrombectomy/endarterectomy on 2/22  - Left groin staples should remain in place until 3/13  - Eliquis 5mg BID -- cleared to restart by cardiology on 3/6.  - DVT ppx: TEDs    #Bacteremia  - Strep salivarius bacteremia> cultures now clear.   - Ceftriaxone through 4/7/25 as above    #Chronic atrial fibrillation  - Continue Eliquis as above    #Type 2 Diabetes Mellitus with hyperglycemia  - Consistent carb diet.   - On metformin and Farxiga at home  - AM glucose 113-97; well controlled.  - Monitor glucose on AM labs.    #HTN  - Spironolactone 25mg daily  - Lasix 40mg daily    #HLD  - Atorvastatin 40mg daily at HS    #Hypothyroid  - levothyroxine 150mcg daily  - TSH 0.73 on 2/24/25    #Anemia  - Ferrous sulfate 325mg daily  - Trend H/H (10.9/32/9 on 3/6)    #Mood/Cognition:  - Neuropsychology consult PRN    #Sleep:   - Maintain quiet hours and low stim environment.  - Melatonin 6mg PRN to maximize participation in therapy during the day.     #Pain Management:  - Analgesic: Tylenol 975mg every 8 hours  - Tramadol 50mg every 4 hours PRN severe pain (7-10)  - Antispasmodic: Cyclobenzaprine 5mg TID PRN muscle spasm    #GI/Bowel:  - Senna 2 tabs QHS   - Miralax 17g Daily  - GI ppx: Pantoprazole 40mg daily before a meal  - bisacodyl 10mg suppository rectally once a day PRN constipation    #/Bladder:   - PVR on admission  - monitor urine output    #Skin/Pressure Injury assessment:   - Skin assessment on admission:       * Left groin 7.5cm incision with 13 staples present; no swelling/redness/drainage to site      * Right buttock healed scab    #Diet/Dysphagia:  - Current Diet: Dash diet, consistent carb diabetic diet  - Nutrition consult     #Patient Education  - Education provided on the following:      * Admitting diagnosis and functional implications      * Functional goals      * Bladder management      * Bowel management      * Skin care management      * Intensity of service and scheduling of rehab disciplines      * Plan of care and role of interdisciplinary team conference in discharge planning      * Reconciliation of medications from prior institution    #Precautions / PROPHYLAXIS:   - Falls, Cardiac, Spinal  - ortho: Weight bearing status: FWB  - Lungs: Aspiration, Incentive Spirometer   - Pressure injury/Skin: OOB to Chair, PT/OT      ---------------  Code Status: DNR/DNI; all other medical interventions allowed (see MOLST)  Emergency Contact:    Outpatient Follow-up (Specialty/Name of physician):    Dr. Fabio Marshall MD  Infectious Disease  [address] [place] [address]  Phone: [phone]    Abdulaziz Jernigan  [facility]  [facility] [address] [facility]  Scheduled Appointment: 03/17/2025  For kidney tests and CBC    Benito Jones  [facility]  [address]  Scheduled Appointment: 04/07/2025    Mahamed Heath  Cardiovascular Disease  [address] [facility], [address]  [place] [id number]  Phone: [phone]  Fax: [phone]  Follow Up Time: 1-2 weeks for ROVERTO and cardiac workup    Lizbeth Redd  Nephrology  [address] [facility], [address]  [place] [address]  Phone: [phone]  Fax: [phone]  Follow Up Time: within 4-6 weeks    Gualberto Porter  Gastroenterology  [address] [facility], [address]  [place] [address]  Phone: [phone]  Fax: [phone]  Follow Up Time:   For pill camera test    Berny Lizarraga [facility]  Neurosurgery  [address] [facility]  [place] [address]  Phone: [phone]  Fax: [phone]  Follow Up Time: 4 weeks  For MRI    Dr. Rodriges   Cardiothoracic surgery  Follow appointment after discharge  [phone]       --------------   Assessment/Plan:  Mr. Win Menezes is a 74-year-old male patient with past medical history of HTN, diabetes mellitus with hyperglycemia, hypothyroidism, obesity, prostate cancer, atrial fibrillation, HLD, GI bleed, S/P TAVR (2023), and chronic back pain who is admitted for Acute Inpatient Rehabilitation with a multidisciplinary rehab program at [facility] with functional impairments in ADLs and mobility secondary to spinal osteomyelitis/discitis secondary to infected TAVR and aortic valve.      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              - WBC 10.09 on 3/6             - ESR//206 on 2/20      * CXR weekly      * Daily weights      * LSO brace when OOB  - Discharge home today  -----------------------------------------------------------------------------------  Concurrent Medical Problems    #LLE edema/warmth/calf pain (on arrival 3/6)  - R/O DVT with bedside doppler 3/6    #Severe aortic stenosis with chronic diastolic CHF and first degree AV block  - S/P TAVR (2023)  - ROVERTO (2/26):       * echogenic sessile not very mobile subcentimeter ( 6x2 mm) structure attached to the base of the leaflet corresponding to the native non-coronary cusp       * likely representing a calcified nodule vs. less likely an old small calcified vegetation, mod AS  - S/P Cardiac catheterization  - Monitor right wrist and right groin for bleeding  - No heavy lifting or strenuous activity until 3/10    #Multilevel osteomyelitis   - Ceftriaxone through 4/7/25 as above  - LSO brace when OOB  - Ortho f/up after rehab d/c    #Arterial embolism of left leg  - S/P LCFA/LSFA thrombectomy/endarterectomy on 2/22  - Left groin staples should remain in place until 3/13  - Eliquis 5mg BID -- cleared to restart by cardiology on 3/6.  - DVT ppx: TEDs    #Bacteremia  - Strep salivarius bacteremia> cultures now clear.   - Ceftriaxone through 4/7/25 as above    #Chronic atrial fibrillation  - Continue Eliquis as above    #Type 2 Diabetes Mellitus with hyperglycemia  - Consistent carb diet.   - On metformin and Farxiga at home  - AM glucose 113-97; well controlled.  - Monitor glucose on AM labs.    #HTN  - Spironolactone 25mg daily  - Lasix 40mg daily    #HLD  - Atorvastatin 40mg daily at HS    #Hypothyroid  - levothyroxine 150mcg daily  - TSH 0.73 on 2/24/25    #Anemia  - Ferrous sulfate 325mg daily  - Trend H/H (10.9/32/9 on 3/6)    #Mood/Cognition:  - Neuropsychology consult PRN    #Sleep:   - Maintain quiet hours and low stim environment.  - Melatonin 6mg PRN to maximize participation in therapy during the day.     #Pain Management:  - Analgesic: Tylenol 975mg every 8 hours  - Tramadol 50mg every 4 hours PRN severe pain (7-10)  - Antispasmodic: Cyclobenzaprine 5mg TID PRN muscle spasm    #GI/Bowel:  - Senna 2 tabs QHS   - Miralax 17g Daily  - GI ppx: Pantoprazole 40mg daily before a meal  - bisacodyl 10mg suppository rectally once a day PRN constipation    #/Bladder:   - PVR on admission  - monitor urine output    #Skin/Pressure Injury assessment:   - Skin assessment on admission:       * Left groin 7.5cm incision with 13 staples present; no swelling/redness/drainage to site      * Right buttock healed scab    #Diet/Dysphagia:  - Current Diet: Dash diet, consistent carb diabetic diet  - Nutrition consult     #Patient Education  - Education provided on the following:      * Admitting diagnosis and functional implications      * Functional goals      * Bladder management      * Bowel management      * Skin care management      * Intensity of service and scheduling of rehab disciplines      * Plan of care and role of interdisciplinary team conference in discharge planning      * Reconciliation of medications from prior institution    #Precautions / PROPHYLAXIS:   - Falls, Cardiac, Spinal  - ortho: Weight bearing status: FWB  - Lungs: Aspiration, Incentive Spirometer   - Pressure injury/Skin: OOB to Chair, PT/OT      ---------------  Code Status: DNR/DNI; all other medical interventions allowed (see MOLST)  Emergency Contact:    Outpatient Follow-up (Specialty/Name of physician):    Dr. Fabio Marshall MD  Infectious Disease  [address],   [place] [address]  Phone: [phone]    [facility]  [facility] [address]  Scheduled Appointment: 03/17/2025  For kidney tests and CBC    Benito Jones  [facility]  [facility] [address]  Scheduled Appointment: 04/07/2025    Mahamed Heath  Cardiovascular Disease  [address] [facility], [address]  [place] [address]  Phone: [phone]  Fax: [phone]  Follow Up Time: 1-2 weeks for ROVERTO and cardiac workup    Lizbeth Redd  Nephrology  [address] [facility], [address]  [place] [address]  Phone: [phone]  Fax: [phone]  Follow Up Time: within 4-6 weeks    Gualberto Porter  Gastroenterology  [address] [facility], [address]  [place] [address]  Phone: [phone]  Fax: [phone]  Follow Up Time:   For pill camera test    [facility]  Neurosurgery  [address] [facility] [place]  [place] [address]  Phone: [phone]  Fax: [phone]  Follow Up Time: 4 weeks  For MRI    Dr. Rodriges   Cardiothoracic surgery  Follow appointment after discharge  [phone]       --------------

## 2025-03-20 NOTE — PROGRESS NOTE ADULT - ASSESSMENT
74-year-old male patient with past medical history of HTN, diabetes mellitus with hyperglycemia, hypothyroidism, obesity, prostate cancer, atrial fibrillation, HLD, GI bleed, S/P TAVR (2023), and chronic back pain who is admitted for Acute Inpatient Rehabilitation with a multidisciplinary rehab program at [facility] with functional impairments in ADLs and mobility secondary to spinal osteomyelitis/discitis secondary to infected TAVR and aortic valve.    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   - Ceftriaxone 2 g once daily via PICC through 4/7/25  - Monitor CBC, CMP, and Weekly ESR and CRP  - CXR weekly  - Ortho f/up after rehab d/c    #Severe aortic stenosis with chronic diastolic CHF and first degree AV block  - S/P TAVR (2023)  - ROVERTO (2/26): echogenic sessile not very mobile subcentimeter ( 6x2 mm) structure attached to the base of the leaflet corresponding to the native non-coronary cusp   - S/P Cardiac catheterization    #Arterial embolism of left leg  # R LE edema  - S/P LCFA/LSFA thrombectomy/endarterectomy on 2/22  - Left groin staples should remain in place until 3/13  - Eliquis 5mg BID  - Lasix/Spironolactone   - R LE doppler neg    #Chronic atrial fibrillation  - Eliquis  - Rate controlled without any av rosamaria agents    # Mild Hyponatremia  - stable     #Type 2 Diabetes Mellitus  - Ha1c 5.7  - Blood glucose levels are within normal limits   - On metformin and Farxiga at home. Not on any meds while here     #HTN  - Spironolactone 25mg daily  - Lasix 40mg daily    #HLD  - Atorvastatin     #Hypothyroid  - levothyroxine    #Anemia  - Ferrous sulfate 325mg daily  - Trend H/H   - Transfuse if HgB <7    DVT ppx- on Eliquis

## 2025-03-22 LAB
CULTURE RESULTS: SIGNIFICANT CHANGE UP
SPECIMEN SOURCE: SIGNIFICANT CHANGE UP

## 2025-03-25 ENCOUNTER — APPOINTMENT (OUTPATIENT)
Dept: FAMILY MEDICINE | Facility: CLINIC | Age: 75
End: 2025-03-25
Payer: MEDICARE

## 2025-03-25 VITALS
TEMPERATURE: 97.2 F | WEIGHT: 195 LBS | BODY MASS INDEX: 29.65 KG/M2 | OXYGEN SATURATION: 98 % | HEART RATE: 111 BPM | SYSTOLIC BLOOD PRESSURE: 108 MMHG | DIASTOLIC BLOOD PRESSURE: 78 MMHG

## 2025-03-25 DIAGNOSIS — E03.9 HYPOTHYROIDISM, UNSPECIFIED: ICD-10-CM

## 2025-03-25 DIAGNOSIS — R00.2 PALPITATIONS: ICD-10-CM

## 2025-03-25 DIAGNOSIS — B34.9 VIRAL INFECTION, UNSPECIFIED: ICD-10-CM

## 2025-03-25 DIAGNOSIS — S39.012S STRAIN OF MUSCLE, FASCIA AND TENDON OF LOWER BACK, SEQUELA: ICD-10-CM

## 2025-03-25 DIAGNOSIS — G89.29 LOW BACK PAIN, UNSPECIFIED: ICD-10-CM

## 2025-03-25 DIAGNOSIS — Z86.03 PERSONAL HISTORY OF NEOPLASM OF UNCERTAIN BEHAVIOR: ICD-10-CM

## 2025-03-25 DIAGNOSIS — E66.09 OTHER OBESITY DUE TO EXCESS CALORIES: ICD-10-CM

## 2025-03-25 DIAGNOSIS — M54.50 LOW BACK PAIN, UNSPECIFIED: ICD-10-CM

## 2025-03-25 DIAGNOSIS — I10 ESSENTIAL (PRIMARY) HYPERTENSION: ICD-10-CM

## 2025-03-25 DIAGNOSIS — Z71.84 ENC FOR HEALTH COUNSELING RELATED TO TRAVEL: ICD-10-CM

## 2025-03-25 PROCEDURE — 99496 TRANSJ CARE MGMT HIGH F2F 7D: CPT

## 2025-03-26 RX ORDER — LOSARTAN POTASSIUM 100 MG/1
100 TABLET, FILM COATED ORAL
Qty: 90 | Refills: 0 | Status: ACTIVE | COMMUNITY
Start: 2025-03-26 | End: 1900-01-01

## 2025-03-26 RX ORDER — FUROSEMIDE 40 MG/1
40 TABLET ORAL DAILY
Qty: 30 | Refills: 5 | Status: ACTIVE | COMMUNITY
Start: 2025-03-26

## 2025-03-26 RX ORDER — DAPAGLIFLOZIN 10 MG/1
10 TABLET, FILM COATED ORAL
Qty: 30 | Refills: 0 | Status: ACTIVE | COMMUNITY
Start: 2025-03-26

## 2025-03-26 RX ORDER — SPIRONOLACTONE 25 MG/1
25 TABLET ORAL
Qty: 90 | Refills: 0 | Status: ACTIVE | COMMUNITY
Start: 2025-03-26

## 2025-03-28 ENCOUNTER — EMERGENCY (EMERGENCY)
Facility: HOSPITAL | Age: 75
LOS: 0 days | Discharge: HOME | End: 2025-03-28
Attending: FAMILY MEDICINE
Payer: MEDICARE

## 2025-03-28 ENCOUNTER — APPOINTMENT (OUTPATIENT)
Dept: FAMILY MEDICINE | Facility: CLINIC | Age: 75
End: 2025-03-28
Payer: MEDICARE

## 2025-03-28 ENCOUNTER — APPOINTMENT (OUTPATIENT)
Dept: ULTRASOUND IMAGING | Facility: CLINIC | Age: 75
End: 2025-03-28

## 2025-03-28 VITALS
SYSTOLIC BLOOD PRESSURE: 110 MMHG | HEART RATE: 100 BPM | TEMPERATURE: 98.6 F | DIASTOLIC BLOOD PRESSURE: 60 MMHG | WEIGHT: 195 LBS | HEIGHT: 68 IN | OXYGEN SATURATION: 98 % | BODY MASS INDEX: 29.55 KG/M2

## 2025-03-28 VITALS
RESPIRATION RATE: 16 BRPM | OXYGEN SATURATION: 97 % | TEMPERATURE: 99 F | SYSTOLIC BLOOD PRESSURE: 117 MMHG | HEART RATE: 79 BPM | DIASTOLIC BLOOD PRESSURE: 68 MMHG

## 2025-03-28 VITALS
TEMPERATURE: 98 F | HEART RATE: 72 BPM | SYSTOLIC BLOOD PRESSURE: 129 MMHG | HEIGHT: 68 IN | WEIGHT: 173.94 LBS | OXYGEN SATURATION: 97 % | RESPIRATION RATE: 17 BRPM | DIASTOLIC BLOOD PRESSURE: 78 MMHG

## 2025-03-28 DIAGNOSIS — Z79.01 LONG TERM (CURRENT) USE OF ANTICOAGULANTS: ICD-10-CM

## 2025-03-28 DIAGNOSIS — M62.830 MUSCLE SPASM OF BACK: ICD-10-CM

## 2025-03-28 DIAGNOSIS — Z79.891 LONG TERM (CURRENT) USE OF OPIATE ANALGESIC: ICD-10-CM

## 2025-03-28 DIAGNOSIS — I38 ENDOCARDITIS, VALVE UNSPECIFIED: ICD-10-CM

## 2025-03-28 DIAGNOSIS — I45.10 UNSPECIFIED RIGHT BUNDLE-BRANCH BLOCK: ICD-10-CM

## 2025-03-28 DIAGNOSIS — M79.604 PAIN IN RIGHT LEG: ICD-10-CM

## 2025-03-28 DIAGNOSIS — M46.20 OSTEOMYELITIS OF VERTEBRA, SITE UNSPECIFIED: ICD-10-CM

## 2025-03-28 DIAGNOSIS — I33.0 ACUTE AND SUBACUTE INFECTIVE ENDOCARDITIS: ICD-10-CM

## 2025-03-28 DIAGNOSIS — R60.0 LOCALIZED EDEMA: ICD-10-CM

## 2025-03-28 DIAGNOSIS — R55 SYNCOPE AND COLLAPSE: ICD-10-CM

## 2025-03-28 DIAGNOSIS — Z79.890 HORMONE REPLACEMENT THERAPY: ICD-10-CM

## 2025-03-28 DIAGNOSIS — E11.42 TYPE 2 DIABETES MELLITUS WITH DIABETIC POLYNEUROPATHY: ICD-10-CM

## 2025-03-28 PROBLEM — M86.10 OSTEOMYELITIS, ACUTE: Status: ACTIVE | Noted: 2025-03-25

## 2025-03-28 LAB
ALBUMIN SERPL ELPH-MCNC: 3.6 G/DL — SIGNIFICANT CHANGE UP (ref 3.3–5)
ALP SERPL-CCNC: 117 U/L — SIGNIFICANT CHANGE UP (ref 40–120)
ALT FLD-CCNC: 18 U/L — SIGNIFICANT CHANGE UP (ref 12–78)
ANION GAP SERPL CALC-SCNC: 6 MMOL/L — SIGNIFICANT CHANGE UP (ref 5–17)
APTT BLD: 36.4 SEC — HIGH (ref 24.5–35.6)
AST SERPL-CCNC: 19 U/L — SIGNIFICANT CHANGE UP (ref 15–37)
BASOPHILS # BLD AUTO: 0.02 K/UL — SIGNIFICANT CHANGE UP (ref 0–0.2)
BASOPHILS NFR BLD AUTO: 0.2 % — SIGNIFICANT CHANGE UP (ref 0–2)
BILIRUB SERPL-MCNC: 0.8 MG/DL — SIGNIFICANT CHANGE UP (ref 0.2–1.2)
BUN SERPL-MCNC: 42 MG/DL — HIGH (ref 7–23)
CALCIUM SERPL-MCNC: 10.2 MG/DL — HIGH (ref 8.5–10.1)
CHLORIDE SERPL-SCNC: 93 MMOL/L — LOW (ref 96–108)
CO2 SERPL-SCNC: 31 MMOL/L — SIGNIFICANT CHANGE UP (ref 22–31)
CREAT SERPL-MCNC: 1.62 MG/DL — HIGH (ref 0.5–1.3)
EGFR: 44 ML/MIN/1.73M2 — LOW
EGFR: 44 ML/MIN/1.73M2 — LOW
EOSINOPHIL # BLD AUTO: 0.04 K/UL — SIGNIFICANT CHANGE UP (ref 0–0.5)
EOSINOPHIL NFR BLD AUTO: 0.5 % — SIGNIFICANT CHANGE UP (ref 0–6)
GLUCOSE SERPL-MCNC: 114 MG/DL — HIGH (ref 70–99)
HCT VFR BLD CALC: 34 % — LOW (ref 39–50)
HGB BLD-MCNC: 11.3 G/DL — LOW (ref 13–17)
IMM GRANULOCYTES # BLD AUTO: 0.03 K/UL — SIGNIFICANT CHANGE UP (ref 0–0.07)
IMM GRANULOCYTES NFR BLD AUTO: 0.4 % — SIGNIFICANT CHANGE UP (ref 0–0.9)
INR BLD: 1.33 RATIO — HIGH (ref 0.85–1.16)
LACTATE SERPL-SCNC: 1.2 MMOL/L — SIGNIFICANT CHANGE UP (ref 0.7–2)
LYMPHOCYTES # BLD AUTO: 0.91 K/UL — LOW (ref 1–3.3)
LYMPHOCYTES NFR BLD AUTO: 10.7 % — LOW (ref 13–44)
MCHC RBC-ENTMCNC: 28.8 PG — SIGNIFICANT CHANGE UP (ref 27–34)
MCHC RBC-ENTMCNC: 33.2 G/DL — SIGNIFICANT CHANGE UP (ref 32–36)
MCV RBC AUTO: 86.7 FL — SIGNIFICANT CHANGE UP (ref 80–100)
MONOCYTES # BLD AUTO: 0.52 K/UL — SIGNIFICANT CHANGE UP (ref 0–0.9)
MONOCYTES NFR BLD AUTO: 6.1 % — SIGNIFICANT CHANGE UP (ref 2–14)
NEUTROPHILS # BLD AUTO: 7.01 K/UL — SIGNIFICANT CHANGE UP (ref 1.8–7.4)
NEUTROPHILS NFR BLD AUTO: 82.1 % — HIGH (ref 43–77)
NRBC # BLD AUTO: 0 K/UL — SIGNIFICANT CHANGE UP (ref 0–0)
NRBC # FLD: 0 K/UL — SIGNIFICANT CHANGE UP (ref 0–0)
NRBC BLD AUTO-RTO: 0 /100 WBCS — SIGNIFICANT CHANGE UP (ref 0–0)
PLATELET # BLD AUTO: 254 K/UL — SIGNIFICANT CHANGE UP (ref 150–400)
PMV BLD: 9.2 FL — SIGNIFICANT CHANGE UP (ref 7–13)
POTASSIUM SERPL-MCNC: 3.9 MMOL/L — SIGNIFICANT CHANGE UP (ref 3.5–5.3)
POTASSIUM SERPL-SCNC: 3.9 MMOL/L — SIGNIFICANT CHANGE UP (ref 3.5–5.3)
PROT SERPL-MCNC: 8.4 GM/DL — HIGH (ref 6–8.3)
PROTHROM AB SERPL-ACNC: 15.6 SEC — HIGH (ref 9.9–13.4)
RBC # BLD: 3.92 M/UL — LOW (ref 4.2–5.8)
RBC # FLD: 17.2 % — HIGH (ref 10.3–14.5)
SODIUM SERPL-SCNC: 130 MMOL/L — LOW (ref 135–145)
TROPONIN I, HIGH SENSITIVITY RESULT: 75.11 NG/L — SIGNIFICANT CHANGE UP
WBC # BLD: 8.53 K/UL — SIGNIFICANT CHANGE UP (ref 3.8–10.5)
WBC # FLD AUTO: 8.53 K/UL — SIGNIFICANT CHANGE UP (ref 3.8–10.5)

## 2025-03-28 PROCEDURE — 85730 THROMBOPLASTIN TIME PARTIAL: CPT

## 2025-03-28 PROCEDURE — 85610 PROTHROMBIN TIME: CPT

## 2025-03-28 PROCEDURE — 93010 ELECTROCARDIOGRAM REPORT: CPT

## 2025-03-28 PROCEDURE — 71045 X-RAY EXAM CHEST 1 VIEW: CPT

## 2025-03-28 PROCEDURE — 93005 ELECTROCARDIOGRAM TRACING: CPT

## 2025-03-28 PROCEDURE — 85025 COMPLETE CBC W/AUTO DIFF WBC: CPT

## 2025-03-28 PROCEDURE — 80053 COMPREHEN METABOLIC PANEL: CPT

## 2025-03-28 PROCEDURE — 93971 EXTREMITY STUDY: CPT | Mod: RT

## 2025-03-28 PROCEDURE — 83605 ASSAY OF LACTIC ACID: CPT

## 2025-03-28 PROCEDURE — 70450 CT HEAD/BRAIN W/O DYE: CPT | Mod: 26

## 2025-03-28 PROCEDURE — 86901 BLOOD TYPING SEROLOGIC RH(D): CPT

## 2025-03-28 PROCEDURE — 99213 OFFICE O/P EST LOW 20 MIN: CPT

## 2025-03-28 PROCEDURE — 70450 CT HEAD/BRAIN W/O DYE: CPT | Mod: MC

## 2025-03-28 PROCEDURE — 93971 EXTREMITY STUDY: CPT | Mod: 26,RT

## 2025-03-28 PROCEDURE — 86900 BLOOD TYPING SEROLOGIC ABO: CPT

## 2025-03-28 PROCEDURE — 99285 EMERGENCY DEPT VISIT HI MDM: CPT

## 2025-03-28 PROCEDURE — 96374 THER/PROPH/DIAG INJ IV PUSH: CPT

## 2025-03-28 PROCEDURE — 82962 GLUCOSE BLOOD TEST: CPT

## 2025-03-28 PROCEDURE — 87040 BLOOD CULTURE FOR BACTERIA: CPT

## 2025-03-28 PROCEDURE — 71045 X-RAY EXAM CHEST 1 VIEW: CPT | Mod: 26

## 2025-03-28 PROCEDURE — 36415 COLL VENOUS BLD VENIPUNCTURE: CPT

## 2025-03-28 PROCEDURE — 86850 RBC ANTIBODY SCREEN: CPT

## 2025-03-28 PROCEDURE — 99285 EMERGENCY DEPT VISIT HI MDM: CPT | Mod: 25

## 2025-03-28 PROCEDURE — 84484 ASSAY OF TROPONIN QUANT: CPT

## 2025-03-28 RX ORDER — CEFTRIAXONE 500 MG/1
2000 INJECTION, POWDER, FOR SOLUTION INTRAMUSCULAR; INTRAVENOUS ONCE
Refills: 0 | Status: DISCONTINUED | OUTPATIENT
Start: 2025-03-28 | End: 2025-03-28

## 2025-03-28 RX ORDER — CEFTRIAXONE 500 MG/1
2000 INJECTION, POWDER, FOR SOLUTION INTRAMUSCULAR; INTRAVENOUS ONCE
Refills: 0 | Status: COMPLETED | OUTPATIENT
Start: 2025-03-28 | End: 2025-03-28

## 2025-03-28 RX ORDER — ACETAMINOPHEN 500 MG/5ML
1000 LIQUID (ML) ORAL ONCE
Refills: 0 | Status: COMPLETED | OUTPATIENT
Start: 2025-03-28 | End: 2025-03-28

## 2025-03-28 RX ADMIN — Medication 1000 MILLILITER(S): at 19:57

## 2025-03-28 RX ADMIN — CEFTRIAXONE 2000 MILLIGRAM(S): 500 INJECTION, POWDER, FOR SOLUTION INTRAMUSCULAR; INTRAVENOUS at 19:57

## 2025-03-28 RX ADMIN — Medication 1000 MILLIGRAM(S): at 19:56

## 2025-03-28 NOTE — ED ADULT NURSE NOTE - NSFALLHARMRISKINTERV_ED_ALL_ED
Assistance OOB with selected safe patient handling equipment if applicable/Assistance with ambulation/Communicate risk of Fall with Harm to all staff, patient, and family/Monitor gait and stability/Provide visual cue: red socks, yellow wristband, yellow gown, etc/Reinforce activity limits and safety measures with patient and family/Bed in lowest position, wheels locked, appropriate side rails in place/Call bell, personal items and telephone in reach/Instruct patient to call for assistance before getting out of bed/chair/stretcher/Non-slip footwear applied when patient is off stretcher/[place] to call system/Physically safe environment - no spills, clutter or unnecessary equipment/Purposeful Proactive Rounding/Room/bathroom lighting operational, light cord in reach

## 2025-03-28 NOTE — ED PROVIDER NOTE - MUSCULOSKELETAL, MLM
Spine appears normal, range of motion is not limited, no muscle or joint tenderness 1+ edema lower ext

## 2025-03-28 NOTE — ED PROVIDER NOTE - OBJECTIVE STATEMENT
Patient is a 74-year-old male with a history of endocarditis septic emboli on IV ceftriaxone osteomyelitis of the spine who developed pain in the right leg patient was seen by his primary Dr. Jernigan and was sent for Doppler when patient got out of the car after driving to the radiologic suite he became dizzy went into the place and then started having dry heaves.  No loss of consciousness no chest pain although he states he was a little short of breath. No recent cough or fever.  No headache.

## 2025-03-28 NOTE — ED ADULT NURSE NOTE - OBJECTIVE STATEMENT
pt c/o dizziness, near syncope, right leg pain, dry heaving. denies cp.  pt on abx at home iv through picc line for endocarditis.pmh: osteo spine, right leg sore, hypothyroid.

## 2025-03-28 NOTE — ED PROVIDER NOTE - PROGRESS NOTE DETAILS
Spoke to Dr. Jernigan. 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 Pt is already anticoagulated on eloquis and probably asprin. We will have pt follow up with neurology. Results discussed with patient. Spoke to Dr. Jernigan. 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 Pt is already anticoagulated on eliquis and probably asprin. We will have pt follow up with neurology. Results discussed with patient and spouse. IVF ordered..

## 2025-03-28 NOTE — ED PROVIDER NOTE - PATIENT PORTAL LINK FT
You can access the FollowMyHealth Patient Portal offered by [facility] by registering at the following website: [email or web address]. By joining [facility]’s FollowMyHealth portal, you will also be able to view your health information using other applications (apps) compatible with our system.

## 2025-03-28 NOTE — ED PROVIDER NOTE - CLINICAL SUMMARY MEDICAL DECISION MAKING FREE TEXT BOX
Patient with a history of endocarditis osteomyelitis of the spine on IV antibiotics via midline septic emboli to the left leg who had pain in the right leg seen by primary sent for Doppler patient had an episode of dizziness nausea and dry heaving on the way to radiology.  No chest pain.  Labs EKG CT head Doppler.

## 2025-03-28 NOTE — ED ADULT NURSE NOTE - CHIEF COMPLAINT QUOTE
Pt A&OX3, BIBEMS with c/o near syncope. EMS reports  he was on his way to radiology to rule out a DVT. While getting out of his car he started to feeling lightheaded and nauseous.  HX: Endocarditis and DVT left leg.  Pt denies CP, SOB, fevers. lightheaded or dizzy at this time.  Taken for STAT EKG and cardiac monitor.  in triage.

## 2025-03-28 NOTE — ED ADULT TRIAGE NOTE - CHIEF COMPLAINT QUOTE
Pt A&OX3, BIBEMS with c/o near syncope. EMS reports  he was on his way to radiology to rule out a DVT. While getting out of his car he started to feeling lightheaded and nauseous.  HX: Endocarditis and DVT left leg.  Pt denies CP, SOB, fevers. lightheaded or dizzy at this time.  Taken for STAT EKG and cardiac monitor. Pt A&OX3, BIBEMS with c/o near syncope. EMS reports  he was on his way to radiology to rule out a DVT. While getting out of his car he started to feeling lightheaded and nauseous.  HX: Endocarditis and DVT left leg.  Pt denies CP, SOB, fevers. lightheaded or dizzy at this time.  Taken for STAT EKG and cardiac monitor.  in triage.

## 2025-03-30 LAB
ALBUMIN SERPL ELPH-MCNC: 4.2 G/DL
ALP BLD-CCNC: 139 U/L
ALT SERPL-CCNC: 16 U/L
ANION GAP SERPL CALC-SCNC: 17 MMOL/L
AST SERPL-CCNC: 22 U/L
BASOPHILS # BLD AUTO: 0.04 K/UL
BASOPHILS NFR BLD AUTO: 0.5 %
BILIRUB SERPL-MCNC: 0.9 MG/DL
BUN SERPL-MCNC: 42 MG/DL
CALCIUM SERPL-MCNC: 9.8 MG/DL
CHLORIDE SERPL-SCNC: 91 MMOL/L
CO2 SERPL-SCNC: 24 MMOL/L
CREAT SERPL-MCNC: 2.18 MG/DL
EGFRCR SERPLBLD CKD-EPI 2021: 31 ML/MIN/1.73M2
EOSINOPHIL # BLD AUTO: 0.02 K/UL
EOSINOPHIL NFR BLD AUTO: 0.2 %
ESTIMATED AVERAGE GLUCOSE: 123 MG/DL
GLUCOSE SERPL-MCNC: 154 MG/DL
HBA1C MFR BLD HPLC: 5.9 %
HCT VFR BLD CALC: 36.2 %
HGB BLD-MCNC: 11.9 G/DL
IMM GRANULOCYTES NFR BLD AUTO: 0.4 %
LYMPHOCYTES # BLD AUTO: 0.87 K/UL
LYMPHOCYTES NFR BLD AUTO: 10.7 %
MAN DIFF?: NORMAL
MCHC RBC-ENTMCNC: 28.4 PG
MCHC RBC-ENTMCNC: 32.9 G/DL
MCV RBC AUTO: 86.4 FL
MONOCYTES # BLD AUTO: 0.45 K/UL
MONOCYTES NFR BLD AUTO: 5.6 %
NEUTROPHILS # BLD AUTO: 6.69 K/UL
NEUTROPHILS NFR BLD AUTO: 82.6 %
PLATELET # BLD AUTO: 274 K/UL
POTASSIUM SERPL-SCNC: 4.2 MMOL/L
PROT SERPL-MCNC: 7.5 G/DL
RBC # BLD: 4.19 M/UL
RBC # FLD: 17.5 %
SODIUM SERPL-SCNC: 132 MMOL/L
T3FREE SERPL-MCNC: 1.6 PG/ML
T4 FREE SERPL-MCNC: 1.9 NG/DL
TSH SERPL-ACNC: 1.35 UIU/ML
WBC # FLD AUTO: 8.1 K/UL

## 2025-04-01 ENCOUNTER — LABORATORY RESULT (OUTPATIENT)
Age: 75
End: 2025-04-01

## 2025-04-02 ENCOUNTER — APPOINTMENT (OUTPATIENT)
Dept: INTERNAL MEDICINE | Facility: CLINIC | Age: 75
End: 2025-04-02
Payer: MEDICARE

## 2025-04-02 ENCOUNTER — NON-APPOINTMENT (OUTPATIENT)
Age: 75
End: 2025-04-02

## 2025-04-02 VITALS
HEART RATE: 86 BPM | WEIGHT: 173 LBS | RESPIRATION RATE: 16 BRPM | BODY MASS INDEX: 26.22 KG/M2 | DIASTOLIC BLOOD PRESSURE: 52 MMHG | HEIGHT: 68 IN | SYSTOLIC BLOOD PRESSURE: 107 MMHG

## 2025-04-02 DIAGNOSIS — M46.46 DISCITIS, UNSPECIFIED, LUMBAR REGION: ICD-10-CM

## 2025-04-02 DIAGNOSIS — Z09 ENCOUNTER FOR FOLLOW-UP EXAMINATION AFTER COMPLETED TREATMENT FOR CONDITIONS OTHER THAN MALIGNANT NEOPLASM: ICD-10-CM

## 2025-04-02 DIAGNOSIS — Z79.2 LONG TERM (CURRENT) USE OF ANTIBIOTICS: ICD-10-CM

## 2025-04-02 PROCEDURE — 99215 OFFICE O/P EST HI 40 MIN: CPT

## 2025-04-02 NOTE — CONSULT NOTE ADULT - SUBJECTIVE AND OBJECTIVE BOX
Chief Complaint   Patient presents with    Follow-up     Cardiac management    Lab     [facility]     Med Refill     Needs refills on cardiac medications -[facility] day [facility] to [facility].        HPI:  HPI   John Urbina is a 75 y.o. male seen in the office today to establish cardiac care.  His past medical history includes: Hypertension, hyperlipidemia, CAD, CKD, and iron deficiency anemia.  He underwent three-vessel coronary artery bypass grafting per Dr. Fox in 2007.  Patient admits being diagnosed with mild stenosis and carotid arteries but unsure when last checked.  He has a history of smoking during  with cessation in 1990.  He admits to daily alcohol intake but no more than 2 beers.     In September 2022, he was seen in the hospital due to dizziness and hypertension.  CT head scan was negative for active or acute intracranial processes.  CT of the chest without contrast showed emphysema.  Cardiac size was normal with mention of advanced atherosclerotic plaque formation throughout the coronary arteries.  He was diagnosed and treated for iron deficiency anemia. He is following with Dr Pathak pulmonology and [facility] for decreased GFR and elevated creatinine. Labs and refills are managed at [facility]. Carotid ultrasound 1/2023 revealed 70 to 94% left carotid stenosis. MRA neck 1/30/2024 showed 50% stenosis proximal LICA and no significant stenosis right carotid     Labs at the hospital 9/2024: H/H12.0/38.3, potassium 5.3, creatinine 2.59, BUN 63, GFR 25, normal liver enzymes, troponin 13 and following trop 13. He states he is not following with nephrology anymore.  [facility] in [place] and states he will not drive all that way for a 5-minute visit.    He returns today for follow-up visit. Patient denies chest pain, pressure, palpitations, tightness, dizziness, shortness of air. [facility] scheduled at [facility] in a week or so.  He states he is doing well and has no  cardiac complaints.    Cardiac History:    Past Surgical History:   Procedure Laterality Date    CARDIOVASCULAR STRESS TEST  10/24/2024    Lexiscan- EF 54%. 154/70. Apical & Septal Infarct & Ischemia    ECHO - CONVERTED  01/05/2023    TLS. EF 60%. LA- 4.4. AO- 4.3. Trace-Mild MR    ECHO - CONVERTED  10/24/2024    TLS. EF 50%. septal WMA. LA- 4.2.AO- 4.4.Trace MR. RVSP- 47 mmHg    US CAROTID UNILATERAL Bilateral 01/05/2023    LICA: 70-94%       Current Outpatient Medications   Medication Sig Dispense Refill    amLODIPine (NORVASC) 5 MG tablet Take 1 tablet by mouth Daily.      aspirin 81 MG EC tablet Take 1 tablet by mouth Daily.      atenolol (TENORMIN) 25 MG tablet Take 1 tablet by mouth Daily.      Budeson-Glycopyrrol-Formoterol (BREZTRI AEROSPHERE IN) Inhale 2 puffs 2 (Two) Times a Day.      CHLORPHENIRAMINE MALEATE ER PO Take 1 tablet by mouth Daily With Breakfast & Dinner.      ferrous sulfate 325 (65 FE) MG tablet Take 1 tablet by mouth Daily With Breakfast.      folic acid (FOLVITE) 1 MG tablet Take 1 tablet by mouth Daily.      hydroCHLOROthiazide (HYDRODIURIL) 25 MG tablet Take 1 tablet by mouth Daily.      isosorbide mononitrate (IMDUR) 30 MG 24 hr tablet Take 1 tablet by mouth Daily. 90 tablet 3    loratadine (Claritin) 10 MG tablet Take 1 tablet by mouth Daily.      pantoprazole (PROTONIX) 40 MG EC tablet Take 1 tablet by mouth Daily.      rosuvastatin (CRESTOR) 20 MG tablet Take 1 tablet by mouth Daily.      sacubitril-valsartan (ENTRESTO) 24-26 MG tablet Take 1 tablet by mouth 2 (Two) Times a Day. Stop lisinopril for 3 days prior to initiating Entresto 60 tablet 6    vitamin B-12 (CYANOCOBALAMIN) 500 MCG tablet Take 1 tablet by mouth Daily.      vitamin C (ASCORBIC ACID) 250 MG tablet Take 1 tablet by mouth Daily.       No current facility-administered medications for this visit.       Morphine    Past Medical History:   Diagnosis Date    COPD (chronic obstructive pulmonary disease)     Hyperlipidemia   "   Kidney stones     Myocardial infarction        Social History     Socioeconomic History    Marital status:    Tobacco Use    Smoking status: Former     Current packs/day: 0.00     Average packs/day: 1 pack/day for 30.0 years (30.0 ttl pk-yrs)     Types: Cigarettes     Start date:      Quit date:      Years since quittin.2     Passive exposure: Past    Smokeless tobacco: Former     Types: Chew   Vaping Use    Vaping status: Never Used   Substance and Sexual Activity    Alcohol use: Not Currently     Comment: occasional beer    Drug use: Never    Sexual activity: Defer       Family History   Problem Relation Age of Onset    Lung cancer Mother     Emphysema Mother     Emphysema Father     Diabetes Sister     No Known Problems Brother        Vitals:   /64 (BP Location: Right arm, Patient Position: Sitting)   Pulse 64   Ht 170.2 cm (67.01\")   Wt 69.2 kg (152 lb 9.6 oz)   BMI 23.89 kg/m²     Physical Exam  Vitals and nursing note reviewed.   Neck:      Vascular: No carotid bruit.   Cardiovascular:      Rate and Rhythm: Normal rate and regular rhythm.      Pulses: Normal pulses.      Heart sounds: Normal heart sounds. No murmur heard.     No friction rub. No gallop.   Pulmonary:      Effort: Pulmonary effort is normal.      Breath sounds: Normal air entry. Rhonchi present.   Musculoskeletal:      Right lower leg: No edema.      Left lower leg: No edema.   Skin:     General: Skin is warm and dry.      Capillary Refill: Capillary refill takes less than 2 seconds.   Neurological:      Mental Status: He is alert and oriented to person, place, and time.       Procedures     Assessment & Plan     Diagnoses and all orders for this visit:    1. Left carotid stenosis (Primary)  -     Cancel: MRI Angiogram Neck Without Contrast; Future  -     MRI Angiogram Neck Without Contrast; Future    2. Primary hypertension    3. Pure hypercholesterolemia    4. IHD (ischemic heart disease)    Other orders  -     " Patient is a 74y old  Male who presents with a chief complaint of low blood counts, chest pain    HPI:  This is a 74 year old male with history TAVR 1/2023, AF on Eliquis (started 2 mos ago), GERD on daily PPI, chronic back pain presenting to [facility] with low blood counts, weakness, and chest pain. Pateint evaluated ta bedside with wife present to assist with collateral history. Patient admits he has chronic lower back pain which has been worsening and has been high dose diclofenac, tumeric, with recent epidural injection one week ago and planned for additional epidural this upcoming Thursday. At home, developed non radiating chest pain, chewed aspirin, with then lone episode vomiting per wife "looked like dinner from last night" per patient "maroon/purple" emesis. In ambulance given NTG with relief of chest pain. Denies abdominal or epigastric pain. Does have GERD on PPI daily. Denies reflux or heartburn. Last EGD 10 years ago. Last colonoscopy 6 years ago with "a few polyps" followed by Dr. Staton @ [facility]. Denies hematochezia, melena, or hematemesis. Last stool formed, soft, brown yesterday per patient. Denies ill contacts, recent travel, did have sushi last night. History daily etoh with last drink 5 months ago. In ED, anemic with Hgb 7.5, DARLINE with Cr 2.2/BUN 69, tmax 101.3, hypotensive, tachycardic, hypoxic now on O2-3liters satting 96%. PRBC transfusing. CT negative for active bleeding. Critical care to evaluate for persistent hypotension requiring IV pressors.    PAST MEDICAL & SURGICAL HISTORY:  Hypertension      Diabetes mellitus      Obesity      Hypothyroidism      Prostate CA    MEDICATIONS  (STANDING):  norepinephrine Infusion 0.05 MICROgram(s)/kG/Min (8.38 mL/Hr) IV Continuous <Continuous>  pantoprazole Infusion 8 mG/Hr (10 mL/Hr) IV Continuous <Continuous>    MEDICATIONS  (PRN):      Allergies    No Known Allergies    Intolerances    SOCIAL HISTORY:    FAMILY HISTORY:    REVIEW OF SYSTEMS:    CONSTITUTIONAL: No weakness, fevers or chills  EYES/ENT: No visual changes;  No vertigo or throat pain   NECK: No pain or stiffness  RESPIRATORY: No cough, wheezing, hemoptysis; No shortness of breath  CARDIOVASCULAR: No chest pain or palpitations  GASTROINTESTINAL: See HPI  GENITOURINARY: No dysuria, frequency or hematuria  NEUROLOGICAL: No numbness or weakness  SKIN: No itching, burning, rashes, or lesions   PSYCH: Normal mood and affect  All other review of systems is negative unless indicated above.    Vital Signs Last 24 Hrs  T(C): 36.8 (10 Feb 2025 12:15), Max: 38.5 (10 Feb 2025 09:16)  T(F): 98.3 (10 Feb 2025 12:15), Max: 101.3 (10 Feb 2025 09:16)  HR: 87 (10 Feb 2025 12:45) (87 - 108)  BP: 99/64 (10 Feb 2025 12:45) (86/51 - 117/67)  BP(mean): 76 (10 Feb 2025 12:45) (63 - 76)  RR: 18 (10 Feb 2025 12:45) (18 - 20)  SpO2: 100% (10 Feb 2025 12:45) (93% - 100%)    Parameters below as of 10 Feb 2025 12:45  Patient On (Oxygen Delivery Method): nasal cannula  O2 Flow (L/min): 4      PHYSICAL EXAM:    Constitutional: No acute distress, pale, non-toxic appearing  HEENT: masked, good phonation, not icteric  Neck: supple, no lymphadenopathy  Respiratory: clear to ascultation bilaterally, no wheezing  Cardiovascular: S1 and S2, regular rate and rhythm, no murmurs rubs or gallops  Gastrointestinal: soft, non-tender, non-distended, +bowel sounds, no rebound or guarding, no surgical scars, no drains  Extremities: No peripheral edema, no cyanosis or clubbing  Vascular: 2+ peripheral pulses, no venous stasis  Neurological: A/O x 3, no focal deficits, no asterixis  Psychiatric: Normal mood, normal affect  Skin: No rashes, not jaundiced    LABS:                        7.5    16.17 )-----------( 242      ( 10 Feb 2025 09:02 )             22.6     02-10    133[L]  |  106  |  69[H]  ----------------------------<  148[H]  4.3   |  22  |  2.25[H]    Ca    9.2      10 Feb 2025 09:02    TPro  7.0  /  Alb  2.5[L]  /  TBili  0.6  /  DBili  x   /  AST  19  /  ALT  22  /  AlkPhos  117  02-10    PT/INR - ( 10 Feb 2025 09:02 )   PT: 17.5 sec;   INR: 1.49 ratio         PTT - ( 10 Feb 2025 09:02 )  PTT:36.6 sec  LIVER FUNCTIONS - ( 10 Feb 2025 09:02 )  Alb: 2.5 g/dL / Pro: 7.0 gm/dL / ALK PHOS: 117 U/L / ALT: 22 U/L / AST: 19 U/L / GGT: x             RADIOLOGY & ADDITIONAL STUDIES:  FINDINGS:  CHEST:  LUNGS AND LARGE AIRWAYS: The central airways are patent. The lungs are   clear.  PLEURA: No pleural abnormality.  VESSELS: Normal caliber aorta. Main pulmonary arteries are patent.  HEART: Transcatheter aortic valve replacement. No cardiomegaly. No   pericardial effusion. Coronary artery calcifications are present.  MEDIASTINUM AND DANN: No adenopathy.  CHEST WALL AND LOWER NECK: No masses.    ABDOMEN AND PELVIS:  LIVER: Normal.  BILE DUCTS: Nondilated.  GALLBLADDER: Normal.  SPLEEN: Normal.  PANCREAS: Normal.  ADRENALS: Normal.  KIDNEYS/URETERS: No calculi, hydronephrosis, or soft tissue attenuating   mass.    BLADDER: Normal.  REPRODUCTIVE ORGANS: Nonenlarged. Radiation fiducial markers.    BOWEL: No bowel-related abnormality. No active GI bleeding. No bowel   obstruction or bowel inflammation. Normal appendix and ileocecal region.   No evidence of active colitis or diverticulitis.  PERITONEUM: No free air or ascites.  VESSELS: Aortoiliac atherosclerosis without aneurysm.  RETROPERITONEUM/LYMPH NODES: No adenopathy or hematoma.  ABDOMINAL WALL: Normal.  BONES: No acute bony abnormality.    IMPRESSION:  *  No acute pathology.  *  No active GI bleeding.   isosorbide mononitrate (IMDUR) 30 MG 24 hr tablet; Take 1 tablet by mouth Daily.  Dispense: 90 tablet; Refill: 3  -     sacubitril-valsartan (ENTRESTO) 24-26 MG tablet; Take 1 tablet by mouth 2 (Two) Times a Day. Stop lisinopril for 3 days prior to initiating Entresto  Dispense: 60 tablet; Refill: 6    Left carotid stenosis  - MRA 1/3/2024 revealed 50% stenosis LICA  - Repeat MRA neck for surveillance  - Continue aspirin, statin    IHD  - Continue aspirin and statin  - Lexiscan stress test 10/2024 apical and septal infarct and ischemia    Hypertension  - BP controlled  - Continue current antihypertensive medication regimen    Hypercholesteremia  - [facility]  - Continue statin therapy    Stable from a cardiac standpoint.  Repeat MRI angiogram neck no medication changes made today.  Refill sent to pharmacy.    Visit Diagnoses:    ICD-10-CM ICD-9-CM   1. Left carotid stenosis  I65.22 433.10   2. Primary hypertension  I10 401.9   3. Pure hypercholesterolemia  E78.00 272.0   4. IHD (ischemic heart disease)  I25.9 414.9       Meds Ordered During Visit:  New Medications Ordered This Visit   Medications    isosorbide mononitrate (IMDUR) 30 MG 24 hr tablet     Sig: Take 1 tablet by mouth Daily.     Dispense:  90 tablet     Refill:  3    sacubitril-valsartan (ENTRESTO) 24-26 MG tablet     Sig: Take 1 tablet by mouth 2 (Two) Times a Day. Stop lisinopril for 3 days prior to initiating Entresto     Dispense:  60 tablet     Refill:  6       Follow Up Appointment:   Return in about 6 months (around 10/2/2025), or if symptoms worsen or fail to improve.           This document has been electronically signed by SELIN Zamora  April 4, 2025 10:17 EDT    Dictated Utilizing Dragon Dictation: Part of this note may be an electronic transcription/translation of spoken language to printed text using the Dragon Dictation System.

## 2025-04-04 ENCOUNTER — APPOINTMENT (OUTPATIENT)
Dept: ORTHOPEDIC SURGERY | Facility: CLINIC | Age: 75
End: 2025-04-04
Payer: MEDICARE

## 2025-04-04 VITALS
BODY MASS INDEX: 25.61 KG/M2 | HEART RATE: 103 BPM | WEIGHT: 169 LBS | HEIGHT: 68 IN | SYSTOLIC BLOOD PRESSURE: 112 MMHG | DIASTOLIC BLOOD PRESSURE: 69 MMHG

## 2025-04-04 DIAGNOSIS — M46.44 DISCITIS, UNSPECIFIED, THORACIC REGION: ICD-10-CM

## 2025-04-04 DIAGNOSIS — M86.10 OTHER ACUTE OSTEOMYELITIS, UNSPECIFIED SITE: ICD-10-CM

## 2025-04-04 PROCEDURE — 99214 OFFICE O/P EST MOD 30 MIN: CPT

## 2025-04-04 PROCEDURE — 72100 X-RAY EXAM L-S SPINE 2/3 VWS: CPT

## 2025-04-04 PROCEDURE — 72070 X-RAY EXAM THORAC SPINE 2VWS: CPT

## 2025-04-07 ENCOUNTER — OFFICE (OUTPATIENT)
Dept: URBAN - METROPOLITAN AREA CLINIC 102 | Facility: CLINIC | Age: 75
Setting detail: OPHTHALMOLOGY
End: 2025-04-07
Payer: MEDICARE

## 2025-04-07 DIAGNOSIS — H25.13: ICD-10-CM

## 2025-04-07 DIAGNOSIS — H35.373: ICD-10-CM

## 2025-04-07 DIAGNOSIS — H40.033: ICD-10-CM

## 2025-04-07 DIAGNOSIS — F32.A DEPRESSION, UNSPECIFIED: ICD-10-CM

## 2025-04-07 DIAGNOSIS — E11.9: ICD-10-CM

## 2025-04-07 DIAGNOSIS — E11.3211: ICD-10-CM

## 2025-04-07 DIAGNOSIS — E11.3292: ICD-10-CM

## 2025-04-07 PROBLEM — H53.9 VISUAL DISTURBANCE, UNSPECIFIED: Status: ACTIVE | Noted: 2025-04-07

## 2025-04-07 PROCEDURE — 92083 EXTENDED VISUAL FIELD XM: CPT | Performed by: OPHTHALMOLOGY

## 2025-04-07 PROCEDURE — 92014 COMPRE OPH EXAM EST PT 1/>: CPT | Performed by: OPHTHALMOLOGY

## 2025-04-07 PROCEDURE — 92134 CPTRZ OPH DX IMG PST SGM RTA: CPT | Performed by: OPHTHALMOLOGY

## 2025-04-07 RX ORDER — ESCITALOPRAM OXALATE 10 MG/1
10 TABLET ORAL
Qty: 30 | Refills: 1 | Status: ACTIVE | COMMUNITY
Start: 2025-04-07 | End: 1900-01-01

## 2025-04-07 ASSESSMENT — REFRACTION_AUTOREFRACTION
OD_CYLINDER: -1.25
OD_AXIS: 099
OD_SPHERE: +1.50
OS_CYLINDER: -1.00
OS_SPHERE: +1.00
OS_AXIS: 096

## 2025-04-07 ASSESSMENT — REFRACTION_MANIFEST
OS_ADD: +2.50
OD_VA1: 20/20
OS_CYLINDER: SPH
OS_VA1: 20/20
OD_ADD: +2.50
OD_CYLINDER: -1.00
OD_SPHERE: +1.25
OD_AXIS: 090
OS_SPHERE: -0.25

## 2025-04-07 ASSESSMENT — TONOMETRY
OD_IOP_MMHG: 13
OS_IOP_MMHG: 12

## 2025-04-07 ASSESSMENT — CONFRONTATIONAL VISUAL FIELD TEST (CVF)
OS_FINDINGS: FULL
OD_FINDINGS: FULL

## 2025-04-07 ASSESSMENT — KERATOMETRY
OD_K2POWER_DIOPTERS: 44.75
METHOD_AUTO_MANUAL: AUTO
OD_AXISANGLE_DEGREES: 179
OS_AXISANGLE_DEGREES: 011
OS_K1POWER_DIOPTERS: 43.25
OD_K1POWER_DIOPTERS: 43.50
OS_K2POWER_DIOPTERS: 44.75

## 2025-04-07 ASSESSMENT — VISUAL ACUITY
OS_BCVA: 20/30
OD_BCVA: 20/150

## 2025-04-08 ENCOUNTER — LABORATORY RESULT (OUTPATIENT)
Age: 75
End: 2025-04-08

## 2025-04-08 ENCOUNTER — INPATIENT (INPATIENT)
Facility: HOSPITAL | Age: 75
LOS: 3 days | Discharge: ANOTHER HOSPITAL | End: 2025-04-12
Attending: HOSPITALIST | Admitting: HOSPITALIST
Payer: MEDICARE

## 2025-04-08 ENCOUNTER — EMERGENCY (EMERGENCY)
Facility: HOSPITAL | Age: 75
LOS: 0 days | Discharge: ANOTHER HOSPITAL | End: 2025-04-08
Attending: EMERGENCY MEDICINE
Payer: MEDICARE

## 2025-04-08 VITALS
HEART RATE: 70 BPM | SYSTOLIC BLOOD PRESSURE: 130 MMHG | DIASTOLIC BLOOD PRESSURE: 56 MMHG | HEIGHT: 68 IN | TEMPERATURE: 98 F | OXYGEN SATURATION: 96 %

## 2025-04-08 VITALS
RESPIRATION RATE: 16 BRPM | DIASTOLIC BLOOD PRESSURE: 63 MMHG | TEMPERATURE: 98 F | OXYGEN SATURATION: 99 % | SYSTOLIC BLOOD PRESSURE: 115 MMHG | HEART RATE: 70 BPM

## 2025-04-08 VITALS — HEIGHT: 68 IN

## 2025-04-08 DIAGNOSIS — I10 ESSENTIAL (PRIMARY) HYPERTENSION: ICD-10-CM

## 2025-04-08 DIAGNOSIS — Z86.79 PERSONAL HISTORY OF OTHER DISEASES OF THE CIRCULATORY SYSTEM: ICD-10-CM

## 2025-04-08 DIAGNOSIS — Z87.39 PERSONAL HISTORY OF OTHER DISEASES OF THE MUSCULOSKELETAL SYSTEM AND CONNECTIVE TISSUE: ICD-10-CM

## 2025-04-08 DIAGNOSIS — H34.232 RETINAL ARTERY BRANCH OCCLUSION, LEFT EYE: ICD-10-CM

## 2025-04-08 DIAGNOSIS — E03.9 HYPOTHYROIDISM, UNSPECIFIED: ICD-10-CM

## 2025-04-08 DIAGNOSIS — H54.62 UNQUALIFIED VISUAL LOSS, LEFT EYE, NORMAL VISION RIGHT EYE: ICD-10-CM

## 2025-04-08 DIAGNOSIS — E11.9 TYPE 2 DIABETES MELLITUS WITHOUT COMPLICATIONS: ICD-10-CM

## 2025-04-08 DIAGNOSIS — Z79.01 LONG TERM (CURRENT) USE OF ANTICOAGULANTS: ICD-10-CM

## 2025-04-08 LAB
ALBUMIN SERPL ELPH-MCNC: 3.1 G/DL — LOW (ref 3.3–5)
ALBUMIN SERPL ELPH-MCNC: 3.8 G/DL — SIGNIFICANT CHANGE UP (ref 3.3–5)
ALP SERPL-CCNC: 120 U/L — SIGNIFICANT CHANGE UP (ref 40–120)
ALP SERPL-CCNC: 123 U/L — HIGH (ref 40–120)
ALT FLD-CCNC: 11 U/L — SIGNIFICANT CHANGE UP (ref 4–41)
ALT FLD-CCNC: 13 U/L — SIGNIFICANT CHANGE UP (ref 12–78)
ANION GAP SERPL CALC-SCNC: 16 MMOL/L — HIGH (ref 7–14)
ANION GAP SERPL CALC-SCNC: 4 MMOL/L — LOW (ref 5–17)
APTT BLD: 32.1 SEC — SIGNIFICANT CHANGE UP (ref 24.5–35.6)
APTT BLD: 38.6 SEC — HIGH (ref 24.5–35.6)
AST SERPL-CCNC: 17 U/L — SIGNIFICANT CHANGE UP (ref 15–37)
AST SERPL-CCNC: 23 U/L — SIGNIFICANT CHANGE UP (ref 4–40)
BASOPHILS # BLD AUTO: 0.04 K/UL — SIGNIFICANT CHANGE UP (ref 0–0.2)
BASOPHILS # BLD AUTO: 0.05 K/UL — SIGNIFICANT CHANGE UP (ref 0–0.2)
BASOPHILS NFR BLD AUTO: 0.3 % — SIGNIFICANT CHANGE UP (ref 0–2)
BASOPHILS NFR BLD AUTO: 0.4 % — SIGNIFICANT CHANGE UP (ref 0–2)
BILIRUB SERPL-MCNC: 1.2 MG/DL — SIGNIFICANT CHANGE UP (ref 0.2–1.2)
BILIRUB SERPL-MCNC: 1.3 MG/DL — HIGH (ref 0.2–1.2)
BLD GP AB SCN SERPL QL: SIGNIFICANT CHANGE UP
BUN SERPL-MCNC: 31 MG/DL — HIGH (ref 7–23)
BUN SERPL-MCNC: 32 MG/DL — HIGH (ref 7–23)
CALCIUM SERPL-MCNC: 9.6 MG/DL — SIGNIFICANT CHANGE UP (ref 8.5–10.1)
CALCIUM SERPL-MCNC: 9.8 MG/DL — SIGNIFICANT CHANGE UP (ref 8.4–10.5)
CHLORIDE SERPL-SCNC: 100 MMOL/L — SIGNIFICANT CHANGE UP (ref 98–107)
CHLORIDE SERPL-SCNC: 106 MMOL/L — SIGNIFICANT CHANGE UP (ref 96–108)
CO2 SERPL-SCNC: 21 MMOL/L — LOW (ref 22–31)
CO2 SERPL-SCNC: 26 MMOL/L — SIGNIFICANT CHANGE UP (ref 22–31)
CREAT SERPL-MCNC: 1.07 MG/DL — SIGNIFICANT CHANGE UP (ref 0.5–1.3)
CREAT SERPL-MCNC: 1.11 MG/DL — SIGNIFICANT CHANGE UP (ref 0.5–1.3)
EGFR: 70 ML/MIN/1.73M2 — SIGNIFICANT CHANGE UP
EGFR: 70 ML/MIN/1.73M2 — SIGNIFICANT CHANGE UP
EGFR: 73 ML/MIN/1.73M2 — SIGNIFICANT CHANGE UP
EGFR: 73 ML/MIN/1.73M2 — SIGNIFICANT CHANGE UP
EOSINOPHIL # BLD AUTO: 0.02 K/UL — SIGNIFICANT CHANGE UP (ref 0–0.5)
EOSINOPHIL # BLD AUTO: 0.05 K/UL — SIGNIFICANT CHANGE UP (ref 0–0.5)
EOSINOPHIL NFR BLD AUTO: 0.1 % — SIGNIFICANT CHANGE UP (ref 0–6)
EOSINOPHIL NFR BLD AUTO: 0.4 % — SIGNIFICANT CHANGE UP (ref 0–6)
GLUCOSE SERPL-MCNC: 104 MG/DL — HIGH (ref 70–99)
GLUCOSE SERPL-MCNC: 81 MG/DL — SIGNIFICANT CHANGE UP (ref 70–99)
HCT VFR BLD CALC: 30.5 % — LOW (ref 39–50)
HCT VFR BLD CALC: 31.3 % — LOW (ref 39–50)
HGB BLD-MCNC: 10.3 G/DL — LOW (ref 13–17)
HGB BLD-MCNC: 10.4 G/DL — LOW (ref 13–17)
IANC: 10.56 K/UL — HIGH (ref 1.8–7.4)
IMM GRANULOCYTES # BLD AUTO: 0.04 K/UL — SIGNIFICANT CHANGE UP (ref 0–0.07)
IMM GRANULOCYTES NFR BLD AUTO: 0.3 % — SIGNIFICANT CHANGE UP (ref 0–0.9)
IMM GRANULOCYTES NFR BLD AUTO: 0.5 % — SIGNIFICANT CHANGE UP (ref 0–0.9)
INR BLD: 1.38 RATIO — HIGH (ref 0.85–1.16)
INR BLD: 1.6 RATIO — HIGH (ref 0.85–1.16)
LYMPHOCYTES # BLD AUTO: 0.86 K/UL — LOW (ref 1–3.3)
LYMPHOCYTES # BLD AUTO: 1.31 K/UL — SIGNIFICANT CHANGE UP (ref 1–3.3)
LYMPHOCYTES # BLD AUTO: 10.2 % — LOW (ref 13–44)
LYMPHOCYTES NFR BLD AUTO: 6.1 % — LOW (ref 13–44)
MCHC RBC-ENTMCNC: 29.1 PG — SIGNIFICANT CHANGE UP (ref 27–34)
MCHC RBC-ENTMCNC: 29.1 PG — SIGNIFICANT CHANGE UP (ref 27–34)
MCHC RBC-ENTMCNC: 33.2 G/DL — SIGNIFICANT CHANGE UP (ref 32–36)
MCHC RBC-ENTMCNC: 33.8 G/DL — SIGNIFICANT CHANGE UP (ref 32–36)
MCV RBC AUTO: 86.2 FL — SIGNIFICANT CHANGE UP (ref 80–100)
MCV RBC AUTO: 87.4 FL — SIGNIFICANT CHANGE UP (ref 80–100)
MONOCYTES # BLD AUTO: 0.69 K/UL — SIGNIFICANT CHANGE UP (ref 0–0.9)
MONOCYTES # BLD AUTO: 0.79 K/UL — SIGNIFICANT CHANGE UP (ref 0–0.9)
MONOCYTES NFR BLD AUTO: 4.9 % — SIGNIFICANT CHANGE UP (ref 2–14)
MONOCYTES NFR BLD AUTO: 6.2 % — SIGNIFICANT CHANGE UP (ref 2–14)
NEUTROPHILS # BLD AUTO: 10.56 K/UL — HIGH (ref 1.8–7.4)
NEUTROPHILS # BLD AUTO: 12.42 K/UL — HIGH (ref 1.8–7.4)
NEUTROPHILS NFR BLD AUTO: 82.3 % — HIGH (ref 43–77)
NEUTROPHILS NFR BLD AUTO: 88.3 % — HIGH (ref 43–77)
NRBC # BLD AUTO: 0 K/UL — SIGNIFICANT CHANGE UP (ref 0–0)
NRBC # BLD AUTO: 0 K/UL — SIGNIFICANT CHANGE UP (ref 0–0)
NRBC # FLD: 0 K/UL — SIGNIFICANT CHANGE UP (ref 0–0)
NRBC # FLD: 0 K/UL — SIGNIFICANT CHANGE UP (ref 0–0)
NRBC BLD AUTO-RTO: 0 /100 WBCS — SIGNIFICANT CHANGE UP (ref 0–0)
NRBC BLD AUTO-RTO: 0 /100 WBCS — SIGNIFICANT CHANGE UP (ref 0–0)
PLATELET # BLD AUTO: 232 K/UL — SIGNIFICANT CHANGE UP (ref 150–400)
PLATELET # BLD AUTO: 241 K/UL — SIGNIFICANT CHANGE UP (ref 150–400)
PMV BLD: 9.9 FL — SIGNIFICANT CHANGE UP (ref 7–13)
POTASSIUM SERPL-MCNC: 4.3 MMOL/L — SIGNIFICANT CHANGE UP (ref 3.5–5.3)
POTASSIUM SERPL-MCNC: 4.6 MMOL/L — SIGNIFICANT CHANGE UP (ref 3.5–5.3)
POTASSIUM SERPL-SCNC: 4.3 MMOL/L — SIGNIFICANT CHANGE UP (ref 3.5–5.3)
POTASSIUM SERPL-SCNC: 4.6 MMOL/L — SIGNIFICANT CHANGE UP (ref 3.5–5.3)
PROT SERPL-MCNC: 7 G/DL — SIGNIFICANT CHANGE UP (ref 6–8.3)
PROT SERPL-MCNC: 7.4 GM/DL — SIGNIFICANT CHANGE UP (ref 6–8.3)
PROTHROM AB SERPL-ACNC: 16.4 SEC — HIGH (ref 9.9–13.4)
PROTHROM AB SERPL-ACNC: 18.3 SEC — HIGH (ref 9.9–13.4)
RBC # BLD: 3.54 M/UL — LOW (ref 4.2–5.8)
RBC # BLD: 3.58 M/UL — LOW (ref 4.2–5.8)
RBC # FLD: 17.5 % — HIGH (ref 10.3–14.5)
RBC # FLD: 17.5 % — HIGH (ref 10.3–14.5)
SODIUM SERPL-SCNC: 136 MMOL/L — SIGNIFICANT CHANGE UP (ref 135–145)
SODIUM SERPL-SCNC: 137 MMOL/L — SIGNIFICANT CHANGE UP (ref 135–145)
WBC # BLD: 12.82 K/UL — HIGH (ref 3.8–10.5)
WBC # BLD: 14.07 K/UL — HIGH (ref 3.8–10.5)
WBC # FLD AUTO: 12.82 K/UL — HIGH (ref 3.8–10.5)
WBC # FLD AUTO: 14.07 K/UL — HIGH (ref 3.8–10.5)

## 2025-04-08 PROCEDURE — 86900 BLOOD TYPING SEROLOGIC ABO: CPT

## 2025-04-08 PROCEDURE — 85730 THROMBOPLASTIN TIME PARTIAL: CPT

## 2025-04-08 PROCEDURE — 86901 BLOOD TYPING SEROLOGIC RH(D): CPT

## 2025-04-08 PROCEDURE — 99285 EMERGENCY DEPT VISIT HI MDM: CPT | Mod: 25

## 2025-04-08 PROCEDURE — 36415 COLL VENOUS BLD VENIPUNCTURE: CPT

## 2025-04-08 PROCEDURE — 70498 CT ANGIOGRAPHY NECK: CPT | Mod: 26

## 2025-04-08 PROCEDURE — 99285 EMERGENCY DEPT VISIT HI MDM: CPT

## 2025-04-08 PROCEDURE — 80053 COMPREHEN METABOLIC PANEL: CPT

## 2025-04-08 PROCEDURE — 93010 ELECTROCARDIOGRAM REPORT: CPT

## 2025-04-08 PROCEDURE — 70498 CT ANGIOGRAPHY NECK: CPT

## 2025-04-08 PROCEDURE — 86850 RBC ANTIBODY SCREEN: CPT

## 2025-04-08 PROCEDURE — 85610 PROTHROMBIN TIME: CPT

## 2025-04-08 PROCEDURE — 85025 COMPLETE CBC W/AUTO DIFF WBC: CPT

## 2025-04-08 PROCEDURE — 93005 ELECTROCARDIOGRAM TRACING: CPT

## 2025-04-08 PROCEDURE — 70496 CT ANGIOGRAPHY HEAD: CPT | Mod: 26

## 2025-04-08 PROCEDURE — 70496 CT ANGIOGRAPHY HEAD: CPT

## 2025-04-08 RX ORDER — CEFTRIAXONE 500 MG/1
2000 INJECTION, POWDER, FOR SOLUTION INTRAMUSCULAR; INTRAVENOUS ONCE
Refills: 0 | Status: COMPLETED | OUTPATIENT
Start: 2025-04-08 | End: 2025-04-08

## 2025-04-08 RX ADMIN — CEFTRIAXONE 100 MILLIGRAM(S): 500 INJECTION, POWDER, FOR SOLUTION INTRAMUSCULAR; INTRAVENOUS at 21:32

## 2025-04-08 NOTE — ED ADULT TRIAGE NOTE - CHIEF COMPLAINT QUOTE
pt ambulatory to triage sent in by opthomologist c/o vision loss in L eye since noon yesterday. -allergies. pmh TAVR, on eliquis

## 2025-04-08 NOTE — ED PROVIDER NOTE - OBJECTIVE STATEMENT
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 Patient was seen by his ophthalmologist today, was referred to a retinal specialist who suspected a branch retinal artery occlusion. The retinal specialist referred the patient to the ED for further testing.

## 2025-04-08 NOTE — ED ADULT NURSE NOTE - NSFALLRISKINTERV_ED_ALL_ED

## 2025-04-08 NOTE — ED ADULT NURSE NOTE - OBJECTIVE STATEMENT
pt presents to ER, sent in by MD, for vision changes starting yesterday at noon. pt reports having lost half vision in L eye. states "when I look through my L eye, the top half is black, but I can see the bottom half". denies headache, nausea, vomiting. hx groin infection, currently on home abx, PICC line in R upper extremity. denies any other medical complaints.

## 2025-04-08 NOTE — ED PROVIDER NOTE - CLINICAL SUMMARY MEDICAL DECISION MAKING FREE TEXT BOX
pt w/ left upper field cut w/ branch artery occlusion at ophtho in setting of endocarditis, outside of window for tnk on eliquis, will transfer for ophto and neuro

## 2025-04-08 NOTE — ED PROVIDER NOTE - PHYSICAL EXAMINATION
GEN - NAD; well appearing; A+O x3   HEAD - NC/AT     EYES - dilated minimally reactive (sp dilated exam at clinic)   ENT -   mucous membranes  moist , no discharge      PULM - CTA b/l,  symmetric breath sounds  COR -  RRR, S1 S2, no murmurs  ABD - , ND, NT, soft, no guarding, no rebound, no masses    BACK - no CVA tenderness, nontender spine     EXTREMS - no edema, no deformity, warm and well perfused    SKIN - no rash or bruising      NEUROLOGIC - A&Ox3, PERRLA, EOMI, no nystagmus, CN2-12 grossly intact, no pronator drift, normal finger to nose and rapid alternating finger movements, normal sensation and 5/5 strength in all extremities

## 2025-04-09 ENCOUNTER — LABORATORY RESULT (OUTPATIENT)
Age: 75
End: 2025-04-09

## 2025-04-09 DIAGNOSIS — I63.9 CEREBRAL INFARCTION, UNSPECIFIED: ICD-10-CM

## 2025-04-09 DIAGNOSIS — Z95.2 PRESENCE OF PROSTHETIC HEART VALVE: ICD-10-CM

## 2025-04-09 DIAGNOSIS — I10 ESSENTIAL (PRIMARY) HYPERTENSION: ICD-10-CM

## 2025-04-09 DIAGNOSIS — H34.12 CENTRAL RETINAL ARTERY OCCLUSION, LEFT EYE: ICD-10-CM

## 2025-04-09 DIAGNOSIS — R63.8 OTHER SYMPTOMS AND SIGNS CONCERNING FOOD AND FLUID INTAKE: ICD-10-CM

## 2025-04-09 DIAGNOSIS — I48.20 CHRONIC ATRIAL FIBRILLATION, UNSPECIFIED: ICD-10-CM

## 2025-04-09 DIAGNOSIS — I38 ENDOCARDITIS, VALVE UNSPECIFIED: ICD-10-CM

## 2025-04-09 DIAGNOSIS — E03.9 HYPOTHYROIDISM, UNSPECIFIED: ICD-10-CM

## 2025-04-09 DIAGNOSIS — A49.1 STREPTOCOCCAL INFECTION, UNSPECIFIED SITE: ICD-10-CM

## 2025-04-09 DIAGNOSIS — Z95.2 PRESENCE OF PROSTHETIC HEART VALVE: Chronic | ICD-10-CM

## 2025-04-09 DIAGNOSIS — E11.9 TYPE 2 DIABETES MELLITUS WITHOUT COMPLICATIONS: ICD-10-CM

## 2025-04-09 DIAGNOSIS — I48.0 PAROXYSMAL ATRIAL FIBRILLATION: ICD-10-CM

## 2025-04-09 DIAGNOSIS — M86.10 OTHER ACUTE OSTEOMYELITIS, UNSPECIFIED SITE: ICD-10-CM

## 2025-04-09 DIAGNOSIS — H34.232 RETINAL ARTERY BRANCH OCCLUSION, LEFT EYE: ICD-10-CM

## 2025-04-09 LAB
A1C WITH ESTIMATED AVERAGE GLUCOSE RESULT: 5.4 % — SIGNIFICANT CHANGE UP (ref 4–5.6)
ALBUMIN SERPL ELPH-MCNC: 3.3 G/DL — SIGNIFICANT CHANGE UP (ref 3.3–5)
ALP SERPL-CCNC: 113 U/L — SIGNIFICANT CHANGE UP (ref 40–120)
ALT FLD-CCNC: 10 U/L — SIGNIFICANT CHANGE UP (ref 4–41)
ANION GAP SERPL CALC-SCNC: 14 MMOL/L — SIGNIFICANT CHANGE UP (ref 7–14)
APTT BLD: 138.9 SEC — CRITICAL HIGH (ref 24.5–35.6)
AST SERPL-CCNC: 16 U/L — SIGNIFICANT CHANGE UP (ref 4–40)
BASOPHILS # BLD AUTO: 0.05 K/UL — SIGNIFICANT CHANGE UP (ref 0–0.2)
BASOPHILS NFR BLD AUTO: 0.4 % — SIGNIFICANT CHANGE UP (ref 0–2)
BILIRUB SERPL-MCNC: 0.7 MG/DL — SIGNIFICANT CHANGE UP (ref 0.2–1.2)
BUN SERPL-MCNC: 31 MG/DL — HIGH (ref 7–23)
CALCIUM SERPL-MCNC: 9.3 MG/DL — SIGNIFICANT CHANGE UP (ref 8.4–10.5)
CHLORIDE SERPL-SCNC: 101 MMOL/L — SIGNIFICANT CHANGE UP (ref 98–107)
CHOLEST SERPL-MCNC: 102 MG/DL — SIGNIFICANT CHANGE UP
CO2 SERPL-SCNC: 22 MMOL/L — SIGNIFICANT CHANGE UP (ref 22–31)
CREAT SERPL-MCNC: 1.06 MG/DL — SIGNIFICANT CHANGE UP (ref 0.5–1.3)
EGFR: 74 ML/MIN/1.73M2 — SIGNIFICANT CHANGE UP
EGFR: 74 ML/MIN/1.73M2 — SIGNIFICANT CHANGE UP
EOSINOPHIL # BLD AUTO: 0.06 K/UL — SIGNIFICANT CHANGE UP (ref 0–0.5)
EOSINOPHIL NFR BLD AUTO: 0.5 % — SIGNIFICANT CHANGE UP (ref 0–6)
ESTIMATED AVERAGE GLUCOSE: 108 — SIGNIFICANT CHANGE UP
GLUCOSE BLDC GLUCOMTR-MCNC: 123 MG/DL — HIGH (ref 70–99)
GLUCOSE SERPL-MCNC: 85 MG/DL — SIGNIFICANT CHANGE UP (ref 70–99)
HCT VFR BLD CALC: 30.4 % — LOW (ref 39–50)
HDLC SERPL-MCNC: 32 MG/DL — LOW
HGB BLD-MCNC: 10.1 G/DL — LOW (ref 13–17)
IANC: 10.02 K/UL — HIGH (ref 1.8–7.4)
IMM GRANULOCYTES NFR BLD AUTO: 0.4 % — SIGNIFICANT CHANGE UP (ref 0–0.9)
LDLC SERPL-MCNC: 53 MG/DL — SIGNIFICANT CHANGE UP
LIPID PNL WITH DIRECT LDL SERPL: 53 MG/DL — SIGNIFICANT CHANGE UP
LYMPHOCYTES # BLD AUTO: 1.49 K/UL — SIGNIFICANT CHANGE UP (ref 1–3.3)
LYMPHOCYTES # BLD AUTO: 12 % — LOW (ref 13–44)
MAGNESIUM SERPL-MCNC: 1.4 MG/DL — LOW (ref 1.6–2.6)
MCHC RBC-ENTMCNC: 28.7 PG — SIGNIFICANT CHANGE UP (ref 27–34)
MCHC RBC-ENTMCNC: 33.2 G/DL — SIGNIFICANT CHANGE UP (ref 32–36)
MCV RBC AUTO: 86.4 FL — SIGNIFICANT CHANGE UP (ref 80–100)
MONOCYTES # BLD AUTO: 0.78 K/UL — SIGNIFICANT CHANGE UP (ref 0–0.9)
MONOCYTES NFR BLD AUTO: 6.3 % — SIGNIFICANT CHANGE UP (ref 2–14)
MRSA PCR RESULT.: SIGNIFICANT CHANGE UP
NEUTROPHILS # BLD AUTO: 10.02 K/UL — HIGH (ref 1.8–7.4)
NEUTROPHILS NFR BLD AUTO: 80.4 % — HIGH (ref 43–77)
NONHDLC SERPL-MCNC: 70 MG/DL — SIGNIFICANT CHANGE UP
NRBC # BLD AUTO: 0 K/UL — SIGNIFICANT CHANGE UP (ref 0–0)
NRBC # FLD: 0 K/UL — SIGNIFICANT CHANGE UP (ref 0–0)
NRBC BLD AUTO-RTO: 0 /100 WBCS — SIGNIFICANT CHANGE UP (ref 0–0)
PHOSPHATE SERPL-MCNC: 3.6 MG/DL — SIGNIFICANT CHANGE UP (ref 2.5–4.5)
PLATELET # BLD AUTO: 229 K/UL — SIGNIFICANT CHANGE UP (ref 150–400)
POTASSIUM SERPL-MCNC: 4 MMOL/L — SIGNIFICANT CHANGE UP (ref 3.5–5.3)
POTASSIUM SERPL-SCNC: 4 MMOL/L — SIGNIFICANT CHANGE UP (ref 3.5–5.3)
PROT SERPL-MCNC: 6.7 G/DL — SIGNIFICANT CHANGE UP (ref 6–8.3)
RBC # BLD: 3.52 M/UL — LOW (ref 4.2–5.8)
RBC # FLD: 17.7 % — HIGH (ref 10.3–14.5)
S AUREUS DNA NOSE QL NAA+PROBE: SIGNIFICANT CHANGE UP
SODIUM SERPL-SCNC: 137 MMOL/L — SIGNIFICANT CHANGE UP (ref 135–145)
TRIGL SERPL-MCNC: 85 MG/DL — SIGNIFICANT CHANGE UP
WBC # BLD: 12.45 K/UL — HIGH (ref 3.8–10.5)
WBC # FLD AUTO: 12.45 K/UL — HIGH (ref 3.8–10.5)

## 2025-04-09 PROCEDURE — 99222 1ST HOSP IP/OBS MODERATE 55: CPT

## 2025-04-09 PROCEDURE — 99223 1ST HOSP IP/OBS HIGH 75: CPT

## 2025-04-09 PROCEDURE — 70551 MRI BRAIN STEM W/O DYE: CPT | Mod: 26

## 2025-04-09 PROCEDURE — 99223 1ST HOSP IP/OBS HIGH 75: CPT | Mod: FS

## 2025-04-09 PROCEDURE — 99223 1ST HOSP IP/OBS HIGH 75: CPT | Mod: GC

## 2025-04-09 PROCEDURE — 70540 MRI ORBIT/FACE/NECK W/O DYE: CPT | Mod: 26

## 2025-04-09 PROCEDURE — 99497 ADVNCD CARE PLAN 30 MIN: CPT | Mod: 25

## 2025-04-09 PROCEDURE — 70544 MR ANGIOGRAPHY HEAD W/O DYE: CPT | Mod: 26,XU

## 2025-04-09 PROCEDURE — G0545: CPT

## 2025-04-09 RX ORDER — FUROSEMIDE 10 MG/ML
40 INJECTION INTRAMUSCULAR; INTRAVENOUS DAILY
Refills: 0 | Status: DISCONTINUED | OUTPATIENT
Start: 2025-04-09 | End: 2025-04-12

## 2025-04-09 RX ORDER — CYCLOBENZAPRINE HYDROCHLORIDE 15 MG/1
5 CAPSULE, EXTENDED RELEASE ORAL THREE TIMES A DAY
Refills: 0 | Status: DISCONTINUED | OUTPATIENT
Start: 2025-04-09 | End: 2025-04-12

## 2025-04-09 RX ORDER — HEPARIN SODIUM 1000 [USP'U]/ML
INJECTION INTRAVENOUS; SUBCUTANEOUS
Qty: 25000 | Refills: 0 | Status: DISCONTINUED | OUTPATIENT
Start: 2025-04-09 | End: 2025-04-09

## 2025-04-09 RX ORDER — POLYETHYLENE GLYCOL 3350 17 G/17G
17 POWDER, FOR SOLUTION ORAL DAILY
Refills: 0 | Status: DISCONTINUED | OUTPATIENT
Start: 2025-04-09 | End: 2025-04-11

## 2025-04-09 RX ORDER — SPIRONOLACTONE 25 MG
25 TABLET ORAL DAILY
Refills: 0 | Status: DISCONTINUED | OUTPATIENT
Start: 2025-04-09 | End: 2025-04-12

## 2025-04-09 RX ORDER — LORAZEPAM 4 MG/ML
0.5 VIAL (ML) INJECTION ONCE
Refills: 0 | Status: DISCONTINUED | OUTPATIENT
Start: 2025-04-09 | End: 2025-04-10

## 2025-04-09 RX ORDER — ATORVASTATIN CALCIUM 80 MG/1
40 TABLET, FILM COATED ORAL AT BEDTIME
Refills: 0 | Status: DISCONTINUED | OUTPATIENT
Start: 2025-04-09 | End: 2025-04-12

## 2025-04-09 RX ORDER — BISACODYL 5 MG
5 TABLET, DELAYED RELEASE (ENTERIC COATED) ORAL AT BEDTIME
Refills: 0 | Status: DISCONTINUED | OUTPATIENT
Start: 2025-04-09 | End: 2025-04-12

## 2025-04-09 RX ORDER — MAGNESIUM SULFATE 500 MG/ML
2 SYRINGE (ML) INJECTION ONCE
Refills: 0 | Status: COMPLETED | OUTPATIENT
Start: 2025-04-09 | End: 2025-04-09

## 2025-04-09 RX ORDER — HEPARIN SODIUM 1000 [USP'U]/ML
8000 INJECTION INTRAVENOUS; SUBCUTANEOUS EVERY 6 HOURS
Refills: 0 | Status: DISCONTINUED | OUTPATIENT
Start: 2025-04-09 | End: 2025-04-09

## 2025-04-09 RX ORDER — HEPARIN SODIUM 1000 [USP'U]/ML
4000 INJECTION INTRAVENOUS; SUBCUTANEOUS EVERY 6 HOURS
Refills: 0 | Status: DISCONTINUED | OUTPATIENT
Start: 2025-04-09 | End: 2025-04-09

## 2025-04-09 RX ORDER — APIXABAN 2.5 MG/1
5 TABLET, FILM COATED ORAL EVERY 12 HOURS
Refills: 0 | Status: DISCONTINUED | OUTPATIENT
Start: 2025-04-09 | End: 2025-04-09

## 2025-04-09 RX ORDER — TRAMADOL HYDROCHLORIDE 50 MG/1
50 TABLET, FILM COATED ORAL EVERY 4 HOURS
Refills: 0 | Status: DISCONTINUED | OUTPATIENT
Start: 2025-04-09 | End: 2025-04-10

## 2025-04-09 RX ORDER — ACETAMINOPHEN 500 MG/5ML
650 LIQUID (ML) ORAL EVERY 6 HOURS
Refills: 0 | Status: DISCONTINUED | OUTPATIENT
Start: 2025-04-09 | End: 2025-04-12

## 2025-04-09 RX ORDER — CEFTRIAXONE 500 MG/1
2000 INJECTION, POWDER, FOR SOLUTION INTRAMUSCULAR; INTRAVENOUS EVERY 12 HOURS
Refills: 0 | Status: DISCONTINUED | OUTPATIENT
Start: 2025-04-09 | End: 2025-04-12

## 2025-04-09 RX ORDER — LEVOTHYROXINE SODIUM 300 MCG
150 TABLET ORAL DAILY
Refills: 0 | Status: DISCONTINUED | OUTPATIENT
Start: 2025-04-09 | End: 2025-04-12

## 2025-04-09 RX ORDER — LIDOCAINE HYDROCHLORIDE 20 MG/ML
1 JELLY TOPICAL EVERY 24 HOURS
Refills: 0 | Status: DISCONTINUED | OUTPATIENT
Start: 2025-04-09 | End: 2025-04-12

## 2025-04-09 RX ORDER — FERROUS SULFATE 137(45) MG
325 TABLET, EXTENDED RELEASE ORAL DAILY
Refills: 0 | Status: DISCONTINUED | OUTPATIENT
Start: 2025-04-09 | End: 2025-04-12

## 2025-04-09 RX ORDER — B1/B2/B3/B5/B6/B12/VIT C/FOLIC 500-0.5 MG
1 TABLET ORAL DAILY
Refills: 0 | Status: DISCONTINUED | OUTPATIENT
Start: 2025-04-09 | End: 2025-04-12

## 2025-04-09 RX ORDER — SIMETHICONE 80 MG
80 TABLET,CHEWABLE ORAL ONCE
Refills: 0 | Status: COMPLETED | OUTPATIENT
Start: 2025-04-09 | End: 2025-04-09

## 2025-04-09 RX ORDER — HEPARIN SODIUM 1000 [USP'U]/ML
8000 INJECTION INTRAVENOUS; SUBCUTANEOUS ONCE
Refills: 0 | Status: COMPLETED | OUTPATIENT
Start: 2025-04-09 | End: 2025-04-09

## 2025-04-09 RX ORDER — MELATONIN 5 MG
3 TABLET ORAL AT BEDTIME
Refills: 0 | Status: DISCONTINUED | OUTPATIENT
Start: 2025-04-09 | End: 2025-04-12

## 2025-04-09 RX ORDER — CEFTRIAXONE 500 MG/1
2000 INJECTION, POWDER, FOR SOLUTION INTRAMUSCULAR; INTRAVENOUS EVERY 24 HOURS
Refills: 0 | Status: DISCONTINUED | OUTPATIENT
Start: 2025-04-09 | End: 2025-04-09

## 2025-04-09 RX ORDER — SENNA 187 MG
2 TABLET ORAL AT BEDTIME
Refills: 0 | Status: DISCONTINUED | OUTPATIENT
Start: 2025-04-09 | End: 2025-04-12

## 2025-04-09 RX ORDER — HEPARIN SODIUM 1000 [USP'U]/ML
2200 INJECTION INTRAVENOUS; SUBCUTANEOUS
Qty: 25000 | Refills: 0 | Status: DISCONTINUED | OUTPATIENT
Start: 2025-04-09 | End: 2025-04-09

## 2025-04-09 RX ADMIN — Medication 25 GRAM(S): at 13:56

## 2025-04-09 RX ADMIN — Medication 80 MILLIGRAM(S): at 21:24

## 2025-04-09 RX ADMIN — Medication 1 APPLICATION(S): at 11:27

## 2025-04-09 RX ADMIN — Medication 650 MILLIGRAM(S): at 23:00

## 2025-04-09 RX ADMIN — ATORVASTATIN CALCIUM 40 MILLIGRAM(S): 80 TABLET, FILM COATED ORAL at 21:23

## 2025-04-09 RX ADMIN — Medication 325 MILLIGRAM(S): at 11:27

## 2025-04-09 RX ADMIN — HEPARIN SODIUM 2200 UNIT(S)/HR: 1000 INJECTION INTRAVENOUS; SUBCUTANEOUS at 06:01

## 2025-04-09 RX ADMIN — Medication 1 TABLET(S): at 11:27

## 2025-04-09 RX ADMIN — CEFTRIAXONE 100 MILLIGRAM(S): 500 INJECTION, POWDER, FOR SOLUTION INTRAMUSCULAR; INTRAVENOUS at 18:52

## 2025-04-09 RX ADMIN — HEPARIN SODIUM 1800 UNIT(S)/HR: 1000 INJECTION INTRAVENOUS; SUBCUTANEOUS at 03:44

## 2025-04-09 RX ADMIN — HEPARIN SODIUM 8000 UNIT(S): 1000 INJECTION INTRAVENOUS; SUBCUTANEOUS at 03:46

## 2025-04-09 RX ADMIN — HEPARIN SODIUM 2200 UNIT(S)/HR: 1000 INJECTION INTRAVENOUS; SUBCUTANEOUS at 07:13

## 2025-04-09 RX ADMIN — Medication 3 MILLIGRAM(S): at 03:51

## 2025-04-09 RX ADMIN — Medication 25 MILLIGRAM(S): at 05:44

## 2025-04-09 RX ADMIN — Medication 650 MILLIGRAM(S): at 22:15

## 2025-04-09 RX ADMIN — Medication 150 MICROGRAM(S): at 04:44

## 2025-04-09 RX ADMIN — Medication 5 MILLIGRAM(S): at 03:51

## 2025-04-09 RX ADMIN — Medication 5 MILLIGRAM(S): at 21:23

## 2025-04-09 RX ADMIN — Medication 40 MILLIGRAM(S): at 05:43

## 2025-04-09 RX ADMIN — LIDOCAINE HYDROCHLORIDE 1 PATCH: 20 JELLY TOPICAL at 03:56

## 2025-04-09 RX ADMIN — Medication 2 TABLET(S): at 21:24

## 2025-04-09 RX ADMIN — FUROSEMIDE 40 MILLIGRAM(S): 10 INJECTION INTRAMUSCULAR; INTRAVENOUS at 05:44

## 2025-04-10 ENCOUNTER — RESULT REVIEW (OUTPATIENT)
Age: 75
End: 2025-04-10

## 2025-04-10 LAB
ANION GAP SERPL CALC-SCNC: 15 MMOL/L — HIGH (ref 7–14)
BASOPHILS # BLD AUTO: 0.04 K/UL — SIGNIFICANT CHANGE UP (ref 0–0.2)
BASOPHILS NFR BLD AUTO: 0.4 % — SIGNIFICANT CHANGE UP (ref 0–2)
BUN SERPL-MCNC: 32 MG/DL — HIGH (ref 7–23)
CALCIUM SERPL-MCNC: 9.4 MG/DL — SIGNIFICANT CHANGE UP (ref 8.4–10.5)
CHLORIDE SERPL-SCNC: 98 MMOL/L — SIGNIFICANT CHANGE UP (ref 98–107)
CO2 SERPL-SCNC: 23 MMOL/L — SIGNIFICANT CHANGE UP (ref 22–31)
CREAT SERPL-MCNC: 1.14 MG/DL — SIGNIFICANT CHANGE UP (ref 0.5–1.3)
EGFR: 67 ML/MIN/1.73M2 — SIGNIFICANT CHANGE UP
EGFR: 67 ML/MIN/1.73M2 — SIGNIFICANT CHANGE UP
EOSINOPHIL # BLD AUTO: 0.08 K/UL — SIGNIFICANT CHANGE UP (ref 0–0.5)
EOSINOPHIL NFR BLD AUTO: 0.8 % — SIGNIFICANT CHANGE UP (ref 0–6)
GLUCOSE SERPL-MCNC: 103 MG/DL — HIGH (ref 70–99)
HCT VFR BLD CALC: 29.9 % — LOW (ref 39–50)
HGB BLD-MCNC: 10 G/DL — LOW (ref 13–17)
IANC: 8.32 K/UL — HIGH (ref 1.8–7.4)
IMM GRANULOCYTES NFR BLD AUTO: 0.5 % — SIGNIFICANT CHANGE UP (ref 0–0.9)
LYMPHOCYTES # BLD AUTO: 1.27 K/UL — SIGNIFICANT CHANGE UP (ref 1–3.3)
LYMPHOCYTES # BLD AUTO: 12.2 % — LOW (ref 13–44)
MAGNESIUM SERPL-MCNC: 1.8 MG/DL — SIGNIFICANT CHANGE UP (ref 1.6–2.6)
MCHC RBC-ENTMCNC: 29.1 PG — SIGNIFICANT CHANGE UP (ref 27–34)
MCHC RBC-ENTMCNC: 33.4 G/DL — SIGNIFICANT CHANGE UP (ref 32–36)
MCV RBC AUTO: 86.9 FL — SIGNIFICANT CHANGE UP (ref 80–100)
MONOCYTES # BLD AUTO: 0.68 K/UL — SIGNIFICANT CHANGE UP (ref 0–0.9)
MONOCYTES NFR BLD AUTO: 6.5 % — SIGNIFICANT CHANGE UP (ref 2–14)
NEUTROPHILS # BLD AUTO: 8.32 K/UL — HIGH (ref 1.8–7.4)
NEUTROPHILS NFR BLD AUTO: 79.6 % — HIGH (ref 43–77)
NRBC # BLD AUTO: 0 K/UL — SIGNIFICANT CHANGE UP (ref 0–0)
NRBC # FLD: 0 K/UL — SIGNIFICANT CHANGE UP (ref 0–0)
NRBC BLD AUTO-RTO: 0 /100 WBCS — SIGNIFICANT CHANGE UP (ref 0–0)
PHOSPHATE SERPL-MCNC: 3.7 MG/DL — SIGNIFICANT CHANGE UP (ref 2.5–4.5)
PLATELET # BLD AUTO: 236 K/UL — SIGNIFICANT CHANGE UP (ref 150–400)
POTASSIUM SERPL-MCNC: 3.6 MMOL/L — SIGNIFICANT CHANGE UP (ref 3.5–5.3)
POTASSIUM SERPL-SCNC: 3.6 MMOL/L — SIGNIFICANT CHANGE UP (ref 3.5–5.3)
RBC # BLD: 3.44 M/UL — LOW (ref 4.2–5.8)
RBC # FLD: 17.4 % — HIGH (ref 10.3–14.5)
SODIUM SERPL-SCNC: 136 MMOL/L — SIGNIFICANT CHANGE UP (ref 135–145)
WBC # BLD: 10.44 K/UL — SIGNIFICANT CHANGE UP (ref 3.8–10.5)
WBC # FLD AUTO: 10.44 K/UL — SIGNIFICANT CHANGE UP (ref 3.8–10.5)

## 2025-04-10 PROCEDURE — 72157 MRI CHEST SPINE W/O & W/DYE: CPT | Mod: 26

## 2025-04-10 PROCEDURE — 70487 CT MAXILLOFACIAL W/DYE: CPT | Mod: 26

## 2025-04-10 PROCEDURE — 93325 DOPPLER ECHO COLOR FLOW MAPG: CPT | Mod: 26,GC

## 2025-04-10 PROCEDURE — 70548 MR ANGIOGRAPHY NECK W/DYE: CPT | Mod: 26

## 2025-04-10 PROCEDURE — 76376 3D RENDER W/INTRP POSTPROCES: CPT | Mod: 26

## 2025-04-10 PROCEDURE — 72158 MRI LUMBAR SPINE W/O & W/DYE: CPT | Mod: 26

## 2025-04-10 PROCEDURE — 99233 SBSQ HOSP IP/OBS HIGH 50: CPT

## 2025-04-10 PROCEDURE — 93320 DOPPLER ECHO COMPLETE: CPT | Mod: 26,GC

## 2025-04-10 PROCEDURE — 93312 ECHO TRANSESOPHAGEAL: CPT | Mod: 26

## 2025-04-10 PROCEDURE — 70543 MRI ORBT/FAC/NCK W/O &W/DYE: CPT | Mod: 26

## 2025-04-10 RX ORDER — TRAMADOL HYDROCHLORIDE 50 MG/1
50 TABLET, FILM COATED ORAL EVERY 4 HOURS
Refills: 0 | Status: DISCONTINUED | OUTPATIENT
Start: 2025-04-10 | End: 2025-04-12

## 2025-04-10 RX ADMIN — ATORVASTATIN CALCIUM 40 MILLIGRAM(S): 80 TABLET, FILM COATED ORAL at 21:08

## 2025-04-10 RX ADMIN — CEFTRIAXONE 100 MILLIGRAM(S): 500 INJECTION, POWDER, FOR SOLUTION INTRAMUSCULAR; INTRAVENOUS at 17:10

## 2025-04-10 RX ADMIN — Medication 325 MILLIGRAM(S): at 14:04

## 2025-04-10 RX ADMIN — Medication 5 MILLIGRAM(S): at 21:08

## 2025-04-10 RX ADMIN — Medication 1 TABLET(S): at 14:04

## 2025-04-10 RX ADMIN — Medication 2 TABLET(S): at 21:07

## 2025-04-10 RX ADMIN — Medication 0.5 MILLIGRAM(S): at 18:24

## 2025-04-10 RX ADMIN — Medication 40 MILLIGRAM(S): at 05:21

## 2025-04-10 RX ADMIN — Medication 5 MILLIGRAM(S): at 00:34

## 2025-04-10 RX ADMIN — Medication 25 MILLIGRAM(S): at 05:21

## 2025-04-10 RX ADMIN — Medication 1 APPLICATION(S): at 14:05

## 2025-04-10 RX ADMIN — LIDOCAINE HYDROCHLORIDE 1 PATCH: 20 JELLY TOPICAL at 21:19

## 2025-04-10 RX ADMIN — Medication 3 MILLIGRAM(S): at 00:35

## 2025-04-10 RX ADMIN — TRAMADOL HYDROCHLORIDE 50 MILLIGRAM(S): 50 TABLET, FILM COATED ORAL at 17:45

## 2025-04-10 RX ADMIN — CEFTRIAXONE 100 MILLIGRAM(S): 500 INJECTION, POWDER, FOR SOLUTION INTRAMUSCULAR; INTRAVENOUS at 05:22

## 2025-04-10 RX ADMIN — Medication 150 MICROGRAM(S): at 04:20

## 2025-04-10 RX ADMIN — FUROSEMIDE 40 MILLIGRAM(S): 10 INJECTION INTRAMUSCULAR; INTRAVENOUS at 05:21

## 2025-04-11 LAB
ANION GAP SERPL CALC-SCNC: 14 MMOL/L — SIGNIFICANT CHANGE UP (ref 7–14)
BLD GP AB SCN SERPL QL: NEGATIVE — SIGNIFICANT CHANGE UP
BUN SERPL-MCNC: 35 MG/DL — HIGH (ref 7–23)
CALCIUM SERPL-MCNC: 9.2 MG/DL — SIGNIFICANT CHANGE UP (ref 8.4–10.5)
CHLORIDE SERPL-SCNC: 97 MMOL/L — LOW (ref 98–107)
CO2 SERPL-SCNC: 24 MMOL/L — SIGNIFICANT CHANGE UP (ref 22–31)
CREAT SERPL-MCNC: 1.21 MG/DL — SIGNIFICANT CHANGE UP (ref 0.5–1.3)
EGFR: 63 ML/MIN/1.73M2 — SIGNIFICANT CHANGE UP
EGFR: 63 ML/MIN/1.73M2 — SIGNIFICANT CHANGE UP
GLUCOSE SERPL-MCNC: 91 MG/DL — SIGNIFICANT CHANGE UP (ref 70–99)
HCT VFR BLD CALC: 22.8 % — LOW (ref 39–50)
HCT VFR BLD CALC: 30.3 % — LOW (ref 39–50)
HGB BLD-MCNC: 10.3 G/DL — LOW (ref 13–17)
HGB BLD-MCNC: 7.6 G/DL — LOW (ref 13–17)
MAGNESIUM SERPL-MCNC: 1.7 MG/DL — SIGNIFICANT CHANGE UP (ref 1.6–2.6)
MCHC RBC-ENTMCNC: 28.8 PG — SIGNIFICANT CHANGE UP (ref 27–34)
MCHC RBC-ENTMCNC: 28.9 PG — SIGNIFICANT CHANGE UP (ref 27–34)
MCHC RBC-ENTMCNC: 33.3 G/DL — SIGNIFICANT CHANGE UP (ref 32–36)
MCHC RBC-ENTMCNC: 34 G/DL — SIGNIFICANT CHANGE UP (ref 32–36)
MCV RBC AUTO: 84.9 FL — SIGNIFICANT CHANGE UP (ref 80–100)
MCV RBC AUTO: 86.4 FL — SIGNIFICANT CHANGE UP (ref 80–100)
NRBC # BLD AUTO: 0 K/UL — SIGNIFICANT CHANGE UP (ref 0–0)
NRBC # BLD AUTO: 0 K/UL — SIGNIFICANT CHANGE UP (ref 0–0)
NRBC # FLD: 0 K/UL — SIGNIFICANT CHANGE UP (ref 0–0)
NRBC # FLD: 0 K/UL — SIGNIFICANT CHANGE UP (ref 0–0)
NRBC BLD AUTO-RTO: 0 /100 WBCS — SIGNIFICANT CHANGE UP (ref 0–0)
NRBC BLD AUTO-RTO: 0 /100 WBCS — SIGNIFICANT CHANGE UP (ref 0–0)
PHOSPHATE SERPL-MCNC: 3.7 MG/DL — SIGNIFICANT CHANGE UP (ref 2.5–4.5)
PLATELET # BLD AUTO: 254 K/UL — SIGNIFICANT CHANGE UP (ref 150–400)
PLATELET # BLD AUTO: 284 K/UL — SIGNIFICANT CHANGE UP (ref 150–400)
POTASSIUM SERPL-MCNC: 3.8 MMOL/L — SIGNIFICANT CHANGE UP (ref 3.5–5.3)
POTASSIUM SERPL-SCNC: 3.8 MMOL/L — SIGNIFICANT CHANGE UP (ref 3.5–5.3)
RBC # BLD: 2.64 M/UL — LOW (ref 4.2–5.8)
RBC # BLD: 3.57 M/UL — LOW (ref 4.2–5.8)
RBC # FLD: 17.3 % — HIGH (ref 10.3–14.5)
RBC # FLD: 17.4 % — HIGH (ref 10.3–14.5)
RH IG SCN BLD-IMP: POSITIVE — SIGNIFICANT CHANGE UP
SODIUM SERPL-SCNC: 135 MMOL/L — SIGNIFICANT CHANGE UP (ref 135–145)
WBC # BLD: 11.78 K/UL — HIGH (ref 3.8–10.5)
WBC # BLD: 12.67 K/UL — HIGH (ref 3.8–10.5)
WBC # FLD AUTO: 11.78 K/UL — HIGH (ref 3.8–10.5)
WBC # FLD AUTO: 12.67 K/UL — HIGH (ref 3.8–10.5)

## 2025-04-11 PROCEDURE — 99233 SBSQ HOSP IP/OBS HIGH 50: CPT | Mod: FS

## 2025-04-11 PROCEDURE — 99233 SBSQ HOSP IP/OBS HIGH 50: CPT

## 2025-04-11 PROCEDURE — G0545: CPT

## 2025-04-11 RX ORDER — SALINE 7; 19 G/118ML; G/118ML
1 ENEMA RECTAL ONCE
Refills: 0 | Status: COMPLETED | OUTPATIENT
Start: 2025-04-11 | End: 2025-04-11

## 2025-04-11 RX ORDER — POLYETHYLENE GLYCOL 3350 17 G/17G
17 POWDER, FOR SOLUTION ORAL DAILY
Refills: 0 | Status: DISCONTINUED | OUTPATIENT
Start: 2025-04-11 | End: 2025-04-12

## 2025-04-11 RX ORDER — ENOXAPARIN SODIUM 100 MG/ML
85 INJECTION SUBCUTANEOUS EVERY 12 HOURS
Refills: 0 | Status: DISCONTINUED | OUTPATIENT
Start: 2025-04-11 | End: 2025-04-12

## 2025-04-11 RX ADMIN — Medication 2 TABLET(S): at 21:32

## 2025-04-11 RX ADMIN — Medication 1 APPLICATION(S): at 11:35

## 2025-04-11 RX ADMIN — CEFTRIAXONE 100 MILLIGRAM(S): 500 INJECTION, POWDER, FOR SOLUTION INTRAMUSCULAR; INTRAVENOUS at 17:18

## 2025-04-11 RX ADMIN — CEFTRIAXONE 100 MILLIGRAM(S): 500 INJECTION, POWDER, FOR SOLUTION INTRAMUSCULAR; INTRAVENOUS at 05:56

## 2025-04-11 RX ADMIN — ENOXAPARIN SODIUM 85 MILLIGRAM(S): 100 INJECTION SUBCUTANEOUS at 18:25

## 2025-04-11 RX ADMIN — Medication 1 TABLET(S): at 11:35

## 2025-04-11 RX ADMIN — Medication 25 MILLIGRAM(S): at 05:55

## 2025-04-11 RX ADMIN — SALINE 1 ENEMA: 7; 19 ENEMA RECTAL at 16:02

## 2025-04-11 RX ADMIN — LIDOCAINE HYDROCHLORIDE 1 PATCH: 20 JELLY TOPICAL at 09:18

## 2025-04-11 RX ADMIN — ATORVASTATIN CALCIUM 40 MILLIGRAM(S): 80 TABLET, FILM COATED ORAL at 21:32

## 2025-04-11 RX ADMIN — Medication 5 MILLIGRAM(S): at 21:32

## 2025-04-11 RX ADMIN — POLYETHYLENE GLYCOL 3350 17 GRAM(S): 17 POWDER, FOR SOLUTION ORAL at 12:18

## 2025-04-11 RX ADMIN — Medication 3 MILLIGRAM(S): at 01:41

## 2025-04-11 RX ADMIN — LIDOCAINE HYDROCHLORIDE 1 PATCH: 20 JELLY TOPICAL at 21:31

## 2025-04-11 RX ADMIN — Medication 150 MICROGRAM(S): at 05:55

## 2025-04-11 RX ADMIN — TRAMADOL HYDROCHLORIDE 50 MILLIGRAM(S): 50 TABLET, FILM COATED ORAL at 12:48

## 2025-04-11 RX ADMIN — Medication 5 MILLIGRAM(S): at 01:41

## 2025-04-11 RX ADMIN — FUROSEMIDE 40 MILLIGRAM(S): 10 INJECTION INTRAMUSCULAR; INTRAVENOUS at 05:55

## 2025-04-11 RX ADMIN — Medication 325 MILLIGRAM(S): at 11:35

## 2025-04-11 RX ADMIN — Medication 40 MILLIGRAM(S): at 05:56

## 2025-04-12 ENCOUNTER — INPATIENT (INPATIENT)
Facility: HOSPITAL | Age: 75
LOS: 1 days | Discharge: HOME | DRG: 301 | End: 2025-04-14
Attending: HOSPITALIST | Admitting: STUDENT IN AN ORGANIZED HEALTH CARE EDUCATION/TRAINING PROGRAM
Payer: MEDICARE

## 2025-04-12 ENCOUNTER — TRANSCRIPTION ENCOUNTER (OUTPATIENT)
Age: 75
End: 2025-04-12

## 2025-04-12 VITALS
OXYGEN SATURATION: 95 % | HEART RATE: 69 BPM | RESPIRATION RATE: 16 BRPM | TEMPERATURE: 97 F | WEIGHT: 175.05 LBS | SYSTOLIC BLOOD PRESSURE: 117 MMHG | DIASTOLIC BLOOD PRESSURE: 61 MMHG | HEIGHT: 68 IN

## 2025-04-12 VITALS
HEART RATE: 67 BPM | OXYGEN SATURATION: 96 % | RESPIRATION RATE: 17 BRPM | DIASTOLIC BLOOD PRESSURE: 65 MMHG | SYSTOLIC BLOOD PRESSURE: 116 MMHG | TEMPERATURE: 98 F

## 2025-04-12 DIAGNOSIS — M86.10 OTHER ACUTE OSTEOMYELITIS, UNSPECIFIED SITE: ICD-10-CM

## 2025-04-12 DIAGNOSIS — I38 ENDOCARDITIS, VALVE UNSPECIFIED: ICD-10-CM

## 2025-04-12 DIAGNOSIS — Z95.2 PRESENCE OF PROSTHETIC HEART VALVE: ICD-10-CM

## 2025-04-12 DIAGNOSIS — I48.20 CHRONIC ATRIAL FIBRILLATION, UNSPECIFIED: ICD-10-CM

## 2025-04-12 DIAGNOSIS — Z95.2 PRESENCE OF PROSTHETIC HEART VALVE: Chronic | ICD-10-CM

## 2025-04-12 DIAGNOSIS — Z29.9 ENCOUNTER FOR PROPHYLACTIC MEASURES, UNSPECIFIED: ICD-10-CM

## 2025-04-12 DIAGNOSIS — E03.9 HYPOTHYROIDISM, UNSPECIFIED: ICD-10-CM

## 2025-04-12 DIAGNOSIS — E11.9 TYPE 2 DIABETES MELLITUS WITHOUT COMPLICATIONS: ICD-10-CM

## 2025-04-12 DIAGNOSIS — H34.12 CENTRAL RETINAL ARTERY OCCLUSION, LEFT EYE: ICD-10-CM

## 2025-04-12 DIAGNOSIS — I76 SEPTIC ARTERIAL EMBOLISM: ICD-10-CM

## 2025-04-12 DIAGNOSIS — I63.9 CEREBRAL INFARCTION, UNSPECIFIED: ICD-10-CM

## 2025-04-12 DIAGNOSIS — I10 ESSENTIAL (PRIMARY) HYPERTENSION: ICD-10-CM

## 2025-04-12 LAB
ANION GAP SERPL CALC-SCNC: 14 MMOL/L — SIGNIFICANT CHANGE UP (ref 7–14)
BASOPHILS # BLD AUTO: 0.04 K/UL — SIGNIFICANT CHANGE UP (ref 0–0.2)
BASOPHILS NFR BLD AUTO: 0.4 % — SIGNIFICANT CHANGE UP (ref 0–2)
BUN SERPL-MCNC: 39 MG/DL — HIGH (ref 7–23)
CALCIUM SERPL-MCNC: 9.3 MG/DL — SIGNIFICANT CHANGE UP (ref 8.4–10.5)
CHLORIDE SERPL-SCNC: 96 MMOL/L — LOW (ref 98–107)
CO2 SERPL-SCNC: 25 MMOL/L — SIGNIFICANT CHANGE UP (ref 22–31)
CREAT SERPL-MCNC: 1.19 MG/DL — SIGNIFICANT CHANGE UP (ref 0.5–1.3)
EGFR: 64 ML/MIN/1.73M2 — SIGNIFICANT CHANGE UP
EGFR: 64 ML/MIN/1.73M2 — SIGNIFICANT CHANGE UP
EOSINOPHIL # BLD AUTO: 0.08 K/UL — SIGNIFICANT CHANGE UP (ref 0–0.5)
EOSINOPHIL NFR BLD AUTO: 0.8 % — SIGNIFICANT CHANGE UP (ref 0–6)
GLUCOSE BLDC GLUCOMTR-MCNC: 129 MG/DL — HIGH (ref 70–99)
GLUCOSE BLDC GLUCOMTR-MCNC: 148 MG/DL — HIGH (ref 70–99)
GLUCOSE BLDC GLUCOMTR-MCNC: 157 MG/DL — HIGH (ref 70–99)
GLUCOSE SERPL-MCNC: 88 MG/DL — SIGNIFICANT CHANGE UP (ref 70–99)
HCT VFR BLD CALC: 30.1 % — LOW (ref 39–50)
HGB BLD-MCNC: 9.9 G/DL — LOW (ref 13–17)
IANC: 7.84 K/UL — HIGH (ref 1.8–7.4)
IMM GRANULOCYTES NFR BLD AUTO: 0.4 % — SIGNIFICANT CHANGE UP (ref 0–0.9)
LYMPHOCYTES # BLD AUTO: 1.04 K/UL — SIGNIFICANT CHANGE UP (ref 1–3.3)
LYMPHOCYTES # BLD AUTO: 10.7 % — LOW (ref 13–44)
MAGNESIUM SERPL-MCNC: 1.8 MG/DL — SIGNIFICANT CHANGE UP (ref 1.6–2.6)
MCHC RBC-ENTMCNC: 29 PG — SIGNIFICANT CHANGE UP (ref 27–34)
MCHC RBC-ENTMCNC: 32.9 G/DL — SIGNIFICANT CHANGE UP (ref 32–36)
MCV RBC AUTO: 88.3 FL — SIGNIFICANT CHANGE UP (ref 80–100)
MONOCYTES # BLD AUTO: 0.64 K/UL — SIGNIFICANT CHANGE UP (ref 0–0.9)
MONOCYTES NFR BLD AUTO: 6.6 % — SIGNIFICANT CHANGE UP (ref 2–14)
NEUTROPHILS # BLD AUTO: 7.84 K/UL — HIGH (ref 1.8–7.4)
NEUTROPHILS NFR BLD AUTO: 81.1 % — HIGH (ref 43–77)
NRBC # BLD AUTO: 0 K/UL — SIGNIFICANT CHANGE UP (ref 0–0)
NRBC # FLD: 0 K/UL — SIGNIFICANT CHANGE UP (ref 0–0)
NRBC BLD AUTO-RTO: 0 /100 WBCS — SIGNIFICANT CHANGE UP (ref 0–0)
PHOSPHATE SERPL-MCNC: 3.6 MG/DL — SIGNIFICANT CHANGE UP (ref 2.5–4.5)
PLATELET # BLD AUTO: 255 K/UL — SIGNIFICANT CHANGE UP (ref 150–400)
POTASSIUM SERPL-MCNC: 3.7 MMOL/L — SIGNIFICANT CHANGE UP (ref 3.5–5.3)
POTASSIUM SERPL-SCNC: 3.7 MMOL/L — SIGNIFICANT CHANGE UP (ref 3.5–5.3)
RBC # BLD: 3.41 M/UL — LOW (ref 4.2–5.8)
RBC # FLD: 17.3 % — HIGH (ref 10.3–14.5)
SODIUM SERPL-SCNC: 135 MMOL/L — SIGNIFICANT CHANGE UP (ref 135–145)
WBC # BLD: 9.68 K/UL — SIGNIFICANT CHANGE UP (ref 3.8–10.5)
WBC # FLD AUTO: 9.68 K/UL — SIGNIFICANT CHANGE UP (ref 3.8–10.5)

## 2025-04-12 PROCEDURE — 99223 1ST HOSP IP/OBS HIGH 75: CPT | Mod: GC

## 2025-04-12 PROCEDURE — 99233 SBSQ HOSP IP/OBS HIGH 50: CPT

## 2025-04-12 RX ORDER — ENOXAPARIN SODIUM 100 MG/ML
85 INJECTION SUBCUTANEOUS EVERY 12 HOURS
Refills: 0 | Status: DISCONTINUED | OUTPATIENT
Start: 2025-04-12 | End: 2025-04-12

## 2025-04-12 RX ORDER — B1/B2/B3/B5/B6/B12/VIT C/FOLIC 500-0.5 MG
1 TABLET ORAL DAILY
Refills: 0 | Status: DISCONTINUED | OUTPATIENT
Start: 2025-04-12 | End: 2025-04-14

## 2025-04-12 RX ORDER — APIXABAN 2.5 MG/1
1 TABLET, FILM COATED ORAL
Refills: 0 | DISCHARGE

## 2025-04-12 RX ORDER — DEXTROSE 50 % IN WATER 50 %
12.5 SYRINGE (ML) INTRAVENOUS ONCE
Refills: 0 | Status: DISCONTINUED | OUTPATIENT
Start: 2025-04-12 | End: 2025-04-14

## 2025-04-12 RX ORDER — ENOXAPARIN SODIUM 100 MG/ML
79 INJECTION SUBCUTANEOUS EVERY 12 HOURS
Refills: 0 | Status: DISCONTINUED | OUTPATIENT
Start: 2025-04-12 | End: 2025-04-12

## 2025-04-12 RX ORDER — FUROSEMIDE 10 MG/ML
40 INJECTION INTRAMUSCULAR; INTRAVENOUS DAILY
Refills: 0 | Status: DISCONTINUED | OUTPATIENT
Start: 2025-04-12 | End: 2025-04-14

## 2025-04-12 RX ORDER — SENNA 187 MG
2 TABLET ORAL AT BEDTIME
Refills: 0 | Status: DISCONTINUED | OUTPATIENT
Start: 2025-04-12 | End: 2025-04-14

## 2025-04-12 RX ORDER — DEXTROSE 50 % IN WATER 50 %
25 SYRINGE (ML) INTRAVENOUS ONCE
Refills: 0 | Status: DISCONTINUED | OUTPATIENT
Start: 2025-04-12 | End: 2025-04-14

## 2025-04-12 RX ORDER — ENOXAPARIN SODIUM 100 MG/ML
80 INJECTION SUBCUTANEOUS EVERY 12 HOURS
Refills: 0 | Status: DISCONTINUED | OUTPATIENT
Start: 2025-04-12 | End: 2025-04-14

## 2025-04-12 RX ORDER — LIDOCAINE HYDROCHLORIDE 20 MG/ML
1 JELLY TOPICAL ONCE
Refills: 0 | Status: COMPLETED | OUTPATIENT
Start: 2025-04-12 | End: 2025-04-12

## 2025-04-12 RX ORDER — LEVOTHYROXINE SODIUM 300 MCG
150 TABLET ORAL DAILY
Refills: 0 | Status: DISCONTINUED | OUTPATIENT
Start: 2025-04-12 | End: 2025-04-14

## 2025-04-12 RX ORDER — POLYETHYLENE GLYCOL 3350 17 G/17G
17 POWDER, FOR SOLUTION ORAL DAILY
Refills: 0 | Status: DISCONTINUED | OUTPATIENT
Start: 2025-04-12 | End: 2025-04-14

## 2025-04-12 RX ORDER — MELATONIN 5 MG
3 TABLET ORAL AT BEDTIME
Refills: 0 | Status: DISCONTINUED | OUTPATIENT
Start: 2025-04-12 | End: 2025-04-14

## 2025-04-12 RX ORDER — SODIUM CHLORIDE 9 G/1000ML
1000 INJECTION, SOLUTION INTRAVENOUS
Refills: 0 | Status: DISCONTINUED | OUTPATIENT
Start: 2025-04-12 | End: 2025-04-14

## 2025-04-12 RX ORDER — INSULIN LISPRO 100 U/ML
INJECTION, SOLUTION INTRAVENOUS; SUBCUTANEOUS AT BEDTIME
Refills: 0 | Status: DISCONTINUED | OUTPATIENT
Start: 2025-04-12 | End: 2025-04-14

## 2025-04-12 RX ORDER — CYCLOBENZAPRINE HYDROCHLORIDE 15 MG/1
5 CAPSULE, EXTENDED RELEASE ORAL THREE TIMES A DAY
Refills: 0 | Status: DISCONTINUED | OUTPATIENT
Start: 2025-04-12 | End: 2025-04-14

## 2025-04-12 RX ORDER — TRAMADOL HYDROCHLORIDE 50 MG/1
50 TABLET, FILM COATED ORAL EVERY 4 HOURS
Refills: 0 | Status: DISCONTINUED | OUTPATIENT
Start: 2025-04-12 | End: 2025-04-14

## 2025-04-12 RX ORDER — ATORVASTATIN CALCIUM 80 MG/1
40 TABLET, FILM COATED ORAL AT BEDTIME
Refills: 0 | Status: DISCONTINUED | OUTPATIENT
Start: 2025-04-12 | End: 2025-04-14

## 2025-04-12 RX ORDER — INSULIN LISPRO 100 U/ML
INJECTION, SOLUTION INTRAVENOUS; SUBCUTANEOUS
Refills: 0 | Status: DISCONTINUED | OUTPATIENT
Start: 2025-04-12 | End: 2025-04-14

## 2025-04-12 RX ORDER — LIDOCAINE HYDROCHLORIDE 20 MG/ML
1 JELLY TOPICAL DAILY
Refills: 0 | Status: DISCONTINUED | OUTPATIENT
Start: 2025-04-12 | End: 2025-04-14

## 2025-04-12 RX ORDER — GLUCAGON 3 MG/1
1 POWDER NASAL ONCE
Refills: 0 | Status: DISCONTINUED | OUTPATIENT
Start: 2025-04-12 | End: 2025-04-14

## 2025-04-12 RX ORDER — FERROUS SULFATE 137(45) MG
325 TABLET, EXTENDED RELEASE ORAL DAILY
Refills: 0 | Status: DISCONTINUED | OUTPATIENT
Start: 2025-04-12 | End: 2025-04-14

## 2025-04-12 RX ORDER — BISACODYL 5 MG
5 TABLET, DELAYED RELEASE (ENTERIC COATED) ORAL AT BEDTIME
Refills: 0 | Status: DISCONTINUED | OUTPATIENT
Start: 2025-04-12 | End: 2025-04-14

## 2025-04-12 RX ORDER — CEFTRIAXONE 500 MG/1
2000 INJECTION, POWDER, FOR SOLUTION INTRAMUSCULAR; INTRAVENOUS EVERY 12 HOURS
Refills: 0 | Status: DISCONTINUED | OUTPATIENT
Start: 2025-04-12 | End: 2025-04-14

## 2025-04-12 RX ORDER — ENOXAPARIN SODIUM 100 MG/ML
85 INJECTION SUBCUTANEOUS
Qty: 0 | Refills: 0 | DISCHARGE
Start: 2025-04-12

## 2025-04-12 RX ORDER — DEXTROSE 50 % IN WATER 50 %
15 SYRINGE (ML) INTRAVENOUS ONCE
Refills: 0 | Status: DISCONTINUED | OUTPATIENT
Start: 2025-04-12 | End: 2025-04-14

## 2025-04-12 RX ORDER — SPIRONOLACTONE 25 MG
25 TABLET ORAL DAILY
Refills: 0 | Status: DISCONTINUED | OUTPATIENT
Start: 2025-04-12 | End: 2025-04-14

## 2025-04-12 RX ORDER — MELATONIN 5 MG
1 TABLET ORAL
Qty: 0 | Refills: 0 | DISCHARGE
Start: 2025-04-12

## 2025-04-12 RX ORDER — CEFTRIAXONE 500 MG/1
2 INJECTION, POWDER, FOR SOLUTION INTRAMUSCULAR; INTRAVENOUS
Qty: 0 | Refills: 0 | DISCHARGE
Start: 2025-04-12

## 2025-04-12 RX ADMIN — TRAMADOL HYDROCHLORIDE 50 MILLIGRAM(S): 50 TABLET, FILM COATED ORAL at 20:43

## 2025-04-12 RX ADMIN — CEFTRIAXONE 100 MILLIGRAM(S): 500 INJECTION, POWDER, FOR SOLUTION INTRAMUSCULAR; INTRAVENOUS at 18:25

## 2025-04-12 RX ADMIN — Medication 3 MILLIGRAM(S): at 21:37

## 2025-04-12 RX ADMIN — FUROSEMIDE 40 MILLIGRAM(S): 10 INJECTION INTRAMUSCULAR; INTRAVENOUS at 05:47

## 2025-04-12 RX ADMIN — Medication 325 MILLIGRAM(S): at 11:34

## 2025-04-12 RX ADMIN — Medication 5 MILLIGRAM(S): at 21:38

## 2025-04-12 RX ADMIN — ATORVASTATIN CALCIUM 40 MILLIGRAM(S): 80 TABLET, FILM COATED ORAL at 21:38

## 2025-04-12 RX ADMIN — Medication 25 MILLIGRAM(S): at 05:48

## 2025-04-12 RX ADMIN — Medication 5 MILLIGRAM(S): at 22:06

## 2025-04-12 RX ADMIN — LIDOCAINE HYDROCHLORIDE 1 PATCH: 20 JELLY TOPICAL at 20:43

## 2025-04-12 RX ADMIN — Medication 2 TABLET(S): at 21:37

## 2025-04-12 RX ADMIN — ENOXAPARIN SODIUM 80 MILLIGRAM(S): 100 INJECTION SUBCUTANEOUS at 18:25

## 2025-04-12 RX ADMIN — Medication 1 TABLET(S): at 11:34

## 2025-04-12 RX ADMIN — TRAMADOL HYDROCHLORIDE 50 MILLIGRAM(S): 50 TABLET, FILM COATED ORAL at 20:48

## 2025-04-12 RX ADMIN — ENOXAPARIN SODIUM 85 MILLIGRAM(S): 100 INJECTION SUBCUTANEOUS at 05:56

## 2025-04-12 RX ADMIN — Medication 1 APPLICATION(S): at 11:28

## 2025-04-12 RX ADMIN — Medication 40 MILLIGRAM(S): at 05:47

## 2025-04-12 RX ADMIN — CEFTRIAXONE 100 MILLIGRAM(S): 500 INJECTION, POWDER, FOR SOLUTION INTRAMUSCULAR; INTRAVENOUS at 05:51

## 2025-04-12 RX ADMIN — LIDOCAINE HYDROCHLORIDE 1 PATCH: 20 JELLY TOPICAL at 11:34

## 2025-04-12 RX ADMIN — POLYETHYLENE GLYCOL 3350 17 GRAM(S): 17 POWDER, FOR SOLUTION ORAL at 11:35

## 2025-04-12 RX ADMIN — TRAMADOL HYDROCHLORIDE 50 MILLIGRAM(S): 50 TABLET, FILM COATED ORAL at 05:57

## 2025-04-12 RX ADMIN — Medication 150 MICROGRAM(S): at 05:48

## 2025-04-12 NOTE — CONSULT NOTE ADULT - PROBLEM SELECTOR RECOMMENDATION 9
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  Ct surgery consulted for endocarditis ROVERTO shows no evidence of vegetations. All labs and radiographic image reviewed by Dr Ambrocio no cardiac  surgery intervention necessary at this time.   Continue  ABX per ID follow neuro recommendations   Dental following   Care as per primary team  Plan d/w Dr Ambrocio Present's with  acute multiple embolic CVA with brain edema, CRAO w/ acute Lt visual loss.HX TAVR   ( Dr Rodriges 1/11/23)  Ct surgery consulted for endocarditis ROVERTO shows no evidence of vegetations. All labs and radiographic image reviewed by Dr Ambrocio no cardiac  surgery intervention necessary at this time.   Continue  ABX per ID follow neuro recommendations   Dental following   Care as per primary team  Plan d/w Dr Ambrocio

## 2025-04-12 NOTE — H&P ADULT - PROBLEM SELECTOR PLAN 7
MR spine reviewed - T8-9 OM w/ epidural phlegmon.    PLAN  - c/w CTX IV  - monitor CBC  - continue flexeril and Ultram PRN for pain

## 2025-04-12 NOTE — CONSULT NOTE ADULT - SUBJECTIVE AND OBJECTIVE BOX
History of Present Illness:  HPI:        Past Medical History  Hypertension    Diabetes mellitus    Hypothyroid obesity    Obesity    Hypothyroidism    Prostate CA        Past Surgical History  No significant past surgical history    H/O endarterectomy    S/P TAVR (transcatheter aortic valve replacement)        MEDICATIONS  (STANDING):  atorvastatin 40 milliGRAM(s) Oral at bedtime  bisacodyl 5 milliGRAM(s) Oral at bedtime  cefTRIAXone   IVPB 2000 milliGRAM(s) IV Intermittent every 12 hours  enoxaparin Injectable 80 milliGRAM(s) SubCutaneous every 12 hours  ferrous    sulfate 325 milliGRAM(s) Oral daily  furosemide    Tablet 40 milliGRAM(s) Oral daily  levothyroxine 150 MICROGram(s) Oral daily  multivitamin 1 Tablet(s) Oral daily  pantoprazole    Tablet 40 milliGRAM(s) Oral before breakfast  spironolactone 25 milliGRAM(s) Oral daily    MEDICATIONS  (PRN):      Vital Signs Last 24 Hrs  T(C): 36.7 (04-12-25 @ 16:42), Max: 36.9 (04-12-25 @ 12:00)  T(F): 98.1 (04-12-25 @ 16:42), Max: 98.4 (04-12-25 @ 12:00)  HR: 72 (04-12-25 @ 16:42) (67 - 80)  BP: 119/66 (04-12-25 @ 16:42) (114/61 - 125/62)  RR: 18 (04-12-25 @ 16:42) (16 - 19)  SpO2: 95% (04-12-25 @ 16:42) (95% - 99%)           Daily Height in cm: 172.72 (12 Apr 2025 13:13)    Daily   Admit Wt: Drug Dosing Weight  Height (cm): 172.7 (12 Apr 2025 13:13)  Weight (kg): 79.4 (12 Apr 2025 13:13)  BMI (kg/m2): 26.6 (12 Apr 2025 13:13)  BSA (m2): 1.93 (12 Apr 2025 13:13)    Allergies: No Known Allergies      SOCIAL HISTORY:  Smoker: [ ] Yes  [X] No                 Pt denies  ETOH use: [ ] Yes  [X] No             Pt denies  Ilicit Drug use:  [ ] Yes  [X] No     Pt denies    FAMILY HISTORY:  No pertinent family history in first degree relatives        Review of Systems  GENERAL:  no weakness, fatigue, fevers or chills  NEURO: no dizziness, numbness, tingling or weakness  SKIN: no itching, burning, rashes, or lesions   HEENT: no visual changes;  no headache, no vertigo, no recent colds  RESPIRATORY: no shortness of breath, no cough, sputum, wheezing  CARDIOVASCULAR:  no chest pain,  or palpitations  GI: no abd pain. no N/V/D.  PERIPHERAL VASCULAR: no swelling, no tenderness, no erythema    PHYSICAL EXAM  General: Well nourished, well developed, NAD.                                              Neuro: Normal exam oriented to person/place & time with no focal motor or sensory  deficits.                    Eyes: Normal exam of conjunctiva & lids, pupils equally reactive.   ENT: Normal exam of nasal/oral mucosa with absence of cyanosis.   Neck: Normal exam of jugular veins, trachea & thyroid.   Chest: Normal lung exam with good air movement absence of wheezes, rales, or rhonchi.                                                                         CV:  Auscultation: normal S1S2, RRR   Carotids: No Bruits[X]  Abdominal Aorta: normal [X] nonpalpable[X]                                                                         GI: Normal exam of abdomen with no noted masses or tenderness. +BSx4Q                                                                                            Extremities: Normal no evidence of cyanosis or deformity, Edema: none  Lower Extremity Pulses: Right[+2DP] Left[+2DP] Varicosities[none]  SKIN : Normal exam to inspection & palpation.                                                           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 Transferred to [facility] for further management   CT surgery Dr Ambrocio  consulted for endocarditis       Past Medical History  Hypertension    Diabetes mellitus    Hypothyroid obesity    Obesity    Hypothyroidism    Prostate CA        Past Surgical History  No significant past surgical history    H/O endarterectomy    S/P TAVR (transcatheter aortic valve replacement)        MEDICATIONS  (STANDING):  atorvastatin 40 milliGRAM(s) Oral at bedtime  bisacodyl 5 milliGRAM(s) Oral at bedtime  cefTRIAXone   IVPB 2000 milliGRAM(s) IV Intermittent every 12 hours  enoxaparin Injectable 80 milliGRAM(s) SubCutaneous every 12 hours  ferrous    sulfate 325 milliGRAM(s) Oral daily  furosemide    Tablet 40 milliGRAM(s) Oral daily  levothyroxine 150 MICROGram(s) Oral daily  multivitamin 1 Tablet(s) Oral daily  pantoprazole    Tablet 40 milliGRAM(s) Oral before breakfast  spironolactone 25 milliGRAM(s) Oral daily    MEDICATIONS  (PRN):      Vital Signs Last 24 Hrs  T(C): 36.7 (04-12-25 @ 16:42), Max: 36.9 (04-12-25 @ 12:00)  T(F): 98.1 (04-12-25 @ 16:42), Max: 98.4 (04-12-25 @ 12:00)  HR: 72 (04-12-25 @ 16:42) (67 - 80)  BP: 119/66 (04-12-25 @ 16:42) (114/61 - 125/62)  RR: 18 (04-12-25 @ 16:42) (16 - 19)  SpO2: 95% (04-12-25 @ 16:42) (95% - 99%)           Daily Height in cm: 172.72 (12 Apr 2025 13:13)    Admit Wt: Drug Dosing Weight  Height (cm): 172.7 (12 Apr 2025 13:13)  Weight (kg): 79.4 (12 Apr 2025 13:13)  BMI (kg/m2): 26.6 (12 Apr 2025 13:13)  BSA (m2): 1.93 (12 Apr 2025 13:13)    Allergies: No Known Allergies      SOCIAL HISTORY:  Smoker: [ ] Yes  [X] No                 Pt denies  ETOH use: [ ] Yes  [X] No             Pt denies  Ilicit Drug use:  [ ] Yes  [X] No     Pt denies    FAMILY HISTORY:  No pertinent family history in first degree relatives        Review of Systems  GENERAL:  no weakness, fatigue, fevers or chills  NEURO: no dizziness, numbness, tingling or weakness- left  hemiopsia   SKIN: no itching, burning, rashes, or lesions   HEENT: no visual changes;  no headache, no vertigo, no recent colds  RESPIRATORY: no shortness of breath, no cough, sputum, wheezing  CARDIOVASCULAR:  no chest pain,  or palpitations  GI: no abd pain. no N/V/D.  PERIPHERAL VASCULAR: no swelling, no tenderness, no erythema    PHYSICAL EXAM  General: Well nourished, well developed, NAD.                                              Neuro: Normal exam oriented to person/place & time with +  focal motor or sensory  deficits. - left hemiopsia                 Eyes: Normal exam of conjunctiva & lids, pupils equally reactive.   ENT: Normal exam of nasal/oral mucosa with absence of cyanosis.   Neck: Normal exam of jugular veins, trachea & thyroid.   Chest: Normal lung exam with good air movement absence of wheezes, rales, or rhonchi.                                                                         CV:  Auscultation: normal S1S2, RRR   Carotids: No Bruits[X]  Abdominal Aorta: normal [X] nonpalpable[X]                                                                         GI: Normal exam of abdomen with no noted masses or tenderness. +BSx4Q                                                                                            Extremities: Normal no evidence of cyanosis or deformity, Edema: none  Lower Extremity Pulses: Right[+2DP] Left[+2DP] Varicosities[none]  SKIN : Normal exam to inspection & palpation.   [facility]                                                          LABS:                        9.9    9.68  )-----------( 255      ( 12 Apr 2025 04:47 )             30.1     04-12    135  |  96[L]  |  39[H]  ----------------------------<  88  3.7   |  25  |  1.19    Ca    9.3      12 Apr 2025 04:47  Phos  3.6     04-12  Mg     1.80     04-12            Cardiac Cath:    TTE / ROVERTO:< from: ROVERTO W or WO Ultrasound Enhancing Agent (04.10.25 @ 11:36) >      1. Left ventricular systolic function is normal. There are no regional wall motion abnormalities seen.   2. Normal right ventricular cavity size and normal right ventricular systolic function.   3. Structurally normal mitral valve with normal leaflet excursion. No vegetations seen in association with the mitral valve. There is calcification of the mitral valve annulus. There is mild mitral regurgitation.   4. A a transcatheter deployed (TAVR) is present in the aortic position. The prosthetic valveis well seated. The peak transaortic velocity is 4.13 m/s, peak transaortic gradient is 68.2 mmHg and mean transaortic gradient is 40.0 mmHg with an LVOT/aortic valve VTI ratio of 0.25. There is mild paravalvular regurgitation, originating anteriorly. The systolic transaortic gradients are elevated. No obvious vegetations seen in association with the transcatheter aortic valve replacement (TAVR).   5. No atheroma in the visualized portions of the proximal ascending aorta. Mild non-mobile atheromain the visualized portions of the transverse aortic arch. Mild non-mobile atheroma in the visualized portions of the descending aorta.   6. The left atrium is normal in size. There is no evidence of left atrial or left atrial appendage thrombus. The left atrial appendage emptying velocity is normal.   7. Agitated saline injection was negative for intracardiac shunt.   8. No echocardiographic evidence of vegetations.    < end of copied text >  < from: MR Lumbar Spine w/wo IV Cont (04.10.25 @ 20:49) >    MRI THORACIC SPINE  1. Progressive T8-T9 discitis-osteomyelitis with evidence of anterior   epidural phlegmon, encroaching upon bilateral neural foramen. No   rim-enhancing epidural-paraspinal fluid collection to suggest abscess.  2. Additional findings, including those degenerative, described in detail   above.    MRI LUMBAR SPINE  1. No significant interval change in the appearance of L3-L4 and L5-S1   discitis-osteomyelitis, with the exception of near complete resolution of   previously described left ventral epidural enhancement at the latter   level. No rim-enhancing epidural-paraspinal fluid collection to suggest   abscess.  2. Additional findings, including those degenerative, described in detail   above.    < end of copied text >  < from: MR Orbits w/wo IV Cont (04.10.25 @ 20:49) >  IMPRESSION: Unremarkable study.    < end of copied text >    MRI BRAIN:  1. Multiple scattered small acute/subacute infarcts and/or septic emboli   throughout the supratentorial brain and bilateral cerebellar hemispheres   with associated cytotoxic edema. No associated hemorrhage.  2. Additional multiple chronic lacunar infarcts and similar-appearing   mild chronic white matter microvascular type changes.    MRI ORBITS:  1. Limited study secondary to premature termination of the exam.  2. Grossly unremarkable noncontrast MRI study.    MRA HEAD:  1. No large vessel occlusion or major stenosis.    HEAD CT:  1. No acute intracranial hemorrhage, mass effect, or shift of the midline   structures.  2. Similar-appearing chronic lacunar infarct in the right corona radiata   and mild chronic white matter microvascular type changes.  CTA NECK:  1. No large vessel occlusion or major stenosis.    CTA HEAD:  1. No large vessel occlusion or major stenosis.           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 Transferred to [facility] for further management   CT surgery Dr Ambrocio  consulted for endocarditis       Past Medical History  Hypertension    Diabetes mellitus    Hypothyroid obesity    Obesity    Hypothyroidism    Prostate CA        Past Surgical History  No significant past surgical history    H/O endarterectomy    S/P TAVR (transcatheter aortic valve replacement)        MEDICATIONS  (STANDING):  atorvastatin 40 milliGRAM(s) Oral at bedtime  bisacodyl 5 milliGRAM(s) Oral at bedtime  cefTRIAXone   IVPB 2000 milliGRAM(s) IV Intermittent every 12 hours  enoxaparin Injectable 80 milliGRAM(s) SubCutaneous every 12 hours  ferrous    sulfate 325 milliGRAM(s) Oral daily  furosemide    Tablet 40 milliGRAM(s) Oral daily  levothyroxine 150 MICROGram(s) Oral daily  multivitamin 1 Tablet(s) Oral daily  pantoprazole    Tablet 40 milliGRAM(s) Oral before breakfast  spironolactone 25 milliGRAM(s) Oral daily    MEDICATIONS  (PRN):      Vital Signs Last 24 Hrs  T(C): 36.7 (04-12-25 @ 16:42), Max: 36.9 (04-12-25 @ 12:00)  T(F): 98.1 (04-12-25 @ 16:42), Max: 98.4 (04-12-25 @ 12:00)  HR: 72 (04-12-25 @ 16:42) (67 - 80)  BP: 119/66 (04-12-25 @ 16:42) (114/61 - 125/62)  RR: 18 (04-12-25 @ 16:42) (16 - 19)  SpO2: 95% (04-12-25 @ 16:42) (95% - 99%)           Daily Height in cm: 172.72 (12 Apr 2025 13:13)    Admit Wt: Drug Dosing Weight  Height (cm): 172.7 (12 Apr 2025 13:13)  Weight (kg): 79.4 (12 Apr 2025 13:13)  BMI (kg/m2): 26.6 (12 Apr 2025 13:13)  BSA (m2): 1.93 (12 Apr 2025 13:13)    Allergies: No Known Allergies      SOCIAL HISTORY:  Smoker: [ ] Yes  [X] No                 Pt denies  ETOH use: [ ] Yes  [X] No             Pt denies  Ilicit Drug use:  [ ] Yes  [X] No     Pt denies    FAMILY HISTORY:  No pertinent family history in first degree relatives        Review of Systems  GENERAL:  no weakness, fatigue, fevers or chills  NEURO: no dizziness, numbness, tingling or weakness- left  hemiopsia   SKIN: no itching, burning, rashes, or lesions   HEENT: no visual changes;  no headache, no vertigo, no recent colds  RESPIRATORY: no shortness of breath, no cough, sputum, wheezing  CARDIOVASCULAR:  no chest pain,  or palpitations  GI: no abd pain. no N/V/D.  PERIPHERAL VASCULAR: no swelling, no tenderness, no erythema    PHYSICAL EXAM  General: Well nourished, well developed, NAD.                                              Neuro: Normal exam oriented to person/place & time with +  focal motor or sensory  deficits. - left hemiopsia                 Eyes: Normal exam of conjunctiva & lids, pupils equally reactive.   ENT: Normal exam of nasal/oral mucosa with absence of cyanosis.   Neck: Normal exam of jugular veins, trachea & thyroid.   Chest: Normal lung exam with good air movement absence of wheezes, rales, or rhonchi.                                                                         CV:  Auscultation: normal S1S2, RRR   Carotids: No Bruits[X]  Abdominal Aorta: normal [X] nonpalpable[X]                                                                         GI: Normal exam of abdomen with no noted masses or tenderness. +BSx4Q                                                                                            Extremities: Normal no evidence of cyanosis or deformity, Edema: none  Lower Extremity Pulses: Right[+2DP] Left[+2DP] Varicosities[none]  SKIN : Normal exam to inspection & palpation.   [facility]                                                          LABS:                        9.9    9.68  )-----------( 255      ( 12 Apr 2025 04:47 )             30.1     04-12    135  |  96[L]  |  39[H]  ----------------------------<  88  3.7   |  25  |  1.19    Ca    9.3      12 Apr 2025 04:47  Phos  3.6     04-12  Mg     1.80     04-12            Cardiac Cath:    TTE / ROVERTO:< from: ROVERTO W or WO Ultrasound Enhancing Agent (04.10.25 @ 11:36) >      1. Left ventricular systolic function is normal. There are no regional wall motion abnormalities seen.   2. Normal right ventricular cavity size and normal right ventricular systolic function.   3. Structurally normal mitral valve with normal leaflet excursion. No vegetations seen in association with the mitral valve. There is calcification of the mitral valve annulus. There is mild mitral regurgitation.   4. A a transcatheter deployed (TAVR) is present in the aortic position. The prosthetic valveis well seated. The peak transaortic velocity is 4.13 m/s, peak transaortic gradient is 68.2 mmHg and mean transaortic gradient is 40.0 mmHg with an LVOT/aortic valve VTI ratio of 0.25. There is mild paravalvular regurgitation, originating anteriorly. The systolic transaortic gradients are elevated. No obvious vegetations seen in association with the transcatheter aortic valve replacement (TAVR).   5. No atheroma in the visualized portions of the proximal ascending aorta. Mild non-mobile atheromain the visualized portions of the transverse aortic arch. Mild non-mobile atheroma in the visualized portions of the descending aorta.   6. The left atrium is normal in size. There is no evidence of left atrial or left atrial appendage thrombus. The left atrial appendage emptying velocity is normal.   7. Agitated saline injection was negative for intracardiac shunt.   8. No echocardiographic evidence of vegetations.    < end of copied text >  < from: MR Lumbar Spine w/wo IV Cont (04.10.25 @ 20:49) >    MRI THORACIC SPINE  1. Progressive T8-T9 discitis-osteomyelitis with evidence of anterior   epidural phlegmon, encroaching upon bilateral neural foramen. No   rim-enhancing epidural-paraspinal fluid collection to suggest abscess.  2. Additional findings, including those degenerative, described in detail   above.    MRI LUMBAR SPINE  1. No significant interval change in the appearance of L3-L4 and L5-S1   discitis-osteomyelitis, with the exception of near complete resolution of   previously described left ventral epidural enhancement at the latter   level. No rim-enhancing epidural-paraspinal fluid collection to suggest   abscess.  2. Additional findings, including those degenerative, described in detail   above.    < end of copied text >  < from: MR Orbits w/wo IV Cont (04.10.25 @ 20:49) >  IMPRESSION: Unremarkable study.    < end of copied text >    MRI BRAIN:  1. Multiple scattered small acute/subacute infarcts and/or septic emboli   throughout the supratentorial brain and bilateral cerebellar hemispheres   with associated cytotoxic edema. No associated hemorrhage.  2. Additional multiple chronic lacunar infarcts and similar-appearing   mild chronic white matter microvascular type changes.    MRI ORBITS:  1. Limited study secondary to premature termination of the exam.  2. Grossly unremarkable noncontrast MRI study.    MRA HEAD:  1. No large vessel occlusion or major stenosis.    HEAD CT:  1. No acute intracranial hemorrhage, mass effect, or shift of the midline   structures.  2. Similar-appearing chronic lacunar infarct in the right corona radiata   and mild chronic white matter microvascular type changes.  CTA NECK:  1. No large vessel occlusion or major stenosis.    CTA HEAD:  1. No large vessel occlusion or major stenosis.

## 2025-04-12 NOTE — H&P ADULT - PROBLEM SELECTOR PLAN 2
Home: eliuqis 5mg bid    PLAN   - c/w lovenox BID  - monitor rate on tele Home: Eliquis 5mg bid    PLAN   - c/w Lovenox BID  - monitor rate on tele

## 2025-04-12 NOTE — H&P ADULT - NSHPLABSRESULTS_GEN_ALL_CORE
LABS:                         9.9    9.68  )-----------( 255      ( 12 Apr 2025 04:47 )             30.1     04-12    135  |  96[L]  |  39[H]  ----------------------------<  88  3.7   |  25  |  1.19    Ca    9.3      12 Apr 2025 04:47  Phos  3.6     04-12  Mg     1.80     04-12        Urinalysis Basic - ( 12 Apr 2025 04:47 )    Color: x / Appearance: x / SG: x / pH: x  Gluc: 88 mg/dL / Ketone: x  / Bili: x / Urobili: x   Blood: x / Protein: x / Nitrite: x   Leuk Esterase: x / RBC: x / WBC x   Sq Epi: x / Non Sq Epi: x / Bacteria: x

## 2025-04-12 NOTE — H&P ADULT - HISTORY OF PRESENT ILLNESS
73 y/o M hx HTN, DM2 hypothyroidism, prostate cancer, chronic afib, HLD, GI bleed (last c-scope/endoscopy negative), TAVR 2023, recent strep salivarius bacteremia c/b endocarditis (6 by 2 mm lesion), and left common femoral artery septic emboli s/p endarterectomy presenting as transfer from [facility] fro CT surgery evaluation of endocarditis. Patient was admitted at [facility] on 4/9 for CVA and central retinal artery occlusion. Prior to this admission doroteo had Strep salivarius bacteremia (2/10) with associated infected TAVR and osteomyelitis/discitis of the spine. He was on ceftriaxone 2 grams until April 14th (therapy extended from 6 weeks to 7 weeks). When patient presented to [facility] with CVA findings, ROVERTO on 4/10 showed no vegetation on TAVR or other leaflets. MRI spine showed progressive T8-T9 osteomyelitis and stable L3-L4 and L5-S1 discitis-osteomyelitis, Given this history of recent bacteremia, osteomyelitis, and endocarditis ID was concerned for septic embolic stroke. Patient transferred to [facility] for further workup and eval by CT surgery. 73 y/o M hx HTN, DM2 hypothyroidism, prostate cancer, chronic Afib, HLD, history of GI bleed (last c-scope/endoscopy negative), TAVR 2023, recent Strep salivarius bacteremia c/b endocarditis (6 by 2 mm lesion), and left common femoral artery septic emboli s/p endarterectomy presenting as transfer from [facility] for CT surgery evaluation of endocarditis. Patient was admitted at [facility] on 4/9 for CVA and central retinal artery occlusion. Prior to this admission patient had Strep salivarius bacteremia (2/10) with associated infected TAVR and osteomyelitis/discitis of the spine. He was on ceftriaxone 2 grams until April 14th (therapy extended from 6 weeks to 7 weeks). When patient presented to [facility] with CVA findings, ROVERTO on 4/10 showed no vegetation on TAVR or other leaflets. MRI spine showed progressive T8-T9 osteomyelitis and stable L3-L4 and L5-S1 discitis-osteomyelitis, Given this history of recent bacteremia, osteomyelitis, and endocarditis ID was concerned for septic embolic stroke. Patient transferred to [facility] for further workup and eval by CT surgery.

## 2025-04-12 NOTE — H&P ADULT - NSICDXPASTSURGICALHX_GEN_ALL_CORE_FT
PAST SURGICAL HISTORY:  H/O endarterectomy     S/P TAVR (transcatheter aortic valve replacement)

## 2025-04-12 NOTE — H&P ADULT - PROBLEM SELECTOR PLAN 1
c/b cytotoxic edema - concern for septic emboli as source  MR Brain reviewed - multiple embolic CVA  ROVERTO reviewed - no endocarditis  Dysphagia screening done    PLAN  - NIHSS   - Tele monitor  - Stroke education provided by nursing  - c/w Lovenox BID before srugery determination and can transition back to eliquis 5m bid  - c/w atorvastatin 40 mg qhs  - PT/OT/PMR eval for safe disposition - KELLIE c/b cytotoxic edema - concern for septic emboli as source  MR Brain reviewed - multiple embolic CVA  ROVERTO reviewed - no endocarditis  Dysphagia screening done    PLAN  - NIHSS   - Tele monitor  - Stroke education provided by nursing  - c/w Lovenox BID before surgery determination and can transition back to Eliquis 5m bid  - c/w atorvastatin 40 mg qhs  - PT/OT/PMR eval for safe disposition - KELLIE

## 2025-04-12 NOTE — H&P ADULT - NSHPREVIEWOFSYSTEMS_GEN_ALL_CORE
REVIEW OF SYSTEMS:  Constitutional: [ ] fevers [ ] chills [ ] weight loss [ ] weight gain  HEENT: [ ] headache [ ] dizziness [ ] vertigo [ ] blurry vision[ ] double vision [ ] rhinorrhea [ ] nasal congestion [ ] sore throat  CV: [ ] chest pain [ ] orthopnea [ ] palpitations  Resp: [ ] cough [ ] shortness of breath [ ] dyspnea [ ] wheezing [ ] sputum [ ] hemoptysis  GI: [ ] nausea [ ] vomiting [ ] diarrhea [ ] constipation [ ] abd pain [ ] dysphagia [ ] hemetemesis [ ] melena [ ] hematochezia  : [ ] dysuria [ ] nocturia [ ] hematuria [ ] urinary frequency [ ] urinary urgency  Musculoskeletal: [X] back pain - best when lying down or standing up, but hurts when getting up [ ] myalgias [ ] arthralgias  Skin: [ ] rash [ ] itch  Neurological: [ ] headache [ ] dizziness [ ] syncope [ ] weakness [ ] numbness  Hematologic/Lymphatic: [ ] anemia [ ] bleeding problem  [X] Negative except noted in HPI/subjective  [ ] Unable to assess ROS because ________  Other Review of Systems: All other review of systems negative, except as noted in HPI

## 2025-04-12 NOTE — H&P ADULT - PROBLEM SELECTOR PLAN 10
Code Status: DNR/DNI  DVT PPx: lovenox BI   E: replete K<4, Mg<2  Diet: regular CC   Dispo: pending medical improvement; likely KELLIE Code Status: DNR/DNI  DVT PPx: Lovenox BI   E: replete K<4, Mg<2  Diet: regular CC   Dispo: pending medical improvement; likely KELLIE

## 2025-04-12 NOTE — PROGRESS NOTE ADULT - SUBJECTIVE AND OBJECTIVE BOX
PROGRESS NOTE    CONNOR SANDRA ([id number])- Patient is a 74y old  Male who presents with a chief complaint of     INTERVAL HPI/OVERNIGHT EVENTS:   Patient has no acute events overnight,     SUBJECTIVE: Patient was seen and examined at bedside this morning. Denies any nausea/vomiting/diarrhea, headache, shortness of breath, abdominal pain or chest pain/palpitations. Patient responding appropriately to questions and able to make needs known.     MEDICATIONS:  atorvastatin 40 milliGRAM(s) Oral at bedtime  bisacodyl 5 milliGRAM(s) Oral at bedtime  cefTRIAXone   IVPB 2000 milliGRAM(s) IV Intermittent every 12 hours  enoxaparin Injectable 80 milliGRAM(s) SubCutaneous every 12 hours  ferrous    sulfate 325 milliGRAM(s) Oral daily  furosemide    Tablet 40 milliGRAM(s) Oral daily  levothyroxine 150 MICROGram(s) Oral daily  multivitamin 1 Tablet(s) Oral daily  pantoprazole    Tablet 40 milliGRAM(s) Oral before breakfast  spironolactone 25 milliGRAM(s) Oral daily      PHYSICAL EXAM:  Vital Signs Last 24 Hrs  T(C): 36.7 (12 Apr 2025 16:42), Max: 36.9 (12 Apr 2025 12:00)  T(F): 98.1 (12 Apr 2025 16:42), Max: 98.4 (12 Apr 2025 12:00)  HR: 72 (12 Apr 2025 16:42) (67 - 80)  BP: 119/66 (12 Apr 2025 16:42) (114/61 - 125/62)  BP(mean): --  RR: 18 (12 Apr 2025 16:42) (16 - 19)  SpO2: 95% (12 Apr 2025 16:42) (95% - 99%)    Parameters below as of 12 Apr 2025 16:42  Patient On (Oxygen Delivery Method): room air        GENERAL: NAD, lying in bed comfortably  HEAD:  Atraumatic, Normocephalic  EYES: EOMI, PERRLA. No scleral icterus and no conjunctival injection. Tracks me in room  ENT: Moist mucous membranes  NECK: Supple, No JVD  CHEST/LUNG: Clear to auscultation bilaterally; No rales, rhonchi, wheezing, or rubs. Unlabored respirations  HEART: Regular rate and rhythm; No murmurs, rubs, or gallops  ABDOMEN: BS +; Soft, nontender, nondistended  EXTREMITIES:  RUE PICC; 2+ Peripheral Pulses, brisk capillary refill. No clubbing, cyanosis, or edema  NERVOUS SYSTEM:  A&Ox3, no focal neurological deficits. CN II-XII grossly intact, but not individually tested.  PSYCHIATRIC: Cooperative. Appropriate mood and affect.  SKIN: No rashes or lesions    LABS:    Culture - Blood (collected 04-08-25 @ 20:45)  Source: Blood Blood-Peripheral  Preliminary Report (04-12-25 @ 01:01):    No growth at 72 Hours    Culture - Blood (collected 04-08-25 @ 20:30)  Source: Blood Blood-Peripheral  Preliminary Report (04-12-25 @ 01:01):    No growth at 72 Hours                            9.9    9.68  )-----------( 255      ( 12 Apr 2025 04:47 )             30.1     04-12    135  |  96[L]  |  39[H]  ----------------------------<  88  3.7   |  25  |  1.19    Ca    9.3      12 Apr 2025 04:47  Phos  3.6     04-12  Mg     1.80     04-12            Urinalysis Basic - ( 12 Apr 2025 04:47 )    Color: x / Appearance: x / SG: x / pH: x  Gluc: 88 mg/dL / Ketone: x  / Bili: x / Urobili: x   Blood: x / Protein: x / Nitrite: x   Leuk Esterase: x / RBC: x / WBC x   Sq Epi: x / Non Sq Epi: x / Bacteria: x        Lactate Trend        CAPILLARY BLOOD GLUCOSE      POCT Blood Glucose.: 129 mg/dL (12 Apr 2025 13:39)          New Radiology, EKG, and additional tests have been reviewed.

## 2025-04-12 NOTE — H&P ADULT - ASSESSMENT
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

## 2025-04-12 NOTE — H&P ADULT - PROBLEM SELECTOR PLAN 5
on long term IV abx  - ID concerned about lack of source control from dental infection, progressing L8-9 OM with phlegmon  - BCx 04/08 - NGTD    PLAN  - c/w ceftriazone   - Dental consult - tentatively planned for inpatient eval on 4/15AM  - set up for new IV abx regimen for home infusion on discharge. on long term IV abx  - ID concerned about lack of source control from dental infection, progressing L8-9 OM with phlegmon  - BCx 04/08 - NGTD    PLAN  - c/w ceftriaxone   - Dental consult - tentatively planned for inpatient eval on 4/15AM  - set up for new IV abx regimen for home infusion on discharge.

## 2025-04-12 NOTE — H&P ADULT - NSHPPHYSICALEXAM_GEN_ALL_CORE
Vital Signs Last 24 Hrs  T(C): 36.7 (12 Apr 2025 16:42), Max: 36.9 (12 Apr 2025 12:00)  T(F): 98.1 (12 Apr 2025 16:42), Max: 98.4 (12 Apr 2025 12:00)  HR: 72 (12 Apr 2025 16:42) (67 - 80)  BP: 119/66 (12 Apr 2025 16:42) (114/61 - 125/62)  BP(mean): --  RR: 18 (12 Apr 2025 16:42) (16 - 19)  SpO2: 95% (12 Apr 2025 16:42) (95% - 99%)    Parameters below as of 12 Apr 2025 16:42  Patient On (Oxygen Delivery Method): room air        GENERAL: NAD, lying in bed comfortably  HEAD:  Atraumatic, Normocephalic  EYES: EOMI, PERRLA. No scleral icterus and no conjunctival injection. Tracks me in room  ENT: Moist mucous membranes  NECK: Supple, No JVD  CHEST/LUNG: Clear to auscultation bilaterally; No rales, rhonchi, wheezing, or rubs. Unlabored respirations  HEART: Regular rate and rhythm; No murmurs, rubs, or gallops  ABDOMEN: BS +; Soft, nontender, nondistended  EXTREMITIES:  RUE PICC; 2+ Peripheral Pulses, brisk capillary refill. No clubbing, cyanosis, or edema  NERVOUS SYSTEM:  A&Ox3, no focal neurological deficits. CN II-XII grossly intact, but not individually tested.  PSYCHIATRIC: Cooperative. Appropriate mood and affect.  SKIN: No rashes or lesions

## 2025-04-12 NOTE — PATIENT PROFILE ADULT - FALL HARM RISK - HARM RISK INTERVENTIONS

## 2025-04-12 NOTE — H&P ADULT - ATTENDING COMMENTS
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    #Acute embolic stroke - c/b cytotoxic edema - concern for septic emboli as source. No endocarditis on ROVERTO. Monitor on telemetry. Continue Lovenox BID for possible intervention - if none, resume Eliquis. PT Eval. Consult CTS   #Endocarditis - s/p TAVR Continue CTX. F/u regarding dental evaluation inpatient  Consult ID  #Chronic Afib - AC as above   #CRAO - Evaluated by opthalmology at [facility].    #Acute OM - Abx as above     Discussed with resident

## 2025-04-12 NOTE — CONSULT NOTE ADULT - ASSESSMENT
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  Ct surgery consulted for endocarditis ROVERTO shows no evidence of vegetations. All labs and radiographic image reviewed by Dr Ambrocio no cardiac  surgery intervention necessary at this time.   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  Ct surgery consulted for endocarditis ROVERTO shows no evidence of vegetations. All labs and radiographic image reviewed by Dr Ambrocio no cardiac  surgery intervention necessary at this time.   Continue  ABX per ID follow neuro recommendations  Dental following

## 2025-04-13 LAB
ANION GAP SERPL CALC-SCNC: 13 MMOL/L — SIGNIFICANT CHANGE UP (ref 5–17)
BUN SERPL-MCNC: 37 MG/DL — HIGH (ref 7–23)
CALCIUM SERPL-MCNC: 9.4 MG/DL — SIGNIFICANT CHANGE UP (ref 8.4–10.5)
CHLORIDE SERPL-SCNC: 96 MMOL/L — SIGNIFICANT CHANGE UP (ref 96–108)
CO2 SERPL-SCNC: 24 MMOL/L — SIGNIFICANT CHANGE UP (ref 22–31)
CREAT SERPL-MCNC: 1.17 MG/DL — SIGNIFICANT CHANGE UP (ref 0.5–1.3)
EGFR: 65 ML/MIN/1.73M2 — SIGNIFICANT CHANGE UP
EGFR: 65 ML/MIN/1.73M2 — SIGNIFICANT CHANGE UP
GLUCOSE BLDC GLUCOMTR-MCNC: 119 MG/DL — HIGH (ref 70–99)
GLUCOSE BLDC GLUCOMTR-MCNC: 128 MG/DL — HIGH (ref 70–99)
GLUCOSE BLDC GLUCOMTR-MCNC: 128 MG/DL — HIGH (ref 70–99)
GLUCOSE BLDC GLUCOMTR-MCNC: 159 MG/DL — HIGH (ref 70–99)
GLUCOSE SERPL-MCNC: 104 MG/DL — HIGH (ref 70–99)
HCT VFR BLD CALC: 29.6 % — LOW (ref 39–50)
HGB BLD-MCNC: 9.6 G/DL — LOW (ref 13–17)
MAGNESIUM SERPL-MCNC: 1.8 MG/DL — SIGNIFICANT CHANGE UP (ref 1.6–2.6)
MCHC RBC-ENTMCNC: 28.6 PG — SIGNIFICANT CHANGE UP (ref 27–34)
MCHC RBC-ENTMCNC: 32.4 G/DL — SIGNIFICANT CHANGE UP (ref 32–36)
MCV RBC AUTO: 88.1 FL — SIGNIFICANT CHANGE UP (ref 80–100)
MRSA PCR RESULT.: SIGNIFICANT CHANGE UP
NRBC BLD AUTO-RTO: 0 /100 WBCS — SIGNIFICANT CHANGE UP (ref 0–0)
PHOSPHATE SERPL-MCNC: 3.4 MG/DL — SIGNIFICANT CHANGE UP (ref 2.5–4.5)
PLATELET # BLD AUTO: 244 K/UL — SIGNIFICANT CHANGE UP (ref 150–400)
POTASSIUM SERPL-MCNC: 3.8 MMOL/L — SIGNIFICANT CHANGE UP (ref 3.5–5.3)
POTASSIUM SERPL-SCNC: 3.8 MMOL/L — SIGNIFICANT CHANGE UP (ref 3.5–5.3)
RBC # BLD: 3.36 M/UL — LOW (ref 4.2–5.8)
RBC # FLD: 16.8 % — HIGH (ref 10.3–14.5)
S AUREUS DNA NOSE QL NAA+PROBE: SIGNIFICANT CHANGE UP
SODIUM SERPL-SCNC: 133 MMOL/L — LOW (ref 135–145)
TSH SERPL-MCNC: 1.27 UIU/ML — SIGNIFICANT CHANGE UP (ref 0.27–4.2)
WBC # BLD: 9.11 K/UL — SIGNIFICANT CHANGE UP (ref 3.8–10.5)
WBC # FLD AUTO: 9.11 K/UL — SIGNIFICANT CHANGE UP (ref 3.8–10.5)

## 2025-04-13 PROCEDURE — 99233 SBSQ HOSP IP/OBS HIGH 50: CPT

## 2025-04-13 PROCEDURE — 99223 1ST HOSP IP/OBS HIGH 75: CPT | Mod: GC

## 2025-04-13 RX ADMIN — POLYETHYLENE GLYCOL 3350 17 GRAM(S): 17 POWDER, FOR SOLUTION ORAL at 11:23

## 2025-04-13 RX ADMIN — LIDOCAINE HYDROCHLORIDE 1 PATCH: 20 JELLY TOPICAL at 09:59

## 2025-04-13 RX ADMIN — FUROSEMIDE 40 MILLIGRAM(S): 10 INJECTION INTRAMUSCULAR; INTRAVENOUS at 06:15

## 2025-04-13 RX ADMIN — TRAMADOL HYDROCHLORIDE 50 MILLIGRAM(S): 50 TABLET, FILM COATED ORAL at 08:14

## 2025-04-13 RX ADMIN — TRAMADOL HYDROCHLORIDE 50 MILLIGRAM(S): 50 TABLET, FILM COATED ORAL at 21:25

## 2025-04-13 RX ADMIN — ATORVASTATIN CALCIUM 40 MILLIGRAM(S): 80 TABLET, FILM COATED ORAL at 21:25

## 2025-04-13 RX ADMIN — Medication 5 MILLIGRAM(S): at 21:25

## 2025-04-13 RX ADMIN — TRAMADOL HYDROCHLORIDE 50 MILLIGRAM(S): 50 TABLET, FILM COATED ORAL at 21:50

## 2025-04-13 RX ADMIN — TRAMADOL HYDROCHLORIDE 50 MILLIGRAM(S): 50 TABLET, FILM COATED ORAL at 09:14

## 2025-04-13 RX ADMIN — Medication 3 MILLIGRAM(S): at 21:25

## 2025-04-13 RX ADMIN — Medication 150 MICROGRAM(S): at 06:15

## 2025-04-13 RX ADMIN — ENOXAPARIN SODIUM 80 MILLIGRAM(S): 100 INJECTION SUBCUTANEOUS at 17:20

## 2025-04-13 RX ADMIN — Medication 40 MILLIGRAM(S): at 06:15

## 2025-04-13 RX ADMIN — INSULIN LISPRO 1: 100 INJECTION, SOLUTION INTRAVENOUS; SUBCUTANEOUS at 11:56

## 2025-04-13 RX ADMIN — Medication 325 MILLIGRAM(S): at 11:23

## 2025-04-13 RX ADMIN — Medication 1 APPLICATION(S): at 11:27

## 2025-04-13 RX ADMIN — Medication 1 TABLET(S): at 11:23

## 2025-04-13 RX ADMIN — CEFTRIAXONE 100 MILLIGRAM(S): 500 INJECTION, POWDER, FOR SOLUTION INTRAMUSCULAR; INTRAVENOUS at 17:21

## 2025-04-13 RX ADMIN — Medication 25 MILLIGRAM(S): at 06:15

## 2025-04-13 RX ADMIN — ENOXAPARIN SODIUM 80 MILLIGRAM(S): 100 INJECTION SUBCUTANEOUS at 06:40

## 2025-04-13 RX ADMIN — CEFTRIAXONE 100 MILLIGRAM(S): 500 INJECTION, POWDER, FOR SOLUTION INTRAMUSCULAR; INTRAVENOUS at 06:15

## 2025-04-13 RX ADMIN — LIDOCAINE HYDROCHLORIDE 1 PATCH: 20 JELLY TOPICAL at 21:25

## 2025-04-13 NOTE — DISCHARGE NOTE PROVIDER - HOSPITAL COURSE
HPI:  75 y/o M hx HTN, DM2 hypothyroidism, prostate cancer, chronic Afib, HLD, history of GI bleed (last c-scope/endoscopy negative), TAVR 2023, recent Strep salivarius bacteremia c/b endocarditis (6 by 2 mm lesion), and left common femoral artery septic emboli s/p endarterectomy presenting as transfer from [facility] for CT surgery evaluation of endocarditis. Patient was admitted at [facility] on 4/9 for CVA and central retinal artery occlusion. Prior to this admission patient had Strep salivarius bacteremia (2/10) with associated infected TAVR and osteomyelitis/discitis of the spine. He was on ceftriaxone 2 grams until April 14th (therapy extended from 6 weeks to 7 weeks). When patient presented to [facility] with CVA findings, ROVERTO on 4/10 showed no vegetation on TAVR or other leaflets. MRI spine showed progressive T8-T9 osteomyelitis and stable L3-L4 and L5-S1 discitis-osteomyelitis, Given this history of recent bacteremia, osteomyelitis, and endocarditis ID was concerned for septic embolic stroke. Patient transferred to [facility] for further workup and eval by CT surgery. (12 Apr 2025 16:50)    Hospital Course:  Patient was discharged from [facility] and transfered to [facility] for CT surgery evaluation of possible intervention given concern for septic emboli iso of CVA and central retinal artery occlusion. CT surgery recommended no intervention as ROVERTO at [facility] showed no valvular vegetations.     Patient seen by ID and will continue home IV abx for 6 more weeks.       On day of discharge, patient is clinically stable with no new exam findings or acute symptoms compared to prior. The patient was seen by the attending physician on the date of discharge and deemed stable and acceptable for discharge. The patient's chronic medical conditions were treated accordingly per the patient's home medication regimen. The patient's medication reconciliation (with changes made to chronic medications), follow up appointments, discharge orders, instructions, and significant lab and diagnostic studies are as noted.     Discharge follow up action items:     1. Follow up with PCP in 1-2 weeks.   2. Follow up weekly CBC CMP   3. Follow up with dental evaluation outpatinet   4. Continue ceftriaxone 2g BID and get weekly CBC with differential and CMP       Important Medication Changes and Reason:  --- New medications   Ceftriaxone 2g BID     --- Hold medications      Advanced Directives:   [ ] Full code  [X] DNR  [ ] Hospice    Discharge Diagnoses:  TAVR  CVA  Strep salivarius bacteremia c/b TAVR infection and spinal discitis/osteomyelitis  BRAO with concern for septic emboli   HPI:  75 y/o M hx HTN, DM2 hypothyroidism, prostate cancer, chronic Afib, HLD, history of GI bleed (last c-scope/endoscopy negative), TAVR 2023, recent Strep salivarius bacteremia c/b endocarditis (6 by 2 mm lesion), and left common femoral artery septic emboli s/p endarterectomy presenting as transfer from [facility] for CT surgery evaluation of endocarditis. Patient was admitted at [facility] on 4/9 for CVA and central retinal artery occlusion. Prior to this admission patient had Strep salivarius bacteremia (2/10) with associated infected TAVR and osteomyelitis/discitis of the spine. He was on ceftriaxone 2 grams until April 14th (therapy extended from 6 weeks to 7 weeks). When patient presented to [facility] with CVA findings, ROVERTO on 4/10 showed no vegetation on TAVR or other leaflets. MRI spine showed progressive T8-T9 osteomyelitis and stable L3-L4 and L5-S1 discitis-osteomyelitis, Given this history of recent bacteremia, osteomyelitis, and endocarditis ID was concerned for septic embolic stroke. Patient transferred to [facility] for further workup and eval by CT surgery.    Hospital Course:  Patient was discharged from [facility] and transfered to [facility] for CT surgery evaluation of possible intervention given concern for septic emboli iso of CVA and central retinal artery occlusion. CT surgery recommended no intervention as ROVERTO at [facility] showed no valvular vegetations.     Patient seen by ID and will continue home IV abx for 6 more weeks.    Discharge follow up action items:     1. Follow up with PCP in 1-2 weeks.   2. Follow up weekly CBC CMP   3. Follow up with dental evaluation outpatient  4. Continue ceftriaxone 2g BID and get weekly CBC with differential and CMP     Important Medication Changes and Reason:  --- New medications   Ceftriaxone 2g BID     --- Hold medications    Advanced Directives:   [ ] Full code  [X] DNR  [ ] Hospice    Discharge Diagnoses:  TAVR  CVA  Strep salivarius bacteremia c/b TAVR infection and spinal discitis/osteomyelitis  BRAO with concern for septic emboli HPI:  73 y/o M hx HTN, DM2 hypothyroidism, prostate cancer, chronic Afib, HLD, history of GI bleed (last c-scope/endoscopy negative), TAVR 2023, recent Strep salivarius bacteremia c/b endocarditis (6 by 2 mm lesion), and left common femoral artery septic emboli s/p endarterectomy presenting as transfer from [facility] for CT surgery evaluation of endocarditis. Patient was admitted at [facility] on 4/9 for CVA and central retinal artery occlusion. Prior to this admission patient had Strep salivarius bacteremia (2/10) with associated infected TAVR and osteomyelitis/discitis of the spine. He was on ceftriaxone 2 grams until April 14th (therapy extended from 6 weeks to 7 weeks). When patient presented to [facility] with CVA findings, ROVERTO on 4/10 showed no vegetation on TAVR or other leaflets. MRI spine showed progressive T8-T9 osteomyelitis and stable L3-L4 and L5-S1 discitis-osteomyelitis, Given this history of recent bacteremia, osteomyelitis, and endocarditis ID was concerned for septic embolic stroke. Patient transferred to [facility] for further workup and eval by CT surgery.    Hospital Course:  Patient was discharged from [facility] and transfered to [facility] for CT surgery evaluation of possible intervention given concern for septic emboli iso of CVA and central retinal artery occlusion. CT surgery recommended no intervention as ROVERTO at [facility] showed no valvular vegetations.     Patient seen by ID and will continue home IV abx for 6 more weeks.    Discharge follow up action items:     1. Follow up with PCP in 1-2 weeks.   2. Follow up weekly CBC CMP   3. Follow up with dental evaluation outpatient  4. Continue ceftriaxone 2g BID and get weekly CBC with differential and CMP     Important Medication Changes and Reason:  --- New medications   Ceftriaxone 2g BID 6 weeks    --- Hold medications    Advanced Directives:   [ ] Full code  [X] DNR  [ ] Hospice    Discharge Diagnoses:  TAVR  CVA  Strep salivarius bacteremia c/b TAVR infection and spinal discitis/osteomyelitis  BRAO with concern for septic emboli

## 2025-04-13 NOTE — PHYSICAL THERAPY INITIAL EVALUATION ADULT - IMPAIRED TRANSFERS: SIT/STAND, REHAB EVAL
decrease endurance , sit -stand at std cane cg of 1 , from rolling walker close supervision/impaired balance/impaired postural control/decreased strength

## 2025-04-13 NOTE — OCCUPATIONAL THERAPY INITIAL EVALUATION ADULT - ADDITIONAL COMMENTS
Pt  lives in [place] w/wife +3STE +1flight inside to [place]. PTA Pt was indep in [place]/amb w/cane. Owns shower chair, commode.

## 2025-04-13 NOTE — PHYSICAL THERAPY INITIAL EVALUATION ADULT - IMPAIRMENTS CONTRIBUTING IMPAIRED BED MOBILITY, REHAB EVAL
decrease endurance , per pt received pain meds for back this am , sat x few min close supervision balance fair plus/impaired postural control/decreased strength

## 2025-04-13 NOTE — DISCHARGE NOTE PROVIDER - NSDCCPTREATMENT_GEN_ALL_CORE_FT
PRINCIPAL PROCEDURE  Procedure: US echo transesophageal  Findings and Treatment: 1. Left ventricular systolic function is normal. There are no regional wall motion abnormalities seen.   2. Normal right ventricular cavity size and normal right ventricular systolic function.   3. Structurally normal mitral valve with normal leaflet excursion. No vegetations seen in association with the mitral valve. There is calcification of the mitral valve annulus. There is mild mitral regurgitation.   4. A a transcatheter deployed (TAVR) is present in the aortic position. The prosthetic valveis well seated. The peak transaortic velocity is 4.13 m/s, peak transaortic gradient is 68.2 mmHg and mean transaortic gradient is 40.0 mmHg with an LVOT/aortic valve VTI ratio of 0.25. There is mild paravalvular regurgitation, originating anteriorly. The systolic transaortic gradients are elevated. No obvious vegetations seen in association with the transcatheter aortic valve replacement (TAVR).   5. No atheroma in the visualized portions of the proximal ascending aorta. Mild non-mobile atheromain the visualized portions of the transverse aortic arch. Mild non-mobile atheroma in the visualized portions of the descending aorta.   6. The left atrium is normal in size. There is no evidence of left atrial or left atrial appendage thrombus. The left atrial appendage emptying velocity is normal.   7. Agitated saline injection was negative for intracardiac shunt.   8. No echocardiographic evidence of vegetations.        SECONDARY PROCEDURE  Procedure: CT angiogram head w contrast  Findings and Treatment: Head CT: There is no acute intracranial hemorrhage, mass effect, shift of   the midline structures, herniation, extra-axial fluid collection, or   hydrocephalus.  There is an unchanged chronic lacunar infarct in the right corona radiata.  There is mild diffuse cerebral volume loss with prominence of the sulci,   fissures, and cisternal spaces which is normal for the patient's age.   There is mild deep and periventricular white matter hypoattenuation   statistically compatible with microvascular changes given calcific   atherosclerotic disease of the intracranial arteries.  The paranasal sinuses and tympanomastoid cavities are clear. The   calvarium is intact. The imaged orbits are unremarkable.  CTA HEAD: Calcified plaques affect the bilateral carotid siphons without   associated stenosis. Otherwise, the bilateral intracranial internal   carotid, anterior, and middle cerebral arteries appearunremarkable.  The anterior and bilateral posterior communicating arteries are seen.  The posterior circulation appears intact.  No aneurysms or arteriovenous malformations are seen.  The intracranial venous structures are patent.    Procedure: CT angiogram neck w contrast  Findings and Treatment: CTA NECK: There is a bovine configuration to the aortic arch.   Atherosclerosis likely arch and origins of the left subclavian artery and   common trunk without associated stenosis. Calcified atherosclerotic   plaque is also seen at the origin and proximal segment of the right   subclavian artery without associated stenosis. Calcified plaque affects   the proximal left subclavian artery as well without associated stenosis.  The bilateral common carotid arteries appear unremarkable. Calcified   plaques affect the bilateral carotid bifurcations without associated   stenosis. The cervical internal carotid arteries are patent extending to   the skull base.  Calcified plaque affects the origin of the left vertebral artery without   associated stenosis. The right vertebral artery origin appears grossly   unremarkable. The bilateral vertebral arteries are patent.    Procedure: MRI head  Findings and Treatment: MRI Brain: Multiple scattered foci of diffusion restriction are seen   within the right posteromedial occipital cortex, right occipital lobe,   right posterior temporal lobe, bilateral frontal parietal cortices, and   high right anterior parietal cortex. Small foci are also seen within the   bilateral cerebellar hemispheres. Associated T2/FLAIR hyperintense signal   changes are also seen, compatible with cytotoxic edema.  A chronic lacunar infarct is seen within the right corona radiata. A   chronic lacunar infarct is noted within the right cerebellar hemisphere.  Multiple nonspecific T2/FLAIR hyperintense signal changes are noted   throughout the bihemispheric white matter without associated mass effect   or restricted diffusion.  A globular focus of hemosiderin is noted within the high right posterior   frontal lobe. Additional wispy areas of hemosiderin staining is seen   within the left parietal occipital sulcus.  Ventricular size and configuration is unremarkable. No abnormal   extra-axial fluid collections are seen. Flow-voids are noted throughout   the major intracranial vessels, on the T2 weighted images, consistent   with their patency. The sella turcica and posterior fossa are   unremarkable.  Minor scattered mucosal thickening is seen throughout the paranasal   sinuses. The tympanomastoid cavities are clear. The calvarium appears   intact.      Procedure: MRI orbit wo contrast  Findings and Treatment: MRI Orbits: Examination is limited signal to premature termination of the   study.  The bilateral orbits inclusive of the globes, extraocular muscles, optic   nerves, and orbital fat appear unremarkable. The lacrimal glands appear   unremarkable. The optic chiasm appears within normal limits.  The Meckel's caves appear unremarkable.  MRA [place]: The bilateral intracranial internal carotid,   anterior, and middle cerebral arteries appear unremarkable.  The anterior and bilateral posterior communicating arteries are notable.  The bilateral intradural vertebral arteries, vertebrobasilar junction,   basilar artery, and basilar tip appear unremarkable as well as the   bilateral posterior cerebral arteries.  No aneurysms or arteriovenous malformations are seen.      Procedure: MR angio head wo con  Findings and Treatment: MRA [place]: The bilateral intracranial internal carotid,   anterior, and middle cerebral arteries appear unremarkable.  The anterior and bilateral posterior communicating arteries are notable.  The bilateral intradural vertebral arteries, vertebrobasilar junction,   basilar artery, and basilar tip appear unremarkable as well as the   bilateral posterior cerebral arteries.  No aneurysms or arteriovenous malformations are seen.  MRA HEAD:  1. No large vessel occlusion or major stenosis.      Procedure: CT maxillofacial area  Findings and Treatment: There is a periapical lucency surrounding the right mandibular first   premolar tooth without associated periapical abscess. There are multiple   dental implants and metallic crowns that result in metallic streak   artifact limiting full evaluation of the dentition and the surrounding   soft tissues, however the visualized buccal soft tissues appear   unremarkable.  Osseous structures appear unremarkable.  Small amount of mucosal inflammation in the inferior left maxillary sinus   is present. Rest of the paranasal sinuses are clear. Mastoid air cells   are clear.  Atherosclerotic calcification of the intracranial internal carotid   arteries.  Visualized portions of the brain and orbits are unremarkable.  IMPRESSION:  There is a periapical lucency surrounding the right mandibular first   premolar tooth without associated periapical abscess. There are multiple   dental implants and metallic crowns that result in metallic streak   artifact limiting full evaluation of the dentition and the surrounding   soft tissues, however the visualized buccal soft tissues appear   unremarkable. Visualized dental inspection would be helpful.      Procedure: MR angio neck w con  Findings and Treatment: There is a bovine anatomic configuration to the aortic arch.  The origins of the great vessels appear unremarkable. The bilateral   common carotid arteries and carotid bifurcations appear unremarkable.  The bilateral cervical internal carotid arteries are within normal limits.  The origins of the bilateral vertebral arteries are normal. The bilateral   cervical vertebral arteries are normal in course and caliber.  IMPRESSION: Unremarkable study.

## 2025-04-13 NOTE — PHYSICAL THERAPY INITIAL EVALUATION ADULT - GAIT DISTANCE, PT EVAL
pt amb with std cane 16 ft intermittent mild unsteady cg of 1 ; pt amb 25 ft x 2 with rolling walker cg to close supervision intermittent mild unsteady pt retain balance with walker and close supervision

## 2025-04-13 NOTE — PHYSICAL THERAPY INITIAL EVALUATION ADULT - PERTINENT HX OF CURRENT PROBLEM, REHAB EVAL
73 y/o M hx HTN, DM2 hypothyroidism, prostate cancer, chronic Afib, HLD, history of GI bleed (last c-scope/endoscopy negative), TAVR 2023, recent Strep salivarius bacteremia c/b endocarditis (6 by 2 mm lesion), and left common femoral artery septic emboli s/p endarterectomy presenting as transfer from [facility] for CT surgery evaluation of endocarditis. Patient was admitted at [facility] on 4/9 for CVA and central retinal artery occlusion. Prior to this admission patient had Strep salivarius bacteremia (2/10) with associated infected TAVR and osteomyelitis/discitis of the spine. He was on ceftriaxone 2 grams until April 14th (therapy extended from 6 weeks to 7 weeks). When patient presented to [facility] with CVA findings, ROVERTO on 4/10 showed no vegetation on TAVR or other leaflets. MRI spine showed progressive T8-T9 osteomyelitis and stable L3-L4 and L5-S1 discitis-osteomyelitis, Given this history of recent bacteremia, osteomyelitis, and endocarditis ID was concerned for septic embolic stroke. Patient transferred to [facility] for further workup and eval by CT surgery. dental follow abscess  4/9/25 MRI BRAIN:1. Multiple scattered small acute/subacute infarcts and/or septic emboli throughout the supratentorial brain and bilateral cerebellar hemispheres with associated cytotoxic edema. No associated hemorrhage.2. Additional multiple chronic lacunar infarcts and similar-appearing mild chronic white matter microvascular type changes.  4/9/25 MRI ORBITS:1. Limited study secondary to premature termination of the exam.2. Grossly unremarkable noncontrast MRI study.  4/9/25 MRA HEAD: 1. No large vessel occlusion or major stenosis.  4/10/25 MRA NECK : unremarkable study   4/10/25 MRI THORACIC SPINE1. Progressive T8-T9 discitis-osteomyelitis with evidence of anterior epidural phlegmon, encroaching upon bilateral neural foramen. No rim-enhancing epidural-paraspinal fluid collection to suggest abscess.2. Additional findings, including those degenerative, described in detail see test  4/10/25 MRI LUMBAR SPINE1. No significant interval change in the appearance of L3-L4 and L5-S1 discitis-osteomyelitis, with the exception of near complete resolution of   previously described left ventral epidural enhancement at the latter level. No rim-enhancing epidural-paraspinal fluid collection to suggest abscess.2. Additional findings, including those degenerative, described in detail in test

## 2025-04-13 NOTE — OCCUPATIONAL THERAPY INITIAL EVALUATION ADULT - LOWER BODY DRESSING, PREVIOUS LEVEL OF FUNCTION, OT EVAL
Pt reports typically wearing slip on shoes, or wife assisting with LB dressing at home prn./needed assist

## 2025-04-13 NOTE — DISCHARGE NOTE PROVIDER - NSDCCPCAREPLAN_GEN_ALL_CORE_FT
PRINCIPAL DISCHARGE DIAGNOSIS  Diagnosis: CVA (cerebrovascular accident)  Assessment and Plan of Treatment: You were admitted to the hospital due to a stroke (CVA).  Imaging initially raised concerns about possible infection in your bloodstream (septic emboli) related to a prior heart valve infection (endocarditis). However, a closer look at your heart valve with a special ultrasound (ROVERTO) showed no signs of active infection.  The cardiothoracic (heart) surgery team also evaluated you and determined you don’t need surgery at this time.   A CT scan of your jaw also showed an issue with your right mandibular first premolar tooth. This may need dental attention, and you already have an appointment scheduled for 4/15.  **Follow-Up:**    - Continue taking your medications as prescribed  - Please follow up with dental on 4/15   **Emergency Signs:**  If you experience sudden weakness, numbness, confusion, severe headache, vision changes, dizziness, or difficulty speaking, go to the emergency room immediately.        SECONDARY DISCHARGE DIAGNOSES  Diagnosis: History of endocarditis  Assessment and Plan of Treatment: You were admitted to the hospital due to concern for endocarditis. This is an infection of your heart valves.  Endocarditis can happen when bacteria enter the bloodstream and infect the heart.  Given you were recently treated for endocarditis and now have signs concerning for septic emboli (infection spreading from your heart valve), we recommend continuing IV antibiotics for 6 weeks  **Your Treatment:** You received IV ceftriaxone to treat the infection.   **Follow-Up:**  - You will continue IV ceftriaxone 2g every 12 hours for an additional 6 weeks.   - We recommend getting weekly labs CBC with differential and CMP that will be emailed to the infectious disease office.   - You also have a dental appointment on 4/15. It's important to keep this appointment, as dental infections can sometimes spread to the bloodstream.  **Emergency Signs:** If you experience fever, chills, increasing shortness of breath, chest pain, dizziness, or new or worsening heart murmur, go to the emergency room immediately.       PRINCIPAL DISCHARGE DIAGNOSIS  Diagnosis: CVA (cerebrovascular accident)  Assessment and Plan of Treatment: You were admitted to the hospital due to a stroke (CVA).  Imaging initially raised concerns about possible infection in your bloodstream (septic emboli) related to a prior heart valve infection (endocarditis). However, a closer look at your heart valve with a special ultrasound (ROVERTO) showed no signs of active infection.  The cardiothoracic (heart) surgery team also evaluated you and determined you don’t need surgery at this time.   A CT scan of your jaw also showed an issue with your right mandibular first premolar tooth. This may need dental attention, and you already have an appointment scheduled for 4/15.  **Follow-Up:**    - Continue taking your medications as prescribed  - Please follow up with Dental on 4/15   **Emergency Signs:**  If you experience sudden weakness, numbness, confusion, severe headache, vision changes, dizziness, or difficulty speaking, go to the emergency room immediately.        SECONDARY DISCHARGE DIAGNOSES  Diagnosis: History of endocarditis  Assessment and Plan of Treatment: You were admitted to the hospital due to concern for endocarditis. This is an infection of your heart valves.  Endocarditis can happen when bacteria enter the bloodstream and infect the heart.  Given you were recently treated for endocarditis and now have signs concerning for septic emboli (infection spreading from your heart valve), we recommend continuing IV antibiotics for 6 weeks  **Your Treatment:** You received IV ceftriaxone to treat the infection.   **Follow-Up:**  - You will continue IV ceftriaxone 2g every 12 hours for an additional 6 weeks.   - We recommend getting weekly labs CBC with differential and CMP that will be emailed to the infectious disease office.   - You also have a dental appointment on 4/15. It's important to keep this appointment, as dental infections can sometimes spread to the bloodstream.  **Emergency Signs:** If you experience fever, chills, increasing shortness of breath, chest pain, dizziness, or new or worsening heart murmur, go to the emergency room immediately.

## 2025-04-13 NOTE — PHYSICAL THERAPY INITIAL EVALUATION ADULT - GENERAL OBSERVATIONS, REHAB EVAL
pt received in bed nad top rails up and 1 siderail HOB 30 degrees resting but arousable , pt report no change in vision L eye sees lower half of visual field but blurry cannot see fingers held up in front clearly and top of fingers are cut off per pt ; role of PT explained pt was receiving at home 1-2 times a week

## 2025-04-13 NOTE — PHYSICAL THERAPY INITIAL EVALUATION ADULT - LEVEL OF INDEPENDENCE: STAIR NEGOTIATION, REHAB EVAL
tired at this time , pt was able to lift le's high enough to clear a step with assist of 1/unable to perform

## 2025-04-13 NOTE — PHYSICAL THERAPY INITIAL EVALUATION ADULT - BED MOBILITY TRAINING, PT EVAL
No care due was identified.  [facility] Embedded Care Due Messages. Reference number: [id number].   9/09/2024 11:14:13 AM CDT  
Please see the attached refill request.  
GOAL : independent with bed mobs in 3 days - 1 wk

## 2025-04-13 NOTE — PHYSICAL THERAPY INITIAL EVALUATION ADULT - NSPTDISCHREC_GEN_A_CORE
Home PT to resume with transition to OUTPT PT for continued increase in fxl mobility , strength, balance, endurance , fall prevention education continued ; pt report spouse able to assist as needed , pt understood need assist all fxl activity and adl's/Home PT

## 2025-04-13 NOTE — PHYSICAL THERAPY INITIAL EVALUATION ADULT - GAIT DEVIATIONS NOTED, PT EVAL
decreased eduardo/increased time in double stance/decreased step length/decreased stride length/decreased swing-to-stance ratio/decreased weight-shifting ability

## 2025-04-13 NOTE — PHYSICAL THERAPY INITIAL EVALUATION ADULT - ADDITIONAL COMMENTS
pt spouse id as HCP Jennifer Everett [phone] pt spouse id as HCP Jennifer Everett [phone] support person , no caregiver ID ; pt lives in [facility] with 3 steps to enter through garage entrance , pt has a chair lift to basement level and has a flight of steps to bedroom and bathroom level ; pt owns a rolling walker , std cane , commode over toilet , and shower chair in walk-in shower , spouse assist adls as needed and home to assist

## 2025-04-13 NOTE — DISCHARGE NOTE PROVIDER - NSDCFUSCHEDAPPT_GEN_ALL_CORE_FT
[facility]  DENTAL  [address]  Scheduled Appointment: 04/15/2025    [facility]   [place]  Scheduled Appointment: 04/17/2025    [facility]   [place]  Scheduled Appointment: 04/17/2025    Golden Edwards  [facility]  PHYSMED [address]  Scheduled Appointment: 04/21/2025    Jerman Rodriges  [facility]  CTSURG [address]  Scheduled Appointment: 04/22/2025    Ever Ruiz  [facility]  ELECTROPH [address]  Scheduled Appointment: 07/01/2025     [facility]  INFDISEASE [address]  Scheduled Appointment: 06/12/2025    Ever Ruiz  [facility] [address]  Scheduled Appointment: 07/01/2025    Portia Adams  [facility]  CTSURG [address]  Scheduled Appointment: 08/05/2025    Vipul Ambrocio  [facility]  CTSURG [address]  Scheduled Appointment: 08/05/2025

## 2025-04-13 NOTE — CONSULT NOTE ADULT - ATTENDING COMMENTS
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 Bacteremia cleared on 2/14. MRI lumbar spine (2/12) showed concern for L3-L4 and L5-S1 osteomyelitis. ROVERTO on 2/26 showed an echogenic sessile not very mobile subcentimeter ( 6x2 mm) structure attached to the base of the leaflet of the prosthetic aortic valve, unclear etiology but possibly old vegetation. Patient was also found to have clot to common femoral and distal embolism, s/p common femoral endarterectomy with clot retrieval and good blood flow to lower extremity post surgery (2/22). OR cultures from this had GPC on gram stain but could not be cultured out. He was initially on ceftriaxone (2/11-2/21) which was switched to daptomycin (2/21-2/25) due to rash, then back to ceftriaxone (restarted 2/25) after rash was thought to be related to ischemic injury and not allergy. He was planned for ceftriaxone 2g q24h via PICC until 4/7/25, which was then extended ro 4/14 in setting of difficulty coordinating outpatient appointments.  [facility] with near syncope and found to have L branch retinal artery occlusion  MRI brain 4/9 showed Multiple scattered small acute/subacute infarcts and/or septic emboli throughout the supratentorial brain and bilateral cerebellar hemispheres with associated cytotoxic edema  MRI L spine 4/10 showing Progressive T8-T9 discitis-osteomyelitis with evidence of anterior epidural phlegmon, encroaching upon bilateral neural foramen( stable L3-L4 and L5-S1 discitis-osteomyelitis)   CT maxillofacial with periapical lucency surrounding the right mandibular first premolar tooth.  pt was transferred here for CT surgery but Repeat ROVERTO 4/10 without evidence of vegetations, so CT surgery is not planning for any intervention    * pt has been on antibiotics for about 8 weeks now and stated the back pain actually has improved, not sure the spine MRI is just lagging behind or it is actually worse but in view of the septic emboli to the brain would extend the course for another 6 weeks  * pt still needs dental eval but does not want to stay in the hospital for it  * c/w ceftriaxone 2 q 12 with weekly CBC/diff, CMP to be emailed to [facility] [email or web address]    The above assessment and plan was discussed with the primary team    Luiza Nolan MD  contact on teams  After 5pm and on weekends call [phone]

## 2025-04-13 NOTE — CONSULT NOTE ADULT - SUBJECTIVE AND OBJECTIVE BOX
Kam is a 74 year old male with  PMH of HTN, DM2 hypothyroidism, prostate cancer, chronic afib, HLD, GI bleed (last c-scope/endoscopy negative), TAVR 2023, afib   presenting as transfer from [facility] for CT surgery evaluation of endocarditis. Kam initially admitted to [facility] on 4/9 ater he was transferred for optho evaluation for branch retinal artery occlusion (BRAO).    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 Bacteremia cleared on 2/14. MRI lumbar spine (2/12) showed concern for L3-L4 and L5-S1 osteomyelitis. ROVERTO on 2/26 showed an echogenic sessile not very mobile subcentimeter ( 6x2 mm) structure attached to the base of the leaflet of the prosthetic aortic valve, unclear etiology but possibly old vegetation. Patient was also found to have clot to common femoral and distal embolism, s/p common femoral endarterectomy with clot retrieval and good blood flow to lower extremity post surgery (2/22). OR cultures from this had GPC on gram stain but could not be cultured out. He was initially on ceftriaxone (2/11-2/21) which was switched to daptomycin (2/21-2/25) due to rash, then back to ceftriaxone (restarted 2/25) after rash was thought to be related to ischemic injury and not allergy. He was planned for ceftriaxone 2g q24h via PICC until 4/7/25, which was then extended ro 4/14 in setting of difficulty coordinating outpatient appointments.    ID was called at [facility] for concern for septic embolic stroke after MRI brain on 4/9 showed Multiple scattered small acute/subacute infarcts and/or septic emboli throughout the supratentorial brain and bilateral cerebellar hemispheres with associated cytotoxic edema. Other relevant imaging include MRI L spine from 4/10 showing Progressive T8-T9 discitis-osteomyelitis with evidence of anterior epidural phlegmon, encroaching upon bilateral neural foramen( stable L3-L4 and L5-S1 discitis-osteomyelitis) and CT maxillofacial with periapical lucency surrounding the right mandibular first premolar tooth. Of note, Repeat ROVERTO performed on 4/10 without evidence of vegetations. However, given concern for septic emboli with BRAO, patirnt transferred to [facility] for CT surgery evaluation.       REVIEW OF SYSTEMS  pending full examination    prior hospital charts reviewed [V]  primary team notes reviewed [V]  other consultant notes reviewed [V]    PAST MEDICAL & SURGICAL HISTORY:  Hypertension      Diabetes mellitus      Obesity      Hypothyroidism      Prostate CA      H/O endarterectomy      S/P TAVR (transcatheter aortic valve replacement)          SOCIAL HISTORY:  Denied smoking/vaping/alcohol/recreational drug use    FAMILY HISTORY:  No pertinent family history in first degree relatives        Allergies  No Known Allergies        ANTIMICROBIALS:  cefTRIAXone   IVPB 2000 every 12 hours      ANTIMICROBIALS (past 90 days):  MEDICATIONS  (STANDING):  cefTRIAXone   IVPB   100 mL/Hr IV Intermittent (04-13-25 @ 06:15)   100 mL/Hr IV Intermittent (04-12-25 @ 18:25)        OTHER MEDS:   MEDICATIONS  (STANDING):  atorvastatin 40 at bedtime  bisacodyl 5 at bedtime  cyclobenzaprine 5 three times a day PRN  dextrose 50% Injectable 25 once  dextrose 50% Injectable 12.5 once  dextrose 50% Injectable 25 once  dextrose Oral Gel 15 once  enoxaparin Injectable 80 every 12 hours  furosemide    Tablet 40 daily  glucagon  Injectable 1 once  insulin lispro (ADMELOG) corrective regimen sliding scale  three times a day before meals  insulin lispro (ADMELOG) corrective regimen sliding scale  at bedtime  levothyroxine 150 daily  melatonin 3 at bedtime PRN  pantoprazole    Tablet 40 before breakfast  polyethylene glycol 3350 17 daily  senna 2 at bedtime  spironolactone 25 daily  traMADol 50 every 4 hours PRN      VITALS:  Vital Signs Last 24 Hrs  T(F): 97.9 (04-13-25 @ 11:52), Max: 100 (04-10-25 @ 12:35)    Vital Signs Last 24 Hrs  HR: 70 (04-13-25 @ 11:52) (67 - 72)  BP: 118/67 (04-13-25 @ 11:52) (99/60 - 119/66)  RR: 18 (04-13-25 @ 11:52)  SpO2: 95% (04-13-25 @ 11:52) (95% - 97%)  Wt(kg): --    EXAM:  pending full examination      Labs:                        9.6    9.11  )-----------( 244      ( 13 Apr 2025 06:38 )             29.6     04-13    133[L]  |  96  |  37[H]  ----------------------------<  104[H]  3.8   |  24  |  1.17    Ca    9.4      13 Apr 2025 06:38  Phos  3.4     04-13  Mg     1.8     04-13        WBC Trend:  WBC Count: 9.11 (04-13-25 @ 06:38)  WBC Count: 9.68 (04-12-25 @ 04:47)  WBC Count: 11.78 (04-11-25 @ 08:59)  WBC Count: 12.67 (04-11-25 @ 04:37)      Auto Neutrophil #: 12.42 K/uL (04-08-25 @ 14:32)  Auto Neutrophil #: 7.01 K/uL (03-28-25 @ 18:04)  Auto Neutrophil #: 9.30 K/uL (02-24-25 @ 20:30)  Auto Neutrophil #: 20.77 K/uL (02-20-25 @ 18:10)  Auto Neutrophil #: 14.38 K/uL (02-16-25 @ 06:23)      Creatine Trend:  Creatinine: 1.17 (04-13)  Creatinine: 1.19 (04-12)  Creatinine: 1.21 (04-11)  Creatinine: 1.14 (04-10)      Liver Biochemical Testing Trend:  Alanine Aminotransferase (ALT/SGPT): 10 (04-09)  Alanine Aminotransferase (ALT/SGPT): 11 (04-08)  Alanine Aminotransferase (ALT/SGPT): 13 (04-08)  Alanine Aminotransferase (ALT/SGPT): 18 (03-28)  Alanine Aminotransferase (ALT/SGPT): 32 (03-20)  Aspartate Aminotransferase (AST/SGOT): 16 (04-09-25 @ 05:45)  Aspartate Aminotransferase (AST/SGOT): 23 (04-08-25 @ 20:45)  Aspartate Aminotransferase (AST/SGOT): 17 (04-08-25 @ 14:32)  Aspartate Aminotransferase (AST/SGOT): 19 (03-28-25 @ 18:04)  Aspartate Aminotransferase (AST/SGOT): 31 (03-20-25 @ 06:16)  Bilirubin Total: 0.7 (04-09)  Bilirubin Total: 1.2 (04-08)  Bilirubin Total: 1.3 (04-08)  Bilirubin Total: 0.8 (03-28)  Bilirubin Total: 1.0 (03-20)      Trend LDH          MICROBIOLOGY:    MRSA PCR Result.: NotDetec (04-09-25 @ 06:00)  MRSA PCR Result.: NotDetec (02-24-25 @ 21:30)  MRSA PCR Result.: NotDetec (02-22-25 @ 18:35)  MRSA PCR Result.: NotDetec (02-10-25 @ 14:30)      Culture - Blood (collected 08 Apr 2025 20:45)  Source: Blood Blood-Peripheral  Preliminary Report:    No growth at 4 days    Culture - Blood (collected 08 Apr 2025 20:30)  Source: Blood Blood-Peripheral  Preliminary Report:    No growth at 4 days    Culture - Blood (collected 28 Mar 2025 18:04)  Source: Blood None  Final Report:    No growth at 5 days    Culture - Blood (collected 28 Mar 2025 18:04)  Source: Blood None  Final Report:    No growth at 5 days    Culture - Blood (collected 24 Feb 2025 20:30)  Source: .Blood Blood-Venous  Final Report:    No growth at 5 days    Culture - Acid Fast - Tissue w/Smear (collected 22 Feb 2025 07:42)  Source: Tissue Left femoral plaque  Final Report:    No acid-fast bacilli isolated after 6 weeks.    Culture - Fungal, Other (collected 22 Feb 2025 07:42)  Source: Other Left femoral plaque  Final Report:    No fungus isolated at 4 weeks.    Culture - Tissue with Gram Stain (collected 22 Feb 2025 07:42)  Source: Tissue Left femoral plaque  Final Report:    Organism seen in Gram stain is non-viable after prolonged    incubation and repeated subculture.    Culture - Blood (collected 20 Feb 2025 18:10)  Source: .Blood None  Final Report:    No growth at 5 days    Culture - Blood (collected 20 Feb 2025 18:10)  Source: .Blood None  Final Report:    No growth at 5 days                          COVID-19 PCR: NotDetec (03-06-25 @ 17:33)                        A1C with Estimated Average Glucose Result: 5.4 % (04-09-25 @ 05:45)  A1C with Estimated Average Glucose Result: 5.7 % (02-24-25 @ 20:30)  A1C with Estimated Average Glucose Result: 5.9 % (02-21-25 @ 07:17)  A1C with Estimated Average Glucose Result: 5.9 % (02-10-25 @ 16:58)      RADIOLOGY:  imaging below personally reviewed   Kam is a 74 year old male with  PMH of HTN, DM2 hypothyroidism, prostate cancer, chronic afib, HLD, GI bleed (last c-scope/endoscopy negative), TAVR 2023, afib   presenting as transfer from [facility] for CT surgery evaluation of endocarditis. Kam initially admitted to [facility] on 4/9 ater he was transferred for optho evaluation for branch retinal artery occlusion (BRAO).    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 Bacteremia cleared on 2/14. MRI lumbar spine (2/12) showed concern for L3-L4 and L5-S1 osteomyelitis. ROVERTO on 2/26 showed an echogenic sessile not very mobile subcentimeter ( 6x2 mm) structure attached to the base of the leaflet of the prosthetic aortic valve, unclear etiology but possibly old vegetation. Patient was also found to have clot to common femoral and distal embolism, s/p common femoral endarterectomy with clot retrieval and good blood flow to lower extremity post surgery (2/22). OR cultures from this had GPC on gram stain but could not be cultured out. He was initially on ceftriaxone (2/11-2/21) which was switched to daptomycin (2/21-2/25) due to rash, then back to ceftriaxone (restarted 2/25) after rash was thought to be related to ischemic injury and not allergy. He was planned for ceftriaxone 2g q24h via PICC until 4/7/25, which was then extended ro 4/14 in setting of difficulty coordinating outpatient appointments.    ID was called at [facility] for concern for septic embolic stroke after MRI brain on 4/9 showed Multiple scattered small acute/subacute infarcts and/or septic emboli throughout the supratentorial brain and bilateral cerebellar hemispheres with associated cytotoxic edema. Other relevant imaging include MRI L spine from 4/10 showing Progressive T8-T9 discitis-osteomyelitis with evidence of anterior epidural phlegmon, encroaching upon bilateral neural foramen( stable L3-L4 and L5-S1 discitis-osteomyelitis) and CT maxillofacial with periapical lucency surrounding the right mandibular first premolar tooth. Of note, Repeat ROVERTO performed on 4/10 without evidence of vegetations. However, given concern for septic emboli with BRAO, patirnt transferred to [facility] for CT surgery evaluation.     CT surgery consulted- no plan for surgical intervention at this time.       REVIEW OF SYSTEMS  Constitutional: No fevers, No chills  Eyes: No discharge, + change in vision	  Respiratory: No cough, no SOB  Cardiovascular:  No chest pain, No palpitations   Gastrointestinal: No pain, No nausea, No vomiting, No diarrhea, No constipation	  Genitourinary: No dysuria, No frequency, No hesitancy, No flank pain  MSK: No Joint pain, + back pain, No edema  Neurological: No HA, no weakness, no seizures, no AMS     prior hospital charts reviewed [V]  primary team notes reviewed [V]  other consultant notes reviewed [V]    PAST MEDICAL & SURGICAL HISTORY:  Hypertension      Diabetes mellitus      Obesity      Hypothyroidism      Prostate CA      H/O endarterectomy      S/P TAVR (transcatheter aortic valve replacement)          SOCIAL HISTORY:  Denied smoking/vaping/alcohol/recreational drug use    FAMILY HISTORY:  No pertinent family history in first degree relatives        Allergies  No Known Allergies        ANTIMICROBIALS:  cefTRIAXone   IVPB 2000 every 12 hours      ANTIMICROBIALS (past 90 days):  MEDICATIONS  (STANDING):  cefTRIAXone   IVPB   100 mL/Hr IV Intermittent (04-13-25 @ 06:15)   100 mL/Hr IV Intermittent (04-12-25 @ 18:25)        OTHER MEDS:   MEDICATIONS  (STANDING):  atorvastatin 40 at bedtime  bisacodyl 5 at bedtime  cyclobenzaprine 5 three times a day PRN  dextrose 50% Injectable 25 once  dextrose 50% Injectable 12.5 once  dextrose 50% Injectable 25 once  dextrose Oral Gel 15 once  enoxaparin Injectable 80 every 12 hours  furosemide    Tablet 40 daily  glucagon  Injectable 1 once  insulin lispro (ADMELOG) corrective regimen sliding scale  three times a day before meals  insulin lispro (ADMELOG) corrective regimen sliding scale  at bedtime  levothyroxine 150 daily  melatonin 3 at bedtime PRN  pantoprazole    Tablet 40 before breakfast  polyethylene glycol 3350 17 daily  senna 2 at bedtime  spironolactone 25 daily  traMADol 50 every 4 hours PRN      VITALS:  Vital Signs Last 24 Hrs  T(F): 97.9 (04-13-25 @ 11:52), Max: 100 (04-10-25 @ 12:35)    Vital Signs Last 24 Hrs  HR: 70 (04-13-25 @ 11:52) (67 - 72)  BP: 118/67 (04-13-25 @ 11:52) (99/60 - 119/66)  RR: 18 (04-13-25 @ 11:52)  SpO2: 95% (04-13-25 @ 11:52) (95% - 97%)  Wt(kg): --    EXAM:  General: Patient appears comfortable, no acute distress  HEENT: NCAT  CV: +systolic murmur, irregularly irregular.   Lungs: No respiratory distress, CTA b/l, no wheezing, rales or rhonchi  Abd:  BS4+, Soft, NTND, no guarding  : No suprapubic tenderness  Neuro: AAOx3. No focal deficits noted. No midline spinal tenderness  Ext: No cyanosis, no edema  Msk: freely moving upper and lower extremities.   Skin: No rash, no phlebitis, No erythema       Labs:                        9.6    9.11  )-----------( 244      ( 13 Apr 2025 06:38 )             29.6     04-13    133[L]  |  96  |  37[H]  ----------------------------<  104[H]  3.8   |  24  |  1.17    Ca    9.4      13 Apr 2025 06:38  Phos  3.4     04-13  Mg     1.8     04-13        WBC Trend:  WBC Count: 9.11 (04-13-25 @ 06:38)  WBC Count: 9.68 (04-12-25 @ 04:47)  WBC Count: 11.78 (04-11-25 @ 08:59)  WBC Count: 12.67 (04-11-25 @ 04:37)      Auto Neutrophil #: 12.42 K/uL (04-08-25 @ 14:32)  Auto Neutrophil #: 7.01 K/uL (03-28-25 @ 18:04)  Auto Neutrophil #: 9.30 K/uL (02-24-25 @ 20:30)  Auto Neutrophil #: 20.77 K/uL (02-20-25 @ 18:10)  Auto Neutrophil #: 14.38 K/uL (02-16-25 @ 06:23)      Creatine Trend:  Creatinine: 1.17 (04-13)  Creatinine: 1.19 (04-12)  Creatinine: 1.21 (04-11)  Creatinine: 1.14 (04-10)      Liver Biochemical Testing Trend:  Alanine Aminotransferase (ALT/SGPT): 10 (04-09)  Alanine Aminotransferase (ALT/SGPT): 11 (04-08)  Alanine Aminotransferase (ALT/SGPT): 13 (04-08)  Alanine Aminotransferase (ALT/SGPT): 18 (03-28)  Alanine Aminotransferase (ALT/SGPT): 32 (03-20)  Aspartate Aminotransferase (AST/SGOT): 16 (04-09-25 @ 05:45)  Aspartate Aminotransferase (AST/SGOT): 23 (04-08-25 @ 20:45)  Aspartate Aminotransferase (AST/SGOT): 17 (04-08-25 @ 14:32)  Aspartate Aminotransferase (AST/SGOT): 19 (03-28-25 @ 18:04)  Aspartate Aminotransferase (AST/SGOT): 31 (03-20-25 @ 06:16)  Bilirubin Total: 0.7 (04-09)  Bilirubin Total: 1.2 (04-08)  Bilirubin Total: 1.3 (04-08)  Bilirubin Total: 0.8 (03-28)  Bilirubin Total: 1.0 (03-20)      Trend LDH          MICROBIOLOGY:    MRSA PCR Result.: NotDetec (04-09-25 @ 06:00)  MRSA PCR Result.: NotDetec (02-24-25 @ 21:30)  MRSA PCR Result.: NotDetec (02-22-25 @ 18:35)  MRSA PCR Result.: NotDetec (02-10-25 @ 14:30)      Culture - Blood (collected 08 Apr 2025 20:45)  Source: Blood Blood-Peripheral  Preliminary Report:    No growth at 4 days    Culture - Blood (collected 08 Apr 2025 20:30)  Source: Blood Blood-Peripheral  Preliminary Report:    No growth at 4 days    Culture - Blood (collected 28 Mar 2025 18:04)  Source: Blood None  Final Report:    No growth at 5 days    Culture - Blood (collected 28 Mar 2025 18:04)  Source: Blood None  Final Report:    No growth at 5 days    Culture - Blood (collected 24 Feb 2025 20:30)  Source: .Blood Blood-Venous  Final Report:    No growth at 5 days    Culture - Acid Fast - Tissue w/Smear (collected 22 Feb 2025 07:42)  Source: Tissue Left femoral plaque  Final Report:    No acid-fast bacilli isolated after 6 weeks.    Culture - Fungal, Other (collected 22 Feb 2025 07:42)  Source: Other Left femoral plaque  Final Report:    No fungus isolated at 4 weeks.    Culture - Tissue with Gram Stain (collected 22 Feb 2025 07:42)  Source: Tissue Left femoral plaque  Final Report:    Organism seen in Gram stain is non-viable after prolonged    incubation and repeated subculture.    Culture - Blood (collected 20 Feb 2025 18:10)  Source: .Blood None  Final Report:    No growth at 5 days    Culture - Blood (collected 20 Feb 2025 18:10)  Source: .Blood None  Final Report:    No growth at 5 days    COVID-19 PCR: NotDetec (03-06-25 @ 17:33)  A1C with Estimated Average Glucose Result: 5.4 % (04-09-25 @ 05:45)  A1C with Estimated Average Glucose Result: 5.7 % (02-24-25 @ 20:30)  A1C with Estimated Average Glucose Result: 5.9 % (02-21-25 @ 07:17)  A1C with Estimated Average Glucose Result: 5.9 % (02-10-25 @ 16:58)      RADIOLOGY:    ACC: [id number] EXAM:  MR ORBIT FACE AND OR NECK   ORDERED BY: ROSEMARIE SPAULDING     ACC: [id number] EXAM:  MR ANGIO BRAIN   ORDERED BY: ROSEMARIE SPAULDING     ACC: [id number] EXAM:  MR BRAIN   ORDERED BY: ROSEMARIE SPAULDING     PROCEDURE DATE:  04/09/2025          INTERPRETATION:  .    CLINICAL INFORMATION: Evaluate for infarct or septic emboli. Retinal   artery branch occlusion.    TECHNIQUE: Multiplanar multi sequential MRI examination of the brain and   orbits was performed without the administration of IV gadolinium. MRA   images through the [place] [facility] [place] were obtained using a combination of   2-D and 3-D time-of-flight acquisition. The data was then reformatted   into a volumetric data set and reviewed as rotational MIP images.    COMPARISON: Prior CT study of the head and CT angiogram studies of the   head and neck from 4/18/2025. No prior brain MRI studies or MR   angiography studies of the head or neck are available for comparison.    FINDINGS:    MRI Brain: Multiple scattered foci of diffusion restriction are seen   within the right posteromedial occipital cortex, right occipital lobe,   right posterior temporal lobe, bilateral frontal parietal cortices, and   high right anterior parietal cortex. Small foci are also seen within the   bilateral cerebellar hemispheres. Associated T2/FLAIR hyperintense signal   changes are also seen, compatible with cytotoxic edema.    A chronic lacunar infarct is seen within the right corona radiata. A   chronic lacunar infarct is noted within the right cerebellar hemisphere.    Multiple nonspecific T2/FLAIR hyperintense signal changes are noted   throughout the bihemispheric white matter without associated mass effect   or restricted diffusion.    A globular focus of hemosiderin is noted within the high right posterior   frontal lobe. Additional wispy areas of hemosiderin staining is seen   within the left parietal occipital sulcus.    Ventricular size and configuration is unremarkable. No abnormal   extra-axial fluid collections are seen. Flow-voids are noted throughout   the major intracranial vessels, on the T2 weighted images, consistent   with their patency. The sella turcica and posterior fossa are   unremarkable.    Minor scattered mucosal thickening is seen throughout the paranasal   sinuses. The tympanomastoid cavities are clear. The calvarium appears   intact.    MRI Orbits: Examination is limited signal to premature termination of the   study.    The bilateral orbits inclusive of the globes, extraocular muscles, optic   nerves, and orbital fat appear unremarkable. The lacrimal glands appear   unremarkable. The optic chiasm appears within normal limits.    The Meckel's caves appear unremarkable.    MRA [place] [facility] [place]: The bilateral intracranial internal carotid,   anterior, and middle cerebral arteries appear unremarkable.    The anterior and bilateral posterior communicating arteries are notable.    The bilateral intradural vertebral arteries, vertebrobasilar junction,   basilar artery, and basilar tip appear unremarkable as well as the   bilateral posterior cerebral arteries.    No aneurysms or arteriovenous malformations are seen.    IMPRESSION:    MRI BRAIN:  1. Multiple scattered small acute/subacute infarcts and/or septic emboli   throughout the supratentorial brain and bilateral cerebellar hemispheres   with associated cytotoxic edema. No associated hemorrhage.  2. Additional multiple chronic lacunar infarcts and similar-appearing   mild chronic white matter microvascular type changes.    MRI ORBITS:  1. Limited study secondary to premature termination of the exam.  2. Grossly unremarkable noncontrast MRI study.    MRA HEAD:  1. No large vessel occlusion or major stenosis.    --- End of Report ---            JAIDEN WATSON MD; Attending Radiologist  This document has been electronically signed. Apr 9 2025  2:33PM    ACC: [id number] EXAM:  MR SPINE THORACIC WAW IC   ORDERED BY: JOSEPHINE WILLIS     ACC: [id number] EXAM:  MR SPINE LUMBAR WAW IC   ORDERED BY: JOSEPHINE WILLIS     PROCEDURE DATE:  04/10/2025          INTERPRETATION:  CLINICAL STATEMENT: Evaluate progression of   discitis-osteomyelitis.    TECHNIQUE: Multiplanar multisequence MRI of thoracic and lumbar spine,   WITHOUT and WITH intravenous contrast. No complications to the   intravenous administration of 7.5 cc of gadolinium-based contrast   (Gadavist). 0 cc discarded.  COMPARISON: MRI thoracic spine 3/1/2025. MRI lumbar spine 2/12/2025.    FINDINGS:    Degenerative change visualized lower cervical spine.    MRI THORACIC SPINE    No gross abnormal cord signal, cord edema or atrophy. No pleural fluid   collection, intrathecal or paraspinal mass identified.    No evidence of diffuse marrow replacement process, acute fracture or   subluxation. Stable 1.5 cm T11 vertebral body hemangioma. Several   additional smaller vertebral body hemangiomas.    Multilevel disc space narrowing, disc desiccation, endplate spurring and   facet arthrosis; findings generally more pronounced in the lower segments.    T6-T7: Left paracentral disc protrusion, impinging upon the thecal sac,   without stenosis.  T7-T8: Left paracentral disc protrusion, impinging upon the thecal sac,   without stenosis.  T8-T9: Disc bulge, without stenosis.  T11-T12: Disc bulge, resulting in mild bilateral neural foramen stenosis   with facet arthrosis.    Again seen is a increased T2-STIR signal T8-T9 intervertebral disc with   increased endplate edema at this level as well as increased left   paravertebral edema. Mild focal anterior epidural enhancement at this   level encroaches upon bilateral neural foramen. No rim-enhancing epidural   or paraspinal fluid collection to suggest abscess. No enhancing mass or   leptomeningeal enhancement identified.    MRI LUMBAR SPINE    No intrathecal or paraspinal mass identified. Patchy feathery edema   dorsal paraspinal musculature, most pronounced inferiorly.    No evidence of diffuse marrow replacement process or acute fracture.   Stable 2.4 cm right L2 vertebral body hemangioma. Again seen is grade 1   anterolisthesis L4 over L5. Multilevel disc space narrowing, Schmorl's   nodes, disc desiccation, endplate spurring, facet arthrosis and   ligamentum flavum hypertrophy. Facet arthrosis is most pronounced and   severe in degree at L4-L5 bilaterally.    L1-L2: Disc bulge, resulting in mild bilateral neural foramen stenosis  with facet arthrosis.  L2-L3: Disc bulge, resulting in mild central canal, moderate right and   mild left neural foramen stenosis with facet arthrosis and ligamentum   flavum hypertrophy.  L3-L4: Disc bulge, resulting in moderate central canal, bilateral lateral   recess and moderate bilateral neural foramen stenosis with facet   arthrosis and ligamentum flavum hypertrophy, contacting bilateral L3   nerve roots in crowding the nerve roots of the cauda equina.  L4-L5: Aforementioned anterolisthesis with a disc bulge, facet arthrosis   and ligamentum flavum hypertrophy, resulting in severe central canal,   bilateral lateral recess and moderate left greater than right neural   foramen stenosis, crowding the nerve roots of the cauda equina contacting   the left L4 nerve roots.  L5-S1: Disc bulge, resulting in moderate bilateral neural foramen   stenosis with facet arthrosis, contacting bilateral L5 nerve roots.    Given differences in technique, there is no significant interval change   in the degree of endplate edema at L3-L4 and L5-S1, with persistent L5-S1   greater than L3-L4 paravertebral edema. Near-complete resolution of   previously described enhancement left ventral epidural space at L5-S1. No   rim-enhancing epidural or paraspinal fluid collections to suggest   abscess. No enhancing mass or leptomeningeal enhancement identified.    IMPRESSION:    MRI THORACIC SPINE  1. Progressive T8-T9 discitis-osteomyelitis with evidence of anterior   epidural phlegmon, encroaching upon bilateral neural foramen. No   rim-enhancing epidural-paraspinal fluid collection to suggest abscess.  2. Additional findings, including those degenerative, described in detail   above.    MRI LUMBAR SPINE  1. No significant interval change in the appearance of L3-L4 and L5-S1   discitis-osteomyelitis, with the exception of near complete resolution of   previously described left ventral epidural enhancement at the latter   level. No rim-enhancing epidural-paraspinal fluid collection to suggest   abscess.  2. Additional findings, including those degenerative, described in detail   above.    --- End of Report ---            SHAHRZAD GARCIA M.D., ATTENDING RADIOLOGIST  This document has been electronically signed. Apr 11 2025 10:03AM

## 2025-04-13 NOTE — DISCHARGE NOTE PROVIDER - CARE PROVIDER_API CALL
Jerman Rodriges  Thoracic and Cardiac Surgery  [address] [place] [id number]  Phone: [phone]  Fax: [phone]  Scheduled Appointment: 04/22/2025

## 2025-04-13 NOTE — PHYSICAL THERAPY INITIAL EVALUATION ADULT - IMPAIRMENTS CONTRIBUTING TO GAIT DEVIATIONS, PT EVAL
decrease endurance , intermittent mild unstedy see gait comments above , pt educated to use rolling walker for safer amb with close supervision of 1/impaired balance/impaired postural control/decreased strength

## 2025-04-13 NOTE — PHYSICAL THERAPY INITIAL EVALUATION ADULT - IMPAIRMENTS FOUND, PT EVAL
L vision  deficits/aerobic capacity/endurance/gait, locomotion, and balance/muscle strength/visual motor

## 2025-04-13 NOTE — DISCHARGE NOTE PROVIDER - NSDCFUADDAPPT_GEN_ALL_CORE_FT
APPTS ARE READY TO BE MADE: [] YES    Best Family or Patient Contact (if needed):    Additional Information about above appointments (if needed):    1:   2:   3:     Other comments or requests:  APPTS ARE READY TO BE MADE: [X] YES    Best Family or Patient Contact (if needed):    Additional Information about above appointments (if needed):    1:   2:   3:     Other comments or requests:  APPTS ARE READY TO BE MADE: [X] YES    Best Family or Patient Contact (if needed):    Additional Information about above appointments (if needed):    1: dentist  2: PCP  3: cardiology  4. cardiothoracic surgery    Other comments or requests:  APPTS ARE READY TO BE MADE: [X] YES    Best Family or Patient Contact (if needed):    Additional Information about above appointments (if needed):    1: dentist  2: PCP  3: cardiology  4. cardiothoracic surgery    Other comments or requests:     Prior to outreaching the patient, it was visible that the patient has secured a follow up appointment which was not scheduled by our team. Patient is scheduled to see Dr. Ambrocio on 5/6 at [address] [place], [place] [id number]

## 2025-04-13 NOTE — PHYSICAL THERAPY INITIAL EVALUATION ADULT - PLANNED THERAPY INTERVENTIONS, PT EVAL
GOAL stairs : pt will negotiate a flight of steps with HR with supervision in 1-2 weeks/balance training/bed mobility training/gait training/strengthening/transfer training

## 2025-04-13 NOTE — DISCHARGE NOTE PROVIDER - NSDCHHNEEDSERVICE_GEN_ALL_CORE
Medication teaching and assessment/Teaching and training Medication teaching and assessment/Observation and assessment/Teaching and training

## 2025-04-13 NOTE — OCCUPATIONAL THERAPY INITIAL EVALUATION ADULT - PERTINENT HX OF CURRENT PROBLEM, REHAB EVAL
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 Patient was admitted at [facility] on 4/9 for CVA and central retinal artery occlusion. Prior to this admission patient had Strep salivarius bacteremia (2/10) with associated infected TAVR and osteomyelitis/discitis of the spine. He was on ceftriaxone 2 grams until April 14th (therapy extended from 6 weeks to 7 weeks). When patient presented to [facility] with CVA findings, ROVERTO on 4/10 showed no vegetation on TAVR or other leaflets. MRI spine showed progressive T8-T9 osteomyelitis and stable L3-L4 and L5-S1 discitis-osteomyelitis, Given this history of recent bacteremia, osteomyelitis, and endocarditis ID was concerned for septic embolic stroke. Patient transferred to [facility] for further workup and eval by CT surgery.  CT 4/8: IMPRESSION: HEAD CT: No acute intracranial hemorrhage, mass effect, or shift of the midline structures. Similar-appearing chronic lacunar infarct in the right corona radiata and mild chronic white matter microvascular type changes.  CTA NECK: No large vessel occlusion or major stenosis.  CTA HEAD: No large vessel occlusion or major stenosis.

## 2025-04-13 NOTE — PROGRESS NOTE ADULT - ATTENDING COMMENTS
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 C/w ctx. PT/OT. Treatment will be dependent on clinical course.     Agree with progress note as outlined above, edited where appropriate.

## 2025-04-13 NOTE — PROGRESS NOTE ADULT - SUBJECTIVE AND OBJECTIVE BOX
PROGRESS NOTE    CONNOR SANDRA ([id number])- Patient is a 74y old  Male who presents with a chief complaint of CVA/ Vision loss (12 Apr 2025 17:07)      INTERVAL HPI/OVERNIGHT EVENTS:   Patient has no acute events overnight,     SUBJECTIVE: Patient was seen and examined at bedside this morning. Denies any nausea/vomiting/diarrhea, headache, shortness of breath, abdominal pain or chest pain/palpitations. Patient responding appropriately to questions and able to make needs known.     MEDICATIONS:  atorvastatin 40 milliGRAM(s) Oral at bedtime  bisacodyl 5 milliGRAM(s) Oral at bedtime  cefTRIAXone   IVPB 2000 milliGRAM(s) IV Intermittent every 12 hours  cyclobenzaprine 5 milliGRAM(s) Oral three times a day PRN  dextrose 5%. 1000 milliLiter(s) IV Continuous <Continuous>  dextrose 5%. 1000 milliLiter(s) IV Continuous <Continuous>  dextrose 50% Injectable 25 Gram(s) IV Push once  dextrose 50% Injectable 12.5 Gram(s) IV Push once  dextrose 50% Injectable 25 Gram(s) IV Push once  dextrose Oral Gel 15 Gram(s) Oral once  enoxaparin Injectable 80 milliGRAM(s) SubCutaneous every 12 hours  ferrous    sulfate 325 milliGRAM(s) Oral daily  furosemide    Tablet 40 milliGRAM(s) Oral daily  glucagon  Injectable 1 milliGRAM(s) IntraMuscular once  insulin lispro (ADMELOG) corrective regimen sliding scale   SubCutaneous three times a day before meals  insulin lispro (ADMELOG) corrective regimen sliding scale   SubCutaneous at bedtime  levothyroxine 150 MICROGram(s) Oral daily  lidocaine   4% Patch 1 Patch Transdermal daily  melatonin 3 milliGRAM(s) Oral at bedtime PRN  multivitamin 1 Tablet(s) Oral daily  pantoprazole    Tablet 40 milliGRAM(s) Oral before breakfast  polyethylene glycol 3350 17 Gram(s) Oral daily  senna 2 Tablet(s) Oral at bedtime  spironolactone 25 milliGRAM(s) Oral daily  traMADol 50 milliGRAM(s) Oral every 4 hours PRN      PHYSICAL EXAM:  Vital Signs Last 24 Hrs  T(C): 37 (13 Apr 2025 04:00), Max: 37.3 (12 Apr 2025 21:12)  T(F): 98.6 (13 Apr 2025 04:00), Max: 99.1 (12 Apr 2025 21:12)  HR: 67 (13 Apr 2025 04:00) (67 - 72)  BP: 111/62 (13 Apr 2025 04:00) (111/62 - 125/62)  BP(mean): --  RR: 18 (13 Apr 2025 04:00) (16 - 18)  SpO2: 96% (13 Apr 2025 04:00) (95% - 96%)    Parameters below as of 13 Apr 2025 04:00  Patient On (Oxygen Delivery Method): room air        GENERAL: NAD, lying in bed comfortably  HEAD:  Atraumatic, Normocephalic  EYES: EOMI, PERRLA. No scleral icterus and no conjunctival injection. Tracks me in room  ENT: Moist mucous membranes  NECK: Supple, No JVD  CHEST/LUNG: Clear to auscultation bilaterally; No rales, rhonchi, wheezing, or rubs. Unlabored respirations  HEART: Regular rate and rhythm; No murmurs, rubs, or gallops  ABDOMEN: BS +; Soft, nontender, nondistended  EXTREMITIES:  RUE PICC; 2+ Peripheral Pulses, brisk capillary refill. No clubbing, cyanosis, or edema  NERVOUS SYSTEM:  A&Ox3, no focal neurological deficits. CN II-XII grossly intact, but not individually tested.  PSYCHIATRIC: Cooperative. Appropriate mood and affect.  SKIN: No rashes or lesions      LABS:    Culture - Blood (collected 04-08-25 @ 20:45)  Source: Blood Blood-Peripheral  Preliminary Report (04-13-25 @ 01:01):    No growth at 4 days    Culture - Blood (collected 04-08-25 @ 20:30)  Source: Blood Blood-Peripheral  Preliminary Report (04-13-25 @ 01:01):    No growth at 4 days                            9.6    9.11  )-----------( 244      ( 13 Apr 2025 06:38 )             29.6     04-13    133[L]  |  96  |  37[H]  ----------------------------<  104[H]  3.8   |  24  |  1.17    Ca    9.4      13 Apr 2025 06:38  Phos  3.4     04-13  Mg     1.8     04-13            Urinalysis Basic - ( 13 Apr 2025 06:38 )    Color: x / Appearance: x / SG: x / pH: x  Gluc: 104 mg/dL / Ketone: x  / Bili: x / Urobili: x   Blood: x / Protein: x / Nitrite: x   Leuk Esterase: x / RBC: x / WBC x   Sq Epi: x / Non Sq Epi: x / Bacteria: x        Lactate Trend        CAPILLARY BLOOD GLUCOSE      POCT Blood Glucose.: 128 mg/dL (13 Apr 2025 07:38)          New Radiology, EKG, and additional tests have been reviewed.

## 2025-04-13 NOTE — DISCHARGE NOTE PROVIDER - NSDCMRMEDTOKEN_GEN_ALL_CORE_FT
apixaban 5 mg oral tablet: 1 tab(s) orally 2 times a day  atorvastatin 40 mg oral tablet: 1 tab(s) orally once a day (at bedtime)  cyclobenzaprine 5 mg oral tablet: 1 tab(s) orally 3 times a day as needed for Muscle Spasm  ferrous sulfate 325 mg (65 mg elemental iron) oral tablet: 1 tab(s) orally once a day  furosemide 40 mg oral tablet: 1 tab(s) orally once a day  levothyroxine 150 mcg (0.15 mg) oral tablet: 1 tab(s) orally once a day  lidocaine 4% topical film: Apply topically to affected area once a day 12 hours on and 12 hours off  melatonin 3 mg oral tablet: 1 tab(s) orally once a day (at bedtime) As needed Insomnia  Multiple Vitamins oral tablet: 1 tab(s) orally once a day  pantoprazole 40 mg oral delayed release tablet: 1 tab(s) orally once a day (before a meal)  polyethylene glycol 3350 oral powder for reconstitution: 17 gram(s) orally once a day  senna leaf extract oral tablet: 2 tab(s) orally once a day (at bedtime)  spironolactone 25 mg oral tablet: 1 tab(s) orally once a day  traMADol 50 mg oral tablet: 1 tab(s) orally every 4 hours as needed for Severe Pain (7 - 10) MDD: 300mg  zolpidem 10 mg oral tablet: 1 tab(s) orally once a day (at bedtime) as needed for  insomnia MDD: 1   apixaban 5 mg oral tablet: 1 tab(s) orally 2 times a day  atorvastatin 40 mg oral tablet: 1 tab(s) orally once a day (at bedtime)  cefTRIAXone 2 g injection: 2 gram(s) intravenously every 12 hours  cyclobenzaprine 5 mg oral tablet: 1 tab(s) orally 3 times a day as needed for Muscle Spasm  ferrous sulfate 325 mg (65 mg elemental iron) oral tablet: 1 tab(s) orally once a day  furosemide 40 mg oral tablet: 1 tab(s) orally once a day  levothyroxine 150 mcg (0.15 mg) oral tablet: 1 tab(s) orally once a day  lidocaine 4% topical film: Apply topically to affected area once a day 12 hours on and 12 hours off  melatonin 3 mg oral tablet: 1 tab(s) orally once a day (at bedtime) As needed Insomnia  Multiple Vitamins oral tablet: 1 tab(s) orally once a day  pantoprazole 40 mg oral delayed release tablet: 1 tab(s) orally once a day (before a meal)  Physical Therapy: outpatient physical therapy  polyethylene glycol 3350 oral powder for reconstitution: 17 gram(s) orally once a day  senna leaf extract oral tablet: 2 tab(s) orally once a day (at bedtime)  spironolactone 25 mg oral tablet: 1 tab(s) orally once a day  traMADol 50 mg oral tablet: 1 tab(s) orally every 4 hours as needed for Severe Pain (7 - 10) MDD: 300mg  zolpidem 10 mg oral tablet: 1 tab(s) orally once a day (at bedtime) as needed for  insomnia MDD: 1

## 2025-04-13 NOTE — CONSULT NOTE ADULT - ASSESSMENT
Kam is a 74 year old male with  PMH of HTN, DM2 hypothyroidism, prostate cancer, chronic afib, HLD, GI bleed (last c-scope/endoscopy negative), TAVR 2023, afib   presenting as transfer from [facility] for CT surgery evaluation of endocarditis. Kam initially admitted to [facility] on 4/9 ater he was transferred for optho evaluation for branch retinal artery occlusion (BRAO).    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 Bacteremia cleared on 2/14. MRI lumbar spine (2/12) showed concern for L3-L4 and L5-S1 osteomyelitis. ROVERTO on 2/26 showed an echogenic sessile not very mobile subcentimeter ( 6x2 mm) structure attached to the base of the leaflet of the prosthetic aortic valve, unclear etiology but possibly old vegetation. Patient was also found to have clot to common femoral and distal embolism, s/p common femoral endarterectomy with clot retrieval and good blood flow to lower extremity post surgery (2/22). OR cultures from this had GPC on gram stain but could not be cultured out. He was initially on ceftriaxone (2/11-2/21) which was switched to daptomycin (2/21-2/25) due to rash, then back to ceftriaxone (restarted 2/25) after rash was thought to be related to ischemic injury and not allergy. He was planned for ceftriaxone 2g q24h via PICC until 4/7/25, which was then extended ro 4/14 in setting of difficulty coordinating outpatient appointments.    ID was called at [facility] for concern for septic embolic stroke after MRI brain on 4/9 showed Multiple scattered small acute/subacute infarcts and/or septic emboli throughout the supratentorial brain and bilateral cerebellar hemispheres with associated cytotoxic edema. Other relevant imaging include MRI L spine from 4/10 showing Progressive T8-T9 discitis-osteomyelitis with evidence of anterior epidural phlegmon, encroaching upon bilateral neural foramen( stable L3-L4 and L5-S1 discitis-osteomyelitis) and CT maxillofacial with periapical lucency surrounding the right mandibular first premolar tooth. Of note, Repeat ROVERTO performed on 4/10 without evidence of vegetations. However, given concern for septic emboli with BRAO, patirnt transferred to [facility] for CT surgery evaluation.     CT surgery consulted- no plan for surgical intervention at this time.     #Strep salivarius bacteremia c/b TAVR infection and spinal discitis/osteomyelitis  #BRAO with concern for septic emboli  -given findings of septic emboli and concern for possible worsening MRI findings, would extend abx course (Ceftriaxone 2 g IVPB q12h)  for endocarditis for an additional 6 weeks   -would still recommend dental evaluation given findings of periapical lucency on CT maxillofacial but does not need to be an inpatient evaluation.   -f/u all culture data  -monitor WBC and fever curve     Case seen and discussed with Dr. Nolan who agrees with assessment and plan. Note not final until attending addendum.

## 2025-04-14 ENCOUNTER — TRANSCRIPTION ENCOUNTER (OUTPATIENT)
Age: 75
End: 2025-04-14

## 2025-04-14 ENCOUNTER — NON-APPOINTMENT (OUTPATIENT)
Age: 75
End: 2025-04-14

## 2025-04-14 VITALS
OXYGEN SATURATION: 92 % | TEMPERATURE: 98 F | SYSTOLIC BLOOD PRESSURE: 143 MMHG | DIASTOLIC BLOOD PRESSURE: 86 MMHG | RESPIRATION RATE: 17 BRPM | HEART RATE: 78 BPM

## 2025-04-14 LAB
ANION GAP SERPL CALC-SCNC: 13 MMOL/L — SIGNIFICANT CHANGE UP (ref 5–17)
BUN SERPL-MCNC: 31 MG/DL — HIGH (ref 7–23)
CALCIUM SERPL-MCNC: 9.5 MG/DL — SIGNIFICANT CHANGE UP (ref 8.4–10.5)
CHLORIDE SERPL-SCNC: 95 MMOL/L — LOW (ref 96–108)
CO2 SERPL-SCNC: 26 MMOL/L — SIGNIFICANT CHANGE UP (ref 22–31)
CREAT SERPL-MCNC: 1.15 MG/DL — SIGNIFICANT CHANGE UP (ref 0.5–1.3)
CULTURE RESULTS: SIGNIFICANT CHANGE UP
CULTURE RESULTS: SIGNIFICANT CHANGE UP
EGFR: 66 ML/MIN/1.73M2 — SIGNIFICANT CHANGE UP
EGFR: 66 ML/MIN/1.73M2 — SIGNIFICANT CHANGE UP
GLUCOSE BLDC GLUCOMTR-MCNC: 115 MG/DL — HIGH (ref 70–99)
GLUCOSE BLDC GLUCOMTR-MCNC: 167 MG/DL — HIGH (ref 70–99)
GLUCOSE SERPL-MCNC: 98 MG/DL — SIGNIFICANT CHANGE UP (ref 70–99)
HCT VFR BLD CALC: 28.6 % — LOW (ref 39–50)
HGB BLD-MCNC: 9.7 G/DL — LOW (ref 13–17)
MAGNESIUM SERPL-MCNC: 2 MG/DL — SIGNIFICANT CHANGE UP (ref 1.6–2.6)
MCHC RBC-ENTMCNC: 29.6 PG — SIGNIFICANT CHANGE UP (ref 27–34)
MCHC RBC-ENTMCNC: 33.9 G/DL — SIGNIFICANT CHANGE UP (ref 32–36)
MCV RBC AUTO: 87.2 FL — SIGNIFICANT CHANGE UP (ref 80–100)
NRBC BLD AUTO-RTO: 0 /100 WBCS — SIGNIFICANT CHANGE UP (ref 0–0)
PHOSPHATE SERPL-MCNC: 3.4 MG/DL — SIGNIFICANT CHANGE UP (ref 2.5–4.5)
PLATELET # BLD AUTO: 257 K/UL — SIGNIFICANT CHANGE UP (ref 150–400)
POTASSIUM SERPL-MCNC: 3.6 MMOL/L — SIGNIFICANT CHANGE UP (ref 3.5–5.3)
POTASSIUM SERPL-SCNC: 3.6 MMOL/L — SIGNIFICANT CHANGE UP (ref 3.5–5.3)
RBC # BLD: 3.28 M/UL — LOW (ref 4.2–5.8)
RBC # FLD: 16.8 % — HIGH (ref 10.3–14.5)
SODIUM SERPL-SCNC: 134 MMOL/L — LOW (ref 135–145)
SPECIMEN SOURCE: SIGNIFICANT CHANGE UP
SPECIMEN SOURCE: SIGNIFICANT CHANGE UP
WBC # BLD: 8.87 K/UL — SIGNIFICANT CHANGE UP (ref 3.8–10.5)
WBC # FLD AUTO: 8.87 K/UL — SIGNIFICANT CHANGE UP (ref 3.8–10.5)

## 2025-04-14 PROCEDURE — 97162 PT EVAL MOD COMPLEX 30 MIN: CPT

## 2025-04-14 PROCEDURE — G0545: CPT

## 2025-04-14 PROCEDURE — 84443 ASSAY THYROID STIM HORMONE: CPT

## 2025-04-14 PROCEDURE — 80048 BASIC METABOLIC PNL TOTAL CA: CPT

## 2025-04-14 PROCEDURE — 97166 OT EVAL MOD COMPLEX 45 MIN: CPT

## 2025-04-14 PROCEDURE — 84100 ASSAY OF PHOSPHORUS: CPT

## 2025-04-14 PROCEDURE — 99232 SBSQ HOSP IP/OBS MODERATE 35: CPT

## 2025-04-14 PROCEDURE — 85027 COMPLETE CBC AUTOMATED: CPT

## 2025-04-14 PROCEDURE — 99239 HOSP IP/OBS DSCHRG MGMT >30: CPT

## 2025-04-14 PROCEDURE — 87641 MR-STAPH DNA AMP PROBE: CPT

## 2025-04-14 PROCEDURE — 83735 ASSAY OF MAGNESIUM: CPT

## 2025-04-14 PROCEDURE — 36415 COLL VENOUS BLD VENIPUNCTURE: CPT

## 2025-04-14 PROCEDURE — 87640 STAPH A DNA AMP PROBE: CPT

## 2025-04-14 PROCEDURE — 82962 GLUCOSE BLOOD TEST: CPT

## 2025-04-14 RX ORDER — CEFTRIAXONE 500 MG/1
2 INJECTION, POWDER, FOR SOLUTION INTRAMUSCULAR; INTRAVENOUS
Qty: 168 | Refills: 0
Start: 2025-04-14 | End: 2025-05-25

## 2025-04-14 RX ADMIN — TRAMADOL HYDROCHLORIDE 50 MILLIGRAM(S): 50 TABLET, FILM COATED ORAL at 11:34

## 2025-04-14 RX ADMIN — Medication 150 MICROGRAM(S): at 05:04

## 2025-04-14 RX ADMIN — Medication 40 MILLIGRAM(S): at 05:04

## 2025-04-14 RX ADMIN — FUROSEMIDE 40 MILLIGRAM(S): 10 INJECTION INTRAMUSCULAR; INTRAVENOUS at 05:04

## 2025-04-14 RX ADMIN — Medication 25 MILLIGRAM(S): at 05:03

## 2025-04-14 RX ADMIN — CEFTRIAXONE 100 MILLIGRAM(S): 500 INJECTION, POWDER, FOR SOLUTION INTRAMUSCULAR; INTRAVENOUS at 05:04

## 2025-04-14 RX ADMIN — POLYETHYLENE GLYCOL 3350 17 GRAM(S): 17 POWDER, FOR SOLUTION ORAL at 11:25

## 2025-04-14 RX ADMIN — Medication 1 APPLICATION(S): at 11:25

## 2025-04-14 RX ADMIN — CEFTRIAXONE 100 MILLIGRAM(S): 500 INJECTION, POWDER, FOR SOLUTION INTRAMUSCULAR; INTRAVENOUS at 15:38

## 2025-04-14 RX ADMIN — LIDOCAINE HYDROCHLORIDE 1 PATCH: 20 JELLY TOPICAL at 10:36

## 2025-04-14 RX ADMIN — ENOXAPARIN SODIUM 80 MILLIGRAM(S): 100 INJECTION SUBCUTANEOUS at 05:04

## 2025-04-14 RX ADMIN — LIDOCAINE HYDROCHLORIDE 1 PATCH: 20 JELLY TOPICAL at 07:35

## 2025-04-14 RX ADMIN — INSULIN LISPRO 1: 100 INJECTION, SOLUTION INTRAVENOUS; SUBCUTANEOUS at 11:35

## 2025-04-14 RX ADMIN — TRAMADOL HYDROCHLORIDE 50 MILLIGRAM(S): 50 TABLET, FILM COATED ORAL at 12:16

## 2025-04-14 RX ADMIN — Medication 325 MILLIGRAM(S): at 11:25

## 2025-04-14 RX ADMIN — Medication 1 TABLET(S): at 11:25

## 2025-04-14 NOTE — DIETITIAN INITIAL EVALUATION ADULT - ORAL INTAKE PTA/DIET HISTORY
Patient visited. Per patient, denies adhering to a therapeutic diet and reports decreased p.o. intake x 4 months prior to admission. Patient reports having 2-3 meals daily, and consuming 50% of most meals. NKFA or intolerances reported. Patient reports supplementing with a Multivitamin prior to admission.

## 2025-04-14 NOTE — DIETITIAN INITIAL EVALUATION ADULT - PERTINENT LABORATORY DATA
04-14    134[L]  |  95[L]  |  31[H]  ----------------------------<  98  3.6   |  26  |  1.15    Ca    9.5      14 Apr 2025 06:46  Phos  3.4     04-14  Mg     2.0     04-14    POCT Blood Glucose.: 167 mg/dL (04-14-25 @ 11:22)  A1C with Estimated Average Glucose Result: 5.4 % (04-09-25 @ 05:45)  A1C with Estimated Average Glucose Result: 5.7 % (02-24-25 @ 20:30)  A1C with Estimated Average Glucose Result: 5.9 % (02-21-25 @ 07:17)

## 2025-04-14 NOTE — DIETITIAN INITIAL EVALUATION ADULT - REASON INDICATOR FOR ASSESSMENT
Dietitian consult received for: MST score 2 or >  Chart reviewed, events noted   Information obtained from: electronic medical record, patient

## 2025-04-14 NOTE — DIETITIAN INITIAL EVALUATION ADULT - NSFNSNUTRHOMESUPPLEMENTFT_GEN_A_CORE
Per H&P, patient ordered for Ferrous Sulfate, Furosemide, Spirolactone, Multivitamin, Senna, Polyethylene Glycol prior to admission.

## 2025-04-14 NOTE — DIETITIAN INITIAL EVALUATION ADULT - PROBLEM SELECTOR PLAN 5
on long term IV abx  - ID concerned about lack of source control from dental infection, progressing L8-9 OM with phlegmon  - BCx 04/08 - NGTD    PLAN  - c/w ceftriaxone   - Dental consult - tentatively planned for inpatient eval on 4/15AM  - set up for new IV abx regimen for home infusion on discharge.

## 2025-04-14 NOTE — DIETITIAN INITIAL EVALUATION ADULT - OTHER INFO
Pertinent History:   Per H&P, PMH: T2DM     Pertinent Nutrition Related Labs:  - POCT  (H). Glucose 98 (WNL). Elevated fingersticks noted during admission. HbA1c 5.4% (4/09), indicating good glycemic control  - Na 134 (L), mild hyponatremia noted during admission  - BUN 31 (H)    Pertinent Medications:   - Antibiotics in use  - Insulin (ADMELOG)   - Ferrous Sulfate  - Multivitamin  - Diuretics in use

## 2025-04-14 NOTE — DISCHARGE NOTE NURSING/CASE MANAGEMENT/SOCIAL WORK - PATIENT PORTAL LINK FT
You can access the FollowMyHealth Patient Portal offered by [facility] by registering at the following website: [email or web address]. By joining [employer]’s FollowMyHealth portal, you will also be able to view your health information using other applications (apps) compatible with our system.

## 2025-04-14 NOTE — DISCHARGE NOTE NURSING/CASE MANAGEMENT/SOCIAL WORK - FINANCIAL ASSISTANCE
[facility] provides services at a reduced cost to those who are determined to be eligible through [facility]’s financial assistance program. Information regarding [facility]’s financial assistance program can be found by going to [email or web address] or by calling [phone].

## 2025-04-14 NOTE — PROGRESS NOTE ADULT - PROBLEM SELECTOR PLAN 1
c/b cytotoxic edema - concern for septic emboli as source  MR Brain reviewed - multiple embolic CVA  ROVERTO reviewed - no endocarditis  Dysphagia screening done    PLAN  - NIHSS   - Tele monitor  - Stroke education provided by nursing  - c/w Lovenox BID before srugery determination and can transition back to eliquis 5m bid  - c/w atorvastatin 40 mg qhs  - PT/OT/PMR eval for safe disposition - KELLIE
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

## 2025-04-14 NOTE — DISCHARGE NOTE NURSING/CASE MANAGEMENT/SOCIAL WORK - NSDCPEFALRISK_GEN_ALL_CORE
For information on Fall & Injury Prevention, visit: [email or web address] OR  [email or web address] OR  https://www.cdc.gov/steadi/patient.html

## 2025-04-14 NOTE — DIETITIAN INITIAL EVALUATION ADULT - ADD RECOMMEND
1. Recommend d/c DASH/ TLC Diet, and continue Consistent Carbohydrate Diet   2. Recommend trial Ensure Max Chocolate once daily (150kcals, 30g protein) for p.o. optimization   3. Replete electrolytes prn per team discretion   4. Recommend re-check patient's weight  5. Monitor routine weights, nutrition related labs, fingersticks, diet advancements, p.o. intake and tolerance, and oral nutrition supplement compliance

## 2025-04-14 NOTE — PROGRESS NOTE ADULT - PROBLEM SELECTOR PLAN 4
- f/u TSH in AM   - c/w levothyroxine 150 mcg

## 2025-04-14 NOTE — DIETITIAN INITIAL EVALUATION ADULT - PERTINENT MEDS FT
MEDICATIONS  (STANDING):  atorvastatin 40 milliGRAM(s) Oral at bedtime  bisacodyl 5 milliGRAM(s) Oral at bedtime  cefTRIAXone   IVPB 2000 milliGRAM(s) IV Intermittent every 12 hours  chlorhexidine 2% Cloths 1 Application(s) Topical daily  dextrose 5%. 1000 milliLiter(s) (50 mL/Hr) IV Continuous <Continuous>  dextrose 5%. 1000 milliLiter(s) (100 mL/Hr) IV Continuous <Continuous>  dextrose 50% Injectable 25 Gram(s) IV Push once  dextrose 50% Injectable 12.5 Gram(s) IV Push once  dextrose 50% Injectable 25 Gram(s) IV Push once  dextrose Oral Gel 15 Gram(s) Oral once  enoxaparin Injectable 80 milliGRAM(s) SubCutaneous every 12 hours  ferrous    sulfate 325 milliGRAM(s) Oral daily  furosemide    Tablet 40 milliGRAM(s) Oral daily  glucagon  Injectable 1 milliGRAM(s) IntraMuscular once  insulin lispro (ADMELOG) corrective regimen sliding scale   SubCutaneous three times a day before meals  insulin lispro (ADMELOG) corrective regimen sliding scale   SubCutaneous at bedtime  levothyroxine 150 MICROGram(s) Oral daily  lidocaine   4% Patch 1 Patch Transdermal daily  multivitamin 1 Tablet(s) Oral daily  pantoprazole    Tablet 40 milliGRAM(s) Oral before breakfast  polyethylene glycol 3350 17 Gram(s) Oral daily  senna 2 Tablet(s) Oral at bedtime  spironolactone 25 milliGRAM(s) Oral daily    MEDICATIONS  (PRN):  cyclobenzaprine 5 milliGRAM(s) Oral three times a day PRN Muscle Spasm  melatonin 3 milliGRAM(s) Oral at bedtime PRN Insomnia  traMADol 50 milliGRAM(s) Oral every 4 hours PRN Severe Pain (7 - 10)

## 2025-04-14 NOTE — PROGRESS NOTE ADULT - PROBLEM SELECTOR PLAN 2
Home: Eliquis 5mg bid    PLAN   - c/w Lovenox BID  - monitor rate on tele
Home: Eliquis 5mg bid    PLAN   - c/w Lovenox BID  - monitor rate on tele
Home: eliuqis 5mg bid    PLAN   - c/w lovenox BID  - monitor rate on tele

## 2025-04-14 NOTE — PROGRESS NOTE ADULT - PROBLEM SELECTOR PLAN 3
concern for septic emboli that broke off from TAVR vegetation    PLAN  - CTM

## 2025-04-14 NOTE — PROGRESS NOTE ADULT - SUBJECTIVE AND OBJECTIVE BOX
Follow Up:  endocarditis with discitis/osteo     Interval History/ROS: pt afebrile doing, well, no vomiting or SOB, back pain controlled, wants to go home        Allergies  No Known Allergies        ANTIMICROBIALS:  cefTRIAXone   IVPB 2000 every 12 hours      OTHER MEDS:  atorvastatin 40 milliGRAM(s) Oral at bedtime  bisacodyl 5 milliGRAM(s) Oral at bedtime  chlorhexidine 2% Cloths 1 Application(s) Topical daily  cyclobenzaprine 5 milliGRAM(s) Oral three times a day PRN  dextrose 5%. 1000 milliLiter(s) IV Continuous <Continuous>  dextrose 5%. 1000 milliLiter(s) IV Continuous <Continuous>  dextrose 50% Injectable 25 Gram(s) IV Push once  dextrose 50% Injectable 12.5 Gram(s) IV Push once  dextrose 50% Injectable 25 Gram(s) IV Push once  dextrose Oral Gel 15 Gram(s) Oral once  enoxaparin Injectable 80 milliGRAM(s) SubCutaneous every 12 hours  ferrous    sulfate 325 milliGRAM(s) Oral daily  furosemide    Tablet 40 milliGRAM(s) Oral daily  glucagon  Injectable 1 milliGRAM(s) IntraMuscular once  insulin lispro (ADMELOG) corrective regimen sliding scale   SubCutaneous three times a day before meals  insulin lispro (ADMELOG) corrective regimen sliding scale   SubCutaneous at bedtime  levothyroxine 150 MICROGram(s) Oral daily  lidocaine   4% Patch 1 Patch Transdermal daily  melatonin 3 milliGRAM(s) Oral at bedtime PRN  multivitamin 1 Tablet(s) Oral daily  pantoprazole    Tablet 40 milliGRAM(s) Oral before breakfast  polyethylene glycol 3350 17 Gram(s) Oral daily  senna 2 Tablet(s) Oral at bedtime  spironolactone 25 milliGRAM(s) Oral daily  traMADol 50 milliGRAM(s) Oral every 4 hours PRN      Vital Signs Last 24 Hrs  T(C): 37.2 (14 Apr 2025 11:11), Max: 37.2 (14 Apr 2025 11:11)  T(F): 98.9 (14 Apr 2025 11:11), Max: 98.9 (14 Apr 2025 11:11)  HR: 77 (14 Apr 2025 11:11) (61 - 77)  BP: 119/66 (14 Apr 2025 11:11) (119/66 - 155/76)  BP(mean): --  RR: 17 (14 Apr 2025 11:11) (17 - 18)  SpO2: 96% (14 Apr 2025 11:11) (96% - 103%)    Parameters below as of 14 Apr 2025 11:11  Patient On (Oxygen Delivery Method): room air        Physical Exam:  General:    NAD,  non toxic  Respiratory:    comfortable on RA  :      no  espinoza  Musculoskeletal:   no joint swelling  vascular: no phlebitis  Skin:    no rash                          9.7    8.87  )-----------( 257      ( 14 Apr 2025 06:44 )             28.6       04-14    134[L]  |  95[L]  |  31[H]  ----------------------------<  98  3.6   |  26  |  1.15    Ca    9.5      14 Apr 2025 06:46  Phos  3.4     04-14  Mg     2.0     04-14        Urinalysis Basic - ( 14 Apr 2025 06:46 )    Color: x / Appearance: x / SG: x / pH: x  Gluc: 98 mg/dL / Ketone: x  / Bili: x / Urobili: x   Blood: x / Protein: x / Nitrite: x   Leuk Esterase: x / RBC: x / WBC x   Sq Epi: x / Non Sq Epi: x / Bacteria: x        MICROBIOLOGY:  v  Blood Blood-Peripheral  04-08-25   No growth at 5 days  --  --      Blood Blood-Peripheral  04-08-25   No growth at 5 days  --  --      Blood None  03-28-25   No growth at 5 days  --  --                RADIOLOGY:  Images independently visualized and reviewed personally, findings as below  < from: MR Lumbar Spine w/wo IV Cont (04.10.25 @ 20:49) >  IMPRESSION:    MRI THORACIC SPINE  1. Progressive T8-T9 discitis-osteomyelitis with evidence of anterior   epidural phlegmon, encroaching upon bilateral neural foramen. No   rim-enhancing epidural-paraspinal fluid collection to suggest abscess.  2. Additional findings, including those degenerative, described in detail   above.    MRI LUMBAR SPINE  1. No significant interval change in the appearance of L3-L4 and L5-S1   discitis-osteomyelitis, with the exception of near complete resolution of   previously described left ventral epidural enhancement at the latter   level. No rim-enhancing epidural-paraspinal fluid collection to suggest   abscess.  2. Additional findings, including those degenerative, described in detail   above.    < end of copied text >  < from: CT Maxillofacial w/ IV Cont (04.10.25 @ 09:29) >  IMPRESSION:    There is a periapical lucency surrounding the right mandibular first   premolar tooth without associated periapical abscess. There are multiple   dental implants and metallic crowns that result in metallic streak   artifact limiting full evaluation of the dentition and the surrounding   soft tissues, however the visualized buccal soft tissues appear   unremarkable. Visualized dental inspection would be helpful.      < end of copied text >  < from: ROVERTO W or WO Ultrasound Enhancing Agent (04.10.25 @ 11:36) >  CONCLUSIONS:      1. Left ventricular systolic function is normal. There are no regional wall motion abnormalities seen.   2. Normal right ventricular cavity size and normal right ventricular systolic function.   3. Structurally normal mitral valve with normal leaflet excursion. No vegetations seen in association with the mitral valve. There is calcification of the mitral valve annulus. There is mild mitral regurgitation.   4. A a transcatheter deployed (TAVR) is present in the aortic position. The prosthetic valveis well seated. The peak transaortic velocity is 4.13 m/s, peak transaortic gradient is 68.2 mmHg and mean transaortic gradient is 40.0 mmHg with an LVOT/aortic valve VTI ratio of 0.25. There is mild paravalvular regurgitation, originating anteriorly. The systolic transaortic gradients are elevated. No obvious vegetations seen in association with the transcatheter aortic valve replacement (TAVR).   5. No atheroma in the visualized portions of the proximal ascending aorta. Mild non-mobile atheromain the visualized portions of the transverse aortic arch. Mild non-mobile atheroma in the visualized portions of the descending aorta.   6. The left atrium is normal in size. There is no evidence of left atrial or left atrial appendage thrombus. The left atrial appendage emptying velocity is normal.   7. Agitated saline injection was negative for intracardiac shunt.    < end of copied text >

## 2025-04-14 NOTE — PROGRESS NOTE ADULT - PROBLEM SELECTOR PROBLEM 3
CRAO (central retinal artery occlusion), left

## 2025-04-14 NOTE — PROGRESS NOTE ADULT - PROBLEM SELECTOR PLAN 10
Code Status: DNR/DNI  DVT PPx: Lovenox BI   E: replete K<4, Mg<2  Diet: regular CC   Dispo: pending medical improvement; likely KELLIE

## 2025-04-14 NOTE — DIETITIAN INITIAL EVALUATION ADULT - NSFNSADHERENCEPTAFT_GEN_A_CORE
Per chart, hx T2DM noted. Patient denies checking his blood sugar or adhering to a therapeutic diet for DM prior to admission.

## 2025-04-14 NOTE — DIETITIAN INITIAL EVALUATION ADULT - ENERGY INTAKE
Per nursing flowsheets, adequate PO intake during admission, %. Patient reports fair appetite. Offered oral nutrition shake to promote PO intake. Patient accepting./Adequate (%)

## 2025-04-14 NOTE — DISCHARGE NOTE NURSING/CASE MANAGEMENT/SOCIAL WORK - NSDCFUADDAPPT_GEN_ALL_CORE_FT
APPTS ARE READY TO BE MADE: [X] YES    Best Family or Patient Contact (if needed):    Additional Information about above appointments (if needed):    1: dentist  2: PCP  3: cardiology  4. cardiothoracic surgery    Other comments or requests:

## 2025-04-14 NOTE — PROGRESS NOTE ADULT - PROBLEM SELECTOR PLAN 9
c/w lasix 40 mg qd  c/w aldactone 25 mg qd

## 2025-04-14 NOTE — DIETITIAN INITIAL EVALUATION ADULT - NS FNS DIET ORDER
Diet, Consistent Carbohydrate w/Evening Snack:   DASH/TLC {Sodium & Cholesterol Restricted} (DASH) (04-12-25 @ 15:50) [Active]

## 2025-04-14 NOTE — PROGRESS NOTE ADULT - PROBLEM SELECTOR PLAN 5
on long term IV abx  - ID concerned about lack of source control from dental infection, progressing L8-9 OM with phlegmon  - BCx 04/08 - NGTD    PLAN  - c/w ceftriaxone   - Dental consult - tentatively planned for inpatient eval on 4/15AM  - set up for new IV abx regimen for home infusion on discharge.
on long term IV abx  - ID concerned about lack of source control from dental infection, progressing L8-9 OM with phlegmon  - BCx 04/08 - NGTD    PLAN  - c/w ceftriaxone   - Dental consult - tentatively planned for inpatient eval on 4/15AM  - set up for new IV abx regimen for home infusion on discharge.
on long term IV abx  - ID concerned about lack of source control from dental infection, progressing L8-9 OM with phlegmon  - BCx 04/08 - NGTD    PLAN  - c/w ceftriazone   - Dental consult - tentatively planned for inpatient eval on 4/15AM  - set up for new IV abx regimen for home infusion on discharge.

## 2025-04-14 NOTE — DIETITIAN INITIAL EVALUATION ADULT - PROBLEM SELECTOR PROBLEM 2
2020    TELEHEALTH EVALUATION -- Audio/Visual (During ITGCI-27 public health emergency)    Due to COVID 19 outbreak, patient's office visit was converted to a virtual visit. Patient was contacted and agreed to proceed with a virtual visit via Doxy. me  The risks and benefits of converting to a virtual visit were discussed in light of the current infectious disease epidemic. Patient also understood that insurance coverage and co-pays are up to their [employer]. HPI:    Jennifer Phillips (:  1970) has requested an audio/video evaluation for the following concern(s):    Right ear pressure and vaginal irritation. Right ear pressure started 5-6 days ago. H/o allergies . She is taking Allegra daily and Sudafed prn- mostly in the am. She has clear nasal discharge , nasal congestion and post nasal drip. Able to pop her ears. No sharp pain. Some muffled hearing. No fever or cough. She has yellow vaginal discharge X 5 days . Using Monistat X 3 days over the counter and helping some. No fever. She denies abnormal vaginal bleeding, odor, or unusual pelvic pain, no dysuria,  or hematuria. Slight itching. Some frequency , but at her baseline. Denies back pain. Rarely has abdominal pain, but intermittent and mild and not daily. Review of Systems    Prior to Visit Medications    Medication Sig Taking?  Authorizing Provider   buPROPion (WELLBUTRIN XL) 150 MG extended release tablet Take 1 tablet by mouth every morning  Smooth Rosas MD   butalbital-acetaminophen-caffeine (FIORICET, ESGIC) -40 MG per tablet Take 1 tablet by mouth every 6 hours as needed for Headaches  Smooth Rosas MD   VASCEPA 1 g CAPS capsule Take 2 capsules by mouth 2 times daily  Smooth Rosas MD   meloxicam (MOBIC) 7.5 MG tablet Take 1 tablet by mouth daily Prn elbow pain bilateral.  Smooth Rosas MD   Elastic Bandages & Supports (WRIST SPLINT/ELASTIC RIGHT SM) MISC DX: lateral epicondylitis  Reuben Rodriguez, 07/23/2021    Lipid screen  07/23/2024    Colon cancer screen colonoscopy  12/06/2026    DTaP/Tdap/Td vaccine (2 - Td) 05/15/2029    Flu vaccine  Completed    Hepatitis A vaccine  Aged Out    Hepatitis B vaccine  Aged Out    Hib vaccine  Aged Out    Meningococcal (ACWY) vaccine  Aged Out    Pneumococcal 0-64 years Vaccine  Aged Out       Family History   Problem Relation Age of Onset    Cancer Mother 76        melanoma    High Blood Pressure Father     Colon Cancer Maternal Grandfather 46    Other Paternal Grandmother         PE       PHYSICAL EXAMINATION:    Vital Signs: (As obtained by patient/caregiver or practitioner observation)     Blood pressure= 110/70   Heart rate= 85  Respiratory rate= 14 Temperature= 98.2      Constitutional:  Appears well-developed and well-nourished. No apparent distress                              Mental status:  Alert and awake. Oriented to person/place/time. Able to follow commands       Eyes: EOM intact. Sclera-normal. No erythema of conjunctiva. No eye discharge. HENT: Normocephalic, atraumatic. Mouth/Throat: normal. Mucous membranes are moist. Non tender at [facility] opening. External Ears: Normal       Neck: No visualized mass      Pulmonary/Chest:  Respiratory effort normal.  No visualized signs of difficulty breathing or respiratory distress         Musculoskeletal:   Normal gait with no signs of ataxia. Normal range of motion of neck. Neurological:         No Facial Asymmetry (Cranial nerve 7 motor function) (limited exam to video visit) . No gaze palsy              Skin:                     No significant exanthematous lesions or discoloration noted on facial skin                                        Psychiatric:          Normal Affect. No Hallucinations           Other pertinent observable physical exam findings:       ASSESSMENT/PLAN:  1. Ear pressure, right/ 2.  Dysfunction of right eustachian tube  - restart Flonase NS qhs - has Gentle Chronic atrial fibrillation

## 2025-04-14 NOTE — DIETITIAN INITIAL EVALUATION ADULT - PROBLEM SELECTOR PLAN 1
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

## 2025-04-14 NOTE — PROGRESS NOTE ADULT - TIME BILLING
- Ordering, reviewing, and interpreting labs, testing, and imaging.  - Independently obtaining a review of systems and performing a physical exam  - Reviewing consultant documentation/recommendations in addition to discussing plan of care with consultants.  - Counselling and educating patient and family regarding interpretation of aforementioned items and plan of care.
review of labs, imaging, notes, discussion of plan with team, which are independent of time spent teaching

## 2025-04-14 NOTE — PROGRESS NOTE ADULT - PROBLEM SELECTOR PLAN 6
concern for endocarditis  ROVERTO reviewed - no visible vegetation at this time    PLAN  - patient transferred to [facility] for CTSx eval for intervention given CVA, no vegetations on valves, but hx of vegetations on valves and current   - aware no vegetation on current ROVERTO but ID concerned that it may have broken off to cause CRAO and CVA and may be a recurrent problem.
concern for endocarditis  ROVERTO reviewed - no visible vegetation at this time    PLAN  - patient transferred to [facility] for CTSx eval for intervention given CVA, no vegetations on valves, but hx of vegetations on valves and current   - aware no vegetation on current ROVERTO but ID concerned that it may have broken off to cause CRAO and CVA and may be a recurrent problem.
concern for endocarditis  ROVERTO reviewed - no visible vegetation at this time    PLAN  - patient transferred to [facility] for CTSx eval for intervention given CVA, no vegetations on valves, but hx of vegetations on valves and current   - aware no vegetation on current ROVERTO but ID concerned that it may have broken off to cause CRAO and CVA and may be a recurrent problem.

## 2025-04-14 NOTE — PROGRESS NOTE ADULT - SUBJECTIVE AND OBJECTIVE BOX
George Rose M.D.   PGY-1 Internal Medicine  ** Note not finalized until signed by attending **   --------------------------  George Rose MD  Internal Medicine   PGY-1  --------------------------    SUBJECTIVE / OVERNIGHT EVENTS:    Pt seen in AM at bedside, resting comfortably in bed, and does not appear to be in any acute distress. When asked, pt denies any recent or active fever, chills, nausea, vomiting, headache, acute sob, chest pain, abdominal pain, genitourinary sx, extremity pain or swelling.    MEDICATIONS  (STANDING):  atorvastatin 40 milliGRAM(s) Oral at bedtime  bisacodyl 5 milliGRAM(s) Oral at bedtime  cefTRIAXone   IVPB 2000 milliGRAM(s) IV Intermittent every 12 hours  chlorhexidine 2% Cloths 1 Application(s) Topical daily  dextrose 5%. 1000 milliLiter(s) (50 mL/Hr) IV Continuous <Continuous>  dextrose 5%. 1000 milliLiter(s) (100 mL/Hr) IV Continuous <Continuous>  dextrose 50% Injectable 25 Gram(s) IV Push once  dextrose 50% Injectable 12.5 Gram(s) IV Push once  dextrose 50% Injectable 25 Gram(s) IV Push once  dextrose Oral Gel 15 Gram(s) Oral once  enoxaparin Injectable 80 milliGRAM(s) SubCutaneous every 12 hours  ferrous    sulfate 325 milliGRAM(s) Oral daily  furosemide    Tablet 40 milliGRAM(s) Oral daily  glucagon  Injectable 1 milliGRAM(s) IntraMuscular once  insulin lispro (ADMELOG) corrective regimen sliding scale   SubCutaneous three times a day before meals  insulin lispro (ADMELOG) corrective regimen sliding scale   SubCutaneous at bedtime  levothyroxine 150 MICROGram(s) Oral daily  lidocaine   4% Patch 1 Patch Transdermal daily  multivitamin 1 Tablet(s) Oral daily  pantoprazole    Tablet 40 milliGRAM(s) Oral before breakfast  polyethylene glycol 3350 17 Gram(s) Oral daily  senna 2 Tablet(s) Oral at bedtime  spironolactone 25 milliGRAM(s) Oral daily    MEDICATIONS  (PRN):  cyclobenzaprine 5 milliGRAM(s) Oral three times a day PRN Muscle Spasm  melatonin 3 milliGRAM(s) Oral at bedtime PRN Insomnia  traMADol 50 milliGRAM(s) Oral every 4 hours PRN Severe Pain (7 - 10)    CAPILLARY BLOOD GLUCOSE    POCT Blood Glucose.: 167 mg/dL (14 Apr 2025 11:22)  POCT Blood Glucose.: 115 mg/dL (14 Apr 2025 07:25)  POCT Blood Glucose.: 128 mg/dL (13 Apr 2025 21:34)  POCT Blood Glucose.: 119 mg/dL (13 Apr 2025 17:40)    I&O's Summary    13 Apr 2025 07:01  -  14 Apr 2025 07:00  --------------------------------------------------------  IN: 100 mL / OUT: 0 mL / NET: 100 mL    14 Apr 2025 07:01  -  14 Apr 2025 14:49  --------------------------------------------------------  IN: 200 mL / OUT: 0 mL / NET: 200 mL    PHYSICAL EXAM:  Vital Signs Last 24 Hrs  T(C): 37.2 (14 Apr 2025 11:11), Max: 37.2 (14 Apr 2025 11:11)  T(F): 98.9 (14 Apr 2025 11:11), Max: 98.9 (14 Apr 2025 11:11)  HR: 77 (14 Apr 2025 11:11) (61 - 77)  BP: 119/66 (14 Apr 2025 11:11) (119/66 - 155/76)  RR: 17 (14 Apr 2025 11:11) (17 - 18)  SpO2: 96% (14 Apr 2025 11:11) (96% - 103%)    Parameters below as of 14 Apr 2025 11:11  Patient On (Oxygen Delivery Method): room air    GENERAL: NAD, lying in bed comfortably  HEAD:  Atraumatic, Normocephalic  EYES: EOMI, PERRLA. No scleral icterus and no conjunctival injection. Tracks me in room  ENT: Moist mucous membranes  NECK: Supple, No JVD  CHEST/LUNG: Clear to auscultation bilaterally; No rales, rhonchi, wheezing, or rubs. Unlabored respirations  HEART: Regular rate and rhythm; No murmurs, rubs, or gallops  ABDOMEN: BS +; Soft, nontender, nondistended  EXTREMITIES: RUE PICC; 2+ Peripheral Pulses, brisk capillary refill. No clubbing, cyanosis, or edema  NERVOUS SYSTEM:  A&Ox3, no focal neurological deficits. CN II-XII grossly intact, but not individually tested.  PSYCHIATRIC: Cooperative. Appropriate mood and affect.  SKIN: No rashes or lesions    LABS:                        9.7    8.87  )-----------( 257      ( 14 Apr 2025 06:44 )             28.6     04-14    134[L]  |  95[L]  |  31[H]  ----------------------------<  98  3.6   |  26  |  1.15    Ca    9.5      14 Apr 2025 06:46  Phos  3.4     04-14  Mg     2.0     04-14    Urinalysis Basic - ( 14 Apr 2025 06:46 )  Color: x / Appearance: x / SG: x / pH: x  Gluc: 98 mg/dL / Ketone: x  / Bili: x / Urobili: x   Blood: x / Protein: x / Nitrite: x   Leuk Esterase: x / RBC: x / WBC x   Sq Epi: x / Non Sq Epi: x / Bacteria: x    RADIOLOGY & ADDITIONAL TESTS:  Results Reviewed: X  Imaging Personally Reviewed: X  Electrocardiogram Personally Reviewed: X

## 2025-04-14 NOTE — PROGRESS NOTE ADULT - ATTENDING COMMENTS
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     # Embolic CVA: with CRAO. minimal mobility issues. able to navigate with using a walker independently. still with left upper visual field defect.   Cont with abx for endocarditis , AC, statin .  outpt follow up with neuro     # Chronic afib: rate controlled . on lovenox transition back to [facility] for dc    # Staph bacteremia: unclear source but some concerns about possible dental source. pt denies any symptoms   Plan for 6 weeks abx course as per ID   Dental eval planned as inpt 4/15 but pt refusing to stay. will f/u as outpt. 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     # Embolic CVA: with CRAO. minimal mobility issues. able to navigate with using a walker independently. still with left upper visual field defect.   Cont with abx for endocarditis , AC, statin .  outpt follow up with neuro     # Chronic afib: rate controlled . on lovenox transition back to eliquis for dc.    # Staph bacteremia: unclear source but some concerns about possible dental source. pt denies any symptoms   Plan for 6 weeks abx course as per ID   Dental eval planned as inpt 4/15 but pt refusing to stay. will f/u as outpt. pt is aware of risk and purpose of exam and f/u     d/c home today with home IV abx therapy/homecare

## 2025-04-14 NOTE — PROGRESS NOTE ADULT - ASSESSMENT
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 Bacteremia cleared on 2/14. MRI lumbar spine (2/12) showed concern for L3-L4 and L5-S1 osteomyelitis. ROVERTO on 2/26 showed an echogenic sessile not very mobile subcentimeter ( 6x2 mm) structure attached to the base of the leaflet of the prosthetic aortic valve, unclear etiology but possibly old vegetation. Patient was also found to have clot to common femoral and distal embolism, s/p common femoral endarterectomy with clot retrieval and good blood flow to lower extremity post surgery (2/22). OR cultures from this had GPC on gram stain but could not be cultured out. He was initially on ceftriaxone (2/11-2/21) which was switched to daptomycin (2/21-2/25) due to rash, then back to ceftriaxone (restarted 2/25) after rash was thought to be related to ischemic injury and not allergy. He was planned for ceftriaxone 2g q24h via PICC until 4/7/25, which was then extended ro 4/14 in setting of difficulty coordinating outpatient appointments.  went to [facility] 4/8 with near syncope and found to have L branch retinal artery occlusion  MRI brain 4/9 showed Multiple scattered small acute/subacute infarcts and/or septic emboli throughout the supratentorial brain and bilateral cerebellar hemispheres with associated cytotoxic edema  MRI L spine 4/10 showing Progressive T8-T9 discitis-osteomyelitis with evidence of anterior epidural phlegmon, encroaching upon bilateral neural foramen( stable L3-L4 and L5-S1 discitis-osteomyelitis)   CT maxillofacial with periapical lucency surrounding the right mandibular first premolar tooth.  pt was transferred here for CT surgery but Repeat ROVERTO 4/10 without evidence of vegetations, so CT surgery is not planning for any intervention    * pt has been on antibiotics for about 8 weeks now and stated the back pain actually has improved, not sure the spine MRI is just lagging behind or it is actually worse but in view of the septic emboli to the brain would extend the course for another 6 weeks  * pt still needs dental eval but does not want to stay in the hospital for it and has an appointment with [facility] dental tomorrow, so can follow as outpatient  * c/w ceftriaxone 2 q 12 with weekly CBC/diff, CMP to be emailed to [email or web address]    The above assessment and plan was discussed with the primary team    Luiza Nolan MD  contact on teams  After 5pm and on weekends call [phone]
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 Transferred to [facility] for CTS eval -> ROVERTO demonstrating no endocarditis. OK to d/c home w/ home abx.

## 2025-04-14 NOTE — DIETITIAN INITIAL EVALUATION ADULT - PHYSCIAL ASSESSMENT
- [facility] weight trend history not available.     - Per RD note from previous admission (3/07/2025), patient's weight documented to be 218lbs. Per chart, patient's current dosing weight 175lbs (4/12/2025). Drastic weight fluctuation noted, question for weight discrepancy.     - Patient confirmed dosing weight to be accurate, and reports unintentional weight loss x 4 months prior to admission related to medication use. Per patient, usual body weight 234lbs and last weighed this 4 months prior.  25% weight loss x 4 months indicated, question accuracy.

## 2025-04-14 NOTE — DIETITIAN INITIAL EVALUATION ADULT - REASON
Mild age related wasting observed, no overt signs of muscle wasting or subcutaneous fat loss observed at this time.

## 2025-04-15 ENCOUNTER — APPOINTMENT (OUTPATIENT)
Age: 75
End: 2025-04-15

## 2025-04-15 RX ORDER — CHLORHEXIDINE GLUCONATE 4 %
325 (65 FE) LIQUID (ML) TOPICAL 3 TIMES DAILY
Qty: 100 | Refills: 0 | Status: ACTIVE | COMMUNITY
Start: 2025-04-15 | End: 1900-01-01

## 2025-04-17 ENCOUNTER — LABORATORY RESULT (OUTPATIENT)
Age: 75
End: 2025-04-17

## 2025-04-17 ENCOUNTER — APPOINTMENT (OUTPATIENT)
Dept: MRI IMAGING | Facility: CLINIC | Age: 75
End: 2025-04-17

## 2025-04-18 NOTE — DISCHARGE NOTE NURSING/CASE MANAGEMENT/SOCIAL WORK - NSDCPEPT PROEDMA_GEN_ALL_CORE
STEVE acknowledges consult. Initial consult completed with pt on 4/17/2025 and STEVE following pt for discharge planning.    JOYCE Camp, PHYLLIS  OBS/ED   Phone: [phone]  Fax: [phone]  Vocera: 1A Obs STEVE    Yes

## 2025-04-21 ENCOUNTER — APPOINTMENT (OUTPATIENT)
Dept: PHYSICAL MEDICINE AND REHAB | Facility: CLINIC | Age: 75
End: 2025-04-21

## 2025-04-21 ENCOUNTER — APPOINTMENT (OUTPATIENT)
Dept: FAMILY MEDICINE | Facility: CLINIC | Age: 75
End: 2025-04-21
Payer: MEDICARE

## 2025-04-21 VITALS
SYSTOLIC BLOOD PRESSURE: 118 MMHG | BODY MASS INDEX: 25.7 KG/M2 | HEART RATE: 98 BPM | TEMPERATURE: 97.3 F | DIASTOLIC BLOOD PRESSURE: 58 MMHG | WEIGHT: 169 LBS | OXYGEN SATURATION: 98 %

## 2025-04-21 DIAGNOSIS — I33.0 ACUTE AND SUBACUTE INFECTIVE ENDOCARDITIS: ICD-10-CM

## 2025-04-21 DIAGNOSIS — C61 MALIGNANT NEOPLASM OF PROSTATE: ICD-10-CM

## 2025-04-21 DIAGNOSIS — M46.44 DISCITIS, UNSPECIFIED, THORACIC REGION: ICD-10-CM

## 2025-04-21 DIAGNOSIS — E11.42 TYPE 2 DIABETES MELLITUS WITH DIABETIC POLYNEUROPATHY: ICD-10-CM

## 2025-04-21 DIAGNOSIS — I48.0 PAROXYSMAL ATRIAL FIBRILLATION: ICD-10-CM

## 2025-04-21 PROCEDURE — 99496 TRANSJ CARE MGMT HIGH F2F 7D: CPT

## 2025-04-21 RX ORDER — TRAMADOL HYDROCHLORIDE 50 MG/1
50 TABLET, COATED ORAL DAILY
Qty: 20 | Refills: 0 | Status: ACTIVE | COMMUNITY
Start: 2025-04-21 | End: 1900-01-01

## 2025-04-22 ENCOUNTER — APPOINTMENT (OUTPATIENT)
Dept: CARDIOTHORACIC SURGERY | Facility: CLINIC | Age: 75
End: 2025-04-22

## 2025-04-22 DIAGNOSIS — M46.46 DISCITIS, UNSPECIFIED, LUMBAR REGION: ICD-10-CM

## 2025-04-22 DIAGNOSIS — M86.10 OTHER ACUTE OSTEOMYELITIS, UNSPECIFIED SITE: ICD-10-CM

## 2025-04-22 DIAGNOSIS — Z95.3 PRESENCE OF XENOGENIC HEART VALVE: ICD-10-CM

## 2025-04-24 ENCOUNTER — LABORATORY RESULT (OUTPATIENT)
Age: 75
End: 2025-04-24

## 2025-04-25 ENCOUNTER — LABORATORY RESULT (OUTPATIENT)
Age: 75
End: 2025-04-25

## 2025-04-28 ENCOUNTER — LABORATORY RESULT (OUTPATIENT)
Age: 75
End: 2025-04-28

## 2025-04-28 NOTE — H&P ADULT - REASON FOR ADMISSION
Message from patient that he got his Tresiba and received a letter from [employer] that they will no longer cover it.  Please call and let him know if there's something else he should take.  Please advise   severe AS

## 2025-05-01 ENCOUNTER — LABORATORY RESULT (OUTPATIENT)
Age: 75
End: 2025-05-01

## 2025-05-02 ENCOUNTER — LABORATORY RESULT (OUTPATIENT)
Age: 75
End: 2025-05-02

## 2025-05-05 ENCOUNTER — RX ONLY (RX ONLY)
Age: 75
End: 2025-05-05

## 2025-05-05 ENCOUNTER — NON-APPOINTMENT (OUTPATIENT)
Age: 75
End: 2025-05-05

## 2025-05-05 ENCOUNTER — OFFICE (OUTPATIENT)
Dept: URBAN - METROPOLITAN AREA CLINIC 70 | Facility: CLINIC | Age: 75
Setting detail: OPHTHALMOLOGY
End: 2025-05-05
Payer: MEDICARE

## 2025-05-05 DIAGNOSIS — H16.221: ICD-10-CM

## 2025-05-05 DIAGNOSIS — H16.223: ICD-10-CM

## 2025-05-05 DIAGNOSIS — H16.222: ICD-10-CM

## 2025-05-05 PROCEDURE — 99213 OFFICE O/P EST LOW 20 MIN: CPT | Performed by: OPTOMETRIST

## 2025-05-05 PROCEDURE — 83861 MICROFLUID ANALY TEARS: CPT | Mod: QW,LT | Performed by: OPTOMETRIST

## 2025-05-05 PROCEDURE — 83861 MICROFLUID ANALY TEARS: CPT | Mod: QW,RT | Performed by: OPTOMETRIST

## 2025-05-05 ASSESSMENT — REFRACTION_MANIFEST
OD_CYLINDER: -1.00
OS_VA1: 20/20
OD_AXIS: 090
OS_SPHERE: -0.25
OD_SPHERE: +1.25
OS_ADD: +2.50
OD_VA1: 20/20
OS_CYLINDER: SPH
OD_ADD: +2.50

## 2025-05-05 ASSESSMENT — REFRACTION_AUTOREFRACTION
OD_AXIS: 099
OD_CYLINDER: -1.25
OD_SPHERE: +1.50
OS_SPHERE: +1.00
OS_AXIS: 096
OS_CYLINDER: -1.00

## 2025-05-05 ASSESSMENT — KERATOMETRY
OS_AXISANGLE_DEGREES: 011
OD_AXISANGLE_DEGREES: 179
METHOD_AUTO_MANUAL: AUTO
OD_K1POWER_DIOPTERS: 43.50
OS_K1POWER_DIOPTERS: 43.25
OS_K2POWER_DIOPTERS: 44.75
OD_K2POWER_DIOPTERS: 44.75

## 2025-05-05 ASSESSMENT — VISUAL ACUITY
OS_BCVA: 20/30
OD_BCVA: 20/200

## 2025-05-05 ASSESSMENT — CONFRONTATIONAL VISUAL FIELD TEST (CVF)
OS_FINDINGS: FULL
OD_FINDINGS: FULL

## 2025-05-06 ENCOUNTER — APPOINTMENT (OUTPATIENT)
Dept: CARDIOTHORACIC SURGERY | Facility: CLINIC | Age: 75
End: 2025-05-06
Payer: MEDICARE

## 2025-05-06 VITALS
BODY MASS INDEX: 25.01 KG/M2 | DIASTOLIC BLOOD PRESSURE: 60 MMHG | OXYGEN SATURATION: 98 % | TEMPERATURE: 98.4 F | HEIGHT: 68 IN | RESPIRATION RATE: 16 BRPM | HEART RATE: 70 BPM | SYSTOLIC BLOOD PRESSURE: 102 MMHG | WEIGHT: 165 LBS

## 2025-05-06 DIAGNOSIS — Z95.3 PRESENCE OF XENOGENIC HEART VALVE: ICD-10-CM

## 2025-05-06 DIAGNOSIS — G47.33 OBSTRUCTIVE SLEEP APNEA (ADULT) (PEDIATRIC): ICD-10-CM

## 2025-05-06 DIAGNOSIS — E03.9 HYPOTHYROIDISM, UNSPECIFIED: ICD-10-CM

## 2025-05-06 DIAGNOSIS — I10 ESSENTIAL (PRIMARY) HYPERTENSION: ICD-10-CM

## 2025-05-06 DIAGNOSIS — E78.5 HYPERLIPIDEMIA, UNSPECIFIED: ICD-10-CM

## 2025-05-06 DIAGNOSIS — I33.0 ACUTE AND SUBACUTE INFECTIVE ENDOCARDITIS: ICD-10-CM

## 2025-05-06 DIAGNOSIS — K21.9 GASTRO-ESOPHAGEAL REFLUX DISEASE W/OUT ESOPHAGITIS: ICD-10-CM

## 2025-05-06 PROCEDURE — 99204 OFFICE O/P NEW MOD 45 MIN: CPT

## 2025-05-06 RX ORDER — CEFTRIAXONE 2 G/1
2 INJECTION, POWDER, FOR SOLUTION INTRAMUSCULAR; INTRAVENOUS
Refills: 0 | Status: ACTIVE | COMMUNITY
Start: 2025-05-06

## 2025-05-07 ENCOUNTER — NON-APPOINTMENT (OUTPATIENT)
Age: 75
End: 2025-05-07

## 2025-05-08 ENCOUNTER — NON-APPOINTMENT (OUTPATIENT)
Age: 75
End: 2025-05-08

## 2025-05-08 ENCOUNTER — LABORATORY RESULT (OUTPATIENT)
Age: 75
End: 2025-05-08

## 2025-05-15 ENCOUNTER — LABORATORY RESULT (OUTPATIENT)
Age: 75
End: 2025-05-15

## 2025-05-15 ENCOUNTER — APPOINTMENT (OUTPATIENT)
Dept: INFECTIOUS DISEASE | Facility: CLINIC | Age: 75
End: 2025-05-15

## 2025-05-19 ENCOUNTER — APPOINTMENT (OUTPATIENT)
Dept: NEUROSURGERY | Facility: CLINIC | Age: 75
End: 2025-05-19
Payer: MEDICARE

## 2025-05-19 VITALS
HEIGHT: 68 IN | SYSTOLIC BLOOD PRESSURE: 96 MMHG | OXYGEN SATURATION: 98 % | WEIGHT: 168 LBS | HEART RATE: 94 BPM | DIASTOLIC BLOOD PRESSURE: 52 MMHG | BODY MASS INDEX: 25.46 KG/M2

## 2025-05-19 DIAGNOSIS — M62.830 MUSCLE SPASM OF BACK: ICD-10-CM

## 2025-05-19 DIAGNOSIS — M46.40 DISCITIS, UNSPECIFIED, SITE UNSPECIFIED: ICD-10-CM

## 2025-05-19 DIAGNOSIS — I33.0 ACUTE AND SUBACUTE INFECTIVE ENDOCARDITIS: ICD-10-CM

## 2025-05-19 PROCEDURE — 99215 OFFICE O/P EST HI 40 MIN: CPT

## 2025-05-20 NOTE — DIETITIAN INITIAL EVALUATION ADULT - % CHANGE
Quality 226: Preventive Care And Screening: Tobacco Use: Screening And Cessation Intervention: Patient screened for tobacco use and is an ex/non-smoker Detail Level: Detailed 7.72

## 2025-05-22 ENCOUNTER — NON-APPOINTMENT (OUTPATIENT)
Age: 75
End: 2025-05-22

## 2025-05-28 ENCOUNTER — APPOINTMENT (OUTPATIENT)
Dept: MRI IMAGING | Facility: CLINIC | Age: 75
End: 2025-05-28
Payer: MEDICARE

## 2025-05-28 ENCOUNTER — OUTPATIENT (OUTPATIENT)
Dept: OUTPATIENT SERVICES | Facility: HOSPITAL | Age: 75
LOS: 1 days | End: 2025-05-28
Payer: MEDICARE

## 2025-05-28 DIAGNOSIS — Z95.2 PRESENCE OF PROSTHETIC HEART VALVE: Chronic | ICD-10-CM

## 2025-05-28 DIAGNOSIS — M46.40 DISCITIS, UNSPECIFIED, SITE UNSPECIFIED: ICD-10-CM

## 2025-05-28 PROCEDURE — 72158 MRI LUMBAR SPINE W/O & W/DYE: CPT | Mod: 26

## 2025-05-28 PROCEDURE — 72158 MRI LUMBAR SPINE W/O & W/DYE: CPT

## 2025-05-28 PROCEDURE — A9585: CPT

## 2025-05-28 PROCEDURE — 72157 MRI CHEST SPINE W/O & W/DYE: CPT | Mod: 26

## 2025-05-28 PROCEDURE — 72157 MRI CHEST SPINE W/O & W/DYE: CPT

## 2025-05-29 ENCOUNTER — OFFICE (OUTPATIENT)
Dept: URBAN - METROPOLITAN AREA CLINIC 102 | Facility: CLINIC | Age: 75
Setting detail: OPHTHALMOLOGY
End: 2025-05-29
Payer: MEDICARE

## 2025-05-29 DIAGNOSIS — H16.221: ICD-10-CM

## 2025-05-29 DIAGNOSIS — H43.12: ICD-10-CM

## 2025-05-29 DIAGNOSIS — H35.373: ICD-10-CM

## 2025-05-29 DIAGNOSIS — H34.232: ICD-10-CM

## 2025-05-29 DIAGNOSIS — H40.033: ICD-10-CM

## 2025-05-29 DIAGNOSIS — H16.222: ICD-10-CM

## 2025-05-29 DIAGNOSIS — E11.9: ICD-10-CM

## 2025-05-29 DIAGNOSIS — H25.13: ICD-10-CM

## 2025-05-29 DIAGNOSIS — E11.3292: ICD-10-CM

## 2025-05-29 DIAGNOSIS — E11.3211: ICD-10-CM

## 2025-05-29 PROCEDURE — 92014 COMPRE OPH EXAM EST PT 1/>: CPT | Performed by: OPHTHALMOLOGY

## 2025-05-29 PROCEDURE — 92250 FUNDUS PHOTOGRAPHY W/I&R: CPT | Performed by: OPHTHALMOLOGY

## 2025-05-29 ASSESSMENT — REFRACTION_AUTOREFRACTION
OS_SPHERE: UNABLE
OD_SPHERE: +1.50
OD_AXIS: 090
OD_CYLINDER: -1.50

## 2025-05-29 ASSESSMENT — KERATOMETRY
OD_AXISANGLE_DEGREES: 168
OS_AXISANGLE_DEGREES: 177
OS_K2POWER_DIOPTERS: 45.00
OS_K1POWER_DIOPTERS: 43.25
OD_K1POWER_DIOPTERS: 43.50
OD_K2POWER_DIOPTERS: 5.00
METHOD_AUTO_MANUAL: AUTO

## 2025-05-29 ASSESSMENT — CONFRONTATIONAL VISUAL FIELD TEST (CVF)
OS_FINDINGS: FULL
OD_FINDINGS: FULL

## 2025-05-29 ASSESSMENT — VISUAL ACUITY
OS_BCVA: 20/30-1
OD_BCVA: HM

## 2025-05-29 ASSESSMENT — REFRACTION_MANIFEST
OD_ADD: +2.50
OD_VA1: 20/20
OS_VA1: 20/20
OD_CYLINDER: -1.00
OS_CYLINDER: SPH
OS_ADD: +2.50
OD_SPHERE: +1.25
OS_SPHERE: -0.25
OD_AXIS: 090

## 2025-05-29 ASSESSMENT — TONOMETRY
OS_IOP_MMHG: 10
OD_IOP_MMHG: 10

## 2025-05-31 ENCOUNTER — RX RENEWAL (OUTPATIENT)
Age: 75
End: 2025-05-31

## 2025-05-31 RX ORDER — FERROUS SULFATE TAB 325 MG (65 MG ELEMENTAL FE) 325 (65 FE) MG
325 (65 FE) TAB ORAL
Qty: 100 | Refills: 0 | Status: ACTIVE | COMMUNITY
Start: 2025-05-31 | End: 1900-01-01

## 2025-06-02 PROCEDURE — 85652 RBC SED RATE AUTOMATED: CPT

## 2025-06-02 PROCEDURE — 97116 GAIT TRAINING THERAPY: CPT | Mod: GP

## 2025-06-02 PROCEDURE — 80053 COMPREHEN METABOLIC PANEL: CPT

## 2025-06-02 PROCEDURE — 82962 GLUCOSE BLOOD TEST: CPT

## 2025-06-02 PROCEDURE — 97110 THERAPEUTIC EXERCISES: CPT | Mod: GO

## 2025-06-02 PROCEDURE — 97535 SELF CARE MNGMENT TRAINING: CPT | Mod: GO

## 2025-06-02 PROCEDURE — 71045 X-RAY EXAM CHEST 1 VIEW: CPT

## 2025-06-02 PROCEDURE — 97112 NEUROMUSCULAR REEDUCATION: CPT | Mod: GP

## 2025-06-02 PROCEDURE — 97161 PT EVAL LOW COMPLEX 20 MIN: CPT | Mod: GP

## 2025-06-02 PROCEDURE — 36415 COLL VENOUS BLD VENIPUNCTURE: CPT

## 2025-06-02 PROCEDURE — 97165 OT EVAL LOW COMPLEX 30 MIN: CPT | Mod: GO

## 2025-06-02 PROCEDURE — 93971 EXTREMITY STUDY: CPT

## 2025-06-02 PROCEDURE — 97530 THERAPEUTIC ACTIVITIES: CPT | Mod: GP

## 2025-06-02 PROCEDURE — 86140 C-REACTIVE PROTEIN: CPT

## 2025-06-02 PROCEDURE — 85025 COMPLETE CBC W/AUTO DIFF WBC: CPT

## 2025-06-02 PROCEDURE — 87635 SARS-COV-2 COVID-19 AMP PRB: CPT

## 2025-06-04 ENCOUNTER — NON-APPOINTMENT (OUTPATIENT)
Age: 75
End: 2025-06-04

## 2025-06-09 ENCOUNTER — RX RENEWAL (OUTPATIENT)
Age: 75
End: 2025-06-09

## 2025-06-10 ENCOUNTER — APPOINTMENT (OUTPATIENT)
Dept: NEUROSURGERY | Facility: CLINIC | Age: 75
End: 2025-06-10
Payer: MEDICARE

## 2025-06-10 ENCOUNTER — NON-APPOINTMENT (OUTPATIENT)
Age: 75
End: 2025-06-10

## 2025-06-10 VITALS
OXYGEN SATURATION: 100 % | DIASTOLIC BLOOD PRESSURE: 56 MMHG | WEIGHT: 175 LBS | SYSTOLIC BLOOD PRESSURE: 109 MMHG | HEART RATE: 68 BPM | HEIGHT: 68 IN | BODY MASS INDEX: 26.52 KG/M2

## 2025-06-10 PROCEDURE — 99214 OFFICE O/P EST MOD 30 MIN: CPT

## 2025-06-11 ENCOUNTER — APPOINTMENT (OUTPATIENT)
Dept: INFECTIOUS DISEASE | Facility: CLINIC | Age: 75
End: 2025-06-11
Payer: MEDICARE

## 2025-06-11 VITALS
HEIGHT: 68 IN | HEART RATE: 69 BPM | DIASTOLIC BLOOD PRESSURE: 61 MMHG | WEIGHT: 175 LBS | OXYGEN SATURATION: 98 % | BODY MASS INDEX: 26.52 KG/M2 | SYSTOLIC BLOOD PRESSURE: 106 MMHG

## 2025-06-11 PROBLEM — Z78.9 NON-SMOKER: Status: ACTIVE | Noted: 2025-06-11

## 2025-06-11 PROCEDURE — 99215 OFFICE O/P EST HI 40 MIN: CPT

## 2025-06-27 ENCOUNTER — RX RENEWAL (OUTPATIENT)
Age: 75
End: 2025-06-27

## 2025-06-28 ENCOUNTER — RX RENEWAL (OUTPATIENT)
Age: 75
End: 2025-06-28

## 2025-07-01 ENCOUNTER — RX RENEWAL (OUTPATIENT)
Age: 75
End: 2025-07-01

## 2025-07-01 ENCOUNTER — APPOINTMENT (OUTPATIENT)
Dept: ELECTROPHYSIOLOGY | Facility: CLINIC | Age: 75
End: 2025-07-01

## 2025-07-01 VITALS
DIASTOLIC BLOOD PRESSURE: 62 MMHG | HEIGHT: 68 IN | SYSTOLIC BLOOD PRESSURE: 118 MMHG | WEIGHT: 175 LBS | HEART RATE: 70 BPM | RESPIRATION RATE: 16 BRPM | BODY MASS INDEX: 26.52 KG/M2 | OXYGEN SATURATION: 100 %

## 2025-07-01 PROCEDURE — G2211 COMPLEX E/M VISIT ADD ON: CPT

## 2025-07-01 PROCEDURE — 93000 ELECTROCARDIOGRAM COMPLETE: CPT

## 2025-07-01 PROCEDURE — 99214 OFFICE O/P EST MOD 30 MIN: CPT

## 2025-07-08 ENCOUNTER — APPOINTMENT (OUTPATIENT)
Dept: FAMILY MEDICINE | Facility: CLINIC | Age: 75
End: 2025-07-08
Payer: MEDICARE

## 2025-07-08 ENCOUNTER — APPOINTMENT (OUTPATIENT)
Dept: INFECTIOUS DISEASE | Facility: CLINIC | Age: 75
End: 2025-07-08
Payer: MEDICARE

## 2025-07-08 VITALS
SYSTOLIC BLOOD PRESSURE: 110 MMHG | OXYGEN SATURATION: 98 % | HEIGHT: 68 IN | WEIGHT: 177 LBS | DIASTOLIC BLOOD PRESSURE: 50 MMHG | TEMPERATURE: 97.7 F | HEART RATE: 67 BPM | BODY MASS INDEX: 26.83 KG/M2

## 2025-07-08 VITALS
DIASTOLIC BLOOD PRESSURE: 59 MMHG | OXYGEN SATURATION: 97 % | SYSTOLIC BLOOD PRESSURE: 112 MMHG | HEART RATE: 70 BPM | BODY MASS INDEX: 26.83 KG/M2 | WEIGHT: 177 LBS | HEIGHT: 68 IN

## 2025-07-08 PROCEDURE — 36415 COLL VENOUS BLD VENIPUNCTURE: CPT

## 2025-07-08 PROCEDURE — 81003 URINALYSIS AUTO W/O SCOPE: CPT | Mod: QW

## 2025-07-08 PROCEDURE — 99215 OFFICE O/P EST HI 40 MIN: CPT

## 2025-07-08 PROCEDURE — G2211 COMPLEX E/M VISIT ADD ON: CPT

## 2025-07-08 PROCEDURE — 99214 OFFICE O/P EST MOD 30 MIN: CPT

## 2025-07-08 RX ORDER — CEFUROXIME AXETIL 500 MG/1
500 TABLET, FILM COATED ORAL
Qty: 14 | Refills: 0 | Status: ACTIVE | COMMUNITY
Start: 2025-07-08

## 2025-07-09 LAB
ALBUMIN SERPL ELPH-MCNC: 4.1 G/DL
ALP BLD-CCNC: 133 U/L
ALT SERPL-CCNC: 19 U/L
ANION GAP SERPL CALC-SCNC: 12 MMOL/L
AST SERPL-CCNC: 21 U/L
BILIRUB SERPL-MCNC: 0.5 MG/DL
BILIRUB UR QL STRIP: NORMAL
BUN SERPL-MCNC: 33 MG/DL
CALCIUM SERPL-MCNC: 9.8 MG/DL
CHLORIDE SERPL-SCNC: 104 MMOL/L
CHOLEST SERPL-MCNC: 113 MG/DL
CLARITY UR: CLEAR
CO2 SERPL-SCNC: 23 MMOL/L
COLLECTION METHOD: NORMAL
CREAT SERPL-MCNC: 1.14 MG/DL
CREAT SPEC-SCNC: 92 MG/DL
EGFRCR SERPLBLD CKD-EPI 2021: 67 ML/MIN/1.73M2
ESTIMATED AVERAGE GLUCOSE: 94 MG/DL
GLUCOSE SERPL-MCNC: 96 MG/DL
GLUCOSE UR-MCNC: ABNORMAL
HBA1C MFR BLD HPLC: 4.9 %
HCG UR QL: 0.2 EU/DL
HDLC SERPL-MCNC: 43 MG/DL
HGB UR QL STRIP.AUTO: NORMAL
KETONES UR-MCNC: NORMAL
LDLC SERPL-MCNC: 55 MG/DL
LEUKOCYTE ESTERASE UR QL STRIP: NORMAL
MICROALBUMIN 24H UR DL<=1MG/L-MCNC: 2.2 MG/DL
MICROALBUMIN/CREAT 24H UR-RTO: 24 MG/G
NITRITE UR QL STRIP: NORMAL
NONHDLC SERPL-MCNC: 70 MG/DL
PH UR STRIP: 5.5
POTASSIUM SERPL-SCNC: 5 MMOL/L
PROT SERPL-MCNC: 6.8 G/DL
PROT UR STRIP-MCNC: ABNORMAL
SODIUM SERPL-SCNC: 139 MMOL/L
SP GR UR STRIP: 1.02
TRIGL SERPL-MCNC: 73 MG/DL

## 2025-08-01 ENCOUNTER — RX RENEWAL (OUTPATIENT)
Age: 75
End: 2025-08-01

## 2025-08-05 ENCOUNTER — RESULT REVIEW (OUTPATIENT)
Age: 75
End: 2025-08-05

## 2025-08-05 ENCOUNTER — OUTPATIENT (OUTPATIENT)
Dept: OUTPATIENT SERVICES | Facility: HOSPITAL | Age: 75
LOS: 1 days | End: 2025-08-05
Payer: MEDICARE

## 2025-08-05 ENCOUNTER — APPOINTMENT (OUTPATIENT)
Dept: CARDIOTHORACIC SURGERY | Facility: CLINIC | Age: 75
End: 2025-08-05
Payer: MEDICARE

## 2025-08-05 VITALS
HEIGHT: 68 IN | TEMPERATURE: 98 F | DIASTOLIC BLOOD PRESSURE: 64 MMHG | BODY MASS INDEX: 26.83 KG/M2 | OXYGEN SATURATION: 99 % | RESPIRATION RATE: 16 BRPM | SYSTOLIC BLOOD PRESSURE: 121 MMHG | WEIGHT: 177 LBS | HEART RATE: 64 BPM

## 2025-08-05 DIAGNOSIS — I33.0 ACUTE AND SUBACUTE INFECTIVE ENDOCARDITIS: ICD-10-CM

## 2025-08-05 DIAGNOSIS — Z95.2 PRESENCE OF PROSTHETIC HEART VALVE: Chronic | ICD-10-CM

## 2025-08-05 DIAGNOSIS — Z95.3 PRESENCE OF XENOGENIC HEART VALVE: ICD-10-CM

## 2025-08-05 DIAGNOSIS — I35.0 NONRHEUMATIC AORTIC (VALVE) STENOSIS: ICD-10-CM

## 2025-08-05 PROCEDURE — 93306 TTE W/DOPPLER COMPLETE: CPT

## 2025-08-05 PROCEDURE — 99214 OFFICE O/P EST MOD 30 MIN: CPT

## 2025-08-05 PROCEDURE — 93356 MYOCRD STRAIN IMG SPCKL TRCK: CPT

## 2025-08-05 PROCEDURE — 93306 TTE W/DOPPLER COMPLETE: CPT | Mod: 26

## 2025-08-07 ENCOUNTER — APPOINTMENT (OUTPATIENT)
Dept: NEUROLOGY | Facility: CLINIC | Age: 75
End: 2025-08-07
Payer: MEDICARE

## 2025-08-07 VITALS
HEIGHT: 68 IN | WEIGHT: 178 LBS | TEMPERATURE: 98.2 F | BODY MASS INDEX: 26.98 KG/M2 | SYSTOLIC BLOOD PRESSURE: 119 MMHG | DIASTOLIC BLOOD PRESSURE: 62 MMHG | HEART RATE: 58 BPM

## 2025-08-07 DIAGNOSIS — I63.9 CEREBRAL INFARCTION, UNSPECIFIED: ICD-10-CM

## 2025-08-07 DIAGNOSIS — G47.33 OBSTRUCTIVE SLEEP APNEA (ADULT) (PEDIATRIC): ICD-10-CM

## 2025-08-07 PROCEDURE — 99214 OFFICE O/P EST MOD 30 MIN: CPT

## 2025-09-09 ENCOUNTER — APPOINTMENT (OUTPATIENT)
Dept: NEUROSURGERY | Facility: CLINIC | Age: 75
End: 2025-09-09
Payer: MEDICARE

## 2025-09-09 VITALS
HEIGHT: 68 IN | BODY MASS INDEX: 28.04 KG/M2 | SYSTOLIC BLOOD PRESSURE: 121 MMHG | DIASTOLIC BLOOD PRESSURE: 63 MMHG | HEART RATE: 60 BPM | WEIGHT: 185 LBS | OXYGEN SATURATION: 98 % | RESPIRATION RATE: 18 BRPM

## 2025-09-09 DIAGNOSIS — M46.45: ICD-10-CM

## 2025-09-09 PROCEDURE — 99214 OFFICE O/P EST MOD 30 MIN: CPT

## (undated) DEVICE — SUT PROLENE 5-0 36" RB-1

## (undated) DEVICE — VISITEC 4X4

## (undated) DEVICE — SUT VICRYL 1 36" CTX UNDYED

## (undated) DEVICE — SOL IRR POUR NS 0.9% 500ML

## (undated) DEVICE — SUT PROLENE 6-0 30" C-1

## (undated) DEVICE — NDL PERC BASEPLT 18GX7CM

## (undated) DEVICE — DRSG TAPE TRANSPORE 3"

## (undated) DEVICE — SUT SOFSILK 4-0 24" CV-15

## (undated) DEVICE — DRAPE DOME BAG 22"

## (undated) DEVICE — SUT STAINLESS STEEL 5 18" SCC

## (undated) DEVICE — SUT MONOCRYL 3-0 27" PS-2 UNDYED

## (undated) DEVICE — PACK VALVE

## (undated) DEVICE — DRAPE INSTRUMENT POUCH 6.75" X 11"

## (undated) DEVICE — SWITCH ARISS TABLE MOUNT UNEQUAL LEGS 13"

## (undated) DEVICE — SUT PERMAHAND SILK 2 60" TIES

## (undated) DEVICE — DRAPE TOWEL BLUE 17" X 24"

## (undated) DEVICE — SUT ETHIBOND 2-0 30" V5 WHITE

## (undated) DEVICE — VENTING ADAPTER "Y" (RED/BLUE) 7.5"

## (undated) DEVICE — GLV 8 PROTEXIS (WHITE)

## (undated) DEVICE — GLV 7 PROTEXIS (BLUE)

## (undated) DEVICE — TAPE UMBILICAL 1/8 X 30" STRANDS

## (undated) DEVICE — STOPCOCK 3 WAY W SWIVEL MALE LUER LOCK

## (undated) DEVICE — MEDICATION LABELS W MARKER

## (undated) DEVICE — SUT PROLENE 3-0 36" SH

## (undated) DEVICE — Device

## (undated) DEVICE — GLV 6.5 PROTEXIS (BLUE)

## (undated) DEVICE — SUT PROLENE 4-0 36" RB-1

## (undated) DEVICE — TUBING MEDI-VAC W MAXIGRIP CONNECTORS 1/4"X6'

## (undated) DEVICE — GOWN TRIMAX LG

## (undated) DEVICE — DRSG CURITY GAUZE SPONGE 4 X 4" 12-PLY

## (undated) DEVICE — SUT PROLENE 3-0 36" MH

## (undated) DEVICE — LAP PAD 18 X 18"

## (undated) DEVICE — NDL HYPO SAFE 18G X 1.5" (PINK)

## (undated) DEVICE — SUT ETHIBOND 2 30" V37

## (undated) DEVICE — VESSEL LOOP MAXI-RED  0.120" X 16"

## (undated) DEVICE — SUT ETHIBOND 2-0 36" SH

## (undated) DEVICE — SUT SILK 0 30" TIES

## (undated) DEVICE — SUT DOUBLE 6 WIRE STERNAL

## (undated) DEVICE — SYR LUER LOK 50CC

## (undated) DEVICE — PACK ANGIO

## (undated) DEVICE — SUT SILK 2 30" MH

## (undated) DEVICE — SUT BLUNT SZ 5

## (undated) DEVICE — SUT ETHIBOND 1 30" OS6

## (undated) DEVICE — SOL IRR POUR H2O 500ML

## (undated) DEVICE — TUBING TRUWAVE PRESSURE MALE/FEMALE 72"

## (undated) DEVICE — SUT SILK 0 30" KS

## (undated) DEVICE — LIGATING CLIPS AESCULAP MEDIUM BLUE 24

## (undated) DEVICE — SUT SILK 0 30" SH

## (undated) DEVICE — SAW BLADE STRYKER STERNUM 31MM X 6.27 X .79

## (undated) DEVICE — SUT ETHIBOND 5 4-30" CCS

## (undated) DEVICE — SUT ETHIBOND 2-0 4-30" RB-1 GREEN

## (undated) DEVICE — SET BLOOD Y-TYPE

## (undated) DEVICE — SUT ETHIBOND 3-0 36" RB-1

## (undated) DEVICE — SUT GORETEX CV-4 (3-0) 36" TH-18

## (undated) DEVICE — PREP CHLORAPREP HI-LITE ORANGE 26ML

## (undated) DEVICE — SUT VICRYL 2 27" TP-1 UNDYED

## (undated) DEVICE — SOL IRR POUR NS 0.9% 1000ML

## (undated) DEVICE — SUT PROLENE 5-0 30" RB-2

## (undated) DEVICE — SUT PROLENE 7-0 30" BV-1

## (undated) DEVICE — SUT PROLENE 4-0 30" SH-1

## (undated) DEVICE — SUT ETHIBOND 2-0 4-30" RB-1 WHITE

## (undated) DEVICE — SUT ETHIBOND 4-0 36" RB-1

## (undated) DEVICE — FOLEY TRAY 16FR 5CC LF LUBRISIL ADVANCE TEMP CLOSED

## (undated) DEVICE — CONNECTOR STRAIGHT 3/8 X 1/2"

## (undated) DEVICE — PRESSURE INFUSOR BAG 500ML

## (undated) DEVICE — PACK OPEN HEART VAMP PLUS

## (undated) DEVICE — SUCTION YANKAUER NO CONTROL VENT

## (undated) DEVICE — SUT ETHIBOND 2-0 30" SH-1 GREEN/WHITE

## (undated) DEVICE — SUT SILK 5-0 60" TIES

## (undated) DEVICE — STOPCOCK 3 WAY W TUBE 35"

## (undated) DEVICE — SUT PROLENE 7-0 24" BV175-6

## (undated) DEVICE — GOWN XL W TOWEL

## (undated) DEVICE — GLV 7.5 SENSICARE W ALOE

## (undated) DEVICE — SYR LUER LOK 10CC

## (undated) DEVICE — SUT PROLENE 5-0 36" C-1

## (undated) DEVICE — SUT PROLENE 8-0 24" BV175-6

## (undated) DEVICE — TUBING IV SET SECOND 34" W/O LOK-BLUNT

## (undated) DEVICE — WARMING BLANKET FULL UNDERBODY

## (undated) DEVICE — SUT PDS II 2-0 27" CT-1

## (undated) DEVICE — SUT VICRYL 0 54" REEL UNDYED

## (undated) DEVICE — GLV 6 PROTEXIS (WHITE)

## (undated) DEVICE — GLV 6.5 PROTEXIS (WHITE)

## (undated) DEVICE — SYR LUER LOK 20CC

## (undated) DEVICE — SUT VICRYL 0 36" CTX UNDYED

## (undated) DEVICE — DRAPE 3/4 SHEET 52X76"

## (undated) DEVICE — SUT PROLENE 7-0 24" BV-1

## (undated) DEVICE — SPONGE PEANUT AUTO COUNT

## (undated) DEVICE — GLV 7 PROTEXIS (WHITE)

## (undated) DEVICE — DRSG TEGADERM 4X4.75"

## (undated) DEVICE — SUT SILK 2-0 18" SH (POP-OFF)

## (undated) DEVICE — SUT MONOCRYL 4-0 27" PS-2 UNDYED

## (undated) DEVICE — TUBING ALARIS PUMP MODULE NON-DEHP

## (undated) DEVICE — SUT VICRYL 3-0 27" CT-1

## (undated) DEVICE — DRAIN CHANNEL 19FR ROUND FULL FLUTED

## (undated) DEVICE — GLV 7.5 PROTEXIS (WHITE)

## (undated) DEVICE — SUT PROLENE 6-0 24" C-1

## (undated) DEVICE — SUT VICRYL 2-0 27" CT-1 UNDYED

## (undated) DEVICE — ELCTR MULTIFUNCTION DEFIBRILLATION ELECTRODE EDGE SYSTEM ADULT

## (undated) DEVICE — SUT PROLENE 4-0 36" SH

## (undated) DEVICE — MULTIPLE PERFUSION SET FEMALE 1 INLET LEG W 4 LEGS 15" (BLUE/RED)

## (undated) DEVICE — GLV 8.5 PROTEXIS (WHITE)

## (undated) DEVICE — PRESSURE INFUSOR BAG 1000ML

## (undated) DEVICE — SAW BLADE STRYKER SAGITTAL SAFEDGE 40.5X47.5X0.64

## (undated) DEVICE — TOURNIQUET SET TOURNIKWIK 12FR (4 TUBES, 1 SNARE) 7.5"